# Patient Record
Sex: MALE | Race: BLACK OR AFRICAN AMERICAN | Employment: OTHER | URBAN - METROPOLITAN AREA
[De-identification: names, ages, dates, MRNs, and addresses within clinical notes are randomized per-mention and may not be internally consistent; named-entity substitution may affect disease eponyms.]

---

## 2019-03-17 ENCOUNTER — ED HISTORICAL/CONVERTED ENCOUNTER (OUTPATIENT)
Dept: OTHER | Age: 74
End: 2019-03-17

## 2019-07-08 ENCOUNTER — OP HISTORICAL/CONVERTED ENCOUNTER (OUTPATIENT)
Dept: OTHER | Age: 74
End: 2019-07-08

## 2019-07-17 ENCOUNTER — OP HISTORICAL/CONVERTED ENCOUNTER (OUTPATIENT)
Dept: OTHER | Age: 74
End: 2019-07-17

## 2020-08-03 VITALS
BODY MASS INDEX: 29.39 KG/M2 | TEMPERATURE: 97.8 F | OXYGEN SATURATION: 96 % | HEIGHT: 74 IN | DIASTOLIC BLOOD PRESSURE: 90 MMHG | WEIGHT: 229 LBS | HEART RATE: 65 BPM | RESPIRATION RATE: 18 BRPM | SYSTOLIC BLOOD PRESSURE: 160 MMHG

## 2020-08-03 DIAGNOSIS — N42.9 BENIGN PROSTATIC DISEASE: ICD-10-CM

## 2020-08-03 PROBLEM — N40.0 BENIGN PROSTATIC HYPERPLASIA: Status: ACTIVE | Noted: 2020-08-03

## 2020-08-03 PROBLEM — I10 HYPERTENSIVE DISORDER: Status: ACTIVE | Noted: 2020-08-03

## 2020-08-03 PROBLEM — R42 DIZZINESS AND GIDDINESS: Status: ACTIVE | Noted: 2020-08-03

## 2020-08-03 PROBLEM — G47.00 INSOMNIA: Status: ACTIVE | Noted: 2020-08-03

## 2020-08-03 PROBLEM — R19.7 DIARRHEA: Status: ACTIVE | Noted: 2020-08-03

## 2020-08-03 PROBLEM — J30.2 SEASONAL ALLERGIC RHINITIS: Status: ACTIVE | Noted: 2020-08-03

## 2020-08-03 PROBLEM — M25.579 ANKLE PAIN: Status: ACTIVE | Noted: 2020-08-03

## 2020-08-03 PROBLEM — R55 VASOVAGAL SYNCOPE: Status: ACTIVE | Noted: 2020-08-03

## 2020-08-03 PROBLEM — M54.12 CERVICAL RADICULOPATHY: Status: ACTIVE | Noted: 2020-08-03

## 2020-08-03 PROBLEM — M17.9 OSTEOARTHRITIS OF KNEE: Status: ACTIVE | Noted: 2020-08-03

## 2020-08-03 PROBLEM — H93.11 TINNITUS OF RIGHT EAR: Status: ACTIVE | Noted: 2020-08-03

## 2020-08-03 PROBLEM — N28.9 KIDNEY DISEASE: Status: ACTIVE | Noted: 2020-08-03

## 2020-08-03 PROBLEM — J44.9 CHRONIC OBSTRUCTIVE LUNG DISEASE (HCC): Status: ACTIVE | Noted: 2020-08-03

## 2020-08-03 PROBLEM — M54.50 LOW BACK PAIN: Status: ACTIVE | Noted: 2020-08-03

## 2020-08-03 PROBLEM — N64.4 PAIN IN BREAST: Status: ACTIVE | Noted: 2020-08-03

## 2020-08-03 PROBLEM — R73.03 PREDIABETES: Status: ACTIVE | Noted: 2020-08-03

## 2020-08-03 PROBLEM — G47.30 SLEEP APNEA: Status: ACTIVE | Noted: 2020-08-03

## 2020-08-03 PROBLEM — F14.10 COCAINE ABUSE (HCC): Status: ACTIVE | Noted: 2020-08-03

## 2020-08-03 PROBLEM — M25.519 SHOULDER PAIN: Status: ACTIVE | Noted: 2020-08-03

## 2020-08-03 PROBLEM — E78.00 HYPERCHOLESTEROLEMIA: Status: ACTIVE | Noted: 2020-08-03

## 2020-08-03 PROBLEM — M25.562 PAIN IN LEFT KNEE: Status: ACTIVE | Noted: 2020-08-03

## 2020-08-03 RX ORDER — PREDNISONE 20 MG/1
TABLET ORAL
COMMUNITY
End: 2022-05-13

## 2020-08-03 RX ORDER — CEPHALEXIN 500 MG/1
500 CAPSULE ORAL 4 TIMES DAILY
COMMUNITY

## 2020-08-03 RX ORDER — HYDROCODONE BITARTRATE AND ACETAMINOPHEN 5; 325 MG/1; MG/1
TABLET ORAL
COMMUNITY

## 2020-08-03 RX ORDER — COLCHICINE 0.6 MG/1
0.6 TABLET ORAL DAILY
COMMUNITY

## 2020-08-03 RX ORDER — AZITHROMYCIN 250 MG/1
250 TABLET, FILM COATED ORAL DAILY
COMMUNITY
End: 2022-05-13

## 2020-08-03 RX ORDER — BENZONATATE 200 MG/1
200 CAPSULE ORAL
COMMUNITY

## 2020-08-03 RX ORDER — INFLUENZA VACCINE, ADJUVANTED 15; 15; 15 UG/.5ML; UG/.5ML; UG/.5ML
0.5 INJECTION, SUSPENSION INTRAMUSCULAR
COMMUNITY

## 2020-08-03 RX ORDER — TRIAMCINOLONE ACETONIDE 40 MG/ML
INJECTION, SUSPENSION INTRA-ARTICULAR; INTRAMUSCULAR ONCE
COMMUNITY

## 2020-08-03 RX ORDER — SODIUM BICARBONATE 325 MG/1
325 TABLET ORAL 4 TIMES DAILY
COMMUNITY

## 2020-08-03 RX ORDER — CYCLOBENZAPRINE HCL 10 MG
TABLET ORAL
COMMUNITY

## 2020-08-03 RX ORDER — METHYLPREDNISOLONE 4 MG/1
TABLET ORAL
COMMUNITY

## 2020-08-03 RX ORDER — INDOMETHACIN 50 MG/1
CAPSULE ORAL 3 TIMES DAILY
COMMUNITY

## 2020-08-03 RX ORDER — TRAZODONE HYDROCHLORIDE 50 MG/1
TABLET ORAL
COMMUNITY

## 2020-08-03 RX ORDER — AMLODIPINE, VALSARTAN AND HYDROCHLOROTHIAZIDE 10; 320; 25 MG/1; MG/1; MG/1
TABLET ORAL
COMMUNITY
End: 2020-12-08 | Stop reason: SDUPTHER

## 2020-08-03 RX ORDER — TEMAZEPAM 30 MG/1
CAPSULE ORAL
COMMUNITY

## 2020-08-03 RX ORDER — FLUTICASONE PROPIONATE 50 MCG
2 SPRAY, SUSPENSION (ML) NASAL DAILY
COMMUNITY

## 2020-08-03 RX ORDER — FEXOFENADINE HCL 60 MG
TABLET ORAL
COMMUNITY

## 2020-08-03 RX ORDER — FINASTERIDE 5 MG/1
5 TABLET, FILM COATED ORAL DAILY
COMMUNITY

## 2020-08-03 RX ORDER — PREDNISONE 10 MG/1
TABLET ORAL
COMMUNITY
End: 2022-05-13

## 2020-08-03 RX ORDER — BECLOMETHASONE DIPROPIONATE HFA 80 UG/1
1 AEROSOL, METERED RESPIRATORY (INHALATION)
COMMUNITY

## 2020-08-03 RX ORDER — SIMVASTATIN 10 MG/1
TABLET, FILM COATED ORAL
COMMUNITY
End: 2021-03-14

## 2020-08-03 RX ORDER — DILTIAZEM HYDROCHLORIDE 120 MG/1
CAPSULE, EXTENDED RELEASE ORAL DAILY
COMMUNITY

## 2020-08-03 RX ORDER — ALBUTEROL SULFATE 90 UG/1
AEROSOL, METERED RESPIRATORY (INHALATION)
COMMUNITY
End: 2020-09-21 | Stop reason: SDUPTHER

## 2020-08-03 RX ORDER — DICLOFENAC SODIUM 10 MG/G
GEL TOPICAL 4 TIMES DAILY
COMMUNITY

## 2020-08-03 RX ORDER — FEXOFENADINE HCL AND PSEUDOEPHEDRINE HCI 60; 120 MG/1; MG/1
1 TABLET, EXTENDED RELEASE ORAL EVERY 12 HOURS
COMMUNITY

## 2020-08-06 ENCOUNTER — OFFICE VISIT (OUTPATIENT)
Dept: FAMILY MEDICINE CLINIC | Age: 75
End: 2020-08-06
Payer: MEDICARE

## 2020-08-06 VITALS
TEMPERATURE: 98.9 F | BODY MASS INDEX: 29.36 KG/M2 | RESPIRATION RATE: 18 BRPM | DIASTOLIC BLOOD PRESSURE: 72 MMHG | HEART RATE: 86 BPM | WEIGHT: 216.8 LBS | OXYGEN SATURATION: 99 % | SYSTOLIC BLOOD PRESSURE: 160 MMHG | HEIGHT: 72 IN

## 2020-08-06 DIAGNOSIS — I10 ESSENTIAL HYPERTENSION: ICD-10-CM

## 2020-08-06 DIAGNOSIS — J41.8 MIXED SIMPLE AND MUCOPURULENT CHRONIC BRONCHITIS (HCC): Primary | ICD-10-CM

## 2020-08-06 DIAGNOSIS — N40.0 BENIGN PROSTATIC HYPERPLASIA WITHOUT LOWER URINARY TRACT SYMPTOMS: ICD-10-CM

## 2020-08-06 DIAGNOSIS — F51.01 PRIMARY INSOMNIA: ICD-10-CM

## 2020-08-06 DIAGNOSIS — G47.33 OBSTRUCTIVE SLEEP APNEA SYNDROME: ICD-10-CM

## 2020-08-06 DIAGNOSIS — R73.03 PREDIABETES: ICD-10-CM

## 2020-08-06 PROCEDURE — G8536 NO DOC ELDER MAL SCRN: HCPCS | Performed by: FAMILY MEDICINE

## 2020-08-06 PROCEDURE — G8419 CALC BMI OUT NRM PARAM NOF/U: HCPCS | Performed by: FAMILY MEDICINE

## 2020-08-06 PROCEDURE — G8427 DOCREV CUR MEDS BY ELIG CLIN: HCPCS | Performed by: FAMILY MEDICINE

## 2020-08-06 PROCEDURE — G8510 SCR DEP NEG, NO PLAN REQD: HCPCS | Performed by: FAMILY MEDICINE

## 2020-08-06 PROCEDURE — 1101F PT FALLS ASSESS-DOCD LE1/YR: CPT | Performed by: FAMILY MEDICINE

## 2020-08-06 PROCEDURE — 99214 OFFICE O/P EST MOD 30 MIN: CPT | Performed by: FAMILY MEDICINE

## 2020-08-06 NOTE — PROGRESS NOTES
Celine Mancilla is a 66 y.o. male and presents with Follow-up; Hypertension; and COPD  . HPI     Subjective:  Cardiovascular Review:  The patient has hypertension   Diet and Lifestyle: generally follows a low fat low cholesterol diet, generally follows a low sodium diet, exercises sporadically  Home BP Monitoring: is not measured at home. Pertinent ROS: taking medications as instructed, no medication side effects noted, no TIA's, no chest pain on exertion, no dyspnea on exertion, no swelling of ankles. Review of Systems  ROS      Past Medical History:   Diagnosis Date    Ankle pain 8/3/2020    Benign prostatic disease 8/3/2020    Hypertrophy with Outflow Obstruction    Benign prostatic hyperplasia 8/3/2020    Bronchitis     Cervical radiculopathy 8/3/2020    Chronic obstructive lung disease ([facility] [address] 8/3/2020    Cocaine abuse ([facility] [address] 8/3/2020    Diarrhea 8/3/2020    Dizziness and giddiness 8/3/2020    Hypercholesterolemia 8/3/2020    Hypertensive disorder 8/3/2020    Insomnia 8/3/2020    Kidney disease 8/3/2020    Low back pain 8/3/2020    Osteoarthritis of knee 8/3/2020    Pain in breast 8/3/2020    Pain in left knee 8/3/2020    Prediabetes 8/3/2020    Seasonal allergic rhinitis 8/3/2020    Shoulder pain 8/3/2020    Sleep apnea 8/3/2020    Tinnitus of right ear 8/3/2020    Vasovagal syncope 8/3/2020     History reviewed. No pertinent surgical history. Social History     Socioeconomic History    Marital status:      Spouse name: Not on file    Number of children: Not on file    Years of education: Not on file    Highest education level: Not on file   Tobacco Use    Smoking status: Former Smoker    Smokeless tobacco: Never Used   Substance and Sexual Activity    Alcohol use: Yes     History reviewed. No pertinent family history.   Current Outpatient Medications   Medication Sig Dispense Refill    fexofenadine-pseudoephedrine (Allergy Relief-D,fexofenadine,)  mg Tb12 Take 1 Tab by mouth every twelve (12) hours.  amLODIPine-Valsartan-HCTZ -25 mg tab Take  by mouth.  azithromycin (ZITHROMAX) 250 mg tablet Take 250 mg by mouth daily.  benzonatate (TESSALON) 200 mg capsule Take 200 mg by mouth three (3) times daily as needed for Cough.  cephALEXin (KEFLEX) 500 mg capsule Take 500 mg by mouth four (4) times daily.  colchicine 0.6 mg tablet Take 0.6 mg by mouth daily.  cyclobenzaprine (FLEXERIL) 10 mg tablet Take  by mouth three (3) times daily as needed for Muscle Spasm(s).  dilTIAZem ER (DILACOR XR) 120 mg capsule Take  by mouth daily.  finasteride (PROSCAR) 5 mg tablet Take 5 mg by mouth daily.  influenza vaccine 2019-20, 65 yrs+,,PF, (Fluad 6609-6992, 65 yr up,,PF,) syrg injection 0.5 mL by IntraMUSCular route PRIOR TO DISCHARGE.  fluticasone propionate (FLONASE) 50 mcg/actuation nasal spray 2 Sprays by Both Nostrils route daily.  HYDROcodone-acetaminophen (NORCO) 5-325 mg per tablet Take  by mouth.  indomethacin (INDOCIN) 50 mg capsule Take  by mouth three (3) times daily.  methylPREDNISolone (MEDROL DOSEPACK) 4 mg tablet Take  by mouth.  predniSONE (DELTASONE) 10 mg tablet Take  by mouth daily (with breakfast).  predniSONE (DELTASONE) 20 mg tablet Take  by mouth daily (with breakfast).  beclomethasone (Qvar) 80 mcg/actuation aero Take 1 Puff by inhalation two (2) times a day.  beclomethasone dipropionate (Qvar RediHaler) 80 mcg/actuation HFAb inhaler Take 1 Puff by inhalation.  edoxaban (Savaysa) 60 mg tab tablet Take 60 mg by mouth daily.  simvastatin (ZOCOR) 10 mg tablet Take  by mouth nightly.  sodium bicarbonate 325 mg tablet Take 325 mg by mouth four (4) times daily.  temazepam (RESTORIL) 30 mg capsule Take  by mouth nightly as needed for Sleep.  traZODone (DESYREL) 50 mg tablet Take  by mouth nightly.       albuterol (Ventolin HFA) 90 mcg/actuation inhaler Take  by inhalation.  fexofenadine (ALLEGRA) 60 mg tablet Take  by mouth.  diclofenac (VOLTAREN) 1 % gel Apply  to affected area four (4) times daily.  triamcinolone acetonide (Kenalog) 40 mg/mL injection once. Allergies   Allergen Reactions    Bactrim [Sulfamethoprim] Unknown (comments)    Penicillin G Unknown (comments)       Objective:  Visit Vitals  /72 (BP 1 Location: Left arm, BP Patient Position: Sitting)   Pulse 86   Temp 98.9 °F (37.2 °C) (Temporal)   Resp 18   Ht 6' (1.829 m)   Wt 216 lb 12.8 oz (98.3 kg)   SpO2 99% Comment: room air   BMI 29.40 kg/m²       Physical Exam:   Physical Exam        No results found for this or any previous visit. Assessment/Plan:    ICD-10-CM ICD-9-CM    1. Mixed simple and mucopurulent chronic bronchitis (HCC)  J41.8 491.1    2. Essential hypertension  I10 401.9    3. Obstructive sleep apnea syndrome  G47.33 327.23     Patient to limit starchy and carbohydrate foods due to higher production of CO2 and adverse effects on COPD and Asthma   4. Primary insomnia  F51.01 307.42    5. Prediabetes  R73.03 790.29    6. Benign prostatic hyperplasia without lower urinary tract symptoms  N40.0 600.00      No orders of the defined types were placed in this encounter. Cannot display discharge medications since this is not an admission.

## 2020-08-22 RX ORDER — DILTIAZEM HYDROCHLORIDE 120 MG/1
CAPSULE, EXTENDED RELEASE ORAL
Qty: 30 CAP | Refills: 2 | Status: SHIPPED | OUTPATIENT
Start: 2020-08-22 | End: 2020-12-19

## 2020-09-21 ENCOUNTER — TELEPHONE (OUTPATIENT)
Dept: FAMILY MEDICINE CLINIC | Age: 75
End: 2020-09-21

## 2020-09-21 RX ORDER — ALBUTEROL SULFATE 90 UG/1
2 AEROSOL, METERED RESPIRATORY (INHALATION)
Qty: 1 INHALER | Refills: 1 | Status: SHIPPED | OUTPATIENT
Start: 2020-09-21 | End: 2022-05-13 | Stop reason: SDUPTHER

## 2020-12-08 ENCOUNTER — OFFICE VISIT (OUTPATIENT)
Dept: FAMILY MEDICINE CLINIC | Age: 75
End: 2020-12-08
Payer: MEDICARE

## 2020-12-08 VITALS
OXYGEN SATURATION: 96 % | DIASTOLIC BLOOD PRESSURE: 100 MMHG | HEART RATE: 96 BPM | HEIGHT: 73 IN | TEMPERATURE: 96.4 F | RESPIRATION RATE: 20 BRPM | WEIGHT: 217 LBS | SYSTOLIC BLOOD PRESSURE: 160 MMHG | BODY MASS INDEX: 28.76 KG/M2

## 2020-12-08 DIAGNOSIS — J41.8 MIXED SIMPLE AND MUCOPURULENT CHRONIC BRONCHITIS (HCC): Primary | ICD-10-CM

## 2020-12-08 DIAGNOSIS — M54.12 CERVICAL RADICULOPATHY: ICD-10-CM

## 2020-12-08 DIAGNOSIS — M51.26 LUMBAR HERNIATED DISC: ICD-10-CM

## 2020-12-08 DIAGNOSIS — I10 ESSENTIAL HYPERTENSION: ICD-10-CM

## 2020-12-08 DIAGNOSIS — F10.10 ALCOHOL ABUSE: ICD-10-CM

## 2020-12-08 DIAGNOSIS — Z23 ENCOUNTER FOR IMMUNIZATION: ICD-10-CM

## 2020-12-08 DIAGNOSIS — F51.01 PRIMARY INSOMNIA: ICD-10-CM

## 2020-12-08 DIAGNOSIS — M17.0 PRIMARY OSTEOARTHRITIS OF BOTH KNEES: ICD-10-CM

## 2020-12-08 PROCEDURE — G0008 ADMIN INFLUENZA VIRUS VAC: HCPCS | Performed by: FAMILY MEDICINE

## 2020-12-08 PROCEDURE — 99214 OFFICE O/P EST MOD 30 MIN: CPT | Performed by: FAMILY MEDICINE

## 2020-12-08 PROCEDURE — 90686 IIV4 VACC NO PRSV 0.5 ML IM: CPT | Performed by: FAMILY MEDICINE

## 2020-12-08 RX ORDER — CLONIDINE HYDROCHLORIDE 0.1 MG/1
0.2 TABLET ORAL
Status: COMPLETED | OUTPATIENT
Start: 2020-12-08 | End: 2020-12-08

## 2020-12-08 RX ORDER — AMLODIPINE, VALSARTAN AND HYDROCHLOROTHIAZIDE 10; 320; 25 MG/1; MG/1; MG/1
1 TABLET ORAL DAILY
Qty: 90 TAB | Refills: 0 | Status: SHIPPED | OUTPATIENT
Start: 2020-12-08 | End: 2021-03-08

## 2020-12-08 RX ADMIN — CLONIDINE HYDROCHLORIDE 0.2 MG: 0.1 TABLET ORAL at 14:18

## 2020-12-08 NOTE — PROGRESS NOTES
Ernie Ledesma is a 66 y.o. male and presents with Follow Up Chronic Condition; Hypertension; and Medication Refill  . HPI   67 yo AAM former [facility]  with a hx of HTN and job related lumbar disc herniation here on follow up c/o intermittent dizziness. Patient with markedly elevated BP admits to ETOH abuse drinking a pint of Scotch a day and blaming this on COVID 19 restrictions and staying indoors all the time-Patient advised he can go out if he has a mask on. Patient adamantly refused hospital transfer for work up and treatment. Subjective:  Cardiovascular Review:  The patient has hypertension   Diet and Lifestyle: generally follows a low fat low cholesterol diet, generally follows a low sodium diet, exercises sporadically  Home BP Monitoring: is not measured at home. Pertinent ROS: taking medications as instructed, no medication side effects noted, no TIA's, no chest pain on exertion, no dyspnea on exertion, no swelling of ankles. Review of Systems  Review of Systems   Constitutional: Negative. Negative for chills and fever. HENT: Negative. Negative for congestion, ear discharge, hearing loss, nosebleeds and tinnitus. Eyes: Negative. Negative for blurred vision, double vision, photophobia and pain. Respiratory: Negative. Negative for cough, hemoptysis and sputum production. Cardiovascular: Negative. Negative for chest pain and palpitations. Gastrointestinal: Negative. Negative for heartburn, nausea and vomiting. Genitourinary: Negative. Negative for dysuria, frequency and urgency. Musculoskeletal: Positive for back pain, joint pain and neck pain. Negative for myalgias. Skin: Negative. Neurological: Positive for dizziness. Negative for tingling, weakness and headaches. Endo/Heme/Allergies: Negative. Psychiatric/Behavioral: Negative. Negative for depression and suicidal ideas. The patient does not have insomnia.     All other systems reviewed and are negative. Past Medical History:   Diagnosis Date    Ankle pain 8/3/2020    Benign prostatic disease 8/3/2020    Hypertrophy with Outflow Obstruction    Benign prostatic hyperplasia 8/3/2020    Bronchitis     Cervical radiculopathy 8/3/2020    Chronic obstructive lung disease ([facility] [address] 8/3/2020    Cocaine abuse ([facility] [address] 8/3/2020    Diarrhea 8/3/2020    Dizziness and giddiness 8/3/2020    Hypercholesterolemia 8/3/2020    Hypertensive disorder 8/3/2020    Insomnia 8/3/2020    Kidney disease 8/3/2020    Low back pain 8/3/2020    Osteoarthritis of knee 8/3/2020    Pain in breast 8/3/2020    Pain in left knee 8/3/2020    Prediabetes 8/3/2020    Seasonal allergic rhinitis 8/3/2020    Shoulder pain 8/3/2020    Sleep apnea 8/3/2020    Tinnitus of right ear 8/3/2020    Vasovagal syncope 8/3/2020     No past surgical history on file. Social History     Socioeconomic History    Marital status:      Spouse name: Not on file    Number of children: Not on file    Years of education: Not on file    Highest education level: Not on file   Tobacco Use    Smoking status: Former Smoker    Smokeless tobacco: Never Used   Substance and Sexual Activity    Alcohol use: Yes     No family history on file. Current Outpatient Medications   Medication Sig Dispense Refill    albuterol (Ventolin HFA) 90 mcg/actuation inhaler Take 2 Puffs by inhalation every six (6) hours as needed for Wheezing. 1 Inhaler 1    dilTIAZem ER (CARDIZEM SR) 120 mg capsule TAKE 1 CAPSULE BY MOUTH EVERY DAY 30 Cap 2    fexofenadine-pseudoephedrine (Allergy Relief-D,fexofenadine,)  mg Tb12 Take 1 Tab by mouth every twelve (12) hours.  amLODIPine-Valsartan-HCTZ -25 mg tab Take  by mouth.  azithromycin (ZITHROMAX) 250 mg tablet Take 250 mg by mouth daily.  benzonatate (TESSALON) 200 mg capsule Take 200 mg by mouth three (3) times daily as needed for Cough.       cephALEXin (KEFLEX) 500 mg capsule Take 500 mg by mouth four (4) times daily.  colchicine 0.6 mg tablet Take 0.6 mg by mouth daily.  cyclobenzaprine (FLEXERIL) 10 mg tablet Take  by mouth three (3) times daily as needed for Muscle Spasm(s).  dilTIAZem ER (DILACOR XR) 120 mg capsule Take  by mouth daily.  finasteride (PROSCAR) 5 mg tablet Take 5 mg by mouth daily.  influenza vaccine 2019-20, 65 yrs+,,PF, (Fluad 0548-2142, 65 yr up,,PF,) syrg injection 0.5 mL by IntraMUSCular route PRIOR TO DISCHARGE.  fluticasone propionate (FLONASE) 50 mcg/actuation nasal spray 2 Sprays by Both Nostrils route daily.  HYDROcodone-acetaminophen (NORCO) 5-325 mg per tablet Take  by mouth.  indomethacin (INDOCIN) 50 mg capsule Take  by mouth three (3) times daily.  methylPREDNISolone (MEDROL DOSEPACK) 4 mg tablet Take  by mouth.  predniSONE (DELTASONE) 10 mg tablet Take  by mouth daily (with breakfast).  predniSONE (DELTASONE) 20 mg tablet Take  by mouth daily (with breakfast).  beclomethasone (Qvar) 80 mcg/actuation aero Take 1 Puff by inhalation two (2) times a day.  beclomethasone dipropionate (Qvar RediHaler) 80 mcg/actuation HFAb inhaler Take 1 Puff by inhalation.  edoxaban (Savaysa) 60 mg tab tablet Take 60 mg by mouth daily.  simvastatin (ZOCOR) 10 mg tablet Take  by mouth nightly.  sodium bicarbonate 325 mg tablet Take 325 mg by mouth four (4) times daily.  temazepam (RESTORIL) 30 mg capsule Take  by mouth nightly as needed for Sleep.  traZODone (DESYREL) 50 mg tablet Take  by mouth nightly.  fexofenadine (ALLEGRA) 60 mg tablet Take  by mouth.  diclofenac (VOLTAREN) 1 % gel Apply  to affected area four (4) times daily.  triamcinolone acetonide (Kenalog) 40 mg/mL injection once.        Allergies   Allergen Reactions    Bactrim [Sulfamethoprim] Unknown (comments)    Penicillin G Unknown (comments)       Objective:  Visit Vitals  BP (!) 200/110 (BP 1 Location: Right arm, BP Patient Position: Sitting)   Pulse 96   Temp (!) 96.4 °F (35.8 °C) (Temporal)   Resp 20   Ht 6' 1\" (1.854 m)   Wt 217 lb (98.4 kg)   SpO2 96% Comment: room air   BMI 28.63 kg/m²       Physical Exam:   Physical Exam  Vitals signs and nursing note reviewed. Constitutional:       General: He is not in acute distress. Appearance: Normal appearance. He is obese. He is not ill-appearing, toxic-appearing or diaphoretic. HENT:      Head: Normocephalic and atraumatic. Right Ear: Tympanic membrane, ear canal and external ear normal. There is no impacted cerumen. Left Ear: Tympanic membrane, ear canal and external ear normal. There is no impacted cerumen. Nose: Nose normal. No congestion or rhinorrhea. Mouth/Throat:      Mouth: Mucous membranes are moist.      Pharynx: Oropharynx is clear. No oropharyngeal exudate or posterior oropharyngeal erythema. Eyes:      General: No scleral icterus. Right eye: No discharge. Left eye: No discharge. Extraocular Movements: Extraocular movements intact. Conjunctiva/sclera: Conjunctivae normal.      Pupils: Pupils are equal, round, and reactive to light. Neck:      Musculoskeletal: Normal range of motion and neck supple. No neck rigidity or muscular tenderness. Vascular: No carotid bruit. Cardiovascular:      Rate and Rhythm: Normal rate and regular rhythm. Pulses: Normal pulses. Heart sounds: Normal heart sounds. No murmur. No friction rub. No gallop. Pulmonary:      Effort: Pulmonary effort is normal. No respiratory distress. Breath sounds: Normal breath sounds. No stridor. No wheezing, rhonchi or rales. Chest:      Chest wall: No tenderness. Abdominal:      General: Abdomen is flat. Bowel sounds are normal. There is no distension. Palpations: Abdomen is soft. There is no mass. Tenderness: There is no abdominal tenderness.  There is no right CVA tenderness, left CVA tenderness, guarding or rebound. Hernia: No hernia is present. Musculoskeletal: Normal range of motion. General: No swelling, tenderness, deformity (chest and shoulder) or signs of injury. Right lower leg: No edema. Left lower leg: No edema. Lymphadenopathy:      Cervical: No cervical adenopathy. Skin:     General: Skin is warm. Capillary Refill: Capillary refill takes 2 to 3 seconds. Coloration: Skin is not jaundiced or pale. Findings: No bruising, erythema, lesion or rash. Neurological:      General: No focal deficit present. Mental Status: He is alert and oriented to person, place, and time. Mental status is at baseline. Cranial Nerves: No cranial nerve deficit. Sensory: No sensory deficit. Motor: No weakness. Coordination: Coordination normal.      Gait: Gait abnormal.      Deep Tendon Reflexes: Reflexes normal.   Psychiatric:         Mood and Affect: Mood normal.         Behavior: Behavior normal.         Thought Content: Thought content normal.         Judgment: Judgment normal.             No results found for this or any previous visit. Assessment/Plan:    ICD-10-CM ICD-9-CM    1. Mixed simple and mucopurulent chronic bronchitis (HCC)  J41.8 491.1    2. Primary osteoarthritis of both knees  M17.0 715.16    3. Primary insomnia  F51.01 307.42    4. Alcohol abuse  F10.10 305.00    5. Cervical radiculopathy  M54.12 723.4    6. Lumbar herniated disc  M51.26 722.10      No orders of the defined types were placed in this encounter. Cannot display discharge medications since this is not an admission.

## 2020-12-19 RX ORDER — DILTIAZEM HYDROCHLORIDE 120 MG/1
CAPSULE, EXTENDED RELEASE ORAL
Qty: 30 CAP | Refills: 2 | Status: SHIPPED | OUTPATIENT
Start: 2020-12-19

## 2021-01-07 ENCOUNTER — TELEPHONE (OUTPATIENT)
Dept: FAMILY MEDICINE CLINIC | Age: 76
End: 2021-01-07

## 2021-03-04 ENCOUNTER — OFFICE VISIT (OUTPATIENT)
Dept: FAMILY MEDICINE CLINIC | Age: 76
End: 2021-03-04
Payer: MEDICARE

## 2021-03-04 VITALS
SYSTOLIC BLOOD PRESSURE: 156 MMHG | OXYGEN SATURATION: 98 % | TEMPERATURE: 98.3 F | HEART RATE: 78 BPM | WEIGHT: 209.4 LBS | BODY MASS INDEX: 27.75 KG/M2 | RESPIRATION RATE: 20 BRPM | HEIGHT: 73 IN | DIASTOLIC BLOOD PRESSURE: 80 MMHG

## 2021-03-04 DIAGNOSIS — N40.1 BENIGN PROSTATIC HYPERPLASIA WITH NOCTURIA: ICD-10-CM

## 2021-03-04 DIAGNOSIS — J41.8 MIXED SIMPLE AND MUCOPURULENT CHRONIC BRONCHITIS (HCC): Primary | ICD-10-CM

## 2021-03-04 DIAGNOSIS — M25.562 CHRONIC PAIN OF LEFT KNEE: ICD-10-CM

## 2021-03-04 DIAGNOSIS — G89.29 CHRONIC PAIN OF LEFT KNEE: ICD-10-CM

## 2021-03-04 DIAGNOSIS — R35.1 BENIGN PROSTATIC HYPERPLASIA WITH NOCTURIA: ICD-10-CM

## 2021-03-04 DIAGNOSIS — I11.9 MALIGNANT HYPERTENSIVE HEART DISEASE WITHOUT HEART FAILURE: ICD-10-CM

## 2021-03-04 DIAGNOSIS — M54.41 CHRONIC BILATERAL LOW BACK PAIN WITH BILATERAL SCIATICA: ICD-10-CM

## 2021-03-04 DIAGNOSIS — M17.0 PRIMARY OSTEOARTHRITIS OF BOTH KNEES: ICD-10-CM

## 2021-03-04 DIAGNOSIS — G89.29 CHRONIC BILATERAL LOW BACK PAIN WITH BILATERAL SCIATICA: ICD-10-CM

## 2021-03-04 DIAGNOSIS — M54.42 CHRONIC BILATERAL LOW BACK PAIN WITH BILATERAL SCIATICA: ICD-10-CM

## 2021-03-04 PROCEDURE — G8754 DIAS BP LESS 90: HCPCS | Performed by: FAMILY MEDICINE

## 2021-03-04 PROCEDURE — G8510 SCR DEP NEG, NO PLAN REQD: HCPCS | Performed by: FAMILY MEDICINE

## 2021-03-04 PROCEDURE — G8419 CALC BMI OUT NRM PARAM NOF/U: HCPCS | Performed by: FAMILY MEDICINE

## 2021-03-04 PROCEDURE — 1101F PT FALLS ASSESS-DOCD LE1/YR: CPT | Performed by: FAMILY MEDICINE

## 2021-03-04 PROCEDURE — G8536 NO DOC ELDER MAL SCRN: HCPCS | Performed by: FAMILY MEDICINE

## 2021-03-04 PROCEDURE — G8427 DOCREV CUR MEDS BY ELIG CLIN: HCPCS | Performed by: FAMILY MEDICINE

## 2021-03-04 PROCEDURE — 99214 OFFICE O/P EST MOD 30 MIN: CPT | Performed by: FAMILY MEDICINE

## 2021-03-04 PROCEDURE — G8753 SYS BP > OR = 140: HCPCS | Performed by: FAMILY MEDICINE

## 2021-03-04 NOTE — PROGRESS NOTES
Livia Torrez is a 66 y.o. male and presents with Follow Up Chronic Condition, Hypertension, and Fall (Patient states he fell twice a t home last week and injured face and head. Does not know what caused the fall.)  . HPI     Subjective:  Cardiovascular Review:  The patient has hypertension   Diet and Lifestyle: generally follows a low fat low cholesterol diet, generally follows a low sodium diet, exercises sporadically  Home BP Monitoring: is not measured at home. Pertinent ROS: taking medications as instructed, no medication side effects noted, no TIA's, no chest pain on exertion, no dyspnea on exertion, no swelling of ankles. Review of Systems  Review of Systems   Constitutional: Negative. Negative for chills and fever. HENT: Negative. Negative for congestion, ear discharge, hearing loss, nosebleeds and tinnitus. Eyes: Negative. Negative for blurred vision, double vision, photophobia and pain. Respiratory: Negative. Negative for cough, hemoptysis and sputum production. Cardiovascular: Negative. Negative for chest pain and palpitations. Gastrointestinal: Negative. Negative for heartburn, nausea and vomiting. Genitourinary: Negative. Negative for dysuria, frequency and urgency. Musculoskeletal: Negative. Negative for back pain and myalgias. Skin: Negative. Neurological: Negative. Negative for dizziness, tingling, weakness and headaches. Endo/Heme/Allergies: Negative. Psychiatric/Behavioral: Negative. Negative for depression and suicidal ideas. The patient does not have insomnia. All other systems reviewed and are negative.         Past Medical History:   Diagnosis Date    Ankle pain 8/3/2020    Benign prostatic disease 8/3/2020    Hypertrophy with Outflow Obstruction    Benign prostatic hyperplasia 8/3/2020    Bronchitis     Cervical radiculopathy 8/3/2020    Chronic obstructive lung disease ([facility] [address] 8/3/2020    Cocaine abuse ([facility] [address] 8/3/2020    Diarrhea 8/3/2020    Dizziness and giddiness 8/3/2020    Hypercholesterolemia 8/3/2020    Hypertensive disorder 8/3/2020    Insomnia 8/3/2020    Kidney disease 8/3/2020    Low back pain 8/3/2020    Osteoarthritis of knee 8/3/2020    Pain in breast 8/3/2020    Pain in left knee 8/3/2020    Prediabetes 8/3/2020    Seasonal allergic rhinitis 8/3/2020    Shoulder pain 8/3/2020    Sleep apnea 8/3/2020    Tinnitus of right ear 8/3/2020    Vasovagal syncope 8/3/2020     No past surgical history on file. Social History     Socioeconomic History    Marital status:      Spouse name: Not on file    Number of children: Not on file    Years of education: Not on file    Highest education level: Not on file   Tobacco Use    Smoking status: Former Smoker    Smokeless tobacco: Never Used   Substance and Sexual Activity    Alcohol use: Yes     No family history on file. Current Outpatient Medications   Medication Sig Dispense Refill    dilTIAZem ER (CARDIZEM SR) 120 mg capsule TAKE 1 CAPSULE BY MOUTH EVERY DAY 30 Cap 2    amLODIPine-Valsartan-HCTZ -25 mg tab Take 1 Tab by mouth daily for 90 days. 90 Tab 0    albuterol (Ventolin HFA) 90 mcg/actuation inhaler Take 2 Puffs by inhalation every six (6) hours as needed for Wheezing. 1 Inhaler 1    fexofenadine-pseudoephedrine (Allergy Relief-D,fexofenadine,)  mg Tb12 Take 1 Tab by mouth every twelve (12) hours.  azithromycin (ZITHROMAX) 250 mg tablet Take 250 mg by mouth daily.  benzonatate (TESSALON) 200 mg capsule Take 200 mg by mouth three (3) times daily as needed for Cough.  cephALEXin (KEFLEX) 500 mg capsule Take 500 mg by mouth four (4) times daily.  colchicine 0.6 mg tablet Take 0.6 mg by mouth daily.  cyclobenzaprine (FLEXERIL) 10 mg tablet Take  by mouth three (3) times daily as needed for Muscle Spasm(s).  dilTIAZem ER (DILACOR XR) 120 mg capsule Take  by mouth daily.       finasteride (PROSCAR) 5 mg tablet Take 5 mg by mouth daily.  influenza vaccine 2019-20, 65 yrs+,,PF, (Fluad 2290-1732, 65 yr up,,PF,) syrg injection 0.5 mL by IntraMUSCular route PRIOR TO DISCHARGE.  fluticasone propionate (FLONASE) 50 mcg/actuation nasal spray 2 Sprays by Both Nostrils route daily.  HYDROcodone-acetaminophen (NORCO) 5-325 mg per tablet Take  by mouth.  indomethacin (INDOCIN) 50 mg capsule Take  by mouth three (3) times daily.  methylPREDNISolone (MEDROL DOSEPACK) 4 mg tablet Take  by mouth.  predniSONE (DELTASONE) 10 mg tablet Take  by mouth daily (with breakfast).  predniSONE (DELTASONE) 20 mg tablet Take  by mouth daily (with breakfast).  beclomethasone (Qvar) 80 mcg/actuation aero Take 1 Puff by inhalation two (2) times a day.  beclomethasone dipropionate (Qvar RediHaler) 80 mcg/actuation HFAb inhaler Take 1 Puff by inhalation.  edoxaban (Savaysa) 60 mg tab tablet Take 60 mg by mouth daily.  simvastatin (ZOCOR) 10 mg tablet Take  by mouth nightly.  sodium bicarbonate 325 mg tablet Take 325 mg by mouth four (4) times daily.  temazepam (RESTORIL) 30 mg capsule Take  by mouth nightly as needed for Sleep.  traZODone (DESYREL) 50 mg tablet Take  by mouth nightly.  fexofenadine (ALLEGRA) 60 mg tablet Take  by mouth.  diclofenac (VOLTAREN) 1 % gel Apply  to affected area four (4) times daily.  triamcinolone acetonide (Kenalog) 40 mg/mL injection once. Allergies   Allergen Reactions    Bactrim [Sulfamethoprim] Unknown (comments)    Penicillin G Unknown (comments)       Objective:  Visit Vitals  BP (!) 156/80 (BP 1 Location: Right upper arm, BP Patient Position: Sitting, BP Cuff Size: Adult)   Pulse 78   Temp 98.3 °F (36.8 °C) (Temporal)   Resp 20   Ht 6' 1\" (1.854 m)   Wt 209 lb 6.4 oz (95 kg)   SpO2 98% Comment: room air   BMI 27.63 kg/m²       Physical Exam:   Physical Exam  Vitals signs and nursing note reviewed.    Constitutional:       General: He is not in acute distress. Appearance: Normal appearance. He is obese. He is not ill-appearing, toxic-appearing or diaphoretic. HENT:      Head: Normocephalic and atraumatic. Right Ear: Tympanic membrane, ear canal and external ear normal. There is no impacted cerumen. Left Ear: Tympanic membrane, ear canal and external ear normal. There is no impacted cerumen. Nose: Nose normal. No congestion or rhinorrhea. Mouth/Throat:      Mouth: Mucous membranes are moist.      Pharynx: Oropharynx is clear. No oropharyngeal exudate or posterior oropharyngeal erythema. Eyes:      General: No scleral icterus. Right eye: No discharge. Left eye: No discharge. Extraocular Movements: Extraocular movements intact. Conjunctiva/sclera: Conjunctivae normal.      Pupils: Pupils are equal, round, and reactive to light. Neck:      Musculoskeletal: Normal range of motion and neck supple. No neck rigidity or muscular tenderness. Vascular: No carotid bruit. Cardiovascular:      Rate and Rhythm: Normal rate and regular rhythm. Pulses: Normal pulses. Heart sounds: Normal heart sounds. No murmur. No friction rub. No gallop. Pulmonary:      Effort: Pulmonary effort is normal. No respiratory distress. Breath sounds: Normal breath sounds. No stridor. No wheezing, rhonchi or rales. Chest:      Chest wall: No tenderness. Abdominal:      General: Abdomen is flat. Bowel sounds are normal. There is no distension. Palpations: Abdomen is soft. There is no mass. Tenderness: There is no abdominal tenderness. There is no right CVA tenderness, left CVA tenderness, guarding or rebound. Hernia: No hernia is present. Musculoskeletal: Normal range of motion. General: No swelling, tenderness, deformity or signs of injury. Right lower leg: No edema. Left lower leg: No edema. Lymphadenopathy:      Cervical: No cervical adenopathy.    Skin:     General: Skin is warm. Capillary Refill: Capillary refill takes 2 to 3 seconds. Coloration: Skin is not jaundiced or pale. Findings: No bruising, erythema, lesion or rash. Neurological:      General: No focal deficit present. Mental Status: He is alert and oriented to person, place, and time. Mental status is at baseline. Cranial Nerves: No cranial nerve deficit. Sensory: No sensory deficit. Motor: No weakness. Coordination: Coordination normal.      Gait: Gait normal.      Deep Tendon Reflexes: Reflexes normal.   Psychiatric:         Mood and Affect: Mood normal.         Behavior: Behavior normal.         Thought Content: Thought content normal.         Judgment: Judgment normal.             No results found for this or any previous visit. Assessment/Plan:    ICD-10-CM ICD-9-CM    1. Mixed simple and mucopurulent chronic bronchitis (HCC)  J41.8 491.1    2. Primary osteoarthritis of both knees  M17.0 715.16    3. Chronic pain of left knee  M25.562 719.46     G89.29 338.29    4. Chronic bilateral low back pain with bilateral sciatica  M54.42 724.2     M54.41 724.3     G89.29 338.29      No orders of the defined types were placed in this encounter. Cannot display discharge medications since this is not an admission.

## 2021-03-13 DIAGNOSIS — E78.5 HYPERLIPIDEMIA, UNSPECIFIED: ICD-10-CM

## 2021-03-14 RX ORDER — SIMVASTATIN 10 MG/1
TABLET, FILM COATED ORAL
Qty: 30 TAB | Refills: 2 | Status: SHIPPED | OUTPATIENT
Start: 2021-03-14 | End: 2021-06-14

## 2021-03-19 ENCOUNTER — IMMUNIZATION (OUTPATIENT)
Dept: FAMILY MEDICINE CLINIC | Age: 76
End: 2021-03-19
Payer: MEDICARE

## 2021-03-19 DIAGNOSIS — Z23 ENCOUNTER FOR IMMUNIZATION: Primary | ICD-10-CM

## 2021-03-19 PROCEDURE — 91301 COVID-19, MRNA, LNP-S, PF, 100MCG/0.5ML DOSE(MODERNA): CPT | Performed by: FAMILY MEDICINE

## 2021-03-19 PROCEDURE — 0011A COVID-19, MRNA, LNP-S, PF, 100MCG/0.5ML DOSE(MODERNA): CPT | Performed by: FAMILY MEDICINE

## 2021-04-16 ENCOUNTER — IMMUNIZATION (OUTPATIENT)
Dept: FAMILY MEDICINE CLINIC | Age: 76
End: 2021-04-16
Payer: MEDICARE

## 2021-04-16 DIAGNOSIS — Z23 ENCOUNTER FOR IMMUNIZATION: Primary | ICD-10-CM

## 2021-04-16 PROCEDURE — 91301 COVID-19, MRNA, LNP-S, PF, 100MCG/0.5ML DOSE(MODERNA): CPT | Performed by: FAMILY MEDICINE

## 2021-04-16 PROCEDURE — 0012A COVID-19, MRNA, LNP-S, PF, 100MCG/0.5ML DOSE(MODERNA): CPT | Performed by: FAMILY MEDICINE

## 2021-05-12 ENCOUNTER — TELEPHONE (OUTPATIENT)
Dept: FAMILY MEDICINE CLINIC | Age: 76
End: 2021-05-12

## 2021-05-12 NOTE — TELEPHONE ENCOUNTER
Patient called and stated that he has called several pharmacies and they do not carry the [facility] 10/320/25. He wants to know if you can send in something else. Please advise. alert

## 2021-05-17 RX ORDER — AMLODIPINE, VALSARTAN AND HYDROCHLOROTHIAZIDE 10; 320; 25 MG/1; MG/1; MG/1
1 TABLET ORAL DAILY
Qty: 60 TAB | Refills: 0 | Status: SHIPPED | OUTPATIENT
Start: 2021-05-17 | End: 2021-06-24 | Stop reason: SINTOL

## 2021-06-14 DIAGNOSIS — E78.5 HYPERLIPIDEMIA, UNSPECIFIED: ICD-10-CM

## 2021-06-14 RX ORDER — SIMVASTATIN 10 MG/1
TABLET, FILM COATED ORAL
Qty: 30 TABLET | Refills: 2 | Status: SHIPPED | OUTPATIENT
Start: 2021-06-14

## 2021-06-19 LAB
ALBUMIN SERPL-MCNC: 3.5 G/DL (ref 3.7–4.7)
ALBUMIN/GLOB SERPL: 1 {RATIO} (ref 1.2–2.2)
ALP SERPL-CCNC: 106 IU/L (ref 48–121)
ALT SERPL-CCNC: 20 IU/L (ref 0–44)
AST SERPL-CCNC: 47 IU/L (ref 0–40)
BILIRUB SERPL-MCNC: 0.2 MG/DL (ref 0–1.2)
BUN SERPL-MCNC: 25 MG/DL (ref 8–27)
BUN/CREAT SERPL: 20 (ref 10–24)
CALCIUM SERPL-MCNC: 8.8 MG/DL (ref 8.6–10.2)
CHLORIDE SERPL-SCNC: 102 MMOL/L (ref 96–106)
CHOLEST SERPL-MCNC: 187 MG/DL (ref 100–199)
CO2 SERPL-SCNC: 22 MMOL/L (ref 20–29)
CREAT SERPL-MCNC: 1.27 MG/DL (ref 0.76–1.27)
GLOBULIN SER CALC-MCNC: 3.4 G/DL (ref 1.5–4.5)
GLUCOSE SERPL-MCNC: 109 MG/DL (ref 65–99)
HDLC SERPL-MCNC: 126 MG/DL
LDLC SERPL CALC-MCNC: 36 MG/DL (ref 0–99)
POTASSIUM SERPL-SCNC: 4.5 MMOL/L (ref 3.5–5.2)
PROT SERPL-MCNC: 6.9 G/DL (ref 6–8.5)
SODIUM SERPL-SCNC: 141 MMOL/L (ref 134–144)
TRIGL SERPL-MCNC: 161 MG/DL (ref 0–149)
VLDLC SERPL CALC-MCNC: 25 MG/DL (ref 5–40)

## 2021-06-24 ENCOUNTER — OFFICE VISIT (OUTPATIENT)
Dept: FAMILY MEDICINE CLINIC | Age: 76
End: 2021-06-24
Payer: MEDICARE

## 2021-06-24 VITALS
RESPIRATION RATE: 20 BRPM | SYSTOLIC BLOOD PRESSURE: 120 MMHG | HEIGHT: 73 IN | TEMPERATURE: 97.7 F | BODY MASS INDEX: 28.41 KG/M2 | DIASTOLIC BLOOD PRESSURE: 62 MMHG | WEIGHT: 214.4 LBS | HEART RATE: 82 BPM | OXYGEN SATURATION: 99 %

## 2021-06-24 DIAGNOSIS — G47.33 OBSTRUCTIVE SLEEP APNEA SYNDROME: ICD-10-CM

## 2021-06-24 DIAGNOSIS — I11.9 MALIGNANT HYPERTENSIVE HEART DISEASE WITHOUT HEART FAILURE: Primary | ICD-10-CM

## 2021-06-24 DIAGNOSIS — J41.8 MIXED SIMPLE AND MUCOPURULENT CHRONIC BRONCHITIS (HCC): ICD-10-CM

## 2021-06-24 DIAGNOSIS — N42.9 BENIGN PROSTATIC DISEASE: ICD-10-CM

## 2021-06-24 PROCEDURE — 99214 OFFICE O/P EST MOD 30 MIN: CPT | Performed by: FAMILY MEDICINE

## 2021-06-24 PROCEDURE — 1101F PT FALLS ASSESS-DOCD LE1/YR: CPT | Performed by: FAMILY MEDICINE

## 2021-06-24 PROCEDURE — G8536 NO DOC ELDER MAL SCRN: HCPCS | Performed by: FAMILY MEDICINE

## 2021-06-24 PROCEDURE — G8752 SYS BP LESS 140: HCPCS | Performed by: FAMILY MEDICINE

## 2021-06-24 PROCEDURE — G8419 CALC BMI OUT NRM PARAM NOF/U: HCPCS | Performed by: FAMILY MEDICINE

## 2021-06-24 PROCEDURE — G8754 DIAS BP LESS 90: HCPCS | Performed by: FAMILY MEDICINE

## 2021-06-24 PROCEDURE — G8427 DOCREV CUR MEDS BY ELIG CLIN: HCPCS | Performed by: FAMILY MEDICINE

## 2021-06-24 PROCEDURE — G8510 SCR DEP NEG, NO PLAN REQD: HCPCS | Performed by: FAMILY MEDICINE

## 2021-06-24 RX ORDER — VALSARTAN 320 MG/1
320 TABLET ORAL DAILY
Qty: 30 TABLET | Refills: 2 | Status: SHIPPED | OUTPATIENT
Start: 2021-06-24 | End: 2021-10-17

## 2021-06-24 NOTE — PROGRESS NOTES
Eden Charles is a 64 y.o. male and presents with Follow Up Chronic Condition, Hypertension, Labs (Patient wants to discuss lab work done on 06/18/2021), Shoulder Injury (Patient states has fallen twice at home over the last month and injured both shoulders ), Medication Refill, and Medication Evaluation (Patient states has stopped taking the amlodipine/vals/hctz because it was making him feel weak.)  . HPI   63 yo AAM with a hx of HTN and COPD stating tiredness and malaise when he takes Amlodipine/Varsartan/HCTZ for almost 1 month. Denies dyspnea or chest pain  Subjective:  Cardiovascular Review:  The patient has hypertension   Diet and Lifestyle: generally follows a low fat low cholesterol diet, generally follows a low sodium diet, exercises sporadically  Home BP Monitoring: is not measured at home. Pertinent ROS: taking medications as instructed, no medication side effects noted, no TIA's, no chest pain on exertion, no dyspnea on exertion, no swelling of ankles. Review of Systems  Review of Systems   Constitutional: Negative. Negative for chills and fever. HENT: Negative. Negative for congestion, ear discharge, hearing loss, nosebleeds and tinnitus. Eyes: Negative. Negative for blurred vision, double vision, photophobia and pain. Respiratory: Negative. Negative for cough, hemoptysis and sputum production. Cardiovascular: Negative. Negative for chest pain and palpitations. Gastrointestinal: Negative. Negative for heartburn, nausea and vomiting. Genitourinary: Negative. Negative for dysuria, frequency and urgency. Musculoskeletal: Negative. Negative for back pain and myalgias. Skin: Negative. Neurological: Positive for weakness. Negative for dizziness, tingling and headaches. Endo/Heme/Allergies: Negative. Psychiatric/Behavioral: Negative. Negative for depression and suicidal ideas. The patient does not have insomnia.     All other systems reviewed and are negative. Past Medical History:   Diagnosis Date    Ankle pain 8/3/2020    Benign prostatic disease 8/3/2020    Hypertrophy with Outflow Obstruction    Benign prostatic hyperplasia 8/3/2020    Bronchitis     Cervical radiculopathy 8/3/2020    Chronic obstructive lung disease ([facility] [address] 8/3/2020    Cocaine abuse ([facility] [address] 8/3/2020    Diarrhea 8/3/2020    Dizziness and giddiness 8/3/2020    Hypercholesterolemia 8/3/2020    Hypertensive disorder 8/3/2020    Insomnia 8/3/2020    Kidney disease 8/3/2020    Low back pain 8/3/2020    Osteoarthritis of knee 8/3/2020    Pain in breast 8/3/2020    Pain in left knee 8/3/2020    Prediabetes 8/3/2020    Seasonal allergic rhinitis 8/3/2020    Shoulder pain 8/3/2020    Sleep apnea 8/3/2020    Tinnitus of right ear 8/3/2020    Vasovagal syncope 8/3/2020     No past surgical history on file. Social History     Socioeconomic History    Marital status:      Spouse name: Not on file    Number of children: Not on file    Years of education: Not on file    Highest education level: Not on file   Tobacco Use    Smoking status: Former Smoker    Smokeless tobacco: Never Used   Substance and Sexual Activity    Alcohol use: Yes     Social Determinants of Health     Financial Resource Strain:     Difficulty of Paying Living Expenses:    Food Insecurity:     Worried About Running Out of Food in the Last Year:     [address] in the Last Year:    Transportation Needs:     Lack of Transportation (Medical):      Lack of Transportation (Non-Medical):    Physical Activity:     Days of Exercise per Week:     Minutes of Exercise per Session:    Stress:     Feeling of Stress :    Social Connections:     Frequency of Communication with Friends and Family:     Frequency of Social Gatherings with Friends and Family:     Attends Confucianism Services:     Active Member of Clubs or Organizations:     Attends Club or Organization Meetings:     Marital Status: No family history on file. Current Outpatient Medications   Medication Sig Dispense Refill    simvastatin (ZOCOR) 10 mg tablet TAKE 1 TABLET BY MOUTH EVERYDAY AT BEDTIME 30 Tablet 2    amLODIPine-Valsartan-HCTZ -25 mg tab Take 1 Tab by mouth daily. 60 Tab 0    dilTIAZem ER (CARDIZEM SR) 120 mg capsule TAKE 1 CAPSULE BY MOUTH EVERY DAY 30 Cap 2    albuterol (Ventolin HFA) 90 mcg/actuation inhaler Take 2 Puffs by inhalation every six (6) hours as needed for Wheezing. 1 Inhaler 1    fexofenadine-pseudoephedrine (Allergy Relief-D,fexofenadine,)  mg Tb12 Take 1 Tab by mouth every twelve (12) hours.  azithromycin (ZITHROMAX) 250 mg tablet Take 250 mg by mouth daily.  benzonatate (TESSALON) 200 mg capsule Take 200 mg by mouth three (3) times daily as needed for Cough.  cephALEXin (KEFLEX) 500 mg capsule Take 500 mg by mouth four (4) times daily.  colchicine 0.6 mg tablet Take 0.6 mg by mouth daily.  cyclobenzaprine (FLEXERIL) 10 mg tablet Take  by mouth three (3) times daily as needed for Muscle Spasm(s).  dilTIAZem ER (DILACOR XR) 120 mg capsule Take  by mouth daily.  finasteride (PROSCAR) 5 mg tablet Take 5 mg by mouth daily.  influenza vaccine 2019-20, 65 yrs+,,PF, (Fluad 7984-3155, 65 yr up,,PF,) syrg injection 0.5 mL by IntraMUSCular route PRIOR TO DISCHARGE.  fluticasone propionate (FLONASE) 50 mcg/actuation nasal spray 2 Sprays by Both Nostrils route daily.  HYDROcodone-acetaminophen (NORCO) 5-325 mg per tablet Take  by mouth.  indomethacin (INDOCIN) 50 mg capsule Take  by mouth three (3) times daily.  methylPREDNISolone (MEDROL DOSEPACK) 4 mg tablet Take  by mouth.  predniSONE (DELTASONE) 10 mg tablet Take  by mouth daily (with breakfast).  predniSONE (DELTASONE) 20 mg tablet Take  by mouth daily (with breakfast).  beclomethasone (Qvar) 80 mcg/actuation aero Take 1 Puff by inhalation two (2) times a day.       beclomethasone dipropionate (Qvar RediHaler) 80 mcg/actuation HFAb inhaler Take 1 Puff by inhalation.  edoxaban (Savaysa) 60 mg tab tablet Take 60 mg by mouth daily.  sodium bicarbonate 325 mg tablet Take 325 mg by mouth four (4) times daily.  temazepam (RESTORIL) 30 mg capsule Take  by mouth nightly as needed for Sleep.  traZODone (DESYREL) 50 mg tablet Take  by mouth nightly.  fexofenadine (ALLEGRA) 60 mg tablet Take  by mouth.  diclofenac (VOLTAREN) 1 % gel Apply  to affected area four (4) times daily.  triamcinolone acetonide (Kenalog) 40 mg/mL injection once. Allergies   Allergen Reactions    Bactrim [Sulfamethoprim] Unknown (comments)    Penicillin G Unknown (comments)       Objective:  Visit Vitals  /62 (BP 1 Location: Right upper arm, BP Patient Position: Sitting, BP Cuff Size: Adult)   Pulse 82   Temp 97.7 °F (36.5 °C) (Temporal)   Resp 20   Ht 6' 1\" (1.854 m)   Wt 214 lb 6.4 oz (97.3 kg)   SpO2 99% Comment: room air   BMI 28.29 kg/m²       Physical Exam:   Physical Exam  Vitals and nursing note reviewed. Constitutional:       General: He is not in acute distress. Appearance: Normal appearance. He is obese. He is not ill-appearing, toxic-appearing or diaphoretic. HENT:      Head: Normocephalic and atraumatic. Right Ear: Tympanic membrane, ear canal and external ear normal. There is no impacted cerumen. Left Ear: Tympanic membrane, ear canal and external ear normal. There is no impacted cerumen. Nose: Nose normal. No congestion or rhinorrhea. Mouth/Throat:      Mouth: Mucous membranes are moist.      Pharynx: Oropharynx is clear. No oropharyngeal exudate or posterior oropharyngeal erythema. Eyes:      General: No scleral icterus. Right eye: No discharge. Left eye: No discharge. Extraocular Movements: Extraocular movements intact.       Conjunctiva/sclera: Conjunctivae normal.      Pupils: Pupils are equal, round, and reactive to light. Neck:      Vascular: No carotid bruit. Cardiovascular:      Rate and Rhythm: Normal rate and regular rhythm. Pulses: Normal pulses. Heart sounds: Normal heart sounds. No murmur heard. No friction rub. No gallop. Pulmonary:      Effort: Pulmonary effort is normal. No respiratory distress. Breath sounds: Normal breath sounds. No stridor. No wheezing, rhonchi or rales. Chest:      Chest wall: No tenderness. Abdominal:      General: Abdomen is flat. Bowel sounds are normal. There is no distension. Palpations: Abdomen is soft. There is no mass. Tenderness: There is no abdominal tenderness. There is no right CVA tenderness, left CVA tenderness, guarding or rebound. Hernia: No hernia is present. Musculoskeletal:         General: No swelling, tenderness, deformity or signs of injury. Normal range of motion. Cervical back: Normal range of motion and neck supple. No rigidity. No muscular tenderness. Right lower leg: No edema. Left lower leg: No edema. Lymphadenopathy:      Cervical: No cervical adenopathy. Skin:     General: Skin is warm. Capillary Refill: Capillary refill takes 2 to 3 seconds. Coloration: Skin is not jaundiced or pale. Findings: No bruising, erythema, lesion or rash. Neurological:      General: No focal deficit present. Mental Status: He is alert and oriented to person, place, and time. Mental status is at baseline. Cranial Nerves: No cranial nerve deficit. Sensory: No sensory deficit. Motor: No weakness. Coordination: Coordination normal.      Gait: Gait normal.      Deep Tendon Reflexes: Reflexes normal.   Psychiatric:         Mood and Affect: Mood normal.         Behavior: Behavior normal.         Thought Content:  Thought content normal.         Judgment: Judgment normal.             Results for orders placed or performed in visit on 95/51/64   METABOLIC PANEL, COMPREHENSIVE   Result Value Ref Range    Glucose 109 (H) 65 - 99 mg/dL    BUN 25 8 - 27 mg/dL    Creatinine 1.27 0.76 - 1.27 mg/dL    GFR est non-AA 54 (L) >59 mL/min/1.73    GFR est AA 62 >59 mL/min/1.73    BUN/Creatinine ratio 20 10 - 24    Sodium 141 134 - 144 mmol/L    Potassium 4.5 3.5 - 5.2 mmol/L    Chloride 102 96 - 106 mmol/L    CO2 22 20 - 29 mmol/L    Calcium 8.8 8.6 - 10.2 mg/dL    Protein, total 6.9 6.0 - 8.5 g/dL    Albumin 3.5 (L) 3.7 - 4.7 g/dL    GLOBULIN, TOTAL 3.4 1.5 - 4.5 g/dL    A-G Ratio 1.0 (L) 1.2 - 2.2    Bilirubin, total 0.2 0.0 - 1.2 mg/dL    Alk. phosphatase 106 48 - 121 IU/L    AST (SGOT) 47 (H) 0 - 40 IU/L    ALT (SGPT) 20 0 - 44 IU/L   LIPID PANEL   Result Value Ref Range    Cholesterol, total 187 100 - 199 mg/dL    Triglyceride 161 (H) 0 - 149 mg/dL    HDL Cholesterol 126 >39 mg/dL    VLDL, calculated 25 5 - 40 mg/dL    LDL, calculated 36 0 - 99 mg/dL       Assessment/Plan:    ICD-10-CM ICD-9-CM    1. Malignant hypertensive heart disease without heart failure  I11.9 402.00    2. Benign prostatic disease  N42.9 602.9    3. Mixed simple and mucopurulent chronic bronchitis (HCC)  J41.8 491.1    4. Obstructive sleep apnea syndrome  G47.33 327.23      No orders of the defined types were placed in this encounter. Cannot display discharge medications since this is not an admission.

## 2021-06-24 NOTE — PROGRESS NOTES
Chief Complaint   Patient presents with    Follow Up Chronic Condition    Hypertension    Labs     Patient wants to discuss lab work done on 06/18/2021    Shoulder Injury     Patient states has fallen twice at home over the last month and injured both shoulders      1. Have you been to the ER, urgent care clinic since your last visit? Hospitalized since your last visit? No    2. Have you seen or consulted any other health care providers outside of the [address] [place] [address] since your last visit? Include any pap smears or colon screening.  No     Visit Vitals  /62 (BP 1 Location: Right upper arm, BP Patient Position: Sitting, BP Cuff Size: Adult)   Pulse 82   Temp 97.7 °F (36.5 °C) (Temporal)   Resp 20   Ht 6' 1\" (1.854 m)   Wt 214 lb 6.4 oz (97.3 kg)   SpO2 99% Comment: room air   BMI 28.29 kg/m²

## 2021-10-17 RX ORDER — VALSARTAN 320 MG/1
TABLET ORAL
Qty: 14 TABLET | Refills: 0 | Status: SHIPPED | OUTPATIENT
Start: 2021-10-17 | End: 2021-10-28

## 2021-10-27 ENCOUNTER — NURSE TRIAGE (OUTPATIENT)
Dept: OTHER | Facility: CLINIC | Age: 76
End: 2021-10-27

## 2021-10-27 NOTE — TELEPHONE ENCOUNTER
Received call from Jeremy Salguero at [facility] with Red Flag Complaint. Brief description of triage:  Patient calling for concerns for right hip pain since fall on Sunday. States that he also hit his head on the floor. No LOC. Denies any headache or vomiting. Goose egg swelling (quarter size) above right eye which he reports is decreasing. Mostly concerned about hip pain which he describes as 10/10 with movements, limping with walking. Triage indicates for patient to go to ED now. Attention Provider: Thank you for allowing me to participate in the care of your patient. The patient was connected to triage in response to information provided to the [facility]. Please do not respond through this encounter as the response is not directed to a shared pool. Reason for Disposition   SEVERE pain (e.g., excruciating)    Answer Assessment - Initial Assessment Questions  1. MECHANISM: \"How did the injury happen? \" (e.g., twisting injury, direct blow)       Going to elevator and fell:  Hit head on floor. No LOC    2. ONSET: \"When did the injury happen? \" (Minutes or hours ago)       Sunday afternoon    3. LOCATION: \"Where is the injury located? \"       Right hip    4. APPEARANCE of INJURY: \"What does the injury look like? \"  (e.g., deformity of leg)      No bleeding, no swelling, no bruising    5. SEVERITY: \"Can you put weight on that leg? \" \"Can you walk? \"       Can bear weight on leg, limping to walk    6. SIZE: For cuts, bruises, or swelling, ask: \"How large is it? \" (e.g., inches or centimeters;  entire joint)       None    7. PAIN: \"Is there pain? \" If so, ask: \"How bad is the pain? \"  (e.g., Scale 1-10; or mild, moderate, severe)      Yes, when moving 10/10    8. TETANUS: For any breaks in the skin, ask: \"When was the last tetanus booster? \"      N/a    9. OTHER SYMPTOMS: \"Do you have any other symptoms? \"       Goose egg lump over right eye (fifty cent size)    10. PREGNANCY: \"Is there any chance you are pregnant? \" \"When was your last menstrual period? \"        n/a    Protocols used: HIP INJURY-ADULT-[place]

## 2021-10-28 ENCOUNTER — APPOINTMENT (OUTPATIENT)
Dept: CT IMAGING | Age: 76
End: 2021-10-28
Attending: EMERGENCY MEDICINE
Payer: MEDICARE

## 2021-10-28 ENCOUNTER — HOSPITAL ENCOUNTER (EMERGENCY)
Age: 76
Discharge: HOME | End: 2021-10-28
Attending: EMERGENCY MEDICINE
Payer: MEDICARE

## 2021-10-28 VITALS
BODY MASS INDEX: 28.49 KG/M2 | HEART RATE: 99 BPM | HEIGHT: 73 IN | TEMPERATURE: 98.2 F | SYSTOLIC BLOOD PRESSURE: 142 MMHG | WEIGHT: 215 LBS | DIASTOLIC BLOOD PRESSURE: 94 MMHG | OXYGEN SATURATION: 95 % | RESPIRATION RATE: 21 BRPM

## 2021-10-28 DIAGNOSIS — F10.920 ALCOHOLIC INTOXICATION WITHOUT COMPLICATION (HCC): ICD-10-CM
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
## 2021-10-28 LAB
ALBUMIN SERPL-MCNC: 2.9 G/DL (ref 3.5–5)
ALBUMIN/GLOB SERPL: 0.8 {RATIO} (ref 1.1–2.2)
ALP SERPL-CCNC: 116 U/L (ref 45–117)
ALT SERPL-CCNC: 32 U/L (ref 12–78)
ANION GAP SERPL CALC-SCNC: 11 MMOL/L (ref 5–15)
AST SERPL W P-5'-P-CCNC: 82 U/L (ref 15–37)
BASOPHILS # BLD: 0 K/UL (ref 0–0.1)
BASOPHILS NFR BLD: 0 % (ref 0–1)
BILIRUB DIRECT SERPL-MCNC: 0.3 MG/DL (ref 0–0.2)
BILIRUB SERPL-MCNC: 0.5 MG/DL (ref 0.2–1)
BUN SERPL-MCNC: 29 MG/DL (ref 6–20)
BUN/CREAT SERPL: 22 (ref 12–20)
CA-I BLD-MCNC: 8.2 MG/DL (ref 8.5–10.1)
CHLORIDE SERPL-SCNC: 102 MMOL/L (ref 97–108)
CO2 SERPL-SCNC: 25 MMOL/L (ref 21–32)
CREAT SERPL-MCNC: 1.29 MG/DL (ref 0.7–1.3)
DIFFERENTIAL METHOD BLD: ABNORMAL
EOSINOPHIL # BLD: 0.1 K/UL (ref 0–0.4)
EOSINOPHIL NFR BLD: 2 % (ref 0–7)
ERYTHROCYTE [DISTWIDTH] IN BLOOD BY AUTOMATED COUNT: 15.1 % (ref 11.5–14.5)
ETHANOL SERPL-MCNC: 150 MG/DL
GLOBULIN SER CALC-MCNC: 3.7 G/DL (ref 2–4)
GLUCOSE SERPL-MCNC: 100 MG/DL (ref 65–100)
HCT VFR BLD AUTO: 32.1 % (ref 36.6–50.3)
HGB BLD-MCNC: 10.9 G/DL (ref 12.1–17)
IMM GRANULOCYTES # BLD AUTO: 0 K/UL (ref 0–0.04)
IMM GRANULOCYTES NFR BLD AUTO: 0 % (ref 0–0.5)
LYMPHOCYTES # BLD: 2.2 K/UL (ref 0.8–3.5)
LYMPHOCYTES NFR BLD: 42 % (ref 12–49)
MCH RBC QN AUTO: 35.7 PG (ref 26–34)
MCHC RBC AUTO-ENTMCNC: 34 G/DL (ref 30–36.5)
MCV RBC AUTO: 105.2 FL (ref 80–99)
MONOCYTES # BLD: 0.7 K/UL (ref 0–1)
MONOCYTES NFR BLD: 13 % (ref 5–13)
NEUTS SEG # BLD: 2.2 K/UL (ref 1.8–8)
NEUTS SEG NFR BLD: 43 % (ref 32–75)
PLATELET # BLD AUTO: 131 K/UL (ref 150–400)
PMV BLD AUTO: 8.8 FL (ref 8.9–12.9)
POTASSIUM SERPL-SCNC: 3.7 MMOL/L (ref 3.5–5.1)
PROT SERPL-MCNC: 6.6 G/DL (ref 6.4–8.2)
RBC # BLD AUTO: 3.05 M/UL (ref 4.1–5.7)
SODIUM SERPL-SCNC: 138 MMOL/L (ref 136–145)
WBC # BLD AUTO: 5.2 K/UL (ref 4.1–11.1)

## 2021-10-28 PROCEDURE — 80076 HEPATIC FUNCTION PANEL: CPT

## 2021-10-28 PROCEDURE — 82077 ASSAY SPEC XCP UR&BREATH IA: CPT

## 2021-10-28 PROCEDURE — 80048 BASIC METABOLIC PNL TOTAL CA: CPT

## 2021-10-28 PROCEDURE — 72192 CT PELVIS W/O DYE: CPT

## 2021-10-28 PROCEDURE — 70450 CT HEAD/BRAIN W/O DYE: CPT

## 2021-10-28 PROCEDURE — 36415 COLL VENOUS BLD VENIPUNCTURE: CPT

## 2021-10-28 PROCEDURE — 85025 COMPLETE CBC W/AUTO DIFF WBC: CPT

## 2021-10-28 PROCEDURE — 99283 EMERGENCY DEPT VISIT LOW MDM: CPT

## 2021-10-28 RX ORDER — VALSARTAN 320 MG/1
TABLET ORAL
Qty: 14 TABLET | Refills: 0 | Status: SHIPPED | OUTPATIENT
Start: 2021-10-28 | End: 2021-12-13 | Stop reason: SDUPTHER

## 2021-10-28 NOTE — ED NOTES
This RN ambulated down hallway past EMS doors and back to room 2, pt ambulated well and stated that his leg feels better than it did when he came in. ED MD notified.

## 2021-10-28 NOTE — ED PROVIDER NOTES
EMERGENCY DEPARTMENT HISTORY AND PHYSICAL EXAM      Date: 10/28/2021  Patient Name: Sabrina Noble    History of Presenting Illness     Chief Complaint   Patient presents with    Fall    Head Injury    Hip Pain    Hand Pain    Knee Pain       History Provided By: Patient    HPI: Sabrina Noble, 64 y.o. male   presents to the ED with cc of fall. Patient states that he had a ground-level fall 2 days ago when he lost balance. He states that he hit his head without LOC. Patient complains of mild discomfort on right forearm, right hip, right hand and right knee. Patient admits of drinking EtOH daily. Patient is not on anticoagulation. Headache is mild without visual changes, slurred speech, paresthesia, or motor dysfunction. No neck pain or back pain. No OTC treatment. PCP: Damien Vega MD    No current facility-administered medications on file prior to encounter. Current Outpatient Medications on File Prior to Encounter   Medication Sig Dispense Refill    valsartan (DIOVAN) 320 mg tablet TAKE 1 TABLET BY MOUTH DAILY 14 Tablet 0    simvastatin (ZOCOR) 10 mg tablet TAKE 1 TABLET BY MOUTH EVERYDAY AT BEDTIME 30 Tablet 2    dilTIAZem ER (CARDIZEM SR) 120 mg capsule TAKE 1 CAPSULE BY MOUTH EVERY DAY 30 Cap 2    albuterol (Ventolin HFA) 90 mcg/actuation inhaler Take 2 Puffs by inhalation every six (6) hours as needed for Wheezing. 1 Inhaler 1    fexofenadine-pseudoephedrine (Allergy Relief-D,fexofenadine,)  mg Tb12 Take 1 Tab by mouth every twelve (12) hours.  azithromycin (ZITHROMAX) 250 mg tablet Take 250 mg by mouth daily.  benzonatate (TESSALON) 200 mg capsule Take 200 mg by mouth three (3) times daily as needed for Cough.  cephALEXin (KEFLEX) 500 mg capsule Take 500 mg by mouth four (4) times daily.  colchicine 0.6 mg tablet Take 0.6 mg by mouth daily.  cyclobenzaprine (FLEXERIL) 10 mg tablet Take  by mouth three (3) times daily as needed for Muscle Spasm(s).  dilTIAZem ER (DILACOR XR) 120 mg capsule Take  by mouth daily.  finasteride (PROSCAR) 5 mg tablet Take 5 mg by mouth daily.  influenza vaccine 2019-20, 65 yrs+,,PF, (Fluad 7951-4551, 65 yr up,,PF,) syrg injection 0.5 mL by IntraMUSCular route PRIOR TO DISCHARGE.  fluticasone propionate (FLONASE) 50 mcg/actuation nasal spray 2 Sprays by Both Nostrils route daily.  HYDROcodone-acetaminophen (NORCO) 5-325 mg per tablet Take  by mouth.  indomethacin (INDOCIN) 50 mg capsule Take  by mouth three (3) times daily.  methylPREDNISolone (MEDROL DOSEPACK) 4 mg tablet Take  by mouth.  predniSONE (DELTASONE) 10 mg tablet Take  by mouth daily (with breakfast).  predniSONE (DELTASONE) 20 mg tablet Take  by mouth daily (with breakfast).  beclomethasone (Qvar) 80 mcg/actuation aero Take 1 Puff by inhalation two (2) times a day.  beclomethasone dipropionate (Qvar RediHaler) 80 mcg/actuation HFAb inhaler Take 1 Puff by inhalation.  edoxaban (Savaysa) 60 mg tab tablet Take 60 mg by mouth daily.  sodium bicarbonate 325 mg tablet Take 325 mg by mouth four (4) times daily.  temazepam (RESTORIL) 30 mg capsule Take  by mouth nightly as needed for Sleep.  traZODone (DESYREL) 50 mg tablet Take  by mouth nightly.  fexofenadine (ALLEGRA) 60 mg tablet Take  by mouth.  diclofenac (VOLTAREN) 1 % gel Apply  to affected area four (4) times daily.  triamcinolone acetonide (Kenalog) 40 mg/mL injection once.          Past History     Past Medical History:  Past Medical History:   Diagnosis Date    Ankle pain 8/3/2020    Benign prostatic disease 8/3/2020    Hypertrophy with Outflow Obstruction    Benign prostatic hyperplasia 8/3/2020    Bronchitis     Cervical radiculopathy 8/3/2020    Chronic obstructive lung disease ([address] 8/3/2020    Cocaine abuse ([facility] [address] 8/3/2020    Diarrhea 8/3/2020    Dizziness and giddiness 8/3/2020    Hypercholesterolemia 8/3/2020    Hypertensive disorder 8/3/2020    Insomnia 8/3/2020    Kidney disease 8/3/2020    Low back pain 8/3/2020    Osteoarthritis of knee 8/3/2020    Pain in breast 8/3/2020    Pain in left knee 8/3/2020    Prediabetes 8/3/2020    Seasonal allergic rhinitis 8/3/2020    Shoulder pain 8/3/2020    Sleep apnea 8/3/2020    Tinnitus of right ear 8/3/2020    Vasovagal syncope 8/3/2020       Past Surgical History:  History reviewed. No pertinent surgical history. Family History:  History reviewed. No pertinent family history. Social History:  Social History     Tobacco Use    Smoking status: Former Smoker    Smokeless tobacco: Never Used   Substance Use Topics    Alcohol use: Yes     Comment: pt drinks everyday    Drug use: Yes     Frequency: 3.0 times per week     Types: Cocaine       Allergies: Allergies   Allergen Reactions    Bactrim [Sulfamethoprim] Unknown (comments)    Penicillin G Unknown (comments)         Review of Systems   Review of Systems   Constitutional: Negative for chills and fever. HENT: Negative for sore throat. Eyes: Negative for visual disturbance. Respiratory: Negative for cough and shortness of breath. Cardiovascular: Negative for chest pain. Gastrointestinal: Negative for abdominal pain and vomiting. Endocrine: Negative for polyuria. Genitourinary: Negative for difficulty urinating. Musculoskeletal: Negative for arthralgias. Skin: Negative for rash. Neurological: Negative for seizures and syncope. Psychiatric/Behavioral: Negative for suicidal ideas. All other systems reviewed and are negative. Physical Exam   Physical Exam  Vitals and nursing note reviewed. Constitutional:       Appearance: Normal appearance. He is not toxic-appearing or diaphoretic. HENT:      Head: Normocephalic and atraumatic. Comments: No appreciable forehead hematoma     Right Ear: External ear normal.      Left Ear: External ear normal.      Nose: No congestion. Mouth/Throat:      Mouth: Mucous membranes are moist.   Eyes:      General: No scleral icterus. Extraocular Movements: Extraocular movements intact. Conjunctiva/sclera: Conjunctivae normal.      Pupils: Pupils are equal, round, and reactive to light. Cardiovascular:      Rate and Rhythm: Normal rate and regular rhythm. Heart sounds: Normal heart sounds. Pulmonary:      Effort: Pulmonary effort is normal.      Breath sounds: Normal breath sounds. Abdominal:      General: Abdomen is flat. Bowel sounds are normal.      Palpations: Abdomen is soft. Musculoskeletal:         General: No swelling. Cervical back: Neck supple. Right lower leg: No edema. Left lower leg: No edema. Comments: Right hand normal without deformity or tenderness. Right knee without deformity or joint effusion. Right hip is nontender. The leg is not shortened and rotated. Patient was able to bear weight and ambulate well without much difficulty. Skin:     General: Skin is warm and dry. Neurological:      General: No focal deficit present. Mental Status: He is alert. Cranial Nerves: No cranial nerve deficit. Motor: No weakness. Gait: Gait normal.   Psychiatric:         Mood and Affect: Mood normal.         Diagnostic Study Results     Labs -     Recent Results (from the past 12 hour(s))   CBC WITH AUTOMATED DIFF    Collection Time: 10/28/21 12:43 AM   Result Value Ref Range    WBC 5.2 4.1 - 11.1 K/uL    RBC 3.05 (L) 4.10 - 5.70 M/uL    HGB 10.9 (L) 12.1 - 17.0 g/dL    HCT 32.1 (L) 36.6 - 50.3 %    .2 (H) 80.0 - 99.0 FL    MCH 35.7 (H) 26.0 - 34.0 PG    MCHC 34.0 30.0 - 36.5 g/dL    RDW 15.1 (H) 11.5 - 14.5 %    PLATELET 348 (L) 058 - 400 K/uL    MPV 8.8 (L) 8.9 - 12.9 FL    NEUTROPHILS 43 32 - 75 %    LYMPHOCYTES 42 12 - 49 %    MONOCYTES 13 5 - 13 %    EOSINOPHILS 2 0 - 7 %    BASOPHILS 0 0 - 1 %    IMMATURE GRANULOCYTES 0 0.0 - 0.5 %    ABS.  NEUTROPHILS 2.2 1.8 - 8.0 K/UL ABS. LYMPHOCYTES 2.2 0.8 - 3.5 K/UL    ABS. MONOCYTES 0.7 0.0 - 1.0 K/UL    ABS. EOSINOPHILS 0.1 0.0 - 0.4 K/UL    ABS. BASOPHILS 0.0 0.0 - 0.1 K/UL    ABS. IMM. GRANS. 0.0 0.00 - 0.04 K/UL    DF AUTOMATED     METABOLIC PANEL, BASIC    Collection Time: 10/28/21 12:43 AM   Result Value Ref Range    Sodium 138 136 - 145 mmol/L    Potassium 3.7 3.5 - 5.1 mmol/L    Chloride 102 97 - 108 mmol/L    CO2 25 21 - 32 mmol/L    Anion gap 11 5 - 15 mmol/L    Glucose 100 65 - 100 mg/dL    BUN 29 (H) 6 - 20 mg/dL    Creatinine 1.29 0.70 - 1.30 mg/dL    BUN/Creatinine ratio 22 (H) 12 - 20      GFR est AA >60 >60 ml/min/1.73m2    GFR est non-AA 54 (L) >60 ml/min/1.73m2    Calcium 8.2 (L) 8.5 - 10.1 mg/dL   HEPATIC FUNCTION PANEL    Collection Time: 10/28/21 12:43 AM   Result Value Ref Range    Protein, total 6.6 6.4 - 8.2 g/dL    Albumin 2.9 (L) 3.5 - 5.0 g/dL    Globulin 3.7 2.0 - 4.0 g/dL    A-G Ratio 0.8 (L) 1.1 - 2.2      Bilirubin, total 0.5 0.2 - 1.0 mg/dL    Bilirubin, direct 0.3 (H) 0.0 - 0.2 mg/dL    Alk. phosphatase 116 45 - 117 U/L    AST (SGOT) 82 (H) 15 - 37 U/L    ALT (SGPT) 32 12 - 78 U/L   ETHYL ALCOHOL    Collection Time: 10/28/21 12:43 AM   Result Value Ref Range    ALCOHOL(ETHYL),SERUM 150 (H) <10 mg/dL       Radiologic Studies -   CT HEAD WO CONT   Final Result   Mild senescent changes consistent with the patient's age. No acute or active intracranial process. CT PELV WO CONT   Final Result   No specific acute or active process. CT Results  (Last 48 hours)               10/28/21 0101  CT HEAD WO CONT Final result    Impression:  Mild senescent changes consistent with the patient's age. No acute or active intracranial process.                Narrative:  PROCEDURE: CT HEAD WO CONT       HISTORY:Injury from a fall right for head hematoma       COMPARISON:None       Department policy stipulates all CT scans at this facility are performed using   dose reduction optimization techniques as appropriate to the performed exam,   including the following: Automated exposure control, adjustments of the mA   and/or KVP according to the patient size, and the use of iterative   reconstruction technique. TECHNIQUE: Without IV contrast       Bones/extracranial soft tissues:  Within normal limits   Extra-axial spaces:  No hemorrhage, mass or focal fluid collection. Ventricles/sulci:  There is mild dilatation of the ventricles. Sulci are   prominent. Brain parenchyma:   No mass effect. There is good gray-white differentiation. There are inhomogeneous areas of mildly decreased density in periventricular   white matter. 10/28/21 0100  CT PELV WO CONT Final result    Impression:  No specific acute or active process. Narrative:  PROCEDURE: CT PELV WO CONT       HISTORY:Injuries from fall. Right hip pain       COMPARISON:CT abdomen and pelvis 24 August 2015       Department policy stipulates all CT scans at this facility are performed using   dose reduction optimization techniques as appropriate to the performed exam,   including the following: Automated exposure control, adjustments of the mA   and/or KVP according to the patient size, and the use of iterative   reconstruction technique. TECHNIQUE: Axial images through the pelvis. No IV contrast given. LIMITATIONS: Evaluation of the vessels and solid organs is limited without IV   contrast.       BLADDER: Normal   RETROPERITONEUM/AORTA: Atherosclerotic changes. No aneurysm or periaortic   pathology. BOWEL/MESENTERY: Visualized portions of the small bowel are unremarkable. Colon   shows extensive diverticulosis without evidence of diverticulitis. APPENDIX: Identified and normal   PERITONEAL CAVITY: No free air or fluid   REPRODUCTIVE ORGANS: Normal   BONE/TISSUES: No acute abnormality.        OTHER: None               CXR Results  (Last 48 hours)    None            Medical Decision Making   I am the first provider for this patient. I reviewed the vital signs, available nursing notes, past medical history, past surgical history, family history and social history. Vital Signs-Reviewed the patient's vital signs. Patient Vitals for the past 12 hrs:   Temp Pulse Resp BP SpO2   10/28/21 0017 98.2 °F (36.8 °C) 96 19 102/88 96 %       Records Reviewed:     Provider Notes (Medical Decision Making):   CTs were obtained to rule out fracture or intracranial bleed. Blood work is significant for elevated EtOH level. Family was called for a ride back home and advised strongly to the patient not to drive after EtOH consumption. ED Course:   Initial assessment performed. The patients presenting problems have been discussed, and they are in agreement with the care plan formulated and outlined with them. I have encouraged them to ask questions as they arise throughout their visit. Patient is alert and oriented x4. Patient ambulated without much difficulty. PROCEDURES      Disposition: Condition stable   DC- Adult Discharges: All of the diagnostic tests were reviewed and questions answered. Diagnosis, care plan and treatment options were discussed. understand instructions and will follow up as directed. The patients results have been reviewed with them. They have been counseled regarding their diagnosis. The patient verbally convey understanding and agreement of the signs, symptoms, diagnosis, treatment and prognosis and additionally agrees to follow up as recommended. They also agree with the care-plan and convey that all of their questions have been answered. I have also put together some discharge instructions for them that include: 1) educational information regarding their diagnosis, 2) how to care for their diagnosis at home, as well a 3) list of reasons why they would want to return to the ED prior to their follow-up appointment, should their condition change. PLAN:  1.    Current Discharge Medication List 2.   Follow-up Information     Follow up With Specialties Details Why Contact Info    Follow up with your primary care physician  Schedule an appointment as soon as possible for a visit in 3 days As needed         Return to ED if worse     Diagnosis     Clinical Impression:   1. Fall from ground level    2. Alcoholic intoxication without complication ([address]        Please note that this dictation was completed with [employer], the computer voice recognition software. Quite often unanticipated grammatical, syntax, homophones, and other interpretive errors are inadvertently transcribed by the computer software. Please disregard these errors. Please excuse any errors that have escaped final proofreading. Thank you.

## 2021-10-28 NOTE — ED TRIAGE NOTES
On Sunday pt fell and hit the right side of his head and entire right side of body hit the floor pt states his R hip and hand are now bothering him, it hurts his hip when lifting his right leg. Positive ETOH, pt has consumed a 1/2 pint of ayde. Pt states he drank to try to help with the pain.

## 2021-10-28 NOTE — ED NOTES
IV removed. Pt a&ox4. gcs 15. Pt self ambulated out of ED to ED waiting room to wait for friend to pick him up.

## 2021-11-11 ENCOUNTER — TELEPHONE (OUTPATIENT)
Dept: FAMILY MEDICINE CLINIC | Age: 76
End: 2021-11-11

## 2021-11-11 NOTE — TELEPHONE ENCOUNTER
[facility] -[phone] called to see if you received fax for a refill on nebulizer.  They are asking for it to be faxed back as soon as possible

## 2021-12-13 RX ORDER — VALSARTAN 320 MG/1
320 TABLET ORAL DAILY
Qty: 30 TABLET | Refills: 0 | Status: SHIPPED | OUTPATIENT
Start: 2021-12-13 | End: 2022-01-10

## 2021-12-27 ENCOUNTER — OFFICE VISIT (OUTPATIENT)
Dept: FAMILY MEDICINE CLINIC | Age: 76
End: 2021-12-27
Payer: MEDICARE

## 2021-12-27 VITALS
SYSTOLIC BLOOD PRESSURE: 130 MMHG | TEMPERATURE: 97.7 F | OXYGEN SATURATION: 98 % | DIASTOLIC BLOOD PRESSURE: 80 MMHG | RESPIRATION RATE: 18 BRPM | HEART RATE: 77 BPM

## 2021-12-27 DIAGNOSIS — I11.9 MALIGNANT HYPERTENSIVE HEART DISEASE WITHOUT HEART FAILURE: Primary | ICD-10-CM

## 2021-12-27 DIAGNOSIS — M54.41 CHRONIC BILATERAL LOW BACK PAIN WITH BILATERAL SCIATICA: ICD-10-CM

## 2021-12-27 DIAGNOSIS — G89.29 CHRONIC BILATERAL LOW BACK PAIN WITH BILATERAL SCIATICA: ICD-10-CM

## 2021-12-27 DIAGNOSIS — E78.00 HYPERCHOLESTEROLEMIA: ICD-10-CM

## 2021-12-27 DIAGNOSIS — M17.0 PRIMARY OSTEOARTHRITIS OF BOTH KNEES: ICD-10-CM

## 2021-12-27 DIAGNOSIS — M54.42 CHRONIC BILATERAL LOW BACK PAIN WITH BILATERAL SCIATICA: ICD-10-CM

## 2021-12-27 DIAGNOSIS — R29.898 WEAKNESS OF BOTH ARMS: ICD-10-CM

## 2021-12-27 DIAGNOSIS — R29.898 WEAKNESS OF BOTH LEGS: ICD-10-CM

## 2021-12-27 DIAGNOSIS — J41.8 MIXED SIMPLE AND MUCOPURULENT CHRONIC BRONCHITIS (HCC): ICD-10-CM

## 2021-12-27 DIAGNOSIS — Z23 ENCOUNTER FOR IMMUNIZATION: ICD-10-CM

## 2021-12-27 PROCEDURE — 90694 VACC AIIV4 NO PRSRV 0.5ML IM: CPT | Performed by: FAMILY MEDICINE

## 2021-12-27 PROCEDURE — G0008 ADMIN INFLUENZA VIRUS VAC: HCPCS | Performed by: FAMILY MEDICINE

## 2021-12-27 PROCEDURE — 99214 OFFICE O/P EST MOD 30 MIN: CPT | Performed by: FAMILY MEDICINE

## 2021-12-27 PROCEDURE — G8510 SCR DEP NEG, NO PLAN REQD: HCPCS | Performed by: FAMILY MEDICINE

## 2021-12-27 PROCEDURE — 1101F PT FALLS ASSESS-DOCD LE1/YR: CPT | Performed by: FAMILY MEDICINE

## 2021-12-27 PROCEDURE — G8419 CALC BMI OUT NRM PARAM NOF/U: HCPCS | Performed by: FAMILY MEDICINE

## 2021-12-27 PROCEDURE — G8754 DIAS BP LESS 90: HCPCS | Performed by: FAMILY MEDICINE

## 2021-12-27 PROCEDURE — G8427 DOCREV CUR MEDS BY ELIG CLIN: HCPCS | Performed by: FAMILY MEDICINE

## 2021-12-27 PROCEDURE — G8536 NO DOC ELDER MAL SCRN: HCPCS | Performed by: FAMILY MEDICINE

## 2021-12-27 PROCEDURE — G8752 SYS BP LESS 140: HCPCS | Performed by: FAMILY MEDICINE

## 2021-12-27 NOTE — PROGRESS NOTES
Chief Complaint   Patient presents with    Follow-up     arhtritis everywhere pt complains of extreme pain       1. Have you been to the ER, urgent care clinic since your last visit? Hospitalized since your last visit? Yes When: november 11, 2021    2. Have you seen or consulted any other health care providers outside of the [address] [place] [address] since your last visit? Include any pap smears or colon screening.  No     Visit Vitals  /80 (BP 1 Location: Right arm, BP Patient Position: Sitting, BP Cuff Size: Adult)   Pulse 77   Temp 97.7 °F (36.5 °C) (Temporal)   Resp 18   SpO2 98%       Pt complains of pain everywhere

## 2021-12-27 NOTE — PROGRESS NOTES
Isidoro Coyne is a 64 y.o. male and presents with Follow-up (arhtritis everywhere pt complains of extreme pain)  . HPI     Subjective:  Cardiovascular Review:  The patient has hypertension   Diet and Lifestyle: generally follows a low fat low cholesterol diet, generally follows a low sodium diet, exercises sporadically  Home BP Monitoring: is not measured at home. Pertinent ROS: taking medications as instructed, no medication side effects noted, no TIA's, no chest pain on exertion, no dyspnea on exertion, no swelling of ankles. Review of Systems  ROS      Past Medical History:   Diagnosis Date    Ankle pain 8/3/2020    Benign prostatic disease 8/3/2020    Hypertrophy with Outflow Obstruction    Benign prostatic hyperplasia 8/3/2020    Bronchitis     Cervical radiculopathy 8/3/2020    Chronic obstructive lung disease ([address] 8/3/2020    Cocaine abuse ([facility] [address] 8/3/2020    Diarrhea 8/3/2020    Dizziness and giddiness 8/3/2020    Hypercholesterolemia 8/3/2020    Hypertensive disorder 8/3/2020    Insomnia 8/3/2020    Kidney disease 8/3/2020    Low back pain 8/3/2020    Osteoarthritis of knee 8/3/2020    Pain in breast 8/3/2020    Pain in left knee 8/3/2020    Prediabetes 8/3/2020    Seasonal allergic rhinitis 8/3/2020    Shoulder pain 8/3/2020    Sleep apnea 8/3/2020    Tinnitus of right ear 8/3/2020    Vasovagal syncope 8/3/2020     History reviewed. No pertinent surgical history.   Social History     Socioeconomic History    Marital status: LEGALLY    Tobacco Use    Smoking status: Former Smoker    Smokeless tobacco: Never Used   Substance and Sexual Activity    Alcohol use: Yes     Comment: pt drinks everyday    Drug use: Yes     Frequency: 3.0 times per week     Types: Cocaine     Family History   Problem Relation Age of Onset    Hypertension Mother      Current Outpatient Medications   Medication Sig Dispense Refill    valsartan (DIOVAN) 320 mg tablet Take 1 Tablet by mouth daily. 30 Tablet 0    simvastatin (ZOCOR) 10 mg tablet TAKE 1 TABLET BY MOUTH EVERYDAY AT BEDTIME 30 Tablet 2    dilTIAZem ER (CARDIZEM SR) 120 mg capsule TAKE 1 CAPSULE BY MOUTH EVERY DAY 30 Cap 2    albuterol (Ventolin HFA) 90 mcg/actuation inhaler Take 2 Puffs by inhalation every six (6) hours as needed for Wheezing. 1 Inhaler 1    fexofenadine-pseudoephedrine (Allergy Relief-D,fexofenadine,)  mg Tb12 Take 1 Tab by mouth every twelve (12) hours.  azithromycin (ZITHROMAX) 250 mg tablet Take 250 mg by mouth daily.  benzonatate (TESSALON) 200 mg capsule Take 200 mg by mouth three (3) times daily as needed for Cough.  cephALEXin (KEFLEX) 500 mg capsule Take 500 mg by mouth four (4) times daily.  colchicine 0.6 mg tablet Take 0.6 mg by mouth daily.  cyclobenzaprine (FLEXERIL) 10 mg tablet Take  by mouth three (3) times daily as needed for Muscle Spasm(s).  dilTIAZem ER (DILACOR XR) 120 mg capsule Take  by mouth daily.  finasteride (PROSCAR) 5 mg tablet Take 5 mg by mouth daily.  influenza vaccine 2019-20, 65 yrs+,,PF, (Fluad 9729-8730, 65 yr up,,PF,) syrg injection 0.5 mL by IntraMUSCular route PRIOR TO DISCHARGE.  fluticasone propionate (FLONASE) 50 mcg/actuation nasal spray 2 Sprays by Both Nostrils route daily.  HYDROcodone-acetaminophen (NORCO) 5-325 mg per tablet Take  by mouth.  indomethacin (INDOCIN) 50 mg capsule Take  by mouth three (3) times daily.  methylPREDNISolone (MEDROL DOSEPACK) 4 mg tablet Take  by mouth.  predniSONE (DELTASONE) 10 mg tablet Take  by mouth daily (with breakfast).  predniSONE (DELTASONE) 20 mg tablet Take  by mouth daily (with breakfast).  beclomethasone (Qvar) 80 mcg/actuation aero Take 1 Puff by inhalation two (2) times a day.  beclomethasone dipropionate (Qvar RediHaler) 80 mcg/actuation HFAb inhaler Take 1 Puff by inhalation.       edoxaban (Savaysa) 60 mg tab tablet Take 60 mg by mouth daily.      sodium bicarbonate 325 mg tablet Take 325 mg by mouth four (4) times daily.  temazepam (RESTORIL) 30 mg capsule Take  by mouth nightly as needed for Sleep.  traZODone (DESYREL) 50 mg tablet Take  by mouth nightly.  fexofenadine (ALLEGRA) 60 mg tablet Take  by mouth.  diclofenac (VOLTAREN) 1 % gel Apply  to affected area four (4) times daily.  triamcinolone acetonide (Kenalog) 40 mg/mL injection once. Allergies   Allergen Reactions    Bactrim [Sulfamethoprim] Unknown (comments)    Penicillin G Unknown (comments)       Objective:  Visit Vitals  /80 (BP 1 Location: Right arm, BP Patient Position: Sitting, BP Cuff Size: Adult)   Pulse 77   Temp 97.7 °F (36.5 °C) (Temporal)   Resp 18   SpO2 98%       Physical Exam:   Physical Exam        Results for orders placed or performed during the hospital encounter of 10/28/21   CBC WITH AUTOMATED DIFF   Result Value Ref Range    WBC 5.2 4.1 - 11.1 K/uL    RBC 3.05 (L) 4.10 - 5.70 M/uL    HGB 10.9 (L) 12.1 - 17.0 g/dL    HCT 32.1 (L) 36.6 - 50.3 %    .2 (H) 80.0 - 99.0 FL    MCH 35.7 (H) 26.0 - 34.0 PG    MCHC 34.0 30.0 - 36.5 g/dL    RDW 15.1 (H) 11.5 - 14.5 %    PLATELET 586 (L) 631 - 400 K/uL    MPV 8.8 (L) 8.9 - 12.9 FL    NEUTROPHILS 43 32 - 75 %    LYMPHOCYTES 42 12 - 49 %    MONOCYTES 13 5 - 13 %    EOSINOPHILS 2 0 - 7 %    BASOPHILS 0 0 - 1 %    IMMATURE GRANULOCYTES 0 0.0 - 0.5 %    ABS. NEUTROPHILS 2.2 1.8 - 8.0 K/UL    ABS. LYMPHOCYTES 2.2 0.8 - 3.5 K/UL    ABS. MONOCYTES 0.7 0.0 - 1.0 K/UL    ABS. EOSINOPHILS 0.1 0.0 - 0.4 K/UL    ABS. BASOPHILS 0.0 0.0 - 0.1 K/UL    ABS. IMM.  GRANS. 0.0 0.00 - 0.04 K/UL    DF AUTOMATED     METABOLIC PANEL, BASIC   Result Value Ref Range    Sodium 138 136 - 145 mmol/L    Potassium 3.7 3.5 - 5.1 mmol/L    Chloride 102 97 - 108 mmol/L    CO2 25 21 - 32 mmol/L    Anion gap 11 5 - 15 mmol/L    Glucose 100 65 - 100 mg/dL    BUN 29 (H) 6 - 20 mg/dL    Creatinine 1.29 0.70 - 1.30 mg/dL    BUN/Creatinine ratio 22 (H) 12 - 20      GFR est AA >60 >60 ml/min/1.73m2    GFR est non-AA 54 (L) >60 ml/min/1.73m2    Calcium 8.2 (L) 8.5 - 10.1 mg/dL   HEPATIC FUNCTION PANEL   Result Value Ref Range    Protein, total 6.6 6.4 - 8.2 g/dL    Albumin 2.9 (L) 3.5 - 5.0 g/dL    Globulin 3.7 2.0 - 4.0 g/dL    A-G Ratio 0.8 (L) 1.1 - 2.2      Bilirubin, total 0.5 0.2 - 1.0 mg/dL    Bilirubin, direct 0.3 (H) 0.0 - 0.2 mg/dL    Alk. phosphatase 116 45 - 117 U/L    AST (SGOT) 82 (H) 15 - 37 U/L    ALT (SGPT) 32 12 - 78 U/L   ETHYL ALCOHOL   Result Value Ref Range    ALCOHOL(ETHYL),SERUM 150 (H) <10 mg/dL       Assessment/Plan:    ICD-10-CM ICD-9-CM    1. Malignant hypertensive heart disease without heart failure  I11.9 402.00    2. Mixed simple and mucopurulent chronic bronchitis (HCC)  J41.8 491.1    3. Hypercholesterolemia  E78.00 272.0    4. Chronic bilateral low back pain with bilateral sciatica  M54.42 724.2 REFERRAL TO PHYSICAL THERAPY    M54.41 724.3     G89.29 338.29    5. Primary osteoarthritis of both knees  M17.0 715.16 REFERRAL TO PHYSICAL THERAPY   6. Weakness of both arms  R29.898 729.89 REFERRAL TO PHYSICAL THERAPY   7. Weakness of both legs  R29.898 729.89 REFERRAL TO PHYSICAL THERAPY     Orders Placed This Encounter    [address] EMPL     Referral Priority:   Routine     Referral Type:   PT/OT/ST     Referral Reason:   Specialty Services Required     Number of Visits Requested:   1     Cannot display discharge medications since this is not an admission.

## 2022-01-10 RX ORDER — VALSARTAN 320 MG/1
320 TABLET ORAL DAILY
Qty: 30 TABLET | Refills: 0 | Status: SHIPPED | OUTPATIENT
Start: 2022-01-10 | End: 2022-04-06 | Stop reason: SDUPTHER

## 2022-01-19 ENCOUNTER — TELEPHONE (OUTPATIENT)
Dept: FAMILY MEDICINE CLINIC | Age: 77
End: 2022-01-19

## 2022-01-19 NOTE — TELEPHONE ENCOUNTER
Dr. Noella Aase would like a referral to go to [facility] for toilet seat and shower chair due to weakness in arms and legs.

## 2022-01-20 NOTE — TELEPHONE ENCOUNTER
Referral for shower chair and bedside commode, and the last office notes sent over to [facility].

## 2022-01-25 ENCOUNTER — TELEPHONE (OUTPATIENT)
Dept: FAMILY MEDICINE CLINIC | Age: 77
End: 2022-01-25

## 2022-01-25 NOTE — TELEPHONE ENCOUNTER
This nurse called [facility] and spoke with Mckenzie Washburn about the bedside commode and shower chair and she stated she has been trying to get in touch with the patient and has had no luck. I called and spoke with the patient and explained to him that he needed to call [facility] and ask for Mckenzie Washburn and I gave him their telephone number [phone]. He stated he will call her.

## 2022-01-26 PROBLEM — Z23 ENCOUNTER FOR IMMUNIZATION: Status: RESOLVED | Noted: 2021-12-27 | Resolved: 2022-01-26

## 2022-02-02 ENCOUNTER — TELEPHONE (OUTPATIENT)
Dept: FAMILY MEDICINE CLINIC | Age: 77
End: 2022-02-02

## 2022-02-02 NOTE — TELEPHONE ENCOUNTER
This nurse called back to [facility] and spoke back with [facility] and she stated the patient still has not contacted her.

## 2022-03-18 PROBLEM — M25.519 SHOULDER PAIN: Status: ACTIVE | Noted: 2020-08-03

## 2022-03-18 PROBLEM — R29.898 WEAKNESS OF BOTH LEGS: Status: ACTIVE | Noted: 2021-12-27

## 2022-03-18 PROBLEM — G47.00 INSOMNIA: Status: ACTIVE | Noted: 2020-08-03

## 2022-03-18 PROBLEM — R55 VASOVAGAL SYNCOPE: Status: ACTIVE | Noted: 2020-08-03

## 2022-03-18 PROBLEM — M54.50 LOW BACK PAIN: Status: ACTIVE | Noted: 2020-08-03

## 2022-03-19 PROBLEM — M25.579 ANKLE PAIN: Status: ACTIVE | Noted: 2020-08-03

## 2022-03-19 PROBLEM — R73.03 PREDIABETES: Status: ACTIVE | Noted: 2020-08-03

## 2022-03-19 PROBLEM — N64.4 PAIN IN BREAST: Status: ACTIVE | Noted: 2020-08-03

## 2022-03-19 PROBLEM — J30.2 SEASONAL ALLERGIC RHINITIS: Status: ACTIVE | Noted: 2020-08-03

## 2022-03-19 PROBLEM — G47.30 SLEEP APNEA: Status: ACTIVE | Noted: 2020-08-03

## 2022-03-19 PROBLEM — N28.9 KIDNEY DISEASE: Status: ACTIVE | Noted: 2020-08-03

## 2022-03-19 PROBLEM — R42 DIZZINESS AND GIDDINESS: Status: ACTIVE | Noted: 2020-08-03

## 2022-03-19 PROBLEM — H93.11 TINNITUS OF RIGHT EAR: Status: ACTIVE | Noted: 2020-08-03

## 2022-03-19 PROBLEM — I11.9 MALIGNANT HYPERTENSIVE HEART DISEASE WITHOUT HEART FAILURE: Status: ACTIVE | Noted: 2021-03-04

## 2022-03-19 PROBLEM — F14.10 COCAINE ABUSE (HCC): Status: ACTIVE | Noted: 2020-08-03

## 2022-03-19 PROBLEM — E78.00 HYPERCHOLESTEROLEMIA: Status: ACTIVE | Noted: 2020-08-03

## 2022-03-19 PROBLEM — J44.9 CHRONIC OBSTRUCTIVE LUNG DISEASE (HCC): Status: ACTIVE | Noted: 2020-08-03

## 2022-03-19 PROBLEM — M17.9 OSTEOARTHRITIS OF KNEE: Status: ACTIVE | Noted: 2020-08-03

## 2022-03-19 PROBLEM — M25.562 PAIN IN LEFT KNEE: Status: ACTIVE | Noted: 2020-08-03

## 2022-03-19 PROBLEM — F10.10 ALCOHOL ABUSE: Status: ACTIVE | Noted: 2020-12-08

## 2022-03-19 PROBLEM — R19.7 DIARRHEA: Status: ACTIVE | Noted: 2020-08-03

## 2022-03-20 PROBLEM — N42.9 BENIGN PROSTATIC DISEASE: Status: ACTIVE | Noted: 2020-08-03

## 2022-03-20 PROBLEM — I10 HYPERTENSIVE DISORDER: Status: ACTIVE | Noted: 2020-08-03

## 2022-03-20 PROBLEM — N40.0 BENIGN PROSTATIC HYPERPLASIA: Status: ACTIVE | Noted: 2020-08-03

## 2022-03-20 PROBLEM — M54.12 CERVICAL RADICULOPATHY: Status: ACTIVE | Noted: 2020-08-03

## 2022-03-20 PROBLEM — M51.26 LUMBAR HERNIATED DISC: Status: ACTIVE | Noted: 2020-12-08

## 2022-03-20 PROBLEM — R29.898 WEAKNESS OF BOTH ARMS: Status: ACTIVE | Noted: 2021-12-27

## 2022-04-04 ENCOUNTER — TELEPHONE (OUTPATIENT)
Dept: ONCOLOGY | Age: 77
End: 2022-04-04

## 2022-04-05 ENCOUNTER — TELEPHONE (OUTPATIENT)
Dept: FAMILY MEDICINE CLINIC | Age: 77
End: 2022-04-05

## 2022-04-05 ENCOUNTER — TELEPHONE (OUTPATIENT)
Dept: ONCOLOGY | Age: 77
End: 2022-04-05

## 2022-04-05 NOTE — TELEPHONE ENCOUNTER
Returned patients call to reschedule appointment due to time being to early. Please advise. Unable to leave vm. Can move patient to any time slot.

## 2022-04-06 ENCOUNTER — TELEPHONE (OUTPATIENT)
Dept: FAMILY MEDICINE CLINIC | Age: 77
End: 2022-04-06

## 2022-04-06 RX ORDER — VALSARTAN 320 MG/1
320 TABLET ORAL DAILY
Qty: 7 TABLET | Refills: 0 | Status: SHIPPED | OUTPATIENT
Start: 2022-04-06 | End: 2022-04-13

## 2022-04-13 PROBLEM — D69.6 THROMBOCYTOPENIA (HCC): Status: ACTIVE | Noted: 2022-04-13

## 2022-04-13 PROBLEM — D53.9 MACROCYTIC ANEMIA: Status: ACTIVE | Noted: 2022-04-13

## 2022-04-14 ENCOUNTER — TELEPHONE (OUTPATIENT)
Dept: ONCOLOGY | Age: 77
End: 2022-04-14

## 2022-04-27 ENCOUNTER — NURSE TRIAGE (OUTPATIENT)
Dept: OTHER | Facility: CLINIC | Age: 77
End: 2022-04-27

## 2022-05-02 RX ORDER — VALSARTAN 320 MG/1
320 TABLET ORAL DAILY
Qty: 30 TABLET | Refills: 0 | Status: SHIPPED | OUTPATIENT
Start: 2022-05-02 | End: 2022-05-02

## 2022-05-02 RX ORDER — VALSARTAN 320 MG/1
320 TABLET ORAL DAILY
Qty: 90 TABLET | Refills: 0 | Status: SHIPPED | OUTPATIENT
Start: 2022-05-02 | End: 2022-06-08

## 2022-05-12 ENCOUNTER — HOSPITAL ENCOUNTER (EMERGENCY)
Age: 77
Discharge: HOME | End: 2022-05-13
Attending: STUDENT IN AN ORGANIZED HEALTH CARE EDUCATION/TRAINING PROGRAM
Payer: MEDICARE

## 2022-05-12 DIAGNOSIS — J44.1 COPD EXACERBATION (HCC): Primary | ICD-10-CM

## 2022-05-12 PROCEDURE — 99285 EMERGENCY DEPT VISIT HI MDM: CPT

## 2022-05-12 PROCEDURE — 96374 THER/PROPH/DIAG INJ IV PUSH: CPT

## 2022-05-13 ENCOUNTER — APPOINTMENT (OUTPATIENT)
Dept: GENERAL RADIOLOGY | Age: 77
End: 2022-05-13
Attending: STUDENT IN AN ORGANIZED HEALTH CARE EDUCATION/TRAINING PROGRAM
Payer: MEDICARE

## 2022-05-13 ENCOUNTER — APPOINTMENT (OUTPATIENT)
Dept: CT IMAGING | Age: 77
End: 2022-05-13
Attending: NURSE PRACTITIONER
Payer: MEDICARE

## 2022-05-13 VITALS
OXYGEN SATURATION: 100 % | RESPIRATION RATE: 22 BRPM | WEIGHT: 220 LBS | SYSTOLIC BLOOD PRESSURE: 147 MMHG | DIASTOLIC BLOOD PRESSURE: 104 MMHG | BODY MASS INDEX: 29.16 KG/M2 | HEIGHT: 73 IN | TEMPERATURE: 97.2 F | HEART RATE: 78 BPM

## 2022-05-13 LAB
ALBUMIN SERPL-MCNC: 2.7 G/DL (ref 3.5–5)
ALBUMIN/GLOB SERPL: 0.7 {RATIO} (ref 1.1–2.2)
ALP SERPL-CCNC: 88 U/L (ref 45–117)
ALT SERPL-CCNC: 23 U/L (ref 12–78)
ANION GAP SERPL CALC-SCNC: 10 MMOL/L (ref 5–15)
AST SERPL W P-5'-P-CCNC: 50 U/L (ref 15–37)
ATRIAL RATE: 73 BPM
BASOPHILS # BLD: 0 K/UL (ref 0–0.1)
BASOPHILS NFR BLD: 0 % (ref 0–1)
BILIRUB SERPL-MCNC: 0.6 MG/DL (ref 0.2–1)
BNP SERPL-MCNC: 456 PG/ML
BUN SERPL-MCNC: 26 MG/DL (ref 6–20)
BUN/CREAT SERPL: 21 (ref 12–20)
CA-I BLD-MCNC: 8 MG/DL (ref 8.5–10.1)
CALCULATED P AXIS, ECG09: 18 DEGREES
CALCULATED R AXIS, ECG10: -22 DEGREES
CALCULATED T AXIS, ECG11: 25 DEGREES
CHLORIDE SERPL-SCNC: 103 MMOL/L (ref 97–108)
CO2 SERPL-SCNC: 23 MMOL/L (ref 21–32)
CREAT SERPL-MCNC: 1.25 MG/DL (ref 0.7–1.3)
DIAGNOSIS, 93000: NORMAL
DIFFERENTIAL METHOD BLD: ABNORMAL
EOSINOPHIL # BLD: 0.1 K/UL (ref 0–0.4)
EOSINOPHIL NFR BLD: 1 % (ref 0–7)
ERYTHROCYTE [DISTWIDTH] IN BLOOD BY AUTOMATED COUNT: 15.6 % (ref 11.5–14.5)
GLOBULIN SER CALC-MCNC: 4 G/DL (ref 2–4)
GLUCOSE SERPL-MCNC: 95 MG/DL (ref 65–100)
HCT VFR BLD AUTO: 38.8 % (ref 36.6–50.3)
HGB BLD-MCNC: 13 G/DL (ref 12.1–17)
IMM GRANULOCYTES # BLD AUTO: 0 K/UL (ref 0–0.04)
IMM GRANULOCYTES NFR BLD AUTO: 0 % (ref 0–0.5)
LYMPHOCYTES # BLD: 3.1 K/UL (ref 0.8–3.5)
LYMPHOCYTES NFR BLD: 62 % (ref 12–49)
MCH RBC QN AUTO: 36.1 PG (ref 26–34)
MCHC RBC AUTO-ENTMCNC: 33.5 G/DL (ref 30–36.5)
MCV RBC AUTO: 107.8 FL (ref 80–99)
MONOCYTES # BLD: 0.4 K/UL (ref 0–1)
MONOCYTES NFR BLD: 8 % (ref 5–13)
NEUTS SEG # BLD: 1.5 K/UL (ref 1.8–8)
NEUTS SEG NFR BLD: 29 % (ref 32–75)
NRBC # BLD: 0 K/UL (ref 0–0.01)
NRBC BLD-RTO: 0 PER 100 WBC
P-R INTERVAL, ECG05: 122 MS
PLATELET # BLD AUTO: 195 K/UL (ref 150–400)
PMV BLD AUTO: 9.6 FL (ref 8.9–12.9)
POTASSIUM SERPL-SCNC: 4.9 MMOL/L (ref 3.5–5.1)
PROT SERPL-MCNC: 6.7 G/DL (ref 6.4–8.2)
Q-T INTERVAL, ECG07: 440 MS
QRS DURATION, ECG06: 104 MS
QTC CALCULATION (BEZET), ECG08: 484 MS
RBC # BLD AUTO: 3.6 M/UL (ref 4.1–5.7)
SODIUM SERPL-SCNC: 136 MMOL/L (ref 136–145)
TROPONIN-HIGH SENSITIVITY: 30 NG/L (ref 0–76)
VENTRICULAR RATE, ECG03: 73 BPM
WBC # BLD AUTO: 5.1 K/UL (ref 4.1–11.1)

## 2022-05-13 PROCEDURE — 36415 COLL VENOUS BLD VENIPUNCTURE: CPT

## 2022-05-13 PROCEDURE — 84484 ASSAY OF TROPONIN QUANT: CPT

## 2022-05-13 PROCEDURE — 85025 COMPLETE CBC W/AUTO DIFF WBC: CPT

## 2022-05-13 PROCEDURE — 70450 CT HEAD/BRAIN W/O DYE: CPT

## 2022-05-13 PROCEDURE — 71045 X-RAY EXAM CHEST 1 VIEW: CPT

## 2022-05-13 PROCEDURE — 74011250636 HC RX REV CODE- 250/636: Performed by: NURSE PRACTITIONER

## 2022-05-13 PROCEDURE — 70486 CT MAXILLOFACIAL W/O DYE: CPT

## 2022-05-13 PROCEDURE — 80053 COMPREHEN METABOLIC PANEL: CPT

## 2022-05-13 PROCEDURE — 93005 ELECTROCARDIOGRAM TRACING: CPT

## 2022-05-13 PROCEDURE — 83880 ASSAY OF NATRIURETIC PEPTIDE: CPT

## 2022-05-13 RX ORDER — PREDNISONE 20 MG/1
40 TABLET ORAL DAILY
Qty: 10 TABLET | Refills: 0 | Status: SHIPPED | OUTPATIENT
Start: 2022-05-13 | End: 2022-05-18

## 2022-05-13 RX ORDER — ALBUTEROL SULFATE 90 UG/1
2 AEROSOL, METERED RESPIRATORY (INHALATION)
Qty: 6.7 G | Refills: 0 | Status: SHIPPED | OUTPATIENT
Start: 2022-05-13

## 2022-05-13 RX ORDER — AZITHROMYCIN 250 MG/1
250 TABLET, FILM COATED ORAL SEE ADMIN INSTRUCTIONS
Qty: 6 TABLET | Refills: 0 | Status: SHIPPED | OUTPATIENT
Start: 2022-05-13

## 2022-05-13 RX ADMIN — METHYLPREDNISOLONE SODIUM SUCCINATE 60 MG: 125 INJECTION, POWDER, FOR SOLUTION INTRAMUSCULAR; INTRAVENOUS at 01:41

## 2022-05-13 NOTE — DISCHARGE INSTRUCTIONS
Thank you! Thank you for allowing me to care for you in the emergency department. I sincerely hope that you are satisfied with your visit today. It is my goal to provide you with excellent care. Below you will find a list of your labs and imaging from your visit today. Should you have any questions regarding these results please do not hesitate to call the emergency department. Labs -     Recent Results (from the past 12 hour(s))   CBC WITH AUTOMATED DIFF    Collection Time: 05/13/22 12:09 AM   Result Value Ref Range    WBC 5.1 4.1 - 11.1 K/uL    RBC 3.60 (L) 4.10 - 5.70 M/uL    HGB 13.0 12.1 - 17.0 g/dL    HCT 38.8 36.6 - 50.3 %    .8 (H) 80.0 - 99.0 FL    MCH 36.1 (H) 26.0 - 34.0 PG    MCHC 33.5 30.0 - 36.5 g/dL    RDW 15.6 (H) 11.5 - 14.5 %    PLATELET 044 139 - 860 K/uL    MPV 9.6 8.9 - 12.9 FL    NRBC 0.0 0.0  WBC    ABSOLUTE NRBC 0.00 0.00 - 0.01 K/uL    NEUTROPHILS 29 (L) 32 - 75 %    LYMPHOCYTES 62 (H) 12 - 49 %    MONOCYTES 8 5 - 13 %    EOSINOPHILS 1 0 - 7 %    BASOPHILS 0 0 - 1 %    IMMATURE GRANULOCYTES 0 0 - 0.5 %    ABS. NEUTROPHILS 1.5 (L) 1.8 - 8.0 K/UL    ABS. LYMPHOCYTES 3.1 0.8 - 3.5 K/UL    ABS. MONOCYTES 0.4 0.0 - 1.0 K/UL    ABS. EOSINOPHILS 0.1 0.0 - 0.4 K/UL    ABS. BASOPHILS 0.0 0.0 - 0.1 K/UL    ABS. IMM. GRANS. 0.0 0.00 - 0.04 K/UL    DF AUTOMATED     METABOLIC PANEL, COMPREHENSIVE    Collection Time: 05/13/22 12:09 AM   Result Value Ref Range    Sodium 136 136 - 145 mmol/L    Potassium 4.9 3.5 - 5.1 mmol/L    Chloride 103 97 - 108 mmol/L    CO2 23 21 - 32 mmol/L    Anion gap 10 5 - 15 mmol/L    Glucose 95 65 - 100 mg/dL    BUN 26 (H) 6 - 20 mg/dL    Creatinine 1.25 0.70 - 1.30 mg/dL    BUN/Creatinine ratio 21 (H) 12 - 20      GFR est AA >60 >60 ml/min/1.73m2    GFR est non-AA 56 (L) >60 ml/min/1.73m2    Calcium 8.0 (L) 8.5 - 10.1 mg/dL    Bilirubin, total 0.6 0.2 - 1.0 mg/dL    AST (SGOT) 50 (H) 15 - 37 U/L    ALT (SGPT) 23 12 - 78 U/L    Alk.  phosphatase 88 45 - 117 U/L    Protein, total 6.7 6.4 - 8.2 g/dL    Albumin 2.7 (L) 3.5 - 5.0 g/dL    Globulin 4.0 2.0 - 4.0 g/dL    A-G Ratio 0.7 (L) 1.1 - 2.2     NT-PRO BNP    Collection Time: 05/13/22  1:30 AM   Result Value Ref Range    NT pro- (H) <450 pg/mL   TROPONIN-HIGH SENSITIVITY    Collection Time: 05/13/22  1:41 AM   Result Value Ref Range    Troponin-High Sensitivity 30 0 - 76 ng/L       Radiologic Studies -   CT MAXILLOFACIAL WO CONT   Final Result   Head CT:   No acute intracranial abnormality. Facial bone CT:   No acute fracture the facial bones. CT HEAD WO CONT   Final Result   Head CT:   No acute intracranial abnormality. Facial bone CT:   No acute fracture the facial bones. XR CHEST PORT   Final Result   No acute cardiopulmonary findings. CT Results  (Last 48 hours)                 05/13/22 0143  CT MAXILLOFACIAL WO CONT Final result    Impression:  Head CT:   No acute intracranial abnormality. Facial bone CT:   No acute fracture the facial bones. Narrative:  Studies:   Head CT without contrast.   Facial bone CT without contrast.       Clinical Indication: Trauma. Comparison: Head CT dated 10/28/2021. Technique: Routine volume acquisition of the head and facial bones was performed   without contrast using soft tissue and bone kernels. Coronal and sagittal   reconstructions were generated and reviewed. Dose reduction: All CT scans at   this facility are performed using dose reduction optimization techniques as   appropriate to a performed exam including the following-automated exposure   control, adjustments of mA and/or Kv according to patient size, or use of   iterative reconstructive technique. Findings:       Head CT:   Generalized volume loss with commensurate prominence of the ventricles and   sulci, unchanged. No midline shift. The basal cisterns are patent.  No acute hemorrhage, abnormal   extra-axial fluid, or evidence of large territorial ischemia. Scattered foci of   hypodensity involving the cerebral hemispheric white matter are nonspecific but   most commonly associated with chronic small vessel ischemic changes. Absent native lenses. No acute fracture. Unremarkable extracalvarial soft   tissues. Facial bone CT: The facial soft tissues are unremarkable. No acute fracture of the facial bones. The temporomandibular joints and mandible   are intact. No evidence of an acute intraorbital injury. The paranasal sinuses and included mastoid air cells are clear. Cerumen in the   left EAC               05/13/22 0143  CT HEAD WO CONT Final result    Impression:  Head CT:   No acute intracranial abnormality. Facial bone CT:   No acute fracture the facial bones. Narrative:  Studies:   Head CT without contrast.   Facial bone CT without contrast.       Clinical Indication: Trauma. Comparison: Head CT dated 10/28/2021. Technique: Routine volume acquisition of the head and facial bones was performed   without contrast using soft tissue and bone kernels. Coronal and sagittal   reconstructions were generated and reviewed. Dose reduction: All CT scans at   this facility are performed using dose reduction optimization techniques as   appropriate to a performed exam including the following-automated exposure   control, adjustments of mA and/or Kv according to patient size, or use of   iterative reconstructive technique. Findings:       Head CT:   Generalized volume loss with commensurate prominence of the ventricles and   sulci, unchanged. No midline shift. The basal cisterns are patent. No acute hemorrhage, abnormal   extra-axial fluid, or evidence of large territorial ischemia. Scattered foci of   hypodensity involving the cerebral hemispheric white matter are nonspecific but   most commonly associated with chronic small vessel ischemic changes. Absent native lenses. No acute fracture. Unremarkable extracalvarial soft   tissues. Facial bone CT: The facial soft tissues are unremarkable. No acute fracture of the facial bones. The temporomandibular joints and mandible   are intact. No evidence of an acute intraorbital injury. The paranasal sinuses and included mastoid air cells are clear. Cerumen in the   left EAC                     CXR Results  (Last 48 hours)                 05/13/22 0039  XR CHEST PORT Final result    Impression:  No acute cardiopulmonary findings. Narrative:  Study: Chest radiograph(s), 1 view       Clinical Indication: Shortness of breath. Comparison: Chest radiographs dated 1/20/2014 and chest CT dated 2/17/2014. Findings: The cardiac silhouette is accentuated by portable AP technique. Tortuous   thoracic aorta. Mild right hemidiaphragm elevation. No focal consolidation, large pleural   effusion, or discernible pneumothorax. If you feel that you have not received excellent quality care or timely care, please ask to speak to the nurse manager. Please choose us in the future for your continued health care needs. ------------------------------------------------------------------------------------------------------------  The exam and treatment you received in the Emergency Department were for an urgent problem and are not intended as complete care. It is important that you follow-up with a doctor, nurse practitioner, or physician assistant to:  (1) confirm your diagnosis,  (2) re-evaluation of changes in your illness and treatment, and  (3) for ongoing care. If your symptoms become worse or you do not improve as expected and you are unable to reach your usual health care provider, you should return to the Emergency Department. We are available 24 hours a day. Please take your discharge instructions with you when you go to your follow-up appointment.      If you have any problem arranging a follow-up appointment, contact the Emergency Department immediately. If a prescription has been provided, please have it filled as soon as possible to prevent a delay in treatment. Read the entire medication instruction sheet provided to you by the pharmacy. If you have any questions or reservations about taking the medication due to side effects or interactions with other medications, please call your primary care physician or contact the ER to speak with the charge nurse. Make an appointment with your family doctor or the physician you were referred to for follow-up of this visit as instructed on your discharge paperwork, as this is a mandatory follow-up. Return to the ER if you are unable to be seen or if you are unable to be seen in a timely manner. If you have any problem arranging the follow-up visit, contact the Emergency Department immediately.

## 2022-05-13 NOTE — ED TRIAGE NOTES
Patient called for generalized weakness on arrival patient O2 was 89% on room air 4L and improved also reports he has had some falls recently

## 2022-05-13 NOTE — ED PROVIDER NOTES
EMERGENCY DEPARTMENT HISTORY AND PHYSICAL EXAM      Date: 5/12/2022  Patient Name: Gilberto Wilson      History of Presenting Illness     Chief Complaint   Patient presents with    Fatigue    Shortness of Breath       History Provided By: Patient    HPI: Gilberto Wilson, 64 y.o. male with a past medical history significant hypertension, COPD and BPH presents to the ED with cc of \"I feel bad \". Patient reports worsening shortness of breath over the last several days. He reports cough with increased mucus production. He denies any associated fever/chills, rhinorrhea or any other URI symptoms. He reports generalized weakness and states he has had several falls recently. He states he is unsure why he is falling but states he thinks it is related to his knees. Advises he has discussed with PCP and has pending PT. He is complaining of tenderness to the left side of his face secondary to fall approximately 1 week ago. He is unsure whether or not he had any LOC. He denies any chest pain, palpitations or dizziness. He denies any HA, visual disturbances or speech difficulty. There are no other complaints, changes, or physical findings at this time. PCP: Jacob Juarez MD    Current Outpatient Medications   Medication Sig Dispense Refill    predniSONE (DELTASONE) 20 mg tablet Take 40 mg by mouth daily for 5 days. With Breakfast 10 Tablet 0    azithromycin (Zithromax Z-Collins) 250 mg tablet Take 1 Tablet by mouth See Admin Instructions. 6 Tablet 0    albuterol (Ventolin HFA) 90 mcg/actuation inhaler Take 2 Puffs by inhalation every six (6) hours as needed for Wheezing or Shortness of Breath.  6.7 g 0    valsartan (DIOVAN) 320 mg tablet TAKE 1 TABLET BY MOUTH DAILY 90 Tablet 0    Bedside Commode XX As directed 1 Each 0    Shower Chair XX kelechi Use as directed 1 Each 0    simvastatin (ZOCOR) 10 mg tablet TAKE 1 TABLET BY MOUTH EVERYDAY AT BEDTIME 30 Tablet 2    dilTIAZem ER (CARDIZEM SR) 120 mg capsule TAKE 1 CAPSULE BY MOUTH EVERY DAY 30 Cap 2    fexofenadine-pseudoephedrine (Allergy Relief-D,fexofenadine,)  mg Tb12 Take 1 Tab by mouth every twelve (12) hours.  benzonatate (TESSALON) 200 mg capsule Take 200 mg by mouth three (3) times daily as needed for Cough.  cephALEXin (KEFLEX) 500 mg capsule Take 500 mg by mouth four (4) times daily.  colchicine 0.6 mg tablet Take 0.6 mg by mouth daily.  cyclobenzaprine (FLEXERIL) 10 mg tablet Take  by mouth three (3) times daily as needed for Muscle Spasm(s).  dilTIAZem ER (DILACOR XR) 120 mg capsule Take  by mouth daily.  finasteride (PROSCAR) 5 mg tablet Take 5 mg by mouth daily.  influenza vaccine 2019-20, 65 yrs+,,PF, (Fluad 8390-5003, 65 yr up,,PF,) syrg injection 0.5 mL by IntraMUSCular route PRIOR TO DISCHARGE.  fluticasone propionate (FLONASE) 50 mcg/actuation nasal spray 2 Sprays by Both Nostrils route daily.  HYDROcodone-acetaminophen (NORCO) 5-325 mg per tablet Take  by mouth.  indomethacin (INDOCIN) 50 mg capsule Take  by mouth three (3) times daily.  methylPREDNISolone (MEDROL DOSEPACK) 4 mg tablet Take  by mouth.  beclomethasone (Qvar) 80 mcg/actuation aero Take 1 Puff by inhalation two (2) times a day.  beclomethasone dipropionate (Qvar RediHaler) 80 mcg/actuation HFAb inhaler Take 1 Puff by inhalation.  edoxaban (Savaysa) 60 mg tab tablet Take 60 mg by mouth daily.  sodium bicarbonate 325 mg tablet Take 325 mg by mouth four (4) times daily.  temazepam (RESTORIL) 30 mg capsule Take  by mouth nightly as needed for Sleep.  traZODone (DESYREL) 50 mg tablet Take  by mouth nightly.  fexofenadine (ALLEGRA) 60 mg tablet Take  by mouth.  diclofenac (VOLTAREN) 1 % gel Apply  to affected area four (4) times daily.  triamcinolone acetonide (Kenalog) 40 mg/mL injection once.          Past History     Past Medical History:  Past Medical History:   Diagnosis Date    Ankle pain 8/3/2020    Benign prostatic disease 8/3/2020    Hypertrophy with Outflow Obstruction    Benign prostatic hyperplasia 8/3/2020    Bronchitis     Cervical radiculopathy 8/3/2020    Chronic obstructive lung disease ([facility] [address] 8/3/2020    Cocaine abuse ([facility] [address] 8/3/2020    Diarrhea 8/3/2020    Dizziness and giddiness 8/3/2020    Hypercholesterolemia 8/3/2020    Hypertensive disorder 8/3/2020    Insomnia 8/3/2020    Kidney disease 8/3/2020    Low back pain 8/3/2020    Osteoarthritis of knee 8/3/2020    Pain in breast 8/3/2020    Pain in left knee 8/3/2020    Prediabetes 8/3/2020    Seasonal allergic rhinitis 8/3/2020    Shoulder pain 8/3/2020    Sleep apnea 8/3/2020    Tinnitus of right ear 8/3/2020    Vasovagal syncope 8/3/2020       Past Surgical History:  History reviewed. No pertinent surgical history. Family History:  Family History   Problem Relation Age of Onset    Hypertension Mother        Social History:  Social History     Tobacco Use    Smoking status: Former Smoker    Smokeless tobacco: Never Used   Substance Use Topics    Alcohol use: Yes     Comment: pt drinks everyday    Drug use: Yes     Frequency: 3.0 times per week     Types: Cocaine       Allergies: Allergies   Allergen Reactions    Bactrim [Sulfamethoprim] Unknown (comments)    Penicillin G Unknown (comments)         Review of Systems     Review of Systems   Constitutional: Positive for fatigue. Negative for chills and fever. HENT: Positive for facial swelling. Negative for rhinorrhea and sore throat. Eyes: Negative for visual disturbance. Respiratory: Positive for cough and shortness of breath. Cardiovascular: Negative. Negative for chest pain and palpitations. Gastrointestinal: Negative. Negative for diarrhea, nausea and vomiting. Genitourinary: Negative. Negative for dysuria. Musculoskeletal: Positive for gait problem. Neurological: Negative for dizziness and headaches.    All other systems reviewed and are negative. Physical Exam     Physical Exam  Vitals and nursing note reviewed. Constitutional:       General: He is not in acute distress. Appearance: Normal appearance. HENT:      Head: Normocephalic. Contusion present. Jaw: There is normal jaw occlusion. Comments: Left sided facial contusion/swelling  Eyes:      General: Vision grossly intact. Extraocular Movements: Extraocular movements intact. Conjunctiva/sclera: Conjunctivae normal.      Pupils: Pupils are equal, round, and reactive to light. Cardiovascular:      Rate and Rhythm: Normal rate and regular rhythm. Pulses:           Dorsalis pedis pulses are 1+ on the right side and 1+ on the left side. Heart sounds: Normal heart sounds. Pulmonary:      Effort: Pulmonary effort is normal. No tachypnea or accessory muscle usage. Breath sounds: Decreased breath sounds present. No wheezing or rales. Abdominal:      Palpations: Abdomen is soft. Tenderness: There is no abdominal tenderness. Musculoskeletal:         General: Normal range of motion. Cervical back: Normal range of motion and neck supple. Left lower le+ Edema present. Skin:     General: Skin is warm and dry. Neurological:      General: No focal deficit present. Mental Status: He is alert. GCS: GCS eye subscore is 4. GCS verbal subscore is 5. GCS motor subscore is 6. Cranial Nerves: Cranial nerves are intact. Sensory: Sensation is intact. Motor: Weakness present. Psychiatric:         Mood and Affect: Mood normal.         Behavior: Behavior normal. Behavior is cooperative.          Lab and Diagnostic Study Results     Labs -     Admission on 2022, Discharged on 2022   Component Date Value    WBC 2022 5.1     RBC 2022 3.60*    HGB 2022 13.0     HCT 2022 38.8     MCV 2022 107.8*    MCH 2022 36.1*    MCHC 2022 33.5     RDW 2022 15.6*    PLATELET 03/70/3867 159     MPV 05/13/2022 9.6     NRBC 05/13/2022 0.0     ABSOLUTE NRBC 05/13/2022 0.00     NEUTROPHILS 05/13/2022 29*    LYMPHOCYTES 05/13/2022 62*    MONOCYTES 05/13/2022 8     EOSINOPHILS 05/13/2022 1     BASOPHILS 05/13/2022 0     IMMATURE GRANULOCYTES 05/13/2022 0     ABS. NEUTROPHILS 05/13/2022 1.5*    ABS. LYMPHOCYTES 05/13/2022 3.1     ABS. MONOCYTES 05/13/2022 0.4     ABS. EOSINOPHILS 05/13/2022 0.1     ABS. BASOPHILS 05/13/2022 0.0     ABS. IMM. GRANS. 05/13/2022 0.0     DF 05/13/2022 AUTOMATED     Sodium 05/13/2022 136     Potassium 05/13/2022 4.9     Chloride 05/13/2022 103     CO2 05/13/2022 23     Anion gap 05/13/2022 10     Glucose 05/13/2022 95     BUN 05/13/2022 26*    Creatinine 05/13/2022 1.25     BUN/Creatinine ratio 05/13/2022 21*    GFR est AA 05/13/2022 >60     GFR est non-AA 05/13/2022 56*    Calcium 05/13/2022 8.0*    Bilirubin, total 05/13/2022 0.6     AST (SGOT) 05/13/2022 50*    ALT (SGPT) 05/13/2022 23     Alk.  phosphatase 05/13/2022 88     Protein, total 05/13/2022 6.7     Albumin 05/13/2022 2.7*    Globulin 05/13/2022 4.0     A-G Ratio 05/13/2022 0.7*    Ventricular Rate 05/13/2022 73     Atrial Rate 05/13/2022 73     P-R Interval 05/13/2022 122     QRS Duration 05/13/2022 104     Q-T Interval 05/13/2022 440     QTC Calculation (Bezet) 05/13/2022 484     Calculated P Axis 05/13/2022 18     Calculated R Axis 05/13/2022 -22     Calculated T Axis 05/13/2022 25     Diagnosis 05/13/2022                      Value:Sinus rhythm with Premature atrial complexes  Moderate voltage criteria for LVH, may be normal variant  Nonspecific T wave abnormality  Abnormal ECG  When compared with ECG of 08-APR-2014 09:39,  Premature atrial complexes are now Present  Confirmed by Martin Stein MD, Joel Screen ([id number]) on 5/13/2022 11:53:19 PM      Troponin-High Sensitivity 05/13/2022 30     NT pro-BNP 05/13/2022 456*     Radiologic Studies -   CT Results (most recent):  Results from Hospital Encounter encounter on 05/12/22    CT HEAD WO CONT    Narrative  Studies:  Head CT without contrast.  Facial bone CT without contrast.    Clinical Indication: Trauma. Comparison: Head CT dated 10/28/2021. Technique: Routine volume acquisition of the head and facial bones was performed  without contrast using soft tissue and bone kernels. Coronal and sagittal  reconstructions were generated and reviewed. Dose reduction: All CT scans at  this facility are performed using dose reduction optimization techniques as  appropriate to a performed exam including the following-automated exposure  control, adjustments of mA and/or Kv according to patient size, or use of  iterative reconstructive technique. Findings:    Head CT:  Generalized volume loss with commensurate prominence of the ventricles and  sulci, unchanged. No midline shift. The basal cisterns are patent. No acute hemorrhage, abnormal  extra-axial fluid, or evidence of large territorial ischemia. Scattered foci of  hypodensity involving the cerebral hemispheric white matter are nonspecific but  most commonly associated with chronic small vessel ischemic changes. Absent native lenses. No acute fracture. Unremarkable extracalvarial soft  tissues. Facial bone CT: The facial soft tissues are unremarkable. No acute fracture of the facial bones. The temporomandibular joints and mandible  are intact. No evidence of an acute intraorbital injury. The paranasal sinuses and included mastoid air cells are clear. Cerumen in the  left EAC    Impression  Head CT:  No acute intracranial abnormality. Facial bone CT:  No acute fracture the facial bones. CXR Results  (Last 48 hours)    None          Medical Decision Making and ED Course   - I am the first and primary provider for this patient AND AM THE PRIMARY PROVIDER OF RECORD.     - I reviewed the vital signs, available nursing notes, past medical history, past surgical history, family history and social history. - Initial assessment performed. The patients presenting problems have been discussed, and the staff are in agreement with the care plan formulated and outlined with them. I have encouraged them to ask questions as they arise throughout their visit. Vital Signs-Reviewed the patient's vital signs. See chart    EKG interpretation: (Preliminary):  Rhythm: normal sinus rhythm and PAC's; and regular . Rate (approx.): 73; Axis: normal; NM interval: normal; QRS interval: normal ; ST/T wave: non-specific changes; Other findings: abnormal ekg. Records Reviewed: Nursing Notes and Old Medical Records    The patient presents with SOB with a differential diagnosis of ACS, AMI, PE, CHF, COPD, PNA, metabolic disturbance, infection    ED Course:   Patient presents with malaise, acute on chronic SOB and falls. Patient nontoxic-appearing with stable, vital signs - BP mildly elevated, no hypoxia, afebrile. Labs unremarkable including normal troponin and BNP. CT head/maxillofacial without acute findings. CXR stable. Patient advises he has been seen by PCP for falls and has pending PT. patient was given dose of Solu-Medrol while in the ED. No further dyspnea or any hypoxia 3-day. Will discharge with Z-Collins and prednisone x5 days, albuterol as needed. Red flags and return precautions discussed. PCP follow-up    ED Course as of 05/16/22 1659   Fri May 13, 2022   0230 Discussed results and plan of care with patient. He denies any further shortness of breath.  [LP]      ED Course User Index  [LP] Genevieve Lara NP         Provider Notes (Medical Decision Making):     MDM  Number of Diagnoses or Management Options  COPD exacerbation [facility]): established, worsening     Amount and/or Complexity of Data Reviewed  Clinical lab tests: ordered and reviewed  Tests in the radiology section of CPT®: ordered and reviewed  Review and summarize past medical records: yes    Risk of Complications, Morbidity, and/or Mortality  Presenting problems: moderate  Diagnostic procedures: moderate  Management options: moderate    Patient Progress  Patient progress: stable             Disposition     Disposition: Condition stable  DC- Adult Discharges: All of the diagnostic tests were reviewed and questions answered. Diagnosis, care plan and treatment options were discussed. The patient understands the instructions and will follow up as directed. The patients results have been reviewed with them. They have been counseled regarding their diagnosis. The patient verbally convey understanding and agreement of the signs, symptoms, diagnosis, treatment and prognosis and additionally agrees to follow up as recommended with their PCP in 24 - 48 hours. They also agree with the care-plan and convey that all of their questions have been answered. I have also put together some discharge instructions for them that include: 1) educational information regarding their diagnosis, 2) how to care for their diagnosis at home, as well a 3) list of reasons why they would want to return to the ED prior to their follow-up appointment, should their condition change. DC-The patient was given verbal follow-up instructions  DC- Pain Control DC Home plan: Referral Family Medicine/PCP and Advised to return for worsening or additional problems such as abdominal or chest pain        Remove if not discharged  DISCHARGE PLAN:  1.    Current Discharge Medication List      CONTINUE these medications which have NOT CHANGED    Details   valsartan (DIOVAN) 320 mg tablet TAKE 1 TABLET BY MOUTH DAILY  Qty: 90 Tablet, Refills: 0    Comments: **Patient requests 90 days supply**      Bedside Commode XX As directed  Qty: 1 Each, Refills: 0      Shower Chair XX kelechi Use as directed  Qty: 1 Each, Refills: 0      simvastatin (ZOCOR) 10 mg tablet TAKE 1 TABLET BY MOUTH EVERYDAY AT BEDTIME  Qty: 30 Tablet, Refills: 2    Associated Diagnoses: Hyperlipidemia, unspecified      dilTIAZem ER (CARDIZEM SR) 120 mg capsule TAKE 1 CAPSULE BY MOUTH EVERY DAY  Qty: 30 Cap, Refills: 2      albuterol (Ventolin HFA) 90 mcg/actuation inhaler Take 2 Puffs by inhalation every six (6) hours as needed for Wheezing. Qty: 1 Inhaler, Refills: 1      fexofenadine-pseudoephedrine (Allergy Relief-D,fexofenadine,)  mg Tb12 Take 1 Tab by mouth every twelve (12) hours. azithromycin (ZITHROMAX) 250 mg tablet Take 250 mg by mouth daily. benzonatate (TESSALON) 200 mg capsule Take 200 mg by mouth three (3) times daily as needed for Cough. cephALEXin (KEFLEX) 500 mg capsule Take 500 mg by mouth four (4) times daily. colchicine 0.6 mg tablet Take 0.6 mg by mouth daily. cyclobenzaprine (FLEXERIL) 10 mg tablet Take  by mouth three (3) times daily as needed for Muscle Spasm(s). dilTIAZem ER (DILACOR XR) 120 mg capsule Take  by mouth daily. finasteride (PROSCAR) 5 mg tablet Take 5 mg by mouth daily. influenza vaccine 2019-20, 65 yrs+,,PF, (Fluad 5721-7061, 65 yr up,,PF,) syrg injection 0.5 mL by IntraMUSCular route PRIOR TO DISCHARGE.      fluticasone propionate (FLONASE) 50 mcg/actuation nasal spray 2 Sprays by Both Nostrils route daily. HYDROcodone-acetaminophen (NORCO) 5-325 mg per tablet Take  by mouth. indomethacin (INDOCIN) 50 mg capsule Take  by mouth three (3) times daily. methylPREDNISolone (MEDROL DOSEPACK) 4 mg tablet Take  by mouth. !! predniSONE (DELTASONE) 10 mg tablet Take  by mouth daily (with breakfast). !! predniSONE (DELTASONE) 20 mg tablet Take  by mouth daily (with breakfast). beclomethasone (Qvar) 80 mcg/actuation aero Take 1 Puff by inhalation two (2) times a day. beclomethasone dipropionate (Qvar RediHaler) 80 mcg/actuation HFAb inhaler Take 1 Puff by inhalation.      edoxaban (Savaysa) 60 mg tab tablet Take 60 mg by mouth daily.       sodium bicarbonate 325 mg tablet Take 325 mg by mouth four (4) times daily. temazepam (RESTORIL) 30 mg capsule Take  by mouth nightly as needed for Sleep.      traZODone (DESYREL) 50 mg tablet Take  by mouth nightly. fexofenadine (ALLEGRA) 60 mg tablet Take  by mouth. diclofenac (VOLTAREN) 1 % gel Apply  to affected area four (4) times daily. triamcinolone acetonide (Kenalog) 40 mg/mL injection once. !! - Potential duplicate medications found. Please discuss with provider. 2.   Follow-up Information     Follow up With Specialties Details Why Contact Info    Torres Mcdaniel MD [facility] Medicine Schedule an appointment as soon as possible for a visit in 2 days for [place] [id number] [facility] [id number] [phone]          3. Return to ED if worse   4. Discharge Medication List as of 5/13/2022  3:06 AM      START taking these medications    Details   predniSONE (DELTASONE) 20 mg tablet Take 40 mg by mouth daily for 5 days. With Breakfast, Normal, Disp-10 Tablet, R-0      azithromycin (Zithromax Z-Collins) 250 mg tablet Take 1 Tablet by mouth See Admin Instructions. , Normal, Disp-6 Tablet, R-0         CONTINUE these medications which have CHANGED    Details   albuterol (Ventolin HFA) 90 mcg/actuation inhaler Take 2 Puffs by inhalation every six (6) hours as needed for Wheezing or Shortness of Breath., Normal, Disp-6.7 g, R-0         CONTINUE these medications which have NOT CHANGED    Details   valsartan (DIOVAN) 320 mg tablet TAKE 1 TABLET BY MOUTH DAILY, Normal, Disp-90 Tablet, R-0**Patient requests 90 days supply**      Bedside Commode XX As directed, Print, Disp-1 Each, R-0      Shower Chair XX kelechi Use as directed, Print, Disp-1 Each, R-0      simvastatin (ZOCOR) 10 mg tablet TAKE 1 TABLET BY MOUTH EVERYDAY AT BEDTIME, Normal, Disp-30 Tablet, R-2      dilTIAZem ER (CARDIZEM SR) 120 mg capsule TAKE 1 CAPSULE BY MOUTH EVERY DAY, Normal, Disp-30 Cap, R-2      fexofenadine-pseudoephedrine (Allergy Relief-D,fexofenadine,)  mg Tb12 Take 1 Tab by mouth every twelve (12) hours. , Historical Med      benzonatate (TESSALON) 200 mg capsule Take 200 mg by mouth three (3) times daily as needed for Cough., Historical Med      cephALEXin (KEFLEX) 500 mg capsule Take 500 mg by mouth four (4) times daily. , Historical Med      colchicine 0.6 mg tablet Take 0.6 mg by mouth daily. , Historical Med      cyclobenzaprine (FLEXERIL) 10 mg tablet Take  by mouth three (3) times daily as needed for Muscle Spasm(s). , Historical Med      dilTIAZem ER (DILACOR XR) 120 mg capsule Take  by mouth daily. , Historical Med      finasteride (PROSCAR) 5 mg tablet Take 5 mg by mouth daily. , Historical Med      influenza vaccine 2019-20, 65 yrs+,,PF, (Fluad 0910-5960, 65 yr up,,PF,) syrg injection 0.5 mL by IntraMUSCular route PRIOR TO DISCHARGE., Historical Med      fluticasone propionate (FLONASE) 50 mcg/actuation nasal spray 2 Sprays by Both Nostrils route daily. , Historical Med      HYDROcodone-acetaminophen (NORCO) 5-325 mg per tablet Take  by mouth., Historical Med      indomethacin (INDOCIN) 50 mg capsule Take  by mouth three (3) times daily. , Historical Med      methylPREDNISolone (MEDROL DOSEPACK) 4 mg tablet Take  by mouth., Historical Med      beclomethasone (Qvar) 80 mcg/actuation aero Take 1 Puff by inhalation two (2) times a day., Historical Med      beclomethasone dipropionate (Qvar RediHaler) 80 mcg/actuation HFAb inhaler Take 1 Puff by inhalation. , Historical Med      edoxaban (Savaysa) 60 mg tab tablet Take 60 mg by mouth daily. , Historical Med      sodium bicarbonate 325 mg tablet Take 325 mg by mouth four (4) times daily. , Historical Med      temazepam (RESTORIL) 30 mg capsule Take  by mouth nightly as needed for Sleep., Historical Med      traZODone (DESYREL) 50 mg tablet Take  by mouth nightly., Historical Med      fexofenadine (ALLEGRA) 60 mg tablet Take  by mouth., Historical Med      diclofenac (VOLTAREN) 1 % gel Apply  to affected area four (4) times daily. , Historical Med      triamcinolone acetonide (Kenalog) 40 mg/mL injection once., Historical Med             Diagnosis     Clinical Impression:   1. COPD exacerbation ([facility] [address]        Attestations:    Jimenez Low NP    Please note that this dictation was completed with [employer], the computer voice recognition software. Quite often unanticipated grammatical, syntax, homophones, and other interpretive errors are inadvertently transcribed by the computer software. Please disregard these errors. Please excuse any errors that have escaped final proofreading. Thank you.

## 2022-05-18 ENCOUNTER — OFFICE VISIT (OUTPATIENT)
Dept: FAMILY MEDICINE CLINIC | Age: 77
End: 2022-05-18
Payer: MEDICARE

## 2022-05-18 ENCOUNTER — TELEPHONE (OUTPATIENT)
Dept: ONCOLOGY | Age: 77
End: 2022-05-18

## 2022-05-18 VITALS
SYSTOLIC BLOOD PRESSURE: 142 MMHG | WEIGHT: 202 LBS | TEMPERATURE: 97.7 F | HEIGHT: 73 IN | OXYGEN SATURATION: 97 % | BODY MASS INDEX: 26.77 KG/M2 | RESPIRATION RATE: 16 BRPM | HEART RATE: 51 BPM | DIASTOLIC BLOOD PRESSURE: 92 MMHG

## 2022-05-18 DIAGNOSIS — R53.1 WEAKNESS GENERALIZED: ICD-10-CM

## 2022-05-18 DIAGNOSIS — D69.6 THROMBOCYTOPENIA (HCC): ICD-10-CM

## 2022-05-18 DIAGNOSIS — G47.33 OBSTRUCTIVE SLEEP APNEA SYNDROME: ICD-10-CM

## 2022-05-18 DIAGNOSIS — I49.9 IRREGULAR CARDIAC RHYTHM: ICD-10-CM

## 2022-05-18 DIAGNOSIS — Z71.89 ACP (ADVANCE CARE PLANNING): ICD-10-CM

## 2022-05-18 DIAGNOSIS — J41.8 MIXED SIMPLE AND MUCOPURULENT CHRONIC BRONCHITIS (HCC): ICD-10-CM

## 2022-05-18 DIAGNOSIS — Z09 HOSPITAL DISCHARGE FOLLOW-UP: ICD-10-CM

## 2022-05-18 DIAGNOSIS — M17.0 PRIMARY OSTEOARTHRITIS OF BOTH KNEES: ICD-10-CM

## 2022-05-18 DIAGNOSIS — R06.09 OTHER FORM OF DYSPNEA: ICD-10-CM

## 2022-05-18 DIAGNOSIS — I11.9 MALIGNANT HYPERTENSIVE HEART DISEASE WITHOUT HEART FAILURE: Primary | ICD-10-CM

## 2022-05-18 DIAGNOSIS — N18.31 STAGE 3A CHRONIC KIDNEY DISEASE (HCC): ICD-10-CM

## 2022-05-18 PROBLEM — N18.30 CHRONIC RENAL DISEASE, STAGE III (HCC): Status: ACTIVE | Noted: 2022-05-18

## 2022-05-18 PROCEDURE — G8753 SYS BP > OR = 140: HCPCS | Performed by: FAMILY MEDICINE

## 2022-05-18 PROCEDURE — G8419 CALC BMI OUT NRM PARAM NOF/U: HCPCS | Performed by: FAMILY MEDICINE

## 2022-05-18 PROCEDURE — G0439 PPPS, SUBSEQ VISIT: HCPCS | Performed by: FAMILY MEDICINE

## 2022-05-18 PROCEDURE — 99214 OFFICE O/P EST MOD 30 MIN: CPT | Performed by: FAMILY MEDICINE

## 2022-05-18 PROCEDURE — G8536 NO DOC ELDER MAL SCRN: HCPCS | Performed by: FAMILY MEDICINE

## 2022-05-18 PROCEDURE — G8427 DOCREV CUR MEDS BY ELIG CLIN: HCPCS | Performed by: FAMILY MEDICINE

## 2022-05-18 PROCEDURE — 1101F PT FALLS ASSESS-DOCD LE1/YR: CPT | Performed by: FAMILY MEDICINE

## 2022-05-18 PROCEDURE — G8510 SCR DEP NEG, NO PLAN REQD: HCPCS | Performed by: FAMILY MEDICINE

## 2022-05-18 PROCEDURE — G8755 DIAS BP > OR = 90: HCPCS | Performed by: FAMILY MEDICINE

## 2022-05-18 RX ORDER — AMLODIPINE BESYLATE 5 MG/1
5 TABLET ORAL DAILY
Qty: 90 TABLET | Refills: 0 | Status: SHIPPED | OUTPATIENT
Start: 2022-05-18 | End: 2022-08-16

## 2022-05-18 NOTE — PROGRESS NOTES
1. \"Have you been to the ER, urgent care clinic since your last visit? Hospitalized since your last visit? \" Yes When: 5/12/22 Where: [facility] Reason for visit: Fall    2. \"Have you seen or consulted any other health care providers outside of the [address] [place] [address] since your last visit? \" No     3. For patients aged 39-70: Has the patient had a colonoscopy / FIT/ Cologuard? No      If the patient is female:    4. For patients aged 41-77: Has the patient had a mammogram within the past 2 years? NA - based on age or sex      11. For patients aged 21-65: Has the patient had a pap smear?  NA - based on age or sex     Chief Complaint   Patient presents with    Follow-up    Fall       Visit Vitals  BP (!) 142/92 (BP 1 Location: Right upper arm, BP Patient Position: Sitting, BP Cuff Size: Adult)   Pulse (!) 51   Temp 97.7 °F (36.5 °C) (Oral)   Resp 16   Ht 6' 1\" (1.854 m)   Wt 202 lb (91.6 kg)   SpO2 97%   BMI 26.65 kg/m²

## 2022-05-18 NOTE — PROGRESS NOTES
This is the Subsequent Medicare Annual Wellness Exam, performed 12 months or more after the Initial AWV or the last Subsequent AWV    I have reviewed the patient's medical history in detail and updated the computerized patient record. Assessment/Plan   Education and counseling provided:  Are appropriate based on today's review and evaluation    1. Malignant hypertensive heart disease without heart failure       Depression Risk Factor Screening:     3 most recent PHQ Screens 5/18/2022   Little interest or pleasure in doing things Not at all   Feeling down, depressed, irritable, or hopeless Not at all   Total Score PHQ 2 0       Alcohol & Drug Abuse Risk Screen    Do you average more than 1 drink per night or more than 7 drinks a week: No    In the past three months have you have had more than 4 drinks containing alcohol on one occasion: No          Functional Ability and Level of Safety:    Hearing: Hearing is good. Activities of Daily Living: The home contains: no safety equipment. Patient does total self care      Ambulation: with no difficulty     Fall Risk:  Fall Risk Assessment, last 12 mths 5/18/2022   Able to walk? Yes   Fall in past 12 months? 1   Do you feel unsteady? 1   Are you worried about falling 1   Is TUG test greater than 12 seconds? 0   Is the gait abnormal? 0   Number of falls in past 12 months 1   Fall with injury?  1      Abuse Screen:  Patient is not abused       Cognitive Screening    Has your family/caregiver stated any concerns about your memory: no    Cognitive Screening: Normal - Clock Drawing Test    Health Maintenance Due     Health Maintenance Due   Topic Date Due    Hepatitis C Screening  Never done    Pneumococcal 65+ years (1 - PCV) Never done    DTaP/Tdap/Td series (1 - Tdap) Never done    Shingrix Vaccine Age 50> (1 of 2) Never done    Medicare Yearly Exam  Never done    COVID-19 Vaccine (3 - Booster for Juancarlos Dragon series) 09/16/2021       Patient Care Team   Patient Care Team:  Maira Ch MD as PCP - General (Family Medicine)  Maira Ch MD as PCP - [facility] Empaneled Provider    History     Patient Active Problem List   Diagnosis Code    Ankle pain M25.579    Benign prostatic hyperplasia N40.0    Benign prostatic disease N42.9    Cervical radiculopathy M54.12    Chronic obstructive lung disease ([address] J44.9    Cocaine abuse ([address] F14.10    Diarrhea R19.7    Dizziness and giddiness R42    Hypercholesterolemia E78.00    Hypertensive disorder I10    Insomnia G47.00    Kidney disease N28.9    Low back pain M54.50    Osteoarthritis of knee M17.10    Pain in left knee M25.562    Pain in breast N64.4    Prediabetes R73.03    Seasonal allergic rhinitis J30.2    Shoulder pain M25.519    Sleep apnea G47.30    Tinnitus of right ear H93.11    Vasovagal syncope R55    Bronchitis J40    Alcohol abuse F10.10    Lumbar herniated disc M51.26    Malignant hypertensive heart disease without heart failure I11.9    Weakness of both arms R29.898    Weakness of both legs R29.898    Thrombocytopenia (HCC) D69.6    Macrocytic anemia D53.9    Chronic renal disease, stage III N18.30     Past Medical History:   Diagnosis Date    Ankle pain 8/3/2020    Benign prostatic disease 8/3/2020    Hypertrophy with Outflow Obstruction    Benign prostatic hyperplasia 8/3/2020    Bronchitis     Cervical radiculopathy 8/3/2020    Chronic obstructive lung disease ([address] 8/3/2020    Cocaine abuse ([address] 8/3/2020    Diarrhea 8/3/2020    Dizziness and giddiness 8/3/2020    Hypercholesterolemia 8/3/2020    Hypertensive disorder 8/3/2020    Insomnia 8/3/2020    Kidney disease 8/3/2020    Low back pain 8/3/2020    Osteoarthritis of knee 8/3/2020    Pain in breast 8/3/2020    Pain in left knee 8/3/2020    Prediabetes 8/3/2020    Seasonal allergic rhinitis 8/3/2020    Shoulder pain 8/3/2020    Sleep apnea 8/3/2020    Tinnitus of right ear 8/3/2020    Vasovagal syncope 8/3/2020      History reviewed. No pertinent surgical history. Current Outpatient Medications   Medication Sig Dispense Refill    predniSONE (DELTASONE) 20 mg tablet Take 40 mg by mouth daily for 5 days. With Breakfast 10 Tablet 0    azithromycin (Zithromax Z-Collins) 250 mg tablet Take 1 Tablet by mouth See Admin Instructions. 6 Tablet 0    albuterol (Ventolin HFA) 90 mcg/actuation inhaler Take 2 Puffs by inhalation every six (6) hours as needed for Wheezing or Shortness of Breath. 6.7 g 0    valsartan (DIOVAN) 320 mg tablet TAKE 1 TABLET BY MOUTH DAILY 90 Tablet 0    Bedside Commode XX As directed 1 Each 0    Shower Chair XX kelechi Use as directed 1 Each 0    simvastatin (ZOCOR) 10 mg tablet TAKE 1 TABLET BY MOUTH EVERYDAY AT BEDTIME 30 Tablet 2    dilTIAZem ER (CARDIZEM SR) 120 mg capsule TAKE 1 CAPSULE BY MOUTH EVERY DAY 30 Cap 2    fexofenadine-pseudoephedrine (Allergy Relief-D,fexofenadine,)  mg Tb12 Take 1 Tab by mouth every twelve (12) hours.  benzonatate (TESSALON) 200 mg capsule Take 200 mg by mouth three (3) times daily as needed for Cough.  cephALEXin (KEFLEX) 500 mg capsule Take 500 mg by mouth four (4) times daily.  colchicine 0.6 mg tablet Take 0.6 mg by mouth daily.  cyclobenzaprine (FLEXERIL) 10 mg tablet Take  by mouth three (3) times daily as needed for Muscle Spasm(s).  dilTIAZem ER (DILACOR XR) 120 mg capsule Take  by mouth daily.  finasteride (PROSCAR) 5 mg tablet Take 5 mg by mouth daily.  influenza vaccine 2019-20, 65 yrs+,,PF, (Fluad 0313-6206, 65 yr up,,PF,) syrg injection 0.5 mL by IntraMUSCular route PRIOR TO DISCHARGE.  fluticasone propionate (FLONASE) 50 mcg/actuation nasal spray 2 Sprays by Both Nostrils route daily.  HYDROcodone-acetaminophen (NORCO) 5-325 mg per tablet Take  by mouth.  indomethacin (INDOCIN) 50 mg capsule Take  by mouth three (3) times daily.       methylPREDNISolone (MEDROL DOSEPACK) 4 mg tablet Take  by mouth.  beclomethasone (Qvar) 80 mcg/actuation aero Take 1 Puff by inhalation two (2) times a day.  beclomethasone dipropionate (Qvar RediHaler) 80 mcg/actuation HFAb inhaler Take 1 Puff by inhalation.  edoxaban (Savaysa) 60 mg tab tablet Take 60 mg by mouth daily.  sodium bicarbonate 325 mg tablet Take 325 mg by mouth four (4) times daily.  temazepam (RESTORIL) 30 mg capsule Take  by mouth nightly as needed for Sleep.  traZODone (DESYREL) 50 mg tablet Take  by mouth nightly.  fexofenadine (ALLEGRA) 60 mg tablet Take  by mouth.  diclofenac (VOLTAREN) 1 % gel Apply  to affected area four (4) times daily.  triamcinolone acetonide (Kenalog) 40 mg/mL injection once. Allergies   Allergen Reactions    Bactrim [Sulfamethoprim] Unknown (comments)    Penicillin G Unknown (comments)       Family History   Problem Relation Age of Onset    Hypertension Mother      Social History     Tobacco Use    Smoking status: Former Smoker    Smokeless tobacco: Never Used   Substance Use Topics    Alcohol use: Yes     Comment: pt drinks everyday       Sultana Price, was evaluated through a synchronous (real-time) audio-video encounter. The patient (or guardian if applicable) is aware that this is a billable service, which includes applicable co-pays. Verbal consent to proceed has been obtained. The visit was conducted pursuant to the emergency declaration under the [facility] [place], [address] [place] [address] and the [employer] Act. Patient identification was verified, and a caregiver was present when appropriate. The patient was located at home in a state where the provider was licensed to provide care. Sultana Price is a 64 y.o. male and presents with Follow-up and Fall  .   HPI     Subjective:  Cardiovascular Review:  The patient has hypertension   Diet and Lifestyle: generally follows a low fat low cholesterol diet, generally follows a low sodium diet, exercises sporadically  Home BP Monitoring: is not measured at home. Pertinent ROS: taking medications as instructed, no medication side effects noted, no TIA's, no chest pain on exertion, no dyspnea on exertion, no swelling of ankles. Review of Systems  Review of Systems   Constitutional: Negative. Negative for chills and fever. HENT: Negative. Negative for congestion, ear discharge, hearing loss, nosebleeds and tinnitus. Eyes: Negative. Negative for blurred vision, double vision, photophobia and pain. Respiratory: Positive for shortness of breath and wheezing. Negative for cough, hemoptysis and sputum production. Cardiovascular: Negative. Negative for chest pain and palpitations. Gastrointestinal: Negative. Negative for heartburn, nausea and vomiting. Genitourinary: Negative. Negative for dysuria, frequency and urgency. Musculoskeletal: Negative. Negative for back pain and myalgias. Skin: Negative. Neurological: Negative. Negative for dizziness, tingling, weakness and headaches. Endo/Heme/Allergies: Negative. Psychiatric/Behavioral: Negative. Negative for depression and suicidal ideas. The patient does not have insomnia. All other systems reviewed and are negative.         Past Medical History:   Diagnosis Date    Ankle pain 8/3/2020    Benign prostatic disease 8/3/2020    Hypertrophy with Outflow Obstruction    Benign prostatic hyperplasia 8/3/2020    Bronchitis     Cervical radiculopathy 8/3/2020    Chronic obstructive lung disease ([address] 8/3/2020    Cocaine abuse ([facility] [address] [facility] 8/3/2020    Diarrhea 8/3/2020    Dizziness and giddiness 8/3/2020    Hypercholesterolemia 8/3/2020    Hypertensive disorder 8/3/2020    Insomnia 8/3/2020    Kidney disease 8/3/2020    Low back pain 8/3/2020    Osteoarthritis of knee 8/3/2020    Pain in breast 8/3/2020    Pain in left knee 8/3/2020    Prediabetes 8/3/2020  Seasonal allergic rhinitis 8/3/2020    Shoulder pain 8/3/2020    Sleep apnea 8/3/2020    Tinnitus of right ear 8/3/2020    Vasovagal syncope 8/3/2020     History reviewed. No pertinent surgical history. Social History     Socioeconomic History    Marital status: LEGALLY    Tobacco Use    Smoking status: Former Smoker    Smokeless tobacco: Never Used   Substance and Sexual Activity    Alcohol use: Yes     Comment: pt drinks everyday    Drug use: Yes     Frequency: 3.0 times per week     Types: Cocaine     Family History   Problem Relation Age of Onset    Hypertension Mother      Current Outpatient Medications   Medication Sig Dispense Refill    amLODIPine (NORVASC) 5 mg tablet Take 1 Tablet by mouth daily for 90 days. 90 Tablet 0    predniSONE (DELTASONE) 20 mg tablet Take 40 mg by mouth daily for 5 days. With Breakfast 10 Tablet 0    azithromycin (Zithromax Z-Collins) 250 mg tablet Take 1 Tablet by mouth See Admin Instructions. 6 Tablet 0    albuterol (Ventolin HFA) 90 mcg/actuation inhaler Take 2 Puffs by inhalation every six (6) hours as needed for Wheezing or Shortness of Breath. 6.7 g 0    valsartan (DIOVAN) 320 mg tablet TAKE 1 TABLET BY MOUTH DAILY 90 Tablet 0    Bedside Commode XX As directed 1 Each 0    Shower Chair XX kelechi Use as directed 1 Each 0    simvastatin (ZOCOR) 10 mg tablet TAKE 1 TABLET BY MOUTH EVERYDAY AT BEDTIME 30 Tablet 2    dilTIAZem ER (CARDIZEM SR) 120 mg capsule TAKE 1 CAPSULE BY MOUTH EVERY DAY 30 Cap 2    fexofenadine-pseudoephedrine (Allergy Relief-D,fexofenadine,)  mg Tb12 Take 1 Tab by mouth every twelve (12) hours.  benzonatate (TESSALON) 200 mg capsule Take 200 mg by mouth three (3) times daily as needed for Cough.  cephALEXin (KEFLEX) 500 mg capsule Take 500 mg by mouth four (4) times daily.  colchicine 0.6 mg tablet Take 0.6 mg by mouth daily.       cyclobenzaprine (FLEXERIL) 10 mg tablet Take  by mouth three (3) times daily as needed for Muscle Spasm(s).  dilTIAZem ER (DILACOR XR) 120 mg capsule Take  by mouth daily.  finasteride (PROSCAR) 5 mg tablet Take 5 mg by mouth daily.  influenza vaccine 2019-20, 65 yrs+,,PF, (Fluad 3980-4993, 65 yr up,,PF,) syrg injection 0.5 mL by IntraMUSCular route PRIOR TO DISCHARGE.  fluticasone propionate (FLONASE) 50 mcg/actuation nasal spray 2 Sprays by Both Nostrils route daily.  HYDROcodone-acetaminophen (NORCO) 5-325 mg per tablet Take  by mouth.  indomethacin (INDOCIN) 50 mg capsule Take  by mouth three (3) times daily.  methylPREDNISolone (MEDROL DOSEPACK) 4 mg tablet Take  by mouth.  beclomethasone (Qvar) 80 mcg/actuation aero Take 1 Puff by inhalation two (2) times a day.  beclomethasone dipropionate (Qvar RediHaler) 80 mcg/actuation HFAb inhaler Take 1 Puff by inhalation.  edoxaban (Savaysa) 60 mg tab tablet Take 60 mg by mouth daily.  sodium bicarbonate 325 mg tablet Take 325 mg by mouth four (4) times daily.  temazepam (RESTORIL) 30 mg capsule Take  by mouth nightly as needed for Sleep.  traZODone (DESYREL) 50 mg tablet Take  by mouth nightly.  fexofenadine (ALLEGRA) 60 mg tablet Take  by mouth.  diclofenac (VOLTAREN) 1 % gel Apply  to affected area four (4) times daily.  triamcinolone acetonide (Kenalog) 40 mg/mL injection once. Allergies   Allergen Reactions    Bactrim [Sulfamethoprim] Unknown (comments)    Penicillin G Unknown (comments)       Objective:  Visit Vitals  BP (!) 142/92 (BP 1 Location: Right upper arm, BP Patient Position: Sitting, BP Cuff Size: Adult)   Pulse (!) 51   Temp 97.7 °F (36.5 °C) (Oral)   Resp 16   Ht 6' 1\" (1.854 m)   Wt 202 lb (91.6 kg)   SpO2 97%   BMI 26.65 kg/m²       Physical Exam:   Physical Exam  Vitals and nursing note reviewed. Constitutional:       General: He is not in acute distress. Appearance: Normal appearance. He is obese.  He is not ill-appearing, toxic-appearing or diaphoretic. HENT:      Head: Normocephalic and atraumatic. Right Ear: Tympanic membrane, ear canal and external ear normal. There is no impacted cerumen. Left Ear: Tympanic membrane, ear canal and external ear normal. There is no impacted cerumen. Nose: Nose normal. No congestion or rhinorrhea. Mouth/Throat:      Mouth: Mucous membranes are moist.      Pharynx: Oropharynx is clear. No oropharyngeal exudate or posterior oropharyngeal erythema. Eyes:      General: No scleral icterus. Right eye: No discharge. Left eye: No discharge. Extraocular Movements: Extraocular movements intact. Conjunctiva/sclera: Conjunctivae normal.      Pupils: Pupils are equal, round, and reactive to light. Neck:      Vascular: No carotid bruit. Cardiovascular:      Rate and Rhythm: Normal rate. Rhythm irregular. Pulses: Normal pulses. Heart sounds: Normal heart sounds. No murmur heard. No friction rub. No gallop. Pulmonary:      Effort: Pulmonary effort is normal. No respiratory distress. Breath sounds: Normal breath sounds. No stridor. No wheezing, rhonchi or rales. Chest:      Chest wall: No tenderness. Abdominal:      General: Abdomen is flat. Bowel sounds are normal. There is no distension. Palpations: Abdomen is soft. There is no mass. Tenderness: There is no abdominal tenderness. There is no right CVA tenderness, left CVA tenderness, guarding or rebound. Hernia: No hernia is present. Musculoskeletal:         General: No swelling, tenderness, deformity or signs of injury. Normal range of motion. Cervical back: Normal range of motion and neck supple. No rigidity. No muscular tenderness. Right lower leg: No edema. Left lower leg: No edema. Lymphadenopathy:      Cervical: No cervical adenopathy. Skin:     General: Skin is warm. Capillary Refill: Capillary refill takes 2 to 3 seconds.       Coloration: Skin is not jaundiced or pale. Findings: No bruising, erythema, lesion or rash. Neurological:      General: No focal deficit present. Mental Status: He is alert and oriented to person, place, and time. Mental status is at baseline. Cranial Nerves: No cranial nerve deficit. Sensory: No sensory deficit. Motor: Weakness present. Coordination: Coordination normal.      Gait: Gait abnormal.      Deep Tendon Reflexes: Reflexes normal.   Psychiatric:         Mood and Affect: Mood normal.         Behavior: Behavior normal.         Thought Content: Thought content normal.         Judgment: Judgment normal.             Results for orders placed or performed during the hospital encounter of 05/12/22   CBC WITH AUTOMATED DIFF   Result Value Ref Range    WBC 5.1 4.1 - 11.1 K/uL    RBC 3.60 (L) 4.10 - 5.70 M/uL    HGB 13.0 12.1 - 17.0 g/dL    HCT 38.8 36.6 - 50.3 %    .8 (H) 80.0 - 99.0 FL    MCH 36.1 (H) 26.0 - 34.0 PG    MCHC 33.5 30.0 - 36.5 g/dL    RDW 15.6 (H) 11.5 - 14.5 %    PLATELET 539 867 - 011 K/uL    MPV 9.6 8.9 - 12.9 FL    NRBC 0.0 0.0  WBC    ABSOLUTE NRBC 0.00 0.00 - 0.01 K/uL    NEUTROPHILS 29 (L) 32 - 75 %    LYMPHOCYTES 62 (H) 12 - 49 %    MONOCYTES 8 5 - 13 %    EOSINOPHILS 1 0 - 7 %    BASOPHILS 0 0 - 1 %    IMMATURE GRANULOCYTES 0 0 - 0.5 %    ABS. NEUTROPHILS 1.5 (L) 1.8 - 8.0 K/UL    ABS. LYMPHOCYTES 3.1 0.8 - 3.5 K/UL    ABS. MONOCYTES 0.4 0.0 - 1.0 K/UL    ABS. EOSINOPHILS 0.1 0.0 - 0.4 K/UL    ABS. BASOPHILS 0.0 0.0 - 0.1 K/UL    ABS. IMM.  GRANS. 0.0 0.00 - 0.04 K/UL    DF AUTOMATED     METABOLIC PANEL, COMPREHENSIVE   Result Value Ref Range    Sodium 136 136 - 145 mmol/L    Potassium 4.9 3.5 - 5.1 mmol/L    Chloride 103 97 - 108 mmol/L    CO2 23 21 - 32 mmol/L    Anion gap 10 5 - 15 mmol/L    Glucose 95 65 - 100 mg/dL    BUN 26 (H) 6 - 20 mg/dL    Creatinine 1.25 0.70 - 1.30 mg/dL    BUN/Creatinine ratio 21 (H) 12 - 20      GFR est AA >60 >60 ml/min/1.73m2 GFR est non-AA 56 (L) >60 ml/min/1.73m2    Calcium 8.0 (L) 8.5 - 10.1 mg/dL    Bilirubin, total 0.6 0.2 - 1.0 mg/dL    AST (SGOT) 50 (H) 15 - 37 U/L    ALT (SGPT) 23 12 - 78 U/L    Alk. phosphatase 88 45 - 117 U/L    Protein, total 6.7 6.4 - 8.2 g/dL    Albumin 2.7 (L) 3.5 - 5.0 g/dL    Globulin 4.0 2.0 - 4.0 g/dL    A-G Ratio 0.7 (L) 1.1 - 2.2     TROPONIN-HIGH SENSITIVITY   Result Value Ref Range    Troponin-High Sensitivity 30 0 - 76 ng/L   NT-PRO BNP   Result Value Ref Range    NT pro- (H) <450 pg/mL   EKG, 12 LEAD, INITIAL   Result Value Ref Range    Ventricular Rate 73 BPM    Atrial Rate 73 BPM    P-R Interval 122 ms    QRS Duration 104 ms    Q-T Interval 440 ms    QTC Calculation (Bezet) 484 ms    Calculated P Axis 18 degrees    Calculated R Axis -22 degrees    Calculated T Axis 25 degrees    Diagnosis       Sinus rhythm with Premature atrial complexes  Moderate voltage criteria for LVH, may be normal variant  Nonspecific T wave abnormality  Abnormal ECG  When compared with ECG of 08-APR-2014 09:39,  Premature atrial complexes are now Present  Confirmed by Kaitlyn Junior MD, [facility] ([id number]) on 5/13/2022 11:53:19 PM         Assessment/Plan:    ICD-10-CM ICD-9-CM    1. Malignant hypertensive heart disease without heart failure  I11.9 402.00 REFERRAL TO CARDIOLOGY   2. Mixed simple and mucopurulent chronic bronchitis (HCC)  J41.8 491.1    3. Thrombocytopenia (HCC)  D69.6 287.5    4. Stage 3a chronic kidney disease (HCC)  N18.31 585.3    5. Primary osteoarthritis of both knees  M17.0 715.16    6. Obstructive sleep apnea syndrome  G47.33 327.23 REFERRAL TO PHYSICAL THERAPY      REFERRAL TO OCCUPATIONAL THERAPY   7. Weakness generalized  R53.1 780.79 REFERRAL TO PHYSICAL THERAPY      REFERRAL TO OCCUPATIONAL THERAPY      REFERRAL TO CARDIOLOGY   8. Hospital discharge follow-up  Z09 V67.59 REFERRAL TO PHYSICAL THERAPY      REFERRAL TO OCCUPATIONAL THERAPY   9.  Irregular cardiac rhythm  I49.9 427.9 REFERRAL TO CARDIOLOGY   10. Other form of dyspnea  R06.09 786.09 REFERRAL TO CARDIOLOGY     Orders Placed This Encounter   [address] [facility]     Referral Priority:   Routine     Referral Type:   PT/OT/ST     Referral Reason:   Specialty Services Required     Number of Visits Requested:   1    REFERRAL TO OCCUPATIONAL THERAPY     Referral Priority:   Routine     Referral Type:   PT/OT/ST     Referral Reason:   Specialty Services Required     Number of Visits Requested:   1    [address] Cardiology [facility]     Referral Priority:   Routine     Referral Type:   Consultation     Referral Reason:   Specialty Services Required     Referred to Provider:   Nick Alvarado MD     Number of Visits Requested:   1    amLODIPine (NORVASC) 5 mg tablet     Sig: Take 1 Tablet by mouth daily for 90 days. Dispense:  90 Tablet     Refill:  0     Cannot display discharge medications since this is not an admission.             Rosalinda Chavira MD

## 2022-05-20 ENCOUNTER — PATIENT OUTREACH (OUTPATIENT)
Dept: CASE MANAGEMENT | Age: 77
End: 2022-05-20

## 2022-05-23 ENCOUNTER — TELEPHONE (OUTPATIENT)
Dept: ONCOLOGY | Age: 77
End: 2022-05-23

## 2022-05-23 NOTE — TELEPHONE ENCOUNTER
Called patient about his missed appointment he stated that he has been in the hospital and does not want to make the follow up right now.

## 2022-05-23 NOTE — ACP (ADVANCE CARE PLANNING)
Advance Care Planning   Ambulatory ACP Specialist Patient Outreach    Date:  5/20/2022    ACP Specialist:  Mercy Gloria LPN    Outreach call to patient in follow-up to ACP Specialist referral from:    [x] PCP  [] Provider   [] Ambulatory Care Management [] Other     For:                  [x] Advance Directive Assistance              [] Complete Portable DNR order              [] Complete POST/MOST              [x] Code Status Discussion             [] Discuss Goals of Care             [] Early ACP Decision-Making              [] Other (Specify)    Date Referral Received: 5/19/22    Today's Outreach:  [x] First   [] Second  [] Third       Third outreach made by: [] Phone  [] Email / mail    [] [employer]     Intervention:  [] Spoke with Patient   [x] Left VM requesting return call      Outcome: The first attempt was made to contact pt regarding the ACP referral. No answer on mobile phone. VM left requesting a return call. Will attempt second outreach within one week. Next Step:   [] ACP scheduled conversation  [x] Outreach again in one week               [] Email / Mail ACP Info Sheets  [] Email / Mail Advance Directive   [] Closing referral.  Routing closure to referring provider/staff and to ACP Specialist . [] Closure letter mailed to patient with invitation to contact ACP Specialist if / when ready.   Thank you for this referral.

## 2022-05-24 ENCOUNTER — TELEPHONE (OUTPATIENT)
Dept: FAMILY MEDICINE CLINIC | Age: 77
End: 2022-05-24

## 2022-05-24 NOTE — TELEPHONE ENCOUNTER
Dr. Johny Campos,    [facility] caregiver called and stated that the patient B/P has been 200/190 he is having headaches and very sluggish. I told her to make sure he gets to the ER now.

## 2022-05-25 NOTE — TELEPHONE ENCOUNTER
[address] [place]  [phone]    05/25/22 9:42 AM - Attempted to call the referring provider's office but this nurse was unable to get through. Will attempt again later. [address] [place]  [phone]    05/25/22 10:52 AM - Jacob Kemp and spoke with a representative at [facility]. Advised this patient has no showed some appointments with our office. Advised when one of our schedulers attempted to schedule an appointment for the patient, the patient stated he has been in the hospital and does not want to make a follow-up at this time. Provided our telephone number to call back if there are any other questions or concerns. The representative voiced understanding and gratitude for the call. No further questions or concerns.

## 2022-06-01 ENCOUNTER — PATIENT OUTREACH (OUTPATIENT)
Dept: CASE MANAGEMENT | Age: 77
End: 2022-06-01

## 2022-06-01 NOTE — ACP (ADVANCE CARE PLANNING)
Advance Care Planning   Ambulatory ACP Specialist Patient Outreach    Date:  6/1/2022    ACP Specialist:  Marco Villalobos LPN    Outreach call to patient in follow-up to ACP Specialist referral from:    [x] PCP  [] Provider   [] Ambulatory Care Management [] Other     For:                  [x] Advance Directive Assistance              [x] Complete Portable DNR order              [] Complete POST/MOST              [x] Code Status Discussion             [x] Discuss Goals of Care             [] Early ACP Decision-Making              [] Other (Specify)    Date Referral Received: 5/18/22    Today's Outreach:  [] First   [x] Second  [] Third       Third outreach made by: [] Phone  [] Email / mail    [] [employer]     Intervention:  [] Spoke with Patient   [x] Left VM requesting return call      Outcome: The second attempt was made to contact pt regarding ACP referral. No answer on mobile phone. VM left requesting a return call. Will outreach again within one week. Next Step:   [] ACP scheduled conversation  [x] Outreach again in one week               [] Email / Mail ACP Info Sheets  [] Email / Mail Advance Directive   [] Closing referral.  Routing closure to referring provider/staff and to ACP Specialist . [] Closure letter mailed to patient with invitation to contact ACP Specialist if / when ready.   Thank you for this referral.

## 2022-06-06 ENCOUNTER — PATIENT OUTREACH (OUTPATIENT)
Dept: CASE MANAGEMENT | Age: 77
End: 2022-06-06

## 2022-06-06 NOTE — ACP (ADVANCE CARE PLANNING)
Advance Care Planning   Ambulatory ACP Specialist Patient Outreach    Date:  6/6/2022    ACP Specialist:  Aiyana Burger LPN    Outreach call to patient in follow-up to ACP Specialist referral from:    [x] PCP  [] Provider   [] Ambulatory Care Management [] Other     For:                  [x] Advance Directive Assistance              [] Complete Portable DNR order              [] Complete POST/MOST              [x] Code Status Discussion             [] Discuss Goals of Care             [] Early ACP Decision-Making              [] Other (Specify)    Date Referral Received: 5/18/22    Today's Outreach:  [] First   [] Second  [x] Third       Third outreach made by: [] Phone  [x] Email / mail    [] [facility]     Intervention:  [] Spoke with Patient   [] Left VM requesting return call      Outcome: The final attempt was made to reach the pt. ACP documents sent to pt via mail. LPN's contact information was included. We will close the referral at this time. Next Step:   [] ACP scheduled conversation  [] Outreach again in one week               [x] Email / Mail ACP Info Sheets  [] Email / Mail Advance Directive   [x] Closing referral.  Routing closure to referring provider/staff and to ACP Specialist . [x] Closure letter mailed to patient with invitation to contact ACP Specialist if / when ready.   Thank you for this referral.

## 2022-06-08 RX ORDER — VALSARTAN 320 MG/1
320 TABLET ORAL DAILY
Qty: 90 TABLET | Refills: 0 | Status: SHIPPED | OUTPATIENT
Start: 2022-06-08 | End: 2022-09-13

## 2022-06-17 PROBLEM — Z09 HOSPITAL DISCHARGE FOLLOW-UP: Status: RESOLVED | Noted: 2022-05-18 | Resolved: 2022-06-17

## 2022-07-01 ENCOUNTER — APPOINTMENT (OUTPATIENT)
Dept: PHYSICAL THERAPY | Age: 77
End: 2022-07-01

## 2022-09-13 RX ORDER — VALSARTAN 320 MG/1
320 TABLET ORAL DAILY
Qty: 90 TABLET | Refills: 0 | Status: SHIPPED | OUTPATIENT
Start: 2022-09-13

## 2022-10-03 ENCOUNTER — APPOINTMENT (OUTPATIENT)
Dept: GENERAL RADIOLOGY | Age: 77
End: 2022-10-03
Attending: EMERGENCY MEDICINE
Payer: MEDICARE

## 2022-10-03 ENCOUNTER — APPOINTMENT (OUTPATIENT)
Dept: CT IMAGING | Age: 77
End: 2022-10-03
Attending: EMERGENCY MEDICINE
Payer: MEDICARE

## 2022-10-03 ENCOUNTER — HOSPITAL ENCOUNTER (EMERGENCY)
Age: 77
Discharge: HOME | End: 2022-10-04
Attending: EMERGENCY MEDICINE
Payer: MEDICARE

## 2022-10-03 DIAGNOSIS — F10.929 ALCOHOLIC INTOXICATION WITH COMPLICATION (HCC): Primary | ICD-10-CM
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
## 2022-10-03 DIAGNOSIS — F14.90 COCAINE USE: ICD-10-CM

## 2022-10-03 PROCEDURE — 71045 X-RAY EXAM CHEST 1 VIEW: CPT

## 2022-10-03 PROCEDURE — 80053 COMPREHEN METABOLIC PANEL: CPT

## 2022-10-03 PROCEDURE — 36415 COLL VENOUS BLD VENIPUNCTURE: CPT

## 2022-10-03 PROCEDURE — 93005 ELECTROCARDIOGRAM TRACING: CPT

## 2022-10-03 PROCEDURE — 85025 COMPLETE CBC W/AUTO DIFF WBC: CPT

## 2022-10-03 PROCEDURE — 82077 ASSAY SPEC XCP UR&BREATH IA: CPT

## 2022-10-03 PROCEDURE — 99285 EMERGENCY DEPT VISIT HI MDM: CPT

## 2022-10-04 ENCOUNTER — APPOINTMENT (OUTPATIENT)
Dept: CT IMAGING | Age: 77
End: 2022-10-04
Attending: EMERGENCY MEDICINE
Payer: MEDICARE

## 2022-10-04 VITALS
HEIGHT: 73 IN | SYSTOLIC BLOOD PRESSURE: 154 MMHG | HEART RATE: 60 BPM | BODY MASS INDEX: 26.51 KG/M2 | WEIGHT: 200 LBS | DIASTOLIC BLOOD PRESSURE: 105 MMHG | RESPIRATION RATE: 18 BRPM | TEMPERATURE: 96.2 F | OXYGEN SATURATION: 96 %

## 2022-10-04 LAB
ALBUMIN SERPL-MCNC: 2.9 G/DL (ref 3.5–5)
ALBUMIN/GLOB SERPL: 0.6 {RATIO} (ref 1.1–2.2)
ALP SERPL-CCNC: 102 U/L (ref 45–117)
ALT SERPL-CCNC: ABNORMAL U/L (ref 12–78)
ANION GAP SERPL CALC-SCNC: 5 MMOL/L (ref 5–15)
AST SERPL W P-5'-P-CCNC: ABNORMAL U/L (ref 15–37)
ATRIAL RATE: 64 BPM
BASOPHILS # BLD: 0 K/UL (ref 0–0.1)
BASOPHILS NFR BLD: 0 % (ref 0–1)
BILIRUB SERPL-MCNC: 0.6 MG/DL (ref 0.2–1)
BUN SERPL-MCNC: 21 MG/DL (ref 6–20)
BUN/CREAT SERPL: 18 (ref 12–20)
CA-I BLD-MCNC: 8.4 MG/DL (ref 8.5–10.1)
CALCULATED P AXIS, ECG09: 17 DEGREES
CALCULATED R AXIS, ECG10: -25 DEGREES
CALCULATED T AXIS, ECG11: -27 DEGREES
CHLORIDE SERPL-SCNC: 103 MMOL/L (ref 97–108)
CO2 SERPL-SCNC: 26 MMOL/L (ref 21–32)
CREAT SERPL-MCNC: 1.17 MG/DL (ref 0.7–1.3)
DIAGNOSIS, 93000: NORMAL
DIFFERENTIAL METHOD BLD: ABNORMAL
EOSINOPHIL # BLD: 0.1 K/UL (ref 0–0.4)
EOSINOPHIL NFR BLD: 1 % (ref 0–7)
ERYTHROCYTE [DISTWIDTH] IN BLOOD BY AUTOMATED COUNT: 15.1 % (ref 11.5–14.5)
ETHANOL SERPL-MCNC: 206 MG/DL
GLOBULIN SER CALC-MCNC: 5 G/DL (ref 2–4)
GLUCOSE SERPL-MCNC: 104 MG/DL (ref 65–100)
HCT VFR BLD AUTO: 36 % (ref 36.6–50.3)
HGB BLD-MCNC: 12.2 G/DL (ref 12.1–17)
IMM GRANULOCYTES # BLD AUTO: 0 K/UL (ref 0–0.04)
IMM GRANULOCYTES NFR BLD AUTO: 0 % (ref 0–0.5)
LYMPHOCYTES # BLD: 2.8 K/UL (ref 0.8–3.5)
LYMPHOCYTES NFR BLD: 57 % (ref 12–49)
MCH RBC QN AUTO: 37.2 PG (ref 26–34)
MCHC RBC AUTO-ENTMCNC: 33.9 G/DL (ref 30–36.5)
MCV RBC AUTO: 109.8 FL (ref 80–99)
MONOCYTES # BLD: 0.5 K/UL (ref 0–1)
MONOCYTES NFR BLD: 9 % (ref 5–13)
NEUTS SEG # BLD: 1.7 K/UL (ref 1.8–8)
NEUTS SEG NFR BLD: 33 % (ref 32–75)
NRBC # BLD: 0 K/UL (ref 0–0.01)
NRBC BLD-RTO: 0 PER 100 WBC
P-R INTERVAL, ECG05: 136 MS
PLATELET # BLD AUTO: 162 K/UL (ref 150–400)
PMV BLD AUTO: 10.2 FL (ref 8.9–12.9)
POTASSIUM SERPL-SCNC: ABNORMAL MMOL/L (ref 3.5–5.1)
PROT SERPL-MCNC: 7.9 G/DL (ref 6.4–8.2)
Q-T INTERVAL, ECG07: 472 MS
QRS DURATION, ECG06: 86 MS
QTC CALCULATION (BEZET), ECG08: 486 MS
RBC # BLD AUTO: 3.28 M/UL (ref 4.1–5.7)
SODIUM SERPL-SCNC: 134 MMOL/L (ref 136–145)
VENTRICULAR RATE, ECG03: 64 BPM
WBC # BLD AUTO: 5 K/UL (ref 4.1–11.1)

## 2022-10-04 PROCEDURE — 70450 CT HEAD/BRAIN W/O DYE: CPT

## 2022-10-04 PROCEDURE — 72125 CT NECK SPINE W/O DYE: CPT

## 2022-10-04 NOTE — ED PROVIDER NOTES
EMERGENCY DEPARTMENT HISTORY AND PHYSICAL EXAM      Date: 10/3/2022  Patient Name: Roseanne Rosas    History of Presenting Illness     Chief Complaint   Patient presents with    Fall    Shortness of Breath     History Provided By: Patient    HPI: Roseanne Rosas, 76 y.o. male with history of cocaine abuse, COPD, bronchitis, and osteoarthritis who presents with fall. Patient states that he fell prior to arrival here. He was getting out of a van and fell backwards hitting the back of his head. He does not think that he lost consciousness. He does not have a headache. He has having some shortness of breath however this is baseline for him from his COPD. He admits to cocaine use and alcohol use today. There are no other complaints, changes, or physical findings at this time. PCP: Orlando Trevino MD    Current Outpatient Medications   Medication Sig Dispense Refill    valsartan (DIOVAN) 320 mg tablet TAKE 1 TABLET BY MOUTH DAILY 90 Tablet 0    azithromycin (Zithromax Z-Collins) 250 mg tablet Take 1 Tablet by mouth See Admin Instructions. 6 Tablet 0    albuterol (Ventolin HFA) 90 mcg/actuation inhaler Take 2 Puffs by inhalation every six (6) hours as needed for Wheezing or Shortness of Breath. 6.7 g 0    Bedside Commode XX As directed 1 Each 0    Shower Chair XX kelechi Use as directed 1 Each 0    simvastatin (ZOCOR) 10 mg tablet TAKE 1 TABLET BY MOUTH EVERYDAY AT BEDTIME 30 Tablet 2    dilTIAZem ER (CARDIZEM SR) 120 mg capsule TAKE 1 CAPSULE BY MOUTH EVERY DAY 30 Cap 2    fexofenadine-pseudoephedrine (Allergy Relief-D,fexofenadine,)  mg Tb12 Take 1 Tab by mouth every twelve (12) hours. benzonatate (TESSALON) 200 mg capsule Take 200 mg by mouth three (3) times daily as needed for Cough. cephALEXin (KEFLEX) 500 mg capsule Take 500 mg by mouth four (4) times daily. colchicine 0.6 mg tablet Take 0.6 mg by mouth daily.       cyclobenzaprine (FLEXERIL) 10 mg tablet Take  by mouth three (3) times daily as needed for Muscle Spasm(s). dilTIAZem ER (DILACOR XR) 120 mg capsule Take  by mouth daily. finasteride (PROSCAR) 5 mg tablet Take 5 mg by mouth daily. influenza vaccine 2019-20, 65 yrs+,,PF, (Fluad 2873-6805, 65 yr up,,PF,) syrg injection 0.5 mL by IntraMUSCular route PRIOR TO DISCHARGE.      fluticasone propionate (FLONASE) 50 mcg/actuation nasal spray 2 Sprays by Both Nostrils route daily. HYDROcodone-acetaminophen (NORCO) 5-325 mg per tablet Take  by mouth. indomethacin (INDOCIN) 50 mg capsule Take  by mouth three (3) times daily. methylPREDNISolone (MEDROL DOSEPACK) 4 mg tablet Take  by mouth. beclomethasone (Qvar) 80 mcg/actuation aero Take 1 Puff by inhalation two (2) times a day. beclomethasone dipropionate (Qvar RediHaler) 80 mcg/actuation HFAb inhaler Take 1 Puff by inhalation.      edoxaban (Savaysa) 60 mg tab tablet Take 60 mg by mouth daily. sodium bicarbonate 325 mg tablet Take 325 mg by mouth four (4) times daily. temazepam (RESTORIL) 30 mg capsule Take  by mouth nightly as needed for Sleep.      traZODone (DESYREL) 50 mg tablet Take  by mouth nightly. fexofenadine (ALLEGRA) 60 mg tablet Take  by mouth. diclofenac (VOLTAREN) 1 % gel Apply  to affected area four (4) times daily. triamcinolone acetonide (Kenalog) 40 mg/mL injection once.          Past History   Past Medical History:  Past Medical History:   Diagnosis Date    Ankle pain 8/3/2020    Benign prostatic disease 8/3/2020    Hypertrophy with Outflow Obstruction    Benign prostatic hyperplasia 8/3/2020    Bronchitis     Cervical radiculopathy 8/3/2020    Chronic obstructive lung disease ([address] 8/3/2020    Cocaine abuse ([address] 8/3/2020    Diarrhea 8/3/2020    Dizziness and giddiness 8/3/2020    Hypercholesterolemia 8/3/2020    Hypertensive disorder 8/3/2020    Insomnia 8/3/2020    Kidney disease 8/3/2020    Low back pain 8/3/2020    Osteoarthritis of knee 8/3/2020    Pain in breast 8/3/2020    Pain in left knee 8/3/2020    Prediabetes 8/3/2020    Seasonal allergic rhinitis 8/3/2020    Shoulder pain 8/3/2020    Sleep apnea 8/3/2020    Tinnitus of right ear 8/3/2020    Vasovagal syncope 8/3/2020        Past Surgical History:  History reviewed. No pertinent surgical history. Family History:  Family History   Problem Relation Age of Onset    Hypertension Mother        Social History:  Social History     Tobacco Use    Smoking status: Former    Smokeless tobacco: Never   Substance Use Topics    Alcohol use: Yes     Comment: pt drinks everyday    Drug use: Yes     Frequency: 3.0 times per week     Types: Cocaine       Allergies: Allergies   Allergen Reactions    Bactrim [Sulfamethoprim] Unknown (comments)    Penicillin G Unknown (comments)        Review of Systems   Review of Systems   Constitutional:  Negative for fever. HENT:  Negative for congestion. Eyes:  Negative for visual disturbance. Respiratory:  Positive for shortness of breath. Cardiovascular:  Negative for chest pain. Gastrointestinal:  Negative for abdominal pain. Genitourinary:  Negative for dysuria. Musculoskeletal:  Negative for arthralgias. Skin:  Negative for rash. Neurological:  Negative for headaches. Physical Exam   Constitutional: No acute distress. Well-nourished. Skin: No rash. ENT: No rhinorrhea. No cough. Head is normocephalic and atraumatic. Eye: No proptosis or conjunctival injections. Respiratory: No apparent respiratory distress. Lung sounds are clear. Gastrointestinal: Nondistended. Soft and nontender. Musculoskeletal: No obvious bony deformities. No tenderness to the pelvis or extremities. No midline cervical spinal tenderness. Psychiatric: Cooperative. Appropriate mood and affect. Neuro: Slurred speech. No focal deficits otherwise. Normal cranial nerves. Normal movement of extremities.     Lab and Diagnostic Study Results   Labs -   No results found for this or any previous visit (from the past 12 hour(s)). Radiologic Studies -   [unfilled]  CT Results  (Last 48 hours)                 10/04/22 0022  CT HEAD WO CONT Final result    Impression:  No acute intracranial abnormality. Narrative:  EXAM:  CT HEAD WO CONT       INDICATION: Fall with head injury. COMPARISON: CT 5/13/2022       TECHNIQUE: Axial noncontrast head CT from foramen magnum to vertex. Coronal and   sagittal reformatted images were obtained. CT dose reduction was achieved   through use of a standardized protocol tailored for this examination and   automatic exposure control for dose modulation. FINDINGS:  There is diffuse age-related parenchymal volume loss. The ventricles   and sulci are age-appropriate without hydrocephalus. There is no mass effect or   midline shift. There is no intracranial hemorrhage or extra-axial fluid   collection. Areas of low attenuation in the periventricular white matter   represent stable chronic microvascular ischemic changes. The gray-white matter   differentiation is maintained. The basal cisterns are patent. The osseous structures are intact. There is mucosal thickening in the posterior   ethmoid air cells. The remaining paranasal sinuses and mastoid air cells are   clear. 10/04/22 0022  CT SPINE CERV WO CONT Final result    Impression:  No acute abnormality. Diffuse degenerative changes. Narrative:  EXAM:  CT CERVICAL SPINE WITHOUT CONTRAST       INDICATION: Fall with head injury. COMPARISON: Radiographs 3/17/2019. CONTRAST:  None. TECHNIQUE: Multislice helical CT of the cervical spine was performed without   intravenous contrast administration. Sagittal and coronal reformats were   generated. CT dose reduction was achieved through use of a standardized   protocol tailored for this examination and automatic exposure control for dose   modulation. FINDINGS:   There is no acute fracture or subluxation. There are diffuse degenerative   changes. The paraspinal soft tissues are unremarkable. The visualized lung   apices are clear. CXR Results  (Last 48 hours)                 10/03/22 2331  XR CHEST PORT Final result    Impression:  Mild right basilar atelectasis. No other acute process. Narrative:  EXAM:  CR chest portable       INDICATION: Shortness of breath       COMPARISON: 5/13/2022. TECHNIQUE: Portable AP upright chest view at 2330 hours       FINDINGS: The cardiomediastinal contours are stable. There is mild right basilar   atelectasis. The lungs and pleural spaces are otherwise clear. There is no   pneumothorax. The bones and upper abdomen are stable. Medical Decision Making and ED Course   - I am the first and primary provider for this patient AND AM THE PRIMARY PROVIDER OF RECORD. I reviewed the vital signs, available nursing notes, past medical history, past surgical history, family history and social history. - Initial assessment performed. The patients presenting problems have been discussed, and the staff are in agreement with the care plan formulated and outlined with them. I have encouraged them to ask questions as they arise throughout their visit. Vital Signs-Reviewed the patient's vital signs. Patient Vitals for the past 12 hrs:   Temp   10/04/22 0154 (!) 96.2 °F (35.7 °C)   10/04/22 0049 (!) 95.8 °F (35.4 °C)     MDM  The differential diagnosis is fall, cocaine abuse, alcohol abuse, head injury, neck injury, chronic COPD. Presented with fall as well as alcohol use and cocaine abuse. No obvious head injury. He did have slurred speech likely related to alcohol ingestion. No other acute findings. Head CT basic labs and imaging. No acute findings. Will discharge patient home when ambulatory. Disposition   Disposition: Discharged    DISCHARGE PLAN:  1.    Current Discharge Medication List        CONTINUE these medications which have NOT CHANGED    Details   valsartan (DIOVAN) 320 mg tablet TAKE 1 TABLET BY MOUTH DAILY  Qty: 90 Tablet, Refills: 0      azithromycin (Zithromax Z-Collins) 250 mg tablet Take 1 Tablet by mouth See Admin Instructions. Qty: 6 Tablet, Refills: 0      albuterol (Ventolin HFA) 90 mcg/actuation inhaler Take 2 Puffs by inhalation every six (6) hours as needed for Wheezing or Shortness of Breath. Qty: 6.7 g, Refills: 0      Bedside Commode XX As directed  Qty: 1 Each, Refills: 0      Shower Chair XX kelechi Use as directed  Qty: 1 Each, Refills: 0      simvastatin (ZOCOR) 10 mg tablet TAKE 1 TABLET BY MOUTH EVERYDAY AT BEDTIME  Qty: 30 Tablet, Refills: 2    Associated Diagnoses: Hyperlipidemia, unspecified      dilTIAZem ER (CARDIZEM SR) 120 mg capsule TAKE 1 CAPSULE BY MOUTH EVERY DAY  Qty: 30 Cap, Refills: 2      fexofenadine-pseudoephedrine (Allergy Relief-D,fexofenadine,)  mg Tb12 Take 1 Tab by mouth every twelve (12) hours. benzonatate (TESSALON) 200 mg capsule Take 200 mg by mouth three (3) times daily as needed for Cough. cephALEXin (KEFLEX) 500 mg capsule Take 500 mg by mouth four (4) times daily. colchicine 0.6 mg tablet Take 0.6 mg by mouth daily. cyclobenzaprine (FLEXERIL) 10 mg tablet Take  by mouth three (3) times daily as needed for Muscle Spasm(s). dilTIAZem ER (DILACOR XR) 120 mg capsule Take  by mouth daily. finasteride (PROSCAR) 5 mg tablet Take 5 mg by mouth daily. influenza vaccine 2019-20, 65 yrs+,,PF, (Fluad 6015-1133, 65 yr up,,PF,) syrg injection 0.5 mL by IntraMUSCular route PRIOR TO DISCHARGE.      fluticasone propionate (FLONASE) 50 mcg/actuation nasal spray 2 Sprays by Both Nostrils route daily. HYDROcodone-acetaminophen (NORCO) 5-325 mg per tablet Take  by mouth. indomethacin (INDOCIN) 50 mg capsule Take  by mouth three (3) times daily. methylPREDNISolone (MEDROL DOSEPACK) 4 mg tablet Take  by mouth.       beclomethasone (Qvar) 80 mcg/actuation aero Take 1 Puff by inhalation two (2) times a day. beclomethasone dipropionate (Qvar RediHaler) 80 mcg/actuation HFAb inhaler Take 1 Puff by inhalation.      edoxaban (Savaysa) 60 mg tab tablet Take 60 mg by mouth daily. sodium bicarbonate 325 mg tablet Take 325 mg by mouth four (4) times daily. temazepam (RESTORIL) 30 mg capsule Take  by mouth nightly as needed for Sleep.      traZODone (DESYREL) 50 mg tablet Take  by mouth nightly. fexofenadine (ALLEGRA) 60 mg tablet Take  by mouth. diclofenac (VOLTAREN) 1 % gel Apply  to affected area four (4) times daily. triamcinolone acetonide (Kenalog) 40 mg/mL injection once. 2.   Follow-up Information       Follow up With Specialties Details Why Contact Info    Primary care doctor  Schedule an appointment as soon as possible for a visit in 3 days            3. Return to ED if worse   4. Discharge Medication List as of 10/4/2022  1:38 AM           Diagnosis/Clinical Impression   Clinical Impression:   1. Alcoholic intoxication with complication ([address]    2. Fall, initial encounter    3. Cocaine use           Attestations:  Briana Blackman, DO    Please note that this dictation was completed with [employer], the computer voice recognition software. Quite often unanticipated grammatical, syntax, homophones, and other interpretive errors are inadvertently transcribed by the computer software. Please disregard these errors. Please excuse any errors that have escaped final proofreading. Thank you.

## 2022-10-04 NOTE — ED NOTES
Emely Yun ([address] HIS CAREGIVER): [phone]      Pt. Gave permission to update this contact when/if she calls.

## 2022-10-04 NOTE — ED TRIAGE NOTES
Patient was getting out of his car and slipped and fell, by standers found him and report that he was unresponsive however on EMS arrival patient was awake and alert GCS of 15 with complaints of arm pain from catching himself on the way down. Patient also complains of shortness of breath which is baseline with his COPD.  Patient also reports ETOH and cocaine use tonight

## 2022-11-30 ENCOUNTER — NURSE TRIAGE (OUTPATIENT)
Dept: OTHER | Facility: CLINIC | Age: 77
End: 2022-11-30

## 2022-11-30 NOTE — TELEPHONE ENCOUNTER
Location of patient: [address] [place] from [id number] at [facility] with [employer]. Subjective: Caller states when standing feels woozy as if he is going to fall. Pt states he has to hold on when walking. Changing position makes Dizziness worse. Has had Dizziness in the past but has not been seen by Dr. Taya Hurt more fatigue than normal.     Current Symptoms: Dizziness    Onset: 3 days ago; gradual    Associated Symptoms: NA    Pain Severity: 0/10; ;     Temperature: Denies     What has been tried: Denies    LMP: NA Pregnant: No    Recommended disposition: Go to ED/UCC Now (Or to Office with PCP Approval)     Care advice provided, patient verbalizes understanding; denies any other questions or concerns; instructed to call back for any new or worsening symptoms. Warm transferred to Kaila Kate at [facility] practice. Attention Provider: Thank you for allowing me to participate in the care of your patient. The patient was connected to triage in response to information provided to the [facility]. Please do not respond through this encounter as the response is not directed to a shared pool.           Reason for Disposition   SEVERE dizziness (e.g., unable to stand, requires support to walk, feels like passing out now)    Protocols used: Dizziness-ADULT-[place]

## 2023-01-04 ENCOUNTER — HOSPITAL ENCOUNTER (EMERGENCY)
Age: 78
Discharge: HOME | End: 2023-01-04
Attending: STUDENT IN AN ORGANIZED HEALTH CARE EDUCATION/TRAINING PROGRAM
Payer: MEDICARE

## 2023-01-04 VITALS
BODY MASS INDEX: 32.88 KG/M2 | HEART RATE: 74 BPM | DIASTOLIC BLOOD PRESSURE: 97 MMHG | HEIGHT: 76 IN | RESPIRATION RATE: 20 BRPM | WEIGHT: 270 LBS | TEMPERATURE: 97.7 F | SYSTOLIC BLOOD PRESSURE: 153 MMHG | OXYGEN SATURATION: 97 %

## 2023-01-04 DIAGNOSIS — R60.9 PERIPHERAL EDEMA: Primary | ICD-10-CM

## 2023-01-04 LAB
ALBUMIN SERPL-MCNC: 2.9 G/DL (ref 3.5–5)
ALBUMIN/GLOB SERPL: 0.9 {RATIO} (ref 1.1–2.2)
ALP SERPL-CCNC: 76 U/L (ref 45–117)
ALT SERPL-CCNC: 20 U/L (ref 12–78)
ANION GAP SERPL CALC-SCNC: 12 MMOL/L (ref 5–15)
AST SERPL W P-5'-P-CCNC: 32 U/L (ref 15–37)
BASOPHILS # BLD: 0 K/UL (ref 0–0.1)
BASOPHILS NFR BLD: 1 % (ref 0–1)
BILIRUB SERPL-MCNC: 0.4 MG/DL (ref 0.2–1)
BNP SERPL-MCNC: 1018 PG/ML
BUN SERPL-MCNC: 23 MG/DL (ref 6–20)
BUN/CREAT SERPL: 16 (ref 12–20)
CA-I BLD-MCNC: 7.9 MG/DL (ref 8.5–10.1)
CHLORIDE SERPL-SCNC: 102 MMOL/L (ref 97–108)
CO2 SERPL-SCNC: 25 MMOL/L (ref 21–32)
CREAT SERPL-MCNC: 1.42 MG/DL (ref 0.7–1.3)
DIFFERENTIAL METHOD BLD: ABNORMAL
EOSINOPHIL # BLD: 0 K/UL (ref 0–0.4)
EOSINOPHIL NFR BLD: 1 % (ref 0–7)
ERYTHROCYTE [DISTWIDTH] IN BLOOD BY AUTOMATED COUNT: 13.4 % (ref 11.5–14.5)
GLOBULIN SER CALC-MCNC: 3.4 G/DL (ref 2–4)
GLUCOSE SERPL-MCNC: 93 MG/DL (ref 65–100)
HCT VFR BLD AUTO: 31.1 % (ref 36.6–50.3)
HGB BLD-MCNC: 10.3 G/DL (ref 12.1–17)
IMM GRANULOCYTES # BLD AUTO: 0 K/UL (ref 0–0.04)
IMM GRANULOCYTES NFR BLD AUTO: 0 % (ref 0–0.5)
LYMPHOCYTES # BLD: 1.9 K/UL (ref 0.8–3.5)
LYMPHOCYTES NFR BLD: 46 % (ref 12–49)
MCH RBC QN AUTO: 36.7 PG (ref 26–34)
MCHC RBC AUTO-ENTMCNC: 33.1 G/DL (ref 30–36.5)
MCV RBC AUTO: 110.7 FL (ref 80–99)
MONOCYTES # BLD: 0.6 K/UL (ref 0–1)
MONOCYTES NFR BLD: 14 % (ref 5–13)
NEUTS SEG # BLD: 1.6 K/UL (ref 1.8–8)
NEUTS SEG NFR BLD: 38 % (ref 32–75)
PLATELET # BLD AUTO: 151 K/UL (ref 150–400)
PMV BLD AUTO: 10.2 FL (ref 8.9–12.9)
POTASSIUM SERPL-SCNC: 3.7 MMOL/L (ref 3.5–5.1)
PROT SERPL-MCNC: 6.3 G/DL (ref 6.4–8.2)
RBC # BLD AUTO: 2.81 M/UL (ref 4.1–5.7)
SODIUM SERPL-SCNC: 139 MMOL/L (ref 136–145)
WBC # BLD AUTO: 4.2 K/UL (ref 4.1–11.1)

## 2023-01-04 PROCEDURE — 80053 COMPREHEN METABOLIC PANEL: CPT

## 2023-01-04 PROCEDURE — 99283 EMERGENCY DEPT VISIT LOW MDM: CPT

## 2023-01-04 PROCEDURE — 85025 COMPLETE CBC W/AUTO DIFF WBC: CPT

## 2023-01-04 PROCEDURE — 83880 ASSAY OF NATRIURETIC PEPTIDE: CPT

## 2023-01-04 PROCEDURE — 36415 COLL VENOUS BLD VENIPUNCTURE: CPT

## 2023-01-04 NOTE — ED PROVIDER NOTES
EMERGENCY DEPARTMENT HISTORY AND PHYSICAL EXAM      Date: 1/4/2023  Patient Name: Xavier Lockett    History of Presenting Illness     Chief Complaint   Patient presents with    Leg Pain       History Provided By: Patient    HPI: Xavier Lockett, 76 y.o. male with past history as reported below presenting to the ED for bilateral lower leg swelling for the last 2 weeks. Patient reports that he typically does not have any swelling in his lower extremity, he notes that someone improves with raising his legs but has failed to improve over the last 2 weeks. Denies any fevers or chills, denies any history of blood clots. He notes mild bilateral pain. There are no other complaints, changes, or physical findings at this time. Past History     Past Medical History:  Past Medical History:   Diagnosis Date    Ankle pain 8/3/2020    Benign prostatic disease 8/3/2020    Hypertrophy with Outflow Obstruction    Benign prostatic hyperplasia 8/3/2020    Bronchitis     Cervical radiculopathy 8/3/2020    Chronic obstructive lung disease ([address] 8/3/2020    Cocaine abuse ([address] 8/3/2020    Diarrhea 8/3/2020    Dizziness and giddiness 8/3/2020    Hypercholesterolemia 8/3/2020    Hypertensive disorder 8/3/2020    Insomnia 8/3/2020    Kidney disease 8/3/2020    Low back pain 8/3/2020    Osteoarthritis of knee 8/3/2020    Pain in breast 8/3/2020    Pain in left knee 8/3/2020    Prediabetes 8/3/2020    Seasonal allergic rhinitis 8/3/2020    Shoulder pain 8/3/2020    Sleep apnea 8/3/2020    Tinnitus of right ear 8/3/2020    Vasovagal syncope 8/3/2020       Past Surgical History:  No past surgical history on file.     Family History:  Family History   Problem Relation Age of Onset    Hypertension Mother        Social History:  Social History     Tobacco Use    Smoking status: Former    Smokeless tobacco: Never   Substance Use Topics    Alcohol use: Yes     Comment: pt drinks everyday    Drug use: Yes     Frequency: 3.0 times per week Types: Cocaine       Allergies: Allergies   Allergen Reactions    Bactrim [Sulfamethoprim] Unknown (comments)    Penicillin G Unknown (comments)       PCP: Margo Leonard MD    No current facility-administered medications on file prior to encounter. Current Outpatient Medications on File Prior to Encounter   Medication Sig Dispense Refill    valsartan (DIOVAN) 320 mg tablet TAKE 1 TABLET BY MOUTH DAILY 90 Tablet 0    azithromycin (Zithromax Z-Collins) 250 mg tablet Take 1 Tablet by mouth See Admin Instructions. 6 Tablet 0    albuterol (Ventolin HFA) 90 mcg/actuation inhaler Take 2 Puffs by inhalation every six (6) hours as needed for Wheezing or Shortness of Breath. 6.7 g 0    Bedside Commode XX As directed 1 Each 0    Shower Chair XX kelechi Use as directed 1 Each 0    simvastatin (ZOCOR) 10 mg tablet TAKE 1 TABLET BY MOUTH EVERYDAY AT BEDTIME 30 Tablet 2    dilTIAZem ER (CARDIZEM SR) 120 mg capsule TAKE 1 CAPSULE BY MOUTH EVERY DAY 30 Cap 2    fexofenadine-pseudoephedrine (Allergy Relief-D,fexofenadine,)  mg Tb12 Take 1 Tab by mouth every twelve (12) hours. benzonatate (TESSALON) 200 mg capsule Take 200 mg by mouth three (3) times daily as needed for Cough. cephALEXin (KEFLEX) 500 mg capsule Take 500 mg by mouth four (4) times daily. colchicine 0.6 mg tablet Take 0.6 mg by mouth daily. cyclobenzaprine (FLEXERIL) 10 mg tablet Take  by mouth three (3) times daily as needed for Muscle Spasm(s). dilTIAZem ER (DILACOR XR) 120 mg capsule Take  by mouth daily. finasteride (PROSCAR) 5 mg tablet Take 5 mg by mouth daily. influenza vaccine 2019-20, 65 yrs+,,PF, (Fluad 7637-9057, 65 yr up,,PF,) syrg injection 0.5 mL by IntraMUSCular route PRIOR TO DISCHARGE.      fluticasone propionate (FLONASE) 50 mcg/actuation nasal spray 2 Sprays by Both Nostrils route daily. HYDROcodone-acetaminophen (NORCO) 5-325 mg per tablet Take  by mouth.       indomethacin (INDOCIN) 50 mg capsule Take by mouth three (3) times daily. methylPREDNISolone (MEDROL DOSEPACK) 4 mg tablet Take  by mouth. beclomethasone (Qvar) 80 mcg/actuation aero Take 1 Puff by inhalation two (2) times a day. beclomethasone dipropionate (Qvar RediHaler) 80 mcg/actuation HFAb inhaler Take 1 Puff by inhalation.      edoxaban (Savaysa) 60 mg tab tablet Take 60 mg by mouth daily. sodium bicarbonate 325 mg tablet Take 325 mg by mouth four (4) times daily. temazepam (RESTORIL) 30 mg capsule Take  by mouth nightly as needed for Sleep.      traZODone (DESYREL) 50 mg tablet Take  by mouth nightly. fexofenadine (ALLEGRA) 60 mg tablet Take  by mouth. diclofenac (VOLTAREN) 1 % gel Apply  to affected area four (4) times daily. triamcinolone acetonide (Kenalog) 40 mg/mL injection once. Review of Systems   Review of Systems  A 10 point review of system was performed and was negative except as noted above in HPI    Physical Exam   Physical Exam  Vitals and nursing note reviewed. Constitutional:       General: He is not in acute distress. Appearance: Normal appearance. HENT:      Head: Normocephalic. Nose: Nose normal.      Mouth/Throat:      Mouth: Mucous membranes are moist.   Eyes:      Conjunctiva/sclera: Conjunctivae normal.   Cardiovascular:      Rate and Rhythm: Normal rate. Pulses: Normal pulses. Comments: Bilateral lower extremity pitting edema, there is normal dorsalis pedis pulses bilaterally  Pulmonary:      Effort: Pulmonary effort is normal.      Breath sounds: Normal breath sounds. Abdominal:      General: Abdomen is flat. Musculoskeletal:         General: No deformity. Cervical back: Neck supple. Skin:     General: Skin is warm. Neurological:      General: No focal deficit present. Mental Status: He is alert.    Psychiatric:         Mood and Affect: Mood normal.       Lab and Diagnostic Study Results   Labs -     Recent Results (from the past 12 hour(s))   CBC WITH AUTOMATED DIFF    Collection Time: 01/04/23  7:35 AM   Result Value Ref Range    WBC 4.2 4.1 - 11.1 K/uL    RBC 2.81 (L) 4.10 - 5.70 M/uL    HGB 10.3 (L) 12.1 - 17.0 g/dL    HCT 31.1 (L) 36.6 - 50.3 %    .7 (H) 80.0 - 99.0 FL    MCH 36.7 (H) 26.0 - 34.0 PG    MCHC 33.1 30.0 - 36.5 g/dL    RDW 13.4 11.5 - 14.5 %    PLATELET 628 271 - 031 K/uL    MPV 10.2 8.9 - 12.9 FL    NEUTROPHILS 38 32 - 75 %    LYMPHOCYTES 46 12 - 49 %    MONOCYTES 14 (H) 5 - 13 %    EOSINOPHILS 1 0 - 7 %    BASOPHILS 1 0 - 1 %    IMMATURE GRANULOCYTES 0 0.0 - 0.5 %    ABS. NEUTROPHILS 1.6 (L) 1.8 - 8.0 K/UL    ABS. LYMPHOCYTES 1.9 0.8 - 3.5 K/UL    ABS. MONOCYTES 0.6 0.0 - 1.0 K/UL    ABS. EOSINOPHILS 0.0 0.0 - 0.4 K/UL    ABS. BASOPHILS 0.0 0.0 - 0.1 K/UL    ABS. IMM. GRANS. 0.0 0.00 - 0.04 K/UL    DF AUTOMATED     METABOLIC PANEL, COMPREHENSIVE    Collection Time: 01/04/23  7:35 AM   Result Value Ref Range    Sodium 139 136 - 145 mmol/L    Potassium 3.7 3.5 - 5.1 mmol/L    Chloride 102 97 - 108 mmol/L    CO2 25 21 - 32 mmol/L    Anion gap 12 5 - 15 mmol/L    Glucose 93 65 - 100 mg/dL    BUN 23 (H) 6 - 20 mg/dL    Creatinine 1.42 (H) 0.70 - 1.30 mg/dL    BUN/Creatinine ratio 16 12 - 20      eGFR 50 (L) >60 ml/min/1.73m2    Calcium 7.9 (L) 8.5 - 10.1 mg/dL    Bilirubin, total 0.4 0.2 - 1.0 mg/dL    AST (SGOT) 32 15 - 37 U/L    ALT (SGPT) 20 12 - 78 U/L    Alk.  phosphatase 76 45 - 117 U/L    Protein, total 6.3 (L) 6.4 - 8.2 g/dL    Albumin 2.9 (L) 3.5 - 5.0 g/dL    Globulin 3.4 2.0 - 4.0 g/dL    A-G Ratio 0.9 (L) 1.1 - 2.2     NT-PRO BNP    Collection Time: 01/04/23  7:35 AM   Result Value Ref Range    NT pro-BNP 1,018 (H) <450 pg/mL       Radiologic Studies -   @lastxrresult@  CT Results  (Last 48 hours)      None          CXR Results  (Last 48 hours)      None            Medical Decision Making and ED Course   Differential Diagnosis & Medical Decision Making Provider Note:   Is a 49-year-old male presenting to the ED with bilateral lower extremity pitting edema, differential diagnosis includes CHF, kidney failure, liver failure, laboratory abnormality, less likely DVT as there is no pain, chronicity of the swelling, there is a negative [place]' sign bilaterally there is no erythema or redness. Swelling appears chronic. Full obtain labs to eval for the above. - I am the first provider for this patient. I reviewed the vital signs, available nursing notes, past medical history, past surgical history, family history and social history. The patients presenting problems have been discussed, and they are in agreement with the care plan formulated and outlined with them. I have encouraged them to ask questions as they arise throughout their visit. Vital Signs-Reviewed the patient's vital signs. Patient Vitals for the past 12 hrs:   Temp Pulse Resp BP SpO2   01/04/23 0936 97.7 °F (36.5 °C) 74 20 (!) 153/97 97 %   01/04/23 0655 -- -- -- (!) 146/97 --   01/04/23 0653 98.7 °F (37.1 °C) 94 20 -- 98 %       ED Course: On reassessment the patient has slightly elevated pro BNP, this could be correlated with heart failure, he also has a slightly low albumin but normal transaminases, he has slightly increased kidney function. He is stable for discharge and outpatient follow-up as his vitals are unremarkable his pain is controlled. Once again I do not suspect any DVT in this case as the swelling is bilateral and he has elevated proBNP. Had discussion with him about the importance of follow-up with his doctor for evaluation of heart failure will refer to cardiology as well. Understanding was insured that at this time there is no evidence for a more malignant underlying process, but that early in the process of an illness, an emergency department workup can be falsely reassuring.      Routine discharge counseling was given including the fact that any worsening, changing or persistent symptoms should prompt an immediate call or follow up with their primary physician or the emergency department. The importance of appropriate follow up was also discussed. More extensive discharge instructions were given in the patient's discharge paperwork. After completion of evaluation and discussion of results and diagnoses, all the questions were answered. If required, all follow up appointments and treatments were discussed and explained. Understanding was insured prior to discharge. Procedures   Performed by: Jesse Sandoval MD  Procedures      Disposition   Disposition: DC- Adult Discharges: All of the diagnostic tests were reviewed and questions answered. Diagnosis, care plan and treatment options were discussed. The patient understands the instructions and will follow up as directed. The patients results have been reviewed with them. They have been counseled regarding their diagnosis. The patient verbally convey understanding and agreement of the signs, symptoms, diagnosis, treatment and prognosis and additionally agrees to follow up as recommended with their PCP in 24 - 48 hours. They also agree with the care-plan and convey that all of their questions have been answered. I have also put together some discharge instructions for them that include: 1) educational information regarding their diagnosis, 2) how to care for their diagnosis at home, as well a 3) list of reasons why they would want to return to the ED prior to their follow-up appointment, should their condition change. DISCHARGE PLAN:  1. Current Discharge Medication List        CONTINUE these medications which have NOT CHANGED    Details   valsartan (DIOVAN) 320 mg tablet TAKE 1 TABLET BY MOUTH DAILY  Qty: 90 Tablet, Refills: 0      azithromycin (Zithromax Z-Collins) 250 mg tablet Take 1 Tablet by mouth See Admin Instructions.   Qty: 6 Tablet, Refills: 0      albuterol (Ventolin HFA) 90 mcg/actuation inhaler Take 2 Puffs by inhalation every six (6) hours as needed for Wheezing or Shortness of Breath. Qty: 6.7 g, Refills: 0      Bedside Commode XX As directed  Qty: 1 Each, Refills: 0      Shower Chair XX kelechi Use as directed  Qty: 1 Each, Refills: 0      simvastatin (ZOCOR) 10 mg tablet TAKE 1 TABLET BY MOUTH EVERYDAY AT BEDTIME  Qty: 30 Tablet, Refills: 2    Associated Diagnoses: Hyperlipidemia, unspecified      dilTIAZem ER (CARDIZEM SR) 120 mg capsule TAKE 1 CAPSULE BY MOUTH EVERY DAY  Qty: 30 Cap, Refills: 2      fexofenadine-pseudoephedrine (Allergy Relief-D,fexofenadine,)  mg Tb12 Take 1 Tab by mouth every twelve (12) hours. benzonatate (TESSALON) 200 mg capsule Take 200 mg by mouth three (3) times daily as needed for Cough. cephALEXin (KEFLEX) 500 mg capsule Take 500 mg by mouth four (4) times daily. colchicine 0.6 mg tablet Take 0.6 mg by mouth daily. cyclobenzaprine (FLEXERIL) 10 mg tablet Take  by mouth three (3) times daily as needed for Muscle Spasm(s). dilTIAZem ER (DILACOR XR) 120 mg capsule Take  by mouth daily. finasteride (PROSCAR) 5 mg tablet Take 5 mg by mouth daily. influenza vaccine 2019-20, 65 yrs+,,PF, (Fluad 2732-4272, 65 yr up,,PF,) syrg injection 0.5 mL by IntraMUSCular route PRIOR TO DISCHARGE.      fluticasone propionate (FLONASE) 50 mcg/actuation nasal spray 2 Sprays by Both Nostrils route daily. HYDROcodone-acetaminophen (NORCO) 5-325 mg per tablet Take  by mouth. indomethacin (INDOCIN) 50 mg capsule Take  by mouth three (3) times daily. methylPREDNISolone (MEDROL DOSEPACK) 4 mg tablet Take  by mouth. beclomethasone (Qvar) 80 mcg/actuation aero Take 1 Puff by inhalation two (2) times a day. beclomethasone dipropionate (Qvar RediHaler) 80 mcg/actuation HFAb inhaler Take 1 Puff by inhalation.      edoxaban (Savaysa) 60 mg tab tablet Take 60 mg by mouth daily. sodium bicarbonate 325 mg tablet Take 325 mg by mouth four (4) times daily.       temazepam (RESTORIL) 30 mg capsule Take  by mouth nightly as needed for Sleep.      traZODone (DESYREL) 50 mg tablet Take  by mouth nightly. fexofenadine (ALLEGRA) 60 mg tablet Take  by mouth. diclofenac (VOLTAREN) 1 % gel Apply  to affected area four (4) times daily. triamcinolone acetonide (Kenalog) 40 mg/mL injection once. 2.   Follow-up Information       Follow up With Specialties Details Why Contact Info    Agnieszka Mireles MD Family Medicine Call in 1 day  [address] [address]  Call in 1 day  [address]  [address] [facility]  [phone]          3. Return to ED if worse   4. Current Discharge Medication List          Diagnosis/Clinical Impression     Clinical Impression:   1. Peripheral edema        Attestations: Artur Navarrete MD, am the primary clinician of record. Please note that this dictation was completed with [employer], the [employer] voice recognition software. Quite often unanticipated grammatical, syntax, homophones, and other interpretive errors are inadvertently transcribed by the computer software. Please disregard these errors. Please excuse any errors that have escaped final proofreading. Thank you.

## 2023-01-04 NOTE — ED TRIAGE NOTES
Pt presents with pedal edema, left worse than right. Endorses that he went to the dr 10 days ago for cortisone shot in left knee and noted swelling. States swelling worse now. Admits to beer this am for the pain.

## 2023-01-06 ENCOUNTER — OFFICE VISIT (OUTPATIENT)
Dept: FAMILY MEDICINE CLINIC | Age: 78
End: 2023-01-06
Payer: MEDICARE

## 2023-01-06 VITALS
BODY MASS INDEX: 24.94 KG/M2 | TEMPERATURE: 98.3 F | WEIGHT: 204.8 LBS | RESPIRATION RATE: 17 BRPM | SYSTOLIC BLOOD PRESSURE: 177 MMHG | HEIGHT: 76 IN | OXYGEN SATURATION: 96 % | DIASTOLIC BLOOD PRESSURE: 96 MMHG | HEART RATE: 82 BPM

## 2023-01-06 DIAGNOSIS — J41.8 MIXED SIMPLE AND MUCOPURULENT CHRONIC BRONCHITIS (HCC): ICD-10-CM

## 2023-01-06 DIAGNOSIS — F10.10 ALCOHOL ABUSE: ICD-10-CM

## 2023-01-06 DIAGNOSIS — I11.9 MALIGNANT HYPERTENSIVE HEART DISEASE WITHOUT HEART FAILURE: Primary | ICD-10-CM

## 2023-01-06 DIAGNOSIS — R60.0 BILATERAL LOWER EXTREMITY EDEMA: ICD-10-CM

## 2023-01-06 DIAGNOSIS — Z09 HOSPITAL DISCHARGE FOLLOW-UP: ICD-10-CM

## 2023-01-06 DIAGNOSIS — R53.1 WEAKNESS GENERALIZED: ICD-10-CM

## 2023-01-06 RX ORDER — HYDROCHLOROTHIAZIDE 25 MG/1
25 TABLET ORAL DAILY
Qty: 30 TABLET | Refills: 2 | Status: SHIPPED | OUTPATIENT
Start: 2023-01-06 | End: 2023-02-05

## 2023-01-06 RX ORDER — ALBUTEROL SULFATE 90 UG/1
2 AEROSOL, METERED RESPIRATORY (INHALATION)
Qty: 6.7 G | Refills: 0 | Status: SHIPPED | OUTPATIENT
Start: 2023-01-06

## 2023-01-06 NOTE — PROGRESS NOTES
1. \"Have you been to the ER, urgent care clinic since your last visit? Hospitalized since your last visit? \" Yes When: 1/3/23 Where: [facility] Reason for visit: foot swollen    2. \"Have you seen or consulted any other health care providers outside of the [address] [place] [address] since your last visit? \" No     3. For patients aged 39-70: Has the patient had a colonoscopy / FIT/ Cologuard? NA - based on age      If the patient is female:    4. For patients aged 41-77: Has the patient had a mammogram within the past 2 years? NA - based on age or sex      11. For patients aged 21-65: Has the patient had a pap smear? NA - based on age or sex     Chief Complaint   Patient presents with    Hospital Follow Up    Medication Refill     Pt stated he needed a refill on some solution for his breathing machine, i don't see it in his chart. Visit Vitals  BP (!) 177/96 (BP 1 Location: Left upper arm, BP Patient Position: Sitting, BP Cuff Size: Large adult)   Pulse 82   Temp 98.3 °F (36.8 °C) (Oral)   Resp 17   Ht 6' 4\" (1.93 m)   Wt 204 lb 12.8 oz (92.9 kg)   SpO2 96%   BMI 24.93 kg/m²     Pt is here for a ER follow up and med refill, BP was elevated both times i checked.

## 2023-01-06 NOTE — PROGRESS NOTES
Livia Torrez is a 76 y.o. male and presents with Hospital Follow Up and Medication Refill (Pt stated he needed a refill on some solution for his breathing machine, i don't see it in his chart. )  . HPI     Subjective:  Cardiovascular Review:  The patient has hypertension   Diet and Lifestyle: generally follows a low fat low cholesterol diet, generally follows a low sodium diet, exercises sporadically  Home BP Monitoring: is not measured at home. Pertinent ROS: taking medications as instructed, no medication side effects noted, no TIA's, no chest pain on exertion, no dyspnea on exertion, no swelling of ankles. Review of Systems  Review of Systems   Constitutional: Negative. Negative for chills and fever. HENT: Negative. Negative for congestion, ear discharge, hearing loss, nosebleeds and tinnitus. Eyes: Negative. Negative for blurred vision, double vision, photophobia and pain. Respiratory: Negative. Negative for cough, hemoptysis and sputum production. Cardiovascular: Negative. Negative for chest pain and palpitations. Gastrointestinal: Negative. Negative for heartburn, nausea and vomiting. Genitourinary: Negative. Negative for dysuria, frequency and urgency. Musculoskeletal: Negative. Negative for back pain and myalgias. Skin: Negative. Neurological: Negative. Negative for dizziness, tingling, weakness and headaches. Endo/Heme/Allergies: Negative. Psychiatric/Behavioral: Negative. Negative for depression and suicidal ideas. The patient does not have insomnia. All other systems reviewed and are negative.       Past Medical History:   Diagnosis Date    Ankle pain 8/3/2020    Benign prostatic disease 8/3/2020    Hypertrophy with Outflow Obstruction    Benign prostatic hyperplasia 8/3/2020    Bronchitis     Cervical radiculopathy 8/3/2020    Chronic obstructive lung disease ([facility] [address] 8/3/2020    Cocaine abuse ([facility] [address] 8/3/2020    Diarrhea 8/3/2020    Dizziness and giddiness 8/3/2020    Hypercholesterolemia 8/3/2020    Hypertensive disorder 8/3/2020    Insomnia 8/3/2020    Kidney disease 8/3/2020    Low back pain 8/3/2020    Osteoarthritis of knee 8/3/2020    Pain in breast 8/3/2020    Pain in left knee 8/3/2020    Prediabetes 8/3/2020    Seasonal allergic rhinitis 8/3/2020    Shoulder pain 8/3/2020    Sleep apnea 8/3/2020    Tinnitus of right ear 8/3/2020    Vasovagal syncope 8/3/2020     History reviewed. No pertinent surgical history. Social History     Socioeconomic History    Marital status: LEGALLY    Tobacco Use    Smoking status: Former    Smokeless tobacco: Never   Substance and Sexual Activity    Alcohol use: Yes     Comment: pt drinks everyday    Drug use: Yes     Frequency: 3.0 times per week     Types: Cocaine     Social Determinants of Health     Financial Resource Strain: High Risk    Difficulty of Paying Living Expenses: Hard   Food Insecurity: Food Insecurity Present    Worried About Running Out of Food in the Last Year: Sometimes true    Ran Out of Food in the Last Year: Sometimes true     Family History   Problem Relation Age of Onset    Hypertension Mother      Current Outpatient Medications   Medication Sig Dispense Refill    albuterol (Ventolin HFA) 90 mcg/actuation inhaler Take 2 Puffs by inhalation every six (6) hours as needed for Wheezing or Shortness of Breath. 6.7 g 0    valsartan (DIOVAN) 320 mg tablet TAKE 1 TABLET BY MOUTH DAILY 90 Tablet 0    azithromycin (Zithromax Z-Collins) 250 mg tablet Take 1 Tablet by mouth See Admin Instructions. 6 Tablet 0    simvastatin (ZOCOR) 10 mg tablet TAKE 1 TABLET BY MOUTH EVERYDAY AT BEDTIME 30 Tablet 2    dilTIAZem ER (CARDIZEM SR) 120 mg capsule TAKE 1 CAPSULE BY MOUTH EVERY DAY 30 Cap 2    fexofenadine-pseudoephedrine (ALLEGRA-D)  mg Tb12 Take 1 Tab by mouth every twelve (12) hours. benzonatate (TESSALON) 200 mg capsule Take 200 mg by mouth three (3) times daily as needed for Cough.       cephALEXin (KEFLEX) 500 mg capsule Take 500 mg by mouth four (4) times daily. colchicine 0.6 mg tablet Take 0.6 mg by mouth daily. cyclobenzaprine (FLEXERIL) 10 mg tablet Take  by mouth three (3) times daily as needed for Muscle Spasm(s). dilTIAZem ER (DILACOR XR) 120 mg capsule Take  by mouth daily. finasteride (PROSCAR) 5 mg tablet Take 5 mg by mouth daily. fluticasone propionate (FLONASE) 50 mcg/actuation nasal spray 2 Sprays by Both Nostrils route daily. HYDROcodone-acetaminophen (NORCO) 5-325 mg per tablet Take  by mouth. indomethacin (INDOCIN) 50 mg capsule Take  by mouth three (3) times daily. methylPREDNISolone (MEDROL DOSEPACK) 4 mg tablet Take  by mouth. beclomethasone (Qvar) 80 mcg/actuation aero Take 1 Puff by inhalation two (2) times a day. beclomethasone dipropionate (Qvar RediHaler) 80 mcg/actuation HFAb inhaler Take 1 Puff by inhalation.      edoxaban (SAVAYSA) 60 mg tab tablet Take 60 mg by mouth daily. sodium bicarbonate 325 mg tablet Take 325 mg by mouth four (4) times daily. temazepam (RESTORIL) 30 mg capsule Take  by mouth nightly as needed for Sleep.      traZODone (DESYREL) 50 mg tablet Take  by mouth nightly. fexofenadine (ALLEGRA) 60 mg tablet Take  by mouth. diclofenac (VOLTAREN) 1 % gel Apply  to affected area four (4) times daily. triamcinolone acetonide (KENALOG-40) 40 mg/mL injection once. Bedside Commode XX As directed 1 Each 0    Shower Chair XX kelechi Use as directed 1 Each 0    influenza vaccine 2019-20, 65 yrs+,,PF, (Fluad [id number], 65 yr up,,PF,) syrg injection 0.5 mL by IntraMUSCular route PRIOR TO DISCHARGE.        Allergies   Allergen Reactions    Bactrim [Sulfamethoprim] Unknown (comments)    Penicillin G Unknown (comments)       Objective:  Visit Vitals  BP (!) 177/96 (BP 1 Location: Left upper arm, BP Patient Position: Sitting, BP Cuff Size: Large adult)   Pulse 82   Temp 98.3 °F (36.8 °C) (Oral)   Resp 17 Ht 6' 4\" (1.93 m)   Wt 204 lb 12.8 oz (92.9 kg)   SpO2 96%   BMI 24.93 kg/m²       Physical Exam:   Physical Exam  Vitals and nursing note reviewed. Constitutional:       General: He is not in acute distress. Appearance: Normal appearance. He is obese. He is not ill-appearing, toxic-appearing or diaphoretic. HENT:      Head: Normocephalic and atraumatic. Right Ear: Tympanic membrane, ear canal and external ear normal. There is no impacted cerumen. Left Ear: Tympanic membrane, ear canal and external ear normal. There is no impacted cerumen. Nose: Nose normal. No congestion or rhinorrhea. Mouth/Throat:      Mouth: Mucous membranes are moist.      Pharynx: Oropharynx is clear. No oropharyngeal exudate or posterior oropharyngeal erythema. Eyes:      General: No scleral icterus. Right eye: No discharge. Left eye: No discharge. Extraocular Movements: Extraocular movements intact. Conjunctiva/sclera: Conjunctivae normal.      Pupils: Pupils are equal, round, and reactive to light. Neck:      Vascular: No carotid bruit. Cardiovascular:      Rate and Rhythm: Normal rate and regular rhythm. Pulses: Normal pulses. Heart sounds: Normal heart sounds. No murmur heard. No friction rub. No gallop. Pulmonary:      Effort: Pulmonary effort is normal. No respiratory distress. Breath sounds: Normal breath sounds. No stridor. No wheezing, rhonchi or rales. Chest:      Chest wall: No tenderness. Abdominal:      General: Abdomen is flat. Bowel sounds are normal. There is no distension. Palpations: Abdomen is soft. There is no mass. Tenderness: There is no abdominal tenderness. There is no right CVA tenderness, left CVA tenderness, guarding or rebound. Hernia: No hernia is present. Musculoskeletal:         General: No swelling, tenderness, deformity or signs of injury. Normal range of motion.       Cervical back: Normal range of motion and neck supple. No rigidity. No muscular tenderness. Right lower leg: No edema. Left lower leg: No edema. Lymphadenopathy:      Cervical: No cervical adenopathy. Skin:     General: Skin is warm. Capillary Refill: Capillary refill takes 2 to 3 seconds. Coloration: Skin is not jaundiced or pale. Findings: No bruising, erythema, lesion or rash. Neurological:      General: No focal deficit present. Mental Status: He is alert and oriented to person, place, and time. Mental status is at baseline. Cranial Nerves: No cranial nerve deficit. Sensory: No sensory deficit. Motor: No weakness. Coordination: Coordination normal.      Gait: Gait normal.      Deep Tendon Reflexes: Reflexes normal.   Psychiatric:         Mood and Affect: Mood normal.         Behavior: Behavior normal.         Thought Content: Thought content normal.         Judgment: Judgment normal.           Results for orders placed or performed during the hospital encounter of 01/04/23   CBC WITH AUTOMATED DIFF   Result Value Ref Range    WBC 4.2 4.1 - 11.1 K/uL    RBC 2.81 (L) 4.10 - 5.70 M/uL    HGB 10.3 (L) 12.1 - 17.0 g/dL    HCT 31.1 (L) 36.6 - 50.3 %    .7 (H) 80.0 - 99.0 FL    MCH 36.7 (H) 26.0 - 34.0 PG    MCHC 33.1 30.0 - 36.5 g/dL    RDW 13.4 11.5 - 14.5 %    PLATELET 920 789 - 808 K/uL    MPV 10.2 8.9 - 12.9 FL    NEUTROPHILS 38 32 - 75 %    LYMPHOCYTES 46 12 - 49 %    MONOCYTES 14 (H) 5 - 13 %    EOSINOPHILS 1 0 - 7 %    BASOPHILS 1 0 - 1 %    IMMATURE GRANULOCYTES 0 0.0 - 0.5 %    ABS. NEUTROPHILS 1.6 (L) 1.8 - 8.0 K/UL    ABS. LYMPHOCYTES 1.9 0.8 - 3.5 K/UL    ABS. MONOCYTES 0.6 0.0 - 1.0 K/UL    ABS. EOSINOPHILS 0.0 0.0 - 0.4 K/UL    ABS. BASOPHILS 0.0 0.0 - 0.1 K/UL    ABS. IMM.  GRANS. 0.0 0.00 - 0.04 K/UL    DF AUTOMATED     METABOLIC PANEL, COMPREHENSIVE   Result Value Ref Range    Sodium 139 136 - 145 mmol/L    Potassium 3.7 3.5 - 5.1 mmol/L    Chloride 102 97 - 108 mmol/L    CO2 25 21 - 32 mmol/L    Anion gap 12 5 - 15 mmol/L    Glucose 93 65 - 100 mg/dL    BUN 23 (H) 6 - 20 mg/dL    Creatinine 1.42 (H) 0.70 - 1.30 mg/dL    BUN/Creatinine ratio 16 12 - 20      eGFR 50 (L) >60 ml/min/1.73m2    Calcium 7.9 (L) 8.5 - 10.1 mg/dL    Bilirubin, total 0.4 0.2 - 1.0 mg/dL    AST (SGOT) 32 15 - 37 U/L    ALT (SGPT) 20 12 - 78 U/L    Alk. phosphatase 76 45 - 117 U/L    Protein, total 6.3 (L) 6.4 - 8.2 g/dL    Albumin 2.9 (L) 3.5 - 5.0 g/dL    Globulin 3.4 2.0 - 4.0 g/dL    A-G Ratio 0.9 (L) 1.1 - 2.2     NT-PRO BNP   Result Value Ref Range    NT pro-BNP 1,018 (H) <450 pg/mL       Assessment/Plan:    ICD-10-CM ICD-9-CM    1. Malignant hypertensive heart disease without heart failure  I11.9 402.00       2. Bilateral lower extremity edema  R60.0 782.3     start hctz 25mg daily      3. Weakness generalized  R53.1 780.79 REFERRAL TO PHYSICAL THERAPY      4. Mixed simple and mucopurulent chronic bronchitis (HCC)  J41.8 491.1 REFERRAL TO PHYSICAL THERAPY      5. Hospital discharge follow-up  Z09 V67.59 VT DISCHRG MEDS RECONCILED W/CURRENT MED LIST      6. Alcohol abuse  F10.10 305.00     counseling effected vis-a-vis nullification of drug effect with etoh intake        Orders Placed This Encounter    albuterol (Ventolin HFA) 90 mcg/actuation inhaler     Sig: Take 2 Puffs by inhalation every six (6) hours as needed for Wheezing or Shortness of Breath. Dispense:  6.7 g     Refill:  0     Cannot display discharge medications since this is not an admission.           Transitional Care Management Progress Note    Patient: Livia Torrez  : 1945  PCP: Krupa Martínez MD    Date of office visit: 2023   Date of admission: 23  Date of discharge: 23  Hospital: [facility]    Call initiated w/i 2 business dates of discharge: *No response documented in the last 14 days   Date of the most recent call to the patient: *No documented post hospital discharge outreach found in the last 14 days      Assessment/Plan:   The complexity of medical decision making for this patient's transitional care is high      Subjective:   Dixon Nurse is a 76 y.o. male presenting today for follow-up after hospital discharge. This encounter and supporting documentation was reviewed if available. Medication reconciliation was performed today. The main problem requiring admission was leg edema . Complications during admission: dyspnea       Interval history/Current status: fair    Admitting symptoms have: not changed      Medications marked \"taking\" at this time:  Prior to Admission medications    Medication Sig Start Date End Date Taking? Authorizing Provider   albuterol (Ventolin HFA) 90 mcg/actuation inhaler Take 2 Puffs by inhalation every six (6) hours as needed for Wheezing or Shortness of Breath. 1/6/23  Yes Carolina Greenberg MD   hydroCHLOROthiazide (HYDRODIURIL) 25 mg tablet Take 1 Tablet by mouth daily for 30 days. 1/6/23 2/5/23 Yes Carolina Greenberg MD   hydroCHLOROthiazide (HYDRODIURIL) 25 mg tablet Take 1 Tablet by mouth daily for 30 days. 1/6/23 2/5/23 Yes Carolina Greenberg MD   valsartan (DIOVAN) 320 mg tablet TAKE 1 TABLET BY MOUTH DAILY 9/13/22  Yes Carolina Greenberg MD   azithromycin (Zithromax Z-Collins) 250 mg tablet Take 1 Tablet by mouth See Admin Instructions. 5/13/22  Yes Mak Alva NP   simvastatin (ZOCOR) 10 mg tablet TAKE 1 TABLET BY MOUTH EVERYDAY AT BEDTIME 6/14/21  Yes Carolina Greenberg MD   dilTIAZem ER (CARDIZEM SR) 120 mg capsule TAKE 1 CAPSULE BY MOUTH EVERY DAY 12/19/20  Yes Carolina Greenberg MD   fexofenadine-pseudoephedrine (ALLEGRA-D)  mg Tb12 Take 1 Tab by mouth every twelve (12) hours. Yes Provider, Historical   benzonatate (TESSALON) 200 mg capsule Take 200 mg by mouth three (3) times daily as needed for Cough. Yes Provider, Historical   cephALEXin (KEFLEX) 500 mg capsule Take 500 mg by mouth four (4) times daily.    Yes Provider, Historical   colchicine 0.6 mg tablet Take 0.6 mg by mouth daily. Yes Provider, Historical   cyclobenzaprine (FLEXERIL) 10 mg tablet Take  by mouth three (3) times daily as needed for Muscle Spasm(s). Yes Provider, Historical   dilTIAZem ER (DILACOR XR) 120 mg capsule Take  by mouth daily. Yes Provider, Historical   finasteride (PROSCAR) 5 mg tablet Take 5 mg by mouth daily. Yes Provider, Historical   fluticasone propionate (FLONASE) 50 mcg/actuation nasal spray 2 Sprays by Both Nostrils route daily. Yes Provider, Historical   HYDROcodone-acetaminophen (NORCO) 5-325 mg per tablet Take  by mouth. Yes Provider, Historical   indomethacin (INDOCIN) 50 mg capsule Take  by mouth three (3) times daily. Yes Provider, Historical   methylPREDNISolone (MEDROL DOSEPACK) 4 mg tablet Take  by mouth. Yes Provider, Historical   beclomethasone (Qvar) 80 mcg/actuation aero Take 1 Puff by inhalation two (2) times a day. Yes Provider, Historical   beclomethasone dipropionate (Qvar RediHaler) 80 mcg/actuation HFAb inhaler Take 1 Puff by inhalation. Yes Provider, Historical   edoxaban (SAVAYSA) 60 mg tab tablet Take 60 mg by mouth daily. Yes Provider, Historical   sodium bicarbonate 325 mg tablet Take 325 mg by mouth four (4) times daily. Yes Provider, Historical   temazepam (RESTORIL) 30 mg capsule Take  by mouth nightly as needed for Sleep. Yes Provider, Historical   traZODone (DESYREL) 50 mg tablet Take  by mouth nightly. Yes Provider, Historical   fexofenadine (ALLEGRA) 60 mg tablet Take  by mouth. Yes Provider, Historical   diclofenac (VOLTAREN) 1 % gel Apply  to affected area four (4) times daily. Yes Provider, Historical   triamcinolone acetonide (KENALOG-40) 40 mg/mL injection once. Yes Provider, Historical   albuterol (Ventolin HFA) 90 mcg/actuation inhaler Take 2 Puffs by inhalation every six (6) hours as needed for Wheezing or Shortness of Breath.  5/13/22 1/6/23  Hayley Cano NP   Bedside Commode XX As directed 1/19/22 Carolina Greenberg MD   Shower Chair XX kelechi Use as directed 1/19/22   Carolina Greenberg MD   influenza vaccine 2019-20, 65 yrs+,,PF, (Fluad [id number], 65 yr up,,PF,) syrg injection 0.5 mL by IntraMUSCular route PRIOR TO DISCHARGE. Provider, Historical        ROS:  Negative except leg edema             Objective:   Visit Vitals  BP (!) 177/96 (BP 1 Location: Left upper arm, BP Patient Position: Sitting, BP Cuff Size: Large adult)   Pulse 82   Temp 98.3 °F (36.8 °C) (Oral)   Resp 17   Ht 6' 4\" (1.93 m)   Wt 204 lb 12.8 oz (92.9 kg)   SpO2 96%   BMI 24.93 kg/m²        Physical Examination: General appearance - alert, well appearing, and in no distress and chronically ill appearing       We discussed the expected course, resolution and complications of the diagnosis(es) in detail. Medication risks, benefits, costs, interactions, and alternatives were discussed as indicated. I advised him to contact the office if his condition worsens, changes or fails to improve as anticipated. He expressed understanding with the diagnosis(es) and plan.      Georges Gregory MD

## 2023-01-17 ENCOUNTER — PATIENT OUTREACH (OUTPATIENT)
Dept: CASE MANAGEMENT | Age: 78
End: 2023-01-17

## 2023-01-17 NOTE — PROGRESS NOTES
1/17/2023  1:37 PM    Patient outreach attempt by this ACM today to perform CCM assessment. Attempts to reach patient were unsuccessful. Left voicemail requesting call back and ACM contact information.      Shannen Brice RN, BSN - Ambulatory Care Manager  [phone]

## 2023-01-18 NOTE — PROGRESS NOTES
1/18/2023  3:41 PM    Patient outreach attempt by this ACM today to perform CCM assessment. Attempts to reach patient were unsuccessful. Left voicemail requesting call back and ACM contact information.      Linh Walls RN, BSN - Ambulatory Care Manager  [phone]

## 2023-04-05 ENCOUNTER — HOSPITAL ENCOUNTER (OUTPATIENT)
Age: 78
End: 2023-04-05
Attending: EMERGENCY MEDICINE
Payer: MEDICARE

## 2023-04-05 ENCOUNTER — APPOINTMENT (OUTPATIENT)
Dept: CT IMAGING | Age: 78
End: 2023-04-05
Attending: EMERGENCY MEDICINE
Payer: MEDICARE

## 2023-04-05 ENCOUNTER — APPOINTMENT (OUTPATIENT)
Dept: GENERAL RADIOLOGY | Age: 78
End: 2023-04-05
Attending: EMERGENCY MEDICINE
Payer: MEDICARE

## 2023-04-05 PROBLEM — R55 SYNCOPE AND COLLAPSE: Status: ACTIVE | Noted: 2023-04-05

## 2023-04-05 LAB
ALBUMIN SERPL-MCNC: 2.8 G/DL (ref 3.5–5)
ALBUMIN/GLOB SERPL: 0.8 (ref 1.1–2.2)
ALP SERPL-CCNC: 58 U/L (ref 45–117)
ALT SERPL-CCNC: 10 U/L (ref 12–78)
AMPHET UR QL SCN: NEGATIVE
ANION GAP SERPL CALC-SCNC: 12 MMOL/L (ref 5–15)
APPEARANCE UR: ABNORMAL
AST SERPL W P-5'-P-CCNC: ABNORMAL U/L (ref 15–37)
BACTERIA URNS QL MICRO: NEGATIVE /HPF
BARBITURATES UR QL SCN: NEGATIVE
BASOPHILS # BLD: 0 K/UL (ref 0–0.1)
BASOPHILS NFR BLD: 1 % (ref 0–1)
BENZODIAZ UR QL: NEGATIVE
BILIRUB SERPL-MCNC: 0.5 MG/DL (ref 0.2–1)
BILIRUB UR QL: NEGATIVE
BUN SERPL-MCNC: 27 MG/DL (ref 6–20)
BUN/CREAT SERPL: 20 (ref 12–20)
CA-I BLD-MCNC: 8.4 MG/DL (ref 8.5–10.1)
CANNABINOIDS UR QL SCN: NEGATIVE
CHLORIDE SERPL-SCNC: 108 MMOL/L (ref 97–108)
CK SERPL-CCNC: 275 U/L (ref 39–308)
CO2 SERPL-SCNC: 17 MMOL/L (ref 21–32)
COCAINE UR QL SCN: POSITIVE
COLOR UR: ABNORMAL
CREAT SERPL-MCNC: 1.36 MG/DL (ref 0.7–1.3)
DIFFERENTIAL METHOD BLD: ABNORMAL
DRUG SCRN COMMENT,DRGCM: ABNORMAL
EOSINOPHIL # BLD: 0.1 K/UL (ref 0–0.4)
EOSINOPHIL NFR BLD: 2 % (ref 0–7)
EPITH CASTS URNS QL MICRO: ABNORMAL /LPF
ERYTHROCYTE [DISTWIDTH] IN BLOOD BY AUTOMATED COUNT: 12.4 % (ref 11.5–14.5)
ETHANOL SERPL-MCNC: <10 MG/DL
GLOBULIN SER CALC-MCNC: 3.5 G/DL (ref 2–4)
GLUCOSE SERPL-MCNC: 74 MG/DL (ref 65–100)
GLUCOSE UR STRIP.AUTO-MCNC: NEGATIVE MG/DL
HCT VFR BLD AUTO: 37.9 % (ref 36.6–50.3)
HGB BLD-MCNC: 12.4 G/DL (ref 12.1–17)
HGB UR QL STRIP: ABNORMAL
IMM GRANULOCYTES # BLD AUTO: 0 K/UL (ref 0–0.04)
IMM GRANULOCYTES NFR BLD AUTO: 0 % (ref 0–0.5)
KETONES UR QL STRIP.AUTO: 5 MG/DL
LEUKOCYTE ESTERASE UR QL STRIP.AUTO: ABNORMAL
LYMPHOCYTES # BLD: 1.5 K/UL (ref 0.8–3.5)
LYMPHOCYTES NFR BLD: 42 % (ref 12–49)
MCH RBC QN AUTO: 33.2 PG (ref 26–34)
MCHC RBC AUTO-ENTMCNC: 32.7 G/DL (ref 30–36.5)
MCV RBC AUTO: 101.3 FL (ref 80–99)
METHADONE UR QL: NEGATIVE
MONOCYTES # BLD: 0.3 K/UL (ref 0–1)
MONOCYTES NFR BLD: 8 % (ref 5–13)
MUCOUS THREADS URNS QL MICRO: ABNORMAL /LPF
NEUTS SEG # BLD: 1.7 K/UL (ref 1.8–8)
NEUTS SEG NFR BLD: 47 % (ref 32–75)
NITRITE UR QL STRIP.AUTO: NEGATIVE
NRBC # BLD: 0 K/UL (ref 0–0.01)
NRBC BLD-RTO: 0 PER 100 WBC
OPIATES UR QL: NEGATIVE
PCP UR QL: NEGATIVE
PH UR STRIP: 5 (ref 5–8)
PLATELET # BLD AUTO: 185 K/UL (ref 150–400)
POTASSIUM SERPL-SCNC: ABNORMAL MMOL/L (ref 3.5–5.1)
POTASSIUM SERPL-SCNC: NORMAL MMOL/L (ref 3.5–5.1)
PROT SERPL-MCNC: 6.3 G/DL (ref 6.4–8.2)
PROT UR STRIP-MCNC: 30 MG/DL
RBC # BLD AUTO: 3.74 M/UL (ref 4.1–5.7)
RBC #/AREA URNS HPF: ABNORMAL /HPF (ref 0–5)
SODIUM SERPL-SCNC: 137 MMOL/L (ref 136–145)
SP GR UR REFRACTOMETRY: 1.01 (ref 1–1.03)
TROPONIN I SERPL HS-MCNC: 26 NG/L (ref 0–76)
TROPONIN I SERPL HS-MCNC: 53 NG/L (ref 0–76)
UA: UC IF INDICATED,UAUC: ABNORMAL
UROBILINOGEN UR QL STRIP.AUTO: 0.1 EU/DL (ref 0.1–1)
WBC # BLD AUTO: 3.6 K/UL (ref 4.1–11.1)
WBC URNS QL MICRO: >100 /HPF (ref 0–4)

## 2023-04-05 PROCEDURE — 74011250636 HC RX REV CODE- 250/636: Performed by: EMERGENCY MEDICINE

## 2023-04-05 PROCEDURE — 87086 URINE CULTURE/COLONY COUNT: CPT

## 2023-04-05 PROCEDURE — 70450 CT HEAD/BRAIN W/O DYE: CPT

## 2023-04-05 PROCEDURE — 80307 DRUG TEST PRSMV CHEM ANLYZR: CPT

## 2023-04-05 PROCEDURE — 71045 X-RAY EXAM CHEST 1 VIEW: CPT

## 2023-04-05 PROCEDURE — 36415 COLL VENOUS BLD VENIPUNCTURE: CPT

## 2023-04-05 PROCEDURE — 84484 ASSAY OF TROPONIN QUANT: CPT

## 2023-04-05 PROCEDURE — 82550 ASSAY OF CK (CPK): CPT

## 2023-04-05 PROCEDURE — 99285 EMERGENCY DEPT VISIT HI MDM: CPT

## 2023-04-05 PROCEDURE — G0378 HOSPITAL OBSERVATION PER HR: HCPCS

## 2023-04-05 PROCEDURE — 74011250637 HC RX REV CODE- 250/637: Performed by: INTERNAL MEDICINE

## 2023-04-05 PROCEDURE — 81001 URINALYSIS AUTO W/SCOPE: CPT

## 2023-04-05 PROCEDURE — 85025 COMPLETE CBC W/AUTO DIFF WBC: CPT

## 2023-04-05 PROCEDURE — 82077 ASSAY SPEC XCP UR&BREATH IA: CPT

## 2023-04-05 PROCEDURE — 72125 CT NECK SPINE W/O DYE: CPT

## 2023-04-05 PROCEDURE — 84132 ASSAY OF SERUM POTASSIUM: CPT

## 2023-04-05 RX ORDER — SODIUM CHLORIDE 0.9 % (FLUSH) 0.9 %
5-40 SYRINGE (ML) INJECTION EVERY 8 HOURS
Status: DISCONTINUED
Start: 2023-04-05 | End: 2023-04-05

## 2023-04-05 RX ORDER — ONDANSETRON 4 MG/1
4 TABLET, ORALLY DISINTEGRATING ORAL
Status: ACTIVE
Start: 2023-04-05

## 2023-04-05 RX ORDER — SODIUM BICARBONATE 650 MG/1
325 TABLET ORAL 4 TIMES DAILY
Status: DISCONTINUED
Start: 2023-04-05 | End: 2023-04-05

## 2023-04-05 RX ORDER — ACETAMINOPHEN 650 MG/1
650 SUPPOSITORY RECTAL
Status: ACTIVE
Start: 2023-04-05

## 2023-04-05 RX ORDER — FINASTERIDE 5 MG/1
5 TABLET, FILM COATED ORAL DAILY
Status: DISCONTINUED
Start: 2023-04-06 | End: 2023-04-05

## 2023-04-05 RX ORDER — SODIUM CHLORIDE 0.9 % (FLUSH) 0.9 %
5-40 SYRINGE (ML) INJECTION AS NEEDED
Status: DISPENSED
Start: 2023-04-05

## 2023-04-05 RX ORDER — ATORVASTATIN CALCIUM 10 MG/1
10 TABLET, FILM COATED ORAL
Status: DISCONTINUED
Start: 2023-04-05 | End: 2023-04-05

## 2023-04-05 RX ORDER — HYDROCHLOROTHIAZIDE 25 MG/1
25 TABLET ORAL DAILY
Status: ON HOLD

## 2023-04-05 RX ORDER — ACETAMINOPHEN 325 MG/1
650 TABLET ORAL
Status: DISPENSED
Start: 2023-04-05

## 2023-04-05 RX ORDER — DILTIAZEM HYDROCHLORIDE 60 MG/1
120 CAPSULE, EXTENDED RELEASE ORAL DAILY
Status: DISCONTINUED
Start: 2023-04-06 | End: 2023-04-05

## 2023-04-05 RX ORDER — BUDESONIDE 0.5 MG/2ML
250 INHALANT ORAL 2 TIMES DAILY
Status: DISCONTINUED
Start: 2023-04-05 | End: 2023-04-05

## 2023-04-05 RX ORDER — AMLODIPINE BESYLATE 5 MG/1
10 TABLET ORAL DAILY
Status: DISPENSED
Start: 2023-04-05

## 2023-04-05 RX ORDER — ONDANSETRON 2 MG/ML
4 INJECTION INTRAMUSCULAR; INTRAVENOUS
Status: ACTIVE
Start: 2023-04-05

## 2023-04-05 RX ORDER — POLYETHYLENE GLYCOL 3350 17 G/17G
17 POWDER, FOR SOLUTION ORAL DAILY PRN
Status: ACTIVE
Start: 2023-04-05

## 2023-04-05 RX ADMIN — ACETAMINOPHEN 650 MG: 325 TABLET ORAL at 18:37

## 2023-04-05 RX ADMIN — AMLODIPINE BESYLATE 10 MG: 5 TABLET ORAL at 19:30

## 2023-04-05 RX ADMIN — SODIUM CHLORIDE 1000 ML: 9 INJECTION, SOLUTION INTRAVENOUS at 12:05

## 2023-04-05 NOTE — PROGRESS NOTES
Admission Medication Reconciliation:    Information obtained from:  Patient   RxQuery data available¹:  YES    Comments/Recommendations: Updated PTA meds/reviewed patient's allergies. 1)  Patient was good historian     2)  Medication changes (since last review): Added  - hctz 25 mg - 1 daily    Adjusted  - N/A    Removed  - Removed azithromycin 250 mg  Removed qvar 80 mcg inh   Removed benzonatate 200 mg  Removed cephalexin 500 mg   Removed colchicine 0.6 mg  Removed cyclobenzaprine 10 mg   Removed diclofenac 1% gel  Removed diltiazem er 120 mg   Removed fexofenadine 60 mg   Removed finasteride 5 mg  Removed flonase  Removed hydrocodone-apap 5-325 mg  Removed indomethacin 50 mg  Removed medrol 4 mg dspk  Removed simvastatin 10 mg  Removed sodium bicarb 650 mg  Removed temazepam 30 mg  Removed trazodone 50 mg  Removed triamcinolone 40mg/ml inj  Removed valsartan 320 mg    3)  Patient states the only medication he takes is hctz 25 mg but he only takes \"every once in a while\"    Pharmacy: [address]   [facility] reflects medications filled and processed through the [facility], however   this data does NOT capture whether the medication was picked up or is currently being taken by the patient. Prior to Admission Medications   Prescriptions Last Dose Informant Taking? albuterol (Ventolin HFA) 90 mcg/actuation inhaler  Self Yes   Sig: Take 2 Puffs by inhalation every six (6) hours as needed for Wheezing or Shortness of Breath. hydroCHLOROthiazide (HYDRODIURIL) 25 mg tablet  Self Yes   Sig: Take 1 Tablet by mouth daily.       Facility-Administered Medications: None

## 2023-04-05 NOTE — PROGRESS NOTES
Admission incomplete. Patient A&Ox1. Called sister, no answer, left voicemail. Will try again later. [phone] again, spoke with sister. Admission completed.

## 2023-04-05 NOTE — ED PROVIDER NOTES
[facility] EMERGENCY DEPT  EMERGENCY DEPARTMENT HISTORY AND PHYSICAL EXAM      Date: 4/5/2023  Patient Name: Bere Snyder  MRN: [id number]  [place] 1945  Date of evaluation: 4/5/2023  Provider: Falguni Salazar DO   Note Started: 11:26 AM 4/5/23    History of Presenting Illness     Chief Complaint   Patient presents with    Altered mental status     History Provided By: Patient, EMS    HPI: Bere Snyder is a 76 y.o. male with history of bronchitis, hypertension, hyperlipidemia, sleep apnea, and COPD who presents with episode of altered mental status. Patient does not remember what happened. He states that he is not having any symptoms. He is not having a headache. No neck pain. He was found in his yard this morning by a neighbor who called 911. History is limited by patient memory. Past History     Past Medical History:  Past Medical History:   Diagnosis Date    Ankle pain 8/3/2020    Benign prostatic disease 8/3/2020    Hypertrophy with Outflow Obstruction    Benign prostatic hyperplasia 8/3/2020    Bronchitis     Cervical radiculopathy 8/3/2020    Chronic obstructive lung disease ([address] 8/3/2020    Cocaine abuse ([address] 8/3/2020    Diarrhea 8/3/2020    Dizziness and giddiness 8/3/2020    Hypercholesterolemia 8/3/2020    Hypertensive disorder 8/3/2020    Insomnia 8/3/2020    Kidney disease 8/3/2020    Low back pain 8/3/2020    Osteoarthritis of knee 8/3/2020    Pain in breast 8/3/2020    Pain in left knee 8/3/2020    Prediabetes 8/3/2020    Seasonal allergic rhinitis 8/3/2020    Shoulder pain 8/3/2020    Sleep apnea 8/3/2020    Tinnitus of right ear 8/3/2020    Vasovagal syncope 8/3/2020       Past Surgical History:  No past surgical history on file.     Family History:  Family History   Problem Relation Age of Onset    Hypertension Mother        Social History:  Social History     Tobacco Use    Smoking status: Former    Smokeless tobacco: Never   Substance Use Topics    Alcohol use: Yes     Comment: pt drinks everyday    Drug use: Yes     Frequency: 3.0 times per week     Types: Cocaine       Allergies: Allergies   Allergen Reactions    Bactrim [Sulfamethoprim] Unknown (comments)    Penicillin G Unknown (comments)       PCP: Hector Nelson MD    Current Meds:   Previous Medications    ALBUTEROL (VENTOLIN HFA) 90 MCG/ACTUATION INHALER    Take 2 Puffs by inhalation every six (6) hours as needed for Wheezing or Shortness of Breath. AZITHROMYCIN (ZITHROMAX Z-NICO) 250 MG TABLET    Take 1 Tablet by mouth See Admin Instructions. BECLOMETHASONE (QVAR) 80 MCG/ACTUATION AERO    Take 1 Puff by inhalation two (2) times a day. BECLOMETHASONE DIPROPIONATE (QVAR REDIHALER) 80 MCG/ACTUATION HFAB INHALER    Take 1 Puff by inhalation. BEDSIDE COMMODE XX    As directed    BENZONATATE (TESSALON) 200 MG CAPSULE    Take 200 mg by mouth three (3) times daily as needed for Cough. CEPHALEXIN (KEFLEX) 500 MG CAPSULE    Take 500 mg by mouth four (4) times daily. COLCHICINE 0.6 MG TABLET    Take 0.6 mg by mouth daily. CYCLOBENZAPRINE (FLEXERIL) 10 MG TABLET    Take  by mouth three (3) times daily as needed for Muscle Spasm(s). DICLOFENAC (VOLTAREN) 1 % GEL    Apply  to affected area four (4) times daily. DILTIAZEM ER (CARDIZEM SR) 120 MG CAPSULE    TAKE 1 CAPSULE BY MOUTH EVERY DAY    DILTIAZEM ER (DILACOR XR) 120 MG CAPSULE    Take  by mouth daily. EDOXABAN (SAVAYSA) 60 MG TAB TABLET    Take 60 mg by mouth daily. FEXOFENADINE (ALLEGRA) 60 MG TABLET    Take  by mouth. FEXOFENADINE-PSEUDOEPHEDRINE (ALLEGRA-D)  MG TB12    Take 1 Tab by mouth every twelve (12) hours. FINASTERIDE (PROSCAR) 5 MG TABLET    Take 5 mg by mouth daily. FLUTICASONE PROPIONATE (FLONASE) 50 MCG/ACTUATION NASAL SPRAY    2 Sprays by Both Nostrils route daily. HYDROCODONE-ACETAMINOPHEN (NORCO) 5-325 MG PER TABLET    Take  by mouth. INDOMETHACIN (INDOCIN) 50 MG CAPSULE    Take  by mouth three (3) times daily.     INFLUENZA VACCINE 2019-20, 65 YRS+,,PF, (FLUAD 4540-8003, 65 YR UP,,PF,) SYRG INJECTION    0.5 mL by IntraMUSCular route PRIOR TO DISCHARGE. METHYLPREDNISOLONE (MEDROL DOSEPACK) 4 MG TABLET    Take  by mouth. SHOWER CHAIR XX ELENA    Use as directed    SIMVASTATIN (ZOCOR) 10 MG TABLET    TAKE 1 TABLET BY MOUTH EVERYDAY AT BEDTIME    SODIUM BICARBONATE 325 MG TABLET    Take 325 mg by mouth four (4) times daily. TEMAZEPAM (RESTORIL) 30 MG CAPSULE    Take  by mouth nightly as needed for Sleep. TRAZODONE (DESYREL) 50 MG TABLET    Take  by mouth nightly. TRIAMCINOLONE ACETONIDE (KENALOG-40) 40 MG/ML INJECTION    once. VALSARTAN (DIOVAN) 320 MG TABLET    TAKE 1 TABLET BY MOUTH DAILY       Physical Exam   Vitals  ED Triage Vitals [04/05/23 1119]   ED Encounter Vitals Group      /86      Pulse (Heart Rate) 88      Resp Rate 16      Temp 97.6 °F (36.4 °C)      Temp src       O2 Sat (%) 96 %      Weight 200 lb      Height 6' 4\"      Exam  Constitutional: No acute distress. Well-nourished. Skin: No rash. ENT: No rhinorrhea. No cough. Head is normocephalic and atraumatic. Eye: No proptosis or conjunctival injections. Respiratory: No apparent respiratory distress. Lung sounds clear. Gastrointestinal: Nondistended. Musculoskeletal: No obvious bony deformities. Cardiac: Regular rate and rhythm. 2+ radial pulses. No murmurs. Neuro: Cranial nerves intact. Alert. No focal deficits.     SCREENINGS               No data recorded        Lab and Diagnostic Study Results   Labs -     Recent Results (from the past 12 hour(s))   CBC WITH AUTOMATED DIFF    Collection Time: 04/05/23 11:30 AM   Result Value Ref Range    WBC 3.6 (L) 4.1 - 11.1 K/uL    RBC 3.74 (L) 4.10 - 5.70 M/uL    HGB 12.4 12.1 - 17.0 g/dL    HCT 37.9 36.6 - 50.3 %    .3 (H) 80.0 - 99.0 FL    MCH 33.2 26.0 - 34.0 PG    MCHC 32.7 30.0 - 36.5 g/dL    RDW 12.4 11.5 - 14.5 %    PLATELET 505 091 - 376 K/uL    NRBC 0.0 0.0  WBC    ABSOLUTE NRBC 0.00 0.00 - 0.01 K/uL    NEUTROPHILS 47 32 - 75 %    LYMPHOCYTES 42 12 - 49 %    MONOCYTES 8 5 - 13 %    EOSINOPHILS 2 0 - 7 %    BASOPHILS 1 0 - 1 %    IMMATURE GRANULOCYTES 0 0 - 0.5 %    ABS. NEUTROPHILS 1.7 (L) 1.8 - 8.0 K/UL    ABS. LYMPHOCYTES 1.5 0.8 - 3.5 K/UL    ABS. MONOCYTES 0.3 0.0 - 1.0 K/UL    ABS. EOSINOPHILS 0.1 0.0 - 0.4 K/UL    ABS. BASOPHILS 0.0 0.0 - 0.1 K/UL    ABS. IMM. GRANS. 0.0 0.00 - 0.04 K/UL    DF AUTOMATED     METABOLIC PANEL, COMPREHENSIVE    Collection Time: 04/05/23 11:30 AM   Result Value Ref Range    Sodium 137 136 - 145 mmol/L    Potassium Hemolysed specimen 3.5 - 5.1 mmol/L    Chloride 108 97 - 108 mmol/L    CO2 17 (L) 21 - 32 mmol/L    Anion gap 12 5 - 15 mmol/L    Glucose 74 65 - 100 mg/dL    BUN 27 (H) 6 - 20 mg/dL    Creatinine 1.36 (H) 0.70 - 1.30 mg/dL    BUN/Creatinine ratio 20 12 - 20      eGFR 53 (L) >60 ml/min/1.73m2    Calcium 8.4 (L) 8.5 - 10.1 mg/dL    Bilirubin, total 0.5 0.2 - 1.0 mg/dL    AST (SGOT) Hemolysed specimen 15 - 37 U/L    ALT (SGPT) 10 (L) 12 - 78 U/L    Alk. phosphatase 58 45 - 117 U/L    Protein, total 6.3 (L) 6.4 - 8.2 g/dL    Albumin 2.8 (L) 3.5 - 5.0 g/dL    Globulin 3.5 2.0 - 4.0 g/dL    A-G Ratio 0.8 (L) 1.1 - 2.2     TROPONIN-HIGH SENSITIVITY    Collection Time: 04/05/23 11:30 AM   Result Value Ref Range    Troponin-High Sensitivity 26 0 - 76 ng/L       EKG: Initial EKG interpreted by me. Interpretation: Obtained on 4/5/2023 at 1132. Read at [phone]. Sinus rhythm with sinus arrhythmia at a rate of 85 bpm.  Normal TX interval, QRS duration, QTc interval.  No ST segment abnormalities. Left axis deviation. Nonspecific T wave abnormalities. Radiologic Studies:  Non-plain film images such as CT, Ultrasound and MRI are read by the radiologist. Plain radiographic images are visualized and preliminarily interpreted by the ED Provider with the below findings:  X-ray negative for any pneumonia or pneumothorax.     Interpretation per the radiologist below, if available at the time of this note:  XR CHEST SNGL V    Result Date: 4/5/2023  EXAM: XR CHEST SNGL V HISTORY: fall. COMPARISON: 10/3/2022 FINDINGS: Portable AP. The heart is upper limits of normal for size, but unchanged. The aorta is tortuous. The lungs are underexpanded with mild bibasilar atelectasis. No pleural effusion or pneumothorax. The bones are osteopenic. No acute process. CT HEAD WO CONT    Result Date: 4/5/2023  EXAM: CT HEAD WO CONT INDICATION: fall, possible syncope vs seizure COMPARISON: None. CONTRAST: None. TECHNIQUE: Unenhanced CT of the head was performed using 5 mm images. Brain and bone windows were generated. Coronal and sagittal reformats. CT dose reduction was achieved through use of a standardized protocol tailored for this examination and automatic exposure control for dose modulation. FINDINGS: The ventricles and sulci are normal in size, shape and configuration. There is no significant white matter disease. There is no intracranial hemorrhage, extra-axial collection, or mass effect. The basilar cisterns are open. No CT evidence of acute infarct. The bone windows demonstrate no abnormalities. The visualized portions of the paranasal sinuses and mastoid air cells are clear. No acute intracranial abnormality. CT SPINE CERV WO CONT    Result Date: 4/5/2023  INDICATION:  fall, possible seizure or syncope STUDY:  CT SPINE CERV WO CONT Examination: Cervical spine CT. No contrast. Comparison 10/4/02746. Thin section axial images were obtained with sagittal and coronal reformats. CT dose reduction was achieved through use of a standardized protocol tailored for this examination and automatic exposure control for dose modulation. FINDINGS: Patient is kyphotic. There is no bony destructive lesion or evidence of acute fracture. Small cystic lucencies in the lung apices unchanged. 3.5 x 1.0 cm fatty mass posterior subcutaneous tissues compatible with small lipoma.  Extensive multilevel degenerative change     1. No acute abnormality        MDM (EMERGENCY DEPARTMENT COURSE and DIFFERENTIAL DIAGNOSIS)   Vitals:    Vitals:    04/05/23 1119   BP: 138/86   Pulse: 88   Resp: 16   Temp: 97.6 °F (36.4 °C)   SpO2: 96%   Weight: 90.7 kg (200 lb)   Height: 6' 4\" (1.93 m)        Patient was given the following medications:  Medications   sodium chloride 0.9 % bolus infusion 1,000 mL (1,000 mL IntraVENous New Bag 4/5/23 1205)       CONSULTS: (who and what was discussed)  None     Social Determinants affecting Dx or Tx: None  Counseling: none    Records Reviewed (source and summary of external notes): Nursing notes. Prior medical records    CC/HPI Summary: Presents with altered mental status episode. Syncope versus seizure. DDx: Seizure, syncope, polysubstance abuse, arrhythmia  ED Course and Reassessment:   Concern for possible seizure or syncope. CT head ordered as well as CT scan of the neck. Chest x-ray as well due to fall. Basic labs and test pending as well including troponin. EKG obtained as well shows T wave inversions but no other acute pathology. X-ray is negative for acute pathology. CT scan of the brain shows no intracranial hemorrhage or signs of stroke. Urine is pending. I am can concerned the patient could have had a syncopal episode. I am also worried that nurse mention he is acting confused. He lives alone and I do not think it is safe to go home. Discussed with the hospitalist for admission. Disposition/Plan     Disposition: Admitted to Dr. Wai Esparza    Clinical Impression     Clinical Impression:   1. Syncope, unspecified syncope type    2. Confusion         Orval Rom, DO    I am the Primary Clinician of Record. Orval Rom, DO (electronically signed)    (Please note that parts of this dictation were completed with voice recognition software.  Quite often unanticipated grammatical, syntax, homophones, and other interpretive errors are inadvertently transcribed by the computer software. Please disregards these errors.  Please excuse any errors that have escaped final proofreading.)

## 2023-04-05 NOTE — ED TRIAGE NOTES
Pt was found via ems on the ground. Pt was confused on arrival. Farzad Mckeon states they do not know if he fell or had a seizure or what is going on.

## 2023-04-05 NOTE — ED NOTES
Pt pulled out line and was jumping out of bed. Patient is not able to tell me where he is but is alert to self and can follow basic commands. Safety sitter has been sat at bedside for confusion.

## 2023-04-05 NOTE — H&P
[facility] HISTORY & PHYSICAL  Barry Weinstein MD, [place], [place]         NAME: Vianey Segura   :  1945   MRN:  [id number]     Date/Time:  2023 3:50 PM    Patient PCP: Cassandra Tabor MD       Subjective:   CHIEF COMPLAINT: Fainting    HISTORY OF PRESENT ILLNESS:     Tamela Santos is a 76 y.o. M with history of hypertension, COPD, previous cocaine abuse, chronic renal failure stage III presenting with syncope and collapse. Patient seen at bedside is currently alert and oriented x3 however sluggish and slow to respond. He states that he does not recall anything that happened last night or early this a.m. and definitely does not recall falling in his porch. Per ED physician patient was found on his front yard unconscious. Patient denies any dysuria, chest pain, nausea/vomiting, abdominal pain, fever/chills, neck pain, trauma to the head. He does endorse mild shortness of breath however has chronic COPD. Patient denies current alcohol use or illicit drug use. In the ED patient found to have normal vital signs and normotensive without any signs or sequelae of seizure activity. Chest x-ray, CT head and CT spine are essentially normal.    Obtain twelve-lead EKG as no EKG was performed in the ED. Urinalysis and UDS currently pending. CPK also noted to be normal.    Patient will be kept in the hospital for further evaluation under observation on telemetry for syncope and mild shortness of breath.     Past Medical History:   Diagnosis Date    Ankle pain 8/3/2020    Benign prostatic disease 8/3/2020    Hypertrophy with Outflow Obstruction    Benign prostatic hyperplasia 8/3/2020    Bronchitis     Cervical radiculopathy 8/3/2020    Chronic obstructive lung disease ([address] 8/3/2020    Cocaine abuse [address] [address] 8/3/2020    Diarrhea 8/3/2020    Dizziness and giddiness 8/3/2020    Hypercholesterolemia 8/3/2020 Hypertensive disorder 8/3/2020    Insomnia 8/3/2020    Kidney disease 8/3/2020    Low back pain 8/3/2020    Osteoarthritis of knee 8/3/2020    Pain in breast 8/3/2020    Pain in left knee 8/3/2020    Prediabetes 8/3/2020    Seasonal allergic rhinitis 8/3/2020    Shoulder pain 8/3/2020    Sleep apnea 8/3/2020    Tinnitus of right ear 8/3/2020    Vasovagal syncope 8/3/2020        No past surgical history on file. Social History     Tobacco Use    Smoking status: Former    Smokeless tobacco: Never   Substance Use Topics    Alcohol use: Yes     Comment: pt drinks everyday        Family History   Problem Relation Age of Onset    Hypertension Mother      Allergies   Allergen Reactions    Bactrim [Sulfamethoprim] Unknown (comments)    Penicillin G Unknown (comments)        Prior to Admission medications    Medication Sig Start Date End Date Taking? Authorizing Provider   hydroCHLOROthiazide (HYDRODIURIL) 25 mg tablet Take 1 Tablet by mouth daily. Yes Provider, Historical   albuterol (Ventolin HFA) 90 mcg/actuation inhaler Take 2 Puffs by inhalation every six (6) hours as needed for Wheezing or Shortness of Breath.  1/6/23  Yes Francine Ivan MD       REVIEW OF SYSTEMS:         Total of 12 systems reviewed as follows:       POSITIVE= underlined text  Negative = text not underlined  General:  fever, chills, sweats, generalized weakness, weight loss/gain,      loss of appetite   Eyes:    blurred vision, eye pain, loss of vision, double vision  ENT:    rhinorrhea, pharyngitis   Respiratory:   cough, sputum production, SOB, BAIRES, wheezing, pleuritic pain   Cardiology:   chest pain, palpitations, orthopnea, PND, edema, syncope   Gastrointestinal:  abdominal pain , N/V, diarrhea, dysphagia, constipation, bleeding   Genitourinary:  frequency, urgency, dysuria, hematuria, incontinence   Muskuloskeletal :  arthralgia, myalgia, back pain  Hematology:  easy bruising, nose or gum bleeding, lymphadenopathy   Dermatological: rash, ulceration, pruritis, color change / jaundice  Endocrine:   hot flashes or polydipsia   Neurological:  headache, dizziness, confusion, focal weakness, paresthesia,     Speech difficulties, memory loss, gait difficulty  Psychological: Feelings of anxiety, depression, agitation    Objective:   VITALS:    Visit Vitals  BP (!) 195/119 (BP 1 Location: Right upper arm, BP Patient Position: Supine)   Pulse 93   Temp 97.6 °F (36.4 °C)   Resp 21   Ht 6' 4\" (1.93 m)   Wt 86.8 kg (191 lb 6.4 oz)   SpO2 98%   BMI 23.30 kg/m²         LAB DATA REVIEWED:    Recent Results (from the past 24 hour(s))   CBC WITH AUTOMATED DIFF    Collection Time: 04/05/23 11:30 AM   Result Value Ref Range    WBC 3.6 (L) 4.1 - 11.1 K/uL    RBC 3.74 (L) 4.10 - 5.70 M/uL    HGB 12.4 12.1 - 17.0 g/dL    HCT 37.9 36.6 - 50.3 %    .3 (H) 80.0 - 99.0 FL    MCH 33.2 26.0 - 34.0 PG    MCHC 32.7 30.0 - 36.5 g/dL    RDW 12.4 11.5 - 14.5 %    PLATELET 115 794 - 319 K/uL    NRBC 0.0 0.0  WBC    ABSOLUTE NRBC 0.00 0.00 - 0.01 K/uL    NEUTROPHILS 47 32 - 75 %    LYMPHOCYTES 42 12 - 49 %    MONOCYTES 8 5 - 13 %    EOSINOPHILS 2 0 - 7 %    BASOPHILS 1 0 - 1 %    IMMATURE GRANULOCYTES 0 0 - 0.5 %    ABS. NEUTROPHILS 1.7 (L) 1.8 - 8.0 K/UL    ABS. LYMPHOCYTES 1.5 0.8 - 3.5 K/UL    ABS. MONOCYTES 0.3 0.0 - 1.0 K/UL    ABS. EOSINOPHILS 0.1 0.0 - 0.4 K/UL    ABS. BASOPHILS 0.0 0.0 - 0.1 K/UL    ABS. IMM.  GRANS. 0.0 0.00 - 0.04 K/UL    DF AUTOMATED     METABOLIC PANEL, COMPREHENSIVE    Collection Time: 04/05/23 11:30 AM   Result Value Ref Range    Sodium 137 136 - 145 mmol/L    Potassium Hemolysed specimen 3.5 - 5.1 mmol/L    Chloride 108 97 - 108 mmol/L    CO2 17 (L) 21 - 32 mmol/L    Anion gap 12 5 - 15 mmol/L    Glucose 74 65 - 100 mg/dL    BUN 27 (H) 6 - 20 mg/dL    Creatinine 1.36 (H) 0.70 - 1.30 mg/dL    BUN/Creatinine ratio 20 12 - 20      eGFR 53 (L) >60 ml/min/1.73m2    Calcium 8.4 (L) 8.5 - 10.1 mg/dL    Bilirubin, total 0.5 0.2 - 1.0 mg/dL    AST (SGOT) Hemolysed specimen 15 - 37 U/L    ALT (SGPT) 10 (L) 12 - 78 U/L    Alk. phosphatase 58 45 - 117 U/L    Protein, total 6.3 (L) 6.4 - 8.2 g/dL    Albumin 2.8 (L) 3.5 - 5.0 g/dL    Globulin 3.5 2.0 - 4.0 g/dL    A-G Ratio 0.8 (L) 1.1 - 2.2     TROPONIN-HIGH SENSITIVITY    Collection Time: 04/05/23 11:30 AM   Result Value Ref Range    Troponin-High Sensitivity 26 0 - 76 ng/L   CK    Collection Time: 04/05/23  2:25 PM   Result Value Ref Range     39 - 308 U/L   POTASSIUM    Collection Time: 04/05/23  2:25 PM   Result Value Ref Range    Potassium Hemolysed specimen 3.5 - 5.1 mmol/L   ETHYL ALCOHOL    Collection Time: 04/05/23  2:25 PM   Result Value Ref Range    ALCOHOL(ETHYL),SERUM <10 <10 mg/dL       RADIOLOGY:  @[facility]([email or web address])@      PHYSICAL EXAM:    General:    Alert, cooperative, no distress, appears stated age. HEENT: Atraumatic, anicteric sclerae, pink conjunctivae     No oral ulcers, mucosa moist, throat clear, dentition fair  Neck:  Supple, symmetrical,  thyroid: non tender  Lungs:   Clear to auscultation bilaterally. No Wheezing or Rhonchi. No rales. Chest wall:  No tenderness  No Accessory muscle use. Heart:   Regular  rhythm,  No  murmur   No edema  Abdomen:   Soft, non-tender. Not distended. Bowel sounds normal  Extremities: No cyanosis. No clubbing,      Skin turgor normal, Capillary refill normal, Radial dial pulse 2+  Skin:     Not pale. Not Jaundiced  No rashes   Psych:  Good insight. Not depressed. Not anxious or agitated. Neurologic: EOMs intact. No facial asymmetry. No aphasia or slurred speech. Symmetrical strength, Sensation grossly intact. Alert and oriented X 4.      ______________________________________________________________________  Given the patient's current clinical presentation, I have a high level of concern for decompensation if discharged from the emergency department.   Complex decision making was performed, which includes reviewing the patient's available past medical records, laboratory results, and x-ray films. My assessment of this patient's clinical condition and my plan of care is as follows. Assessment / Plan:    Syncope and collapse  Initial troponin normal noted. Will cycle another troponin as well as obtain echocardiogram.  Obtain EKG as none was done in the ED. Denies any history of atrial fibrillation or any cardiac history. Obtain orthostatics. Patient does have significant history of cocaine abuse therefore will check UDS. Obtaining urinalysis. CRF stage III  Seems to be at baseline in terms of GFR. Hold HCTZ for now. Hypertension  Start Norvasc 10mg    COPD  Currently compensated. DVT Proph  Heparin SC    Patient be kept in the hospital under observation on telemetry for further evaluation. _______________________________________________________________________  Care Plan discussed with:    Comments   Patient x    Family      RN x    Care Manager                    Consultant:      _______________________________________________________________________  Expected  Disposition:   Home with Family x   HH/PT/OT/RN    SNF/LTC    ROE    ________________________________________________________________________  TOTAL TIME:  28 Minutes    Critical Care Provided     Minutes non procedure based      Comments     Reviewed previous records   >50% of visit spent in counseling and coordination of care x Discussion with patient and/or family and questions answered       ________________________________________________________________________  Signed: Magdaleno Cameron MD    Procedures: see electronic medical records for all procedures/Xrays and details which were not copied into this note but were reviewed prior to creation of Plan.

## 2023-04-05 NOTE — ED NOTES
Report called to [facility] RN on [employer] using [employer]

## 2023-04-05 NOTE — PROGRESS NOTES
Dual skin assessment performed by Marcy Palomino RN and myself. Patient's skin is clean, dry, and intact.

## 2023-04-05 NOTE — PROGRESS NOTES
Atorvastatin 10 mg was therapeutically interchanged for Simvastatin 10 mg per the P&T Committee approved Therapeutic Interchanges Policy.     Regine Bueno MS, Pharmacist  4/5/2023 3:11 PM

## 2023-04-05 NOTE — PROGRESS NOTES
Problem: Falls - Risk of  Goal: *Absence of Falls  Description: Document Regla Dow Fall Risk and appropriate interventions in the flowsheet. Outcome: Progressing Towards Goal  Note: Fall Risk Interventions:    Problem: Patient Education: Go to Patient Education Activity  Goal: Patient/Family Education  Outcome: Progressing Towards Goal     Problem: Pressure Injury - Risk of  Goal: *Prevention of pressure injury  Description: Document Josue Scale and appropriate interventions in the flowsheet.   Outcome: Progressing Towards Goal  Note: Pressure Injury Interventions:  Sensory Interventions: Assess changes in LOC, Check visual cues for pain, Keep linens dry and wrinkle-free, Minimize linen layers, Discuss PT/OT consult with provider    Moisture Interventions: Absorbent underpads, Internal/External urinary devices, Minimize layers    Activity Interventions: PT/OT evaluation    Mobility Interventions: PT/OT evaluation    Nutrition Interventions: Document food/fluid/supplement intake, Offer support with meals,snacks and hydration                          Problem: Patient Education: Go to Patient Education Activity  Goal: Patient/Family Education  Outcome: Progressing Towards Goal

## 2023-04-06 ENCOUNTER — APPOINTMENT (OUTPATIENT)
Dept: NON INVASIVE DIAGNOSTICS | Age: 78
End: 2023-04-06
Attending: INTERNAL MEDICINE
Payer: MEDICARE

## 2023-04-06 PROCEDURE — 93306 TTE W/DOPPLER COMPLETE: CPT

## 2023-04-06 PROCEDURE — 97530 THERAPEUTIC ACTIVITIES: CPT

## 2023-04-06 PROCEDURE — 97162 PT EVAL MOD COMPLEX 30 MIN: CPT

## 2023-04-06 PROCEDURE — 74011250637 HC RX REV CODE- 250/637: Performed by: INTERNAL MEDICINE

## 2023-04-06 PROCEDURE — 74011250636 HC RX REV CODE- 250/636: Performed by: HOSPITALIST

## 2023-04-06 PROCEDURE — 97165 OT EVAL LOW COMPLEX 30 MIN: CPT

## 2023-04-06 PROCEDURE — G0378 HOSPITAL OBSERVATION PER HR: HCPCS

## 2023-04-06 PROCEDURE — 74011000250 HC RX REV CODE- 250: Performed by: HOSPITALIST

## 2023-04-06 RX ADMIN — SODIUM CHLORIDE 75 ML/HR: 9 INJECTION, SOLUTION INTRAVENOUS at 16:09

## 2023-04-06 RX ADMIN — AMLODIPINE BESYLATE 10 MG: 5 TABLET ORAL at 08:50

## 2023-04-06 RX ADMIN — SODIUM CHLORIDE 1000 ML: 9 INJECTION, SOLUTION INTRAVENOUS at 11:54

## 2023-04-06 RX ADMIN — CEFTRIAXONE SODIUM 1 G: 1 INJECTION, POWDER, FOR SOLUTION INTRAMUSCULAR; INTRAVENOUS at 11:54

## 2023-04-06 NOTE — DISCHARGE INSTRUCTIONS
INSTRUCTIONS ON DISCHARGE    (1) please take KEFLEX 500 mg twice a day for 5 days    (2) please followup with cardiology as needed.

## 2023-04-06 NOTE — CONSULTS
CARDIOLOGY CONSULTATION    REASON FOR CONSULT: Syncope    REQUESTING PROVIDER: Dr. Monique Calvin:  Syncope    HISTORY OF PRESENT ILLNESS:  Amy Galo is a 76y.o. year-old male with past medical history significant for hypertension, COPD, cocaine abuse, CKD who was evaluated today due to syncope. He states he cannot remember what he was doing yesterday and woke up in the ED. UDS was positive for cocaine. He denies chest pain or palpitations. He notes occasional shortness of breath. Presently he is feeling fine, back to normal.  No other complaints offered. Records from hospital admission course thus far reviewed. Telemetry reviewed. Brief SVT overnight. INPATIENT MEDICATIONS:  Home medications reviewed. Current Facility-Administered Medications:     cefTRIAXone (ROCEPHIN) 1 g in sterile water (preservative free) 10 mL IV syringe, 1 g, IntraVENous, Q24H, Richard Montemayor MD    sodium chloride 0.9 % bolus infusion 1,000 mL, 1,000 mL, IntraVENous, ONCE, Richard Montemayor MD    hydrALAZINE (APRESOLINE) 20 mg/mL injection 20 mg, 20 mg, IntraVENous, Q4H PRN, Shannon Paulson MD    sodium chloride (NS) flush 5-40 mL, 5-40 mL, IntraVENous, PRN, Babak Carlson MD    acetaminophen (TYLENOL) tablet 650 mg, 650 mg, Oral, Q6H PRN, 650 mg at 04/05/23 1837 **OR** acetaminophen (TYLENOL) suppository 650 mg, 650 mg, Rectal, Q6H PRN, Babak Carlson MD    polyethylene glycol (MIRALAX) packet 17 g, 17 g, Oral, DAILY PRN, Babak Carlson MD    ondansetron (ZOFRAN ODT) tablet 4 mg, 4 mg, Oral, Q8H PRN **OR** ondansetron (ZOFRAN) injection 4 mg, 4 mg, IntraVENous, Q6H PRN, Babak Carlson MD    amLODIPine (NORVASC) tablet 10 mg, 10 mg, Oral, DAILY, Babak Carlson MD, 10 mg at 04/06/23 0850     ALLERGIES:  Allergies reviewed with the patient,   Allergies   Allergen Reactions    Bactrim [Sulfamethoprim] Unknown (comments)    Penicillin G Unknown (comments)    .       FAMILY HISTORY:  Family history reviewed. SOCIAL HISTORY:  Notable for no tobacco use, no heavy alcohol, + illicit drug use. REVIEW OF SYSTEMS:  Complete review of systems performed, pertinents noted above, all other systems are negative. PHYSICAL EXAMINATION:    General:  Alert, in NAD  Cardiovascular:  RRR, no murmur  Respiratory:  Lungs are clear  Abdomen:  Soft, nontender  Extremities:  No lower extremity edema  Skin:  Dry, warm  Psych:  Normal affect    Visit Vitals  BP (!) 158/89 (BP 1 Location: Left upper arm, BP Patient Position: Lying)   Pulse 93   Temp 97.3 °F (36.3 °C)   Resp 18   Ht 6' 4\" (1.93 m)   Wt 86.8 kg (191 lb 6.4 oz)   SpO2 98%   BMI 23.30 kg/m²       Recent labs results and imaging reviewed. Discussed case with Dr. Alexandra Lundberg and our impression and recommendations are as follows:  Syncope, unclear circumstances but in setting of cocaine use  Check orthostatics  Echo with preserved EF, moderate AI  Trops negative  SVT, brief episode overnight, fit with monitor as OP  Moderate aortic valve insufficiency, follow with serial echos in office  Hypertension, blood pressure above goal, agree with amlodipine  COPD, per primary  Cocaine abuse, needs total cessation    Acceptable for discharge today with 2 week follow up in office. Thank you for involving us in the care of this patient. Please do not hesitate to call me or Dr. Alexandra Lundberg if additional questions arise.     Magalie Rodriguez NP  4/6/2023

## 2023-04-06 NOTE — DISCHARGE SUMMARY
Physician Discharge Summary     Patient ID:    Zac Cedeno  [id number]  76 y.o.  1945    Admit date: 4/5/2023    Discharge date : 4/6/2023    Chronic Diagnoses:    Problem List as of 4/6/2023 Date Reviewed: 5/18/2022            Codes Class Noted - Resolved    Syncope and collapse ICD-10-CM: R55  ICD-9-CM: 780.2  4/5/2023 - Present        Bilateral lower extremity edema ICD-10-CM: R60.0  ICD-9-CM: 782.3  1/6/2023 - Present        Chronic renal disease, stage III ICD-10-CM: N18.30  ICD-9-CM: 585.3  5/18/2022 - Present        Weakness generalized ICD-10-CM: R53.1  ICD-9-CM: 780.79  5/18/2022 - Present        Hospital discharge follow-up ICD-10-CM: Z09  ICD-9-CM: V67.59  5/18/2022 - Present        Thrombocytopenia ([address] ICD-10-CM: D69.6  ICD-9-CM: 287.5  4/13/2022 - Present        Macrocytic anemia ICD-10-CM: D53.9  ICD-9-CM: 281.9  4/13/2022 - Present        Weakness of both arms ICD-10-CM: R29.898  ICD-9-CM: 729.89  12/27/2021 - Present        Weakness of both legs ICD-10-CM: R29.898  ICD-9-CM: 729.89  12/27/2021 - Present        Malignant hypertensive heart disease without heart failure ICD-10-CM: I11.9  ICD-9-CM: 402.00  3/4/2021 - Present        Alcohol abuse ICD-10-CM: F10.10  ICD-9-CM: 305.00  12/8/2020 - Present        Lumbar herniated disc ICD-10-CM: M51.26  ICD-9-CM: 722.10  12/8/2020 - Present        Ankle pain ICD-10-CM: M25.579  ICD-9-CM: 719.47  8/3/2020 - Present        Benign prostatic hyperplasia ICD-10-CM: N40.0  ICD-9-CM: 600.00  8/3/2020 - Present        Benign prostatic disease ICD-10-CM: N42.9  ICD-9-CM: 602.9  8/3/2020 - Present    Overview Signed 8/3/2020  2:26 PM by Roselia Barry with Outflow Obstruction             Cervical radiculopathy ICD-10-CM: M54.12  ICD-9-CM: 723.4  8/3/2020 - Present        Chronic obstructive lung disease ([facility] [address] ICD-10-CM: J44.9  ICD-9-CM: 460  8/3/2020 - Present        Cocaine abuse ([facility] [address] ICD-10-CM: F14.10  ICD-9-CM: 305.60 8/3/2020 - Present        Diarrhea ICD-10-CM: R19.7  ICD-9-CM: 787.91  8/3/2020 - Present        Dizziness and giddiness ICD-10-CM: R42  ICD-9-CM: 780.4  8/3/2020 - Present        Hypercholesterolemia ICD-10-CM: E78.00  ICD-9-CM: 272.0  8/3/2020 - Present        Hypertensive disorder ICD-10-CM: I10  ICD-9-CM: 401.9  8/3/2020 - Present        Insomnia ICD-10-CM: G47.00  ICD-9-CM: 780.52  8/3/2020 - Present        Kidney disease ICD-10-CM: N28.9  ICD-9-CM: 593.9  8/3/2020 - Present        Low back pain ICD-10-CM: M54.50  ICD-9-CM: 724.2  8/3/2020 - Present        Osteoarthritis of knee ICD-10-CM: M17.9  ICD-9-CM: 715.36  8/3/2020 - Present        Pain in left knee ICD-10-CM: M25.562  ICD-9-CM: 719.46  8/3/2020 - Present        Pain in breast ICD-10-CM: N64.4  ICD-9-CM: 611.71  8/3/2020 - Present        Prediabetes ICD-10-CM: R73.03  ICD-9-CM: 790.29  8/3/2020 - Present        Seasonal allergic rhinitis ICD-10-CM: J30.2  ICD-9-CM: 477.9  8/3/2020 - Present        Shoulder pain ICD-10-CM: M25.519  ICD-9-CM: 719.41  8/3/2020 - Present        Sleep apnea ICD-10-CM: G47.30  ICD-9-CM: 780.57  8/3/2020 - Present        Tinnitus of right ear ICD-10-CM: H93.11  ICD-9-CM: 388.30  8/3/2020 - Present        Vasovagal syncope ICD-10-CM: R55  ICD-9-CM: 780.2  8/3/2020 - Present        Bronchitis ICD-10-CM: J40  ICD-9-CM: 979  Unknown - Present        RESOLVED: Encounter for immunization ICD-10-CM: Z23  ICD-9-CM: V03.89  12/27/2021 - 1/26/2022       22    Final Diagnoses:   Syncope and collapse [R55]    Reason for Hospitalization:  Syncope- orthostatic  UTI  Polysubstance abuse with cocaine  CKDIII  HTN  COPD        Hospital Course:   Gracie Hendrickson is a 76 y.o. M with history of hypertension, COPD, previous cocaine abuse, chronic renal failure stage III presenting with syncope and collapse. Patient seen at bedside is currently alert and oriented x3 however sluggish and slow to respond.      He states that he does not recall anything that happened last night or early this a.m. and definitely does not recall falling in his porch. Per ED physician patient was found on his front yard unconscious. Patient denies any dysuria, chest pain, nausea/vomiting, abdominal pain, fever/chills, neck pain, trauma to the head. He does endorse mild shortness of breath however has chronic COPD. Patient denies current alcohol use or illicit drug use. In the ED patient found to have normal vital signs and normotensive without any signs or sequelae of seizure activity. Chest x-ray, CT head and CT spine are essentially normal.     Obtain twelve-lead EKG as no EKG was performed in the ED. Urinalysis and UDS currently pending. CPK also noted to be normal.     Patient will be kept in the hospital for further evaluation under observation on telemetry for syncope and mild shortness of breath.    --> he was found to be + orthostasis. UDS positive for cocaine and UA suggestive of UTI. He was treated with IVF and ceftriaxone. ECHO was performed. Results pending. INSTRUCTIONS ON DISCHARGE    (1) please take KEFLEX 500 mg twice a day for 5 days    (2) please followup with cardiology as needed. Discharge Medications:   Current Discharge Medication List        START taking these medications    Details   cephALEXin (Keflex) 500 mg capsule Take 1 Capsule by mouth two (2) times a day for 5 days. Qty: 10 Capsule, Refills: 0  Start date: 4/6/2023, End date: 4/11/2023           CONTINUE these medications which have NOT CHANGED    Details   hydroCHLOROthiazide (HYDRODIURIL) 25 mg tablet Take 1 Tablet by mouth daily. albuterol (Ventolin HFA) 90 mcg/actuation inhaler Take 2 Puffs by inhalation every six (6) hours as needed for Wheezing or Shortness of Breath. Qty: 6.7 g, Refills: 0               Follow up Care:    1. Kacey Mccray MD in 1-2 weeks. Please call to set up an appointment shortly after discharge.       Diet:  Cardiac Diet    Disposition:  Home. Advanced Directive:   FULL x   DNR      Discharge Exam:  General:          Alert, cooperative, no distress, appears stated age. HEENT:           Atraumatic, anicteric sclerae, pink conjunctivae                          No oral ulcers, mucosa moist, throat clear, dentition fair  Neck:               Supple, symmetrical,  thyroid: non tender  Lungs:             Clear to auscultation bilaterally. No Wheezing or Rhonchi. No rales. Chest wall:      No tenderness  No Accessory muscle use. Heart:              Regular  rhythm,  No  murmur   No edema  Abdomen:        Soft, non-tender. Not distended. Bowel sounds normal  Extremities:     No cyanosis. No clubbing,                            Skin turgor normal, Capillary refill normal, Radial dial pulse 2+  Skin:                Not pale. Not Jaundiced  No rashes   Psych:             Good insight. Not depressed. Not anxious or agitated. Neurologic:      EOMs intact. No facial asymmetry. No aphasia or slurred speech. Symmetrical strength, Sensation grossly intact. Alert and oriented X 4.    CONSULTATIONS: Cardiology    Significant Diagnostic Studies:     Radiologic Studies -   Results from Hospital Encounter encounter on 04/05/23    XR CHEST SNGL V    Narrative  EXAM: XR CHEST SNGL V    HISTORY: fall. COMPARISON: 10/3/2022    FINDINGS: Portable AP. The heart is upper limits of normal for size, but  unchanged. The aorta is tortuous. The lungs are underexpanded with mild  bibasilar atelectasis. No pleural effusion or pneumothorax. The bones are  osteopenic. Impression  No acute process. CT Results  (Last 48 hours)                 04/05/23 1215  CT HEAD WO CONT Final result    Impression:  No acute intracranial abnormality. Narrative:  EXAM: CT HEAD WO CONT       INDICATION: fall, possible syncope vs seizure       COMPARISON: None. CONTRAST: None.        TECHNIQUE: Unenhanced CT of the head was performed using 5 mm images. Brain and   bone windows were generated. Coronal and sagittal reformats. CT dose reduction   was achieved through use of a standardized protocol tailored for this   examination and automatic exposure control for dose modulation. FINDINGS:   The ventricles and sulci are normal in size, shape and configuration. There is   no significant white matter disease. There is no intracranial hemorrhage,   extra-axial collection, or mass effect. The basilar cisterns are open. No CT   evidence of acute infarct. The bone windows demonstrate no abnormalities. The visualized portions of the   paranasal sinuses and mastoid air cells are clear. 04/05/23 1215  CT SPINE CERV WO CONT Final result    Impression:  1. No acute abnormality           Narrative:  INDICATION:  fall, possible seizure or syncope        STUDY:  CT SPINE CERV WO CONT        Examination: Cervical spine CT. No contrast. Comparison 10/4/13728. Thin section   axial images were obtained with sagittal and coronal reformats. CT dose   reduction was achieved through use of a standardized protocol tailored for this   examination and automatic exposure control for dose modulation. FINDINGS: Patient is kyphotic. There is no bony destructive lesion or evidence   of acute fracture. Small cystic lucencies in the lung apices unchanged. 3.5 x   1.0 cm fatty mass posterior subcutaneous tissues compatible with small lipoma.    Extensive multilevel degenerative change                       Discharge time spent 35 minutes    Signed:  Spring Colunga MD  4/6/2023  11:34 AM

## 2023-04-06 NOTE — PROGRESS NOTES
OCCUPATIONAL THERAPY EVALUATION  Patient: Stephanie Rowe (53 y.o. male)  Date: 4/6/2023  Primary Diagnosis: Syncope and collapse [R55]       Precautions: fall risk     In place during session: External Catheter and EKG/telemetry     ASSESSMENT  Pt is a 76 y.o. male presenting to [facility] with c/o AMS, admitted 4/5/23 and currently being treated for syncope and confusion. See below for home and PLOF information. Pt received semi-supine in bed upon arrival, AXO x person and place, and agreeable to OT evaluation. Based on current observations, pt presents with deficits in generalized strength/AROM, balance (see PT note for gait details), functional activity tolerance, cognition/confusion, and decreased safety awareness currently impacting overall performance of ADLs and functional transfers/mobility (see below for objective details and assist levels). Overall, pt tolerates session fair with pt completing bed mobility, sit<>stand transfers, ambulating to and from bathroom/around room using SPC with SBA-CGA overall. Pt able to doff/don socks while seated EOB with CGA. Hospital gown doffed/donned with min A to thread BUE. Pt demo'd decreased RUE shoulder flexion compared to LUE. Pt inconsistent with reports regarding whether he followed up with care for R shoulder so unclear whether pt has been receiving therapy or has a brace, however, pt preferred to use RUE to hold SPC. OT/PT educated pt on using LUE with SPC, however, pt reports he prefers RUE and continued to use RUE throughout ambulation. Pt able to stand at sink and wash face with CGA for balance. Pt would benefit from continued skilled OT services to address current impairments and improve IND and safety with self cares and functional transfers/mobility. Current OT d/c recommendation Beto Rodriguez once medically appropriate.     Other factors to consider for discharge: family/social support, DME, time since onset, severity of deficits, functional baseline     Patient will benefit from skilled therapy intervention to address the above noted impairments. PLAN :  Recommendations and Planned Interventions: self care training, functional mobility training, therapeutic exercise, balance training, therapeutic activities, cognitive retraining, endurance activities, patient education, and home safety training    Recommend with staff: Out of bed to chair for meals, Encourage HEP in prep for ADLs/mobility, Amb to bathroom for toileting with gt belt and AD, Use of bed/chair alarm for safety, and Amb in hallway    Recommend next session: Toileting and Standing grooming    Frequency/Duration: Patient will be followed by occupational therapy:  2-3x/week to address goals. Recommendation for discharge: (in order for the patient to meet his/her long term goals)  Beto Tay discharge recommendation:  Has been made in collaboration with the attending provider and/or case management    IF patient discharges home will need the following DME: TBD       SUBJECTIVE:   Patient stated I like to use my right arm.     OBJECTIVE DATA SUMMARY:   HISTORY:   Past Medical History:   Diagnosis Date    Ankle pain 8/3/2020    Benign prostatic disease 8/3/2020    Hypertrophy with Outflow Obstruction    Benign prostatic hyperplasia 8/3/2020    Bronchitis     Cervical radiculopathy 8/3/2020    Chronic obstructive lung disease ([facility] [address] 8/3/2020    Cocaine abuse ([facility] [address] 8/3/2020    Diarrhea 8/3/2020    Dizziness and giddiness 8/3/2020    Hypercholesterolemia 8/3/2020    Hypertensive disorder 8/3/2020    Insomnia 8/3/2020    Kidney disease 8/3/2020    Low back pain 8/3/2020    Osteoarthritis of knee 8/3/2020    Pain in breast 8/3/2020    Pain in left knee 8/3/2020    Prediabetes 8/3/2020    Seasonal allergic rhinitis 8/3/2020    Shoulder pain 8/3/2020    Sleep apnea 8/3/2020    Tinnitus of right ear 8/3/2020    Vasovagal syncope 8/3/2020     No past surgical history on file.    Per pt:   Home Situation  Home Environment: Other (comment) (renting a room)  One/Two Story Residence: One story  Living Alone: No  Support Systems: Friend/Neighbor  Patient Expects to be Discharged to[de-identified] Skilled nursing facility  Current DME Used/Available at Home: Cane, straight    Hand dominance: Right    EXAMINATION OF PERFORMANCE DEFICITS:  Cognitive/Behavioral Status:  Neurologic State: Alert;Confused  Orientation Level: Disoriented to situation;Disoriented to time    Hearing: Auditory  Auditory Impairment: None    Range of Motion:  AROM: Generally decreased, functional (RUE decreased compared to LUE)    Strength:  Strength: Generally decreased, functional (BUE)    Coordination:  Coordination: Within functional limits  Fine Motor Skills-Upper: Left Intact; Right Intact    Gross Motor Skills-Upper: Left Intact; Right Intact    Balance:  Sitting: Intact; Without support  Standing: Impaired; With support  Standing - Static: Good;Constant support  Standing - Dynamic : Fair;Constant support    Functional Mobility and Transfers for ADLs:  Bed Mobility:  Rolling: Stand-by assistance  Supine to Sit: Stand-by assistance;Contact guard assistance  Sit to Supine: Stand-by assistance;Contact guard assistance  Scooting: Stand-by assistance;Contact guard assistance    Transfers:  Sit to Stand: Stand-by assistance;Contact guard assistance  Stand to Sit: Stand-by assistance;Contact guard assistance      ADL Intervention and task modifications:  Grooming  Position Performed: Standing (At sink)  Washing Face: Contact guard assistance    Upper Body [address]: Minimum  assistance    Lower Body Dressing Assistance  Socks: Contact guard assistance (For balance)  Position Performed: Seated edge of bed    Therapeutic Exercise:  Pt would benefit from UE HEP in prep for ADLs and functional mobility/transfers.      Functional Measure:    Leander Edgar AM-PACSUSIE \"6 Clicks\" Daily Activity Inpatient Short Form  How much help from another person does the patient currently need. .. Total; A Lot A Little None   1. Putting on and taking off regular lower body clothing? []  1 []  2 [x]  3 []  4   2. Bathing (including washing, rinsing, drying)? []  1 []  2 [x]  3 []  4   3. Toileting, which includes using toilet, bedpan or urinal? [] 1 []  2 [x]  3 []  4   4. Putting on and taking off regular upper body clothing? []  1 []  2 [x]  3 []  4   5. Taking care of personal grooming such as brushing teeth? []  1 []  2 [x]  3 []  4   6. Eating meals? []  1 []  2 [x]  3 []  4   © , Trustees of [address] [place] [address] [address] [address], under license to [employer]. All rights reserved     Score: 1824     Interpretation of Tool:  Represents clinically-significant functional categories (i.e. Activities of daily living). Percentage of Impairment CH    0%   CI    1-19% CJ    20-39% CK    40-59% CL    60-79% CM    80-99% CN     100%   [facility]  Score 6-24 24 23 20-22 15-19 10-14 7-9 6     Occupational Therapy Evaluation Charge Determination   History Examination Decision-Making   LOW Complexity : Brief history review  MEDIUM Complexity : 3-5 performance deficits relating to physical, cognitive , or psychosocial skils that result in activity limitations and / or participation restrictions MEDIUM Complexity : Patient may present with comorbidities that affect occupational performnce.  Miniml to moderate modification of tasks or assistance (eg, physical or verbal ) with assesment(s) is necessary to enable patient to complete evaluation       Based on the above components, the patient evaluation is determined to be of the following complexity level: LOW     Pain Ratin/10    Activity Tolerance:   Fair and requires rest breaks    After treatment patient left in no apparent distress:    Supine in bed, Call bell within reach, Bed / chair alarm activated, and Side rails x 3, bed locked and in lowest position    COMMUNICATION/EDUCATION:   The patients plan of care was discussed with: Physical therapist, Registered nurse, and Case management. The supervising occupational therapist and treating occupational therapist assistant have met to review this patients progress and plan of care. Patient/family agree to work toward stated goals and plan of care. This patients plan of care is appropriate for delegation to ISADORA. PT/OT sessions occurred together for increased safety of pt and clinician. Thank you for this referral.  Pennie Montesnios, OT  Time Calculation: 23 mins    Problem: Self Care Deficits Care Plan (Adult)  Goal: *Acute Goals and Plan of Care (Insert Text)  Description:   FUNCTIONAL STATUS PRIOR TO ADMISSION: Patient was modified independent using a single point cane for functional mobility. Patient was independent for basic and instrumental ADLs. HOME SUPPORT: Inconsistent reports. Per nsg and CM, the patient lived with a friend but did not require assist. Per pt, he lived alone. Occupational Therapy Goals  Initiated 4/6/2023  Patient Goal: Pt did not state. 1.  Patient will perform lower body dressing with independence within 7 day(s). 2.  Patient will perform grooming with independence within 7 day(s). 3.  Patient will perform bathing with independence within 7 day(s). 4.  Patient will perform toilet transfers with independence within 7 day(s). 5.  Patient will perform all aspects of toileting with independence within 7 day(s). 6.  Patient will participate in upper extremity therapeutic exercise/activities with independence within 7 day(s).       Outcome: Not Met

## 2023-04-06 NOTE — PROGRESS NOTES
PHYSICAL THERAPY EVALUATION  Patient: Lalitha Appiah (4 y.o. male)  Date: 4/6/2023  Primary Diagnosis: Syncope and collapse [R55]       Precautions: Fall    In place during session: Peripheral IV and External Catheter    ASSESSMENT  Pt is a 76 y.o. male admitted on 4/5/2023 for AMS; pt currently being treated for syncope confusion . Pt semi supine  upon PT /OTarrival, agreeable to evaluation. Pt A&O x 2. Based on the objective data described, the patient presents with generalized weakness, impaired functional mobility, impaired amb, impaired balance, and decreased endurance to activities. (See below for objective details and assist levels). Patient able to amb with starlight cane but using the cane in rt UE which rt shoulder with previous injury,Patient was unable to tell us the time and duration of injury and what he has been doing with therapy. patient did say he has been going to therapy. patient with altered posture while using th ecane in the rt UE with elevated shoulder. Loss of rom and strength  in rt shoulder. cane too high,nit adjustable. May benefit from [facility]. Overall pt tolerated session fair today with therapy. Pt will benefit from continued skilled PT to address above deficits and return to PLOF. Current PT DC recommendation SNF once medically appropriate. Current Level of Function Impacting Discharge (mobility/balance): patient with rt shoulder recent injury and left knee and ankle injury in 1962. patient is a poor historian so difficult to communicate. will not be safe at home alone     Other factors to consider for discharge: SNf      PLAN :  Recommendations and Planned Interventions: bed mobility training, transfer training, gait training, therapeutic exercises, patient and family training/education, and therapeutic activities      Recommend for staff: Out of bed to chair for meals and Amb to bathroom for toileting with gt belt and AD    Frequency/Duration: Patient will be followed by physical therapy:  2-3x/week to address goals. Recommendation for discharge: (in order for the patient to meet his/her long term goals)  Beto Rodriguez    This discharge recommendation:  Has been made in collaboration with the attending provider and/or case management    IF patient discharges home will need the following DME: rolling walker         SUBJECTIVE:   Patient stated I am ok.     OBJECTIVE DATA SUMMARY:   HISTORY:    Past Medical History:   Diagnosis Date    Ankle pain 8/3/2020    Benign prostatic disease 8/3/2020    Hypertrophy with Outflow Obstruction    Benign prostatic hyperplasia 8/3/2020    Bronchitis     Cervical radiculopathy 8/3/2020    Chronic obstructive lung disease ([facility] [address] 8/3/2020    Cocaine abuse ([facility] [address] 8/3/2020    Diarrhea 8/3/2020    Dizziness and giddiness 8/3/2020    Hypercholesterolemia 8/3/2020    Hypertensive disorder 8/3/2020    Insomnia 8/3/2020    Kidney disease 8/3/2020    Low back pain 8/3/2020    Osteoarthritis of knee 8/3/2020    Pain in breast 8/3/2020    Pain in left knee 8/3/2020    Prediabetes 8/3/2020    Seasonal allergic rhinitis 8/3/2020    Shoulder pain 8/3/2020    Sleep apnea 8/3/2020    Tinnitus of right ear 8/3/2020    Vasovagal syncope 8/3/2020   No past surgical history on file. Home Situation  Home Environment: Other (comment) (renting a room)  One/Two Story Residence: One story  Living Alone: No  Support Systems: Friend/Neighbor  Patient Expects to be Discharged to[de-identified] Skilled nursing facility  Current DME Used/Available at Home: Cane, straight    EXAMINATION/PRESENTATION/DECISION MAKING:   Critical Behavior:  Neurologic State: Alert, Confused  Orientation Level: Disoriented to situation, Disoriented to time  Cognition: Impaired decision making, Poor safety awareness     Hearing:   Auditory  Auditory Impairment: None  Skin:  intact  Edema:   Range Of Motion:         BLE WFL                 Strength:        Decreased left LE strength and endurance Tone & Sensation:       wfl                           Functional Mobility:  Bed Mobility:  Rolling: Stand-by assistance  Supine to Sit: Stand-by assistance;Contact guard assistance  Sit to Supine: Stand-by assistance;Contact guard assistance  Scooting: Stand-by assistance;Contact guard assistance  Transfers:  Sit to Stand: Stand-by assistance;Contact guard assistance  Stand to Sit: Stand-by assistance;Contact guard assistance                       Balance:   Sitting: Intact; Without support  Standing: Impaired; With support  Standing - Static: Good;Constant support  Standing - Dynamic : Fair;Constant support  Ambulation/Gait Training:  Distance (ft): 100 Feet (ft)  Assistive Device: Cane, straight  Ambulation - Level of Assistance: Stand-by assistance;Contact guard assistance     Gait Description (WDL): Exceptions to WDL  Gait Abnormalities: Antalgic; Altered arm swing;Hip Hike                                      Functional Measure:  [facility] AM-PAC 6 Clicks         Basic Mobility Inpatient Short Form  How much difficulty does the patient currently have. .. Unable A Lot A Little None   1. Turning over in bed (including adjusting bedclothes, sheets and blankets)? [] 1   [] 2   [] 3   [x] 4   2. Sitting down on and standing up from a chair with arms ( e.g., wheelchair, bedside commode, etc.)   [] 1   [] 2   [x] 3   [] 4   3. Moving from lying on back to sitting on the side of the bed? [] 1   [] 2   [] 3   [x] 4          How much help from another person does the patient currently need. .. Total A Lot A Little None   4. Moving to and from a bed to a chair (including a wheelchair)? [] 1   [] 2   [x] 3   [] 4   5. Need to walk in hospital room? [] 1   [] 2   [x] 3   [] 4   6. Climbing 3-5 steps with a railing? [] 1   [] 2   [x] 3   [] 4   © 2007, Trustees of [facility], under license to [employer].  All rights reserved     Score:  Initial: 20/24 Most Recent: X (Date: 04/05/2023 ) Interpretation of Tool:  Represents activities that are increasingly more difficult (i.e. Bed mobility, Transfers, Gait). Score 24 23 22-20 19-15 14-10 9-7 6   Modifier CH CI CJ CK CL CM CN         Physical Therapy Evaluation Charge Determination   History Examination Presentation Decision-Making   LOW Complexity : Zero comorbidities / personal factors that will impact the outcome / POC LOW Complexity : 1-2 Standardized tests and measures addressing body structure, function, activity limitation and / or participation in recreation  LOW Complexity : Stable, uncomplicated  Other outcome measures [facility] 6        Based on the above components, the patient evaluation is determined to be of the following complexity level: LOW     Pain Ratin/10     Activity Tolerance:   Good and Fair    After treatment patient left in no apparent distress:   Bed locked and in lowest position Supine in bed, Call bell within reach, Bed / chair alarm activated, and Side rails x 3. GOALS:    Problem: Mobility Impaired (Adult and Pediatric)  Goal: *Acute Goals and Plan of Care (Insert Text)  Description: FUNCTIONAL STATUS PRIOR TO ADMISSION: Patient was modified independent using a single point cane for functional mobility. HOME SUPPORT PRIOR TO ADMISSION: The patient lived alone with friends or roommate to provide assistance. Patient stated goal: get better  Patient will move from supine to sit and sit to supine , scoot up and down, and roll side to side in bed with modified independence within 7 day(s). Patient will transfer from bed to chair and chair to bed with modified independence using the least restrictive device within 7 day(s). Patient will improve static standing balance to modified independence within 1 week(s). Patient will ambulate 120 feet with modified independence with least restrictive device within 1 weeks.      Outcome: Progressing Towards Goal       COMMUNICATION/EDUCATION:   The patients plan of care was discussed with: Occupational therapist, Registered nurse, and Case management. Fall prevention education was provided and the patient/caregiver indicated understanding., Patient/family have participated as able in goal setting and plan of care. , and Patient/family agree to work toward stated goals and plan of care.          Thank you for this referral.  Yovanny Read, PT   Time Calculation: 29 mins

## 2023-04-06 NOTE — PROGRESS NOTES
Problem: Falls - Risk of  Goal: *Absence of Falls  Description: Document Aura Hernandez Fall Risk and appropriate interventions in the flowsheet. Outcome: Progressing Towards Goal  Note: Fall Risk Interventions:                                Problem: Patient Education: Go to Patient Education Activity  Goal: Patient/Family Education  Outcome: Progressing Towards Goal     Problem: Pressure Injury - Risk of  Goal: *Prevention of pressure injury  Description: Document Josue Scale and appropriate interventions in the flowsheet.   Outcome: Progressing Towards Goal  Note: Pressure Injury Interventions:  Sensory Interventions: Assess changes in LOC, Check visual cues for pain, Float heels, Keep linens dry and wrinkle-free, Minimize linen layers    Moisture Interventions: Absorbent underpads, Internal/External urinary devices, Minimize layers    Activity Interventions: PT/OT evaluation    Mobility Interventions: PT/OT evaluation    Nutrition Interventions: Document food/fluid/supplement intake, Offer support with meals,snacks and hydration

## 2023-04-06 NOTE — PROGRESS NOTES
Problem: Pressure Injury - Risk of  Goal: *Prevention of pressure injury  Description: Document Josue Scale and appropriate interventions in the flowsheet. Outcome: Progressing Towards Goal  Note: Pressure Injury Interventions:  Sensory Interventions: Assess changes in LOC    Moisture Interventions: Absorbent underpads    Activity Interventions: PT/OT evaluation    Mobility Interventions: PT/OT evaluation    Nutrition Interventions: Discuss nutritional consult with provider                     Problem: Falls - Risk of  Goal: *Absence of Falls  Description: Document Torres Fall Risk and appropriate interventions in the flowsheet.   Outcome: Progressing Towards Goal  Note: Fall Risk Interventions:                                Problem: Patient Education: Go to Patient Education Activity  Goal: Patient/Family Education  Outcome: Progressing Towards Goal

## 2023-04-06 NOTE — PROGRESS NOTES
CM notified by PT/OT that patient is confused and they are recc [facility] for patient. Patient's nurse contacted patient's sister, Kandis Samuel [phone], to discuss, CM also spoke with Ms. Marge Urbina, she is agreeable to [facility]. Referrals sent via kendal, awaiting acceptance. Medicare Outpatient Observation Notice (MOON)/ [place] Outpatient Observation Notice (Sherri Fenton) provided to patient/representative with verbal explanation of the notice. Time allotted for questions regarding the notice. Patient /representative provided a completed copy of the MOON/VOON notice. Copy placed on bedside chart.

## 2023-04-07 RX ADMIN — Medication 10 ML: at 05:26

## 2023-04-07 RX ADMIN — SODIUM CHLORIDE 75 ML/HR: 9 INJECTION, SOLUTION INTRAVENOUS at 09:05

## 2023-04-07 RX ADMIN — AMLODIPINE BESYLATE 10 MG: 5 TABLET ORAL at 09:03

## 2023-04-07 NOTE — PROGRESS NOTES
Problem: Pressure Injury - Risk of  Goal: *Prevention of pressure injury  Description: Document Josue Scale and appropriate interventions in the flowsheet.   Outcome: Progressing Towards Goal  Note: Pressure Injury Interventions:  Sensory Interventions: Assess changes in LOC    Moisture Interventions: Absorbent underpads    Activity Interventions: PT/OT evaluation    Mobility Interventions: PT/OT evaluation    Nutrition Interventions: Document food/fluid/supplement intake, Offer support with meals,snacks and hydration                     Problem: Patient Education: Go to Patient Education Activity  Goal: Patient/Family Education  Outcome: Progressing Towards Goal     Problem: Patient Education: Go to Patient Education Activity  Goal: Patient/Family Education  Outcome: Progressing Towards Goal     Problem: Patient Education: Go to Patient Education Activity  Goal: Patient/Family Education  Outcome: Progressing Towards Goal

## 2023-04-20 ENCOUNTER — PATIENT OUTREACH (OUTPATIENT)
Dept: CASE MANAGEMENT | Age: 78
End: 2023-04-20

## 2023-04-25 ENCOUNTER — OFFICE VISIT (OUTPATIENT)
Dept: FAMILY MEDICINE CLINIC | Age: 78
End: 2023-04-25

## 2023-04-25 ENCOUNTER — PATIENT OUTREACH (OUTPATIENT)
Dept: CASE MANAGEMENT | Age: 78
End: 2023-04-25

## 2023-04-25 VITALS
HEIGHT: 76 IN | TEMPERATURE: 97.8 F | WEIGHT: 202.8 LBS | HEART RATE: 73 BPM | OXYGEN SATURATION: 95 % | RESPIRATION RATE: 18 BRPM | SYSTOLIC BLOOD PRESSURE: 138 MMHG | DIASTOLIC BLOOD PRESSURE: 82 MMHG | BODY MASS INDEX: 24.7 KG/M2

## 2023-04-25 DIAGNOSIS — G47.33 OBSTRUCTIVE SLEEP APNEA SYNDROME: ICD-10-CM

## 2023-04-25 DIAGNOSIS — M25.471 ANKLE EDEMA, BILATERAL: ICD-10-CM

## 2023-04-25 DIAGNOSIS — Z99.89 OSA ON CPAP: ICD-10-CM

## 2023-04-25 DIAGNOSIS — F10.10 ALCOHOL ABUSE: ICD-10-CM

## 2023-04-25 DIAGNOSIS — G89.29 CHRONIC PAIN OF LEFT KNEE: ICD-10-CM

## 2023-04-25 DIAGNOSIS — F14.10 COCAINE ABUSE (HCC): ICD-10-CM

## 2023-04-25 DIAGNOSIS — M25.562 CHRONIC PAIN OF LEFT KNEE: ICD-10-CM

## 2023-04-25 DIAGNOSIS — J41.8 MIXED SIMPLE AND MUCOPURULENT CHRONIC BRONCHITIS (HCC): ICD-10-CM

## 2023-04-25 DIAGNOSIS — R29.898 WEAKNESS OF BOTH LEGS: ICD-10-CM

## 2023-04-25 DIAGNOSIS — R26.81 UNSTEADY GAIT WHEN WALKING: ICD-10-CM

## 2023-04-25 DIAGNOSIS — Z71.51 DRUG ABUSE COUNSELING AND SURVEILLANCE OF DRUG ABUSER: ICD-10-CM

## 2023-04-25 DIAGNOSIS — Z09 HOSPITAL DISCHARGE FOLLOW-UP: ICD-10-CM

## 2023-04-25 DIAGNOSIS — R55 SYNCOPE AND COLLAPSE: ICD-10-CM

## 2023-04-25 DIAGNOSIS — I11.9 MALIGNANT HYPERTENSIVE HEART DISEASE WITHOUT HEART FAILURE: Primary | ICD-10-CM

## 2023-04-25 DIAGNOSIS — G47.33 OSA ON CPAP: ICD-10-CM

## 2023-04-25 DIAGNOSIS — N18.31 STAGE 3A CHRONIC KIDNEY DISEASE (HCC): ICD-10-CM

## 2023-04-25 DIAGNOSIS — M25.472 ANKLE EDEMA, BILATERAL: ICD-10-CM

## 2023-04-25 RX ORDER — HYDROCHLOROTHIAZIDE 25 MG/1
25 TABLET ORAL
Qty: 30 TABLET | Refills: 0 | Status: SHIPPED | OUTPATIENT
Start: 2023-04-25 | End: 2023-05-25

## 2023-04-25 NOTE — PROGRESS NOTES
Lalitha Appiah is a 76 y.o. male and presents with Physical (Physical for admission to assisted living facility)  . HPI     77 Yo with a hx of HTN,s/p hospitalization for ETOH abuse and drug abuse denying both and refusing to be admitted into an [address] stating he'd rather stay living renting space from a Mrs Kami Hernandez. States recent memory lapses and denies dyspnea or chest pain    Subjective:  Cardiovascular Review:  The patient has hypertension   Diet and Lifestyle: generally follows a low fat low cholesterol diet, generally follows a low sodium diet, exercises sporadically  Home BP Monitoring: is not measured at home. Pertinent ROS: taking medications as instructed, no medication side effects noted, no TIA's, no chest pain on exertion, no dyspnea on exertion, no swelling of ankles. Review of Systems  Review of Systems   Constitutional: Negative. Negative for chills and fever. HENT: Negative. Negative for congestion, ear discharge, hearing loss, nosebleeds and tinnitus. Eyes: Negative. Negative for blurred vision, double vision, photophobia and pain. Respiratory: Negative. Negative for cough, hemoptysis and sputum production. Cardiovascular: Negative. Negative for chest pain and palpitations. Gastrointestinal: Negative. Negative for heartburn, nausea and vomiting. Genitourinary: Negative. Negative for dysuria, frequency and urgency. Musculoskeletal:  Positive for joint pain. Negative for back pain and myalgias. Skin: Negative. Neurological: Negative. Negative for dizziness, tingling, weakness and headaches. Endo/Heme/Allergies: Negative. Psychiatric/Behavioral: Negative. Negative for depression and suicidal ideas. The patient does not have insomnia.         Past Medical History:   Diagnosis Date    Ankle pain 8/3/2020    Benign prostatic disease 8/3/2020    Hypertrophy with Outflow Obstruction    Benign prostatic hyperplasia 8/3/2020    Bronchitis     Cervical radiculopathy 8/3/2020    Chronic obstructive lung disease ([facility] [address] 8/3/2020    Cocaine abuse ([facility] [address] 8/3/2020    Diarrhea 8/3/2020    Dizziness and giddiness 8/3/2020    Hypercholesterolemia 8/3/2020    Hypertensive disorder 8/3/2020    Insomnia 8/3/2020    Kidney disease 8/3/2020    Low back pain 8/3/2020    Osteoarthritis of knee 8/3/2020    Pain in breast 8/3/2020    Pain in left knee 8/3/2020    Prediabetes 8/3/2020    Seasonal allergic rhinitis 8/3/2020    Shoulder pain 8/3/2020    Sleep apnea 8/3/2020    Tinnitus of right ear 8/3/2020    Vasovagal syncope 8/3/2020     History reviewed. No pertinent surgical history. Social History     Socioeconomic History    Marital status: LEGALLY    Tobacco Use    Smoking status: Former    Smokeless tobacco: Never   Vaping Use    Vaping Use: Former   Substance and Sexual Activity    Alcohol use: Yes     Comment: pt drinks everyday    Drug use: Yes     Frequency: 3.0 times per week     Types: Cocaine     Social Determinants of Health     Financial Resource Strain: High Risk    Difficulty of Paying Living Expenses: Hard   Food Insecurity: Food Insecurity Present    Worried About Running Out of Food in the Last Year: Sometimes true    Ran Out of Food in the Last Year: Sometimes true     Family History   Problem Relation Age of Onset    Hypertension Mother      Current Outpatient Medications   Medication Sig Dispense Refill    amLODIPine (NORVASC) 10 mg tablet Take 1 Tablet by mouth daily. 30 Tablet 2    albuterol (Ventolin HFA) 90 mcg/actuation inhaler Take 2 Puffs by inhalation every six (6) hours as needed for Wheezing or Shortness of Breath.  6.7 g 0     Allergies   Allergen Reactions    Bactrim [Sulfamethoprim] Unknown (comments)    Penicillin G Unknown (comments)       Objective:  Visit Vitals  BP (!) 140/86 (BP 1 Location: Left arm, BP Patient Position: Sitting, BP Cuff Size: Adult)   Pulse 73   Temp 97.8 °F (36.6 °C) (Oral)   Resp 18   Ht 6' 4\" (1.93 m)   Wt 202 lb 12.8 oz (92 kg)   SpO2 95%   BMI 24.69 kg/m²       Physical Exam:   Physical Exam  Vitals and nursing note reviewed. Constitutional:       General: He is not in acute distress. Appearance: Normal appearance. He is obese. He is not ill-appearing, toxic-appearing or diaphoretic. HENT:      Head: Normocephalic and atraumatic. Right Ear: Tympanic membrane, ear canal and external ear normal. There is no impacted cerumen. Left Ear: Tympanic membrane, ear canal and external ear normal. There is no impacted cerumen. Nose: Nose normal. No congestion or rhinorrhea. Mouth/Throat:      Mouth: Mucous membranes are moist.      Pharynx: Oropharynx is clear. No oropharyngeal exudate or posterior oropharyngeal erythema. Eyes:      General: No scleral icterus. Right eye: No discharge. Left eye: No discharge. Extraocular Movements: Extraocular movements intact. Conjunctiva/sclera: Conjunctivae normal.      Pupils: Pupils are equal, round, and reactive to light. Neck:      Vascular: No carotid bruit. Cardiovascular:      Rate and Rhythm: Normal rate and regular rhythm. Pulses: Normal pulses. Heart sounds: Normal heart sounds. No murmur heard. No friction rub. No gallop. Pulmonary:      Effort: Pulmonary effort is normal. No respiratory distress. Breath sounds: Normal breath sounds. No stridor. No wheezing, rhonchi or rales. Chest:      Chest wall: No tenderness. Abdominal:      General: Abdomen is flat. Bowel sounds are normal. There is no distension. Palpations: Abdomen is soft. There is no mass. Tenderness: There is no abdominal tenderness. There is no right CVA tenderness, left CVA tenderness, guarding or rebound. Hernia: No hernia is present. Musculoskeletal:         General: No swelling, tenderness, deformity or signs of injury. Normal range of motion. Cervical back: Normal range of motion and neck supple. No rigidity.  No muscular tenderness. Right lower leg: Edema present. Left lower leg: Edema present. Lymphadenopathy:      Cervical: No cervical adenopathy. Skin:     General: Skin is warm. Capillary Refill: Capillary refill takes 2 to 3 seconds. Coloration: Skin is not jaundiced or pale. Findings: No bruising, erythema, lesion or rash. Neurological:      General: No focal deficit present. Mental Status: He is alert and oriented to person, place, and time. Mental status is at baseline. Cranial Nerves: No cranial nerve deficit. Sensory: No sensory deficit. Motor: Weakness present. Coordination: Coordination normal.      Gait: Gait abnormal.      Deep Tendon Reflexes: Reflexes normal.   Psychiatric:         Mood and Affect: Mood normal.         Behavior: Behavior normal.         Thought Content: Thought content normal.         Judgment: Judgment normal.           Results for orders placed or performed during the hospital encounter of 04/05/23   CULTURE, URINE    Specimen: Urine   Result Value Ref Range    Special Requests: No Special Requests  Reflexed from [id number]        [place] Count 15,000      Culture result: Mixed urogenital donn isolated     CBC WITH AUTOMATED DIFF   Result Value Ref Range    WBC 3.6 (L) 4.1 - 11.1 K/uL    RBC 3.74 (L) 4.10 - 5.70 M/uL    HGB 12.4 12.1 - 17.0 g/dL    HCT 37.9 36.6 - 50.3 %    .3 (H) 80.0 - 99.0 FL    MCH 33.2 26.0 - 34.0 PG    MCHC 32.7 30.0 - 36.5 g/dL    RDW 12.4 11.5 - 14.5 %    PLATELET 072 230 - 139 K/uL    NRBC 0.0 0.0  WBC    ABSOLUTE NRBC 0.00 0.00 - 0.01 K/uL    NEUTROPHILS 47 32 - 75 %    LYMPHOCYTES 42 12 - 49 %    MONOCYTES 8 5 - 13 %    EOSINOPHILS 2 0 - 7 %    BASOPHILS 1 0 - 1 %    IMMATURE GRANULOCYTES 0 0 - 0.5 %    ABS. NEUTROPHILS 1.7 (L) 1.8 - 8.0 K/UL    ABS. LYMPHOCYTES 1.5 0.8 - 3.5 K/UL    ABS. MONOCYTES 0.3 0.0 - 1.0 K/UL    ABS. EOSINOPHILS 0.1 0.0 - 0.4 K/UL    ABS.  BASOPHILS 0.0 0.0 - 0.1 K/UL ABS. IMM. GRANS. 0.0 0.00 - 0.04 K/UL    DF AUTOMATED     METABOLIC PANEL, COMPREHENSIVE   Result Value Ref Range    Sodium 137 136 - 145 mmol/L    Potassium Hemolysed specimen 3.5 - 5.1 mmol/L    Chloride 108 97 - 108 mmol/L    CO2 17 (L) 21 - 32 mmol/L    Anion gap 12 5 - 15 mmol/L    Glucose 74 65 - 100 mg/dL    BUN 27 (H) 6 - 20 mg/dL    Creatinine 1.36 (H) 0.70 - 1.30 mg/dL    BUN/Creatinine ratio 20 12 - 20      eGFR 53 (L) >60 ml/min/1.73m2    Calcium 8.4 (L) 8.5 - 10.1 mg/dL    Bilirubin, total 0.5 0.2 - 1.0 mg/dL    AST (SGOT) Hemolysed specimen 15 - 37 U/L    ALT (SGPT) 10 (L) 12 - 78 U/L    Alk.  phosphatase 58 45 - 117 U/L    Protein, total 6.3 (L) 6.4 - 8.2 g/dL    Albumin 2.8 (L) 3.5 - 5.0 g/dL    Globulin 3.5 2.0 - 4.0 g/dL    A-G Ratio 0.8 (L) 1.1 - 2.2     TROPONIN-HIGH SENSITIVITY   Result Value Ref Range    Troponin-High Sensitivity 26 0 - 76 ng/L   URINALYSIS W/ REFLEX CULTURE    Specimen: Urine   Result Value Ref Range    Color Yellow/Straw      Appearance Turbid (A) Clear      Specific gravity 1.013 1.003 - 1.030      pH (UA) 5.0 5.0 - 8.0      Protein 30 (A) Negative mg/dL    Glucose Negative Negative mg/dL    Ketone 5 (A) Negative mg/dL    Bilirubin Negative Negative      Blood Small (A) Negative      Urobilinogen 0.1 0.1 - 1.0 EU/dL    Nitrites Negative Negative      Leukocyte Esterase Large (A) Negative      UA:UC IF INDICATED Urine Culture Ordered (A) Culture not indicated by UA result      WBC >100 (H) 0 - 4 /hpf    RBC 0-5 0 - 5 /hpf    Epithelial cells Few Few /lpf    Bacteria Negative Negative /hpf    Mucus Trace /lpf   CK   Result Value Ref Range     39 - 308 U/L   POTASSIUM   Result Value Ref Range    Potassium Hemolysed specimen 3.5 - 5.1 mmol/L   DRUG SCREEN, URINE   Result Value Ref Range    AMPHETAMINES Negative Negative      BARBITURATES Negative Negative      BENZODIAZEPINES Negative Negative      COCAINE Positive (A) Negative      METHADONE Negative Negative      OPIATES Negative Negative      PCP(PHENCYCLIDINE) Negative Negative      THC (TH-CANNABINOL) Negative Negative      Drug screen comment        This test is a screen for drugs of abuse in a medical setting only (i.e., they are unconfirmed results and as such must not be used for non-medical purposes, e.g.,employment testing, legal testing). Due to its inherent nature, false positive (FP) and false negative (FN) results may be obtained. Therefore, if necessary for medical care, recommend confirmation of positive findings by GC/MS.      ETHYL ALCOHOL   Result Value Ref Range    ALCOHOL(ETHYL),SERUM <10 <10 mg/dL   TROPONIN-HIGH SENSITIVITY   Result Value Ref Range    Troponin-High Sensitivity 53 0 - 76 ng/L   METABOLIC PANEL, BASIC   Result Value Ref Range    Sodium 137 136 - 145 mmol/L    Potassium 4.2 3.5 - 5.1 mmol/L    Chloride 108 97 - 108 mmol/L    CO2 23 21 - 32 mmol/L    Anion gap 6 5 - 15 mmol/L    Glucose 87 65 - 100 mg/dL    BUN 31 (H) 6 - 20 mg/dL    Creatinine 1.33 (H) 0.70 - 1.30 mg/dL    BUN/Creatinine ratio 23 (H) 12 - 20      eGFR 54 (L) >60 ml/min/1.73m2    Calcium 8.8 8.5 - 10.1 mg/dL   MAGNESIUM   Result Value Ref Range    Magnesium 2.0 1.6 - 2.4 mg/dL   EKG, 12 LEAD, INITIAL   Result Value Ref Range    Ventricular Rate 85 BPM    Atrial Rate 85 BPM    P-R Interval 148 ms    QRS Duration 96 ms    Q-T Interval 422 ms    QTC Calculation (Bezet) 502 ms    Calculated P Axis 27 degrees    Calculated R Axis -34 degrees    Calculated T Axis 68 degrees    Diagnosis       Sinus rhythm with marked sinus arrhythmia  Left axis deviation  Minimal voltage criteria for LVH, may be normal variant  Nonspecific T wave abnormality  Prolonged QT  Abnormal ECG    Confirmed by ELMA SPANGLER, Mini Winslow ([id number]) on 4/7/2023 10:12:49 AM     EKG, 12 LEAD, INITIAL   Result Value Ref Range    Ventricular Rate 79 BPM    Atrial Rate 79 BPM    P-R Interval 148 ms    QRS Duration 86 ms    Q-T Interval 412 ms    QTC Calculation (Bezet) 472 ms Calculated P Axis 27 degrees    Calculated R Axis -36 degrees    Calculated T Axis -24 degrees    Diagnosis       Sinus rhythm with marked sinus arrhythmia  Left axis deviation  Nonspecific T wave abnormality  Prolonged QT  Abnormal ECG  When compared with ECG of 05-APR-2023 11:32, (Unconfirmed)  No significant change was found  Confirmed by [facility] ANSHUL SPANGLER ([id number]) on 4/7/2023 1:48:26 PM     ECHO ADULT COMPLETE   Result Value Ref Range    LV EDV A2C 114 mL    LV EDV A4C 112 mL    LV ESV A2C 41 mL    LV ESV A4C 50 mL    IVSd 1.2 (A) 0.6 - 1.0 cm    LVIDd 4.7 4.2 - 5.9 cm    LVIDs 3.3 cm    LVOT Diameter 2.0 cm    LVOT Mean Gradient 2 mmHg    LVOT VTI 21.8 cm    LVOT Peak Velocity 0.9 m/s    LVOT Peak Gradient 3 mmHg    LVPWd 1.2 (A) 0.6 - 1.0 cm    LV Ejection Fraction A2C 64 %    LV Ejection Fraction A4C 55 %    EF BP 59 55 - 100 %    LVOT Area 3.1 cm2    LVOT SV 68.5 ml    LA Minor Axis 6.7 cm    LA Major [place] 5.2 cm    LA Area 2C 29.4 cm2    LA Area 4C 23.4 cm2    LA Diameter 3.6 cm    AR .0 ms    AR Max Velocity PISA 5.0 m/s    AV Peak Velocity 1.6 m/s    AV Peak Gradient 11 mmHg    AV Mean Gradient 6 mmHg    AV VTI 30.2 cm    AV Mean Velocity 1.1 m/s    AV Area by VTI 2.3 cm2    AV Area by Peak Velocity 1.8 cm2    Aortic Root 3.8 cm    Ascending Aorta 4.3 cm    PV Max Velocity 0.6 m/s    PV Peak Gradient 1 mmHg    RVIDd 3.6 cm    TR Max Velocity 2.60 m/s    TR Peak Gradient 27 mmHg    Fractional Shortening 2D 30 28 - 44 %    LV ESV Index A4C 23 mL/m2    LV EDV Index A4C 52 mL/m2    LV ESV Index A2C 19 mL/m2    LV EDV Index A2C 53 mL/m2    LVIDd Index 2.17 cm/m2    LVIDs Index 1.52 cm/m2    LV RWT Ratio 0.51     LV Mass 2D 212.0 88 - 224 g    LV Mass 2D Index 97.7 49 - 115 g/m2    LVOT Stroke Volume Index 31.5 mL/m2    LA Size Index 1.66 cm/m2    LA/AO Root Ratio 0.95     Ao Root Index 1.75 cm/m2    Ascending Aorta Index 1.98 cm/m2    AV Velocity Ratio 0.56     LVOT:AV VTI Index 0.72     ANH/BSA VTI 1.1 cm2/m2    ANH/BSA Peak Velocity 0.8 cm2/m2       Assessment/Plan:    ICD-10-CM ICD-9-CM    1. Malignant hypertensive heart disease without heart failure  I11.9 402.00       2. Mixed simple and mucopurulent chronic bronchitis (HCC)  J41.8 491.1       3. Hospital discharge follow-up  Z09 V67.59 [facility]      4. Alcohol abuse  F10.10 305.00       5. Cocaine abuse ([facility] [address]  F14.10 305.60       6. Stage 3a chronic kidney disease (HCC)  N18.31 585.3       7. Syncope and collapse  R55 780.2       8. Weakness of both legs  R29.898 729.89 REFERRAL TO HOME HEALTH      9. Chronic pain of left knee  M25.562 719.46 REFERRAL TO HOME HEALTH    G89.29 338.29       10. Obstructive sleep apnea syndrome  G47.33 327.23       11. MILIND on CPAP  G47.33 327.23     Z99.89 V46.8       12. Unsteady gait when walking  R26.81 781.2 REFERRAL TO HOME HEALTH      13. Ankle edema, bilateral  M25.471 719.07     M25.472      start hctz 25mg daily prn      14. Drug abuse counseling and surveillance of drug abuser  Z71.51 V65.42     Refused referral for drug abuse treatment        No orders of the defined types were placed in this encounter. Cannot display discharge medications since this is not an admission.

## 2023-04-25 NOTE — PROGRESS NOTES
Care Transitions Follow Up Call    Patient Current Location: [facility] Transition Nurse (CTN) contacted by [facility]  by telephone to follow up aon referral to help with skilled nursing Verified name and  with  [facility]  as identifiers. Addressed changes since last contact: Follow up appointment completed? yes. Was follow up appointment scheduled within 7 days of discharge? no.       [address] follow up appointment(s): No future appointments. [facility]s): [facility]    CTN provided contact information for future needs. Plan for follow-up call in 1-2 days based on severity of symptoms and risk factors. Plan for next call: community resources-[facility] L[facility]ano       Goals Addressed                   This Visit's Progress     Understands red flags post discharge. 23  -patient declined SNF inpatient, opened to skilled nursing  -sister reports patient wants information on assist living, help at home-[facility]  -Attend [facility] appt -23  -Will  abx from pharmacy today  -c/o knee pain-participate with [facility], [address] [address]    23   Spoke with patient, he did not attend appt with PC on  as scheduled, now has an appt on  scheduled. Patient states he has not had any further syncope, does have knee and ankle pain. Patient states he is not receiving Home Health and has not heard from [facility]. Called and LM for [place] to see if they received referral or had issues or questions. Received call back from [facility], they have talked to patient and sister a few times. Took patient to the [place] to see what resources he could have there and took him to the [place]. Patient is going tomorrow to look at 3 skilled nursing that he is able to afford tomorrow. Monitor status if patient did attend PCP appt, and did he decide on [facility]? .  ACP status    Brandan Barnhart MSN, RN, [facility] / Care Transition Nurse / [phone]   23  [facility] called to update patient has toured facility and has not made a decision to move to skilled nursing.  PCP today and [facility] doctor 4/27/23. Spoken with patient multiple times. Per review of chart patient attended PCP appt today, given new referral for [facility]. CTN will follow up in 1-2 days with patient to ensure [facility] SOC.  ALANA

## 2023-04-25 NOTE — PROGRESS NOTES
1. \"Have you been to the ER, urgent care clinic since your last visit? Hospitalized since your last visit? \" Yes When: few days ago   [address]    2. \"Have you seen or consulted any other health care providers outside of the [address] [place] [address] since your last visit? \" No     3. For patients aged 39-70: Has the patient had a colonoscopy / FIT/ Cologuard? NA - based on age      If the patient is female:    4. For patients aged 41-77: Has the patient had a mammogram within the past 2 years? NA - based on age or sex      11. For patients aged 21-65: Has the patient had a pap smear?  NA - based on age or sex    Chief Complaint   Patient presents with    Physical     Physical for admission to assisted living facility        Visit Vitals  BP (!) 140/86 (BP 1 Location: Left arm, BP Patient Position: Sitting, BP Cuff Size: Adult)   Pulse 73   Temp 97.8 °F (36.6 °C) (Oral)   Resp 18   Ht 6' 4\" (1.93 m)   Wt 202 lb 12.8 oz (92 kg)   SpO2 95%   BMI 24.69 kg/m²        Patient is here for a physical for admission to assisted living facility

## 2023-04-27 ENCOUNTER — PATIENT OUTREACH (OUTPATIENT)
Dept: CASE MANAGEMENT | Age: 78
End: 2023-04-27

## 2023-04-27 NOTE — PROGRESS NOTES
Care Transitions Follow Up Call    Patient Current Location: [facility] Transition Nurse (CTN) contacted the patient by telephone to follow up after admission on 23. Verified name and  with patient as identifiers. Addressed changes since last contact: Follow up appointment completed? yes. Was follow up appointment scheduled within 7 days of discharge? no.       CTN reviewed discharge instructions, medical action plan and red flags with patient and discussed any barriers to care and/or understanding of plan of care after discharge. Discussed appropriate site of care based on symptoms and resources available to patient including: PCP, Home Health, and Condition related references. The patient agrees to contact the PCP office for questions related to their healthcare. [facility] follow up appointment(s): No future appointments. Non-[facility] follow up appointment(s): none    CTN provided contact information for future needs. Plan for follow-up call in 5-7 days based on severity of symptoms and risk factors. Plan for next call: symptom management-leg pain and referrals-        Goals Addressed                   This Visit's Progress     Understands red flags post discharge. 23  -patient declined SNF inpatient, opened to [facility]  -sister reports patient wants information on assist living, help at home-referral to [address] [place] [address]  -Attend CLEVE appt -23  -Will  abx from pharmacy today  -c/o knee pain-participate with [facility], [address] [address]    23   Spoke with patient, he did not attend appt with PC on  as scheduled, now has an appt on  scheduled. Patient states he has not had any further syncope, does have knee and ankle pain. Patient states he is not receiving Home Health and has not heard from [facility]. Called and LM for [facility] to see if they received referral or had issues or questions. [facility], they have talked to patient and sister a few times.   Took patient to the [address] to see what resources he could have there and took him to the bank. Patient is going tomorrow to look at 3 senior care that he is able to afford tomorrow. Monitor status if patient did attend PCP appt, and did he decide on [facility]? .  ACP status    Charlie MOREL, RN, [facility] / Care Transition Nurse / [phone]   4/25/23  Ambrose Llamas called to update patient has toured facility and has not made a decision to move to [facility]. PCP today and [facility] doctor 4/27/23. Spoken with patient multiple times. Per review of chart patient attended PCP appt today, given new referral for [facility]. CTN will follow up in 1-2 days with patient to ensure [facility] SOC. ALANA   4/27/23 patient reports seeing PCP this week, HH SOC today, endorses leg pain so cancelled [facility] appt today. Patient declining senior care at this time. Will monitor improvement of leg pain with HH. CTN will outreach patient next week.  ALANA

## 2023-05-03 ENCOUNTER — PATIENT OUTREACH (OUTPATIENT)
Dept: CASE MANAGEMENT | Age: 78
End: 2023-05-03

## 2023-05-10 ENCOUNTER — TELEPHONE (OUTPATIENT)
Facility: CLINIC | Age: 78
End: 2023-05-10

## 2023-06-22 ENCOUNTER — OFFICE VISIT (OUTPATIENT)
Facility: CLINIC | Age: 78
End: 2023-06-22
Payer: MEDICARE

## 2023-06-22 VITALS
HEIGHT: 76 IN | DIASTOLIC BLOOD PRESSURE: 90 MMHG | RESPIRATION RATE: 18 BRPM | HEART RATE: 97 BPM | SYSTOLIC BLOOD PRESSURE: 158 MMHG | BODY MASS INDEX: 25.79 KG/M2 | WEIGHT: 211.8 LBS | TEMPERATURE: 98.1 F | OXYGEN SATURATION: 98 %

## 2023-06-22 DIAGNOSIS — I11.9 MALIGNANT HYPERTENSIVE HEART DISEASE WITHOUT HEART FAILURE: Primary | ICD-10-CM

## 2023-06-22 DIAGNOSIS — D69.6 THROMBOCYTOPENIA, UNSPECIFIED (HCC): ICD-10-CM

## 2023-06-22 DIAGNOSIS — Z00.00 MEDICARE ANNUAL WELLNESS VISIT, SUBSEQUENT: ICD-10-CM

## 2023-06-22 DIAGNOSIS — N18.31 STAGE 3A CHRONIC KIDNEY DISEASE (HCC): ICD-10-CM

## 2023-06-22 DIAGNOSIS — I48.91 ATRIAL FIBRILLATION, UNSPECIFIED TYPE (HCC): ICD-10-CM

## 2023-06-22 DIAGNOSIS — D69.6 THROMBOCYTOPENIA (HCC): ICD-10-CM

## 2023-06-22 DIAGNOSIS — J44.9 CHRONIC OBSTRUCTIVE PULMONARY DISEASE, UNSPECIFIED COPD TYPE (HCC): ICD-10-CM

## 2023-06-22 DIAGNOSIS — R01.1 HEART MURMUR, SYSTOLIC: ICD-10-CM

## 2023-06-22 DIAGNOSIS — I49.9 CARDIAC ARRHYTHMIA, UNSPECIFIED CARDIAC ARRHYTHMIA TYPE: ICD-10-CM

## 2023-06-22 DIAGNOSIS — F10.920 ALCOHOL USE, UNSPECIFIED WITH INTOXICATION, UNCOMPLICATED (HCC): ICD-10-CM

## 2023-06-22 PROCEDURE — 1036F TOBACCO NON-USER: CPT | Performed by: FAMILY MEDICINE

## 2023-06-22 PROCEDURE — 3023F SPIROM DOC REV: CPT | Performed by: FAMILY MEDICINE

## 2023-06-22 PROCEDURE — 99214 OFFICE O/P EST MOD 30 MIN: CPT | Performed by: FAMILY MEDICINE

## 2023-06-22 PROCEDURE — G8419 CALC BMI OUT NRM PARAM NOF/U: HCPCS | Performed by: FAMILY MEDICINE

## 2023-06-22 PROCEDURE — 1123F ACP DISCUSS/DSCN MKR DOCD: CPT | Performed by: FAMILY MEDICINE

## 2023-06-22 PROCEDURE — G8427 DOCREV CUR MEDS BY ELIG CLIN: HCPCS | Performed by: FAMILY MEDICINE

## 2023-06-22 PROCEDURE — G0439 PPPS, SUBSEQ VISIT: HCPCS | Performed by: FAMILY MEDICINE

## 2023-06-22 PROCEDURE — 3080F DIAST BP >= 90 MM HG: CPT | Performed by: FAMILY MEDICINE

## 2023-06-22 PROCEDURE — 3077F SYST BP >= 140 MM HG: CPT | Performed by: FAMILY MEDICINE

## 2023-06-22 RX ORDER — NAPROXEN 500 MG/1
500 TABLET ORAL 2 TIMES DAILY PRN
Qty: 50 TABLET | Refills: 0 | Status: SHIPPED | OUTPATIENT
Start: 2023-06-22 | End: 2023-07-17

## 2023-06-22 SDOH — ECONOMIC STABILITY: HOUSING INSECURITY
IN THE LAST 12 MONTHS, WAS THERE A TIME WHEN YOU DID NOT HAVE A STEADY PLACE TO SLEEP OR SLEPT IN A SHELTER (INCLUDING NOW)?: NO

## 2023-06-22 SDOH — ECONOMIC STABILITY: FOOD INSECURITY: WITHIN THE PAST 12 MONTHS, YOU WORRIED THAT YOUR FOOD WOULD RUN OUT BEFORE YOU GOT MONEY TO BUY MORE.: NEVER TRUE

## 2023-06-22 SDOH — ECONOMIC STABILITY: INCOME INSECURITY: HOW HARD IS IT FOR YOU TO PAY FOR THE VERY BASICS LIKE FOOD, HOUSING, MEDICAL CARE, AND HEATING?: NOT VERY HARD

## 2023-06-22 SDOH — ECONOMIC STABILITY: FOOD INSECURITY: WITHIN THE PAST 12 MONTHS, THE FOOD YOU BOUGHT JUST DIDN'T LAST AND YOU DIDN'T HAVE MONEY TO GET MORE.: NEVER TRUE

## 2023-06-22 ASSESSMENT — PATIENT HEALTH QUESTIONNAIRE - PHQ9
SUM OF ALL RESPONSES TO PHQ QUESTIONS 1-9: 0
SUM OF ALL RESPONSES TO PHQ QUESTIONS 1-9: 0
1. LITTLE INTEREST OR PLEASURE IN DOING THINGS: 0
2. FEELING DOWN, DEPRESSED OR HOPELESS: 0
SUM OF ALL RESPONSES TO PHQ QUESTIONS 1-9: 0
SUM OF ALL RESPONSES TO PHQ9 QUESTIONS 1 & 2: 0
SUM OF ALL RESPONSES TO PHQ QUESTIONS 1-9: 0

## 2023-06-22 ASSESSMENT — LIFESTYLE VARIABLES
HOW OFTEN DO YOU HAVE A DRINK CONTAINING ALCOHOL: NEVER
HOW MANY STANDARD DRINKS CONTAINING ALCOHOL DO YOU HAVE ON A TYPICAL DAY: PATIENT DOES NOT DRINK

## 2023-06-22 NOTE — PATIENT INSTRUCTIONS
Preventing Falls: Care Instructions    Talk to your doctor about the medicines you take. Ask if any of them increase the risk of falls and whether they can be changed or stopped. Try to exercise regularly. It can help improve your strength and balance. This can help lower your risk of falling. Practice fall safety and prevention. Wear low-heeled shoes that fit well and give your feet good support. Talk to your doctor if you have foot problems that make this hard. Carry a cellphone or wear a medical alert device that you can use to call for help. Use stepladders instead of chairs to reach high objects. Don't climb if you're at risk for falls. Ask for help, if needed. Wear the correct eyeglasses, if you need them. Make your home safer. Remove rugs, cords, clutter, and furniture from walkways. Keep your house well lit. Use night-lights in hallways and bathrooms. Install and use sturdy handrails on stairways. Wear nonskid footwear, even inside. Don't walk barefoot or in socks without shoes. Be safe outside. Use handrails, curb cuts, and ramps whenever possible. Keep your hands free by using a shoulder bag or backpack. Try to walk in well-lit areas. Watch out for uneven ground, changes in pavement, and debris. Be careful in the winter. Walk on the grass or gravel when sidewalks are slippery. Use de-icer on steps and walkways. Add non-slip devices to shoes. Put grab bars and nonskid mats in your shower or tub and near the toilet. Try to use a shower chair or bath bench when bathing. Get into a tub or shower by putting in your weaker leg first. Get out with your strong side first. Have a phone or medical alert device in the bathroom with you. Where can you learn more? Go to http://www.messina.com/ and enter G117 to learn more about \"Preventing Falls: Care Instructions. \"  Current as of: November 9, 2022               Content Version: 13.7  © 5501-0868 [employer]wise

## 2023-06-22 NOTE — PROGRESS NOTES
1. \"Have you been to the ER, urgent care clinic since your last visit? Hospitalized since your last visit? \" No     2. \"Have you seen or consulted any other health care providers outside of the [address] [place] [address] since your last visit? \" No      3. For patients aged 39-70: Has the patient had a colonoscopy / FIT/ Cologuard? N/A      If the patient is female:    4. For patients aged 41-77: Has the patient had a mammogram within the past 2 years? N/a based off age or sex       11. For patients aged 21-65: Has the patient had a pap smear? N/a based off age or sex       Chief Complaint   Patient presents with    Medicare AWV    Wrist Pain    Knee Pain     BP (!) 158/90 (Site: Right Upper Arm, Position: Sitting, Cuff Size: Medium Adult)   Pulse 97   Temp 98.1 °F (36.7 °C) (Oral)   Resp 18   Ht 6' 4\" (1.93 m)   Wt 211 lb 12.8 oz (96.1 kg)   SpO2 98%   BMI 25.78 kg/m²     Pt is here for an AWV, experiencing wrist pain and knee pain. Bp was elevated when I checked.
[facility]ocyt[facility] ([facility]  D69.6         No orders of the defined types were placed in this encounter. Cannot display discharge medications since this is not an admission.

## 2023-07-22 PROBLEM — Z00.00 MEDICARE ANNUAL WELLNESS VISIT, SUBSEQUENT: Status: RESOLVED | Noted: 2023-06-22 | Resolved: 2023-07-22

## 2023-07-28 ENCOUNTER — NURSE TRIAGE (OUTPATIENT)
Dept: OTHER | Facility: CLINIC | Age: 78
End: 2023-07-28

## 2023-07-28 NOTE — TELEPHONE ENCOUNTER
Received call from [place] at [facility] with [employer] Complaint of worsening COPD (SOB). Patient disconnected the call prior to transfer from the [facility] ([facility]). 2 attempts to call back were unsuccessful. No msg left as mailbox is full. Attention Provider: Thank you for allowing me to participate in the care of your patient. The patient was connected to triage in response to information provided to the ECC/PSC. Please do not respond through this encounter as the response is not directed to a shared pool.

## 2023-08-07 ENCOUNTER — OFFICE VISIT (OUTPATIENT)
Facility: CLINIC | Age: 78
End: 2023-08-07
Payer: MEDICARE

## 2023-08-07 VITALS
HEIGHT: 76 IN | TEMPERATURE: 97.7 F | WEIGHT: 212.6 LBS | OXYGEN SATURATION: 96 % | HEART RATE: 73 BPM | SYSTOLIC BLOOD PRESSURE: 138 MMHG | DIASTOLIC BLOOD PRESSURE: 70 MMHG | RESPIRATION RATE: 18 BRPM | BODY MASS INDEX: 25.89 KG/M2

## 2023-08-07 DIAGNOSIS — J30.89 NON-SEASONAL ALLERGIC RHINITIS, UNSPECIFIED TRIGGER: ICD-10-CM

## 2023-08-07 DIAGNOSIS — I11.9 MALIGNANT HYPERTENSIVE HEART DISEASE WITHOUT HEART FAILURE: Primary | ICD-10-CM

## 2023-08-07 DIAGNOSIS — G47.33 OSA ON CPAP: ICD-10-CM

## 2023-08-07 DIAGNOSIS — J44.1 CHRONIC OBSTRUCTIVE PULMONARY DISEASE WITH ACUTE EXACERBATION (HCC): ICD-10-CM

## 2023-08-07 DIAGNOSIS — N40.1 BENIGN PROSTATIC HYPERPLASIA WITH NOCTURIA: ICD-10-CM

## 2023-08-07 DIAGNOSIS — R35.1 BENIGN PROSTATIC HYPERPLASIA WITH NOCTURIA: ICD-10-CM

## 2023-08-07 DIAGNOSIS — R05.3 PERSISTENT COUGH: ICD-10-CM

## 2023-08-07 DIAGNOSIS — Z99.89 OSA ON CPAP: ICD-10-CM

## 2023-08-07 DIAGNOSIS — N18.31 STAGE 3A CHRONIC KIDNEY DISEASE (HCC): ICD-10-CM

## 2023-08-07 DIAGNOSIS — R29.898 WEAKNESS OF BOTH LEGS: ICD-10-CM

## 2023-08-07 DIAGNOSIS — I48.11 LONGSTANDING PERSISTENT ATRIAL FIBRILLATION (HCC): ICD-10-CM

## 2023-08-07 PROCEDURE — 99214 OFFICE O/P EST MOD 30 MIN: CPT | Performed by: FAMILY MEDICINE

## 2023-08-07 PROCEDURE — G8419 CALC BMI OUT NRM PARAM NOF/U: HCPCS | Performed by: FAMILY MEDICINE

## 2023-08-07 PROCEDURE — 1036F TOBACCO NON-USER: CPT | Performed by: FAMILY MEDICINE

## 2023-08-07 PROCEDURE — 3078F DIAST BP <80 MM HG: CPT | Performed by: FAMILY MEDICINE

## 2023-08-07 PROCEDURE — 3075F SYST BP GE 130 - 139MM HG: CPT | Performed by: FAMILY MEDICINE

## 2023-08-07 PROCEDURE — 3023F SPIROM DOC REV: CPT | Performed by: FAMILY MEDICINE

## 2023-08-07 PROCEDURE — G8427 DOCREV CUR MEDS BY ELIG CLIN: HCPCS | Performed by: FAMILY MEDICINE

## 2023-08-07 PROCEDURE — 1123F ACP DISCUSS/DSCN MKR DOCD: CPT | Performed by: FAMILY MEDICINE

## 2023-08-07 RX ORDER — ALBUTEROL SULFATE 90 UG/1
2 AEROSOL, METERED RESPIRATORY (INHALATION) EVERY 6 HOURS PRN
Qty: 18 G | Refills: 1 | Status: SHIPPED | OUTPATIENT
Start: 2023-08-07 | End: 2023-09-06

## 2023-08-07 RX ORDER — GUAIFENESIN 600 MG/1
600 TABLET, EXTENDED RELEASE ORAL 2 TIMES DAILY
Qty: 30 TABLET | Refills: 0 | Status: SHIPPED | OUTPATIENT
Start: 2023-08-07 | End: 2023-08-22

## 2023-08-07 SDOH — ECONOMIC STABILITY: FOOD INSECURITY: WITHIN THE PAST 12 MONTHS, YOU WORRIED THAT YOUR FOOD WOULD RUN OUT BEFORE YOU GOT MONEY TO BUY MORE.: NEVER TRUE

## 2023-08-07 SDOH — ECONOMIC STABILITY: FOOD INSECURITY: WITHIN THE PAST 12 MONTHS, THE FOOD YOU BOUGHT JUST DIDN'T LAST AND YOU DIDN'T HAVE MONEY TO GET MORE.: NEVER TRUE

## 2023-08-07 SDOH — ECONOMIC STABILITY: INCOME INSECURITY: HOW HARD IS IT FOR YOU TO PAY FOR THE VERY BASICS LIKE FOOD, HOUSING, MEDICAL CARE, AND HEATING?: NOT HARD AT ALL

## 2023-08-07 ASSESSMENT — ANXIETY QUESTIONNAIRES
1. FEELING NERVOUS, ANXIOUS, OR ON EDGE: 0
2. NOT BEING ABLE TO STOP OR CONTROL WORRYING: 0
5. BEING SO RESTLESS THAT IT IS HARD TO SIT STILL: 0
3. WORRYING TOO MUCH ABOUT DIFFERENT THINGS: 0
IF YOU CHECKED OFF ANY PROBLEMS ON THIS QUESTIONNAIRE, HOW DIFFICULT HAVE THESE PROBLEMS MADE IT FOR YOU TO DO YOUR WORK, TAKE CARE OF THINGS AT HOME, OR GET ALONG WITH OTHER PEOPLE: NOT DIFFICULT AT ALL
4. TROUBLE RELAXING: 0
GAD7 TOTAL SCORE: 0
7. FEELING AFRAID AS IF SOMETHING AWFUL MIGHT HAPPEN: 0
6. BECOMING EASILY ANNOYED OR IRRITABLE: 0

## 2023-08-07 ASSESSMENT — COPD QUESTIONNAIRES: COPD: 1

## 2023-08-07 ASSESSMENT — PATIENT HEALTH QUESTIONNAIRE - PHQ9
SUM OF ALL RESPONSES TO PHQ QUESTIONS 1-9: 0
SUM OF ALL RESPONSES TO PHQ QUESTIONS 1-9: 0
1. LITTLE INTEREST OR PLEASURE IN DOING THINGS: 0
SUM OF ALL RESPONSES TO PHQ9 QUESTIONS 1 & 2: 0
SUM OF ALL RESPONSES TO PHQ QUESTIONS 1-9: 0
SUM OF ALL RESPONSES TO PHQ QUESTIONS 1-9: 0
2. FEELING DOWN, DEPRESSED OR HOPELESS: 0

## 2023-08-07 ASSESSMENT — ENCOUNTER SYMPTOMS: COUGH: 1

## 2023-08-07 NOTE — PROGRESS NOTES
Natalie Laguerre is a 78 y.o. male and presents with Follow-up, Hypertension, COPD, Cough, and Medication Refill (Patient wants to know if you can prescribe him some mucinex for mucus and cough)  . Hypertension    COPD  He complains of cough. Cough    Medication Refill  Associated symptoms include coughing. with a hx of HTN and COPD  78 y.o. Patient here on a routine follow up with c/o cough mostly dry x 2 weeks    Subjective:  Cardiovascular Review:  The patient has hypertension   Diet and Lifestyle: generally follows a low fat low cholesterol diet, generally follows a low sodium diet, exercises sporadically  Home BP Monitoring: is not measured at home. Pertinent ROS: taking medications as instructed, no medication side effects noted, no TIA's, no chest pain on exertion, no dyspnea on exertion, no swelling of ankles. Review of Systems  Review of Systems - Respiratory ROS: positive for - cough       Past Medical History:   Diagnosis Date    Ankle pain 8/3/2020    Benign prostatic disease 8/3/2020    Hypertrophy with Outflow Obstruction    Benign prostatic hyperplasia 8/3/2020    Bronchitis     Cervical radiculopathy 8/3/2020    Chronic obstructive lung disease ([address]) 8/3/2020    Cocaine abuse ([address]) 8/3/2020    Diarrhea 8/3/2020    Dizziness and giddiness 8/3/2020    Hypercholesterolemia 8/3/2020    Hypertensive disorder 8/3/2020    Insomnia 8/3/2020    Kidney disease 8/3/2020    Low back pain 8/3/2020    Osteoarthritis of knee 8/3/2020    Pain in breast 8/3/2020    Pain in left knee 8/3/2020    Prediabetes 8/3/2020    Seasonal allergic rhinitis 8/3/2020    Shoulder pain 8/3/2020    Sleep apnea 8/3/2020    Tinnitus of right ear 8/3/2020    Vasovagal syncope 8/3/2020     No past surgical history on file.   Social History     Socioeconomic History    Marital status: Legally      Spouse name: None    Number of children: None    Years of education: None    Highest education level: None   Tobacco Use

## 2023-09-05 ENCOUNTER — CLINICAL DOCUMENTATION (OUTPATIENT)
Facility: CLINIC | Age: 78
End: 2023-09-05

## 2023-09-05 ENCOUNTER — TELEPHONE (OUTPATIENT)
Facility: CLINIC | Age: 78
End: 2023-09-05

## 2023-09-05 NOTE — PROGRESS NOTES
Mail box full unable to leave the telephone for dr Berhane Birmingham in [facility]

## 2023-09-05 NOTE — TELEPHONE ENCOUNTER
----- Message from Tiffany Chaney sent at 9/5/2023  1:31 PM EDT -----  Subject: Message to Provider    QUESTIONS  Information for Provider? Patient is calling because she needs the number   to Dr. Javed Patricio his arthritis provider. Please advise  ---------------------------------------------------------------------------  --------------  [facility] INFO  [phone]; OK to leave message on voicemail  ---------------------------------------------------------------------------  --------------  SCRIPT ANSWERS  Relationship to Patient?  Self

## 2023-10-04 ENCOUNTER — HOSPITAL ENCOUNTER (EMERGENCY)
Facility: HOSPITAL | Age: 78
Discharge: HOME | End: 2023-10-04
Attending: EMERGENCY MEDICINE
Payer: MEDICARE

## 2023-10-04 ENCOUNTER — APPOINTMENT (OUTPATIENT)
Facility: HOSPITAL | Age: 78
End: 2023-10-04
Payer: MEDICARE

## 2023-10-04 VITALS
HEART RATE: 71 BPM | SYSTOLIC BLOOD PRESSURE: 178 MMHG | RESPIRATION RATE: 20 BRPM | HEIGHT: 73 IN | OXYGEN SATURATION: 97 % | TEMPERATURE: 97.9 F | WEIGHT: 190 LBS | BODY MASS INDEX: 25.18 KG/M2 | DIASTOLIC BLOOD PRESSURE: 117 MMHG

## 2023-10-04 DIAGNOSIS — Z91.148 NON COMPLIANCE W MEDICATION REGIMEN: ICD-10-CM

## 2023-10-04 DIAGNOSIS — Z91.148 H/O MEDICATION NONCOMPLIANCE: ICD-10-CM

## 2023-10-04 DIAGNOSIS — I10 UNCONTROLLED HYPERTENSION: ICD-10-CM

## 2023-10-04 DIAGNOSIS — I48.91 ATRIAL FIBRILLATION, UNSPECIFIED TYPE (HCC): ICD-10-CM

## 2023-10-04 DIAGNOSIS — M79.89 LEG SWELLING: Primary | ICD-10-CM

## 2023-10-04 DIAGNOSIS — N18.9 CHRONIC KIDNEY DISEASE, UNSPECIFIED CKD STAGE: ICD-10-CM

## 2023-10-04 LAB
ALBUMIN SERPL-MCNC: 3 G/DL (ref 3.5–5)
ALBUMIN/GLOB SERPL: 0.8 (ref 1.1–2.2)
ALP SERPL-CCNC: 66 U/L (ref 45–117)
ALT SERPL-CCNC: 18 U/L (ref 12–78)
AMPHET UR QL SCN: NEGATIVE
ANION GAP SERPL CALC-SCNC: 7 MMOL/L (ref 5–15)
APPEARANCE UR: CLEAR
AST SERPL W P-5'-P-CCNC: 22 U/L (ref 15–37)
BACTERIA URNS QL MICRO: NEGATIVE /HPF
BARBITURATES UR QL SCN: NEGATIVE
BASOPHILS # BLD: 0 K/UL (ref 0–0.1)
BASOPHILS NFR BLD: 0 % (ref 0–1)
BENZODIAZ UR QL: NEGATIVE
BILIRUB SERPL-MCNC: 0.5 MG/DL (ref 0.2–1)
BILIRUB UR QL: NEGATIVE
BNP SERPL-MCNC: 3217 PG/ML
BUN SERPL-MCNC: 44 MG/DL (ref 6–20)
BUN/CREAT SERPL: 30 (ref 12–20)
CA-I BLD-MCNC: 8.9 MG/DL (ref 8.5–10.1)
CANNABINOIDS UR QL SCN: NEGATIVE
CHLORIDE SERPL-SCNC: 103 MMOL/L (ref 97–108)
CO2 SERPL-SCNC: 28 MMOL/L (ref 21–32)
COCAINE UR QL SCN: POSITIVE
COLOR UR: YELLOW
CREAT SERPL-MCNC: 1.48 MG/DL (ref 0.7–1.3)
DIFFERENTIAL METHOD BLD: ABNORMAL
EOSINOPHIL # BLD: 0 K/UL (ref 0–0.4)
EOSINOPHIL NFR BLD: 1 % (ref 0–7)
EPITH CASTS URNS QL MICRO: ABNORMAL /LPF
ERYTHROCYTE [DISTWIDTH] IN BLOOD BY AUTOMATED COUNT: 13.5 % (ref 11.5–14.5)
GLOBULIN SER CALC-MCNC: 4 G/DL (ref 2–4)
GLUCOSE SERPL-MCNC: 95 MG/DL (ref 65–100)
GLUCOSE UR STRIP.AUTO-MCNC: NEGATIVE MG/DL
HCT VFR BLD AUTO: 37.5 % (ref 36.6–50.3)
HGB BLD-MCNC: 12.1 G/DL (ref 12.1–17)
HGB UR QL STRIP: ABNORMAL
HYALINE CASTS URNS QL MICRO: ABNORMAL /LPF (ref 0–5)
IMM GRANULOCYTES # BLD AUTO: 0 K/UL (ref 0–0.04)
IMM GRANULOCYTES NFR BLD AUTO: 0 % (ref 0–0.5)
KETONES UR QL STRIP.AUTO: NEGATIVE MG/DL
LEUKOCYTE ESTERASE UR QL STRIP.AUTO: NEGATIVE
LYMPHOCYTES # BLD: 1.4 K/UL (ref 0.8–3.5)
LYMPHOCYTES NFR BLD: 28 % (ref 12–49)
Lab: ABNORMAL
MCH RBC QN AUTO: 32.1 PG (ref 26–34)
MCHC RBC AUTO-ENTMCNC: 32.3 G/DL (ref 30–36.5)
MCV RBC AUTO: 99.5 FL (ref 80–99)
METHADONE UR QL: NEGATIVE
MONOCYTES # BLD: 0.5 K/UL (ref 0–1)
MONOCYTES NFR BLD: 10 % (ref 5–13)
NEUTS SEG # BLD: 3 K/UL (ref 1.8–8)
NEUTS SEG NFR BLD: 61 % (ref 32–75)
NITRITE UR QL STRIP.AUTO: NEGATIVE
OPIATES UR QL: NEGATIVE
PCP UR QL: NEGATIVE
PH UR STRIP: 5 (ref 5–8)
PLATELET # BLD AUTO: 160 K/UL (ref 150–400)
PMV BLD AUTO: 9.6 FL (ref 8.9–12.9)
POTASSIUM SERPL-SCNC: 4 MMOL/L (ref 3.5–5.1)
PROT SERPL-MCNC: 7 G/DL (ref 6.4–8.2)
PROT UR STRIP-MCNC: >300 MG/DL
RBC # BLD AUTO: 3.77 M/UL (ref 4.1–5.7)
RBC #/AREA URNS HPF: ABNORMAL /HPF (ref 0–5)
SODIUM SERPL-SCNC: 138 MMOL/L (ref 136–145)
SP GR UR REFRACTOMETRY: 1.01 (ref 1–1.03)
URINE CULTURE IF INDICATED: ABNORMAL
UROBILINOGEN UR QL STRIP.AUTO: 0.1 EU/DL (ref 0.2–1)
WBC # BLD AUTO: 5 K/UL (ref 4.1–11.1)
WBC URNS QL MICRO: ABNORMAL /HPF (ref 0–4)

## 2023-10-04 PROCEDURE — 83880 ASSAY OF NATRIURETIC PEPTIDE: CPT

## 2023-10-04 PROCEDURE — 80307 DRUG TEST PRSMV CHEM ANLYZR: CPT

## 2023-10-04 PROCEDURE — 71045 X-RAY EXAM CHEST 1 VIEW: CPT

## 2023-10-04 PROCEDURE — 81001 URINALYSIS AUTO W/SCOPE: CPT

## 2023-10-04 PROCEDURE — 99284 EMERGENCY DEPT VISIT MOD MDM: CPT

## 2023-10-04 PROCEDURE — 85025 COMPLETE CBC W/AUTO DIFF WBC: CPT

## 2023-10-04 PROCEDURE — 6360000002 HC RX W HCPCS: Performed by: EMERGENCY MEDICINE

## 2023-10-04 PROCEDURE — 80053 COMPREHEN METABOLIC PANEL: CPT

## 2023-10-04 PROCEDURE — 36415 COLL VENOUS BLD VENIPUNCTURE: CPT

## 2023-10-04 PROCEDURE — 96374 THER/PROPH/DIAG INJ IV PUSH: CPT

## 2023-10-04 RX ORDER — FUROSEMIDE 10 MG/ML
40 INJECTION INTRAMUSCULAR; INTRAVENOUS ONCE
Status: COMPLETED | OUTPATIENT
Start: 2023-10-04 | End: 2023-10-04

## 2023-10-04 RX ORDER — FUROSEMIDE 20 MG/1
20 TABLET ORAL DAILY
Qty: 7 TABLET | Refills: 0 | Status: SHIPPED | OUTPATIENT
Start: 2023-10-04 | End: 2023-10-11

## 2023-10-04 RX ADMIN — FUROSEMIDE 40 MG: 10 INJECTION, SOLUTION INTRAMUSCULAR; INTRAVENOUS at 15:00

## 2023-10-04 ASSESSMENT — PAIN - FUNCTIONAL ASSESSMENT: PAIN_FUNCTIONAL_ASSESSMENT: NONE - DENIES PAIN

## 2023-10-04 NOTE — ED TRIAGE NOTES
Pt c/o bilateral LE swelling x2 weeks; seen by PCP when symptoms started and was started on a fluid pill    Denies chest pain/SOB

## 2023-10-04 NOTE — ED PROVIDER NOTES
[facility] EMERGENCY DEPT  EMERGENCY DEPARTMENT HISTORY AND PHYSICAL EXAM      Date: 10/4/2023  Patient Name: Florette Curling  MRN: [id number]  [address] [place] 1945  Date of evaluation: 10/4/2023  Provider: Dary Vega MD   Note Started: 1:27 PM EDT 10/4/23    HISTORY OF PRESENT ILLNESS     Chief Complaint   Patient presents with    Leg Swelling       History Provided By: Patient    HPI: Florette Curling is a 78 y.o. male with complex medical history including but not limited to afib, HTN, COPD, KD, chrnic LE edema, presents with complaints of worsening edema to both legs. He reports he was given prescription for a fluid pill a few days ago but doesn't know name or dose. He denies worsening SOB, but notes chronic SOB due to SOB. He denies CP. He denies asymmetric swelling or calf victorino    PAST MEDICAL HISTORY   Past Medical History:  Past Medical History:   Diagnosis Date    Ankle pain 8/3/2020    Benign prostatic disease 8/3/2020    Hypertrophy with Outflow Obstruction    Benign prostatic hyperplasia 8/3/2020    Bronchitis     Cervical radiculopathy 8/3/2020    Chronic obstructive lung disease ([address]) 8/3/2020    Cocaine abuse ([address]) 8/3/2020    Diarrhea 8/3/2020    Dizziness and giddiness 8/3/2020    Hypercholesterolemia 8/3/2020    Hypertensive disorder 8/3/2020    Insomnia 8/3/2020    Kidney disease 8/3/2020    Low back pain 8/3/2020    Osteoarthritis of knee 8/3/2020    Pain in breast 8/3/2020    Pain in left knee 8/3/2020    Prediabetes 8/3/2020    Seasonal allergic rhinitis 8/3/2020    Shoulder pain 8/3/2020    Sleep apnea 8/3/2020    Tinnitus of right ear 8/3/2020    Vasovagal syncope 8/3/2020       Past Surgical History:  History reviewed. No pertinent surgical history.     Family History:  Family History   Problem Relation Age of Onset    Hypertension Mother        Social History:  Social History     Tobacco Use    Smoking status: Former    Smokeless tobacco: Never   Substance Use Topics    Alcohol use: Yes    Drug use: Not List          I am the Primary Clinician of Record. Apryl Ocampo MD (electronically signed)    (Please note that parts of this dictation were completed with voice recognition software. Quite often unanticipated grammatical, syntax, homophones, and other interpretive errors are inadvertently transcribed by the computer software. Please disregards these errors.  Please excuse any errors that have escaped final proofreading.)     Apryl Ocampo MD  10/06/23 0028

## 2023-10-06 ENCOUNTER — OFFICE VISIT (OUTPATIENT)
Facility: CLINIC | Age: 78
End: 2023-10-06

## 2023-10-06 VITALS
SYSTOLIC BLOOD PRESSURE: 170 MMHG | TEMPERATURE: 98.8 F | OXYGEN SATURATION: 98 % | HEART RATE: 68 BPM | RESPIRATION RATE: 17 BRPM | DIASTOLIC BLOOD PRESSURE: 80 MMHG | WEIGHT: 214 LBS | HEIGHT: 73 IN | BODY MASS INDEX: 28.36 KG/M2

## 2023-10-06 DIAGNOSIS — I11.9 MALIGNANT HYPERTENSIVE HEART DISEASE WITHOUT HEART FAILURE: Primary | ICD-10-CM

## 2023-10-06 DIAGNOSIS — Z09 HOSPITAL DISCHARGE FOLLOW-UP: ICD-10-CM

## 2023-10-06 DIAGNOSIS — Z23 ENCOUNTER FOR IMMUNIZATION: ICD-10-CM

## 2023-10-06 DIAGNOSIS — R60.0 EDEMA, LOWER EXTREMITY: ICD-10-CM

## 2023-10-06 RX ORDER — FUROSEMIDE 40 MG/1
40 TABLET ORAL DAILY PRN
Qty: 30 TABLET | Refills: 0 | Status: SHIPPED | OUTPATIENT
Start: 2023-10-06

## 2023-10-06 NOTE — PROGRESS NOTES
Post-Discharge Transitional Care  Follow Up      Natalie Laguerre   YOB: 1945    Date of Office Visit:  10/6/2023  Date of Hospital Admission: 10/4/23  Date of Hospital Discharge: 10/4/23  Risk of hospital readmission (high >=14%. Medium >=10%) :No data recorded    Care management risk score Rising risk (score 2-5) and Complex Care (Scores >=6): No Risk Score On File     Non face to face  following discharge, date last encounter closed (first attempt may have been earlier): *No documented post hospital discharge outreach found in the last 14 days    Call initiated 2 business days of discharge: *No response recorded in the last 14 days    ASSESSMENT/PLAN:   Malignant hypertensive heart disease without heart failure  Edema, lower extremity  Hospital discharge follow-up  -     AK DISCHARGE MEDS RECONCILED W/ CURRENT OUTPATIENT MED LIST      Medical Decision Making: moderate complexity  No follow-ups on file. On this date 10/6/2023 I have spent 35 minutes reviewing previous notes, test results and face to face with the patient discussing the diagnosis and importance of compliance with the treatment plan as well as documenting on the day of the visit. Subjective:   HPI:  Follow up of Hospital problems/diagnosis(es): Lower extremity edema    Inpatient course: Discharge summary reviewed- see chart.     Interval history/Current status: stable    Patient Active Problem List   Diagnosis    Weakness of both legs    Insomnia    Shoulder pain    Low back pain    Vasovagal syncope    Tinnitus of right ear    Bronchitis    Alcohol abuse    Malignant hypertensive heart disease without heart failure    Pain in left knee    Dizziness and giddiness    Osteoarthritis of knee    Diarrhea    Hypercholesterolemia    Ankle pain    Pain in breast    Prediabetes    Sleep apnea    Seasonal allergic rhinitis    Chronic obstructive lung disease (HCC)    Cocaine abuse ([address])    Kidney disease    Benign prostatic disease

## 2023-11-05 PROBLEM — Z09 HOSPITAL DISCHARGE FOLLOW-UP: Status: RESOLVED | Noted: 2023-10-06 | Resolved: 2023-11-05

## 2023-12-14 NOTE — TELEPHONE ENCOUNTER
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 male was recently admitted to the hospital, this is their TCM encounter.    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  The patient tolerated the procedure well and was transferred to the PACU in stable condition.  Their post op course was uncomplicated.  The patients pain was controlled with PO medications.  Ambulating without issue.  Voiding without issue with adequate urine output. Passing gas and stool.  Tolerating diet without nausea or vomiting.  Incision site is clean, dry, and intact.  Patient is on hydrocortisone 40mg in the morning and 20mg in the afternoon.  Has been stable on this regiment.  Endocrinology has adjusted his insulin regiment as well.  Discharged on 12/10 in good condition.     DECISION MAKING TODAY       Assessment & Plan:  1. 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    --Incision site is clean, dry, and intact.    --increased NovoLog to 8 units t.i.d. and Levemir to 20 units daily; Bgs 200-300s prior to dose change  --patient is following with Endocrine, Oncology, Cardiology, Nephrology  --patient's endocrinologist gave instruction to resume testosterone  --awaiting response from  Received call from [facility] at [facility] with Red Flag Complaint. Subjective: Caller states \"cough with green mucous production, respiratory problem\"     Current Symptoms: cough with green mucous    Onset: 3 days ago; worsening    Associated Symptoms: none    Pain Severity: denies    Temperature: denies    What has been tried: nebulizer twice a day    LMP: NA Pregnant: NA    Recommended disposition: Go to [facility] for further evaluation of cough with green mucous production and wheezing. Care advice provided, patient verbalizes understanding; denies any other questions or concerns; instructed to call back for any new or worsening symptoms. Patient/Caller agrees with recommended disposition; writer provided warm transfer to Atul España at [facility] for appointment scheduling    Attention Provider: Thank you for allowing me to participate in the care of your patient. The patient was connected to triage in response to information provided to the [facility]. Please do not respond through this encounter as the response is not directed to a shared pool.     Reason for Disposition   Wheezing is present    Protocols used: [id number] "oncologist on whether or not to resume lanreotide  --HH with PT/OT/RN ordered with labs on admission      2. Secondary neuroendocrine tumor of bone  -     Ambulatory referral/consult to [facility]  -     Ambulatory referral/consult to Home Health; Future; Expected date: 12/15/2023         Medication List on Discharge:     Medication List            Accurate as of December 13, 2023 11:59 PM. If you have any questions, ask your nurse or doctor.                CHANGE how you take these medications      LEVEMIR FLEXPEN 100 unit/mL (3 mL) Inpn pen  Generic drug: insulin detemir U-100 (Levemir)  Inject 12 Units into the skin once daily.  What changed: how much to take     NovoLOG Flexpen U-100 Insulin 100 unit/mL (3 mL) Inpn pen  Generic drug: insulin aspart U-100  Inject 7 Units into the skin 3 (three) times daily.  What changed: how much to take            CONTINUE taking these medications      albuterol 90 mcg/actuation inhaler  Commonly known as: PROVENTIL/VENTOLIN HFA  Inhale 1-2 puffs into the lungs every 4 (four) hours as needed for Wheezing or Shortness of Breath (cough). Rescue     ALPHAGAN P 0.1 % Drop  Generic drug: brimonidine 0.1%  Place 1 drop into both eyes 2 (two) times a day.     * BD ULTRA-FINE DONIS PEN NEEDLE 32 gauge x 5/32" Ndle  Generic drug: pen needle, diabetic  Use to inject insulin once daily     * BD ULTRA-FINE DONIS PEN NEEDLE 32 gauge x 5/32" Ndle  Generic drug: pen needle, diabetic  Use as directed     DEXCOM G7 SENSOR Debora  Generic drug: blood-glucose sensor  1 each by Misc.(Non-Drug; Combo Route) route every 10 days.     gabapentin 100 MG capsule  Commonly known as: NEURONTIN  Take 1 capsule (100 mg total) by mouth 2 (two) times daily.     hydrALAZINE 25 MG tablet  Commonly known as: APRESOLINE  Take 25 mg by mouth 3 (three) times daily as needed.     HYDROcodone-acetaminophen 5-325 mg per tablet  Commonly known as: NORCO  Take 1 tablet by mouth every 6 (six) " "hours as needed for Pain.     hydrocortisone 20 MG Tab  Commonly known as: CORTEF  Take 2 tablets (40 mg total) by mouth every morning & take 1 tablet (20 mg total) by mouth before dinner.     ketoconazole 200 mg Tab  Commonly known as: NIZORAL  Take 2 tablets (400 mg total) by mouth 3 (three) times daily.     lanreotide 60 mg/0.2 mL Syrg  Commonly known as: SOMATULINE DEPOT  Inject 60 mg into the skin every 28 days.     * needle (disp) 18 G 18 gauge x 1" Ndle  1 Device by Misc.(Non-Drug; Combo Route) route every 14 (fourteen) days. Use to draw testosterone     * needle (disp) 25 gauge 25 gauge x 1" Ndle  1 Device by Misc.(Non-Drug; Combo Route) route every 14 (fourteen) days. Use to inject testosterone     NYSTATIN TOP  Apply 100,000 Units/day topically as needed.     ONETOUCH ULTRASOFT LANCETS Misc  Generic drug: lancets  1 EACH 3 (THREE) TIMES DAILY BY MISC.(NON-DRUG; COMBO ROUTE) ROUTE     oxyCODONE 5 MG immediate release tablet  Commonly known as: ROXICODONE  Take 1 tablet (5 mg total) by mouth every 6 (six) hours as needed for Pain.     pantoprazole 40 MG tablet  Commonly known as: PROTONIX  Take 1 tablet (40 mg total) by mouth once daily.     potassium chloride 10 MEQ Tbsr  Commonly known as: KLOR-CON  Take 20 mEq by mouth once daily.     rosuvastatin 20 MG tablet  Commonly known as: CRESTOR  TAKE ONE TABLET BY MOUTH AT BEDTIME     spironolactone 25 MG tablet  Commonly known as: ALDACTONE  TAKE 3 TABLETS(75 MG) BY MOUTH TWICE DAILY     syringe (disposable) 3 mL Syrg  1 mL by Misc.(Non-Drug; Combo Route) route every 14 (fourteen) days.     tamsulosin 0.4 mg Cap  Commonly known as: FLOMAX  Take 1 capsule by mouth every evening.     testosterone cypionate 200 mg/mL injection  Commonly known as: DEPOTESTOTERONE CYPIONATE  Inject 1 mL (200 mg total) into the muscle every 14 (fourteen) days.     urea 40 % Crea  Commonly known as: CARMOL  as needed.           * This list has 4 medication(s) that are the same as " other medications prescribed for you. Read the directions carefully, and ask your doctor or other care provider to review them with you.                  Medication Reconciliation:  Were medications changed on discharge? Yes  Were medications in the home? Yes  Is the patient taking the medications as directed? Yes  Does the patient understand the medications and changes? Yes  Does updated med list accurately reflects meds patient is currently taking? Yes    ENVIRONMENT OF CARE      Family and/or Caregiver present at visit?  Yes  Name of Caregiver: wife  History provided by: patient and caregiver    Advance Care Planning   Advanced Care Planning Status:  Patient does not have an ACP conversation on file  Living Will: No  Power of : No  La[place]: No           Impression upon entering the home:  Physical Dwelling: single family home   Appearance of home environment: cleaniness: clean and walking pathways: clear  Functional Status: minimal assistance  Mobility: ambulatory with device  Nutritional access: adequate intake and access  Home Health: No, and will be referred today   DME/Supplies: rolling walker     Diagnostic tests reviewed/disposition: No diagnosic tests pending after this hospitalization.  Disease/illness education:  Cushing's  Establishment or re-establishment of referral orders for community resources: No other necessary community resources.   Discussion with other health care providers: Endo, onc, nephro  Does patient have a PCP at [place]? No   Repatriation plan with PCP? follow-up with PCP within 30d   Does patient have an ostomy (ileostomy, colostomy, suprapubic catheter, nephrostomy tube, tracheostomy, PEG tube, pleurex catheter, cholecystostomy, etc)? No  Were BPAs reviewed? Yes    Social History     Socioeconomic History    Marital status:    Tobacco Use    Smoking status: Never     Passive exposure: Yes    Smokeless tobacco: Never   Substance and Sexual Activity    Alcohol use: Not  Currently    Drug use: Never    Sexual activity: Yes     Partners: Female     Social Determinants of Health     Financial Resource Strain: Low Risk  (10/24/2023)    Overall Financial Resource Strain (CARDIA)     Difficulty of Paying Living Expenses: Not hard at all   Food Insecurity: No Food Insecurity (10/24/2023)    Hunger Vital Sign     Worried About Running Out of Food in the Last Year: Never true     Ran Out of Food in the Last Year: Never true   Transportation Needs: No Transportation Needs (10/24/2023)    PRAPARE - Transportation     Lack of Transportation (Medical): No     Lack of Transportation (Non-Medical): No   Physical Activity: Inactive (10/24/2023)    Exercise Vital Sign     Days of Exercise per Week: 0 days     Minutes of Exercise per Session: 0 min   Stress: No Stress Concern Present (10/24/2023)    [employer] - Occupational Stress Questionnaire     Feeling of Stress : Not at all   Social Connections: Socially Integrated (10/24/2023)    Social Connection and Isolation Panel [NHANES]     Frequency of Communication with Friends and Family: More than three times a week     Frequency of Social Gatherings with Friends and Family: More than three times a week     Attends Yarsanism Services: More than 4 times per year     Active Member of Clubs or Organizations: Yes     Attends Club or Organization Meetings: 1 to 4 times per year     Marital Status:    Housing Stability: Low Risk  (10/24/2023)    Housing Stability Vital Sign     Unable to Pay for Housing in the Last Year: No     Number of Places Lived in the Last Year: 1     Unstable Housing in the Last Year: No         OBJECTIVE:     Vital Signs:  Vitals:    12/13/23 1000   BP: 118/72   Pulse: 102   Resp: 18   Temp: 98.1 °F (36.7 °C)       Review of Systems    Physical Exam:  Physical Exam    INSTRUCTIONS FOR PATIENT:     Scheduled Follow-up, Appts Reviewed with Modifications if Needed: Yes  Future Appointments   Date  Time Provider Department Center   12/20/2023  9:20 AM LAB,  HOSPITAL WB LAB [facility] [place]   12/20/2023 10:00 AM WB CT2 LIMIT 500 LBS WB CT SCAN [facility] [place]   12/21/2023  4:30 PM Cielo Buck MD [facility] [address]   12/22/2023  1:30 PM Hung Jean MD [facility] HEMONC3 [address]   12/22/2023  3:45 PM INJECTION, [facility] INFUSION [facility] CHEMO [facility] [place]   12/27/2023  9:00 AM Meera Christianson FNP-C RET [facility]   2/14/2024 11:45 AM Jessika Harrison MD [facility]       Signature: Donaldo Youngblood NP    Transition of Care Visit:  I have reviewed and updated the history and problem list.  I have reconciled the medication list.  I have discussed the hospitalization and current medical issues, prognosis and plans with the patient/family.

## 2024-02-01 ENCOUNTER — HOSPITAL ENCOUNTER (EMERGENCY)
Facility: HOSPITAL | Age: 79
Discharge: HOME | End: 2024-02-01
Attending: STUDENT IN AN ORGANIZED HEALTH CARE EDUCATION/TRAINING PROGRAM
Payer: MEDICARE

## 2024-02-01 ENCOUNTER — APPOINTMENT (OUTPATIENT)
Facility: HOSPITAL | Age: 79
End: 2024-02-01
Payer: MEDICARE

## 2024-02-01 VITALS
DIASTOLIC BLOOD PRESSURE: 120 MMHG | HEIGHT: 73 IN | RESPIRATION RATE: 18 BRPM | OXYGEN SATURATION: 94 % | BODY MASS INDEX: 25.84 KG/M2 | SYSTOLIC BLOOD PRESSURE: 178 MMHG | TEMPERATURE: 96.8 F | HEART RATE: 77 BPM | WEIGHT: 195 LBS

## 2024-02-01 DIAGNOSIS — I10 UNCONTROLLED HYPERTENSION: ICD-10-CM

## 2024-02-01 DIAGNOSIS — S39.012A STRAIN OF LUMBAR REGION, INITIAL ENCOUNTER: Primary | ICD-10-CM

## 2024-02-01 LAB
APPEARANCE UR: CLEAR
BACTERIA URNS QL MICRO: NEGATIVE /HPF
BILIRUB UR QL: NEGATIVE
COLOR UR: YELLOW
EPITH CASTS URNS QL MICRO: ABNORMAL /LPF
GLUCOSE UR STRIP.AUTO-MCNC: NEGATIVE MG/DL
HGB UR QL STRIP: ABNORMAL
KETONES UR QL STRIP.AUTO: 15 MG/DL
LEUKOCYTE ESTERASE UR QL STRIP.AUTO: NEGATIVE
NITRITE UR QL STRIP.AUTO: NEGATIVE
PH UR STRIP: 5 (ref 5–8)
PROT UR STRIP-MCNC: >300 MG/DL
RBC #/AREA URNS HPF: ABNORMAL /HPF (ref 0–5)
SP GR UR REFRACTOMETRY: 1.02 (ref 1–1.03)
URINE CULTURE IF INDICATED: ABNORMAL
UROBILINOGEN UR QL STRIP.AUTO: 0.1 EU/DL (ref 0.2–1)
WBC URNS QL MICRO: ABNORMAL /HPF (ref 0–4)

## 2024-02-01 PROCEDURE — 6370000000 HC RX 637 (ALT 250 FOR IP): Performed by: STUDENT IN AN ORGANIZED HEALTH CARE EDUCATION/TRAINING PROGRAM

## 2024-02-01 PROCEDURE — 93005 ELECTROCARDIOGRAM TRACING: CPT | Performed by: STUDENT IN AN ORGANIZED HEALTH CARE EDUCATION/TRAINING PROGRAM

## 2024-02-01 PROCEDURE — 81001 URINALYSIS AUTO W/SCOPE: CPT

## 2024-02-01 PROCEDURE — 6360000002 HC RX W HCPCS: Performed by: STUDENT IN AN ORGANIZED HEALTH CARE EDUCATION/TRAINING PROGRAM

## 2024-02-01 PROCEDURE — 72100 X-RAY EXAM L-S SPINE 2/3 VWS: CPT

## 2024-02-01 PROCEDURE — 99285 EMERGENCY DEPT VISIT HI MDM: CPT

## 2024-02-01 PROCEDURE — 96372 THER/PROPH/DIAG INJ SC/IM: CPT

## 2024-02-01 RX ORDER — DILTIAZEM HYDROCHLORIDE 120 MG/1
120 CAPSULE, COATED, EXTENDED RELEASE ORAL ONCE
Status: DISCONTINUED | OUTPATIENT
Start: 2024-02-01 | End: 2024-02-01 | Stop reason: RX

## 2024-02-01 RX ORDER — LIDOCAINE 50 MG/G
1 PATCH TOPICAL DAILY
Qty: 10 PATCH | Refills: 0 | Status: SHIPPED | OUTPATIENT
Start: 2024-02-01

## 2024-02-01 RX ORDER — KETOROLAC TROMETHAMINE 30 MG/ML
30 INJECTION, SOLUTION INTRAMUSCULAR; INTRAVENOUS
Status: COMPLETED | OUTPATIENT
Start: 2024-02-01 | End: 2024-02-01

## 2024-02-01 RX ORDER — LOSARTAN POTASSIUM 25 MG/1
100 TABLET ORAL ONCE
Status: COMPLETED | OUTPATIENT
Start: 2024-02-01 | End: 2024-02-01

## 2024-02-01 RX ORDER — HYDROCODONE BITARTRATE AND ACETAMINOPHEN 5; 325 MG/1; MG/1
1 TABLET ORAL
Status: COMPLETED | OUTPATIENT
Start: 2024-02-01 | End: 2024-02-01

## 2024-02-01 RX ORDER — ACETAMINOPHEN 325 MG/1
650 TABLET ORAL
Status: COMPLETED | OUTPATIENT
Start: 2024-02-01 | End: 2024-02-01

## 2024-02-01 RX ORDER — LIDOCAINE 4 G/G
1 PATCH TOPICAL
Status: DISCONTINUED | OUTPATIENT
Start: 2024-02-01 | End: 2024-02-01 | Stop reason: HOSPADM

## 2024-02-01 RX ORDER — METHOCARBAMOL 750 MG/1
750 TABLET, FILM COATED ORAL 3 TIMES DAILY
Qty: 9 TABLET | Refills: 0 | Status: SHIPPED | OUTPATIENT
Start: 2024-02-01 | End: 2024-02-04

## 2024-02-01 RX ORDER — VALSARTAN 160 MG/1
320 TABLET ORAL
Status: DISCONTINUED | OUTPATIENT
Start: 2024-02-01 | End: 2024-02-01 | Stop reason: RX

## 2024-02-01 RX ORDER — NAPROXEN 500 MG/1
500 TABLET ORAL 2 TIMES DAILY
Qty: 14 TABLET | Refills: 0 | Status: SHIPPED | OUTPATIENT
Start: 2024-02-01 | End: 2024-02-08

## 2024-02-01 RX ADMIN — LOSARTAN POTASSIUM 100 MG: 25 TABLET, FILM COATED ORAL at 17:21

## 2024-02-01 RX ADMIN — HYDROCODONE BITARTRATE AND ACETAMINOPHEN 1 TABLET: 5; 325 TABLET ORAL at 17:21

## 2024-02-01 RX ADMIN — KETOROLAC TROMETHAMINE 30 MG: 30 INJECTION INTRAMUSCULAR; INTRAVENOUS at 17:22

## 2024-02-01 RX ADMIN — ACETAMINOPHEN 650 MG: 325 TABLET ORAL at 17:21

## 2024-02-01 RX ADMIN — DILTIAZEM HYDROCHLORIDE 30 MG: 30 TABLET, FILM COATED ORAL at 17:21

## 2024-02-01 ASSESSMENT — PAIN - FUNCTIONAL ASSESSMENT
PAIN_FUNCTIONAL_ASSESSMENT: PREVENTS OR INTERFERES SOME ACTIVE ACTIVITIES AND ADLS
PAIN_FUNCTIONAL_ASSESSMENT: 0-10

## 2024-02-01 ASSESSMENT — PAIN DESCRIPTION - ONSET: ONSET: ON-GOING

## 2024-02-01 ASSESSMENT — PAIN SCALES - GENERAL: PAINLEVEL_OUTOF10: 8

## 2024-02-01 ASSESSMENT — PAIN DESCRIPTION - DESCRIPTORS: DESCRIPTORS: ACHING;SORE

## 2024-02-01 ASSESSMENT — PAIN DESCRIPTION - FREQUENCY: FREQUENCY: CONTINUOUS

## 2024-02-01 ASSESSMENT — PAIN DESCRIPTION - ORIENTATION: ORIENTATION: LOWER

## 2024-02-01 ASSESSMENT — PAIN DESCRIPTION - LOCATION: LOCATION: BACK

## 2024-02-01 ASSESSMENT — PAIN DESCRIPTION - PAIN TYPE: TYPE: CHRONIC PAIN

## 2024-02-01 NOTE — ED PROVIDER NOTES
[facility] EMERGENCY DEPARTMENT  EMERGENCY DEPARTMENT HISTORY AND PHYSICAL EXAM      Date: 2/1/2024  Patient Name: Agustín Willis  MRN: [id number]  Birthdate 1945  Date of evaluation: 2/1/2024  Provider: Sai Weems MD   Note Started: 6:14 PM EST 2/1/24    HISTORY OF PRESENT ILLNESS     Chief Complaint   Patient presents with    Back Pain       History Provided By: Patient    HPI: Agustín Willis is a 78 y.o. male with past medical history as reviewed below presents for evaluation of acute on chronic low back pain.  Patient endorses pain worse over the last 24 hours.  He has not taken any of his medications today.  Patient urinated on himself today due to being unable to get to the bathroom in time.  He denies any other incontinence, no urinary retention, no focal motor weakness or loss of sensation.  No recent trauma.    PAST MEDICAL HISTORY   Past Medical History:  Past Medical History:   Diagnosis Date    Ankle pain 8/3/2020    Benign prostatic disease 8/3/2020    Hypertrophy with Outflow Obstruction    Benign prostatic hyperplasia 8/3/2020    Bronchitis     Cervical radiculopathy 8/3/2020    Chronic obstructive lung disease (HCC) 8/3/2020    Cocaine abuse (HCC) 8/3/2020    Diarrhea 8/3/2020    Dizziness and giddiness 8/3/2020    Hypercholesterolemia 8/3/2020    Hypertensive disorder 8/3/2020    Insomnia 8/3/2020    Kidney disease 8/3/2020    Low back pain 8/3/2020    Osteoarthritis of knee 8/3/2020    Pain in breast 8/3/2020    Pain in left knee 8/3/2020    Prediabetes 8/3/2020    Seasonal allergic rhinitis 8/3/2020    Shoulder pain 8/3/2020    Sleep apnea 8/3/2020    Tinnitus of right ear 8/3/2020    Vasovagal syncope 8/3/2020       Past Surgical History:  History reviewed. No pertinent surgical history.    Family History:  Family History   Problem Relation Age of Onset    Hypertension Mother        Social History:  Social History     Tobacco Use    Smoking status: Former    Smokeless tobacco: Never

## 2024-02-01 NOTE — ED TRIAGE NOTES
Arrival to ED via EMS. Chronic lower back pain since 1984, worse x 1 day. Pt is unkempt and smells heavily of urine.

## 2024-02-02 LAB
EKG ATRIAL RATE: 93 BPM
EKG DIAGNOSIS: NORMAL
EKG P AXIS: 22 DEGREES
EKG P-R INTERVAL: 152 MS
EKG Q-T INTERVAL: 388 MS
EKG QRS DURATION: 86 MS
EKG QTC CALCULATION (BAZETT): 482 MS
EKG R AXIS: -36 DEGREES
EKG T AXIS: -19 DEGREES
EKG VENTRICULAR RATE: 93 BPM

## 2024-02-05 ENCOUNTER — APPOINTMENT (OUTPATIENT)
Facility: HOSPITAL | Age: 79
DRG: 896 | End: 2024-02-05
Payer: MEDICARE

## 2024-02-05 ENCOUNTER — HOSPITAL ENCOUNTER (INPATIENT)
Facility: HOSPITAL | Age: 79
LOS: 8 days | Discharge: NURSING, REHAB OR LONG-TERM CARE | DRG: 896 | End: 2024-02-14
Attending: EMERGENCY MEDICINE | Admitting: STUDENT IN AN ORGANIZED HEALTH CARE EDUCATION/TRAINING PROGRAM
Payer: MEDICARE

## 2024-02-05 DIAGNOSIS — R60.0 EDEMA, LOWER EXTREMITY: ICD-10-CM

## 2024-02-05 DIAGNOSIS — R41.82 ALTERED MENTAL STATUS, UNSPECIFIED ALTERED MENTAL STATUS TYPE: Primary | ICD-10-CM

## 2024-02-05 PROBLEM — N17.9 AKI (ACUTE KIDNEY INJURY) (HCC): Status: ACTIVE | Noted: 2024-02-05

## 2024-02-05 LAB
ALBUMIN SERPL-MCNC: 2.9 G/DL (ref 3.5–5)
ALBUMIN/GLOB SERPL: 0.7 (ref 1.1–2.2)
ALP SERPL-CCNC: 61 U/L (ref 45–117)
ALT SERPL-CCNC: 21 U/L (ref 12–78)
AMMONIA PLAS-SCNC: <10 UMOL/L
ANION GAP SERPL CALC-SCNC: 12 MMOL/L (ref 5–15)
APTT PPP: 25.8 SEC (ref 21.2–34.1)
AST SERPL W P-5'-P-CCNC: 35 U/L (ref 15–37)
BASOPHILS # BLD: 0 K/UL (ref 0–0.1)
BASOPHILS NFR BLD: 0 % (ref 0–1)
BILIRUB SERPL-MCNC: 0.4 MG/DL (ref 0.2–1)
BNP SERPL-MCNC: 1211 PG/ML
BUN SERPL-MCNC: 43 MG/DL (ref 6–20)
BUN/CREAT SERPL: 26 (ref 12–20)
CA-I BLD-MCNC: 8.8 MG/DL (ref 8.5–10.1)
CHLORIDE SERPL-SCNC: 105 MMOL/L (ref 97–108)
CHOLEST SERPL-MCNC: 184 MG/DL
CK SERPL-CCNC: 367 U/L (ref 39–308)
CO2 SERPL-SCNC: 22 MMOL/L (ref 21–32)
CREAT SERPL-MCNC: 1.68 MG/DL (ref 0.7–1.3)
DIFFERENTIAL METHOD BLD: ABNORMAL
EOSINOPHIL # BLD: 0.1 K/UL (ref 0–0.4)
EOSINOPHIL NFR BLD: 2 % (ref 0–7)
ERYTHROCYTE [DISTWIDTH] IN BLOOD BY AUTOMATED COUNT: 12.9 % (ref 11.5–14.5)
ETHANOL SERPL-MCNC: 21 MG/DL (ref 0–0.08)
GLOBULIN SER CALC-MCNC: 4.1 G/DL (ref 2–4)
GLUCOSE SERPL-MCNC: 75 MG/DL (ref 65–100)
HCT VFR BLD AUTO: 36.5 % (ref 36.6–50.3)
HDLC SERPL-MCNC: 52 MG/DL
HDLC SERPL: 3.5 (ref 0–5)
HGB BLD-MCNC: 11.8 G/DL (ref 12.1–17)
IMM GRANULOCYTES # BLD AUTO: 0 K/UL (ref 0–0.04)
IMM GRANULOCYTES NFR BLD AUTO: 0 % (ref 0–0.5)
INR PPP: 1 (ref 0.9–1.1)
LDLC SERPL CALC-MCNC: 110.4 MG/DL (ref 0–100)
LIPID PANEL: ABNORMAL
LYMPHOCYTES # BLD: 2.6 K/UL (ref 0.8–3.5)
LYMPHOCYTES NFR BLD: 39 % (ref 12–49)
MAGNESIUM SERPL-MCNC: 1.7 MG/DL (ref 1.6–2.4)
MCH RBC QN AUTO: 31 PG (ref 26–34)
MCHC RBC AUTO-ENTMCNC: 32.3 G/DL (ref 30–36.5)
MCV RBC AUTO: 95.8 FL (ref 80–99)
MONOCYTES # BLD: 0.5 K/UL (ref 0–1)
MONOCYTES NFR BLD: 7 % (ref 5–13)
NEUTS SEG # BLD: 3.5 K/UL (ref 1.8–8)
NEUTS SEG NFR BLD: 52 % (ref 32–75)
NRBC # BLD: 0 K/UL (ref 0–0.01)
NRBC BLD-RTO: 0 PER 100 WBC
PLATELET # BLD AUTO: 197 K/UL (ref 150–400)
PMV BLD AUTO: 10 FL (ref 8.9–12.9)
POTASSIUM SERPL-SCNC: 5.2 MMOL/L (ref 3.5–5.1)
PROT SERPL-MCNC: 7 G/DL (ref 6.4–8.2)
PROTHROMBIN TIME: 13.7 SEC (ref 11.9–14.6)
RBC # BLD AUTO: 3.81 M/UL (ref 4.1–5.7)
SODIUM SERPL-SCNC: 139 MMOL/L (ref 136–145)
THERAPEUTIC RANGE: NORMAL SEC (ref 82–109)
TRIGL SERPL-MCNC: 108 MG/DL
TROPONIN I SERPL HS-MCNC: 40 NG/L (ref 0–76)
TSH SERPL DL<=0.05 MIU/L-ACNC: 2.63 UIU/ML (ref 0.36–3.74)
VLDLC SERPL CALC-MCNC: 21.6 MG/DL
WBC # BLD AUTO: 6.8 K/UL (ref 4.1–11.1)

## 2024-02-05 PROCEDURE — 36415 COLL VENOUS BLD VENIPUNCTURE: CPT

## 2024-02-05 PROCEDURE — 87086 URINE CULTURE/COLONY COUNT: CPT

## 2024-02-05 PROCEDURE — G0378 HOSPITAL OBSERVATION PER HR: HCPCS

## 2024-02-05 PROCEDURE — 94640 AIRWAY INHALATION TREATMENT: CPT

## 2024-02-05 PROCEDURE — 6370000000 HC RX 637 (ALT 250 FOR IP): Performed by: STUDENT IN AN ORGANIZED HEALTH CARE EDUCATION/TRAINING PROGRAM

## 2024-02-05 PROCEDURE — 71045 X-RAY EXAM CHEST 1 VIEW: CPT

## 2024-02-05 PROCEDURE — 2580000003 HC RX 258: Performed by: STUDENT IN AN ORGANIZED HEALTH CARE EDUCATION/TRAINING PROGRAM

## 2024-02-05 PROCEDURE — 82077 ASSAY SPEC XCP UR&BREATH IA: CPT

## 2024-02-05 PROCEDURE — 83735 ASSAY OF MAGNESIUM: CPT

## 2024-02-05 PROCEDURE — 85610 PROTHROMBIN TIME: CPT

## 2024-02-05 PROCEDURE — 71250 CT THORAX DX C-: CPT

## 2024-02-05 PROCEDURE — 85025 COMPLETE CBC W/AUTO DIFF WBC: CPT

## 2024-02-05 PROCEDURE — 80053 COMPREHEN METABOLIC PANEL: CPT

## 2024-02-05 PROCEDURE — 70498 CT ANGIOGRAPHY NECK: CPT

## 2024-02-05 PROCEDURE — 85730 THROMBOPLASTIN TIME PARTIAL: CPT

## 2024-02-05 PROCEDURE — 0042T CT BRAIN PERFUSION: CPT

## 2024-02-05 PROCEDURE — 84443 ASSAY THYROID STIM HORMONE: CPT

## 2024-02-05 PROCEDURE — 94762 N-INVAS EAR/PLS OXIMTRY CONT: CPT

## 2024-02-05 PROCEDURE — 81001 URINALYSIS AUTO W/SCOPE: CPT

## 2024-02-05 PROCEDURE — 82140 ASSAY OF AMMONIA: CPT

## 2024-02-05 PROCEDURE — 4A03X5D MEASUREMENT OF ARTERIAL FLOW, INTRACRANIAL, EXTERNAL APPROACH: ICD-10-PCS | Performed by: STUDENT IN AN ORGANIZED HEALTH CARE EDUCATION/TRAINING PROGRAM

## 2024-02-05 PROCEDURE — 84484 ASSAY OF TROPONIN QUANT: CPT

## 2024-02-05 PROCEDURE — 6360000004 HC RX CONTRAST MEDICATION: Performed by: EMERGENCY MEDICINE

## 2024-02-05 PROCEDURE — 82550 ASSAY OF CK (CPK): CPT

## 2024-02-05 PROCEDURE — 80307 DRUG TEST PRSMV CHEM ANLYZR: CPT

## 2024-02-05 PROCEDURE — 99285 EMERGENCY DEPT VISIT HI MDM: CPT

## 2024-02-05 PROCEDURE — 83880 ASSAY OF NATRIURETIC PEPTIDE: CPT

## 2024-02-05 PROCEDURE — G0378 HOSPITAL OBSERVATION PER HR: HCPCS | Performed by: STUDENT IN AN ORGANIZED HEALTH CARE EDUCATION/TRAINING PROGRAM

## 2024-02-05 PROCEDURE — 93005 ELECTROCARDIOGRAM TRACING: CPT | Performed by: EMERGENCY MEDICINE

## 2024-02-05 PROCEDURE — 80061 LIPID PANEL: CPT

## 2024-02-05 PROCEDURE — 70450 CT HEAD/BRAIN W/O DYE: CPT

## 2024-02-05 RX ORDER — LOSARTAN POTASSIUM 50 MG/1
25 TABLET ORAL DAILY
Status: DISCONTINUED | OUTPATIENT
Start: 2024-02-05 | End: 2024-02-15 | Stop reason: HOSPADM

## 2024-02-05 RX ORDER — LORAZEPAM 1 MG/1
1 TABLET ORAL
Status: DISCONTINUED | OUTPATIENT
Start: 2024-02-05 | End: 2024-02-08

## 2024-02-05 RX ORDER — SODIUM CHLORIDE 0.9 % (FLUSH) 0.9 %
5-40 SYRINGE (ML) INJECTION PRN
Status: DISCONTINUED | OUTPATIENT
Start: 2024-02-05 | End: 2024-02-15 | Stop reason: HOSPADM

## 2024-02-05 RX ORDER — FLUTICASONE PROPIONATE 110 UG/1
1 AEROSOL, METERED RESPIRATORY (INHALATION)
Status: DISCONTINUED | OUTPATIENT
Start: 2024-02-05 | End: 2024-02-15 | Stop reason: HOSPADM

## 2024-02-05 RX ORDER — FOLIC ACID 1 MG/1
1 TABLET ORAL DAILY
Status: DISCONTINUED | OUTPATIENT
Start: 2024-02-05 | End: 2024-02-15 | Stop reason: HOSPADM

## 2024-02-05 RX ORDER — HYDROCODONE BITARTRATE AND ACETAMINOPHEN 5; 325 MG/1; MG/1
1 TABLET ORAL EVERY 6 HOURS PRN
Status: DISCONTINUED | OUTPATIENT
Start: 2024-02-05 | End: 2024-02-08

## 2024-02-05 RX ORDER — TRAZODONE HYDROCHLORIDE 50 MG/1
50 TABLET ORAL NIGHTLY
Status: DISCONTINUED | OUTPATIENT
Start: 2024-02-05 | End: 2024-02-15 | Stop reason: HOSPADM

## 2024-02-05 RX ORDER — LORAZEPAM 1 MG/1
2 TABLET ORAL
Status: DISCONTINUED | OUTPATIENT
Start: 2024-02-05 | End: 2024-02-07

## 2024-02-05 RX ORDER — LORAZEPAM 1 MG/1
3 TABLET ORAL
Status: DISCONTINUED | OUTPATIENT
Start: 2024-02-05 | End: 2024-02-07

## 2024-02-05 RX ORDER — ENOXAPARIN SODIUM 100 MG/ML
40 INJECTION SUBCUTANEOUS DAILY
Status: DISCONTINUED | OUTPATIENT
Start: 2024-02-05 | End: 2024-02-15 | Stop reason: HOSPADM

## 2024-02-05 RX ORDER — FLUTICASONE PROPIONATE 50 MCG
2 SPRAY, SUSPENSION (ML) NASAL DAILY
Status: DISCONTINUED | OUTPATIENT
Start: 2024-02-05 | End: 2024-02-15 | Stop reason: HOSPADM

## 2024-02-05 RX ORDER — SODIUM CHLORIDE 9 MG/ML
INJECTION, SOLUTION INTRAVENOUS PRN
Status: DISCONTINUED | OUTPATIENT
Start: 2024-02-05 | End: 2024-02-15 | Stop reason: HOSPADM

## 2024-02-05 RX ORDER — POLYETHYLENE GLYCOL 3350 17 G/17G
17 POWDER, FOR SOLUTION ORAL DAILY PRN
Status: DISCONTINUED | OUTPATIENT
Start: 2024-02-05 | End: 2024-02-15 | Stop reason: HOSPADM

## 2024-02-05 RX ORDER — MAGNESIUM SULFATE IN WATER 40 MG/ML
2000 INJECTION, SOLUTION INTRAVENOUS PRN
Status: DISCONTINUED | OUTPATIENT
Start: 2024-02-05 | End: 2024-02-15 | Stop reason: HOSPADM

## 2024-02-05 RX ORDER — SODIUM CHLORIDE, SODIUM LACTATE, POTASSIUM CHLORIDE, CALCIUM CHLORIDE 600; 310; 30; 20 MG/100ML; MG/100ML; MG/100ML; MG/100ML
INJECTION, SOLUTION INTRAVENOUS CONTINUOUS
Status: DISPENSED | OUTPATIENT
Start: 2024-02-05 | End: 2024-02-06

## 2024-02-05 RX ORDER — LORAZEPAM 2 MG/ML
2 INJECTION INTRAMUSCULAR
Status: DISCONTINUED | OUTPATIENT
Start: 2024-02-05 | End: 2024-02-07

## 2024-02-05 RX ORDER — POTASSIUM CHLORIDE 20 MEQ/1
40 TABLET, EXTENDED RELEASE ORAL PRN
Status: DISCONTINUED | OUTPATIENT
Start: 2024-02-05 | End: 2024-02-15 | Stop reason: HOSPADM

## 2024-02-05 RX ORDER — ONDANSETRON 2 MG/ML
4 INJECTION INTRAMUSCULAR; INTRAVENOUS EVERY 6 HOURS PRN
Status: DISCONTINUED | OUTPATIENT
Start: 2024-02-05 | End: 2024-02-15 | Stop reason: HOSPADM

## 2024-02-05 RX ORDER — FINASTERIDE 5 MG/1
5 TABLET, FILM COATED ORAL DAILY
Status: DISCONTINUED | OUTPATIENT
Start: 2024-02-05 | End: 2024-02-15 | Stop reason: HOSPADM

## 2024-02-05 RX ORDER — SODIUM BICARBONATE 650 MG/1
325 TABLET ORAL 4 TIMES DAILY
Status: DISCONTINUED | OUTPATIENT
Start: 2024-02-05 | End: 2024-02-15 | Stop reason: HOSPADM

## 2024-02-05 RX ORDER — LORAZEPAM 2 MG/ML
3 INJECTION INTRAMUSCULAR
Status: DISCONTINUED | OUTPATIENT
Start: 2024-02-05 | End: 2024-02-07

## 2024-02-05 RX ORDER — ATORVASTATIN CALCIUM 10 MG/1
10 TABLET, FILM COATED ORAL NIGHTLY
Status: DISCONTINUED | OUTPATIENT
Start: 2024-02-05 | End: 2024-02-15 | Stop reason: HOSPADM

## 2024-02-05 RX ORDER — ACETAMINOPHEN 650 MG/1
650 SUPPOSITORY RECTAL EVERY 6 HOURS PRN
Status: DISCONTINUED | OUTPATIENT
Start: 2024-02-05 | End: 2024-02-15 | Stop reason: HOSPADM

## 2024-02-05 RX ORDER — GAUZE BANDAGE 2" X 2"
100 BANDAGE TOPICAL DAILY
Status: DISCONTINUED | OUTPATIENT
Start: 2024-02-05 | End: 2024-02-15 | Stop reason: HOSPADM

## 2024-02-05 RX ORDER — FUROSEMIDE 10 MG/ML
20 INJECTION INTRAMUSCULAR; INTRAVENOUS ONCE
Status: DISCONTINUED | OUTPATIENT
Start: 2024-02-05 | End: 2024-02-11

## 2024-02-05 RX ORDER — POTASSIUM CHLORIDE 7.45 MG/ML
10 INJECTION INTRAVENOUS PRN
Status: DISCONTINUED | OUTPATIENT
Start: 2024-02-05 | End: 2024-02-15 | Stop reason: HOSPADM

## 2024-02-05 RX ORDER — SODIUM CHLORIDE 0.9 % (FLUSH) 0.9 %
5-40 SYRINGE (ML) INJECTION EVERY 12 HOURS SCHEDULED
Status: DISCONTINUED | OUTPATIENT
Start: 2024-02-05 | End: 2024-02-15 | Stop reason: HOSPADM

## 2024-02-05 RX ORDER — ACETAMINOPHEN 325 MG/1
650 TABLET ORAL EVERY 6 HOURS PRN
Status: DISCONTINUED | OUTPATIENT
Start: 2024-02-05 | End: 2024-02-15 | Stop reason: HOSPADM

## 2024-02-05 RX ORDER — DILTIAZEM HYDROCHLORIDE 60 MG/1
120 CAPSULE, EXTENDED RELEASE ORAL DAILY
Status: DISCONTINUED | OUTPATIENT
Start: 2024-02-05 | End: 2024-02-12

## 2024-02-05 RX ORDER — ONDANSETRON 4 MG/1
4 TABLET, ORALLY DISINTEGRATING ORAL EVERY 8 HOURS PRN
Status: DISCONTINUED | OUTPATIENT
Start: 2024-02-05 | End: 2024-02-15 | Stop reason: HOSPADM

## 2024-02-05 RX ORDER — LORAZEPAM 2 MG/ML
1 INJECTION INTRAMUSCULAR
Status: DISCONTINUED | OUTPATIENT
Start: 2024-02-05 | End: 2024-02-08

## 2024-02-05 RX ORDER — PANTOPRAZOLE SODIUM 40 MG/1
40 TABLET, DELAYED RELEASE ORAL
Status: DISCONTINUED | OUTPATIENT
Start: 2024-02-06 | End: 2024-02-15 | Stop reason: HOSPADM

## 2024-02-05 RX ORDER — SODIUM CHLORIDE, SODIUM LACTATE, POTASSIUM CHLORIDE, CALCIUM CHLORIDE 600; 310; 30; 20 MG/100ML; MG/100ML; MG/100ML; MG/100ML
INJECTION, SOLUTION INTRAVENOUS CONTINUOUS
Status: DISCONTINUED | OUTPATIENT
Start: 2024-02-05 | End: 2024-02-05

## 2024-02-05 RX ORDER — ALBUTEROL SULFATE 90 UG/1
2 AEROSOL, METERED RESPIRATORY (INHALATION) EVERY 6 HOURS PRN
Status: DISCONTINUED | OUTPATIENT
Start: 2024-02-05 | End: 2024-02-15 | Stop reason: HOSPADM

## 2024-02-05 RX ORDER — HYDRALAZINE HYDROCHLORIDE 20 MG/ML
10 INJECTION INTRAMUSCULAR; INTRAVENOUS EVERY 6 HOURS PRN
Status: DISCONTINUED | OUTPATIENT
Start: 2024-02-05 | End: 2024-02-15 | Stop reason: HOSPADM

## 2024-02-05 RX ADMIN — SODIUM CHLORIDE, PRESERVATIVE FREE 5 ML: 5 INJECTION INTRAVENOUS at 21:41

## 2024-02-05 RX ADMIN — SODIUM CHLORIDE, PRESERVATIVE FREE 10 ML: 5 INJECTION INTRAVENOUS at 21:41

## 2024-02-05 RX ADMIN — ATORVASTATIN CALCIUM 10 MG: 20 TABLET, FILM COATED ORAL at 21:40

## 2024-02-05 RX ADMIN — SODIUM BICARBONATE 325 MG: 650 TABLET ORAL at 21:40

## 2024-02-05 RX ADMIN — Medication 1 PUFF: at 20:56

## 2024-02-05 RX ADMIN — TRAZODONE HYDROCHLORIDE 50 MG: 50 TABLET ORAL at 21:41

## 2024-02-05 RX ADMIN — HYDROCODONE BITARTRATE AND ACETAMINOPHEN 1 TABLET: 5; 325 TABLET ORAL at 21:40

## 2024-02-05 RX ADMIN — IOPAMIDOL 100 ML: 755 INJECTION, SOLUTION INTRAVENOUS at 13:42

## 2024-02-05 ASSESSMENT — PAIN SCALES - GENERAL: PAINLEVEL_OUTOF10: 9

## 2024-02-05 ASSESSMENT — PAIN DESCRIPTION - LOCATION: LOCATION: BACK

## 2024-02-05 NOTE — ED NOTES
Pt wiped with warm wipes, urine present. Changed into clean hospital gown, repositioned on hospital stretcher, given fresh warm blanket, is aware of plan of care.   
   Active Feeding Tubes:      Administered Medications:   Medications   sodium chloride flush 0.9 % injection 5-40 mL (has no administration in time range)   sodium chloride flush 0.9 % injection 5-40 mL (has no administration in time range)   0.9 % sodium chloride infusion (has no administration in time range)   potassium chloride (KLOR-CON M) extended release tablet 40 mEq (has no administration in time range)     Or   potassium bicarb-citric acid (EFFER-K) effervescent tablet 40 mEq (has no administration in time range)     Or   potassium chloride 10 mEq/100 mL IVPB (Peripheral Line) (has no administration in time range)   magnesium sulfate 2000 mg in 50 mL IVPB premix (has no administration in time range)   enoxaparin (LOVENOX) injection 40 mg (has no administration in time range)   ondansetron (ZOFRAN-ODT) disintegrating tablet 4 mg (has no administration in time range)     Or   ondansetron (ZOFRAN) injection 4 mg (has no administration in time range)   polyethylene glycol (GLYCOLAX) packet 17 g (has no administration in time range)   acetaminophen (TYLENOL) tablet 650 mg (has no administration in time range)     Or   acetaminophen (TYLENOL) suppository 650 mg (has no administration in time range)   furosemide (LASIX) injection 20 mg (has no administration in time range)   folic acid (FOLVITE) tablet 1 mg (has no administration in time range)   thiamine mononitrate tablet 100 mg (has no administration in time range)   albuterol sulfate HFA (PROVENTIL;VENTOLIN;PROAIR) 108 (90 Base) MCG/ACT inhaler 2 puff (has no administration in time range)   fluticasone (FLOVENT HFA) 110 MCG/ACT inhaler 1 puff (has no administration in time range)   dilTIAZem (CARDIZEM 12 HR) extended release capsule 120 mg (has no administration in time range)   finasteride (PROSCAR) tablet 5 mg (has no administration in time range)   fluticasone (FLONASE) 50 MCG/ACT nasal spray 2 spray (has no administration in time range)

## 2024-02-05 NOTE — H&P
Hospitalist Admission Note    NAME: Agustín Willis   :  1945   MRN:  [id number]     Date/Time:  2024 5:13 PM    Patient PCP: Gerry Taylor MD    ______________________________________________________________________  Given the patient's current clinical presentation, I have a high level of concern for decompensation if discharged from the emergency department.  Complex decision making was performed, which includes reviewing the patient's available past medical records, laboratory results, and x-ray films.       My assessment of this patient's clinical condition and my plan of care is as follows.    Assessment / Plan:    AMS  - Possibly secondary to polysubstance abuse  - No leukocytosis, afebrile  - Urinalysis pending  - CT brain perfusion and CTA head/neck negative for acute intracranial abnormality, no large vessel occlusion or flow-limiting stenosis    Uncontrolled HTN  - Expect non-compliance  - Hold ACE in setting of FIDEL  - IV hydralazine PRN     FIDEL on CKD   - BUN 43, Cr 1.68  - Likely s/t poor oral intake  - Continue IVFs    Atrial fibrillation  - Rate controlled  - No on AC  - Continue home diltiazem 120 mg daily    Hyperkalemia   - Monitor for now    Rhabdomyolysis  - Mild, CK elevated 367  - Trend CK  - Start on IV Lactated Ringers at 100 mL/hr     Lung cysts  - Incidental finding on CT head  - Will order CT chest for further evaluation    Fluid overload  - Will give one dose IV lasix   - Otherwise hold diuretics until FIDLE improves   - Will order echocardiogram     Polysubstance abuse  - Ethyl alcohol level mildly elevated  - CIWA protocol   - UDS pending     HLD  - Lipid panel  - Continue statin    COPD  - Not in exacerbation  - Continue home inhalers: Flovent     Failure to thrive   - Possible APS case ? Per prior report, family taking patient to [facility] to pull out his money.  - Case management consult  - PT/OT eval             Medical Decision Making:   I personally reviewed labs: CBC,

## 2024-02-05 NOTE — ED TRIAGE NOTES
Ems called for unresponsive, lkw approx 30 mins prior to ems arrival to pt, however bystander on scene stated pt has been confused for 3-4 days, dr fulton at bedside to assess. Stroke alert called at this time

## 2024-02-05 NOTE — ED PROVIDER NOTES
[facility] EMERGENCY DEPT  EMERGENCY DEPARTMENT HISTORY AND PHYSICAL EXAM      Date: 2/5/2024  Patient Name: Agustín Willis  MRN: [id number]  YOB: 1945  Date of evaluation: 2/5/2024  Provider: Chalino Sandoval MD   Note Started: 1:32 PM EST 2/5/24    HISTORY OF PRESENT ILLNESS     Chief Complaint   Patient presents with    Altered Mental Status       History Provided By: Patient    HPI: Agustín Willis is a 78-year-old male with history of A-fib, hypertension, COPD, CKD here with altered mental status.  There was report that episode started about 10 minutes prior to arrival however apparently someone else presented on scene saying that this has been going on for 3 days and they have been taking him to the bank to pull money out of his bank account    PAST MEDICAL HISTORY   Past Medical History:  Past Medical History:   Diagnosis Date    Ankle pain 8/3/2020    Benign prostatic disease 8/3/2020    Hypertrophy with Outflow Obstruction    Benign prostatic hyperplasia 8/3/2020    Bronchitis     Cervical radiculopathy 8/3/2020    Chronic obstructive lung disease (HCC) 8/3/2020    Cocaine abuse (HCC) 8/3/2020    Diarrhea 8/3/2020    Dizziness and giddiness 8/3/2020    Hypercholesterolemia 8/3/2020    Hypertensive disorder 8/3/2020    Insomnia 8/3/2020    Kidney disease 8/3/2020    Low back pain 8/3/2020    Osteoarthritis of knee 8/3/2020    Pain in breast 8/3/2020    Pain in left knee 8/3/2020    Prediabetes 8/3/2020    Seasonal allergic rhinitis 8/3/2020    Shoulder pain 8/3/2020    Sleep apnea 8/3/2020    Tinnitus of right ear 8/3/2020    Vasovagal syncope 8/3/2020       Past Surgical History:  No past surgical history on file.    Family History:  Family History   Problem Relation Age of Onset    Hypertension Mother        Social History:  Social History     Tobacco Use    Smoking status: Former    Smokeless tobacco: Never   Substance Use Topics    Alcohol use: Yes    Drug use: Yes     Frequency: 3.0 times per week

## 2024-02-06 ENCOUNTER — APPOINTMENT (OUTPATIENT)
Facility: HOSPITAL | Age: 79
DRG: 896 | End: 2024-02-06
Payer: MEDICARE

## 2024-02-06 PROBLEM — R41.82 AMS (ALTERED MENTAL STATUS): Status: ACTIVE | Noted: 2024-02-06

## 2024-02-06 LAB
ALBUMIN SERPL-MCNC: 2.8 G/DL (ref 3.5–5)
ALBUMIN/GLOB SERPL: 0.7 (ref 1.1–2.2)
ALP SERPL-CCNC: 58 U/L (ref 45–117)
ALT SERPL-CCNC: 24 U/L (ref 12–78)
AMPHET UR QL SCN: NEGATIVE
ANION GAP SERPL CALC-SCNC: 8 MMOL/L (ref 5–15)
APPEARANCE UR: CLEAR
AST SERPL W P-5'-P-CCNC: 54 U/L (ref 15–37)
BACTERIA URNS QL MICRO: NEGATIVE /HPF
BARBITURATES UR QL SCN: NEGATIVE
BASOPHILS # BLD: 0 K/UL (ref 0–0.1)
BASOPHILS NFR BLD: 0 % (ref 0–1)
BENZODIAZ UR QL: NEGATIVE
BILIRUB SERPL-MCNC: 0.7 MG/DL (ref 0.2–1)
BILIRUB UR QL: NEGATIVE
BNP SERPL-MCNC: 714 PG/ML
BUN SERPL-MCNC: 46 MG/DL (ref 6–20)
BUN/CREAT SERPL: 28 (ref 12–20)
CA-I BLD-MCNC: 8.8 MG/DL (ref 8.5–10.1)
CANNABINOIDS UR QL SCN: NEGATIVE
CHLORIDE SERPL-SCNC: 107 MMOL/L (ref 97–108)
CK SERPL-CCNC: 879 U/L (ref 39–308)
CO2 SERPL-SCNC: 22 MMOL/L (ref 21–32)
COCAINE UR QL SCN: POSITIVE
COLOR UR: ABNORMAL
CREAT SERPL-MCNC: 1.62 MG/DL (ref 0.7–1.3)
DIFFERENTIAL METHOD BLD: ABNORMAL
EKG ATRIAL RATE: 85 BPM
EKG DIAGNOSIS: NORMAL
EKG P AXIS: 28 DEGREES
EKG P-R INTERVAL: 124 MS
EKG Q-T INTERVAL: 394 MS
EKG QRS DURATION: 96 MS
EKG QTC CALCULATION (BAZETT): 468 MS
EKG R AXIS: -25 DEGREES
EKG T AXIS: 108 DEGREES
EKG VENTRICULAR RATE: 85 BPM
EOSINOPHIL # BLD: 0.1 K/UL (ref 0–0.4)
EOSINOPHIL NFR BLD: 1 % (ref 0–7)
EPITH CASTS URNS QL MICRO: ABNORMAL /LPF
ERYTHROCYTE [DISTWIDTH] IN BLOOD BY AUTOMATED COUNT: 13 % (ref 11.5–14.5)
GLOBULIN SER CALC-MCNC: 4.3 G/DL (ref 2–4)
GLUCOSE SERPL-MCNC: 66 MG/DL (ref 65–100)
GLUCOSE UR STRIP.AUTO-MCNC: NEGATIVE MG/DL
HCT VFR BLD AUTO: 35.3 % (ref 36.6–50.3)
HGB BLD-MCNC: 11.6 G/DL (ref 12.1–17)
HGB UR QL STRIP: NEGATIVE
IMM GRANULOCYTES # BLD AUTO: 0 K/UL (ref 0–0.04)
IMM GRANULOCYTES NFR BLD AUTO: 0 % (ref 0–0.5)
KETONES UR QL STRIP.AUTO: 20 MG/DL
LEUKOCYTE ESTERASE UR QL STRIP.AUTO: ABNORMAL
LYMPHOCYTES # BLD: 2 K/UL (ref 0.8–3.5)
LYMPHOCYTES NFR BLD: 32 % (ref 12–49)
Lab: ABNORMAL
MCH RBC QN AUTO: 31.4 PG (ref 26–34)
MCHC RBC AUTO-ENTMCNC: 32.9 G/DL (ref 30–36.5)
MCV RBC AUTO: 95.7 FL (ref 80–99)
METHADONE UR QL: NEGATIVE
MONOCYTES # BLD: 0.5 K/UL (ref 0–1)
MONOCYTES NFR BLD: 8 % (ref 5–13)
NEUTS SEG # BLD: 3.7 K/UL (ref 1.8–8)
NEUTS SEG NFR BLD: 59 % (ref 32–75)
NITRITE UR QL STRIP.AUTO: NEGATIVE
NRBC # BLD: 0 K/UL (ref 0–0.01)
NRBC BLD-RTO: 0 PER 100 WBC
OPIATES UR QL: POSITIVE
PCP UR QL: NEGATIVE
PH UR STRIP: 5 (ref 5–8)
PLATELET # BLD AUTO: 211 K/UL (ref 150–400)
PMV BLD AUTO: 10.4 FL (ref 8.9–12.9)
POTASSIUM SERPL-SCNC: 5.2 MMOL/L (ref 3.5–5.1)
PROT SERPL-MCNC: 7.1 G/DL (ref 6.4–8.2)
PROT UR STRIP-MCNC: 100 MG/DL
RBC # BLD AUTO: 3.69 M/UL (ref 4.1–5.7)
RBC #/AREA URNS HPF: ABNORMAL /HPF (ref 0–5)
SODIUM SERPL-SCNC: 137 MMOL/L (ref 136–145)
URINE CULTURE IF INDICATED: ABNORMAL
UROBILINOGEN UR QL STRIP.AUTO: 2 EU/DL (ref 0.1–1)
WBC # BLD AUTO: 6.2 K/UL (ref 4.1–11.1)
WBC URNS QL MICRO: ABNORMAL /HPF (ref 0–4)

## 2024-02-06 PROCEDURE — 2580000003 HC RX 258: Performed by: STUDENT IN AN ORGANIZED HEALTH CARE EDUCATION/TRAINING PROGRAM

## 2024-02-06 PROCEDURE — G0378 HOSPITAL OBSERVATION PER HR: HCPCS

## 2024-02-06 PROCEDURE — 96376 TX/PRO/DX INJ SAME DRUG ADON: CPT

## 2024-02-06 PROCEDURE — 85025 COMPLETE CBC W/AUTO DIFF WBC: CPT

## 2024-02-06 PROCEDURE — 99221 1ST HOSP IP/OBS SF/LOW 40: CPT | Performed by: PSYCHIATRY & NEUROLOGY

## 2024-02-06 PROCEDURE — 6370000000 HC RX 637 (ALT 250 FOR IP): Performed by: STUDENT IN AN ORGANIZED HEALTH CARE EDUCATION/TRAINING PROGRAM

## 2024-02-06 PROCEDURE — 6360000004 HC RX CONTRAST MEDICATION

## 2024-02-06 PROCEDURE — 96375 TX/PRO/DX INJ NEW DRUG ADDON: CPT

## 2024-02-06 PROCEDURE — C8924 2D TTE W OR W/O FOL W/CON,FU: HCPCS

## 2024-02-06 PROCEDURE — 80053 COMPREHEN METABOLIC PANEL: CPT

## 2024-02-06 PROCEDURE — 6360000002 HC RX W HCPCS: Performed by: STUDENT IN AN ORGANIZED HEALTH CARE EDUCATION/TRAINING PROGRAM

## 2024-02-06 PROCEDURE — 82550 ASSAY OF CK (CPK): CPT

## 2024-02-06 PROCEDURE — 94640 AIRWAY INHALATION TREATMENT: CPT

## 2024-02-06 PROCEDURE — 70551 MRI BRAIN STEM W/O DYE: CPT

## 2024-02-06 PROCEDURE — 1100000000 HC RM PRIVATE

## 2024-02-06 PROCEDURE — 83880 ASSAY OF NATRIURETIC PEPTIDE: CPT

## 2024-02-06 PROCEDURE — 6360000002 HC RX W HCPCS: Performed by: PSYCHIATRY & NEUROLOGY

## 2024-02-06 PROCEDURE — 93325 DOPPLER ECHO COLOR FLOW MAPG: CPT

## 2024-02-06 RX ORDER — LEVETIRACETAM 500 MG/5ML
500 INJECTION, SOLUTION, CONCENTRATE INTRAVENOUS EVERY 12 HOURS
Status: DISCONTINUED | OUTPATIENT
Start: 2024-02-06 | End: 2024-02-07

## 2024-02-06 RX ADMIN — SODIUM CHLORIDE, PRESERVATIVE FREE 10 ML: 5 INJECTION INTRAVENOUS at 21:36

## 2024-02-06 RX ADMIN — SODIUM BICARBONATE 325 MG: 650 TABLET ORAL at 21:28

## 2024-02-06 RX ADMIN — PERFLUTREN 2 ML: 6.52 INJECTION, SUSPENSION INTRAVENOUS at 13:14

## 2024-02-06 RX ADMIN — TRAZODONE HYDROCHLORIDE 50 MG: 50 TABLET ORAL at 21:28

## 2024-02-06 RX ADMIN — Medication 1 PUFF: at 08:08

## 2024-02-06 RX ADMIN — LORAZEPAM 2 MG: 1 TABLET ORAL at 06:01

## 2024-02-06 RX ADMIN — LORAZEPAM 2 MG: 2 INJECTION INTRAMUSCULAR; INTRAVENOUS at 22:31

## 2024-02-06 RX ADMIN — Medication 1 PUFF: at 20:26

## 2024-02-06 RX ADMIN — LEVETIRACETAM 500 MG: 100 INJECTION, SOLUTION INTRAVENOUS at 21:29

## 2024-02-06 RX ADMIN — LORAZEPAM 1 MG: 2 INJECTION INTRAMUSCULAR; INTRAVENOUS at 16:49

## 2024-02-07 ENCOUNTER — APPOINTMENT (OUTPATIENT)
Facility: HOSPITAL | Age: 79
DRG: 896 | End: 2024-02-07
Payer: MEDICARE

## 2024-02-07 LAB
ANION GAP SERPL CALC-SCNC: 7 MMOL/L (ref 5–15)
ARTERIAL PATENCY WRIST A: YES
BACTERIA SPEC CULT: NORMAL
BASE DEFICIT BLDA-SCNC: 5.8 MMOL/L
BDY SITE: ABNORMAL
BODY TEMPERATURE: 98.8
BUN SERPL-MCNC: 47 MG/DL (ref 6–20)
BUN/CREAT SERPL: 31 (ref 12–20)
CA-I BLD-MCNC: 8.9 MG/DL (ref 8.5–10.1)
CHLORIDE SERPL-SCNC: 106 MMOL/L (ref 97–108)
CK SERPL-CCNC: 714 U/L (ref 39–308)
CO2 SERPL-SCNC: 24 MMOL/L (ref 21–32)
COHGB MFR BLD: 0.2 % (ref 1–2)
CREAT SERPL-MCNC: 1.53 MG/DL (ref 0.7–1.3)
ECHO AO ASC DIAM: 4.2 CM
ECHO AO ASCENDING AORTA INDEX: 1.97 CM/M2
ECHO AO ROOT DIAM: 2.8 CM
ECHO AO ROOT INDEX: 1.31 CM/M2
ECHO AR MAX VEL PISA: 3.5 M/S
ECHO AV AREA PEAK VELOCITY: 1.9 CM2
ECHO AV AREA VTI: 2.2 CM2
ECHO AV AREA/BSA PEAK VELOCITY: 0.9 CM2/M2
ECHO AV AREA/BSA VTI: 1 CM2/M2
ECHO AV CUSP MM: 1.3 CM
ECHO AV MEAN GRADIENT: 7 MMHG
ECHO AV MEAN VELOCITY: 1.2 M/S
ECHO AV PEAK GRADIENT: 13 MMHG
ECHO AV PEAK VELOCITY: 1.8 M/S
ECHO AV REGURGITANT PHT: 894 MS
ECHO AV VELOCITY RATIO: 0.56
ECHO AV VTI: 26.8 CM
ECHO BSA: 2.13 M2
ECHO LA AREA 2C: 12.8 CM2
ECHO LA AREA 4C: 17.6 CM2
ECHO LA DIAMETER INDEX: 2.07 CM/M2
ECHO LA DIAMETER: 4.4 CM
ECHO LA MAJOR AXIS: 5.7 CM
ECHO LA MINOR AXIS: 4 CM
ECHO LA TO AORTIC ROOT RATIO: 1.57
ECHO LA VOL MOD A2C: 34 ML (ref 18–58)
ECHO LA VOL MOD A4C: 45 ML (ref 18–58)
ECHO LA VOLUME INDEX MOD A2C: 16 ML/M2 (ref 16–34)
ECHO LA VOLUME INDEX MOD A4C: 21 ML/M2 (ref 16–34)
ECHO LV E' LATERAL VELOCITY: 6 CM/S
ECHO LV E' SEPTAL VELOCITY: 3 CM/S
ECHO LV EDV A2C: 88 ML
ECHO LV EDV A4C: 113 ML
ECHO LV EDV INDEX A4C: 53 ML/M2
ECHO LV EDV NDEX A2C: 41 ML/M2
ECHO LV EJECTION FRACTION A2C: 74 %
ECHO LV EJECTION FRACTION A4C: 61 %
ECHO LV EJECTION FRACTION BIPLANE: 68 % (ref 55–100)
ECHO LV ESV A2C: 23 ML
ECHO LV ESV A4C: 44 ML
ECHO LV ESV INDEX A2C: 11 ML/M2
ECHO LV ESV INDEX A4C: 21 ML/M2
ECHO LV FRACTIONAL SHORTENING: 43 % (ref 28–44)
ECHO LV INTERNAL DIMENSION DIASTOLE INDEX: 2.07 CM/M2
ECHO LV INTERNAL DIMENSION DIASTOLIC MMODE: 5 CM (ref 4.2–5.9)
ECHO LV INTERNAL DIMENSION DIASTOLIC: 4.4 CM (ref 4.2–5.9)
ECHO LV INTERNAL DIMENSION SYSTOLIC INDEX: 1.17 CM/M2
ECHO LV INTERNAL DIMENSION SYSTOLIC MMODE: 2.9 CM
ECHO LV INTERNAL DIMENSION SYSTOLIC: 2.5 CM
ECHO LV IVSD MMODE: 1.5 CM (ref 0.6–1)
ECHO LV IVSD: 1.8 CM (ref 0.6–1)
ECHO LV MASS 2D: 280.7 G (ref 88–224)
ECHO LV MASS INDEX 2D: 131.8 G/M2 (ref 49–115)
ECHO LV POSTERIOR WALL DIASTOLIC MMODE: 1.5 CM (ref 0.6–1)
ECHO LV POSTERIOR WALL DIASTOLIC: 1.3 CM (ref 0.6–1)
ECHO LV RELATIVE WALL THICKNESS RATIO: 0.59
ECHO LVOT AREA: 3.5 CM2
ECHO LVOT AV VTI INDEX: 0.63
ECHO LVOT DIAM: 2.1 CM
ECHO LVOT MEAN GRADIENT: 2 MMHG
ECHO LVOT PEAK GRADIENT: 4 MMHG
ECHO LVOT PEAK VELOCITY: 1 M/S
ECHO LVOT STROKE VOLUME INDEX: 27.5 ML/M2
ECHO LVOT SV: 58.5 ML
ECHO LVOT VTI: 16.9 CM
ECHO MV A VELOCITY: 0.63 M/S
ECHO MV E DECELERATION TIME (DT): 341 MS
ECHO MV E VELOCITY: 0.3 M/S
ECHO MV E/A RATIO: 0.48
ECHO MV E/E' LATERAL: 5
ECHO MV E/E' RATIO (AVERAGED): 7.5
ECHO PV MAX VELOCITY: 1.2 M/S
ECHO PV PEAK GRADIENT: 5 MMHG
ECHO RVOT MEAN GRADIENT: 1 MMHG
ECHO RVOT PEAK GRADIENT: 2 MMHG
ECHO RVOT PEAK VELOCITY: 0.7 M/S
ECHO RVOT VTI: 11.5 CM
ECHO TV REGURGITANT MAX VELOCITY: 1.67 M/S
ECHO TV REGURGITANT PEAK GRADIENT: 11 MMHG
ERYTHROCYTE [DISTWIDTH] IN BLOOD BY AUTOMATED COUNT: 12.6 % (ref 11.5–14.5)
FIO2 ON VENT: 28 %
GAS FLOW.O2 O2 DELIVERY SYS: 2 L/MIN
GLUCOSE BLD STRIP.AUTO-MCNC: 107 MG/DL (ref 65–100)
GLUCOSE SERPL-MCNC: 99 MG/DL (ref 65–100)
HCO3 BLDA-SCNC: 17 MMOL/L (ref 22–26)
HCT VFR BLD AUTO: 35.2 % (ref 36.6–50.3)
HGB BLD-MCNC: 11.4 G/DL (ref 12.1–17)
Lab: NORMAL
MCH RBC QN AUTO: 31.2 PG (ref 26–34)
MCHC RBC AUTO-ENTMCNC: 32.4 G/DL (ref 30–36.5)
MCV RBC AUTO: 96.4 FL (ref 80–99)
METHGB MFR BLD: 0.5 % (ref 0–1.4)
NRBC # BLD: 0 K/UL (ref 0–0.01)
NRBC BLD-RTO: 0 PER 100 WBC
OXYHGB MFR BLD: 96.7 % (ref 95–99)
PCO2 BLDA: 28 MMHG (ref 35–45)
PERFORMED BY:: ABNORMAL
PERFORMED BY:: ABNORMAL
PH BLDA: 7.41 (ref 7.35–7.45)
PLATELET # BLD AUTO: 197 K/UL (ref 150–400)
PMV BLD AUTO: 10 FL (ref 8.9–12.9)
PO2 BLDA: 107 MMHG (ref 80–100)
POTASSIUM SERPL-SCNC: 4.4 MMOL/L (ref 3.5–5.1)
RBC # BLD AUTO: 3.65 M/UL (ref 4.1–5.7)
SAO2 % BLD: 97 % (ref 95–99)
SAO2% DEVICE SAO2% SENSOR NAME: ABNORMAL
SODIUM SERPL-SCNC: 137 MMOL/L (ref 136–145)
SPECIMEN SITE: ABNORMAL
WBC # BLD AUTO: 5.8 K/UL (ref 4.1–11.1)

## 2024-02-07 PROCEDURE — 70450 CT HEAD/BRAIN W/O DYE: CPT

## 2024-02-07 PROCEDURE — 36600 WITHDRAWAL OF ARTERIAL BLOOD: CPT

## 2024-02-07 PROCEDURE — 96376 TX/PRO/DX INJ SAME DRUG ADON: CPT

## 2024-02-07 PROCEDURE — 2580000003 HC RX 258: Performed by: STUDENT IN AN ORGANIZED HEALTH CARE EDUCATION/TRAINING PROGRAM

## 2024-02-07 PROCEDURE — 6360000002 HC RX W HCPCS: Performed by: STUDENT IN AN ORGANIZED HEALTH CARE EDUCATION/TRAINING PROGRAM

## 2024-02-07 PROCEDURE — 1100000000 HC RM PRIVATE

## 2024-02-07 PROCEDURE — 82962 GLUCOSE BLOOD TEST: CPT

## 2024-02-07 PROCEDURE — 82803 BLOOD GASES ANY COMBINATION: CPT

## 2024-02-07 PROCEDURE — 94761 N-INVAS EAR/PLS OXIMETRY MLT: CPT

## 2024-02-07 PROCEDURE — 85027 COMPLETE CBC AUTOMATED: CPT

## 2024-02-07 PROCEDURE — 36415 COLL VENOUS BLD VENIPUNCTURE: CPT

## 2024-02-07 PROCEDURE — 6360000002 HC RX W HCPCS: Performed by: PSYCHIATRY & NEUROLOGY

## 2024-02-07 PROCEDURE — 96372 THER/PROPH/DIAG INJ SC/IM: CPT

## 2024-02-07 PROCEDURE — 6370000000 HC RX 637 (ALT 250 FOR IP): Performed by: STUDENT IN AN ORGANIZED HEALTH CARE EDUCATION/TRAINING PROGRAM

## 2024-02-07 PROCEDURE — 71250 CT THORAX DX C-: CPT

## 2024-02-07 PROCEDURE — 96375 TX/PRO/DX INJ NEW DRUG ADDON: CPT

## 2024-02-07 PROCEDURE — G0425 INPT/ED TELECONSULT30: HCPCS | Performed by: PSYCHIATRY & NEUROLOGY

## 2024-02-07 PROCEDURE — 82550 ASSAY OF CK (CPK): CPT

## 2024-02-07 PROCEDURE — 80048 BASIC METABOLIC PNL TOTAL CA: CPT

## 2024-02-07 PROCEDURE — 94640 AIRWAY INHALATION TREATMENT: CPT

## 2024-02-07 RX ORDER — 0.9 % SODIUM CHLORIDE 0.9 %
500 INTRAVENOUS SOLUTION INTRAVENOUS ONCE
Status: COMPLETED | OUTPATIENT
Start: 2024-02-07 | End: 2024-02-07

## 2024-02-07 RX ORDER — ASPIRIN 81 MG/1
81 TABLET ORAL DAILY
Status: DISCONTINUED | OUTPATIENT
Start: 2024-02-07 | End: 2024-02-15 | Stop reason: HOSPADM

## 2024-02-07 RX ADMIN — HYDRALAZINE HYDROCHLORIDE 10 MG: 20 INJECTION, SOLUTION INTRAMUSCULAR; INTRAVENOUS at 12:21

## 2024-02-07 RX ADMIN — Medication 1 PUFF: at 08:27

## 2024-02-07 RX ADMIN — SODIUM CHLORIDE 500 ML: 9 INJECTION, SOLUTION INTRAVENOUS at 11:51

## 2024-02-07 RX ADMIN — ENOXAPARIN SODIUM 40 MG: 100 INJECTION SUBCUTANEOUS at 11:53

## 2024-02-07 RX ADMIN — SODIUM CHLORIDE, PRESERVATIVE FREE 10 ML: 5 INJECTION INTRAVENOUS at 11:51

## 2024-02-07 RX ADMIN — LEVETIRACETAM 500 MG: 100 INJECTION, SOLUTION INTRAVENOUS at 11:48

## 2024-02-07 ASSESSMENT — PAIN SCALES - GENERAL: PAINLEVEL_OUTOF10: 0

## 2024-02-08 LAB
ANION GAP SERPL CALC-SCNC: 8 MMOL/L (ref 5–15)
BUN SERPL-MCNC: 53 MG/DL (ref 6–20)
BUN/CREAT SERPL: 28 (ref 12–20)
CA-I BLD-MCNC: 8.8 MG/DL (ref 8.5–10.1)
CHLORIDE SERPL-SCNC: 108 MMOL/L (ref 97–108)
CK SERPL-CCNC: 1293 U/L (ref 39–308)
CO2 SERPL-SCNC: 23 MMOL/L (ref 21–32)
CREAT SERPL-MCNC: 1.92 MG/DL (ref 0.7–1.3)
ERYTHROCYTE [DISTWIDTH] IN BLOOD BY AUTOMATED COUNT: 13 % (ref 11.5–14.5)
GLUCOSE BLD STRIP.AUTO-MCNC: 116 MG/DL (ref 65–100)
GLUCOSE BLD STRIP.AUTO-MCNC: 99 MG/DL (ref 65–100)
GLUCOSE SERPL-MCNC: 108 MG/DL (ref 65–100)
HCT VFR BLD AUTO: 33.9 % (ref 36.6–50.3)
HGB BLD-MCNC: 11.2 G/DL (ref 12.1–17)
MCH RBC QN AUTO: 31.5 PG (ref 26–34)
MCHC RBC AUTO-ENTMCNC: 33 G/DL (ref 30–36.5)
MCV RBC AUTO: 95.2 FL (ref 80–99)
NRBC # BLD: 0 K/UL (ref 0–0.01)
NRBC BLD-RTO: 0 PER 100 WBC
PERFORMED BY:: ABNORMAL
PERFORMED BY:: NORMAL
PLATELET # BLD AUTO: 192 K/UL (ref 150–400)
PMV BLD AUTO: 10.1 FL (ref 8.9–12.9)
POTASSIUM SERPL-SCNC: 4.3 MMOL/L (ref 3.5–5.1)
RBC # BLD AUTO: 3.56 M/UL (ref 4.1–5.7)
SODIUM SERPL-SCNC: 139 MMOL/L (ref 136–145)
WBC # BLD AUTO: 6.8 K/UL (ref 4.1–11.1)

## 2024-02-08 PROCEDURE — 96372 THER/PROPH/DIAG INJ SC/IM: CPT

## 2024-02-08 PROCEDURE — 82550 ASSAY OF CK (CPK): CPT

## 2024-02-08 PROCEDURE — 94761 N-INVAS EAR/PLS OXIMETRY MLT: CPT

## 2024-02-08 PROCEDURE — 6370000000 HC RX 637 (ALT 250 FOR IP): Performed by: STUDENT IN AN ORGANIZED HEALTH CARE EDUCATION/TRAINING PROGRAM

## 2024-02-08 PROCEDURE — 36415 COLL VENOUS BLD VENIPUNCTURE: CPT

## 2024-02-08 PROCEDURE — 2580000003 HC RX 258: Performed by: STUDENT IN AN ORGANIZED HEALTH CARE EDUCATION/TRAINING PROGRAM

## 2024-02-08 PROCEDURE — 94640 AIRWAY INHALATION TREATMENT: CPT

## 2024-02-08 PROCEDURE — G0408 INPT/TELE FOLLOW UP 35: HCPCS | Performed by: PSYCHIATRY & NEUROLOGY

## 2024-02-08 PROCEDURE — 85027 COMPLETE CBC AUTOMATED: CPT

## 2024-02-08 PROCEDURE — 6360000002 HC RX W HCPCS: Performed by: STUDENT IN AN ORGANIZED HEALTH CARE EDUCATION/TRAINING PROGRAM

## 2024-02-08 PROCEDURE — 95819 EEG AWAKE AND ASLEEP: CPT

## 2024-02-08 PROCEDURE — 80048 BASIC METABOLIC PNL TOTAL CA: CPT

## 2024-02-08 PROCEDURE — 82962 GLUCOSE BLOOD TEST: CPT

## 2024-02-08 PROCEDURE — 1100000000 HC RM PRIVATE

## 2024-02-08 RX ORDER — SODIUM CHLORIDE 9 MG/ML
INJECTION, SOLUTION INTRAVENOUS CONTINUOUS
Status: DISCONTINUED | OUTPATIENT
Start: 2024-02-08 | End: 2024-02-09

## 2024-02-08 RX ADMIN — ENOXAPARIN SODIUM 40 MG: 100 INJECTION SUBCUTANEOUS at 13:04

## 2024-02-08 RX ADMIN — Medication 1 PUFF: at 09:34

## 2024-02-08 RX ADMIN — FLUTICASONE PROPIONATE 2 SPRAY: 50 SPRAY, METERED NASAL at 09:30

## 2024-02-08 RX ADMIN — SODIUM BICARBONATE 325 MG: 650 TABLET ORAL at 16:31

## 2024-02-08 RX ADMIN — SODIUM CHLORIDE: 9 INJECTION, SOLUTION INTRAVENOUS at 16:27

## 2024-02-08 RX ADMIN — SODIUM CHLORIDE, PRESERVATIVE FREE 10 ML: 5 INJECTION INTRAVENOUS at 09:30

## 2024-02-08 ASSESSMENT — PAIN SCALES - GENERAL: PAINLEVEL_OUTOF10: 0

## 2024-02-08 NOTE — CODE DOCUMENTATION
Rapid response was called as patient was unresponsive.  According to patient's nurse since morning patient is lethargic, did not get anything by mouth.  He is responding only to pain.  And this evening due to shift change noticed patient is not even responding to the pain properly very lethargic.  Per staff the night before he was agitated, was given Ativan in the middle of the night     Ordered for the CT head already, patient is vomiting in the CT scan.  He is still reviewed lethargic and not responding.  2 days ago on admission has a CT and perfusion scan done that was normal.  On admission he was with lower dose of the opiates and he is with substance abuse.    Had a CT of the chest done as he was vomiting with suspicion of aspiration pneumonitis but it was negative.  Neuro alert was called evaluation was done.  ABG did not show any CO2 retention.    Evaluation was encephalopathic due to persistent acute kidney injury and suspected from medications.  Did not suspect any CVA.  Recommended supportive management avoiding any sedatives.

## 2024-02-09 ENCOUNTER — APPOINTMENT (OUTPATIENT)
Facility: HOSPITAL | Age: 79
DRG: 896 | End: 2024-02-09
Payer: MEDICARE

## 2024-02-09 LAB
ANION GAP SERPL CALC-SCNC: 8 MMOL/L (ref 5–15)
BNP SERPL-MCNC: 302 PG/ML
BUN SERPL-MCNC: 53 MG/DL (ref 6–20)
BUN/CREAT SERPL: 33 (ref 12–20)
CA-I BLD-MCNC: 8.8 MG/DL (ref 8.5–10.1)
CHLORIDE SERPL-SCNC: 112 MMOL/L (ref 97–108)
CK SERPL-CCNC: 692 U/L (ref 39–308)
CO2 SERPL-SCNC: 22 MMOL/L (ref 21–32)
CREAT SERPL-MCNC: 1.63 MG/DL (ref 0.7–1.3)
ERYTHROCYTE [DISTWIDTH] IN BLOOD BY AUTOMATED COUNT: 13 % (ref 11.5–14.5)
GLUCOSE BLD STRIP.AUTO-MCNC: 100 MG/DL (ref 65–100)
GLUCOSE SERPL-MCNC: 107 MG/DL (ref 65–100)
HCT VFR BLD AUTO: 31.1 % (ref 36.6–50.3)
HGB BLD-MCNC: 10.3 G/DL (ref 12.1–17)
MCH RBC QN AUTO: 31.7 PG (ref 26–34)
MCHC RBC AUTO-ENTMCNC: 33.1 G/DL (ref 30–36.5)
MCV RBC AUTO: 95.7 FL (ref 80–99)
NRBC # BLD: 0 K/UL (ref 0–0.01)
NRBC BLD-RTO: 0 PER 100 WBC
PERFORMED BY:: NORMAL
PLATELET # BLD AUTO: 178 K/UL (ref 150–400)
PMV BLD AUTO: 10.7 FL (ref 8.9–12.9)
POTASSIUM SERPL-SCNC: 3.9 MMOL/L (ref 3.5–5.1)
RBC # BLD AUTO: 3.25 M/UL (ref 4.1–5.7)
SODIUM SERPL-SCNC: 142 MMOL/L (ref 136–145)
WBC # BLD AUTO: 10.5 K/UL (ref 4.1–11.1)

## 2024-02-09 PROCEDURE — 85027 COMPLETE CBC AUTOMATED: CPT

## 2024-02-09 PROCEDURE — 96372 THER/PROPH/DIAG INJ SC/IM: CPT

## 2024-02-09 PROCEDURE — 6370000000 HC RX 637 (ALT 250 FOR IP): Performed by: STUDENT IN AN ORGANIZED HEALTH CARE EDUCATION/TRAINING PROGRAM

## 2024-02-09 PROCEDURE — 96376 TX/PRO/DX INJ SAME DRUG ADON: CPT

## 2024-02-09 PROCEDURE — G0408 INPT/TELE FOLLOW UP 35: HCPCS | Performed by: PSYCHIATRY & NEUROLOGY

## 2024-02-09 PROCEDURE — 1100000000 HC RM PRIVATE

## 2024-02-09 PROCEDURE — 82550 ASSAY OF CK (CPK): CPT

## 2024-02-09 PROCEDURE — 36415 COLL VENOUS BLD VENIPUNCTURE: CPT

## 2024-02-09 PROCEDURE — 82962 GLUCOSE BLOOD TEST: CPT

## 2024-02-09 PROCEDURE — 92610 EVALUATE SWALLOWING FUNCTION: CPT

## 2024-02-09 PROCEDURE — 83880 ASSAY OF NATRIURETIC PEPTIDE: CPT

## 2024-02-09 PROCEDURE — 71045 X-RAY EXAM CHEST 1 VIEW: CPT

## 2024-02-09 PROCEDURE — 2580000003 HC RX 258: Performed by: STUDENT IN AN ORGANIZED HEALTH CARE EDUCATION/TRAINING PROGRAM

## 2024-02-09 PROCEDURE — 6360000002 HC RX W HCPCS: Performed by: STUDENT IN AN ORGANIZED HEALTH CARE EDUCATION/TRAINING PROGRAM

## 2024-02-09 PROCEDURE — 94760 N-INVAS EAR/PLS OXIMETRY 1: CPT

## 2024-02-09 PROCEDURE — 94640 AIRWAY INHALATION TREATMENT: CPT

## 2024-02-09 PROCEDURE — 94761 N-INVAS EAR/PLS OXIMETRY MLT: CPT

## 2024-02-09 PROCEDURE — 80048 BASIC METABOLIC PNL TOTAL CA: CPT

## 2024-02-09 RX ORDER — HYDRALAZINE HYDROCHLORIDE 20 MG/ML
10 INJECTION INTRAMUSCULAR; INTRAVENOUS EVERY 8 HOURS
Status: DISCONTINUED | OUTPATIENT
Start: 2024-02-09 | End: 2024-02-10

## 2024-02-09 RX ADMIN — SODIUM CHLORIDE, PRESERVATIVE FREE 10 ML: 5 INJECTION INTRAVENOUS at 10:31

## 2024-02-09 RX ADMIN — ENOXAPARIN SODIUM 40 MG: 100 INJECTION SUBCUTANEOUS at 11:22

## 2024-02-09 RX ADMIN — Medication 1 PUFF: at 21:54

## 2024-02-09 RX ADMIN — HYDRALAZINE HYDROCHLORIDE 10 MG: 20 INJECTION, SOLUTION INTRAMUSCULAR; INTRAVENOUS at 08:51

## 2024-02-09 RX ADMIN — HYDRALAZINE HYDROCHLORIDE 10 MG: 20 INJECTION, SOLUTION INTRAMUSCULAR; INTRAVENOUS at 17:23

## 2024-02-09 RX ADMIN — Medication 1 PUFF: at 07:53

## 2024-02-09 RX ADMIN — HYDRALAZINE HYDROCHLORIDE 10 MG: 20 INJECTION, SOLUTION INTRAMUSCULAR; INTRAVENOUS at 23:09

## 2024-02-09 RX ADMIN — FLUTICASONE PROPIONATE 2 SPRAY: 50 SPRAY, METERED NASAL at 09:00

## 2024-02-09 ASSESSMENT — PAIN SCALES - WONG BAKER: WONGBAKER_NUMERICALRESPONSE: NO HURT

## 2024-02-09 ASSESSMENT — PAIN SCALES - GENERAL: PAINLEVEL_OUTOF10: 0

## 2024-02-10 ENCOUNTER — APPOINTMENT (OUTPATIENT)
Facility: HOSPITAL | Age: 79
DRG: 896 | End: 2024-02-10
Payer: MEDICARE

## 2024-02-10 LAB
AMMONIA PLAS-SCNC: <10 UMOL/L
ANION GAP SERPL CALC-SCNC: 7 MMOL/L (ref 5–15)
BNP SERPL-MCNC: 608 PG/ML
BUN SERPL-MCNC: 54 MG/DL (ref 6–20)
BUN/CREAT SERPL: 34 (ref 12–20)
CA-I BLD-MCNC: 8.7 MG/DL (ref 8.5–10.1)
CHLORIDE SERPL-SCNC: 116 MMOL/L (ref 97–108)
CK SERPL-CCNC: 388 U/L (ref 39–308)
CO2 SERPL-SCNC: 22 MMOL/L (ref 21–32)
CREAT SERPL-MCNC: 1.57 MG/DL (ref 0.7–1.3)
ERYTHROCYTE [DISTWIDTH] IN BLOOD BY AUTOMATED COUNT: 13.1 % (ref 11.5–14.5)
GLUCOSE BLD STRIP.AUTO-MCNC: 108 MG/DL (ref 65–100)
GLUCOSE SERPL-MCNC: 105 MG/DL (ref 65–100)
HCT VFR BLD AUTO: 32 % (ref 36.6–50.3)
HGB BLD-MCNC: 10.4 G/DL (ref 12.1–17)
MAGNESIUM SERPL-MCNC: 2.4 MG/DL (ref 1.6–2.4)
MCH RBC QN AUTO: 31.2 PG (ref 26–34)
MCHC RBC AUTO-ENTMCNC: 32.5 G/DL (ref 30–36.5)
MCV RBC AUTO: 96.1 FL (ref 80–99)
NRBC # BLD: 0 K/UL (ref 0–0.01)
NRBC BLD-RTO: 0 PER 100 WBC
PERFORMED BY:: ABNORMAL
PLATELET # BLD AUTO: 196 K/UL (ref 150–400)
PMV BLD AUTO: 9.8 FL (ref 8.9–12.9)
POTASSIUM SERPL-SCNC: 4.1 MMOL/L (ref 3.5–5.1)
RBC # BLD AUTO: 3.33 M/UL (ref 4.1–5.7)
SODIUM SERPL-SCNC: 145 MMOL/L (ref 136–145)
TROPONIN I SERPL HS-MCNC: 52 NG/L (ref 0–76)
WBC # BLD AUTO: 11.5 K/UL (ref 4.1–11.1)

## 2024-02-10 PROCEDURE — 71045 X-RAY EXAM CHEST 1 VIEW: CPT

## 2024-02-10 PROCEDURE — 96366 THER/PROPH/DIAG IV INF ADDON: CPT

## 2024-02-10 PROCEDURE — 97166 OT EVAL MOD COMPLEX 45 MIN: CPT

## 2024-02-10 PROCEDURE — 2580000003 HC RX 258: Performed by: STUDENT IN AN ORGANIZED HEALTH CARE EDUCATION/TRAINING PROGRAM

## 2024-02-10 PROCEDURE — 85027 COMPLETE CBC AUTOMATED: CPT

## 2024-02-10 PROCEDURE — 94640 AIRWAY INHALATION TREATMENT: CPT

## 2024-02-10 PROCEDURE — 2060000000 HC ICU INTERMEDIATE R&B

## 2024-02-10 PROCEDURE — 2580000003 HC RX 258: Performed by: INTERNAL MEDICINE

## 2024-02-10 PROCEDURE — 83735 ASSAY OF MAGNESIUM: CPT

## 2024-02-10 PROCEDURE — 92526 ORAL FUNCTION THERAPY: CPT

## 2024-02-10 PROCEDURE — 96375 TX/PRO/DX INJ NEW DRUG ADDON: CPT

## 2024-02-10 PROCEDURE — 87040 BLOOD CULTURE FOR BACTERIA: CPT

## 2024-02-10 PROCEDURE — 2500000003 HC RX 250 WO HCPCS: Performed by: INTERNAL MEDICINE

## 2024-02-10 PROCEDURE — 82962 GLUCOSE BLOOD TEST: CPT

## 2024-02-10 PROCEDURE — 83880 ASSAY OF NATRIURETIC PEPTIDE: CPT

## 2024-02-10 PROCEDURE — 94761 N-INVAS EAR/PLS OXIMETRY MLT: CPT

## 2024-02-10 PROCEDURE — 96372 THER/PROPH/DIAG INJ SC/IM: CPT

## 2024-02-10 PROCEDURE — 93005 ELECTROCARDIOGRAM TRACING: CPT | Performed by: INTERNAL MEDICINE

## 2024-02-10 PROCEDURE — 6360000002 HC RX W HCPCS: Performed by: STUDENT IN AN ORGANIZED HEALTH CARE EDUCATION/TRAINING PROGRAM

## 2024-02-10 PROCEDURE — 82140 ASSAY OF AMMONIA: CPT

## 2024-02-10 PROCEDURE — 82550 ASSAY OF CK (CPK): CPT

## 2024-02-10 PROCEDURE — 97530 THERAPEUTIC ACTIVITIES: CPT

## 2024-02-10 PROCEDURE — 6370000000 HC RX 637 (ALT 250 FOR IP): Performed by: STUDENT IN AN ORGANIZED HEALTH CARE EDUCATION/TRAINING PROGRAM

## 2024-02-10 PROCEDURE — 80048 BASIC METABOLIC PNL TOTAL CA: CPT

## 2024-02-10 PROCEDURE — 36415 COLL VENOUS BLD VENIPUNCTURE: CPT

## 2024-02-10 PROCEDURE — 96376 TX/PRO/DX INJ SAME DRUG ADON: CPT

## 2024-02-10 PROCEDURE — 6360000002 HC RX W HCPCS: Performed by: INTERNAL MEDICINE

## 2024-02-10 PROCEDURE — 84484 ASSAY OF TROPONIN QUANT: CPT

## 2024-02-10 PROCEDURE — 96365 THER/PROPH/DIAG IV INF INIT: CPT

## 2024-02-10 PROCEDURE — 2700000000 HC OXYGEN THERAPY PER DAY

## 2024-02-10 PROCEDURE — 2500000003 HC RX 250 WO HCPCS

## 2024-02-10 RX ORDER — DILTIAZEM HYDROCHLORIDE 5 MG/ML
10 INJECTION INTRAVENOUS ONCE
Status: COMPLETED | OUTPATIENT
Start: 2024-02-10 | End: 2024-02-10

## 2024-02-10 RX ORDER — MAGNESIUM SULFATE IN WATER 40 MG/ML
2000 INJECTION, SOLUTION INTRAVENOUS ONCE
Status: COMPLETED | OUTPATIENT
Start: 2024-02-10 | End: 2024-02-11

## 2024-02-10 RX ORDER — HYDRALAZINE HYDROCHLORIDE 20 MG/ML
10 INJECTION INTRAMUSCULAR; INTRAVENOUS 2 TIMES DAILY
Status: DISCONTINUED | OUTPATIENT
Start: 2024-02-10 | End: 2024-02-15 | Stop reason: HOSPADM

## 2024-02-10 RX ORDER — FUROSEMIDE 10 MG/ML
40 INJECTION INTRAMUSCULAR; INTRAVENOUS ONCE
Status: COMPLETED | OUTPATIENT
Start: 2024-02-10 | End: 2024-02-10

## 2024-02-10 RX ORDER — MORPHINE SULFATE 2 MG/ML
1 INJECTION, SOLUTION INTRAMUSCULAR; INTRAVENOUS EVERY 4 HOURS PRN
Status: DISPENSED | OUTPATIENT
Start: 2024-02-10 | End: 2024-02-12

## 2024-02-10 RX ORDER — DILTIAZEM HYDROCHLORIDE 5 MG/ML
INJECTION INTRAVENOUS
Status: COMPLETED
Start: 2024-02-10 | End: 2024-02-10

## 2024-02-10 RX ORDER — CARBOXYMETHYLCELLULOSE SODIUM 10 MG/ML
2 GEL OPHTHALMIC 3 TIMES DAILY PRN
Status: DISCONTINUED | OUTPATIENT
Start: 2024-02-10 | End: 2024-02-15 | Stop reason: HOSPADM

## 2024-02-10 RX ADMIN — SODIUM CHLORIDE: 9 INJECTION, SOLUTION INTRAVENOUS at 06:02

## 2024-02-10 RX ADMIN — FUROSEMIDE 40 MG: 10 INJECTION, SOLUTION INTRAMUSCULAR; INTRAVENOUS at 19:31

## 2024-02-10 RX ADMIN — MAGNESIUM SULFATE HEPTAHYDRATE 2000 MG: 40 INJECTION, SOLUTION INTRAVENOUS at 22:04

## 2024-02-10 RX ADMIN — DILTIAZEM HYDROCHLORIDE 10 MG: 5 INJECTION, SOLUTION INTRAVENOUS at 18:51

## 2024-02-10 RX ADMIN — DILTIAZEM HYDROCHLORIDE 5 MG/HR: 5 INJECTION, SOLUTION INTRAVENOUS at 21:07

## 2024-02-10 RX ADMIN — DILTIAZEM HYDROCHLORIDE 10 MG: 5 INJECTION INTRAVENOUS at 18:51

## 2024-02-10 RX ADMIN — HYDRALAZINE HYDROCHLORIDE 10 MG: 20 INJECTION, SOLUTION INTRAMUSCULAR; INTRAVENOUS at 08:42

## 2024-02-10 RX ADMIN — HYDRALAZINE HYDROCHLORIDE 10 MG: 20 INJECTION, SOLUTION INTRAMUSCULAR; INTRAVENOUS at 22:00

## 2024-02-10 RX ADMIN — ENOXAPARIN SODIUM 40 MG: 100 INJECTION SUBCUTANEOUS at 09:33

## 2024-02-10 RX ADMIN — SODIUM CHLORIDE, PRESERVATIVE FREE 10 ML: 5 INJECTION INTRAVENOUS at 21:09

## 2024-02-10 RX ADMIN — SODIUM CHLORIDE, PRESERVATIVE FREE 10 ML: 5 INJECTION INTRAVENOUS at 09:35

## 2024-02-10 RX ADMIN — Medication 1 PUFF: at 20:44

## 2024-02-10 RX ADMIN — SODIUM CHLORIDE, PRESERVATIVE FREE 10 ML: 5 INJECTION INTRAVENOUS at 09:36

## 2024-02-10 ASSESSMENT — PAIN SCALES - GENERAL
PAINLEVEL_OUTOF10: 0
PAINLEVEL_OUTOF10: 0
PAINLEVEL_OUTOF10: 4

## 2024-02-10 ASSESSMENT — PAIN SCALES - WONG BAKER: WONGBAKER_NUMERICALRESPONSE: NO HURT

## 2024-02-11 LAB
ANION GAP SERPL CALC-SCNC: 5 MMOL/L (ref 5–15)
BUN SERPL-MCNC: 65 MG/DL (ref 6–20)
BUN/CREAT SERPL: 40 (ref 12–20)
CA-I BLD-MCNC: 9.7 MG/DL (ref 8.5–10.1)
CHLORIDE SERPL-SCNC: 118 MMOL/L (ref 97–108)
CK SERPL-CCNC: 396 U/L (ref 39–308)
CO2 SERPL-SCNC: 23 MMOL/L (ref 21–32)
CREAT SERPL-MCNC: 1.62 MG/DL (ref 0.7–1.3)
ERYTHROCYTE [DISTWIDTH] IN BLOOD BY AUTOMATED COUNT: 13.4 % (ref 11.5–14.5)
GLUCOSE SERPL-MCNC: 110 MG/DL (ref 65–100)
HCT VFR BLD AUTO: 33.7 % (ref 36.6–50.3)
HGB BLD-MCNC: 10.8 G/DL (ref 12.1–17)
MCH RBC QN AUTO: 31.4 PG (ref 26–34)
MCHC RBC AUTO-ENTMCNC: 32 G/DL (ref 30–36.5)
MCV RBC AUTO: 98 FL (ref 80–99)
NRBC # BLD: 0 K/UL (ref 0–0.01)
NRBC BLD-RTO: 0 PER 100 WBC
PLATELET # BLD AUTO: 236 K/UL (ref 150–400)
PMV BLD AUTO: 10.1 FL (ref 8.9–12.9)
POTASSIUM SERPL-SCNC: 4.1 MMOL/L (ref 3.5–5.1)
RBC # BLD AUTO: 3.44 M/UL (ref 4.1–5.7)
SODIUM SERPL-SCNC: 146 MMOL/L (ref 136–145)
TROPONIN I SERPL HS-MCNC: 39 NG/L (ref 0–76)
WBC # BLD AUTO: 8.3 K/UL (ref 4.1–11.1)

## 2024-02-11 PROCEDURE — 82550 ASSAY OF CK (CPK): CPT

## 2024-02-11 PROCEDURE — 2700000000 HC OXYGEN THERAPY PER DAY

## 2024-02-11 PROCEDURE — 92526 ORAL FUNCTION THERAPY: CPT

## 2024-02-11 PROCEDURE — 6360000002 HC RX W HCPCS: Performed by: STUDENT IN AN ORGANIZED HEALTH CARE EDUCATION/TRAINING PROGRAM

## 2024-02-11 PROCEDURE — 96376 TX/PRO/DX INJ SAME DRUG ADON: CPT

## 2024-02-11 PROCEDURE — 94761 N-INVAS EAR/PLS OXIMETRY MLT: CPT

## 2024-02-11 PROCEDURE — 96372 THER/PROPH/DIAG INJ SC/IM: CPT

## 2024-02-11 PROCEDURE — 2500000003 HC RX 250 WO HCPCS: Performed by: INTERNAL MEDICINE

## 2024-02-11 PROCEDURE — 2060000000 HC ICU INTERMEDIATE R&B

## 2024-02-11 PROCEDURE — 85027 COMPLETE CBC AUTOMATED: CPT

## 2024-02-11 PROCEDURE — 2580000003 HC RX 258: Performed by: INTERNAL MEDICINE

## 2024-02-11 PROCEDURE — 36415 COLL VENOUS BLD VENIPUNCTURE: CPT

## 2024-02-11 PROCEDURE — 96367 TX/PROPH/DG ADDL SEQ IV INF: CPT

## 2024-02-11 PROCEDURE — 94640 AIRWAY INHALATION TREATMENT: CPT

## 2024-02-11 PROCEDURE — 6370000000 HC RX 637 (ALT 250 FOR IP): Performed by: STUDENT IN AN ORGANIZED HEALTH CARE EDUCATION/TRAINING PROGRAM

## 2024-02-11 PROCEDURE — 80048 BASIC METABOLIC PNL TOTAL CA: CPT

## 2024-02-11 PROCEDURE — 84484 ASSAY OF TROPONIN QUANT: CPT

## 2024-02-11 PROCEDURE — 96366 THER/PROPH/DIAG IV INF ADDON: CPT

## 2024-02-11 PROCEDURE — 2580000003 HC RX 258: Performed by: STUDENT IN AN ORGANIZED HEALTH CARE EDUCATION/TRAINING PROGRAM

## 2024-02-11 RX ORDER — FUROSEMIDE 10 MG/ML
20 INJECTION INTRAMUSCULAR; INTRAVENOUS ONCE
Status: COMPLETED | OUTPATIENT
Start: 2024-02-11 | End: 2024-02-11

## 2024-02-11 RX ADMIN — Medication 1 PUFF: at 08:58

## 2024-02-11 RX ADMIN — DILTIAZEM HYDROCHLORIDE 5 MG/HR: 5 INJECTION, SOLUTION INTRAVENOUS at 23:21

## 2024-02-11 RX ADMIN — Medication 1 PUFF: at 21:54

## 2024-02-11 RX ADMIN — TRAZODONE HYDROCHLORIDE 50 MG: 50 TABLET ORAL at 20:58

## 2024-02-11 RX ADMIN — HYDRALAZINE HYDROCHLORIDE 10 MG: 20 INJECTION, SOLUTION INTRAMUSCULAR; INTRAVENOUS at 10:23

## 2024-02-11 RX ADMIN — SODIUM CHLORIDE, PRESERVATIVE FREE 10 ML: 5 INJECTION INTRAVENOUS at 20:59

## 2024-02-11 RX ADMIN — SODIUM CHLORIDE, PRESERVATIVE FREE 10 ML: 5 INJECTION INTRAVENOUS at 09:00

## 2024-02-11 RX ADMIN — SODIUM CHLORIDE, PRESERVATIVE FREE 10 ML: 5 INJECTION INTRAVENOUS at 17:15

## 2024-02-11 RX ADMIN — SODIUM BICARBONATE 325 MG: 650 TABLET ORAL at 20:57

## 2024-02-11 RX ADMIN — THIAMINE HCL TAB 100 MG 100 MG: 100 TAB at 17:08

## 2024-02-11 RX ADMIN — FLUTICASONE PROPIONATE 2 SPRAY: 50 SPRAY, METERED NASAL at 10:33

## 2024-02-11 RX ADMIN — FINASTERIDE 5 MG: 5 TABLET, FILM COATED ORAL at 17:09

## 2024-02-11 RX ADMIN — ASPIRIN 81 MG: 81 TABLET, COATED ORAL at 17:09

## 2024-02-11 RX ADMIN — FUROSEMIDE 20 MG: 10 INJECTION, SOLUTION INTRAMUSCULAR; INTRAVENOUS at 10:23

## 2024-02-11 RX ADMIN — FOLIC ACID 1 MG: 1 TABLET ORAL at 17:08

## 2024-02-11 RX ADMIN — ENOXAPARIN SODIUM 40 MG: 100 INJECTION SUBCUTANEOUS at 10:24

## 2024-02-11 RX ADMIN — SODIUM CHLORIDE, PRESERVATIVE FREE 10 ML: 5 INJECTION INTRAVENOUS at 20:58

## 2024-02-11 RX ADMIN — SODIUM BICARBONATE 325 MG: 650 TABLET ORAL at 17:08

## 2024-02-11 ASSESSMENT — PAIN SCALES - GENERAL
PAINLEVEL_OUTOF10: 0
PAINLEVEL_OUTOF10: 0
PAINLEVEL_OUTOF10: 5

## 2024-02-11 ASSESSMENT — PAIN DESCRIPTION - PAIN TYPE: TYPE: ACUTE PAIN

## 2024-02-11 ASSESSMENT — PAIN DESCRIPTION - LOCATION: LOCATION: BACK

## 2024-02-11 ASSESSMENT — PAIN DESCRIPTION - ORIENTATION: ORIENTATION: MID

## 2024-02-11 ASSESSMENT — PAIN DESCRIPTION - DESCRIPTORS: DESCRIPTORS: ACHING

## 2024-02-11 NOTE — CONSULTS
Cardiology Consult Note    Patient Name: Agustín Willis  : 1945 MRN: [id number]  Date: 2024  Time: 11:05 AM    Admit Diagnosis: FIDEL (acute kidney injury) (HCC) [N17.9]  Edema, lower extremity [R60.0]  Altered mental status, unspecified altered mental status type [R41.82]  AMS (altered mental status) [R41.82]    Primary Cardiologist: Panfilo Huffman MD   Consulting Cardiologist: Gerry Wilson MD    Reason for Consult: tachy    Requesting MD: Guillermo Izaguirre MD    HPI:  Agustín Willis is a 78 y.o. male admitted on 2024  for FIDEL (acute kidney injury) (HCC) [N17.9]  Edema, lower extremity [R60.0]  Altered mental status, unspecified altered mental status type [R41.82]  AMS (altered mental status) [R41.82].   has a past medical history of Ankle pain, Benign prostatic disease, Benign prostatic hyperplasia, Bronchitis, Cervical radiculopathy, Chronic obstructive lung disease (HCC), Cocaine abuse (HCC), Diarrhea, Dizziness and giddiness, Hypercholesterolemia, Hypertensive disorder, Insomnia, Kidney disease, Low back pain, Osteoarthritis of knee, Pain in breast, Pain in left knee, Prediabetes, Seasonal allergic rhinitis, Shoulder pain, Sleep apnea, Tinnitus of right ear, and Vasovagal syncope.  presenting to the ED via EMS for altered mental status. Per prior reports, episode started about 10 minutes prior to arrival, but apparently a bystander on the scene states patient has been confused for the last 3-4 days. States that family has been taking patient to the bank to pull money from his bank account. UDS positive for cocaine and opiates. ETOH +    Subjective:  Alert, awake. Poor historian.  No c/o CP or SOB. On low dose Dilt gtt      Assessment and Plan     AMS - per Primary team  HTN - not taking PO. IV hydral.  Consider Clonidine SC  FIDEL on CKD  Tachy - SVT on EKG.  NSR on telem.  Continue on Dilt gtt 5   - Trops neg   - - Echocardiogram LVEF 
     CARDIOLOGY CONSULTATION    REASON FOR CONSULT: LA closure/ablation vs DOAC vs ASA for 2nd stroke prevention- risk vs benefit conversation with AA/microbleeds and ischemic risks     REQUESTING PROVIDER: Venkata Paredes PA-C    CHIEF COMPLAINT:  AMS    HISTORY OF PRESENT ILLNESS:  Agustín Willis is a 78 y.o. year-old male with past medical history significant for hypertension, COPD, cocaine abuse, CKD who was evaluated today due to neuro/primary requesting DOAC conversation. Patient here with altered mental status for the past 3-4 days. Attempted to converse with patient today and patient adamant that he has no cardiac issues.     Records from hospital admission course thus far reviewed.      Not on telemetry.     INPATIENT MEDICATIONS:  Home medications reviewed.    Current Facility-Administered Medications:     levETIRAcetam (KEPPRA) injection 500 mg, 500 mg, IntraVENous, Q12H, Elia Cerrato MD    sodium chloride flush 0.9 % injection 5-40 mL, 5-40 mL, IntraVENous, 2 times per day, Venkata Paredes PA-C, 5 mL at 02/05/24 2141    sodium chloride flush 0.9 % injection 5-40 mL, 5-40 mL, IntraVENous, PRN, Venkata Paredes PA-C    0.9 % sodium chloride infusion, , IntraVENous, PRN, Venkata Paredes PA-C    potassium chloride (KLOR-CON M) extended release tablet 40 mEq, 40 mEq, Oral, PRN **OR** potassium bicarb-citric acid (EFFER-K) effervescent tablet 40 mEq, 40 mEq, Oral, PRN **OR** potassium chloride 10 mEq/100 mL IVPB (Peripheral Line), 10 mEq, IntraVENous, PRN, Venkata Paredes PA-C    magnesium sulfate 2000 mg in 50 mL IVPB premix, 2,000 mg, IntraVENous, PRN, Venkata Paredes PA-C    enoxaparin (LOVENOX) injection 40 mg, 40 mg, SubCUTAneous, Daily, Venkata Paredes PA-C    ondansetron (ZOFRAN-ODT) disintegrating tablet 4 mg, 4 mg, Oral, Q8H PRN **OR** ondansetron (ZOFRAN) injection 4 mg, 4 mg, IntraVENous, Q6H PRN, Venkata Paredes PA-C    polyethylene glycol (GLYCOLAX) packet 17 g, 17 
[facility] HEALTH NEUROLOGY CONSULT NOTE    Patient Name:  Agustín Willis  Date of Admission: 2/5/2024  Referring Provider: Guillermo Izaguirre MD    Chief Complaint/Admission Diagnosis:  FIDEL (acute kidney injury) (HCC) [N17.9]  Edema, lower extremity [R60.0]  Altered mental status, unspecified altered mental status type [R41.82]  AMS (altered mental status) [R41.82]    History of Present Illness:  Agustín Willis is a 78 y.o. male  with LKW unknown, Ams/speech debility/global weak, he has no insight notes chronic back pain.  No other complaints.     Review of Symptoms:    A ten system review of constitutional, cardiovascular, respiratory, musculoskeletal, endocrine, skin, HEENT, genitourinary, neurological, and psychiatric systems was obtained and is negative except as noted above in HPI.    Past Medical History:  Past Medical History:   Diagnosis Date    Ankle pain 8/3/2020    Benign prostatic disease 8/3/2020    Hypertrophy with Outflow Obstruction    Benign prostatic hyperplasia 8/3/2020    Bronchitis     Cervical radiculopathy 8/3/2020    Chronic obstructive lung disease (HCC) 8/3/2020    Cocaine abuse ([facility]) 8/3/2020    Diarrhea 8/3/2020    Dizziness and giddiness 8/3/2020    Hypercholesterolemia 8/3/2020    Hypertensive disorder 8/3/2020    Insomnia 8/3/2020    Kidney disease 8/3/2020    Low back pain 8/3/2020    Osteoarthritis of knee 8/3/2020    Pain in breast 8/3/2020    Pain in left knee 8/3/2020    Prediabetes 8/3/2020    Seasonal allergic rhinitis 8/3/2020    Shoulder pain 8/3/2020    Sleep apnea 8/3/2020    Tinnitus of right ear 8/3/2020    Vasovagal syncope 8/3/2020      No past surgical history on file.    Family History:  Family History   Problem Relation Age of Onset    Hypertension Mother        Social History:  Social History     Tobacco Use    Smoking status: Former    Smokeless tobacco: Never   Substance Use Topics    Alcohol use: Yes    Drug use: Yes     Frequency: 3.0 times per week     Types: Cocaine 
The orbits are otherwise unremarkable. Patchy opacification of right ethmoid air cells and mild diffuse mucosal thickening of the paranasal sinuses. The mastoids are free of fluid bilaterally. Dental amalgam associated metallic artifact precludes optimal evaluation of the adjacent structures, oral cavity and oropharynx. Edentulous maxilla. Visualized soft tissues of the neck are unremarkable. Visualized lung apices demonstrate multiple bilateral thin-walled cystic lesions measuring up to 1.4 x 1.4 cm (image 25 of series 305). No acute fracture or aggressive osseous lesion. Multilevel cervical spondylosis with areas of at least moderate spinal canal narrowing at C3-C4 through C7-T1 and associated moderate to severe bilateral neural foraminal narrowing at this levels.     CT Head: No evidence of acute intracranial abnormality. CT Brain Perfusion: Technically nondiagnostic study. CTA Head: No large vessel occlusion or significant flow-limiting stenosis. CTA Neck: No large vessel occlusion or significant flow-limiting stenosis. Others: Bilateral lung cysts. Clinical correlation advised and consider further evaluation with dedicated chest imaging. Multilevel cervical spondylosis with associated high-grade spinal canal or neuroforaminal narrowing as detailed.      CTA HEAD NECK W CONTRAST    Result Date: 2/5/2024  EXAM:  CTA CODE NEURO HEAD AND NECK W CONT, CT BRAIN PERFUSION, CT CODE NEURO HEAD WO CONTRAST INDICATION:   dysphagia, confusion, weakness COMPARISON:  4/5/2023 head. CONTRAST:  100 mL of Isovue-370. TECHNIQUE:  Unenhanced  images were obtained to localize the volume for acquisition. Unenhanced multislice axial CT of the head was performed. Multislice helical axial CT angiography was performed from the aortic arch to the top of the head during uneventful rapid bolus intravenous contrast administration.   Coronal and sagittal reformations and 3D/MIP  post processing were performed.  CT brain perfusion was

## 2024-02-12 LAB
ALBUMIN SERPL-MCNC: 2 G/DL (ref 3.5–5)
ALBUMIN/GLOB SERPL: 0.4 (ref 1.1–2.2)
ALP SERPL-CCNC: 68 U/L (ref 45–117)
ALT SERPL-CCNC: 29 U/L (ref 12–78)
ANION GAP SERPL CALC-SCNC: 6 MMOL/L (ref 5–15)
AST SERPL W P-5'-P-CCNC: 51 U/L (ref 15–37)
BASOPHILS # BLD: 0 K/UL (ref 0–0.1)
BASOPHILS NFR BLD: 0 % (ref 0–1)
BILIRUB SERPL-MCNC: 0.4 MG/DL (ref 0.2–1)
BUN SERPL-MCNC: 71 MG/DL (ref 6–20)
BUN/CREAT SERPL: 41 (ref 12–20)
CA-I BLD-MCNC: 9.2 MG/DL (ref 8.5–10.1)
CHLORIDE SERPL-SCNC: 118 MMOL/L (ref 97–108)
CK SERPL-CCNC: 245 U/L (ref 39–308)
CO2 SERPL-SCNC: 23 MMOL/L (ref 21–32)
CREAT SERPL-MCNC: 1.75 MG/DL (ref 0.7–1.3)
DIFFERENTIAL METHOD BLD: ABNORMAL
EKG ATRIAL RATE: 96 BPM
EKG DIAGNOSIS: NORMAL
EKG P AXIS: 7 DEGREES
EKG P-R INTERVAL: 132 MS
EKG Q-T INTERVAL: 370 MS
EKG QRS DURATION: 88 MS
EKG QTC CALCULATION (BAZETT): 467 MS
EKG R AXIS: -25 DEGREES
EKG T AXIS: 93 DEGREES
EKG VENTRICULAR RATE: 96 BPM
EOSINOPHIL # BLD: 0.1 K/UL (ref 0–0.4)
EOSINOPHIL NFR BLD: 1 % (ref 0–7)
ERYTHROCYTE [DISTWIDTH] IN BLOOD BY AUTOMATED COUNT: 13.3 % (ref 11.5–14.5)
GLOBULIN SER CALC-MCNC: 4.8 G/DL (ref 2–4)
GLUCOSE SERPL-MCNC: 125 MG/DL (ref 65–100)
HCT VFR BLD AUTO: 31.2 % (ref 36.6–50.3)
HGB BLD-MCNC: 9.9 G/DL (ref 12.1–17)
IMM GRANULOCYTES # BLD AUTO: 0.1 K/UL (ref 0–0.04)
IMM GRANULOCYTES NFR BLD AUTO: 1 % (ref 0–0.5)
LYMPHOCYTES # BLD: 1.1 K/UL (ref 0.8–3.5)
LYMPHOCYTES NFR BLD: 17 % (ref 12–49)
MCH RBC QN AUTO: 31.1 PG (ref 26–34)
MCHC RBC AUTO-ENTMCNC: 31.7 G/DL (ref 30–36.5)
MCV RBC AUTO: 98.1 FL (ref 80–99)
MONOCYTES # BLD: 0.7 K/UL (ref 0–1)
MONOCYTES NFR BLD: 11 % (ref 5–13)
NEUTS SEG # BLD: 4.5 K/UL (ref 1.8–8)
NEUTS SEG NFR BLD: 70 % (ref 32–75)
NRBC # BLD: 0 K/UL (ref 0–0.01)
NRBC BLD-RTO: 0 PER 100 WBC
PLATELET # BLD AUTO: 252 K/UL (ref 150–400)
PMV BLD AUTO: 9.9 FL (ref 8.9–12.9)
POTASSIUM SERPL-SCNC: 3.8 MMOL/L (ref 3.5–5.1)
PROT SERPL-MCNC: 6.8 G/DL (ref 6.4–8.2)
RBC # BLD AUTO: 3.18 M/UL (ref 4.1–5.7)
RBC MORPH BLD: ABNORMAL
SODIUM SERPL-SCNC: 147 MMOL/L (ref 136–145)
WBC # BLD AUTO: 6.5 K/UL (ref 4.1–11.1)

## 2024-02-12 PROCEDURE — 82550 ASSAY OF CK (CPK): CPT

## 2024-02-12 PROCEDURE — 6370000000 HC RX 637 (ALT 250 FOR IP): Performed by: STUDENT IN AN ORGANIZED HEALTH CARE EDUCATION/TRAINING PROGRAM

## 2024-02-12 PROCEDURE — 94761 N-INVAS EAR/PLS OXIMETRY MLT: CPT

## 2024-02-12 PROCEDURE — 80053 COMPREHEN METABOLIC PANEL: CPT

## 2024-02-12 PROCEDURE — 96372 THER/PROPH/DIAG INJ SC/IM: CPT

## 2024-02-12 PROCEDURE — 85025 COMPLETE CBC W/AUTO DIFF WBC: CPT

## 2024-02-12 PROCEDURE — 36415 COLL VENOUS BLD VENIPUNCTURE: CPT

## 2024-02-12 PROCEDURE — 6370000000 HC RX 637 (ALT 250 FOR IP)

## 2024-02-12 PROCEDURE — 94640 AIRWAY INHALATION TREATMENT: CPT

## 2024-02-12 PROCEDURE — 97530 THERAPEUTIC ACTIVITIES: CPT

## 2024-02-12 PROCEDURE — 2060000000 HC ICU INTERMEDIATE R&B

## 2024-02-12 PROCEDURE — 97161 PT EVAL LOW COMPLEX 20 MIN: CPT

## 2024-02-12 PROCEDURE — 96366 THER/PROPH/DIAG IV INF ADDON: CPT

## 2024-02-12 PROCEDURE — 96376 TX/PRO/DX INJ SAME DRUG ADON: CPT

## 2024-02-12 PROCEDURE — 2700000000 HC OXYGEN THERAPY PER DAY

## 2024-02-12 PROCEDURE — 2580000003 HC RX 258: Performed by: STUDENT IN AN ORGANIZED HEALTH CARE EDUCATION/TRAINING PROGRAM

## 2024-02-12 PROCEDURE — 6360000002 HC RX W HCPCS: Performed by: STUDENT IN AN ORGANIZED HEALTH CARE EDUCATION/TRAINING PROGRAM

## 2024-02-12 RX ADMIN — Medication 1 PUFF: at 07:24

## 2024-02-12 RX ADMIN — PANTOPRAZOLE SODIUM 40 MG: 40 TABLET, DELAYED RELEASE ORAL at 06:29

## 2024-02-12 RX ADMIN — SODIUM BICARBONATE 325 MG: 650 TABLET ORAL at 12:04

## 2024-02-12 RX ADMIN — METOPROLOL TARTRATE 25 MG: 25 TABLET, FILM COATED ORAL at 21:39

## 2024-02-12 RX ADMIN — FOLIC ACID 1 MG: 1 TABLET ORAL at 09:34

## 2024-02-12 RX ADMIN — FINASTERIDE 5 MG: 5 TABLET, FILM COATED ORAL at 09:34

## 2024-02-12 RX ADMIN — SODIUM CHLORIDE, PRESERVATIVE FREE 10 ML: 5 INJECTION INTRAVENOUS at 21:39

## 2024-02-12 RX ADMIN — HYDRALAZINE HYDROCHLORIDE 10 MG: 20 INJECTION, SOLUTION INTRAMUSCULAR; INTRAVENOUS at 10:29

## 2024-02-12 RX ADMIN — TRAZODONE HYDROCHLORIDE 50 MG: 50 TABLET ORAL at 21:39

## 2024-02-12 RX ADMIN — HYDRALAZINE HYDROCHLORIDE 10 MG: 20 INJECTION, SOLUTION INTRAMUSCULAR; INTRAVENOUS at 21:38

## 2024-02-12 RX ADMIN — ENOXAPARIN SODIUM 40 MG: 100 INJECTION SUBCUTANEOUS at 09:34

## 2024-02-12 RX ADMIN — FLUTICASONE PROPIONATE 2 SPRAY: 50 SPRAY, METERED NASAL at 09:35

## 2024-02-12 RX ADMIN — SODIUM BICARBONATE 325 MG: 650 TABLET ORAL at 21:39

## 2024-02-12 RX ADMIN — SODIUM BICARBONATE 325 MG: 650 TABLET ORAL at 09:34

## 2024-02-12 RX ADMIN — SODIUM BICARBONATE 325 MG: 650 TABLET ORAL at 16:20

## 2024-02-12 RX ADMIN — ASPIRIN 81 MG: 81 TABLET, COATED ORAL at 09:34

## 2024-02-12 RX ADMIN — SODIUM CHLORIDE, PRESERVATIVE FREE 10 ML: 5 INJECTION INTRAVENOUS at 10:29

## 2024-02-12 RX ADMIN — THIAMINE HCL TAB 100 MG 100 MG: 100 TAB at 09:34

## 2024-02-12 RX ADMIN — SODIUM CHLORIDE, PRESERVATIVE FREE 10 ML: 5 INJECTION INTRAVENOUS at 21:43

## 2024-02-12 RX ADMIN — Medication 1 PUFF: at 20:59

## 2024-02-13 LAB
ALBUMIN SERPL-MCNC: 2.1 G/DL (ref 3.5–5)
ALBUMIN/GLOB SERPL: 0.4 (ref 1.1–2.2)
ALP SERPL-CCNC: 62 U/L (ref 45–117)
ALT SERPL-CCNC: 24 U/L (ref 12–78)
ANION GAP SERPL CALC-SCNC: 3 MMOL/L (ref 5–15)
AST SERPL W P-5'-P-CCNC: 25 U/L (ref 15–37)
BASOPHILS # BLD: 0 K/UL (ref 0–0.1)
BASOPHILS NFR BLD: 0 % (ref 0–1)
BILIRUB SERPL-MCNC: 0.4 MG/DL (ref 0.2–1)
BUN SERPL-MCNC: 63 MG/DL (ref 6–20)
BUN/CREAT SERPL: 40 (ref 12–20)
CA-I BLD-MCNC: 9.6 MG/DL (ref 8.5–10.1)
CHLORIDE SERPL-SCNC: 119 MMOL/L (ref 97–108)
CO2 SERPL-SCNC: 25 MMOL/L (ref 21–32)
CREAT SERPL-MCNC: 1.57 MG/DL (ref 0.7–1.3)
DIFFERENTIAL METHOD BLD: ABNORMAL
EOSINOPHIL # BLD: 0.1 K/UL (ref 0–0.4)
EOSINOPHIL NFR BLD: 2 % (ref 0–7)
ERYTHROCYTE [DISTWIDTH] IN BLOOD BY AUTOMATED COUNT: 13.2 % (ref 11.5–14.5)
GLOBULIN SER CALC-MCNC: 4.9 G/DL (ref 2–4)
GLUCOSE SERPL-MCNC: 155 MG/DL (ref 65–100)
HCT VFR BLD AUTO: 33.9 % (ref 36.6–50.3)
HGB BLD-MCNC: 10.7 G/DL (ref 12.1–17)
IMM GRANULOCYTES # BLD AUTO: 0 K/UL (ref 0–0.04)
IMM GRANULOCYTES NFR BLD AUTO: 0 % (ref 0–0.5)
IRON SATN MFR SERPL: 17 % (ref 20–50)
IRON SERPL-MCNC: 28 UG/DL (ref 35–150)
LYMPHOCYTES # BLD: 1.1 K/UL (ref 0.8–3.5)
LYMPHOCYTES NFR BLD: 20 % (ref 12–49)
MAGNESIUM SERPL-MCNC: 2.6 MG/DL (ref 1.6–2.4)
MCH RBC QN AUTO: 31.5 PG (ref 26–34)
MCHC RBC AUTO-ENTMCNC: 31.6 G/DL (ref 30–36.5)
MCV RBC AUTO: 99.7 FL (ref 80–99)
MONOCYTES # BLD: 0.7 K/UL (ref 0–1)
MONOCYTES NFR BLD: 14 % (ref 5–13)
NEUTS SEG # BLD: 3.3 K/UL (ref 1.8–8)
NEUTS SEG NFR BLD: 64 % (ref 32–75)
NRBC # BLD: 0 K/UL (ref 0–0.01)
NRBC BLD-RTO: 0 PER 100 WBC
PLATELET # BLD AUTO: 268 K/UL (ref 150–400)
PMV BLD AUTO: 9.8 FL (ref 8.9–12.9)
POTASSIUM SERPL-SCNC: 4 MMOL/L (ref 3.5–5.1)
PROT SERPL-MCNC: 7 G/DL (ref 6.4–8.2)
RBC # BLD AUTO: 3.4 M/UL (ref 4.1–5.7)
SODIUM SERPL-SCNC: 147 MMOL/L (ref 136–145)
TIBC SERPL-MCNC: 168 UG/DL (ref 250–450)
WBC # BLD AUTO: 5.2 K/UL (ref 4.1–11.1)

## 2024-02-13 PROCEDURE — 85025 COMPLETE CBC W/AUTO DIFF WBC: CPT

## 2024-02-13 PROCEDURE — 6360000002 HC RX W HCPCS: Performed by: STUDENT IN AN ORGANIZED HEALTH CARE EDUCATION/TRAINING PROGRAM

## 2024-02-13 PROCEDURE — 6370000000 HC RX 637 (ALT 250 FOR IP): Performed by: STUDENT IN AN ORGANIZED HEALTH CARE EDUCATION/TRAINING PROGRAM

## 2024-02-13 PROCEDURE — 94640 AIRWAY INHALATION TREATMENT: CPT

## 2024-02-13 PROCEDURE — 2060000000 HC ICU INTERMEDIATE R&B

## 2024-02-13 PROCEDURE — 2700000000 HC OXYGEN THERAPY PER DAY

## 2024-02-13 PROCEDURE — 2580000003 HC RX 258: Performed by: STUDENT IN AN ORGANIZED HEALTH CARE EDUCATION/TRAINING PROGRAM

## 2024-02-13 PROCEDURE — 83540 ASSAY OF IRON: CPT

## 2024-02-13 PROCEDURE — 96376 TX/PRO/DX INJ SAME DRUG ADON: CPT

## 2024-02-13 PROCEDURE — 94761 N-INVAS EAR/PLS OXIMETRY MLT: CPT

## 2024-02-13 PROCEDURE — 97530 THERAPEUTIC ACTIVITIES: CPT

## 2024-02-13 PROCEDURE — 36415 COLL VENOUS BLD VENIPUNCTURE: CPT

## 2024-02-13 PROCEDURE — 96372 THER/PROPH/DIAG INJ SC/IM: CPT

## 2024-02-13 PROCEDURE — 80053 COMPREHEN METABOLIC PANEL: CPT

## 2024-02-13 PROCEDURE — 83735 ASSAY OF MAGNESIUM: CPT

## 2024-02-13 PROCEDURE — 6370000000 HC RX 637 (ALT 250 FOR IP)

## 2024-02-13 RX ORDER — FERROUS SULFATE 325(65) MG
325 TABLET ORAL
Status: DISCONTINUED | OUTPATIENT
Start: 2024-02-14 | End: 2024-02-15 | Stop reason: HOSPADM

## 2024-02-13 RX ORDER — FUROSEMIDE 10 MG/ML
20 INJECTION INTRAMUSCULAR; INTRAVENOUS ONCE
Status: COMPLETED | OUTPATIENT
Start: 2024-02-13 | End: 2024-02-13

## 2024-02-13 RX ADMIN — FLUTICASONE PROPIONATE 2 SPRAY: 50 SPRAY, METERED NASAL at 09:56

## 2024-02-13 RX ADMIN — HYDRALAZINE HYDROCHLORIDE 10 MG: 20 INJECTION, SOLUTION INTRAMUSCULAR; INTRAVENOUS at 09:54

## 2024-02-13 RX ADMIN — SODIUM CHLORIDE, PRESERVATIVE FREE 10 ML: 5 INJECTION INTRAVENOUS at 22:29

## 2024-02-13 RX ADMIN — ASPIRIN 81 MG: 81 TABLET, COATED ORAL at 09:53

## 2024-02-13 RX ADMIN — POLYETHYLENE GLYCOL 3350 17 G: 17 POWDER, FOR SOLUTION ORAL at 07:56

## 2024-02-13 RX ADMIN — PANTOPRAZOLE SODIUM 40 MG: 40 TABLET, DELAYED RELEASE ORAL at 06:10

## 2024-02-13 RX ADMIN — Medication 1 PUFF: at 19:37

## 2024-02-13 RX ADMIN — METOPROLOL TARTRATE 25 MG: 25 TABLET, FILM COATED ORAL at 22:25

## 2024-02-13 RX ADMIN — FOLIC ACID 1 MG: 1 TABLET ORAL at 09:55

## 2024-02-13 RX ADMIN — Medication 1 PUFF: at 07:49

## 2024-02-13 RX ADMIN — ENOXAPARIN SODIUM 40 MG: 100 INJECTION SUBCUTANEOUS at 09:54

## 2024-02-13 RX ADMIN — SODIUM CHLORIDE, PRESERVATIVE FREE 10 ML: 5 INJECTION INTRAVENOUS at 22:11

## 2024-02-13 RX ADMIN — SODIUM CHLORIDE, PRESERVATIVE FREE 10 ML: 5 INJECTION INTRAVENOUS at 09:54

## 2024-02-13 RX ADMIN — METOPROLOL TARTRATE 25 MG: 25 TABLET, FILM COATED ORAL at 09:54

## 2024-02-13 RX ADMIN — FINASTERIDE 5 MG: 5 TABLET, FILM COATED ORAL at 09:54

## 2024-02-13 RX ADMIN — SODIUM BICARBONATE 325 MG: 650 TABLET ORAL at 22:25

## 2024-02-13 RX ADMIN — ACETAMINOPHEN 650 MG: 325 TABLET ORAL at 18:31

## 2024-02-13 RX ADMIN — SODIUM BICARBONATE 325 MG: 650 TABLET ORAL at 09:53

## 2024-02-13 RX ADMIN — FUROSEMIDE 20 MG: 10 INJECTION, SOLUTION INTRAMUSCULAR; INTRAVENOUS at 12:35

## 2024-02-13 RX ADMIN — SODIUM BICARBONATE 325 MG: 650 TABLET ORAL at 18:26

## 2024-02-13 RX ADMIN — SODIUM BICARBONATE 325 MG: 650 TABLET ORAL at 12:29

## 2024-02-13 RX ADMIN — ACETAMINOPHEN 650 MG: 325 TABLET ORAL at 10:51

## 2024-02-13 RX ADMIN — THIAMINE HCL TAB 100 MG 100 MG: 100 TAB at 09:54

## 2024-02-13 ASSESSMENT — PAIN DESCRIPTION - LOCATION
LOCATION: ARM
LOCATION: BACK

## 2024-02-13 ASSESSMENT — PAIN SCALES - GENERAL: PAINLEVEL_OUTOF10: 6

## 2024-02-13 NOTE — PLAN OF CARE
PHYSICAL THERAPY TREATMENT     Patient: Agustín Willis (78 y.o. male)  Date: 2/13/2024  Diagnosis: FIDEL (acute kidney injury) ([facility]) [N17.9]  Edema, lower extremity [R60.0]  Altered mental status, unspecified altered mental status type [R41.82]  AMS (altered mental status) [R41.82] FIDEL (acute kidney injury) ([facility])      Precautions: Fall Risk                      Recommendations for nursing mobility: Encourage HEP in prep for ADLs/mobility; see handout for details, Frequent repositioning to prevent skin breakdown, Use of bed/chair alarm for safety, and Assist x2    In place during session: External Catheter and EKG/telemetry   Chart, physical therapy assessment, plan of care and goals were reviewed.  ASSESSMENT  Patient continues with skilled PT services and is slowly progressing towards goals. Pt supine in bed upon PT arrival, agreeable to session. (See below for objective details and assist levels).     Overall pt tolerated session fair/poor today. Pt transferred to eob requiring hands on assistance x2. Pt sat demonstrating L lateral lean that pt was unaware of. Pt sat demonstrating good sitting balance. Pt performed seated therex with no complaints of pain or discomfort but required verbal cues for speed and sequencing. Pt stood once from bed and bedpan placed under him due to pt stating needing to have a bowel movement. Pt sat eob attempted to have a bowel movement but no success so laid pt back down in bed and kept with bedpan underneath him. Pt left supine with all needs met. Will continue to benefit from skilled PT services, and will continue to progress as tolerated. Potential barriers for safe discharge:. Current PT DC recommendation Skilled Nursing Facility once medically appropriate.     Start of Session End of Session   SPO2 (%) 92 96   Heart Rate (BPM) 92 65     GOALS:  Problem: Physical Therapy - Adult  Goal: By Discharge: Performs mobility at highest level of function for planned discharge 
  Problem: Discharge Planning  Goal: Discharge to home or other facility with appropriate resources  Outcome: Progressing     Problem: Safety - Adult  Goal: Free from fall injury  Outcome: Progressing     Problem: Skin/Tissue Integrity  Goal: Absence of new skin breakdown  Description: 1.  Monitor for areas of redness and/or skin breakdown  2.  Assess vascular access sites hourly  3.  Every 4-6 hours minimum:  Change oxygen saturation probe site  4.  Every 4-6 hours:  If on nasal continuous positive airway pressure, respiratory therapy assess nares and determine need for appliance change or resting period.  Outcome: Progressing     Problem: Pain  Goal: Verbalizes/displays adequate comfort level or baseline comfort level  Outcome: Progressing     Problem: Pain  Goal: Verbalizes/displays adequate comfort level or baseline comfort level  Outcome: Progressing     
  Problem: Discharge Planning  Goal: Discharge to home or other facility with appropriate resources  Outcome: Progressing  Flowsheets (Taken 2/11/2024 0820)  Discharge to home or other facility with appropriate resources:   Identify barriers to discharge with patient and caregiver   Arrange for needed discharge resources and transportation as appropriate   Identify discharge learning needs (meds, wound care, etc)   Arrange for interpreters to assist at discharge as needed   Refer to discharge planning if patient needs post-hospital services based on physician order or complex needs related to functional status, cognitive ability or social support system     Problem: Safety - Adult  Goal: Free from fall injury  Outcome: Progressing     Problem: Skin/Tissue Integrity  Goal: Absence of new skin breakdown  Description: 1.  Monitor for areas of redness and/or skin breakdown  2.  Assess vascular access sites hourly  3.  Every 4-6 hours minimum:  Change oxygen saturation probe site  4.  Every 4-6 hours:  If on nasal continuous positive airway pressure, respiratory therapy assess nares and determine need for appliance change or resting period.  Outcome: Progressing     Problem: Pain  Goal: Verbalizes/displays adequate comfort level or baseline comfort level  Outcome: Progressing     
  Problem: Safety - Adult  Goal: Free from fall injury  Outcome: Progressing     Problem: Skin/Tissue Integrity  Goal: Absence of new skin breakdown  Description: 1.  Monitor for areas of redness and/or skin breakdown  2.  Assess vascular access sites hourly  3.  Every 4-6 hours minimum:  Change oxygen saturation probe site  4.  Every 4-6 hours:  If on nasal continuous positive airway pressure, respiratory therapy assess nares and determine need for appliance change or resting period.  Outcome: Progressing     Problem: SLP Adult - Impaired Swallowing  Goal: By Discharge: Advance to least restrictive diet without signs or symptoms of aspiration for planned discharge setting.  See evaluation for individualized goals.  Description: Speech Therapy Swallow Goals    Initiated 2/9/2024    -Patient stated goal: pt unable to state        -Patient will tolerate PO trials without clinical indicators of aspiration given max cues within 7 day(s).  NPO      -Patient will participate in modified barium swallow study within 7 day(s), when medically appropriate.      -Patient will demonstrate understanding of swallow safety precautions and aspiration precautions, diet recs with max cues within 7 day(s).           2/9/2024 1134 by Dianna Grant, SLP  Outcome: Progressing     
OCCUPATIONAL THERAPY EVALUATION  Patient: Agustín Willis (78 y.o. male)  Date: 2/10/2024  Primary Diagnosis: FIDEL (acute kidney injury) ([facility]) [N17.9]  Edema, lower extremity [R60.0]  Altered mental status, unspecified altered mental status type [R41.82]  AMS (altered mental status) [R41.82]       Precautions: Fall Risk, NPO                Recommendations for nursing mobility: Frequent repositioning to prevent skin breakdown, LE elevation for management of edema, and Assist x2    In place during session:Peripheral IV, Nasal Cannula 3.5L, External Catheter, and EKG/telemetry   ASSESSMENT  Pt is a 78 y.o. male presenting to [facility] with c/o AMS, admitted 2/5/2024 and currently being treated for AMS, uncontrolled HTN, FIDEL on CKD, A-fib, hyperkalemia, rhabdomyolysis, lung cysts, fluid overload, polysubstance abuse, HLD, COPD, failure to thrive. Pt received semi-supine in bed upon arrival, AXO to person and place, requiring orientation to date and situation, and agreeable to OT evaluation. OT spoke w/ nursing prior to eval, who reports pt's fever has improved and pt is now appropriate for therapy. In addition, pt's BP has improved and was 123/75 in semi-supine in bed.     Based on current observations, pt presents with decreased  functional mobility, independence in ADLs, high-level IADLs, ROM, strength, activity tolerance, endurance, cognition, command following, attention/concentration, coordination (see below for objective details and assist levels).     Overall, pt tolerates session poor today with c/o 9/10 pain in neck at beginning of session, nsing notified. Pt was verbal and responsive throughout session, however, often required repetition of commands and multimodal cueing. OT assessed BUE ROM at bed level, pt presents w/ minimal AROM through BUEs. Pt able to tolerate ~70* PROM through L shoulder flexion and 110* PROM through R shoulder flexion. Pt reports having arthritis, impacting BUE ROM. Pt presents w/ edema through 
Speech LAnguage Pathology Dysphagia EVALUATION    Patient: Agustín Willis (78 y.o. male)  Date: 2/9/2024  Primary Diagnosis: FIDEL (acute kidney injury) ([facility]) [N17.9]  Edema, lower extremity [R60.0]  Altered mental status, unspecified altered mental status type [R41.82]  AMS (altered mental status) [R41.82]       Precautions: aspiration    DIET RECOMMENDATIONS: NPO, alternate source of nutrition as per PA    SWALLOW SAFETY PRECAUTIONS: oral care, contact PA for med orders, keep suction at bedside      ASSESSMENT :  Based on the objective data described below, the patient presents with severe oropharyngeal dysphagia negatively impacted by AMS.    Pt seen for bedside sw evaluation, requested due to improved mentation. Nsg held meds this date due to poor arousability.   Pt admitted with FIDEL, AMS, HTN, FIDEL on CKD, a-fib, hyperkalemia, rhabdo, lung cysts, fluid overload, polysubstance abuse, HLD, COPD, FTT. Pt has reportedly been lethargic. PA reports improved mentation this date.  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 Severe nonspecific white matter lesions may represent  chronic microangiopathic  versus amyloid angiopathy related white matter disease. Clinical correlation  advised. Nonspecific brain atrophy.\"    Pt with poor responsiveness but vocalizes in response to to clinician. Pt does not follow commands. Labored respirations noted. Pt does not follow commands. Pt's top dentures noted to be loose and removed from oral cavity and placed in cup with . Significant halitosis. Pt needs oral care, nsg aware. Pt accepts ice chip and thin via spoon trials.  Weak labial closure and no oral phase initiation.  Bolus spills from oral cavity. VERY delayed pharyngeal phase of swallow initiated x 1 of limited trials.  No further trials due to concerns for aspiration. Pt's reg/thin diet held by nsg this date. 
Speech LAnguage Pathology Dysphagia TREATMENT    Patient: Agustín Willis (78 y.o. male)  Date: 2/10/2024  Primary Diagnosis: FIDEL (acute kidney injury) (formerly [facility]) [N17.9]  Edema, lower extremity [R60.0]  Altered mental status, unspecified altered mental status type [R41.82]  AMS (altered mental status) [R41.82]       Precautions: Aspiration; Fall Risk, NPO                  ASSESSMENT :  Patient positioned upright, alert, and cooperative with IV flowing. Upon arrival he stated he was thirsty and wanted water. SLP provided ice chips, ice water via spoon, cup and straw. Oral phase: no deficits noted. SLP had to provide oral care for dried secretions. Slow mastication of ice chips noted, but functional. Pharyngeal phase: double swallows noted followed by wet vocalization. He was able to clear after numerous throat clears or cough/double swallows. No further trials provided. SLP will continue with PO trials.     PLAN :  Recommendations and Planned Interventions:  Diet: NPO  Possible alternate means of nutrition.     Acute SLP Services: Yes, SLP will continue to follow per plan of care.    Discharge Recommendations: Skilled Nursing Facility     SUBJECTIVE:   I want water.    OBJECTIVE:     Past Medical History:   Diagnosis Date    Ankle pain 8/3/2020    Benign prostatic disease 8/3/2020    Hypertrophy with Outflow Obstruction    Benign prostatic hyperplasia 8/3/2020    Bronchitis     Cervical radiculopathy 8/3/2020    Chronic obstructive lung disease (HCC) 8/3/2020    Cocaine abuse (formerly [facility]) 8/3/2020    Diarrhea 8/3/2020    Dizziness and giddiness 8/3/2020    Hypercholesterolemia 8/3/2020    Hypertensive disorder 8/3/2020    Insomnia 8/3/2020    Kidney disease 8/3/2020    Low back pain 8/3/2020    Osteoarthritis of knee 8/3/2020    Pain in breast 8/3/2020    Pain in left knee 8/3/2020    Prediabetes 8/3/2020    Seasonal allergic rhinitis 8/3/2020    Shoulder pain 8/3/2020    Sleep apnea 8/3/2020    Tinnitus of right ear 8/3/2020    
Speech LAnguage Pathology Dysphagia TREATMENT    Patient: Agustín Willis (78 y.o. male)  Date: 2/11/2024  Primary Diagnosis: FIDEL (acute kidney injury) ([facility]) [N17.9]  Edema, lower extremity [R60.0]  Altered mental status, unspecified altered mental status type [R41.82]  AMS (altered mental status) [R41.82]       Precautions: Aspiration, Fall Risk                   ASSESSMENT :  Based on the objective data described below, the patient presents with mild oral dysphagia. The patient was alert and oriented x4.  Patient carried more of a conversation this date and was able to tolerate all PO intake without difficulty. Patient presents with poor natural dentition. RN reported the MD ordered a pureed diet with moderately thick liquids that was at bedside. However, only liquids had been provided by staff. PO trials: ice water via straw, moderately thick and mildly thick liquids. Patient given pureed diet. Oral phase: slow mastication and manipulation of bolus, primarily due to minimal dentition. However, able to clear bolus with minimal oral residue. Pharyngeal phase: grossly intact; good HLE via digital palpation. Immediate coughing noted with thin liquids via cup and straw. He ate 100% of pureed tray with mildly thick liquids. MD notified of swallow results and diet recommendations. Nurse notified.     Patient will benefit from skilled intervention to address the above impairments.     PLAN :  Recommendations and Planned Interventions:  Diet: Puree and mildly thick liquids  Patient is a feeder; Please use Aspiration/GERD precautions.     Acute SLP Services: Yes, SLP will continue to follow per plan of care.    Discharge Recommendations: To Be Determined     SUBJECTIVE:   Thank you for helping me.    OBJECTIVE:     Past Medical History:   Diagnosis Date    Ankle pain 8/3/2020    Benign prostatic disease 8/3/2020    Hypertrophy with Outflow Obstruction    Benign prostatic hyperplasia 8/3/2020    Bronchitis     Cervical 
Restraints and nsg updated.    COMMUNICATION/EDUCATION:   The patient’s plan of care was discussed with: Case management    Patient Education  Education Given To: Patient  Education Provided: Role of Therapy;Plan of Care  Education Method: Verbal  Barriers to Learning: Cognition  Education Outcome: Continued education needed         Thank you for this referral.  Katt Sanz, PT  Minutes: 20

## 2024-02-13 NOTE — CARE COORDINATION
3:00pm CM received call from Louie Weinstein with [facility] at [phone]. He informed CM he has been following and trying to assist the patient for the past year with placement/resources. (CM reached out to the patient's sister to get permission to speak with Mr. Weinstein and confirm relationship)     Per Mr. Weinstein he is trying to help with placement for the patient and reports patient is service connected but he is trying to obtain his  to see how what he is eligible for. He reached out to [facility] and they asked that CM send referral for placement. Patient has [employer] and would still need insurance auth.     3:09pm CM obtained choice from patient's sister, Obdulio Cho, for [facility] along with with no preference should they decline. She was agreeable for referrals anywhere in the [place] to [place] area.    3:15pm [facility] declined patient. Additional referrals sent starting in the [place] area.     At this time patient has not been able to work with therapy due to lethargy/elevated Blood Pressures.     CM will continue to follow and see if patient is able to work with therapy as his insurance requires authorization.     *Louie Weinstein remains of assistance in placement for this patient and can be reached at [phone].          
CM has reviewed pt chart    DCP: from home with sister; CM received cons for poss SNF placement; pt will require 2 [place] inpatient stay for SNF placement, pt currently in Obs.     1500: Ted BOWSER came to visit pt, no further information provided by APS  
CM has reviewed pt chart    DCP: from home with sister; [facility] involved; awaiting PT/OT evals and recs    1150: CM spoke with PT and pt unable to follow commands and eval is unable to be completed, PT/OT orders to be dc'd at this time. Once pt is able to participate will need new PT/OT orders.     1314: CM called [facility] to speak with . CM left  for return call.     1407: CM spoke with [facility]. They reported pt is cleared to return to his home with his sister. Per APS sister is able to be updated on pt care.      1415: CM spoke with pt sister at length to discuss dispo. CM explained that pt is not able to participate with therapy at this time, but would like to discuss what his dc plan may be. Sister states pt can return home with the lady he rents a room from if he gets back to his baseline. CM explained he could go to rehab on a short term basis based on his insurance, but would need a long term payor (ie. Medicaid) in order to stay for a long term. Pt sister says pt likely not to qualify for medicaid as he receives a pension. Pt sister states she wishes to await more information on his clinical status or progress before making a further plan other than returning to pt former place of residence. CM to f/u at later time. Pt sister requested attending to give her a call to discuss pt clinical status.     1431: CM sent message to attending and informed sister requesting call from her.   
CM has reviewed pt chart    DCP: pt from home with sister; pending PT/OT evals and recs;  [facility] is involved    1027: GARRETT spoke with attending, pt will need to be evaluated by PT/OT before discharging to determine dispo.   
CM reviewed chart. [facility] accepted, auth is needed. PT and OT note needed to start auth. CM will continue to follow.  
CM reviewed. Patient with discharge order. Patient pending placement and insurance auth.  
CM was informed patient was for ready for discharge today pending PT and OT. Per nursing patient's BP is elevated and he has not yet worked with therapy. CM awaiting to see if patient is able to work with therapy and their recs.     Patient has no LTC payor but if SNF is recommended we could pursue that but will need insurance auth.     CM will follow once recs made.  
Insurance auth was approved for patient to go to [facility], however, they informed CM patient has to be out of [facility] x24 hours. Patient was taken out of [facility] around 8:30am per nursing. If he remains out he can discharge to [facility] tomorrow.  
  One/Two Story Residence One story   History of falls? 0   Services At/After Discharge   Transition of Care Consult (CM Consult) Discharge Planning   Services At/After Discharge None    Resource Information Provided? No   Mode of Transport at Discharge Other (see comment)  (Friend)   Confirm Follow Up Transport Friends     Advance Care Planning     General Advance Care Planning (ACP) Conversation    Date of Conversation: 2/5/2024  Conducted with:     Healthcare Decision Maker:    Primary Decision Maker: Obdulio Cho - Brother/Sister - [phone]  Click here to complete Healthcare Decision Makers including selection of the Healthcare Decision Maker Relationship (ie \"Primary\").   Today we     Content/Action Overview:    Reviewed DNR/DNI and patient       Length of Voluntary ACP Conversation in minutes:      Analilia Vela RN           CM met with pt & no confusion noted @ this time, but pt with confusion earlier ( pt pulled IV out blood on hands, body, & bed) nsg informed. D/C Plan is home with sister ( Obdulio Cho) & a friend to transport. Send Rxs to [facility] upon discharge. Uses cane. Pt gave verbal consent to OBS Notice.

## 2024-02-14 VITALS
SYSTOLIC BLOOD PRESSURE: 126 MMHG | RESPIRATION RATE: 18 BRPM | DIASTOLIC BLOOD PRESSURE: 79 MMHG | BODY MASS INDEX: 25.24 KG/M2 | OXYGEN SATURATION: 92 % | WEIGHT: 190.48 LBS | TEMPERATURE: 97.5 F | HEART RATE: 69 BPM | HEIGHT: 73 IN

## 2024-02-14 LAB
ALBUMIN SERPL-MCNC: 2 G/DL (ref 3.5–5)
ALBUMIN/GLOB SERPL: 0.4 (ref 1.1–2.2)
ALP SERPL-CCNC: 64 U/L (ref 45–117)
ALT SERPL-CCNC: 19 U/L (ref 12–78)
ANION GAP SERPL CALC-SCNC: 7 MMOL/L (ref 5–15)
AST SERPL W P-5'-P-CCNC: 21 U/L (ref 15–37)
BASOPHILS # BLD: 0 K/UL (ref 0–0.1)
BASOPHILS NFR BLD: 0 % (ref 0–1)
BILIRUB SERPL-MCNC: 0.4 MG/DL (ref 0.2–1)
BUN SERPL-MCNC: 63 MG/DL (ref 6–20)
BUN/CREAT SERPL: 39 (ref 12–20)
CA-I BLD-MCNC: 9.4 MG/DL (ref 8.5–10.1)
CHLORIDE SERPL-SCNC: 114 MMOL/L (ref 97–108)
CO2 SERPL-SCNC: 25 MMOL/L (ref 21–32)
CREAT SERPL-MCNC: 1.6 MG/DL (ref 0.7–1.3)
DIFFERENTIAL METHOD BLD: ABNORMAL
EOSINOPHIL # BLD: 0.2 K/UL (ref 0–0.4)
EOSINOPHIL NFR BLD: 3 % (ref 0–7)
ERYTHROCYTE [DISTWIDTH] IN BLOOD BY AUTOMATED COUNT: 13.1 % (ref 11.5–14.5)
GLOBULIN SER CALC-MCNC: 4.9 G/DL (ref 2–4)
GLUCOSE SERPL-MCNC: 113 MG/DL (ref 65–100)
HCT VFR BLD AUTO: 33.3 % (ref 36.6–50.3)
HGB BLD-MCNC: 10.3 G/DL (ref 12.1–17)
IMM GRANULOCYTES # BLD AUTO: 0 K/UL (ref 0–0.04)
IMM GRANULOCYTES NFR BLD AUTO: 0 % (ref 0–0.5)
LYMPHOCYTES # BLD: 1.6 K/UL (ref 0.8–3.5)
LYMPHOCYTES NFR BLD: 28 % (ref 12–49)
MCH RBC QN AUTO: 30.8 PG (ref 26–34)
MCHC RBC AUTO-ENTMCNC: 30.9 G/DL (ref 30–36.5)
MCV RBC AUTO: 99.7 FL (ref 80–99)
MONOCYTES # BLD: 0.7 K/UL (ref 0–1)
MONOCYTES NFR BLD: 12 % (ref 5–13)
NEUTS SEG # BLD: 3.2 K/UL (ref 1.8–8)
NEUTS SEG NFR BLD: 57 % (ref 32–75)
NRBC # BLD: 0 K/UL (ref 0–0.01)
NRBC BLD-RTO: 0 PER 100 WBC
PLATELET # BLD AUTO: 301 K/UL (ref 150–400)
PMV BLD AUTO: 10.4 FL (ref 8.9–12.9)
POTASSIUM SERPL-SCNC: 4.1 MMOL/L (ref 3.5–5.1)
PROT SERPL-MCNC: 6.9 G/DL (ref 6.4–8.2)
RBC # BLD AUTO: 3.34 M/UL (ref 4.1–5.7)
SODIUM SERPL-SCNC: 146 MMOL/L (ref 136–145)
WBC # BLD AUTO: 5.5 K/UL (ref 4.1–11.1)

## 2024-02-14 PROCEDURE — 94640 AIRWAY INHALATION TREATMENT: CPT

## 2024-02-14 PROCEDURE — 6370000000 HC RX 637 (ALT 250 FOR IP): Performed by: STUDENT IN AN ORGANIZED HEALTH CARE EDUCATION/TRAINING PROGRAM

## 2024-02-14 PROCEDURE — 80053 COMPREHEN METABOLIC PANEL: CPT

## 2024-02-14 PROCEDURE — 85025 COMPLETE CBC W/AUTO DIFF WBC: CPT

## 2024-02-14 PROCEDURE — 6360000002 HC RX W HCPCS: Performed by: STUDENT IN AN ORGANIZED HEALTH CARE EDUCATION/TRAINING PROGRAM

## 2024-02-14 PROCEDURE — 2580000003 HC RX 258: Performed by: STUDENT IN AN ORGANIZED HEALTH CARE EDUCATION/TRAINING PROGRAM

## 2024-02-14 PROCEDURE — 6370000000 HC RX 637 (ALT 250 FOR IP)

## 2024-02-14 PROCEDURE — 36415 COLL VENOUS BLD VENIPUNCTURE: CPT

## 2024-02-14 PROCEDURE — 96372 THER/PROPH/DIAG INJ SC/IM: CPT

## 2024-02-14 PROCEDURE — 94761 N-INVAS EAR/PLS OXIMETRY MLT: CPT

## 2024-02-14 PROCEDURE — 96376 TX/PRO/DX INJ SAME DRUG ADON: CPT

## 2024-02-14 RX ORDER — ASPIRIN 81 MG/1
81 TABLET ORAL DAILY
Qty: 30 TABLET | Refills: 1 | Status: SHIPPED
Start: 2024-02-15

## 2024-02-14 RX ORDER — FOLIC ACID 1 MG/1
1 TABLET ORAL DAILY
Qty: 30 TABLET | Refills: 0 | Status: SHIPPED
Start: 2024-02-15

## 2024-02-14 RX ORDER — THIAMINE MONONITRATE (VIT B1) 100 MG
100 TABLET ORAL DAILY
Qty: 30 TABLET | Refills: 0 | Status: SHIPPED
Start: 2024-02-15 | End: 2024-03-16

## 2024-02-14 RX ORDER — FERROUS SULFATE 325(65) MG
325 TABLET ORAL
Qty: 30 TABLET | Refills: 0 | Status: SHIPPED
Start: 2024-02-15

## 2024-02-14 RX ORDER — HYDRALAZINE HYDROCHLORIDE 10 MG/1
10 TABLET, FILM COATED ORAL 3 TIMES DAILY
Qty: 90 TABLET | Refills: 0 | Status: SHIPPED
Start: 2024-02-14 | End: 2024-03-15

## 2024-02-14 RX ADMIN — SODIUM BICARBONATE 325 MG: 650 TABLET ORAL at 21:46

## 2024-02-14 RX ADMIN — ASPIRIN 81 MG: 81 TABLET, COATED ORAL at 08:42

## 2024-02-14 RX ADMIN — SODIUM BICARBONATE 325 MG: 650 TABLET ORAL at 08:41

## 2024-02-14 RX ADMIN — HYDRALAZINE HYDROCHLORIDE 10 MG: 20 INJECTION, SOLUTION INTRAMUSCULAR; INTRAVENOUS at 08:42

## 2024-02-14 RX ADMIN — FOLIC ACID 1 MG: 1 TABLET ORAL at 08:41

## 2024-02-14 RX ADMIN — TRAZODONE HYDROCHLORIDE 50 MG: 50 TABLET ORAL at 21:47

## 2024-02-14 RX ADMIN — FINASTERIDE 5 MG: 5 TABLET, FILM COATED ORAL at 08:42

## 2024-02-14 RX ADMIN — SODIUM BICARBONATE 325 MG: 650 TABLET ORAL at 13:43

## 2024-02-14 RX ADMIN — METOPROLOL TARTRATE 25 MG: 25 TABLET, FILM COATED ORAL at 21:47

## 2024-02-14 RX ADMIN — FERROUS SULFATE TAB 325 MG (65 MG ELEMENTAL FE) 325 MG: 325 (65 FE) TAB at 08:41

## 2024-02-14 RX ADMIN — ENOXAPARIN SODIUM 40 MG: 100 INJECTION SUBCUTANEOUS at 08:42

## 2024-02-14 RX ADMIN — PANTOPRAZOLE SODIUM 40 MG: 40 TABLET, DELAYED RELEASE ORAL at 06:19

## 2024-02-14 RX ADMIN — THIAMINE HCL TAB 100 MG 100 MG: 100 TAB at 08:42

## 2024-02-14 RX ADMIN — Medication 1 PUFF: at 19:51

## 2024-02-14 RX ADMIN — HYDRALAZINE HYDROCHLORIDE 10 MG: 20 INJECTION, SOLUTION INTRAMUSCULAR; INTRAVENOUS at 21:41

## 2024-02-14 RX ADMIN — Medication 1 PUFF: at 08:57

## 2024-02-14 RX ADMIN — METOPROLOL TARTRATE 25 MG: 25 TABLET, FILM COATED ORAL at 08:41

## 2024-02-14 RX ADMIN — SODIUM CHLORIDE, PRESERVATIVE FREE 10 ML: 5 INJECTION INTRAVENOUS at 21:47

## 2024-02-14 NOTE — DISCHARGE SUMMARY
follow-up as noted below.    Discharge Exam:  Patient seen and examined by me on discharge day.  Pertinent Findings:  Patient Vitals for the past 24 hrs:   BP Temp Temp src Pulse Resp SpO2   02/14/24 1459 136/82 97.5 °F (36.4 °C) Oral 81 20 97 %   02/14/24 1243 103/62 98.2 °F (36.8 °C) Oral 80 24 98 %   02/14/24 0859 -- -- -- -- -- 97 %   02/14/24 0757 -- -- -- 82 -- --   02/14/24 0752 (!) 143/86 97.5 °F (36.4 °C) Oral 92 15 98 %   02/14/24 0341 135/88 -- Oral 74 20 100 %   02/14/24 0215 (!) 142/92 98 °F (36.7 °C) Oral 78 -- 97 %   02/13/24 1939 (!) 138/50 98 °F (36.7 °C) Oral 85 22 92 %       Gen:    Not in distress  Chest: Clear lungs  CVS:   Regular rate and rhythm.  Trace lower extremity edema  Abd:  Soft, not distended, not tender  Neuro: A&Ox3.     Discharge/Recent Laboratory Results:  Recent Labs     02/13/24  0652 02/14/24  0234   * 146*   K 4.0 4.1   * 114*   CO2 25 25   BUN 63* 63*   CREATININE 1.57* 1.60*   GLUCOSE 155* 113*   CALCIUM 9.6 9.4   MG 2.6*  --      Recent Labs     02/14/24  0234   HGB 10.3*   HCT 33.3*   WBC 5.5          Discharge Medications:     Medication List        START taking these medications      aspirin 81 MG EC tablet  Take 1 tablet by mouth daily  Start taking on: February 15, 2024     ferrous sulfate 325 (65 Fe) MG tablet  Commonly known as: IRON 325  Take 1 tablet by mouth daily (with breakfast)  Start taking on: February 15, 2024     folic acid 1 MG tablet  Commonly known as: FOLVITE  Take 1 tablet by mouth daily  Start taking on: February 15, 2024     hydrALAZINE 10 MG tablet  Commonly known as: APRESOLINE  Take 1 tablet by mouth 3 times daily     metoprolol tartrate 25 MG tablet  Commonly known as: LOPRESSOR  Take 1 tablet by mouth 2 times daily     vitamin B-1 100 MG tablet  Commonly known as: THIAMINE  Take 1 tablet by mouth daily  Start taking on: February 15, 2024            CHANGE how you take these medications      furosemide 40 MG tablet  Commonly

## 2024-02-15 NOTE — PROGRESS NOTES
CARDIOLOGY PROGRESS NOTE      Patient Name: Agustín Willis  Age: 78 y.o.  Gender:male  :1945  MRN: [id number]    Patient seen and examined. This is a patient with a history of hypertension, COPD, cocaine abuse, CKD who presented with altered mental status now being followed for tachycardia. Patient awake, alert today. Denies chest pain. No other complaints reported.    Telemetry reviewed, there were no events noted in the past 24 hours.    Pertinent review of systems items noted above, all other systems are negative. Current medications reviewed.    Physical Examination    Allergies   Allergen Reactions    Penicillin G Other (See Comments)     Pt states he passes out    Sulfamethoxazole-Trimethoprim      Pt states he passes out     Vitals:    24 1050   BP: (!) 148/99   Pulse: 88   Resp: 15   Temp: 97.7 °F (36.5 °C)   SpO2: 99%     Vital signs are stable  No apparent distress.  Heart has a RRR.  No murmur  Lungs are clear  Abdomen is soft, nontender, normal bowel sounds.  Extremities have no edema  Skin is dry and warm.  Normal affect    Labs reviewed:  Recent Results (from the past 12 hour(s))   CBC with Auto Differential    Collection Time: 24  6:52 AM   Result Value Ref Range    WBC 5.2 4.1 - 11.1 K/uL    RBC 3.40 (L) 4.10 - 5.70 M/uL    Hemoglobin 10.7 (L) 12.1 - 17.0 g/dL    Hematocrit 33.9 (L) 36.6 - 50.3 %    MCV 99.7 (H) 80.0 - 99.0 FL    MCH 31.5 26.0 - 34.0 PG    MCHC 31.6 30.0 - 36.5 g/dL    RDW 13.2 11.5 - 14.5 %    Platelets 268 150 - 400 K/uL    MPV 9.8 8.9 - 12.9 FL    Nucleated RBCs 0.0 0.0  WBC    nRBC 0.00 0.00 - 0.01 K/uL    Neutrophils % 64 32 - 75 %    Lymphocytes % 20 12 - 49 %    Monocytes % 14 (H) 5 - 13 %    Eosinophils % 2 0 - 7 %    Basophils % 0 0 - 1 %    Immature Granulocytes 0 0 - 0.5 %    Neutrophils Absolute 3.3 1.8 - 8.0 K/UL    Lymphocytes Absolute 1.1 0.8 - 3.5 K/UL    Monocytes Absolute 0.7 0.0 - 1.0 K/UL    Eosinophils Absolute 0.1 0.0 - 0.4 K/UL    
    CARDIOLOGY PROGRESS NOTE      Patient Name: Agustín Willis  Age: 78 y.o.  Gender:male  :1945  MRN: [id number]    Patient seen and examined. This is a patient with a history of hypertension, COPD, cocaine abuse, CKD who presented with altered mental status now being followed for tachycardia. Patient awake, alert today. Complaining of some right shoulder/ arm pain. No other complaints reported.    Telemetry reviewed, there were no events noted in the past 24 hours.    Pertinent review of systems items noted above, all other systems are negative. Current medications reviewed.    Physical Examination    Allergies   Allergen Reactions    Penicillin G Other (See Comments)     Pt states he passes out    Sulfamethoxazole-Trimethoprim      Pt states he passes out     Vitals:    24 0745   BP: (!) 157/91   Pulse: 73   Resp: 17   Temp: 97.7 °F (36.5 °C)   SpO2: 100%     Vital signs are stable  No apparent distress.  Heart has a RRR.  No murmur  Lungs are clear  Abdomen is soft, nontender, normal bowel sounds.  Extremities have no edema  Skin is dry and warm.  Normal affect    Labs reviewed:  Recent Results (from the past 12 hour(s))   CK    Collection Time: 24  4:58 AM   Result Value Ref Range    Total  39 - 308 U/L   Comprehensive Metabolic Panel    Collection Time: 24  4:58 AM   Result Value Ref Range    Sodium 147 (H) 136 - 145 mmol/L    Potassium 3.8 3.5 - 5.1 mmol/L    Chloride 118 (H) 97 - 108 mmol/L    CO2 23 21 - 32 mmol/L    Anion Gap 6 5 - 15 mmol/L    Glucose 125 (H) 65 - 100 mg/dL    BUN 71 (H) 6 - 20 mg/dL    Creatinine 1.75 (H) 0.70 - 1.30 mg/dL    Bun/Cre Ratio 41 (H) 12 - 20      Est, Glom Filt Rate 39 (L) >60 ml/min/1.73m2    Calcium 9.2 8.5 - 10.1 mg/dL    Total Bilirubin 0.4 0.2 - 1.0 mg/dL    AST 51 (H) 15 - 37 U/L    ALT 29 12 - 78 U/L    Alk Phosphatase 68 45 - 117 U/L    Total Protein 6.8 6.4 - 8.2 g/dL    Albumin 2.0 (L) 3.5 - 5.0 g/dL    Globulin 4.8 (H) 2.0 - 4.0 g/dL 
    Hospitalist Progress Note    NAME:   Agustín Willis   : 1945   MRN: [id number]     Date/Time: 2024 10:33 AM  Patient PCP: Gerry Taylor MD    Estimated discharge date: 24 hours  Barriers: PT/OT      HOSPITAL COURSE:    Agustín Willis is a 78 y.o.  male with PMHx significant for atrial fibrillation, COPD, HTN, HLD, and CKD presenting to the ED via EMS for altered mental status. Per prior reports, episode started about 10 minutes prior to arrival, but apparently a bystander on the scene states patient has been confused for the last 3-4 days. States that family has been taking patient to the bank to pull money from his bank account. Patient denies any alcohol or drug use. States, \"I came in for my back.\" Poor historian. Denies any headaches, dizziness, chills, chest pain, palpitations, cough, SOB, abdominal pain, N/V, dysuria, constipation, diarrhea, or extremity edema.   In the ED, stroke alert called. Vitals significant for /96, otherwise stable. Initial labs significant for potassium 5.2, BUN 43, Cr 1.68, and mildly elevated BNP. CK elevated 367. Ethyl alcohol level mildly elevated. UDS positive for cocaine and opiates. CT brain perfusion and CTA head/neck negative for acute intracranial abnormality, no large vessel occlusion or flow-limiting stenosis. Incidental finding of bilateral lung cysts. CT chest negative. CXR showed hypoinspiratory lungs with mild pulmonary fluid overload. We were asked to admit for work up and evaluation of the above problems. Tele-neurology recommending MRI brain, negative for acute infarct or hemorrhage, but noted multiple bilateral hemispheric microhemorrhages, likely cerebral angiopathy. Neuro recommending to start on Keppra 500 BID. Cardiology recommending aspirin for antiplatelet coverage.     Patient is medically stable. He would benefit from SNF. APS involved in case and case management aware. Awaiting PT/OT recommendations for discharge.     Assessment / 
    Hospitalist Progress Note    NAME:   Agustín Willis   : 1945   MRN: [id number]     Date/Time: 2024 10:50 AM  Patient PCP: Gerry Tyalor MD    Estimated discharge date: 24 hours  Barriers: PT/OT, placement       HOSPITAL COURSE:    Agustín Willis is a 78 y.o.  male with PMHx significant for atrial fibrillation, COPD, HTN, HLD, and CKD presenting to the ED via EMS for altered mental status. Per prior reports, episode started about 10 minutes prior to arrival, but apparently a bystander on the scene states patient has been confused for the last 3-4 days. States that family has been taking patient to the bank to pull money from his bank account. Patient denies any alcohol or drug use. States, \"I came in for my back.\" Poor historian. Denies any headaches, dizziness, chills, chest pain, palpitations, cough, SOB, abdominal pain, N/V, dysuria, constipation, diarrhea, or extremity edema.   In the ED, stroke alert called. Vitals significant for /96, otherwise stable. Initial labs significant for potassium 5.2, BUN 43, Cr 1.68, and mildly elevated BNP. CK elevated 367. Ethyl alcohol level mildly elevated. UDS positive for cocaine and opiates. CT brain perfusion and CTA head/neck negative for acute intracranial abnormality, no large vessel occlusion or flow-limiting stenosis. Incidental finding of bilateral lung cysts. CT chest negative. CXR showed hypoinspiratory lungs with mild pulmonary fluid overload. We were asked to admit for work up and evaluation of the above problems. Tele-neurology recommending MRI brain, negative for acute infarct or hemorrhage, but noted multiple bilateral hemispheric microhemorrhages, likely cerebral angiopathy. Neuro recommending to start on Keppra 500 BID. Patient developed worsening lethargy on . Requested tele-neurology follow-up with patient. It was deemed patient's sx unlikely seizure, discontinued keppra. Recommending follow-up CT head, which was negative. 
    Hospitalist Progress Note    NAME:   Agustín Willis   : 1945   MRN: [id number]     Date/Time: 2024 11:28 AM  Patient PCP: Gerry Taylor MD    Estimated discharge date: 24-48 hours  Barriers: placement      HOSPITAL COURSE:    Agustín Willis is a 78 y.o.  male with PMHx significant for atrial fibrillation, COPD, HTN, HLD, and CKD presenting to the ED via EMS for altered mental status. Per prior reports, episode started about 10 minutes prior to arrival, but apparently a bystander on the scene states patient has been confused for the last 3-4 days. States that family has been taking patient to the bank to pull money from his bank account. Patient denies any alcohol or drug use. States, \"I came in for my back.\" Poor historian. Denies any headaches, dizziness, chills, chest pain, palpitations, cough, SOB, abdominal pain, N/V, dysuria, constipation, diarrhea, or extremity edema.   In the ED, stroke alert called. Vitals significant for /96, otherwise stable. Initial labs significant for potassium 5.2, BUN 43, Cr 1.68, and mildly elevated BNP. CK elevated 367. Ethyl alcohol level mildly elevated. UDS positive for cocaine and opiates. CT brain perfusion and CTA head/neck negative for acute intracranial abnormality, no large vessel occlusion or flow-limiting stenosis. Incidental finding of bilateral lung cysts. CT chest negative. CXR showed hypoinspiratory lungs with mild pulmonary fluid overload. We were asked to admit for work up and evaluation of the above problems. Tele-neurology recommending MRI brain, negative for acute infarct or hemorrhage, but noted multiple bilateral hemispheric microhemorrhages, likely cerebral angiopathy. Neuro recommending to start on Keppra 500 BID. Patient developed worsening lethargy on . Requested tele-neurology follow-up with patient. It was deemed patient's sx unlikely seizure, discontinued keppra. Recommending follow-up CT head, which was negative. Cardiology 
    Hospitalist Progress Note    NAME:   Agustín Willis   : 1945   MRN: [id number]     Date/Time: 2024 3:56 PM  Patient PCP: Gerry Taylor MD    Estimated discharge date: 24-48 hours  Barriers: placement      HOSPITAL COURSE:    Agustín Willis is a 78 y.o.  male with PMHx significant for atrial fibrillation, COPD, HTN, HLD, and CKD presenting to the ED via EMS for altered mental status. Per prior reports, episode started about 10 minutes prior to arrival, but apparently a bystander on the scene states patient has been confused for the last 3-4 days. States that family has been taking patient to the bank to pull money from his bank account. Patient denies any alcohol or drug use. States, \"I came in for my back.\" Poor historian. Denies any headaches, dizziness, chills, chest pain, palpitations, cough, SOB, abdominal pain, N/V, dysuria, constipation, diarrhea, or extremity edema.   In the ED, stroke alert called. Vitals significant for /96, otherwise stable. Initial labs significant for potassium 5.2, BUN 43, Cr 1.68, and mildly elevated BNP. CK elevated 367. Ethyl alcohol level mildly elevated. UDS positive for cocaine and opiates. CT brain perfusion and CTA head/neck negative for acute intracranial abnormality, no large vessel occlusion or flow-limiting stenosis. Incidental finding of bilateral lung cysts. CT chest negative. CXR showed hypoinspiratory lungs with mild pulmonary fluid overload. We were asked to admit for work up and evaluation of the above problems. Tele-neurology recommending MRI brain, negative for acute infarct or hemorrhage, but noted multiple bilateral hemispheric microhemorrhages, likely cerebral angiopathy. Neuro recommending to start on Keppra 500 BID. Patient developed worsening lethargy on . Requested tele-neurology follow-up with patient. It was deemed patient's sx unlikely seizure, discontinued keppra. Recommending follow-up CT head, which was negative. Cardiology 
    Hospitalist Progress Note    NAME:   Agustín Willis   : 1945   MRN: [id number]     Date/Time: 2024 1:04 PM  Patient PCP: Gerry Taylor MD    Estimated discharge date: 24 hours  Barriers: PT/OT, placement       HOSPITAL COURSE:    Agustín Willis is a 78 y.o.  male with PMHx significant for atrial fibrillation, COPD, HTN, HLD, and CKD presenting to the ED via EMS for altered mental status. Per prior reports, episode started about 10 minutes prior to arrival, but apparently a bystander on the scene states patient has been confused for the last 3-4 days. States that family has been taking patient to the bank to pull money from his bank account. Patient denies any alcohol or drug use. States, \"I came in for my back.\" Poor historian. Denies any headaches, dizziness, chills, chest pain, palpitations, cough, SOB, abdominal pain, N/V, dysuria, constipation, diarrhea, or extremity edema.   In the ED, stroke alert called. Vitals significant for /96, otherwise stable. Initial labs significant for potassium 5.2, BUN 43, Cr 1.68, and mildly elevated BNP. CK elevated 367. Ethyl alcohol level mildly elevated. UDS positive for cocaine and opiates. CT brain perfusion and CTA head/neck negative for acute intracranial abnormality, no large vessel occlusion or flow-limiting stenosis. Incidental finding of bilateral lung cysts. CT chest negative. CXR showed hypoinspiratory lungs with mild pulmonary fluid overload. We were asked to admit for work up and evaluation of the above problems. Tele-neurology recommending MRI brain, negative for acute infarct or hemorrhage, but noted multiple bilateral hemispheric microhemorrhages, likely cerebral angiopathy. Neuro recommending to start on Keppra 500 BID. Patient developed worsening lethargy on . Requested tele-neurology follow-up with patient. It was deemed patient's sx unlikely seizure, discontinued keppra. Recommending follow-up CT head, which was negative. Cardiology 
  Physician Progress Note      PATIENT:               YOSVANY FITCH  CSN #:                  [id number]  :                       1945  ADMIT DATE:       2024 1:18 PM  DISCH DATE:  RESPONDING  PROVIDER #:        Venkata Paredes PA-C        QUERY TEXT:    Stage of Chronic Kidney Disease: Please provide further specificity, if known.    Clinical indicators include: ckd, bun, cr, bnp, creatinine, brain natriuretic   peptide, pro-bnp  Options provided:  -- Chronic kidney disease stage 1  -- Chronic kidney disease stage 2  -- Chronic kidney disease stage 3  -- Chronic kidney disease stage 3a  -- Chronic kidney disease stage 3b  -- Chronic kidney disease stage 4  -- Chronic kidney disease stage 5  -- Chronic kidney disease stage 5, requiring dialysis  -- End stage renal disease  -- Other - I will add my own diagnosis  -- Disagree - Not applicable / Not valid  -- Disagree - Clinically Unable to determine / Unknown        PROVIDER RESPONSE TEXT:    The patient has chronic kidney disease stage 3a.      Electronically signed by:  Venkata Paredes PA-C 2024 8:48 AM          
  Physician Progress Note      PATIENT:               YOSVANY FITCH  [facility] #:                  [id number]  :                       1945  ADMIT DATE:       2024 1:18 PM  DISCH DATE:  RESPONDING  PROVIDER #:        Venkata Paredes PA-C          QUERY TEXT:    Patient admitted with polysubstance abuse.  Noted documentation of Acute   Kidney Injury in H&P, IM PN .  In order to support the diagnosis of FIDEL,   please include additional clinical indicators in your documentation.? Or   please document if the diagnosis of FIDEL has been ruled out after further   study.    The medical record reflects the following:  Risk Factors: 78 year old male, CKD, HTN, FTT  Clinical Indicators: Per H&P, IM PN  \"FIDEL on CKD\",  Creatinine: 1.68-1.62-1.53-1.92 (Creatinine 10/4/23: 1.48)  BUN/Creatinine Ratio: --  Attending query response: The patient has chronic kidney disease stage 3a.  Treatment: IVF NS bolus x 1, IVF NS 100ml/hr, Serial lab    Defined by Kidney Disease Improving Global Outcomes (KDIGO) clinical practice   guideline for acute kidney injury:  -Increase in SCr by greater than or equal to 0.3 mg/dl within 48 hours; or  -Increase or decrease in SCr to greater than or equal to 1.5 times baseline,   which is known or presumed to have occurred within the prior 7 days; or  -Urine volume < 0.5ml/kg/h for 6 hours.      Please email [email or web address] with any questions  Options provided:  -- Acute kidney injury evidenced by, Please document evidence as well as a   numerical baseline creatinine, if known.  -- CKD Only, Please specify stage  -- Other - I will add my own diagnosis  -- Disagree - Not applicable / Not valid  -- Disagree - Clinically unable to determine / Unknown  -- Refer to Clinical Documentation Reviewer    PROVIDER RESPONSE TEXT:    This patient has acute kidney injury as evidenced by elevated Cr 1.92 from   baseline    Query created by: Sheryl Bloom on 2024 6:18 
 PT /96 medicated with 10mg of Apresoline PRN order. Pt  alert opens eyes. NPO. Urine output noted dark bahman color.  Reported a run of SVT per monitor.   
1930 hrs: Noted patient was unresponsive, only withdraws to painful stimuli (GCS [id number]).     Rapid Response/Stroke Code activated, noted normal Sinus rhythm to Sinus Tachycardia on monitor, BP normal, no desaturations seen.   STAT CT Brain done for possible stroke, mild left facial droop noted with drooling. Dr. Whelan seen and reviewed patient at   CT Scan. Ordered STAT ABG (no C02 retention noted).   Dr. Allen (Tele-neurologist) seen and reviewed patient's notes, CT Brain,unremarkable. No further orders made from neuro POV.     To manage patient in dumont level.      
4 Eyes Skin Assessment     NAME:  Agustín Willis  YOB: 1945  MEDICAL RECORD NUMBER:  [id number]    The patient is being assessed for  Admission    I agree that at least one RN has performed a thorough Head to Toe Skin Assessment on the patient. ALL assessment sites listed below have been assessed.      Areas assessed by both nurses:    Head, Face, Ears, Shoulders, Back, Chest, Arms, Elbows, Hands, Sacrum. Buttock, Coccyx, Ischium, and Legs. Feet and Heels        Does the Patient have a Wound? No noted wound(s)       Luisito Prevention initiated by RN: No  Wound Care Orders initiated by RN: No    Pressure Injury (Stage 3,4, Unstageable, DTI, NWPT, and Complex wounds) if present, place Wound referral order by RN under : No    New Ostomies, if present place, Ostomy referral order under : No     Nurse 1 eSignature: Electronically signed by Becca Lockhart RN on 2/6/24 at 2:31 AM EST    **SHARE this note so that the co-signing nurse can place an eSignature**    Nurse 2 eSignature: {Esignature:[id number]}   
4 Eyes Skin Assessment     NAME:  Agustín Willis  YOB: 1945  MEDICAL RECORD NUMBER:  [id number]    The patient is being assessed for  Admission    I agree that at least one RN has performed a thorough Head to Toe Skin Assessment on the patient. ALL assessment sites listed below have been assessed.      Areas assessed by both nurses:    Head, Face, Ears, Shoulders, Back, Chest, Arms, Elbows, Hands, Sacrum. Buttock, Coccyx, Ischium, and Legs. Feet and Heels        Does the Patient have a Wound? No noted wound(s)       Luisito Prevention initiated by RN: No  Wound Care Orders initiated by RN: No    Pressure Injury (Stage 3,4, Unstageable, DTI, NWPT, and Complex wounds) if present, place Wound referral order by RN under : No    New Ostomies, if present place, Ostomy referral order under : No     Nurse 1 eSignature: Electronically signed by Becca Lockhart RN on 2/6/24 at 7:04 AM EST    **SHARE this note so that the co-signing nurse can place an eSignature**    Nurse 2 eSignature: {Esignature:[id number]}   
4 Eyes Skin Assessment     NAME:  Agustín Willis  YOB: 1945  MEDICAL RECORD NUMBER:  [id number]    The patient is being assessed for  Transfer to New Unit    I agree that at least one RN has performed a thorough Head to Toe Skin Assessment on the patient. ALL assessment sites listed below have been assessed.      Areas assessed by both nurses:    Head, Face, Ears, Shoulders, Back, Chest, Arms, Elbows, Hands, Sacrum. Buttock, Coccyx, Ischium, Legs. Feet and Heels, and Under Medical Devices         Does the Patient have a Wound? Yes wound(s) were present on assessment. LDA wound assessment was Initiated and completed by RN  Small open area present on left forearm       Luisito Prevention initiated by RN: Yes  Wound Care Orders initiated by RN: No    Pressure Injury (Stage 3,4, Unstageable, DTI, NWPT, and Complex wounds) if present, place Wound referral order by RN under : No    New Ostomies, if present place, Ostomy referral order under : No     Nurse 1 eSignature: Electronically signed by Priscila Mullins RN on 2/11/24 at 12:48 AM EST    **SHARE this note so that the co-signing nurse can place an eSignature**    Nurse 2 eSignature: Electronically signed by Ann Marie Khan RN on 2/11/24 at 12:49 AM EST   
4 Eyes Skin Assessment     NAME:  Agustín Willis  YOB: 1945  MEDICAL RECORD NUMBER:  [id number]    The patient is being assessed for  Other Per protocol    I agree that at least one RN has performed a thorough Head to Toe Skin Assessment on the patient. ALL assessment sites listed below have been assessed.      Areas assessed by both nurses:    Shoulders, Back, Chest, Arms, Elbows, Hands, Sacrum. Buttock, Coccyx, Ischium, and Legs. Feet and Heels        Does the Patient have a Wound? No noted wound(s)       Luisito Prevention initiated by RN: Yes  Wound Care Orders initiated by RN: No    Pressure Injury (Stage 3,4, Unstageable, DTI, NWPT, and Complex wounds) if present, place Wound referral order by RN under : No    New Ostomies, if present place, Ostomy referral order under : No     Nurse 1 eSignature: Electronically signed by Pam Thompson RN on 2/8/24 at 8:37 AM EST    **SHARE this note so that the co-signing nurse can place an eSignature**    Nurse 2 eSignature: Electronically signed by Javan Soria RN on 2/8/24 at 4:49 PM EST    
CM noted discharge order. CM called and spoke to Nataliya Joya. Patient will be going to room 203 at [facility]. Nursing report can be called to [phone].     CM attempted to call sister at [phone]. Left voicemail for return call to inform of discharge.     1608 pm  CM called again and left message for sister Obdulio Cho at [phone] to notify of discharge to [facility] room 203 today. CM requested family transport or pay for hospital to home wheelchair service.         
Comprehensive Nutrition Assessment    Type and Reason for Visit:  Initial, RD Nutrition Re-Screen/LOS    Nutrition Recommendations/Plan:   Ensure HP BID  Montior labs, intake, GI symptoms, wt      Malnutrition Assessment:  Malnutrition Status:  Insufficient data (02/13/24 1444)      Nutrition Assessment:    Pt with PMH of hypertension, COPD, cocaine abuse, CKD who presented with altered mental status now being followed for tachycardia. Noted pt disoriented x2, and poor historian. Per chart review, pt with some wt loss since 5/18/23, indicating 8.9% loss in 10 months. Not yet significant. Pt however denies wt loss, reports UBW of 185lbs. Per plate observation pt consuming ~50% meals. RD to initiate ONS. No GI symptoms noted. Meds: folic acid, B1, protonix Labs: Na 147, Mg 2.6, .    Nutrition Related Findings:    No deficts noted per NFPE, poor dentition noted, no N/V/D/C reported. LBM PTA. L/R etremity + 1 pitting. Wound Type: None       Current Nutrition Intake & Therapies:    Average Meal Intake:  (~50%)  Average Supplements Intake: None Ordered  ADULT DIET; Dysphagia - Pureed; Mildly Thick (Nectar)  ADULT ORAL NUTRITION SUPPLEMENT; Dinner, Breakfast; Low Calorie/High Protein Oral Supplement    Anthropometric Measures:  Height: 185.4 cm (6' 1\")  Ideal Body Weight (IBW): 184 lbs (84 kg)       Current Body Weight: 86.4 kg (190 lb 7.6 oz),   IBW. Weight Source: Bed Scale  Current BMI (kg/m2): 25.1        Weight Adjustment For: No Adjustment                 BMI Categories: Overweight (BMI 25.0-29.9)    Estimated Daily Nutrient Needs:  Energy Requirements Based On: Kcal/kg  Weight Used for Energy Requirements: Current  Energy (kcal/day): 1900- 2160 kcal/day  Weight Used for Protein Requirements: Current  Protein (g/day): 104- 130g pro  Method Used for Fluid Requirements: 1 ml/kcal  Fluid (ml/day): 1900- 2160 ml per day or per MD    Nutrition Diagnosis:   Inadequate protein-energy intake related to swallowing 
Discharge paperwork started. Patient not leaving today per primary nurse.  
Echo completed. Report to follow.     
Mittens were taking off of patient around 0830 on 2/13/2024. Mittens will be left off for the rest of the patients stay at the hospital.  
OCCUPATIONAL THERAPY TREATMENT  Patient: Agustín Willis (78 y.o. male)  Date: 2/12/2024  Primary Diagnosis: FIDEL (acute kidney injury) (HCC) [N17.9]  Edema, lower extremity [R60.0]  Altered mental status, unspecified altered mental status type [R41.82]  AMS (altered mental status) [R41.82]       Precautions: Fall Risk                Recommendations for nursing mobility: Frequent repositioning to prevent skin breakdown, Use of bed/chair alarm for safety, and Assist x2    In place during session: Peripheral IV and EKG/telemetry   Chart, occupational therapy assessment, plan of care, and goals were reviewed.  ASSESSMENT  Patient continues with skilled OT services and is progressing towards goals. Pt semi supine in bed w/ B mitts on and O2 out upon OT arrival, agreeable to session. Pt A&O x 2. O2 placed back on and stat checked at 98.  B mitts removed w/ edema noted in R hand. Informed RN of swelling. Pt completed light grooming semi supine in bed  and UE therex w/ min verbal/ visual cues, see grid below for details. Therex completed to maintain/ increase strength and endurance to aid in adl performance.  Mitt replaced at end of session.   All known needs met. (See below for objective details and assist levels).     Overall pt tolerated session fair today with rest breaks.  Pt pleasant and cooperative during session. Potential barriers for safe discharge: pts current support system is unable to meet their requirements for physical assistance, pt has poor safety awareness, pt has impaired cognition, pt is a high fall risk, pt is not safe to be alone, and concern for pt safely navigating or managing the home environment. Current OT recommendations for discharge Skilled Nursing Facility. Will continue to benefit from skilled OT services, and will continue to progress as tolerated.      Start of Session End of Session   SPO2 (%) 98 97   Heart Rate (BPM) 86 84     GOALS:    Problem: Occupational Therapy - Adult  Goal: By 
OT eval order received and acknowledged. OT eval attempted at 933am spoke w/ nsing, pt's BP is improving, however, pt now w/ fever. Nsing advised to hold until fever has come down. Will continue to follow patient and attempt OT eval at a later time. Thank you.   
OT eval order received and acknowledged. OT eval attempted at 9:18 AM however pt's /100. Will continue to follow patient and attempt OT eval at a later time when medically appropriate. Thank you.   
OT eval order received and acknowledged. OT eval attempted at 9:26 AM however pt lethargic, having difficulty following commands, and difficulty keep eyes open. Will continue to follow patient and attempt OT eval at a later time. Thank you.   
Oral suctioning done. About 20ml of frothy secretions removed. Patient looks alert, and made comfortable in bed.  
PT eval order received and acknowledged. PT eval attempted at 0843 however pt very lethargic and with decreased responses to orientation questions, limited eye opening. Pt declined therapy at this time stating \"not right now\" despite max encouragement for OOB mobility. Will continue to follow patient and attempt PT eval at a later time. Thank you.    
PT eval order received and acknowledged. PT eval attempted at 1133 however pt continues to be lethargic with minimal responses to commands. Pt with improved responsiveness this date compared to most recent ATT, opens eyes with verbal commands, is oriented to self and place and answers with soft mumbles. Pt presents with increased oral secretions and foam, SLP and RN notified and addressing.  Pt demos light  bilaterally with cues to squeeze fingers, wiggles R toes with verbal and tactile cues, unable to wiggle L. Pt unable to move BLE/BUE further with commands. BP continues to be elevated (175/106). Pt continues to be inappropriate to attempt OOB mobility at this time d/t safety concerns d/t limited command following and alertness, will continue to follow patient and attempt PT eval at a later time. Thank you.    
PT eval order received and acknowledged. PT eval attempted x3 at 1140 however pt continues to present with significant lethargy, does not follow commands. Pt does not open eyes to sternal rub. Pt is inappropriate for therapy at this time, will discontinue current PT orders. Please re-consult if functional status changes. Thank you.    
PT eval order received and acknowledged. X2 PT eval attempted at 0930 however PT observed OT and per OT report, pt continues to be lethargic and is unable to follow commands with limited eye opening. Will continue to follow patient and attempt PT eval at a later time. Thank you.    
Patient is lethargic, he withdraws from pain. Vital signs checked and recorded. SPO2 is 99% on room air. INO2 discontinued. Morning medications held. Attending provider Venkata JACKSON notified.  
Patient is still lethargic, he responds to painful stimuli and falls back to sleep.  Morning oral medications not given. Vitals signs checked and recorded. /111mmHg. Rechecked and recorded 196/100mmHg, IV hydralazine 10mg given.   
Patient lives with his sister, but recommendations from PT/OT for patient to discharge to SNF. Patient accepted at [facility] and auth obtained but patient needed to be out of mitts for 24 hours. Mitts were removed yesterday at 8:30 am. Patient should be able to discharge today if medically clear. Awaiting bed assignment at [facility].     
Pt continues to be lethargic,unable to give 2100 medications opens eyes and responds to verbal and tactile stimuli but falls back to sleep. Vitals stable.   
Report given to Caroline Blanco  
Spiritual Care Assessment/Progress Note  [facility]    Name: Agustín Willis MRN: [id number]    Age: 78 y.o.     Sex: male   Language: English     Date: 2/14/2024            Total Time Calculated: 17 min              Spiritual Assessment begun in [facility] [place] [facility]  Service Provided For:: Patient  Referral/Consult From:: Rounding  Encounter Overview/Reason : Initial Encounter    Spiritual beliefs:      [x] Involved in a yamini tradition/spiritual practice: Hinduism     [x] Supported by a yamini community: [place] [employer]ism     [] Claims no spiritual orientation:      [] Seeking spiritual identity:           [] Adheres to an individual form of spirituality:      [] Not able to assess:                Identified resources for coping and support system:   Support System: Children, Family members       [x] Prayer                  [] Devotional reading               [] Music                  [] Guided Imagery     [] Pet visits                                        [] Other: (COMMENT)     Specific area/focus of visit   Encounter:    Crisis:    Spiritual/Emotional needs: Type: Spiritual Support  Ritual, Rites and Sacraments: Type: Blessings  Grief, Loss, and Adjustments:    Ethics/Mediation:    Behavioral Health:    Palliative Care:    Advance Care Planning:      Plan/Referrals: Other (Comment) ( is available if needed)    Narrative:  conferred with RNEula prior to visit. Physician is consulting with pt when  arrives. Pt is lying in bed. He admits he is feeling fair. Pt shares in life review/storytelling. Pt shares about familial relationships, former  career, and his yamini. Pt reports he prays daily. Pt believes in treating others the way he wants to be treated. Pt letitia by taking one day at a time. Pt expresses his gratitude for the prayer and the visit.     invited pt to articulate his feelings/concerns.  listened empathically as pt shared. 
imaging: CT brain perfusion, CTA head/neck, CXR, MRI brain  Toxic drug monitoring:  Lovenox for signs of bleeding  Discussed case with: patient and nursing        Code Status: FULL  DVT Prophylaxis: Lovenox   GI Prophylaxis: Protonix     Subjective:     Chief Complaint / Reason for Physician Visit    Patient seen and examined at bedside.  He is lethargic. Confused.  Discussed with RN events overnight.       Objective:     VITALS:   Last 24hrs VS reviewed since prior progress note. Most recent are:  Patient Vitals for the past 24 hrs:   BP Temp Temp src Pulse Resp SpO2   02/07/24 1145 (!) 177/93 -- -- 94 -- --   02/07/24 0827 -- -- -- -- -- 97 %   02/07/24 0739 (!) 186/89 98.2 °F (36.8 °C) Oral 94 18 97 %   02/06/24 2006 (!) 166/96 98.5 °F (36.9 °C) Oral 80 20 100 %   02/06/24 1517 (!) 139/98 98.1 °F (36.7 °C) Oral 84 19 96 %           Intake/Output Summary (Last 24 hours) at 2/7/2024 1429  Last data filed at 2/6/2024 1730  Gross per 24 hour   Intake 0 ml   Output 100 ml   Net -100 ml          I had a face to face encounter and independently examined this patient on 2/7/2024, as outlined below:    Review of Systems   Reason unable to perform ROS: AMS.   Constitutional:  Negative for fever.   Respiratory:  Negative for cough and shortness of breath.    Cardiovascular:  Negative for chest pain, palpitations and leg swelling.   Gastrointestinal:  Negative for abdominal pain, diarrhea, nausea and vomiting.   Musculoskeletal:  Positive for back pain.   Neurological:  Negative for headaches.         PHYSICAL EXAM:  Constitutional:       Appearance: He is ill-appearing.   HENT:      Head: Normocephalic and atraumatic.   Eyes:      Pupils: Pupils are equal, round, and reactive to light.   Cardiovascular:      Rate and Rhythm: Normal rate and regular rhythm.      Heart sounds: No murmur heard.     No friction rub. No gallop.   Pulmonary:      Effort: Pulmonary effort is normal.      Breath sounds: Rhonchi present. No wheezing 
on CT head  - However, CT chest negative. No further work-up.      Fluid overload  - S/p one dose IV lasix   - Otherwise hold diuretics until FIDEL improves   - Echocardiogram LVEF 65-70%, LVH      Polysubstance abuse  - Ethyl alcohol level mildly elevated  - CIWA protocol   - UDS positive for cocaine and opiates       HLD  - Lipid panel  - Continue statin     COPD  - Not in exacerbation  - Continue home inhalers: Flovent      Failure to thrive   - APS case. Per prior report, woman taking care of patient was taking him to the bank to pull out his money.  - Case management consult  - PT/OT eval            Medical Decision Making:   I personally reviewed labs: Cr 1.57,   I personally reviewed imaging: CT brain perfusion, CTA head/neck, CXR, MRI brain  Toxic drug monitoring:  Lovenox for signs of bleeding  Discussed case with: patient and nursing. Updated sister, Obdulio Cho, over the phone.        Code Status: FULL  DVT Prophylaxis: Lovenox   GI Prophylaxis: Protonix     Subjective:     Chief Complaint / Reason for Physician Visit    Patient seen and examined at bedside.  He is more responsive today. Carrying on conversation. Asking for ice water.  I have consulted Speech Therapy to re-evaluate patient.  I have also asked the RN to have PT and OT work with him. Patient does not have true temperature, advised OT this is not a reason to not work with patient.  Discussed with RN events overnight.       Objective:     VITALS:   Last 24hrs VS reviewed since prior progress note. Most recent are:  Patient Vitals for the past 24 hrs:   BP Temp Temp src Pulse Resp SpO2   02/10/24 0915 133/78 100.1 °F (37.8 °C) -- 61 -- --   02/10/24 0826 (!) 152/102 99.1 °F (37.3 °C) Oral 62 20 98 %   02/10/24 0602 (!) 140/78 -- -- 61 -- --   02/10/24 0132 135/82 -- -- -- -- --   02/09/24 2309 (!) 177/96 -- -- -- -- --   02/09/24 2259 (!) 177/96 98.4 °F (36.9 °C) Oral 51 18 97 %   02/09/24 2154 -- -- -- 57 18 97 %   02/09/24 2014 (!) 149/90 
     Est, Glom Filt Rate 42 (L) >60 ml/min/1.73m2    Calcium 8.8 8.5 - 10.1 mg/dL   CK    Collection Time: 02/09/24  5:04 AM   Result Value Ref Range    Total  (H) 39 - 308 U/L   Brain Natriuretic Peptide    Collection Time: 02/09/24  5:04 AM   Result Value Ref Range    NT Pro- <450 pg/mL       ?     Imaging:         CT CHEST WO CONTRAST    Result Date: 2/7/2024  INDICATION: Aspiration COMPARISON: 2/5/2024 CONTRAST: None. TECHNIQUE:  5 mm axial images were obtained through the chest. Coronal and sagittal reformats were generated.  CT dose reduction was achieved through use of a standardized protocol tailored for this examination and automatic exposure control for dose modulation. The absence of intravenous contrast reduces the sensitivity for evaluation of the mediastinum, china, vasculature, and upper abdominal organs. FINDINGS: CHEST WALL: No mass or axillary lymphadenopathy. THYROID: No nodule. MEDIASTINUM: No mass or lymphadenopathy. CHINA: No mass or lymphadenopathy. THORACIC AORTA: No aneurysm. MAIN PULMONARY ARTERY: Normal in caliber. TRACHEA/BRONCHI: Patent. ESOPHAGUS: No wall thickening or dilatation. HEART: Left ventricular dilatation. Coronary artery calcium: present PLEURA: No effusion or pneumothorax. LUNGS: Minimal bilateral lower lobe atelectasis is noted. INCIDENTALLY IMAGED UPPER ABDOMEN: No significant abnormality in the incidentally imaged upper abdomen. BONES: Degenerative changes are seen in the thoracic spine.     Minimal bilateral lower lobe atelectasis otherwise no acute abnormality is identified.    CT HEAD WO CONTRAST    Result Date: 2/7/2024  EXAM: CT CODE NEURO HEAD WO CONTRAST INDICATION: Stroke alert COMPARISON: 2/5/2020. CONTRAST: None. TECHNIQUE: Unenhanced CT of the head was performed using 5 mm images. Brain and bone windows were generated. Coronal and sagittal reformats. CT dose reduction was achieved through use of a standardized protocol tailored for this examination 
  HENT:      Head: Normocephalic and atraumatic.   Eyes:      Pupils: Pupils are equal, round, and reactive to light.   Cardiovascular:      Rate and Rhythm: Normal rate and regular rhythm.      Heart sounds: No murmur heard.     No friction rub. No gallop.   Pulmonary:      Effort: Pulmonary effort is normal.      Breath sounds: Rhonchi present. No wheezing or rales.   Abdominal:      General: Abdomen is flat. There is no distension.      Palpations: Abdomen is soft.      Tenderness: There is no abdominal tenderness.   Musculoskeletal:         General: Normal range of motion.      Right lower leg: Edema present.      Left lower leg: Edema present.   Skin:     General: Skin is dry.      Coloration: Skin is pale.   Neurological:      Mental Status: He is alert. He is disoriented.      Cranial Nerves: No cranial nerve deficit.      Comments: Normal speech. No facial droop.   Psychiatric:         Mood and Affect: Mood normal.         Behavior: Behavior normal.     Reviewed most current lab test results and cultures  YES  Reviewed most current radiology test results   YES  Review and summation of old records today    NO  Reviewed patient's current orders and MAR    YES  PMH/SH reviewed - no change compared to H&P  ________________________________________________________________________  Care Plan discussed with:    Comments   Patient x    Family      RN x    Care Manager     Consultant                        Multidiciplinary team rounds were held today with , nursing, pharmacist and clinical coordinator.  Patient's plan of care was discussed; medications were reviewed and discharge planning was addressed.     ________________________________________________________________________  Total NON critical care TIME:  35  Minutes    Total CRITICAL CARE TIME Spent:   Minutes non procedure based      Comments   >50% of visit spent in counseling and coordination of care   
0.0 0.0  WBC    nRBC 0.00 0.00 - 0.01 K/uL       ?     Imaging:         CT CHEST WO CONTRAST    Result Date: 2/7/2024  INDICATION: Aspiration COMPARISON: 2/5/2024 CONTRAST: None. TECHNIQUE:  5 mm axial images were obtained through the chest. Coronal and sagittal reformats were generated.  CT dose reduction was achieved through use of a standardized protocol tailored for this examination and automatic exposure control for dose modulation. The absence of intravenous contrast reduces the sensitivity for evaluation of the mediastinum, china, vasculature, and upper abdominal organs. FINDINGS: CHEST WALL: No mass or axillary lymphadenopathy. THYROID: No nodule. MEDIASTINUM: No mass or lymphadenopathy. CHINA: No mass or lymphadenopathy. THORACIC AORTA: No aneurysm. MAIN PULMONARY ARTERY: Normal in caliber. TRACHEA/BRONCHI: Patent. ESOPHAGUS: No wall thickening or dilatation. HEART: Left ventricular dilatation. Coronary artery calcium: present PLEURA: No effusion or pneumothorax. LUNGS: Minimal bilateral lower lobe atelectasis is noted. INCIDENTALLY IMAGED UPPER ABDOMEN: No significant abnormality in the incidentally imaged upper abdomen. BONES: Degenerative changes are seen in the thoracic spine.     Minimal bilateral lower lobe atelectasis otherwise no acute abnormality is identified.    CT HEAD WO CONTRAST    Result Date: 2/7/2024  EXAM: CT CODE NEURO HEAD WO CONTRAST INDICATION: Stroke alert COMPARISON: 2/5/2020. CONTRAST: None. TECHNIQUE: Unenhanced CT of the head was performed using 5 mm images. Brain and bone windows were generated. Coronal and sagittal reformats. CT dose reduction was achieved through use of a standardized protocol tailored for this examination and automatic exposure control for dose modulation. Study is limited by motion artifact FINDINGS: Ventricles and cisterns are enlarged compatible with the overall degree of volume. Severe low-density in the periventricular white matter. There is no 
Clinical Factors: EOB, facial care               Therapeutic exercise:    Exercise Sets Reps AROM AAROM PROM Self PROM Comments   Elbow E/F 1 10 [] [x]R UE [] [] EOB   Chest press 1 10 [] [x] [] []    Wrist E/F 1 10 [] [x] [] []         Pain Ratin/10  Pain Intervention(s):       Activity Tolerance:   Fair  and requires rest breaks    After treatment patient left in no apparent distress:   Bed locked and returned to lowest position, Patient left in no apparent distress in bed, Call bell within reach, Bed/ chair alarm activated, Side rails x3, and left on bed pan , and nsg updated     COMMUNICATION/EDUCATION:   The patient’s plan of care was discussed with: Physical therapy assistant and Registered nurse Co-tx with PTA for increased pt/clinician safety with OOB activity         Patient Education  Education Given To: Patient  Education Provided: Role of Therapy;Home Exercise Program;Plan of Care;Transfer Training;Fall Prevention Strategies;Equipment;Orientation  Education Method: Demonstration;Verbal  Barriers to Learning: Cognition  Education Outcome: Demonstrated understanding;Continued education needed    Thank you for this referral.  LESLIE Ribeiro  Minutes: 42   
see electronic medical records for all procedures/Xrays and details which were not copied into this note but were reviewed prior to creation of Plan.      LABS:  I reviewed today's most current labs and imaging studies.  Pertinent labs include:  Recent Labs     02/05/24  1357 02/06/24  0528   WBC 6.8 6.2   HGB 11.8* 11.6*   HCT 36.5* 35.3*    211     Recent Labs     02/05/24  1357 02/06/24  0528    137   K 5.2* 5.2*    107   CO2 22 22   BUN 43* 46*   MG 1.7  --    ALT 21 24   INR 1.0  --        Signed: Venkata Paredes PA-C

## 2024-02-16 LAB
BACTERIA SPEC CULT: NORMAL
BACTERIA SPEC CULT: NORMAL
Lab: NORMAL
Lab: NORMAL

## 2024-03-03 ENCOUNTER — APPOINTMENT (OUTPATIENT)
Facility: HOSPITAL | Age: 79
DRG: 917 | End: 2024-03-03
Payer: MEDICARE

## 2024-03-03 ENCOUNTER — HOSPITAL ENCOUNTER (INPATIENT)
Facility: HOSPITAL | Age: 79
LOS: 9 days | Discharge: NURSING, REHAB OR LONG-TERM CARE | DRG: 917 | End: 2024-03-12
Attending: EMERGENCY MEDICINE | Admitting: HOSPITALIST
Payer: MEDICARE

## 2024-03-03 DIAGNOSIS — M62.82 NON-TRAUMATIC RHABDOMYOLYSIS: Primary | ICD-10-CM

## 2024-03-03 DIAGNOSIS — R53.1 GENERALIZED WEAKNESS: ICD-10-CM

## 2024-03-03 DIAGNOSIS — S09.90XA INJURY OF HEAD, INITIAL ENCOUNTER: ICD-10-CM

## 2024-03-03 LAB
ALBUMIN SERPL-MCNC: 2.3 G/DL (ref 3.5–5)
ALBUMIN/GLOB SERPL: 0.6 (ref 1.1–2.2)
ALP SERPL-CCNC: 69 U/L (ref 45–117)
ALT SERPL-CCNC: 23 U/L (ref 12–78)
AMPHET UR QL SCN: NEGATIVE
ANION GAP SERPL CALC-SCNC: 4 MMOL/L (ref 5–15)
APPEARANCE UR: ABNORMAL
AST SERPL W P-5'-P-CCNC: 55 U/L (ref 15–37)
BACTERIA URNS QL MICRO: NEGATIVE /HPF
BARBITURATES UR QL SCN: NEGATIVE
BASOPHILS # BLD: 0 K/UL (ref 0–0.1)
BASOPHILS NFR BLD: 0 % (ref 0–1)
BENZODIAZ UR QL: NEGATIVE
BILIRUB SERPL-MCNC: 0.6 MG/DL (ref 0.2–1)
BILIRUB UR QL: NEGATIVE
BUN SERPL-MCNC: 32 MG/DL (ref 6–20)
BUN/CREAT SERPL: 21 (ref 12–20)
CA-I BLD-MCNC: 8.2 MG/DL (ref 8.5–10.1)
CANNABINOIDS UR QL SCN: NEGATIVE
CHLORIDE SERPL-SCNC: 108 MMOL/L (ref 97–108)
CK SERPL-CCNC: 914 U/L (ref 39–308)
CO2 SERPL-SCNC: 26 MMOL/L (ref 21–32)
COCAINE UR QL SCN: POSITIVE
COLOR UR: ABNORMAL
CREAT SERPL-MCNC: 1.49 MG/DL (ref 0.7–1.3)
DIFFERENTIAL METHOD BLD: ABNORMAL
EKG ATRIAL RATE: 93 BPM
EKG DIAGNOSIS: NORMAL
EKG P AXIS: 91 DEGREES
EKG P-R INTERVAL: 116 MS
EKG Q-T INTERVAL: 364 MS
EKG QRS DURATION: 86 MS
EKG QTC CALCULATION (BAZETT): 452 MS
EKG R AXIS: -30 DEGREES
EKG T AXIS: 140 DEGREES
EKG VENTRICULAR RATE: 93 BPM
EOSINOPHIL # BLD: 0 K/UL (ref 0–0.4)
EOSINOPHIL NFR BLD: 0 % (ref 0–7)
EPITH CASTS URNS QL MICRO: ABNORMAL /LPF
ERYTHROCYTE [DISTWIDTH] IN BLOOD BY AUTOMATED COUNT: 13 % (ref 11.5–14.5)
ETHANOL SERPL-MCNC: <10 MG/DL (ref 0–0.08)
FLUAV AG NPH QL IA: NEGATIVE
FLUBV AG NOSE QL IA: NEGATIVE
GLOBULIN SER CALC-MCNC: 4.1 G/DL (ref 2–4)
GLUCOSE SERPL-MCNC: 115 MG/DL (ref 65–100)
GLUCOSE UR STRIP.AUTO-MCNC: NEGATIVE MG/DL
HCT VFR BLD AUTO: 29.4 % (ref 36.6–50.3)
HGB BLD-MCNC: 9.6 G/DL (ref 12.1–17)
HGB UR QL STRIP: ABNORMAL
IMM GRANULOCYTES # BLD AUTO: 0 K/UL (ref 0–0.04)
IMM GRANULOCYTES NFR BLD AUTO: 0 % (ref 0–0.5)
KETONES UR QL STRIP.AUTO: NEGATIVE MG/DL
LEUKOCYTE ESTERASE UR QL STRIP.AUTO: ABNORMAL
LYMPHOCYTES # BLD: 1.6 K/UL (ref 0.8–3.5)
LYMPHOCYTES NFR BLD: 15 % (ref 12–49)
Lab: ABNORMAL
MAGNESIUM SERPL-MCNC: 1.8 MG/DL (ref 1.6–2.4)
MAGNESIUM SERPL-MCNC: 1.8 MG/DL (ref 1.6–2.4)
MCH RBC QN AUTO: 30.9 PG (ref 26–34)
MCHC RBC AUTO-ENTMCNC: 32.7 G/DL (ref 30–36.5)
MCV RBC AUTO: 94.5 FL (ref 80–99)
METHADONE UR QL: NEGATIVE
MONOCYTES # BLD: 0.7 K/UL (ref 0–1)
MONOCYTES NFR BLD: 7 % (ref 5–13)
MUCOUS THREADS URNS QL MICRO: NEGATIVE /LPF
NEUTS SEG # BLD: 8 K/UL (ref 1.8–8)
NEUTS SEG NFR BLD: 78 % (ref 32–75)
NITRITE UR QL STRIP.AUTO: NEGATIVE
NRBC # BLD: 0 K/UL (ref 0–0.01)
NRBC BLD-RTO: 0 PER 100 WBC
OPIATES UR QL: NEGATIVE
PCP UR QL: NEGATIVE
PH UR STRIP: 7 (ref 5–8)
PLATELET # BLD AUTO: 182 K/UL (ref 150–400)
PMV BLD AUTO: 9.9 FL (ref 8.9–12.9)
POTASSIUM SERPL-SCNC: 4.3 MMOL/L (ref 3.5–5.1)
POTASSIUM SERPL-SCNC: 5.3 MMOL/L (ref 3.5–5.1)
PROT SERPL-MCNC: 6.4 G/DL (ref 6.4–8.2)
PROT UR STRIP-MCNC: 100 MG/DL
RBC # BLD AUTO: 3.11 M/UL (ref 4.1–5.7)
RBC #/AREA URNS HPF: >100 /HPF (ref 0–5)
SARS-COV-2 RDRP RESP QL NAA+PROBE: NOT DETECTED
SODIUM SERPL-SCNC: 138 MMOL/L (ref 136–145)
SP GR UR REFRACTOMETRY: 1.02 (ref 1–1.03)
TROPONIN I SERPL HS-MCNC: 40 NG/L (ref 0–76)
URINE CULTURE IF INDICATED: ABNORMAL
UROBILINOGEN UR QL STRIP.AUTO: 4 EU/DL (ref 0.1–1)
WBC # BLD AUTO: 10.4 K/UL (ref 4.1–11.1)
WBC URNS QL MICRO: ABNORMAL /HPF (ref 0–4)

## 2024-03-03 PROCEDURE — 81001 URINALYSIS AUTO W/SCOPE: CPT

## 2024-03-03 PROCEDURE — 70450 CT HEAD/BRAIN W/O DYE: CPT

## 2024-03-03 PROCEDURE — 70486 CT MAXILLOFACIAL W/O DYE: CPT

## 2024-03-03 PROCEDURE — 87804 INFLUENZA ASSAY W/OPTIC: CPT

## 2024-03-03 PROCEDURE — 83735 ASSAY OF MAGNESIUM: CPT

## 2024-03-03 PROCEDURE — 93005 ELECTROCARDIOGRAM TRACING: CPT | Performed by: EMERGENCY MEDICINE

## 2024-03-03 PROCEDURE — 99285 EMERGENCY DEPT VISIT HI MDM: CPT

## 2024-03-03 PROCEDURE — 6370000000 HC RX 637 (ALT 250 FOR IP): Performed by: EMERGENCY MEDICINE

## 2024-03-03 PROCEDURE — 72125 CT NECK SPINE W/O DYE: CPT

## 2024-03-03 PROCEDURE — 2580000003 HC RX 258: Performed by: EMERGENCY MEDICINE

## 2024-03-03 PROCEDURE — 36415 COLL VENOUS BLD VENIPUNCTURE: CPT

## 2024-03-03 PROCEDURE — 82550 ASSAY OF CK (CPK): CPT

## 2024-03-03 PROCEDURE — 80053 COMPREHEN METABOLIC PANEL: CPT

## 2024-03-03 PROCEDURE — 71045 X-RAY EXAM CHEST 1 VIEW: CPT

## 2024-03-03 PROCEDURE — 94761 N-INVAS EAR/PLS OXIMETRY MLT: CPT

## 2024-03-03 PROCEDURE — 80307 DRUG TEST PRSMV CHEM ANLYZR: CPT

## 2024-03-03 PROCEDURE — 87086 URINE CULTURE/COLONY COUNT: CPT

## 2024-03-03 PROCEDURE — 87635 SARS-COV-2 COVID-19 AMP PRB: CPT

## 2024-03-03 PROCEDURE — 84484 ASSAY OF TROPONIN QUANT: CPT

## 2024-03-03 PROCEDURE — 82077 ASSAY SPEC XCP UR&BREATH IA: CPT

## 2024-03-03 PROCEDURE — 1100000000 HC RM PRIVATE

## 2024-03-03 PROCEDURE — 85025 COMPLETE CBC W/AUTO DIFF WBC: CPT

## 2024-03-03 PROCEDURE — 84132 ASSAY OF SERUM POTASSIUM: CPT

## 2024-03-03 RX ORDER — 0.9 % SODIUM CHLORIDE 0.9 %
1000 INTRAVENOUS SOLUTION INTRAVENOUS
Status: COMPLETED | OUTPATIENT
Start: 2024-03-03 | End: 2024-03-03

## 2024-03-03 RX ORDER — IPRATROPIUM BROMIDE AND ALBUTEROL SULFATE 2.5; .5 MG/3ML; MG/3ML
1 SOLUTION RESPIRATORY (INHALATION) EVERY 20 MIN
Status: COMPLETED | OUTPATIENT
Start: 2024-03-03 | End: 2024-03-03

## 2024-03-03 RX ADMIN — SODIUM CHLORIDE 1000 ML: 9 INJECTION, SOLUTION INTRAVENOUS at 21:14

## 2024-03-03 RX ADMIN — IPRATROPIUM BROMIDE AND ALBUTEROL SULFATE 1 DOSE: .5; 3 SOLUTION RESPIRATORY (INHALATION) at 14:06

## 2024-03-03 RX ADMIN — IPRATROPIUM BROMIDE AND ALBUTEROL SULFATE 1 DOSE: .5; 3 SOLUTION RESPIRATORY (INHALATION) at 14:30

## 2024-03-03 RX ADMIN — IPRATROPIUM BROMIDE AND ALBUTEROL SULFATE 1 DOSE: .5; 3 SOLUTION RESPIRATORY (INHALATION) at 13:32

## 2024-03-03 ASSESSMENT — LIFESTYLE VARIABLES
HOW MANY STANDARD DRINKS CONTAINING ALCOHOL DO YOU HAVE ON A TYPICAL DAY: PATIENT DOES NOT DRINK
HOW OFTEN DO YOU HAVE A DRINK CONTAINING ALCOHOL: NEVER

## 2024-03-03 ASSESSMENT — PAIN - FUNCTIONAL ASSESSMENT: PAIN_FUNCTIONAL_ASSESSMENT: NONE - DENIES PAIN

## 2024-03-03 NOTE — ED PROVIDER NOTES
[facility] EMERGENCY DEPT  EMERGENCY DEPARTMENT HISTORY AND PHYSICAL EXAM      Date: 3/3/2024  Patient Name: Agustín Willis  MRN: [id number]  Birthdate 1945  Date of evaluation: 3/3/2024  Provider: Alvarez Song MD   Note Started: 12:57 PM EST 3/3/24    HISTORY OF PRESENT ILLNESS     Chief Complaint   Patient presents with    Fall    Care Management       History Provided By: Patient and patient's friend    HPI: Agustín Willis is a 78 y.o. male presents to the emergency department with complaint of evaluation for ground-level fall.  Per patient's friend, the patient is staying with him after he was discharged from [facility] for physical therapy from a recent admission.  Patient's friend states he found the patient on the ground this morning is uncertain when he fell.  Patient is unsure of LOC, does not think he is on blood thinners.  Patient also reports wound to his leg from the fall.  Patient states he was discharged but was still too weak to ambulate and care for himself at home.    PAST MEDICAL HISTORY   Past Medical History:  Past Medical History:   Diagnosis Date    Ankle pain 8/3/2020    Benign prostatic disease 8/3/2020    Hypertrophy with Outflow Obstruction    Benign prostatic hyperplasia 8/3/2020    Bronchitis     Cervical radiculopathy 8/3/2020    Chronic obstructive lung disease (HCC) 8/3/2020    Cocaine abuse (HCC) 8/3/2020    Diarrhea 8/3/2020    Dizziness and giddiness 8/3/2020    Hypercholesterolemia 8/3/2020    Hypertensive disorder 8/3/2020    Insomnia 8/3/2020    Kidney disease 8/3/2020    Low back pain 8/3/2020    Osteoarthritis of knee 8/3/2020    Pain in breast 8/3/2020    Pain in left knee 8/3/2020    Prediabetes 8/3/2020    Seasonal allergic rhinitis 8/3/2020    Shoulder pain 8/3/2020    Sleep apnea 8/3/2020    Tinnitus of right ear 8/3/2020    Vasovagal syncope 8/3/2020       Past Surgical History:  History reviewed. No pertinent surgical history.    Family History:  Family History

## 2024-03-03 NOTE — ED TRIAGE NOTES
Pt arrives with friend. Was here IP for AMS and FIDEL. [facility] DC from [facility] Friday, has not ambulated, weak, sleepy in triage, evidence of fall to side of left face. Bruising and swelling, no thinners on list. Unsure of LOC, states he fell a few days ago. Here because he was not ready to dc from SNF and for possible fall.

## 2024-03-04 ENCOUNTER — APPOINTMENT (OUTPATIENT)
Facility: HOSPITAL | Age: 79
DRG: 917 | End: 2024-03-04
Payer: MEDICARE

## 2024-03-04 PROBLEM — R31.0 GROSS HEMATURIA: Status: ACTIVE | Noted: 2024-03-04

## 2024-03-04 LAB
ALBUMIN SERPL-MCNC: 2.1 G/DL (ref 3.5–5)
ANION GAP SERPL CALC-SCNC: 4 MMOL/L (ref 5–15)
BASOPHILS # BLD: 0 K/UL (ref 0–0.1)
BASOPHILS NFR BLD: 0 % (ref 0–1)
BUN SERPL-MCNC: 29 MG/DL (ref 6–20)
BUN/CREAT SERPL: 23 (ref 12–20)
CA-I BLD-MCNC: 8.6 MG/DL (ref 8.5–10.1)
CHLORIDE SERPL-SCNC: 109 MMOL/L (ref 97–108)
CO2 SERPL-SCNC: 26 MMOL/L (ref 21–32)
CREAT SERPL-MCNC: 1.26 MG/DL (ref 0.7–1.3)
DIFFERENTIAL METHOD BLD: ABNORMAL
EKG ATRIAL RATE: 144 BPM
EKG DIAGNOSIS: NORMAL
EKG P-R INTERVAL: 118 MS
EKG Q-T INTERVAL: 326 MS
EKG QRS DURATION: 84 MS
EKG QTC CALCULATION (BAZETT): 504 MS
EKG R AXIS: -35 DEGREES
EKG T AXIS: 225 DEGREES
EKG VENTRICULAR RATE: 144 BPM
EOSINOPHIL # BLD: 0 K/UL (ref 0–0.4)
EOSINOPHIL NFR BLD: 0 % (ref 0–7)
ERYTHROCYTE [DISTWIDTH] IN BLOOD BY AUTOMATED COUNT: 13 % (ref 11.5–14.5)
GLUCOSE SERPL-MCNC: 93 MG/DL (ref 65–100)
HCT VFR BLD AUTO: 27.4 % (ref 36.6–50.3)
HGB BLD-MCNC: 8.8 G/DL (ref 12.1–17)
IMM GRANULOCYTES # BLD AUTO: 0.1 K/UL (ref 0–0.04)
IMM GRANULOCYTES NFR BLD AUTO: 1 % (ref 0–0.5)
LYMPHOCYTES # BLD: 2 K/UL (ref 0.8–3.5)
LYMPHOCYTES NFR BLD: 19 % (ref 12–49)
MCH RBC QN AUTO: 30.7 PG (ref 26–34)
MCHC RBC AUTO-ENTMCNC: 32.1 G/DL (ref 30–36.5)
MCV RBC AUTO: 95.5 FL (ref 80–99)
MONOCYTES # BLD: 0.8 K/UL (ref 0–1)
MONOCYTES NFR BLD: 7 % (ref 5–13)
NEUTS SEG # BLD: 7.6 K/UL (ref 1.8–8)
NEUTS SEG NFR BLD: 73 % (ref 32–75)
NRBC # BLD: 0 K/UL (ref 0–0.01)
NRBC BLD-RTO: 0 PER 100 WBC
PHOSPHATE SERPL-MCNC: 4.7 MG/DL (ref 2.6–4.7)
PLATELET # BLD AUTO: 181 K/UL (ref 150–400)
PMV BLD AUTO: 10.3 FL (ref 8.9–12.9)
POTASSIUM SERPL-SCNC: 4.2 MMOL/L (ref 3.5–5.1)
RBC # BLD AUTO: 2.87 M/UL (ref 4.1–5.7)
SODIUM SERPL-SCNC: 139 MMOL/L (ref 136–145)
WBC # BLD AUTO: 10.5 K/UL (ref 4.1–11.1)

## 2024-03-04 PROCEDURE — 1100000000 HC RM PRIVATE

## 2024-03-04 PROCEDURE — 99222 1ST HOSP IP/OBS MODERATE 55: CPT | Performed by: UROLOGY

## 2024-03-04 PROCEDURE — 94761 N-INVAS EAR/PLS OXIMETRY MLT: CPT

## 2024-03-04 PROCEDURE — 6370000000 HC RX 637 (ALT 250 FOR IP): Performed by: HOSPITALIST

## 2024-03-04 PROCEDURE — 74176 CT ABD & PELVIS W/O CONTRAST: CPT

## 2024-03-04 PROCEDURE — 6370000000 HC RX 637 (ALT 250 FOR IP): Performed by: INTERNAL MEDICINE

## 2024-03-04 PROCEDURE — 85025 COMPLETE CBC W/AUTO DIFF WBC: CPT

## 2024-03-04 PROCEDURE — 6360000002 HC RX W HCPCS: Performed by: HOSPITALIST

## 2024-03-04 PROCEDURE — 94640 AIRWAY INHALATION TREATMENT: CPT

## 2024-03-04 PROCEDURE — 2580000003 HC RX 258: Performed by: HOSPITALIST

## 2024-03-04 PROCEDURE — 80069 RENAL FUNCTION PANEL: CPT

## 2024-03-04 PROCEDURE — 36415 COLL VENOUS BLD VENIPUNCTURE: CPT

## 2024-03-04 RX ORDER — ACETAMINOPHEN 325 MG/1
650 TABLET ORAL EVERY 6 HOURS PRN
Status: DISCONTINUED | OUTPATIENT
Start: 2024-03-04 | End: 2024-03-12 | Stop reason: HOSPADM

## 2024-03-04 RX ORDER — HYDRALAZINE HYDROCHLORIDE 20 MG/ML
10 INJECTION INTRAMUSCULAR; INTRAVENOUS EVERY 4 HOURS PRN
Status: DISCONTINUED | OUTPATIENT
Start: 2024-03-04 | End: 2024-03-12 | Stop reason: HOSPADM

## 2024-03-04 RX ORDER — ONDANSETRON 4 MG/1
4 TABLET, ORALLY DISINTEGRATING ORAL EVERY 8 HOURS PRN
Status: DISCONTINUED | OUTPATIENT
Start: 2024-03-04 | End: 2024-03-12 | Stop reason: HOSPADM

## 2024-03-04 RX ORDER — GAUZE BANDAGE 2" X 2"
100 BANDAGE TOPICAL DAILY
Status: DISCONTINUED | OUTPATIENT
Start: 2024-03-04 | End: 2024-03-12 | Stop reason: HOSPADM

## 2024-03-04 RX ORDER — SODIUM CHLORIDE 9 MG/ML
INJECTION, SOLUTION INTRAVENOUS PRN
Status: DISCONTINUED | OUTPATIENT
Start: 2024-03-04 | End: 2024-03-12 | Stop reason: HOSPADM

## 2024-03-04 RX ORDER — ONDANSETRON 2 MG/ML
4 INJECTION INTRAMUSCULAR; INTRAVENOUS EVERY 6 HOURS PRN
Status: DISCONTINUED | OUTPATIENT
Start: 2024-03-04 | End: 2024-03-12 | Stop reason: HOSPADM

## 2024-03-04 RX ORDER — SODIUM CHLORIDE 0.9 % (FLUSH) 0.9 %
5-40 SYRINGE (ML) INJECTION EVERY 12 HOURS SCHEDULED
Status: DISCONTINUED | OUTPATIENT
Start: 2024-03-04 | End: 2024-03-12 | Stop reason: HOSPADM

## 2024-03-04 RX ORDER — FINASTERIDE 5 MG/1
5 TABLET, FILM COATED ORAL DAILY
Status: DISCONTINUED | OUTPATIENT
Start: 2024-03-04 | End: 2024-03-12 | Stop reason: HOSPADM

## 2024-03-04 RX ORDER — FOLIC ACID 1 MG/1
1 TABLET ORAL DAILY
Status: DISCONTINUED | OUTPATIENT
Start: 2024-03-04 | End: 2024-03-12 | Stop reason: HOSPADM

## 2024-03-04 RX ORDER — ACETAMINOPHEN 650 MG/1
650 SUPPOSITORY RECTAL EVERY 6 HOURS PRN
Status: DISCONTINUED | OUTPATIENT
Start: 2024-03-04 | End: 2024-03-12 | Stop reason: HOSPADM

## 2024-03-04 RX ORDER — ASPIRIN 81 MG/1
81 TABLET ORAL DAILY
Status: DISCONTINUED | OUTPATIENT
Start: 2024-03-04 | End: 2024-03-12 | Stop reason: HOSPADM

## 2024-03-04 RX ORDER — IPRATROPIUM BROMIDE AND ALBUTEROL SULFATE 2.5; .5 MG/3ML; MG/3ML
1 SOLUTION RESPIRATORY (INHALATION)
Status: DISCONTINUED | OUTPATIENT
Start: 2024-03-04 | End: 2024-03-10

## 2024-03-04 RX ORDER — SODIUM CHLORIDE 0.9 % (FLUSH) 0.9 %
5-40 SYRINGE (ML) INJECTION PRN
Status: DISCONTINUED | OUTPATIENT
Start: 2024-03-04 | End: 2024-03-12 | Stop reason: HOSPADM

## 2024-03-04 RX ORDER — IPRATROPIUM BROMIDE AND ALBUTEROL SULFATE 2.5; .5 MG/3ML; MG/3ML
1 SOLUTION RESPIRATORY (INHALATION) EVERY 6 HOURS PRN
Status: DISCONTINUED | OUTPATIENT
Start: 2024-03-04 | End: 2024-03-12 | Stop reason: HOSPADM

## 2024-03-04 RX ORDER — ENOXAPARIN SODIUM 100 MG/ML
40 INJECTION SUBCUTANEOUS DAILY
Status: DISCONTINUED | OUTPATIENT
Start: 2024-03-04 | End: 2024-03-12 | Stop reason: HOSPADM

## 2024-03-04 RX ADMIN — METOPROLOL TARTRATE 25 MG: 25 TABLET, FILM COATED ORAL at 08:46

## 2024-03-04 RX ADMIN — METOPROLOL TARTRATE 25 MG: 25 TABLET, FILM COATED ORAL at 21:07

## 2024-03-04 RX ADMIN — FINASTERIDE 5 MG: 5 TABLET, FILM COATED ORAL at 08:46

## 2024-03-04 RX ADMIN — SODIUM CHLORIDE, PRESERVATIVE FREE 10 ML: 5 INJECTION INTRAVENOUS at 08:51

## 2024-03-04 RX ADMIN — FOLIC ACID 1 MG: 1 TABLET ORAL at 08:46

## 2024-03-04 RX ADMIN — ASPIRIN 81 MG: 81 TABLET, COATED ORAL at 08:46

## 2024-03-04 RX ADMIN — IPRATROPIUM BROMIDE AND ALBUTEROL SULFATE 1 DOSE: .5; 3 SOLUTION RESPIRATORY (INHALATION) at 19:37

## 2024-03-04 RX ADMIN — IPRATROPIUM BROMIDE AND ALBUTEROL SULFATE 1 DOSE: .5; 3 SOLUTION RESPIRATORY (INHALATION) at 12:38

## 2024-03-04 RX ADMIN — IPRATROPIUM BROMIDE AND ALBUTEROL SULFATE 1 DOSE: .5; 3 SOLUTION RESPIRATORY (INHALATION) at 03:27

## 2024-03-04 RX ADMIN — SODIUM CHLORIDE, PRESERVATIVE FREE 10 ML: 5 INJECTION INTRAVENOUS at 21:07

## 2024-03-04 RX ADMIN — IPRATROPIUM BROMIDE AND ALBUTEROL SULFATE 1 DOSE: .5; 3 SOLUTION RESPIRATORY (INHALATION) at 15:44

## 2024-03-04 RX ADMIN — THIAMINE HCL TAB 100 MG 100 MG: 100 TAB at 08:46

## 2024-03-04 NOTE — ED NOTES
Assumed care of pt at this time. Pt connected to continuous pulse ox, BP, and cardiac monitor. Resting comfortably with equal chest rise and fall. Continuing plan of care.  
Attempted calling Oswald Freitases, pt's nephew. He did not answer and was unable to leave a voicemail due to mailbox being full.  
Called nurse on [facility] to update on pt condition.  
Upon changing/cleaning pt up, skin tear that appears like a burn noted to left upper thigh and another smaller tear to left inner thigh. Painful to pt. Removed soiled brief and noted small blood clots in brief. Pt does not recall how the skin tears happened and has not complained of any painful urination, or pain at all. MD notified of concerns.  
Shoulder pain 8/3/2020    Sleep apnea 8/3/2020    Tinnitus of right ear 8/3/2020    Vasovagal syncope 8/3/2020       Assessment  Vitals: MEWS Score: 2  Level of Consciousness: Alert (0)   Vitals:    03/03/24 1830 03/03/24 1936 03/03/24 2030 03/03/24 2315   BP: (!) 148/102 (!) 130/96 (!) 138/100 128/81   Pulse: 91 93 87 89   Resp:  23 18 17   Temp:  98.9 °F (37.2 °C)  98.8 °F (37.1 °C)   TempSrc:  Oral  Oral   SpO2: 99% 94% 95% 98%   Weight:       Height:         Deterioration Index (DI): Deterioration Index: 24.08  Deterioration Index (DI) Interventions Performed:    O2 Flow Rate:    O2 Device: O2 Device: None (Room air)  Cardiac Rhythm:    Critical Lab Results: [unfilled]  Cultures: Cultures:Urine  NIH Score: NIH     Active LDA's:   Peripheral IV 03/03/24 Left;Posterior Forearm (Active)     Active Central Lines:                          Active Wounds:    Active Barnett's:    Active Feeding Tubes:      Administered Medications:   Medications   ipratropium 0.5 mg-albuterol 2.5 mg (DUONEB) nebulizer solution 1 Dose (1 Dose Inhalation Given 3/3/24 1430)   sodium chloride 0.9 % bolus 1,000 mL (1,000 mLs IntraVENous New Bag 3/3/24 2114)     Last documented pain medication administration:   Pertinent or High Risk Medications/Drips: no   If Yes, please provide details:   Blood Product Administration: no  If Yes, please provide details:   Process Protocols/Bundles:     Recommendation  Incomplete STAT orders: none  Overdue Medications: none  Patient Belongings:    Additional Comments: pt very pleasant and not in pain. Should have no problems with  If any further questions, please call Sending RN at [phone]      Admitting Unit Notification  Name of person notified and time: Daphne at 3625      Electronically signed by: Electronically signed by Lisandra Arzate RN on 3/3/2024 at 11:22 PM

## 2024-03-04 NOTE — PROGRESS NOTES
Pt arrived to unit exhibiting dyspnea at rest and with exertion. Expiratory wheezing and congested non productive cough observed. 94% on RA.MD notified. See new order.

## 2024-03-04 NOTE — CARE COORDINATION
03/04/24 1506   Service Assessment   Patient Orientation Person   Cognition Alert   History Provided By Child/Family   Primary Caregiver Self   Accompanied By/Relationship alone   Support Systems Family Members   Patient's Healthcare Decision Maker is: Patient Declined (Legal Next of Kin Remains as Decision Maker)   PCP Verified by CM Yes  (Dr. Taylor, unsure when last seen by MD)   Last Visit to PCP Within last year   Prior Functional Level Assistance with the following:;Mobility;Bathing;Dressing;Housework;Shopping   Current Functional Level Mobility;Assistance with the following:;Bathing;Dressing;Housework;Shopping   Can patient return to prior living arrangement Unknown at present   Ability to make needs known: Poor   Family able to assist with home care needs: No   Would you like for me to discuss the discharge plan with any other family members/significant others, and if so, who? Yes  (sister)   Financial Resources Medicare   Community Resources None   CM/SW Referral Other (see comment)  (discharge planning)   Discharge Planning   Patient expects to be discharged to: Unknown     CM spoke with pt sister. Pt d/c'd from [place] on 2/15 and has been staying in a hotel, but sister was unaware that he was staying in hotel. Pt at this time is homeless, does not have a LTC payor. CM explained that pt will need to be screened for Medicaid in order to try for LTC.     CM will f/u with family and pt on dispo, likely does not have any more Skilled days as he has recently been discharged from SNF. CM to complete [facility] for LTC medicaid screening.     Advance Care Planning   Healthcare Decision Maker:    Primary Decision Maker: Obdulio Cho - Brother/Sister - [phone]

## 2024-03-04 NOTE — PROGRESS NOTES
4 Eyes Skin Assessment     NAME:  Agustín Willis  YOB: 1945  MEDICAL RECORD NUMBER:  [id number]    The patient is being assessed for  Admission    I agree that at least one RN has performed a thorough Head to Toe Skin Assessment on the patient. ALL assessment sites listed below have been assessed.      Areas assessed by both nurses:    Head, Face, Ears, Shoulders, Back, Chest, Arms, Elbows, Hands, Sacrum. Buttock, Coccyx, Ischium, Legs. Feet and Heels, and Under Medical Devices         Does the Patient have a Wound? Yes wound(s) were present on assessment. LDA wound assessment was Initiated and completed by RN       Luisito Prevention initiated by RN: Yes  Wound Care Orders initiated by RN: Yes    Pressure Injury (Stage 3,4, Unstageable, DTI, NWPT, and Complex wounds) if present, place Wound referral order by RN under : Yes    New Ostomies, if present place, Ostomy referral order under : No     Nurse 1 eSignature: Electronically signed by Daniel Carr RN on 3/4/24 at 1:37 AM EST    **SHARE this note so that the co-signing nurse can place an eSignature**    Nurse 2 eSignature: Electronically signed by FREDRICK CHAMBERS RN on 3/4/24 at 1:38 AM EST

## 2024-03-04 NOTE — CONSULTS
UROLOGY CONSULT    Wilfrid Shea MD  [phone] Office    Patient: Agustín Willis MRN: [id number]  SSN: [id number]    YOB: 1945  Age: 78 y.o.  Sex: male          Date of Encounter:  March 4, 2024  ADMITTED: 3/3/2024  for Altered mental status [R41.82]  Generalized weakness [R53.1]  Injury of head, initial encounter [S09.90XA]  Non-traumatic rhabdomyolysis [M62.82]  Chief Complaint:  falls, failure to thrive  Reason for consult: hematuria         History of Present Illness:  Patient is a 78 y.o. male admitted 3/3/2024 to the hospital for Altered mental status [R41.82]  Generalized weakness [R53.1]  Injury of head, initial encounter [S09.90XA]  Non-traumatic rhabdomyolysis [M62.82].       Mr. Willis was brought to the emergency room by family members, reportedly found on the floor altered.  He is more alert but says he does not feel back to normal.   He was recently hospitalized here fore the similar presentation from 2/5/24-2/15/24 and was discharged to [facility].  He was recently discharged from [facility] to home with family members.     Noticed to have an injury on the left thigh anteriorly with the skin thickness loss of skin. Also has a small injury on the lower extremity.      ER nursing staff reported blood clots in the urine.  Urology consulted for this reason.  He says he saw blood in the urine about 3 days.  The patient denies urinary frequency, urgency, dysuria or hematuria.    The patient denies a weak urinary stream, sense of incomplete emptying, straining or hesitancy.    He is on ASA 81 mg, enoxaparin.    UA 3/3/24 with > 100 RBC, 20-50 WBC, culture pending.  Not on abx at this time.     He is also on finasteride.  No I&O recorded.  Cr 1.49 today; appears to be baseline.    He recent h/o influenza, positive for COVID-19. 3/3/24 COVID rapid test negative. Flu a/b negative  His urine toxicology was positive for cocaine since last year 4/5/23, 10/4/23, 2/5/24 and 3/3/24. Opiate positive  Lumbar herniated disc    Cervical radiculopathy    Hypertensive disorder    Thrombocytopenia (HCC)    Macrocytic anemia    Chronic renal disease, stage III (HCC)    Weakness generalized    Bilateral lower extremity edema    Syncope and collapse    SARTHAK on CPAP    Unsteady gait when walking    Ankle edema, bilateral    Drug abuse counseling and surveillance of drug abuser    Alcohol use, unspecified with intoxication, uncomplicated (HCC)    Thrombocytopenia, unspecified (HCC)    Stage 3a chronic kidney disease (HCC)    Heart murmur, systolic    Atrial fibrillation (HCC)    Cardiac arrhythmia    Persistent cough    Non-seasonal allergic rhinitis    Edema, lower extremity    Encounter for immunization    FIDEL (acute kidney injury) (HCC)    Altered mental status        Assessment/Plan:  GROSS HEMATURIA: CT pending.  UA with > 100 RBC on admission.    Urine WBC present but low.  Urine LE small 20-0 wbc/ >100rbc not convincing for cystitis: culture pending.  Not on abx at this time. Not symptomatic  Plan CT abd/pel w/wo IV.   On ASA and Lovenox.  Cocaine positive.        I personally had a face to face encounter with the patient and performed the history, physical, assessment and plan.   Wilfrid Shea MD      Signed By: KELLEY Nelson - EYAD  - March 4, 2024    Please note that portions of this note was potentially completed with Dragon dictation, the computer voice recognition software.  Quite often unanticipated grammatical, syntax, homophones, and other interpretive errors are inadvertently transcribed by the computer software.  Please disregard these errors.  Please excuse any errors that have escaped final proofreading.  Thank you.

## 2024-03-04 NOTE — H&P
Hospitalist History & Physical Notes.           [facility].              Name : Agustín Willis      MRN number : [id number]     YOB: 1945     Subjective :   Chief Complaint : Found on the floor with loss of consciousness, wound on left thigh    Source of information : From previous records, ED provider and staff.  Patient is with altered mental status when I seen him.    History of present illness:   Agustín Willis is  78 y.o. male below mentioned past medical history is brought to the emergency room as he was found on the floor altered.  He was recently admitted to our hospital February 5 due to altered mental status of similar presentation today, discharged on 15 February 2 [facility].  During treatment he got improvement, he is completely oriented and doing well at the time of discharge.  But case management and staff noticed that patient was unkempt on arrival soaked in feces and urine.  But niece nephew was trying to take him to get the money.  So [facility]PS was contacted.    He was discharged from the [facility] few days ago, who ever was with him placed him in a motor labs as they do not have the house available at this time.  She left she went out to get some errands, when he came back found him on the floor unresponsive.  Called emergency crew and brought here.  Patient has no respiratory distress, holding airway well.  Vitals are stable, blood pressure low normal.  Monitoring suggesting with rhythm.    Noticed to have an injury on the left thigh anteriorly with the skin thickness loss of skin.  Also has a small injury on the lower extremity.  Started in the emergency room noticed blood clot from the urine when they were trying to clean him to transfer to floor.    He was recently found with influenza, positive for COVID-19.    Past Medical History:   Diagnosis Date    Ankle pain 8/3/2020    Benign prostatic disease 8/3/2020    Cervical radiculopathy 8/3/2020  Duration 86 ms    Q-T Interval 364 ms    QTc Calculation (Bazett) 452 ms    P Axis 91 degrees    R Axis -30 degrees    T Axis 140 degrees    Diagnosis       Normal sinus rhythm with sinus arrhythmia  Left axis deviation  Pulmonary disease pattern  Moderate voltage criteria for LVH, may be normal variant  Nonspecific ST and T wave abnormality  Abnormal ECG  When compared with ECG of 10-FEB-2024 19:03,  Premature atrial complexes are no longer Present  Non-specific change in ST segment in Lateral leads  Nonspecific T wave abnormality now evident in Inferior leads  Confirmed by WILLIAMS GARCIAS MDH ([id number]) on 3/3/2024 6:08:01 PM     CBC with Auto Differential    Collection Time: 03/03/24  1:11 PM   Result Value Ref Range    WBC 10.4 4.1 - 11.1 K/uL    RBC 3.11 (L) 4.10 - 5.70 M/uL    Hemoglobin 9.6 (L) 12.1 - 17.0 g/dL    Hematocrit 29.4 (L) 36.6 - 50.3 %    MCV 94.5 80.0 - 99.0 FL    MCH 30.9 26.0 - 34.0 PG    MCHC 32.7 30.0 - 36.5 g/dL    RDW 13.0 11.5 - 14.5 %    Platelets 182 150 - 400 K/uL    MPV 9.9 8.9 - 12.9 FL    Nucleated RBCs 0.0 0.0  WBC    nRBC 0.00 0.00 - 0.01 K/uL    Neutrophils % 78 (H) 32 - 75 %    Lymphocytes % 15 12 - 49 %    Monocytes % 7 5 - 13 %    Eosinophils % 0 0 - 7 %    Basophils % 0 0 - 1 %    Immature Granulocytes 0 0 - 0.5 %    Neutrophils Absolute 8.0 1.8 - 8.0 K/UL    Lymphocytes Absolute 1.6 0.8 - 3.5 K/UL    Monocytes Absolute 0.7 0.0 - 1.0 K/UL    Eosinophils Absolute 0.0 0.0 - 0.4 K/UL    Basophils Absolute 0.0 0.0 - 0.1 K/UL    Absolute Immature Granulocyte 0.0 0.00 - 0.04 K/UL    Differential Type AUTOMATED     CMP    Collection Time: 03/03/24  1:11 PM   Result Value Ref Range    Sodium 138 136 - 145 mmol/L    Potassium 5.3 (H) 3.5 - 5.1 mmol/L    Chloride 108 97 - 108 mmol/L    CO2 26 21 - 32 mmol/L    Anion Gap 4 (L) 5 - 15 mmol/L    Glucose 115 (H) 65 - 100 mg/dL    BUN 32 (H) 6 - 20 mg/dL    Creatinine 1.49 (H) 0.70 - 1.30 mg/dL    Bun/Cre Ratio 21 (H) 12 - 20      Est, Glom

## 2024-03-04 NOTE — PROGRESS NOTES
Dr. Martinez notified about continued dyspnea at rest with expiratory wheezing and congested, non productive cough after nebulizer treatment. 91-94% on RA.Charge nurse notified.

## 2024-03-04 NOTE — PROGRESS NOTES
yesterday/today were reviewed  [x] All current labs were reviewed and interpreted for clinical significance   [x] Appropriate follow-up labs were ordered  [] Collateral history obtained from:  chart review      Code Status: full  DVT Prophylaxis: Lovenox  GI Prophylaxis:none    Subjective:     Chief Complaint / Reason for Physician Visit  Patient was seen and examined at the bedside. Awake and alert. He is a poor historian overall.  Discussed with RN events overnight.       Objective:     VITALS:   Last 24hrs VS reviewed since prior progress note. Most recent are:  Patient Vitals for the past 24 hrs:   BP Temp Temp src Pulse Resp SpO2   03/04/24 1230 -- -- -- 83 18 95 %   03/04/24 0830 (!) 146/85 98.2 °F (36.8 °C) Oral 87 20 96 %   03/04/24 0330 -- -- -- 87 26 94 %   03/04/24 0058 (!) 148/82 99.4 °F (37.4 °C) Oral 81 22 94 %   03/03/24 2315 128/81 98.8 °F (37.1 °C) Oral 89 17 98 %   03/03/24 2030 (!) 138/100 -- -- 87 18 95 %   03/03/24 1936 (!) 130/96 98.9 °F (37.2 °C) Oral 93 23 94 %   03/03/24 1830 (!) 148/102 -- -- 91 -- 99 %   03/03/24 1745 (!) 145/85 98.8 °F (37.1 °C) Oral 87 -- 96 %   03/03/24 1645 (!) 148/99 -- -- 81 -- 96 %   03/03/24 1604 (!) 151/97 -- -- 87 -- 98 %   03/03/24 1507 -- -- -- 97 -- --   03/03/24 1504 (!) 144/90 -- -- (!) 144 23 97 %   03/03/24 1420 -- -- -- 87 23 98 %         Intake/Output Summary (Last 24 hours) at 3/4/2024 1356  Last data filed at 3/4/2024 0940  Gross per 24 hour   Intake 100 ml   Output --   Net 100 ml        I had a face to face encounter and independently examined this patient on 3/4/2024, as outlined below:    Review of systems  General: No fever  Neuro: No headache. (+) AMS.  CV: No chest pain  Resp: No shortness of breath  GI: No vomiting. No diarrhea.  : No dysuria. No flank pain.  All other systems reviewed are negative except as documented in the HPI.    Exam  General:Well developed, well nourished patient in NAD  Skin/Derm: Skin is warm and dry.  Ophth:  Palpebral conjunctivae are not pale and the bulbar conjunctivae  are not erythematous.  No scleral icterus.  ENT: Oral mucosa is moist. Missing dentition  Neck: Neck is supple for age without thyromegaly. No nuchal rigidity.  Pulm: No tachypnea or retractions. Lungs are clear to auscultation. No wheezing.  GI: Soft and non-tender.  : No CVAT.  Neuro: No facial asymmetry. Alert. Strength is 5/5 in bilateral upper extremities. No dysarthria.   Psych: Affect is normal.  Non-anxious.      ________________________________________________________________________    Total NON critical care TIME:  35  Minutes    Total CRITICAL CARE TIME Spent:  0 Minutes non procedure based      Comments   >50% of visit spent in counseling and coordination of care     ________________________________________________________________________  KELLEY Nicholson NP     Procedures: see electronic medical records for all procedures/Xrays and details which were not copied into this note but were reviewed prior to creation of Plan.      LABS:  I reviewed today's most current labs and imaging studies.  Pertinent labs include:  Recent Labs     03/03/24  1311 03/04/24  0838   WBC 10.4 10.5   HGB 9.6* 8.8*   HCT 29.4* 27.4*    181     Recent Labs     03/03/24  1311 03/03/24  1526 03/04/24  0838     --  139   K 5.3* 4.3 4.2     --  109*   CO2 26  --  26   BUN 32*  --  29*   MG 1.8 1.8  --    PHOS  --   --  4.7   ALT 23  --   --        Signed: KELLEY Nicholson NP

## 2024-03-05 LAB
ALBUMIN SERPL-MCNC: 2 G/DL (ref 3.5–5)
ANION GAP SERPL CALC-SCNC: 3 MMOL/L (ref 5–15)
ANION GAP SERPL CALC-SCNC: 6 MMOL/L (ref 5–15)
BACTERIA SPEC CULT: NORMAL
BASOPHILS # BLD: 0 K/UL (ref 0–0.1)
BASOPHILS NFR BLD: 0 % (ref 0–1)
BUN SERPL-MCNC: 28 MG/DL (ref 6–20)
BUN SERPL-MCNC: 30 MG/DL (ref 6–20)
BUN/CREAT SERPL: 22 (ref 12–20)
BUN/CREAT SERPL: 23 (ref 12–20)
CA-I BLD-MCNC: 7.7 MG/DL (ref 8.5–10.1)
CA-I BLD-MCNC: 8 MG/DL (ref 8.5–10.1)
CHLORIDE SERPL-SCNC: 107 MMOL/L (ref 97–108)
CHLORIDE SERPL-SCNC: 109 MMOL/L (ref 97–108)
CO2 SERPL-SCNC: 25 MMOL/L (ref 21–32)
CO2 SERPL-SCNC: 25 MMOL/L (ref 21–32)
CREAT SERPL-MCNC: 1.24 MG/DL (ref 0.7–1.3)
CREAT SERPL-MCNC: 1.39 MG/DL (ref 0.7–1.3)
DIFFERENTIAL METHOD BLD: ABNORMAL
EOSINOPHIL # BLD: 0.1 K/UL (ref 0–0.4)
EOSINOPHIL NFR BLD: 1 % (ref 0–7)
ERYTHROCYTE [DISTWIDTH] IN BLOOD BY AUTOMATED COUNT: 13.1 % (ref 11.5–14.5)
GLUCOSE SERPL-MCNC: 100 MG/DL (ref 65–100)
GLUCOSE SERPL-MCNC: 97 MG/DL (ref 65–100)
HCT VFR BLD AUTO: 24.8 % (ref 36.6–50.3)
HGB BLD-MCNC: 8.1 G/DL (ref 12.1–17)
IMM GRANULOCYTES # BLD AUTO: 0 K/UL (ref 0–0.04)
IMM GRANULOCYTES NFR BLD AUTO: 0 % (ref 0–0.5)
LYMPHOCYTES # BLD: 1.3 K/UL (ref 0.8–3.5)
LYMPHOCYTES NFR BLD: 15 % (ref 12–49)
Lab: NORMAL
MCH RBC QN AUTO: 30.6 PG (ref 26–34)
MCHC RBC AUTO-ENTMCNC: 32.7 G/DL (ref 30–36.5)
MCV RBC AUTO: 93.6 FL (ref 80–99)
MONOCYTES # BLD: 0.7 K/UL (ref 0–1)
MONOCYTES NFR BLD: 8 % (ref 5–13)
NEUTS SEG # BLD: 6.3 K/UL (ref 1.8–8)
NEUTS SEG NFR BLD: 76 % (ref 32–75)
NRBC # BLD: 0 K/UL (ref 0–0.01)
NRBC BLD-RTO: 0 PER 100 WBC
PHOSPHATE SERPL-MCNC: 3.6 MG/DL (ref 2.6–4.7)
PLATELET # BLD AUTO: 167 K/UL (ref 150–400)
PMV BLD AUTO: 10.3 FL (ref 8.9–12.9)
POTASSIUM SERPL-SCNC: 3.9 MMOL/L (ref 3.5–5.1)
POTASSIUM SERPL-SCNC: 4 MMOL/L (ref 3.5–5.1)
RBC # BLD AUTO: 2.65 M/UL (ref 4.1–5.7)
SODIUM SERPL-SCNC: 137 MMOL/L (ref 136–145)
SODIUM SERPL-SCNC: 138 MMOL/L (ref 136–145)
WBC # BLD AUTO: 8.4 K/UL (ref 4.1–11.1)

## 2024-03-05 PROCEDURE — 6360000002 HC RX W HCPCS: Performed by: NURSE PRACTITIONER

## 2024-03-05 PROCEDURE — 94640 AIRWAY INHALATION TREATMENT: CPT

## 2024-03-05 PROCEDURE — 6370000000 HC RX 637 (ALT 250 FOR IP): Performed by: HOSPITALIST

## 2024-03-05 PROCEDURE — 94761 N-INVAS EAR/PLS OXIMETRY MLT: CPT

## 2024-03-05 PROCEDURE — 36415 COLL VENOUS BLD VENIPUNCTURE: CPT

## 2024-03-05 PROCEDURE — 1100000000 HC RM PRIVATE

## 2024-03-05 PROCEDURE — 6370000000 HC RX 637 (ALT 250 FOR IP): Performed by: INTERNAL MEDICINE

## 2024-03-05 PROCEDURE — 80048 BASIC METABOLIC PNL TOTAL CA: CPT

## 2024-03-05 PROCEDURE — 85025 COMPLETE CBC W/AUTO DIFF WBC: CPT

## 2024-03-05 PROCEDURE — 2580000003 HC RX 258: Performed by: HOSPITALIST

## 2024-03-05 PROCEDURE — 80069 RENAL FUNCTION PANEL: CPT

## 2024-03-05 PROCEDURE — 99232 SBSQ HOSP IP/OBS MODERATE 35: CPT | Performed by: UROLOGY

## 2024-03-05 RX ORDER — BENZONATATE 100 MG/1
100 CAPSULE ORAL 3 TIMES DAILY PRN
Status: DISCONTINUED | OUTPATIENT
Start: 2024-03-05 | End: 2024-03-12 | Stop reason: HOSPADM

## 2024-03-05 RX ORDER — PANTOPRAZOLE SODIUM 40 MG/1
40 TABLET, DELAYED RELEASE ORAL
Status: DISCONTINUED | OUTPATIENT
Start: 2024-03-06 | End: 2024-03-12 | Stop reason: HOSPADM

## 2024-03-05 RX ORDER — FUROSEMIDE 10 MG/ML
20 INJECTION INTRAMUSCULAR; INTRAVENOUS DAILY
Status: COMPLETED | OUTPATIENT
Start: 2024-03-05 | End: 2024-03-06

## 2024-03-05 RX ORDER — METHYLPREDNISOLONE SODIUM SUCCINATE 40 MG/ML
40 INJECTION, POWDER, LYOPHILIZED, FOR SOLUTION INTRAMUSCULAR; INTRAVENOUS EVERY 12 HOURS
Status: DISCONTINUED | OUTPATIENT
Start: 2024-03-05 | End: 2024-03-08

## 2024-03-05 RX ADMIN — BENZONATATE 100 MG: 100 CAPSULE ORAL at 00:38

## 2024-03-05 RX ADMIN — IPRATROPIUM BROMIDE AND ALBUTEROL SULFATE 1 DOSE: .5; 3 SOLUTION RESPIRATORY (INHALATION) at 20:31

## 2024-03-05 RX ADMIN — IPRATROPIUM BROMIDE AND ALBUTEROL SULFATE 1 DOSE: .5; 3 SOLUTION RESPIRATORY (INHALATION) at 11:13

## 2024-03-05 RX ADMIN — SODIUM CHLORIDE, PRESERVATIVE FREE 10 ML: 5 INJECTION INTRAVENOUS at 08:54

## 2024-03-05 RX ADMIN — IPRATROPIUM BROMIDE AND ALBUTEROL SULFATE 1 DOSE: .5; 3 SOLUTION RESPIRATORY (INHALATION) at 15:01

## 2024-03-05 RX ADMIN — METOPROLOL TARTRATE 25 MG: 25 TABLET, FILM COATED ORAL at 08:52

## 2024-03-05 RX ADMIN — FUROSEMIDE 20 MG: 10 INJECTION, SOLUTION INTRAMUSCULAR; INTRAVENOUS at 11:25

## 2024-03-05 RX ADMIN — ASPIRIN 81 MG: 81 TABLET, COATED ORAL at 08:51

## 2024-03-05 RX ADMIN — IPRATROPIUM BROMIDE AND ALBUTEROL SULFATE 1 DOSE: .5; 3 SOLUTION RESPIRATORY (INHALATION) at 08:09

## 2024-03-05 RX ADMIN — THIAMINE HCL TAB 100 MG 100 MG: 100 TAB at 08:51

## 2024-03-05 RX ADMIN — IPRATROPIUM BROMIDE AND ALBUTEROL SULFATE 1 DOSE: .5; 3 SOLUTION RESPIRATORY (INHALATION) at 03:11

## 2024-03-05 RX ADMIN — FOLIC ACID 1 MG: 1 TABLET ORAL at 08:51

## 2024-03-05 RX ADMIN — METHYLPREDNISOLONE SODIUM SUCCINATE 40 MG: 40 INJECTION INTRAMUSCULAR; INTRAVENOUS at 11:15

## 2024-03-05 RX ADMIN — SODIUM CHLORIDE, PRESERVATIVE FREE 10 ML: 5 INJECTION INTRAVENOUS at 21:11

## 2024-03-05 RX ADMIN — FINASTERIDE 5 MG: 5 TABLET, FILM COATED ORAL at 08:52

## 2024-03-05 RX ADMIN — METOPROLOL TARTRATE 25 MG: 25 TABLET, FILM COATED ORAL at 21:11

## 2024-03-05 NOTE — PROGRESS NOTES
UROLOGY Progress Note         [phone]      Daily Progress Note: 3/5/2024    Subjective:   The patient is seen for UROLOGIC follow up for hematuria.     On ASA/enoxaparin. HX of drug use/positive tox screens.  750 cc urine out overnight. No damico. Urine culture in process, 3/3/24.  Cr stable at 1.26.  Has been on finasteride.    CT scan abd/pel WO with no upper tract findings on 3/4/24.  I independently reviewed the images. I agree with the lack of urologic concerns.  No bladder thickening. Prostate not abnormally enlarged.  No renal stones, hydro or masses.  Small renal masses or endophytic lesions cannot be seen on noncontrast imaging    No further hematuria while in the hospital.He is still on aspirin.  Not on Lovenox.    He has refused inpatient cystoscopy for lower tract evaluation.    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    Pain in left knee    Dizziness and giddiness    Osteoarthritis of knee    Diarrhea    Hypercholesterolemia    Ankle pain    Pain in breast    Prediabetes    Sleep apnea    Seasonal allergic rhinitis    Chronic obstructive lung disease (HCC)    Cocaine abuse (HCC)    Kidney disease    Benign prostatic disease    Benign prostatic hyperplasia    Weakness of both arms    Lumbar herniated disc    Cervical radiculopathy    Hypertensive disorder    Thrombocytopenia (HCC)    Macrocytic anemia    Chronic renal disease, stage III (HCC)    Weakness generalized    Bilateral lower extremity edema    Syncope and collapse    SARTHAK on CPAP    Unsteady gait when walking    Ankle edema, bilateral    Drug abuse counseling and surveillance of drug abuser    Alcohol use, unspecified with intoxication, uncomplicated (HCC)    Thrombocytopenia, unspecified (HCC)    Stage 3a  is normal.   Genitourinary:     Comments: Voiding; hematuria noted in external collection device.  Neurological:      Mental Status: He is alert and oriented to person, place, and time. Mental status is at baseline.   Psychiatric:         Behavior: Behavior normal.          Vitals:    03/05/24 0753   BP: 130/79   Pulse: 86   Resp:    Temp: 99 °F (37.2 °C)   SpO2: 94%       In: 650 [P.O.:650]  Out: 750 [Urine:750]    Results in Past 30 Days  Result Component Current Result Ref Range Previous Result Ref Range   Appearance Turbid (A) (3/3/2024) Clear   Clear (2/5/2024) Clear     Bilirubin Urine Negative (3/3/2024) Negative   Negative (2/5/2024) Negative     Blood, Urine Large (A) (3/3/2024) Negative   Negative (2/5/2024) Negative     Color, UA Penny (3/3/2024)   Yellow/Straw (2/5/2024)     Glucose, UA Negative (3/3/2024) Negative mg/dL Negative (2/5/2024) Negative mg/dL   Ketones, Urine Negative (3/3/2024) Negative mg/dL 20 (A) (2/5/2024) Negative mg/dL   Leukocyte Esterase, Urine Small (A) (3/3/2024) Negative   Small (A) (2/5/2024) Negative     Nitrite, Urine Negative (3/3/2024) Negative   Negative (2/5/2024) Negative     pH, Urine 7.0 (3/3/2024) 5.0 - 8.0   5.0 (2/5/2024) 5.0 - 8.0     Protein,  (A) (3/3/2024) Negative mg/dL 100 (A) (2/5/2024) Negative mg/dL   RBC, UA >100 (H) (3/3/2024) 0 - 5 /hpf 0-5 (2/5/2024) 0 - 5 /hpf   Specific Gravity, UA 1.017 (3/3/2024) 1.003 - 1.030   Not in Time Range    Urobilinogen, Urine 4.0 (H) (3/3/2024) 0.1 - 1.0 EU/dL 2.0 (H) (2/5/2024) 0.1 - 1.0 EU/dL   WBC, UA 20-50 (3/3/2024) 0 - 4 /hpf 10-20 (2/5/2024) 0 - 4 /hpf         Data Review:       Recent Days:  Recent Labs     03/03/24  1311 03/04/24  0838   WBC 10.4 10.5   HGB 9.6* 8.8*   HCT 29.4* 27.4*    181     Recent Labs     03/03/24  1311 03/03/24  1526 03/04/24  0838     --  139   K 5.3* 4.3 4.2     --  109*   CO2 26  --  26   BUN 32*  --  29*   MG 1.8 1.8  --    PHOS  --   --  4.7   ALT 23  --   --

## 2024-03-05 NOTE — CARE COORDINATION
CM has reviewed pt chart    DCP: TBD; pt has been staying at [facility] with a family member

## 2024-03-05 NOTE — CONSULTS
PULMONARY CONSULT  [facility]    Name: Agustín Willis MRN: [id number]   : 1945 Hospital: [facility]   Date: 3/5/2024  Admission date: 3/3/2024 Hospital Day: 3       HPI:     Patient Active Problem List   Diagnosis    Weakness of both legs    Insomnia    Shoulder pain    Low back pain    Vasovagal syncope    Tinnitus of right ear    Bronchitis    Alcohol abuse    Malignant hypertensive heart disease without heart failure    Pain in left knee    Dizziness and giddiness    Osteoarthritis of knee    Diarrhea    Hypercholesterolemia    Ankle pain    Pain in breast    Prediabetes    Sleep apnea    Seasonal allergic rhinitis    Chronic obstructive lung disease (HCC)    Cocaine abuse (HCC)    Kidney disease    Benign prostatic disease    Benign prostatic hyperplasia    Weakness of both arms    Lumbar herniated disc    Cervical radiculopathy    Hypertensive disorder    Thrombocytopenia (HCC)    Macrocytic anemia    Chronic renal disease, stage III (HCC)    Weakness generalized    Bilateral lower extremity edema    Syncope and collapse    SARTHAK on CPAP    Unsteady gait when walking    Ankle edema, bilateral    Drug abuse counseling and surveillance of drug abuser    Alcohol use, unspecified with intoxication, uncomplicated (HCC)    Thrombocytopenia, unspecified (HCC)    Stage 3a chronic kidney disease (HCC)    Heart murmur, systolic    Atrial fibrillation (HCC)    Cardiac arrhythmia    Persistent cough    Non-seasonal allergic rhinitis    Edema, lower extremity    Encounter for immunization    FIDEL (acute kidney injury) (HCC)    Altered mental status    Gross hematuria             [x] High complexity decision making was performed  [x] See my orders for details      Subjective/Initial History:     I was asked by Guillermo Martinez MD to see Agustín Willis  a 78 y.o.   male in consultation     Excerpts from admission 3/3/2024 or consult notes as follows:   78-year-old male came in

## 2024-03-05 NOTE — PROGRESS NOTES
PT eval order received and acknowledged. PT eval attempted at 0810 however pt currently refusing to participate with therapy at this time reporting he wants to sleep. Will continue to follow patient and attempt PT eval at a later time. Thank you.

## 2024-03-05 NOTE — PROGRESS NOTES
Hospitalist Progress Note    NAME:   Agustín Willis   : 1945   MRN: [id number]     Date/Time: 3/5/2024 10:59 AM  Patient PCP: Gerry Taylor MD    Estimated discharge date:>48 hours  Barriers: clinical improvement in mental status, placement      HOSPITAL COURSE:    Agustín Willis is  78 y.o. male with benign prostatic disease, cervical radiculopathy, COPD, cocaine abuse, HLD, HTN, osteoarthritis, seasonal allergies, and SARTHAK who was admitted to the hospital with a chief complaint of an AMS. Patient was recently admitted to [facility] on , with the same presentation. Patient was treated and discharged on 15 February to [facility]. During his last admission APS was involved due to social and financial issues that were concerning.    Patient was discharged from the [facility] a few days ago. Patient was apparently staying in a motel/hotel with a family member as they did not having a stable housing option. The family member went to run errands and when she returned he was on lying on the floor unresponsive.  EMS was called and the patient was transported to the ED. In the ED, the patient  was hemodynamically stable  with no signs of  respiratory distress. Noticed to have an injury on the anterior left thigh. Patient passing clots with urination, urology consult requested. 400 cc of bloody urine noted in the canister.   CT head negative. CT C-spine, CT maxillofacial showed no acute fracture or subluxation. He was recently found with influenza, positive for COVID-19. Cxr showed subtle fullness in the right perihilar region, airspace disease vs atelectasis. Patient with diffuse inspiratory/expiratory wheezing this morning. Will add IV Solu-medrol. Pulmonary consult requested.     Assessment / Plan:    Acute metabolic encephalopathy:   Likely relate to cocaine use  No obvious signs of infection.   UA negative  Cxr, no acute abnormalities  CT chest/abdomen bibasilar airspace  documented in the HPI.    Exam  General:Well developed, well nourished patient in NAD, appears chronically ill   Skin/Derm: Skin is warm and dry.  Ophth: Palpebral conjunctivae are not pale and the bulbar conjunctivae  are not erythematous.  No scleral icterus.  ENT: Oral mucosa is moist. Missing dentition  Neck: Neck is supple for age without thyromegaly. No nuchal rigidity.  Pulm: No tachypnea or retractions. Lungs are clear to auscultation. Inspiratory and expiratory wheezing.  GI: Soft and non-tender.  : No CVAT.  Neuro: No facial asymmetry. Drowsy but arousable No dysarthria.   Psych: Affect is normal.  Non-anxious.      ________________________________________________________________________    Total NON critical care TIME:  35  Minutes    Total CRITICAL CARE TIME Spent:  0 Minutes non procedure based      Comments   >50% of visit spent in counseling and coordination of care     ________________________________________________________________________  KELLEY Nicholson NP     Procedures: see electronic medical records for all procedures/Xrays and details which were not copied into this note but were reviewed prior to creation of Plan.      LABS:  I reviewed today's most current labs and imaging studies.  Pertinent labs include:  Recent Labs     03/03/24  1311 03/04/24  0838   WBC 10.4 10.5   HGB 9.6* 8.8*   HCT 29.4* 27.4*    181       Recent Labs     03/03/24  1311 03/03/24  1526 03/04/24  0838 03/05/24  0905     --  139 138   K 5.3* 4.3 4.2 3.9     --  109* 107   CO2 26  --  26 25   BUN 32*  --  29* 28*   MG 1.8 1.8  --   --    PHOS  --   --  4.7 3.6   ALT 23  --   --   --          Signed: KELLEY Nicholson NP

## 2024-03-06 LAB
ALBUMIN SERPL-MCNC: 2.3 G/DL (ref 3.5–5)
ANION GAP SERPL CALC-SCNC: 3 MMOL/L (ref 5–15)
ANION GAP SERPL CALC-SCNC: 5 MMOL/L (ref 5–15)
BASOPHILS # BLD: 0 K/UL (ref 0–0.1)
BASOPHILS NFR BLD: 0 % (ref 0–1)
BUN SERPL-MCNC: 48 MG/DL (ref 6–20)
BUN SERPL-MCNC: 50 MG/DL (ref 6–20)
BUN/CREAT SERPL: 30 (ref 12–20)
BUN/CREAT SERPL: 31 (ref 12–20)
CA-I BLD-MCNC: 8.2 MG/DL (ref 8.5–10.1)
CA-I BLD-MCNC: 8.3 MG/DL (ref 8.5–10.1)
CHLORIDE SERPL-SCNC: 105 MMOL/L (ref 97–108)
CHLORIDE SERPL-SCNC: 105 MMOL/L (ref 97–108)
CO2 SERPL-SCNC: 26 MMOL/L (ref 21–32)
CO2 SERPL-SCNC: 27 MMOL/L (ref 21–32)
CREAT SERPL-MCNC: 1.59 MG/DL (ref 0.7–1.3)
CREAT SERPL-MCNC: 1.6 MG/DL (ref 0.7–1.3)
DIFFERENTIAL METHOD BLD: ABNORMAL
EOSINOPHIL # BLD: 0 K/UL (ref 0–0.4)
EOSINOPHIL NFR BLD: 0 % (ref 0–7)
ERYTHROCYTE [DISTWIDTH] IN BLOOD BY AUTOMATED COUNT: 12.9 % (ref 11.5–14.5)
GLUCOSE SERPL-MCNC: 157 MG/DL (ref 65–100)
GLUCOSE SERPL-MCNC: 159 MG/DL (ref 65–100)
HCT VFR BLD AUTO: 27.6 % (ref 36.6–50.3)
HGB BLD-MCNC: 9.1 G/DL (ref 12.1–17)
IMM GRANULOCYTES # BLD AUTO: 0.1 K/UL (ref 0–0.04)
IMM GRANULOCYTES NFR BLD AUTO: 1 % (ref 0–0.5)
LYMPHOCYTES # BLD: 1.2 K/UL (ref 0.8–3.5)
LYMPHOCYTES NFR BLD: 15 % (ref 12–49)
MCH RBC QN AUTO: 30.7 PG (ref 26–34)
MCHC RBC AUTO-ENTMCNC: 33 G/DL (ref 30–36.5)
MCV RBC AUTO: 93.2 FL (ref 80–99)
MONOCYTES # BLD: 0.4 K/UL (ref 0–1)
MONOCYTES NFR BLD: 5 % (ref 5–13)
NEUTS SEG # BLD: 6.1 K/UL (ref 1.8–8)
NEUTS SEG NFR BLD: 79 % (ref 32–75)
NRBC # BLD: 0 K/UL (ref 0–0.01)
NRBC BLD-RTO: 0 PER 100 WBC
PHOSPHATE SERPL-MCNC: 4.1 MG/DL (ref 2.6–4.7)
PLATELET # BLD AUTO: 195 K/UL (ref 150–400)
PMV BLD AUTO: 10.1 FL (ref 8.9–12.9)
POTASSIUM SERPL-SCNC: 4.6 MMOL/L (ref 3.5–5.1)
POTASSIUM SERPL-SCNC: 4.6 MMOL/L (ref 3.5–5.1)
RBC # BLD AUTO: 2.96 M/UL (ref 4.1–5.7)
SODIUM SERPL-SCNC: 135 MMOL/L (ref 136–145)
SODIUM SERPL-SCNC: 136 MMOL/L (ref 136–145)
WBC # BLD AUTO: 7.7 K/UL (ref 4.1–11.1)

## 2024-03-06 PROCEDURE — 97165 OT EVAL LOW COMPLEX 30 MIN: CPT

## 2024-03-06 PROCEDURE — 85025 COMPLETE CBC W/AUTO DIFF WBC: CPT

## 2024-03-06 PROCEDURE — 36415 COLL VENOUS BLD VENIPUNCTURE: CPT

## 2024-03-06 PROCEDURE — 80048 BASIC METABOLIC PNL TOTAL CA: CPT

## 2024-03-06 PROCEDURE — 97530 THERAPEUTIC ACTIVITIES: CPT

## 2024-03-06 PROCEDURE — 6360000002 HC RX W HCPCS: Performed by: NURSE PRACTITIONER

## 2024-03-06 PROCEDURE — 6370000000 HC RX 637 (ALT 250 FOR IP): Performed by: INTERNAL MEDICINE

## 2024-03-06 PROCEDURE — 92610 EVALUATE SWALLOWING FUNCTION: CPT

## 2024-03-06 PROCEDURE — 99232 SBSQ HOSP IP/OBS MODERATE 35: CPT | Performed by: NURSE PRACTITIONER

## 2024-03-06 PROCEDURE — 80069 RENAL FUNCTION PANEL: CPT

## 2024-03-06 PROCEDURE — 1100000000 HC RM PRIVATE

## 2024-03-06 PROCEDURE — 6370000000 HC RX 637 (ALT 250 FOR IP): Performed by: NURSE PRACTITIONER

## 2024-03-06 PROCEDURE — 94640 AIRWAY INHALATION TREATMENT: CPT

## 2024-03-06 PROCEDURE — 97162 PT EVAL MOD COMPLEX 30 MIN: CPT

## 2024-03-06 PROCEDURE — 2580000003 HC RX 258: Performed by: HOSPITALIST

## 2024-03-06 PROCEDURE — 6370000000 HC RX 637 (ALT 250 FOR IP): Performed by: HOSPITALIST

## 2024-03-06 PROCEDURE — 94761 N-INVAS EAR/PLS OXIMETRY MLT: CPT

## 2024-03-06 RX ORDER — GINSENG 100 MG
CAPSULE ORAL 2 TIMES DAILY
Status: DISCONTINUED | OUTPATIENT
Start: 2024-03-06 | End: 2024-03-12 | Stop reason: HOSPADM

## 2024-03-06 RX ORDER — AZITHROMYCIN 500 MG/1
500 TABLET, FILM COATED ORAL DAILY
Status: DISCONTINUED | OUTPATIENT
Start: 2024-03-06 | End: 2024-03-06

## 2024-03-06 RX ORDER — LEVOFLOXACIN 500 MG/1
500 TABLET, FILM COATED ORAL DAILY
Status: COMPLETED | OUTPATIENT
Start: 2024-03-06 | End: 2024-03-10

## 2024-03-06 RX ADMIN — THIAMINE HCL TAB 100 MG 100 MG: 100 TAB at 08:35

## 2024-03-06 RX ADMIN — BENZONATATE 100 MG: 100 CAPSULE ORAL at 08:55

## 2024-03-06 RX ADMIN — PANTOPRAZOLE SODIUM 40 MG: 40 TABLET, DELAYED RELEASE ORAL at 08:34

## 2024-03-06 RX ADMIN — FOLIC ACID 1 MG: 1 TABLET ORAL at 08:35

## 2024-03-06 RX ADMIN — METOPROLOL TARTRATE 25 MG: 25 TABLET, FILM COATED ORAL at 08:34

## 2024-03-06 RX ADMIN — BENZONATATE 100 MG: 100 CAPSULE ORAL at 20:34

## 2024-03-06 RX ADMIN — ASPIRIN 81 MG: 81 TABLET, COATED ORAL at 08:35

## 2024-03-06 RX ADMIN — SODIUM CHLORIDE, PRESERVATIVE FREE 10 ML: 5 INJECTION INTRAVENOUS at 08:37

## 2024-03-06 RX ADMIN — FUROSEMIDE 20 MG: 10 INJECTION, SOLUTION INTRAMUSCULAR; INTRAVENOUS at 08:35

## 2024-03-06 RX ADMIN — IPRATROPIUM BROMIDE AND ALBUTEROL SULFATE 1 DOSE: .5; 3 SOLUTION RESPIRATORY (INHALATION) at 08:01

## 2024-03-06 RX ADMIN — HYDRALAZINE HYDROCHLORIDE 10 MG: 20 INJECTION INTRAMUSCULAR; INTRAVENOUS at 18:49

## 2024-03-06 RX ADMIN — AZITHROMYCIN DIHYDRATE 500 MG: 500 TABLET ORAL at 08:35

## 2024-03-06 RX ADMIN — IPRATROPIUM BROMIDE AND ALBUTEROL SULFATE 1 DOSE: .5; 3 SOLUTION RESPIRATORY (INHALATION) at 11:14

## 2024-03-06 RX ADMIN — METHYLPREDNISOLONE SODIUM SUCCINATE 40 MG: 40 INJECTION INTRAMUSCULAR; INTRAVENOUS at 00:37

## 2024-03-06 RX ADMIN — METHYLPREDNISOLONE SODIUM SUCCINATE 40 MG: 40 INJECTION INTRAMUSCULAR; INTRAVENOUS at 11:08

## 2024-03-06 RX ADMIN — SODIUM CHLORIDE, PRESERVATIVE FREE 10 ML: 5 INJECTION INTRAVENOUS at 20:34

## 2024-03-06 RX ADMIN — BACITRACIN: 500 OINTMENT TOPICAL at 13:22

## 2024-03-06 RX ADMIN — METHYLPREDNISOLONE SODIUM SUCCINATE 40 MG: 40 INJECTION INTRAMUSCULAR; INTRAVENOUS at 23:22

## 2024-03-06 RX ADMIN — METOPROLOL TARTRATE 25 MG: 25 TABLET, FILM COATED ORAL at 20:34

## 2024-03-06 RX ADMIN — LEVOFLOXACIN 500 MG: 500 TABLET, FILM COATED ORAL at 11:08

## 2024-03-06 RX ADMIN — IPRATROPIUM BROMIDE AND ALBUTEROL SULFATE 1 DOSE: .5; 3 SOLUTION RESPIRATORY (INHALATION) at 22:10

## 2024-03-06 RX ADMIN — BACITRACIN: 500 OINTMENT TOPICAL at 20:35

## 2024-03-06 RX ADMIN — FINASTERIDE 5 MG: 5 TABLET, FILM COATED ORAL at 08:35

## 2024-03-06 NOTE — PROGRESS NOTES
PULMONARY NOTE  [facility]    Name: Agustín Willis MRN: [id number]   : 1945 Hospital: [facility]   Date: 3/6/2024  Admission date: 3/3/2024 Hospital Day: 4       HPI:     Patient Active Problem List   Diagnosis    Weakness of both legs    Insomnia    Shoulder pain    Low back pain    Vasovagal syncope    Tinnitus of right ear    Bronchitis    Alcohol abuse    Malignant hypertensive heart disease without heart failure    Pain in left knee    Dizziness and giddiness    Osteoarthritis of knee    Diarrhea    Hypercholesterolemia    Ankle pain    Pain in breast    Prediabetes    Sleep apnea    Seasonal allergic rhinitis    Chronic obstructive lung disease (HCC)    Cocaine abuse (HCC)    Kidney disease    Benign prostatic disease    Benign prostatic hyperplasia    Weakness of both arms    Lumbar herniated disc    Cervical radiculopathy    Hypertensive disorder    Thrombocytopenia (HCC)    Macrocytic anemia    Chronic renal disease, stage III (HCC)    Weakness generalized    Bilateral lower extremity edema    Syncope and collapse    SARTHAK on CPAP    Unsteady gait when walking    Ankle edema, bilateral    Drug abuse counseling and surveillance of drug abuser    Alcohol use, unspecified with intoxication, uncomplicated (HCC)    Thrombocytopenia, unspecified (HCC)    Stage 3a chronic kidney disease (HCC)    Heart murmur, systolic    Atrial fibrillation (HCC)    Cardiac arrhythmia    Persistent cough    Non-seasonal allergic rhinitis    Edema, lower extremity    Encounter for immunization    FIDEL (acute kidney injury) (HCC)    Altered mental status    Gross hematuria             [x] High complexity decision making was performed  [x] See my orders for details      Subjective/Initial History:     I was asked by Guillermo Martinez MD to see Agustín Willis  a 78 y.o.   male in consultation     Excerpts from admission 3/3/2024 or consult notes as follows:   78-year-old male came in because  microvascular ischemic changes. There is no evidence of acute territorial infarct. The mastoid air cells are well pneumatized. There are air-fluid levels within the visualized left and right maxillary sinuses.     No acute intracranial hemorrhage or infarct.  INDICATION: GLF, left facial injury Exam: Noncontrast CT of the face is performed with 2.5 mm collimation. Coronal and sagittal reformatted images were also obtained. CT dose reduction was achieved through the use of a standardized protocol tailored for this examination and automatic exposure control for dose modulation. FINDINGS: No acute fracture is visualized. Visualized articulations are normal. There are air-fluid levels within the left and right maxillary sinuses. There is partial opacification of the ethmoid air cells and there is moderate mucosal thickening in the left sphenoid sinus. The globes are intact bilaterally. Orbits are intact. Extraocular muscles and optic nerves are grossly normal. IMPRESSION: No acute fracture. INDICATION: GLF, left facial injury Exam: Noncontrast CT of the cervical spine is performed with 2.5 mm collimation. Sagittal and coronal reformatted images were also performed. CT dose reduction was achieved through use of a standardized protocol tailored for this examination and automatic exposure control for dose modulation. FINDINGS: There is no acute fracture or subluxation. The prevertebral soft tissues are within normal limits. Bones are diffusely demineralized. Remaining visualized soft tissues are normal. The level cervical degenerative changes are noted. IMPRESSION: No acute fracture or subluxation.      CT CSpine W/O Contrast    Result Date: 3/3/2024  INDICATION:GLF, left facial injury Exam: Noncontrast CT of the brain is performed with 5 mm collimation. CT dose reduction was achieved to the use of a standardized protocol tailored for this examination and automatic exposure control for dose modulation. FINDINGS: There is

## 2024-03-06 NOTE — PROGRESS NOTES
distress in bed, Call bell within reach, Bed/ chair alarm activated, and Side rails x3 and nsg updated.    COMMUNICATION/EDUCATION:   The patient’s plan of care was discussed with: Occupational therapist, Speech therapist, and Registered nurse     Patient Education  Education Given To: Patient  Education Provided: Role of Therapy;Plan of Care;Fall Prevention Strategies;Transfer Training;Orientation;Equipment  Education Provided Comments: DC recommendations, PT POC, safety with mobility, and use of RW  Education Method: Demonstration;Verbal;Teach Back  Barriers to Learning: Cognition (desire and motivation)  Education Outcome: Verbalized understanding;Unable to demonstrate understanding;Continued education needed    PT/OT sessions occurred together for increased safety of pt and clinician.     Thank you for this referral.  Pati Lou, PT, DPT  Minutes: 33

## 2024-03-06 NOTE — PLAN OF CARE
Equipment: Cane  Has the patient had two or more falls in the past year or any fall with injury in the past year?:  (6 falls in the past 3 months)  Social/Functional History  Lives With:  (niece)  Type of Home:  (motel)  Home Equipment: Cane  Has the patient had two or more falls in the past year or any fall with injury in the past year?:  (6 falls in the past 3 months)  ADL Assistance: Needs assistance  Toileting: Needs assistance  Homemaking Assistance: Needs assistance  Ambulation Assistance: Needs assistance  Transfer Assistance: Needs assistance  Active : No    Hand Dominance: right     EXAMINATION OF PERFORMANCE DEFICITS:    Cognitive/Behavioral Status:  Orientation  Orientation Level: Oriented to place;Oriented to person;Disoriented to time;Disoriented to situation  Cognition  Overall Cognitive Status: Exceptions  Following Commands: Inconsistently follows commands  Attention Span: Difficulty dividing attention  Safety Judgement: Decreased awareness of need for assistance;Decreased awareness of need for safety  Insights: Decreased awareness of deficits  Initiation: Requires cues for all  Sequencing: Requires cues for all      Hearing:   Hearing  Hearing: Exceptions to [facility]  Hearing Exceptions: Hard of hearing/hearing concerns    Vision/Perceptual:          Vision  Vision: Within Functional Limits       Range of Motion:     AROM: Generally decreased, functional (LUE generally decreased functional. RUE grossly decreased nonfunctional.)       Strength:    Strength: Generally decreased, functional (LUE generally decreased functional. RUE grossly decreased nonfunctional)    Coordination:  Coordination: Generally decreased, functional        Functional Mobility and Transfers for ADLs:  Bed Mobility:  Bed Mobility Training  Bed Mobility Training: Yes  Interventions: Safety awareness training;Verbal cues;Tactile cues;Manual cues;Visual cues  Rolling: Moderate assistance;Maximum assistance;Assist X2;Additional  time  Supine to Sit: Maximum assistance;Assist X2;Additional time  Sit to Supine: Maximum assistance;Assist X2;Additional time  Scooting: Maximum assistance;Assist X2;Additional time    Transfers:  Transfer Training  Transfer Training: Yes  Interventions: Verbal cues;Tactile cues;Safety awareness training;Manual cues  Sit to Stand: Maximum assistance;Assist X2;Additional time;Adaptive equipment  Stand to Sit: Maximum assistance;Assist X2;Adaptive equipment      Balance:  Balance  Sitting: Impaired  Sitting - Static: Good (unsupported)  Sitting - Dynamic: Poor (constant support)  Standing: Impaired  Standing - Static: Constant support;Poor  Standing - Dynamic: Poor;Constant support      ADL Assessment:          Feeding: Stand by assistance  Feeding Skilled Clinical Factors: Pt brings LUE>mouth IND. Req's VC for pacing.                                            Toileting: Dependent/Total  Toileting Skilled Clinical Factors: Posterior gab care while standing and while rolling in bed               Functional Measure:    [facility] AM-PAC \"6 Clicks\"                                                       Daily Activity Inpatient Short Form  How much help from another person does the patient currently need... Total; A Lot A Little None   1.  Putting on and taking off regular lower body clothing? [x]  1 []  2 []  3 []  4   2.  Bathing (including washing, rinsing, drying)? [x]  1 []  2 []  3 []  4   3.  Toileting, which includes using toilet, bedpan or urinal? [x] 1 []  2 []  3 []  4   4.  Putting on and taking off regular upper body clothing? []  1 [x]  2 []  3 []  4   5.  Taking care of personal grooming such as brushing teeth? []  1 []  2 [x]  3 []  4   6.  Eating meals? []  1 []  2 [x]  3 []  4   © 2007, Trustees of [facility], under license to [employer]. All rights reserved     Score: 11/24     Interpretation of Tool:  Represents clinically-significant functional categories (i.e. Activities of daily

## 2024-03-06 NOTE — PLAN OF CARE
Problem: Discharge Planning  Goal: Discharge to home or other facility with appropriate resources  Outcome: Progressing  Flowsheets (Taken 3/5/2024 2325)  Discharge to home or other facility with appropriate resources:   Identify barriers to discharge with patient and caregiver   Arrange for needed discharge resources and transportation as appropriate   Identify discharge learning needs (meds, wound care, etc)     Problem: Safety - Adult  Goal: Free from fall injury  Outcome: Progressing  Flowsheets (Taken 3/5/2024 2325)  Free From Fall Injury:   Instruct family/caregiver on patient safety   Based on caregiver fall risk screen, instruct family/caregiver to ask for assistance with transferring infant if caregiver noted to have fall risk factors     Problem: Skin/Tissue Integrity  Goal: Absence of new skin breakdown  Description: 1.  Monitor for areas of redness and/or skin breakdown  2.  Assess vascular access sites hourly  3.  Every 4-6 hours minimum:  Change oxygen saturation probe site  4.  Every 4-6 hours:  If on nasal continuous positive airway pressure, respiratory therapy assess nares and determine need for appliance change or resting period.  Outcome: Progressing

## 2024-03-06 NOTE — ADT AUTH CERT
Medicine     Progress Notes      Signed     Date of Service: 3/5/2024 10:59 AM     Signed              Hospitalist Progress Note     NAME:              Agustín Willis   :   1945   MRN:   [id number]      Date/Time: 3/5/2024 10:59 AM  Patient PCP: Gerry Taylor MD     Estimated discharge date:>48 hours  Barriers: clinical improvement in mental status, placement        HOSPITAL COURSE:     Agustín Willis is  78 y.o. male with benign prostatic disease, cervical radiculopathy, COPD, cocaine abuse, HLD, HTN, osteoarthritis, seasonal allergies, and SARTHKA who was admitted to the hospital with a chief complaint of an AMS. Patient was recently admitted to [facility] on , with the same presentation. Patient was treated and discharged on 15 February to [facility]. During his last admission APS was involved due to social and financial issues that were concerning.    Patient was discharged from the [facility] a few days ago. Patient was apparently staying in a motel/hotel with a family member as they did not having a stable housing option. The family member went to run errands and when she returned he was on lying on the floor unresponsive.  EMS was called and the patient was transported to the ED. In the ED, the patient  was hemodynamically stable  with no signs of  respiratory distress. Noticed to have an injury on the anterior left thigh. Patient passing clots with urination, urology consult requested. 400 cc of bloody urine noted in the canister.   CT head negative. CT C-spine, CT maxillofacial showed no acute fracture or subluxation. He was recently found with influenza, positive for COVID-19. Cxr showed subtle fullness in the right perihilar region, airspace disease vs atelectasis. Patient with diffuse inspiratory/expiratory wheezing this morning. Will add IV Solu-medrol. Pulmonary consult requested.      Assessment / Plan:     Acute metabolic encephalopathy:   Likely relate to cocaine  11.5 - 14.5 %     Platelets 167 150 - 400 K/uL     MPV 10.3 8.9 - 12.9 FL     Nucleated RBCs 0.0 0.0  WBC     nRBC 0.00 0.00 - 0.01 K/uL     Neutrophils % 76 (H) 32 - 75 %     Lymphocytes % 15 12 - 49 %     Monocytes % 8 5 - 13 %     Eosinophils % 1 0 - 7 %     Basophils % 0 0 - 1 %     Immature Granulocytes 0 0 - 0.5 %     Neutrophils Absolute 6.3 1.8 - 8.0 K/UL     Lymphocytes Absolute 1.3 0.8 - 3.5 K/UL     Monocytes Absolute 0.7 0.0 - 1.0 K/UL     Eosinophils Absolute 0.1 0.0 - 0.4 K/UL     Basophils Absolute 0.0 0.0 - 0.1 K/UL     Absolute Immature Granulocyte 0.0 0.00 - 0.04 K/UL     Differential Type AUTOMATED              Assessment          Patient Active Problem List   Diagnosis    Weakness of both legs    Insomnia    Shoulder pain    Low back pain    Vasovagal syncope    Tinnitus of right ear    Bronchitis    Alcohol abuse    Malignant hypertensive heart disease without heart failure    Pain in left knee    Dizziness and giddiness    Osteoarthritis of knee    Diarrhea    Hypercholesterolemia    Ankle pain    Pain in breast    Prediabetes    Sleep apnea    Seasonal allergic rhinitis    Chronic obstructive lung disease (HCC)    Cocaine abuse (HCC)    Kidney disease    Benign prostatic disease    Benign prostatic hyperplasia    Weakness of both arms    Lumbar herniated disc    Cervical radiculopathy    Hypertensive disorder    Thrombocytopenia (HCC)    Macrocytic anemia    Chronic renal disease, stage III (HCC)    Weakness generalized    Bilateral lower extremity edema    Syncope and collapse    SARTHAK on CPAP    Unsteady gait when walking    Ankle edema, bilateral    Drug abuse counseling and surveillance of drug abuser    Alcohol use, unspecified with intoxication, uncomplicated (HCC)    Thrombocytopenia, unspecified (HCC)    Stage 3a chronic kidney disease (HCC)    Heart murmur, systolic    Atrial fibrillation (HCC)    Cardiac arrhythmia    Persistent cough    Non-seasonal allergic rhinitis    Edema,

## 2024-03-06 NOTE — PROGRESS NOTES
Hospitalist Progress Note    NAME:   Agustín Willis   : 1945   MRN: [id number]     Date/Time: 3/6/2024 11:56 AM  Patient PCP: Gerry Taylor MD    Estimated discharge date:>48 hours  Barriers: MBS 3/7, placement      HOSPITAL COURSE:    Agustín Willis is  78 y.o. male with benign prostatic disease, cervical radiculopathy, COPD, cocaine abuse, HLD, HTN, osteoarthritis, seasonal allergies, and SARTHAK who was admitted to the hospital with a chief complaint of an AMS. Patient was recently admitted to [facility] on , with the same presentation. Patient was treated and discharged on 15 February to [facility]. During his last admission APS was involved due to social and financial issues that were concerning.    Patient was discharged from the [facility] a few days ago. Patient was apparently staying in a motel/hotel with a family member as they did not having a stable housing option. The family member went to run errands and when she returned he was on lying on the floor unresponsive.  EMS was called and the patient was transported to the ED. In the ED, the patient  was hemodynamically stable  with no signs of  respiratory distress. Noticed to have an injury on the anterior left thigh. Patient passing clots with urination, urology consult requested. 400 cc of bloody urine noted in the canister.   CT head negative. CT C-spine, CT maxillofacial showed no acute fracture or subluxation. He was recently found with influenza, positive for COVID-19. Cxr showed subtle fullness in the right perihilar region, airspace disease vs atelectasis. Patient with diffuse inspiratory/expiratory wheezing this morning. Will add IV Solu-medrol. Continue oral abx. ST recommending MBS tomorrow for dysphagia.    Assessment / Plan:    Acute metabolic encephalopathy:   Likely relate to cocaine use  No obvious signs of infection.   UA negative  Cxr, no acute abnormalities  CT chest/abdomen bibasilar airspace

## 2024-03-06 NOTE — PLAN OF CARE
Speech LAnguage Pathology Dysphagia EVALUATION    Patient: Agustín Willis (78 y.o. male)  Date: 3/6/2024  Primary Diagnosis: Altered mental status [R41.82]  Generalized weakness [R53.1]  Injury of head, initial encounter [S09.90XA]  Non-traumatic rhabdomyolysis [M62.82]       Precautions: Aspiration Fall Risk, Bed Alarm                DIET RECOMMENDATIONS: Soft and bite sized and thin liquids, meds whole with applesauce or pudding,    SWALLOW SAFETY PRECAUTIONS: Rec slow rate of intake, small bites/sips, 6 small meals, double swallow, liquid wash, remain upright 1 hour after PO and do not eat 3 hours before bedtime.       ASSESSMENT :  Based on the objective data described below, the patient presents with mild oral dysphagia and concerns for underlying esophageal dysphagia.   Notified by PT patient coughing while eating sausage and blueberry muffin. RN reports tolerated meals past 3 days. Patient w/ audible wheeze. CXR results noted.   Patient confused and irritable but cooperative  Oral phase c/b reduced mastication s/t dentition. Pharyngeal phase c/b timely swallow and HLE is wfl upon palpation. Audible swallow noted. Patient reports globus sensation. No overt s/s of pen/asp observed.  Given reports of coughing, pharyngeal globus and CXR results will plan for MBS to r/o aspiration and further assess oropharyngeal phase of swallow.   Patient will benefit from skilled intervention to address the above impairments.    GOALS:    Problem: SLP Adult - Impaired Swallowing  Goal: By Discharge: Advance to least restrictive diet without signs or symptoms of aspiration for planned discharge setting.  See evaluation for individualized goals.  Description: Speech Therapy Swallow Goals  Initiated 3/6/2024  -Patient will tolerate soft and bite sized diet with thin liquids without clinical indicators of aspiration given no cues within 7 day(s).        -Patient will tolerate PO trials without clinical indicators of aspiration  given no cues within 7 day(s).      -Patient will participate in modified barium swallow study within 7 day(s).      -Patient will demonstrate understanding of swallow safety precautions and aspiration precautions, diet recs with no cues within 7day(s).    -Patient/caregiver goal: eat what I want           Outcome: Progressing        PLAN :  Recommendations and Planned Interventions:  DIET RECOMMENDATIONS: Soft and bite sized and thin liquids, meds whole with applesauce or pudding,    SWALLOW SAFETY PRECAUTIONS: Rec slow rate of intake, small bites/sips, 6 small meals, double swallow, liquid wash, remain upright 1 hour after PO and do not eat 3 hours before bedtime.    Plan for MBS on 3/7/24  Acute SLP Services: Yes, patient will be followed by speech-language pathology 3x/week to address goals. Patient's rehabilitation potential is considered to be Fair.    Discharge Recommendations: Skilled Nursing Facility     SUBJECTIVE:   \"I'm from [place]\"    OBJECTIVE:   Patient admitted w/  Past Medical History:   Diagnosis Date    Benign prostatic disease 8/3/2020    Hypertrophy with Outflow Obstruction    Benign prostatic hyperplasia 8/3/2020    Cervical radiculopathy 8/3/2020    Chronic obstructive lung disease (HCC) 8/3/2020    Cocaine abuse (HCC) 8/3/2020    Dizziness and giddiness 8/3/2020    Hypercholesterolemia 8/3/2020    Hypertensive disorder 8/3/2020    Insomnia 8/3/2020    Kidney disease 8/3/2020    Low back pain 8/3/2020    Osteoarthritis of knee 8/3/2020    Sleep apnea 8/3/2020    Vasovagal syncope 8/3/2020   History reviewed. No pertinent surgical history.  Prior Level of Function/Home Situation:   Social/Functional History  Lives With:  (niece)  Type of Home:  (motel)  Home Equipment: Cane  Has the patient had two or more falls in the past year or any fall with injury in the past year?:  (6 falls in the past 3 months)  ADL Assistance: Needs assistance  Toileting: Needs assistance  Homemaking Assistance:
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
## 2024-03-06 NOTE — PROGRESS NOTES
Notified by PT, patient having difficult time w/ breakfast. Please place ST order as medically indicated.

## 2024-03-06 NOTE — PROGRESS NOTES
UROLOGY Progress Note         [phone]      Daily Progress Note: 3/6/2024    Subjective:   The patient is seen for UROLOGIC follow up for hematuria.     On ASA/enoxaparin (enoxaparin held). HX of drug use/positive tox screens.  No damico; voiding to external collection device. Urine culture with no growth.  Cr slightly elevated at 1.39, 3/5/24.  Has been on finasteride.    CT scan abd/pel WO with no upper tract findings on 3/4/24.  No urologic findings to explain hematuria.  No bladder thickening. Prostate not abnormally enlarged.  No renal stones, hydro or masses.    Small renal masses or endophytic lesions cannot be seen on noncontrast imaging.    He continues with hematuria in the external collection device, though clearing.    He has refused inpatient cystoscopy for lower tract evaluation.  He is concerned with his breakfast tray.    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    Pain in left knee    Dizziness and giddiness    Osteoarthritis of knee    Diarrhea    Hypercholesterolemia    Ankle pain    Pain in breast    Prediabetes    Sleep apnea    Seasonal allergic rhinitis    Chronic obstructive lung disease (HCC)    Cocaine abuse (HCC)    Kidney disease    Benign prostatic disease    Benign prostatic hyperplasia    Weakness of both arms    Lumbar herniated disc    Cervical radiculopathy    Hypertensive disorder    Thrombocytopenia (HCC)    Macrocytic anemia    Chronic renal disease, stage III (HCC)    Weakness generalized    Bilateral lower extremity edema    Syncope and collapse    SARTHAK on CPAP    Unsteady gait when walking    Ankle edema, bilateral    Drug abuse counseling and surveillance of drug abuser    Alcohol use, unspecified with intoxication, uncomplicated ([facility])     03/05/24  1015   WBC 10.4 10.5 8.4   HGB 9.6* 8.8* 8.1*   HCT 29.4* 27.4* 24.8*    181 167       Recent Labs     03/03/24  1311 03/03/24  1526 03/04/24  0838 03/05/24  0905 03/05/24  1015     --  139 138 137   K 5.3* 4.3 4.2 3.9 4.0     --  109* 107 109*   CO2 26  --  26 25 25   BUN 32*  --  29* 28* 30*   MG 1.8 1.8  --   --   --    PHOS  --   --  4.7 3.6  --    ALT 23  --   --   --   --          24 Hour Results:  Recent Results (from the past 24 hour(s))   Basic Metabolic Panel    Collection Time: 03/05/24 10:15 AM   Result Value Ref Range    Sodium 137 136 - 145 mmol/L    Potassium 4.0 3.5 - 5.1 mmol/L    Chloride 109 (H) 97 - 108 mmol/L    CO2 25 21 - 32 mmol/L    Anion Gap 3 (L) 5 - 15 mmol/L    Glucose 100 65 - 100 mg/dL    BUN 30 (H) 6 - 20 mg/dL    Creatinine 1.39 (H) 0.70 - 1.30 mg/dL    Bun/Cre Ratio 22 (H) 12 - 20      Est, Glom Filt Rate 51 (L) >60 ml/min/1.73m2    Calcium 7.7 (L) 8.5 - 10.1 mg/dL   CBC with Auto Differential    Collection Time: 03/05/24 10:15 AM   Result Value Ref Range    WBC 8.4 4.1 - 11.1 K/uL    RBC 2.65 (L) 4.10 - 5.70 M/uL    Hemoglobin 8.1 (L) 12.1 - 17.0 g/dL    Hematocrit 24.8 (L) 36.6 - 50.3 %    MCV 93.6 80.0 - 99.0 FL    MCH 30.6 26.0 - 34.0 PG    MCHC 32.7 30.0 - 36.5 g/dL    RDW 13.1 11.5 - 14.5 %    Platelets 167 150 - 400 K/uL    MPV 10.3 8.9 - 12.9 FL    Nucleated RBCs 0.0 0.0  WBC    nRBC 0.00 0.00 - 0.01 K/uL    Neutrophils % 76 (H) 32 - 75 %    Lymphocytes % 15 12 - 49 %    Monocytes % 8 5 - 13 %    Eosinophils % 1 0 - 7 %    Basophils % 0 0 - 1 %    Immature Granulocytes 0 0 - 0.5 %    Neutrophils Absolute 6.3 1.8 - 8.0 K/UL    Lymphocytes Absolute 1.3 0.8 - 3.5 K/UL    Monocytes Absolute 0.7 0.0 - 1.0 K/UL    Eosinophils Absolute 0.1 0.0 - 0.4 K/UL    Basophils Absolute 0.0 0.0 - 0.1 K/UL    Absolute Immature Granulocyte 0.0 0.00 - 0.04 K/UL    Differential Type AUTOMATED         Assessment     Patient Active Problem List   Diagnosis

## 2024-03-07 ENCOUNTER — APPOINTMENT (OUTPATIENT)
Facility: HOSPITAL | Age: 79
DRG: 917 | End: 2024-03-07
Payer: MEDICARE

## 2024-03-07 LAB
ANION GAP SERPL CALC-SCNC: 4 MMOL/L (ref 5–15)
BASOPHILS # BLD: 0 K/UL (ref 0–0.1)
BASOPHILS NFR BLD: 0 % (ref 0–1)
BUN SERPL-MCNC: 55 MG/DL (ref 6–20)
BUN/CREAT SERPL: 34 (ref 12–20)
CA-I BLD-MCNC: 8 MG/DL (ref 8.5–10.1)
CHLORIDE SERPL-SCNC: 106 MMOL/L (ref 97–108)
CO2 SERPL-SCNC: 26 MMOL/L (ref 21–32)
CREAT SERPL-MCNC: 1.61 MG/DL (ref 0.7–1.3)
DIFFERENTIAL METHOD BLD: ABNORMAL
EOSINOPHIL # BLD: 0 K/UL (ref 0–0.4)
EOSINOPHIL NFR BLD: 0 % (ref 0–7)
ERYTHROCYTE [DISTWIDTH] IN BLOOD BY AUTOMATED COUNT: 12.9 % (ref 11.5–14.5)
GLUCOSE SERPL-MCNC: 148 MG/DL (ref 65–100)
HCT VFR BLD AUTO: 25.5 % (ref 36.6–50.3)
HGB BLD-MCNC: 8.5 G/DL (ref 12.1–17)
IMM GRANULOCYTES # BLD AUTO: 0.1 K/UL (ref 0–0.04)
IMM GRANULOCYTES NFR BLD AUTO: 2 % (ref 0–0.5)
LYMPHOCYTES # BLD: 0.9 K/UL (ref 0.8–3.5)
LYMPHOCYTES NFR BLD: 11 % (ref 12–49)
MCH RBC QN AUTO: 30.8 PG (ref 26–34)
MCHC RBC AUTO-ENTMCNC: 33.3 G/DL (ref 30–36.5)
MCV RBC AUTO: 92.4 FL (ref 80–99)
MONOCYTES # BLD: 0.4 K/UL (ref 0–1)
MONOCYTES NFR BLD: 5 % (ref 5–13)
NEUTS SEG # BLD: 6.6 K/UL (ref 1.8–8)
NEUTS SEG NFR BLD: 82 % (ref 32–75)
NRBC # BLD: 0 K/UL (ref 0–0.01)
NRBC BLD-RTO: 0 PER 100 WBC
PLATELET # BLD AUTO: 212 K/UL (ref 150–400)
PMV BLD AUTO: 10.5 FL (ref 8.9–12.9)
POTASSIUM SERPL-SCNC: 4.4 MMOL/L (ref 3.5–5.1)
RBC # BLD AUTO: 2.76 M/UL (ref 4.1–5.7)
SODIUM SERPL-SCNC: 136 MMOL/L (ref 136–145)
WBC # BLD AUTO: 8 K/UL (ref 4.1–11.1)

## 2024-03-07 PROCEDURE — 80048 BASIC METABOLIC PNL TOTAL CA: CPT

## 2024-03-07 PROCEDURE — 97530 THERAPEUTIC ACTIVITIES: CPT

## 2024-03-07 PROCEDURE — 6360000002 HC RX W HCPCS: Performed by: NURSE PRACTITIONER

## 2024-03-07 PROCEDURE — 92611 MOTION FLUOROSCOPY/SWALLOW: CPT

## 2024-03-07 PROCEDURE — 94761 N-INVAS EAR/PLS OXIMETRY MLT: CPT

## 2024-03-07 PROCEDURE — 74230 X-RAY XM SWLNG FUNCJ C+: CPT

## 2024-03-07 PROCEDURE — 6370000000 HC RX 637 (ALT 250 FOR IP): Performed by: HOSPITALIST

## 2024-03-07 PROCEDURE — 6370000000 HC RX 637 (ALT 250 FOR IP): Performed by: INTERNAL MEDICINE

## 2024-03-07 PROCEDURE — 85025 COMPLETE CBC W/AUTO DIFF WBC: CPT

## 2024-03-07 PROCEDURE — 2580000003 HC RX 258: Performed by: NURSE PRACTITIONER

## 2024-03-07 PROCEDURE — 94640 AIRWAY INHALATION TREATMENT: CPT

## 2024-03-07 PROCEDURE — 36415 COLL VENOUS BLD VENIPUNCTURE: CPT

## 2024-03-07 PROCEDURE — 1100000000 HC RM PRIVATE

## 2024-03-07 PROCEDURE — 2500000003 HC RX 250 WO HCPCS: Performed by: INTERNAL MEDICINE

## 2024-03-07 PROCEDURE — 2580000003 HC RX 258: Performed by: HOSPITALIST

## 2024-03-07 RX ORDER — SODIUM CHLORIDE 9 MG/ML
INJECTION, SOLUTION INTRAVENOUS CONTINUOUS
Status: DISPENSED | OUTPATIENT
Start: 2024-03-07 | End: 2024-03-08

## 2024-03-07 RX ADMIN — BARIUM SULFATE 100 ML: 0.81 POWDER, FOR SUSPENSION ORAL at 13:15

## 2024-03-07 RX ADMIN — BARIUM SULFATE 30 ML: 400 PASTE ORAL at 13:15

## 2024-03-07 RX ADMIN — BACITRACIN: 500 OINTMENT TOPICAL at 09:10

## 2024-03-07 RX ADMIN — METOPROLOL TARTRATE 25 MG: 25 TABLET, FILM COATED ORAL at 20:53

## 2024-03-07 RX ADMIN — SODIUM CHLORIDE, PRESERVATIVE FREE 10 ML: 5 INJECTION INTRAVENOUS at 09:46

## 2024-03-07 RX ADMIN — FOLIC ACID 1 MG: 1 TABLET ORAL at 09:45

## 2024-03-07 RX ADMIN — METHYLPREDNISOLONE SODIUM SUCCINATE 40 MG: 40 INJECTION INTRAMUSCULAR; INTRAVENOUS at 23:24

## 2024-03-07 RX ADMIN — IPRATROPIUM BROMIDE AND ALBUTEROL SULFATE 1 DOSE: .5; 3 SOLUTION RESPIRATORY (INHALATION) at 22:44

## 2024-03-07 RX ADMIN — HYDRALAZINE HYDROCHLORIDE 10 MG: 20 INJECTION INTRAMUSCULAR; INTRAVENOUS at 08:01

## 2024-03-07 RX ADMIN — THIAMINE HCL TAB 100 MG 100 MG: 100 TAB at 09:44

## 2024-03-07 RX ADMIN — METHYLPREDNISOLONE SODIUM SUCCINATE 40 MG: 40 INJECTION INTRAMUSCULAR; INTRAVENOUS at 11:58

## 2024-03-07 RX ADMIN — PANTOPRAZOLE SODIUM 40 MG: 40 TABLET, DELAYED RELEASE ORAL at 06:37

## 2024-03-07 RX ADMIN — SODIUM CHLORIDE: 9 INJECTION, SOLUTION INTRAVENOUS at 16:05

## 2024-03-07 RX ADMIN — LEVOFLOXACIN 500 MG: 500 TABLET, FILM COATED ORAL at 09:44

## 2024-03-07 RX ADMIN — SODIUM CHLORIDE, PRESERVATIVE FREE 10 ML: 5 INJECTION INTRAVENOUS at 20:54

## 2024-03-07 RX ADMIN — IPRATROPIUM BROMIDE AND ALBUTEROL SULFATE 1 DOSE: .5; 3 SOLUTION RESPIRATORY (INHALATION) at 08:00

## 2024-03-07 RX ADMIN — BENZONATATE 100 MG: 100 CAPSULE ORAL at 20:53

## 2024-03-07 RX ADMIN — METOPROLOL TARTRATE 25 MG: 25 TABLET, FILM COATED ORAL at 09:44

## 2024-03-07 RX ADMIN — IPRATROPIUM BROMIDE AND ALBUTEROL SULFATE 1 DOSE: .5; 3 SOLUTION RESPIRATORY (INHALATION) at 16:04

## 2024-03-07 RX ADMIN — BACITRACIN: 500 OINTMENT TOPICAL at 20:55

## 2024-03-07 RX ADMIN — FINASTERIDE 5 MG: 5 TABLET, FILM COATED ORAL at 09:45

## 2024-03-07 RX ADMIN — ASPIRIN 81 MG: 81 TABLET, COATED ORAL at 09:44

## 2024-03-07 RX ADMIN — BENZONATATE 100 MG: 100 CAPSULE ORAL at 02:59

## 2024-03-07 NOTE — CARE COORDINATION
CM has reviewed pt chart    DCP: [facility] pending acceptance and auth    0819: CM spoke with pt sister and she made SNF choice of pt to try [facility] again    0822: CM sent referral to [facility] and requested auth to be initiated if able to accept, awaiting reply

## 2024-03-07 NOTE — PLAN OF CARE
precautions and aspiration precautions, diet recs with no cues within 7day(s).    -Patient/caregiver goal: eat what I want           Outcome: Progressing        PLAN :  Recommendations and Planned Interventions:  DIET RECOMMENDATIONS: Soft and bite sized and thin liquids, meds whole with applesauce or pudding,     SWALLOW SAFETY PRECAUTIONS: Rec slow rate of intake, small bites/sips, 6 small meals, double swallow, liquid wash, remain upright 1 hour after PO and do not eat 3 hours before bedtime.   Acute SLP Services: Yes, patient will be followed by speech-language pathology 1x/week to address goals. Patient's rehabilitation potential is considered to be Guarded. F/u x1 for diet tolerance  Discharge Recommendations: None     SUBJECTIVE:   Patient reports he did well with lunch.    OBJECTIVE:     Past Medical History:   Diagnosis Date    Benign prostatic disease 8/3/2020    Hypertrophy with Outflow Obstruction    Benign prostatic hyperplasia 8/3/2020    Cervical radiculopathy 8/3/2020    Chronic obstructive lung disease (HCC) 8/3/2020    Cocaine abuse (HCC) 8/3/2020    Dizziness and giddiness 8/3/2020    Hypercholesterolemia 8/3/2020    Hypertensive disorder 8/3/2020    Insomnia 8/3/2020    Kidney disease 8/3/2020    Low back pain 8/3/2020    Osteoarthritis of knee 8/3/2020    Sleep apnea 8/3/2020    Vasovagal syncope 8/3/2020   History reviewed. No pertinent surgical history.  Prior Level of Function/Home Situation:   Social/Functional History  Lives With:  (niece)  Type of Home:  ([facility])  Home Equipment: Cane  Has the patient had two or more falls in the past year or any fall with injury in the past year?:  (6 falls in the past 3 months)  ADL Assistance: Needs assistance  Toileting: Needs assistance  Homemaking Assistance: Needs assistance  Ambulation Assistance: Needs assistance  Transfer Assistance: Needs assistance  Active : No       Video Flouroscopic Procedures  [x] Lateral View   [] A-P View [x] Scanned

## 2024-03-07 NOTE — PLAN OF CARE
Problem: Discharge Planning  Goal: Discharge to home or other facility with appropriate resources  Outcome: Progressing  Flowsheets (Taken 3/6/2024 2004)  Discharge to home or other facility with appropriate resources:   Identify barriers to discharge with patient and caregiver   Arrange for needed discharge resources and transportation as appropriate     Problem: Safety - Adult  Goal: Free from fall injury  Outcome: Progressing  Flowsheets (Taken 3/5/2024 2325)  Free From Fall Injury:   Instruct family/caregiver on patient safety   Based on caregiver fall risk screen, instruct family/caregiver to ask for assistance with transferring infant if caregiver noted to have fall risk factors     Problem: Skin/Tissue Integrity  Goal: Absence of new skin breakdown  Description: 1.  Monitor for areas of redness and/or skin breakdown  2.  Assess vascular access sites hourly  3.  Every 4-6 hours minimum:  Change oxygen saturation probe site  4.  Every 4-6 hours:  If on nasal continuous positive airway pressure, respiratory therapy assess nares and determine need for appliance change or resting period.  Outcome: Progressing

## 2024-03-07 NOTE — PROGRESS NOTES
OCCUPATIONAL THERAPY TREATMENT  Patient: Agustín Willis (78 y.o. male)  Date: 3/7/2024  Primary Diagnosis: Altered mental status [R41.82]  Generalized weakness [R53.1]  Injury of head, initial encounter [S09.90XA]  Non-traumatic rhabdomyolysis [M62.82]       Precautions: Fall Risk, Bed Alarm                Recommendations for nursing mobility: Out of bed to chair for meals, Encourage HEP in prep for ADLs/mobility; see handout for details, Frequent repositioning to prevent skin breakdown, Use of bed/chair alarm for safety, LE elevation for management of edema, Use of BSC for toileting , AD and gt belt for bed to chair , and Assist x2    In place during session: EKG/telemetry   Chart, occupational therapy assessment, plan of care, and goals were reviewed.  ASSESSMENT  Patient continues with skilled OT services and is progressing towards goals. Pt semi supine with friend at bedside upon OT and PTA arrival, agreeable to session. Pt's friend stayed throughout session with pt's approval. Pt A&O x person and place only. Pt continues to req significant Ax2 for bed and OOB functional mobility/transfers in prep for bathroom mobility req'd for toileting. Pt able to stand ~5-7 minutes this date to complete gab care with total A. Pt req'd 1 seated RB following stand and then completed SPT EOB>chair with max Ax2 and RW. Pt continues to req additional time for all activity, verbal and visual cuing for sequencing, and cuing to push from/reach back for bed/chair during transfers. (See below for objective details and assist levels).     Overall pt tolerated session fair today with c/o L knee pain with activity. Potential barriers for safe discharge: pts current support system is unable to meet their requirements for physical assistance, pt has poor safety awareness, pt has impaired cognition, pt is a high fall risk, pt is not safe to be alone, and concern for pt safely navigating or managing the home environment. Current OT

## 2024-03-07 NOTE — PLAN OF CARE
PHYSICAL THERAPY TREATMENT     Patient: Agustín Willis (78 y.o. male)  Date: 3/7/2024  Diagnosis: Altered mental status [R41.82]  Generalized weakness [R53.1]  Injury of head, initial encounter [S09.90XA]  Non-traumatic rhabdomyolysis [M62.82] Altered mental status      Precautions: Fall Risk, Bed Alarm                      Recommendations for nursing mobility: Encourage HEP in prep for ADLs/mobility; see handout for details, Юлия Hull for bed to chair transfers , and Assist x2    In place during session: External Catheter and EKG/telemetry   Chart, physical therapy assessment, plan of care and goals were reviewed.  ASSESSMENT  Patient continues with skilled PT services and is progressing towards goals. Pt in bed upon PT arrival, agreeable to session. Pt A&O x 2. (See below for objective details and assist levels).     Overall pt tolerated session fair today. Pt improved with functional mobility today, overall needing max Ax2 for rolling, mod A x2 for supine>sit, and transfers. Pt did req max A x2 for transfers to the chair. Prior to chair transfer, pt stood approx 5 minutes to get cleaned up/pericare provided. Standing balance improved however demos very shuffled gait and unsteadiness increased when attempting ambulation. Patient req max A x2 ambulation and very unsafe for further ambulation than just bed>chair at this time. Will continue to benefit from skilled PT services, and will continue to progress as tolerated. Potential barriers for safe discharge: pt has poor safety awareness, pt has impaired cognition, pt is a high fall risk, and concern for pt safely navigating or managing the home environment. Current PT DC recommendation Skilled Nursing Facility once medically appropriate.     Start of Session End of Session   SPO2 (%) 95    Heart Rate (BPM) 83      GOALS:    Problem: Physical Therapy - Adult  Goal: By Discharge: Performs mobility at highest level of function for planned discharge setting.

## 2024-03-07 NOTE — PROGRESS NOTES
PULMONARY NOTE  [facility]    Name: Agustín Willis MRN: [id number]   : 1945 Hospital: [facility]   Date: 3/7/2024  Admission date: 3/3/2024 Hospital Day: 5       HPI:     Patient Active Problem List   Diagnosis    Weakness of both legs    Insomnia    Shoulder pain    Low back pain    Vasovagal syncope    Tinnitus of right ear    Bronchitis    Alcohol abuse    Malignant hypertensive heart disease without heart failure    Pain in left knee    Dizziness and giddiness    Osteoarthritis of knee    Diarrhea    Hypercholesterolemia    Ankle pain    Pain in breast    Prediabetes    Sleep apnea    Seasonal allergic rhinitis    Chronic obstructive lung disease (HCC)    Cocaine abuse (HCC)    Kidney disease    Benign prostatic disease    Benign prostatic hyperplasia    Weakness of both arms    Lumbar herniated disc    Cervical radiculopathy    Hypertensive disorder    Thrombocytopenia (HCC)    Macrocytic anemia    Chronic renal disease, stage III (HCC)    Weakness generalized    Bilateral lower extremity edema    Syncope and collapse    SARTHAK on CPAP    Unsteady gait when walking    Ankle edema, bilateral    Drug abuse counseling and surveillance of drug abuser    Alcohol use, unspecified with intoxication, uncomplicated (HCC)    Thrombocytopenia, unspecified (HCC)    Stage 3a chronic kidney disease (HCC)    Heart murmur, systolic    Atrial fibrillation (HCC)    Cardiac arrhythmia    Persistent cough    Non-seasonal allergic rhinitis    Edema, lower extremity    Encounter for immunization    FIDEL (acute kidney injury) (HCC)    Altered mental status    Gross hematuria             [x] High complexity decision making was performed  [x] See my orders for details      Subjective/Initial History:     I was asked by Guillermo Martinez MD to see Agustín Willis  a 78 y.o.   male in consultation     Excerpts from admission 3/3/2024 or consult notes as follows:   78-year-old male came in because  modulation. FINDINGS: There is no acute fracture or subluxation. The prevertebral soft tissues are within normal limits. Bones are diffusely demineralized. Remaining visualized soft tissues are normal. The level cervical degenerative changes are noted. IMPRESSION: No acute fracture or subluxation.      XR CHEST PORTABLE    Result Date: 3/3/2024  INDICATION:  b//l wheezing, h/.o COPD Exam: Portable chest 1316. Comparison: 2/10/2024. Findings: Cardiomediastinal silhouette is within normal limits. Pulmonary vasculature is not engorged. Subtle fullness of the right perihilar region. No pneumothorax or pleural effusion.     1. Subtle fullness in the right perihilar region may represent developing airspace disease or atelectasis. Close follow-up is recommended        ARTERIAL BLOOD GAS  No results for input(s): \"PHART\", \"FGV3IXA\", \"PO2ART\", \"GNA5RKP\", \"BEART\", \"QDX6ETVZ\", \"HGBART\", \"TN9OYRDYY\", \"FIO2A\", \"O6PXKDHK\", \"OXYHEM\", \"CARBOXHGBART\", \"METHGBART\", \"W5KUQVPQT\", \"PHCORART\", \"TEMP\" in the last 72 hours.    Invalid input(s): \"SCZ0QNCCR\"  No results found for this or any previous visit.    IMPRESSION:   Polysubstance abuse  Cocaine positive  Chronic Obstructive Pulmonary Disease   Chronic kidney disease stage III  Hematuria       RECOMMENDATIONS/PLAN:     78-year-old male came in confusion altered mental status generalized weakness also having hematuria he has significant history of polysubstance abuse now with cocaine positive chronic kidney disease stage III  Wheezing agree with giving Solu-Medrol and nebulizer treatment  Patient is on Zithromax unable to give Augmentin secondary to penicillin allergy will change to Levaquin  Hx of EtOH abuse start patient on thiamine folic acid B12  Patient on Protonix  Patient refused inpatient cystoscopy     3/7 now patient is on room air tolerating well intermittent confusion    Jeremy Ramos MD

## 2024-03-07 NOTE — PROGRESS NOTES
Hospitalist Progress Note    NAME:   Agustín Willis   : 1945   MRN: [id number]     Date/Time: 3/7/2024 3:20 PM  Patient PCP: Gerry Taylor MD    Estimated discharge date:>48 hours  Barriers: MIRA 3/7, placement      HOSPITAL COURSE:    Agustín Willis is  78 y.o. male with benign prostatic disease, cervical radiculopathy, COPD, cocaine abuse, HLD, HTN, osteoarthritis, seasonal allergies, and SARTHAK who was admitted to the hospital with a chief complaint of an AMS. Patient was recently admitted to [facility] on , with the same presentation. Patient was treated and discharged on [facility]. During his last admission APS was involved due to social and financial issues that were concerning.    Patient was discharged from the [facility] a few days ago. Patient was apparently staying in a motel/hotel with a family member as they did not having a stable housing option. The family member went to run errands and when she returned he was on lying on the floor unresponsive.  EMS was called and the patient was transported to the ED. In the ED, the patient  was hemodynamically stable  with no signs of  respiratory distress. Noticed to have an injury on the anterior left thigh. Patient passing clots with urination, urology consult requested. 400 cc of bloody urine noted in the canister.   CT head negative. CT C-spine, CT maxillofacial showed no acute fracture or subluxation. He was recently found with influenza, positive for COVID-19. Cxr showed subtle fullness in the right perihilar region, airspace disease vs atelectasis. Patient with diffuse inspiratory/expiratory wheezing this morning. Will add IV Solu-medrol. Continue oral abx. MB normal, continue current diet. PT/OT recommending SNF, awaiting for authorization.    Assessment / Plan:    Acute metabolic encephalopathy:   Improved  No obvious signs of infection.   UA negative  Cxr, no acute abnormalities  CT chest/abdomen  systems  General: No fever  Neuro: No headache. (-) AMS.  CV: No chest pain  Resp: No shortness of breath  GI: No vomiting. No diarrhea.  : No dysuria. No flank pain.  All other systems reviewed are negative except as documented in the HPI.    Exam  General:Well developed, well nourished patient in [facility], appears chronically ill   Skin/Derm: Skin is warm and dry.  Ophth: Palpebral conjunctivae are not pale and the bulbar conjunctivae  are not erythematous.  No scleral icterus.  ENT: Oral mucosa is moist. Missing dentition  Neck: Neck is supple for age without thyromegaly. No nuchal rigidity.  Pulm: No tachypnea or retractions. Lungs are clear to auscultation. Wheezing improved.   GI: Soft and non-tender.  : No CVAT.  Neuro: No facial asymmetry. No dysarthria.   Psych: Affect is normal.  Non-anxious.      ________________________________________________________________________    Total NON critical care TIME:  35  Minutes    Total CRITICAL CARE TIME Spent:  0 Minutes non procedure based      Comments   >50% of visit spent in counseling and coordination of care     ________________________________________________________________________  KELLEY Nicholson NP     Procedures: see electronic medical records for all procedures/Xrays and details which were not copied into this note but were reviewed prior to creation of Plan.      LABS:  I reviewed today's most current labs and imaging studies.  Pertinent labs include:  Recent Labs     03/05/24  1015 03/06/24  1121 03/07/24  0547   WBC 8.4 7.7 8.0   HGB 8.1* 9.1* 8.5*   HCT 24.8* 27.6* 25.5*    195 212       Recent Labs     03/05/24  0905 03/05/24  1015 03/06/24  1120 03/06/24  1121 03/07/24  0547      < > 135* 136 136   K 3.9   < > 4.6 4.6 4.4      < > 105 105 106   CO2 25   < > 27 26 26   BUN 28*   < > 50* 48* 55*   PHOS 3.6  --  4.1  --   --     < > = values in this interval not displayed.         Signed: KELLEY Nicholson NP

## 2024-03-08 LAB
ALBUMIN SERPL-MCNC: 2.1 G/DL (ref 3.5–5)
ANION GAP SERPL CALC-SCNC: 6 MMOL/L (ref 5–15)
BASOPHILS # BLD: 0 K/UL (ref 0–0.1)
BASOPHILS NFR BLD: 0 % (ref 0–1)
BUN SERPL-MCNC: 57 MG/DL (ref 6–20)
BUN/CREAT SERPL: 37 (ref 12–20)
CA-I BLD-MCNC: 7.9 MG/DL (ref 8.5–10.1)
CHLORIDE SERPL-SCNC: 108 MMOL/L (ref 97–108)
CO2 SERPL-SCNC: 23 MMOL/L (ref 21–32)
CREAT SERPL-MCNC: 1.53 MG/DL (ref 0.7–1.3)
DIFFERENTIAL METHOD BLD: ABNORMAL
EOSINOPHIL # BLD: 0 K/UL (ref 0–0.4)
EOSINOPHIL NFR BLD: 0 % (ref 0–7)
ERYTHROCYTE [DISTWIDTH] IN BLOOD BY AUTOMATED COUNT: 12.9 % (ref 11.5–14.5)
GLUCOSE SERPL-MCNC: 142 MG/DL (ref 65–100)
HCT VFR BLD AUTO: 26.1 % (ref 36.6–50.3)
HGB BLD-MCNC: 8.2 G/DL (ref 12.1–17)
IMM GRANULOCYTES # BLD AUTO: 0 K/UL
IMM GRANULOCYTES NFR BLD AUTO: 0 %
LYMPHOCYTES # BLD: 0.6 K/UL (ref 0.8–3.5)
LYMPHOCYTES NFR BLD: 8 % (ref 12–49)
MCH RBC QN AUTO: 29.7 PG (ref 26–34)
MCHC RBC AUTO-ENTMCNC: 31.4 G/DL (ref 30–36.5)
MCV RBC AUTO: 94.6 FL (ref 80–99)
METAMYELOCYTES NFR BLD MANUAL: 2 %
MONOCYTES # BLD: 0.2 K/UL (ref 0–1)
MONOCYTES NFR BLD: 3 % (ref 5–13)
NEUTS BAND NFR BLD MANUAL: 1 % (ref 0–6)
NEUTS SEG # BLD: 7 K/UL (ref 1.8–8)
NEUTS SEG NFR BLD: 86 % (ref 32–75)
NRBC # BLD: 0 K/UL (ref 0–0.01)
NRBC BLD-RTO: 0 PER 100 WBC
PHOSPHATE SERPL-MCNC: 3.5 MG/DL (ref 2.6–4.7)
PLATELET # BLD AUTO: 232 K/UL (ref 150–400)
PMV BLD AUTO: 10.2 FL (ref 8.9–12.9)
POTASSIUM SERPL-SCNC: 4.3 MMOL/L (ref 3.5–5.1)
RBC # BLD AUTO: 2.76 M/UL (ref 4.1–5.7)
RBC MORPH BLD: ABNORMAL
SODIUM SERPL-SCNC: 137 MMOL/L (ref 136–145)
WBC # BLD AUTO: 8 K/UL (ref 4.1–11.1)

## 2024-03-08 PROCEDURE — 1100000000 HC RM PRIVATE

## 2024-03-08 PROCEDURE — 97530 THERAPEUTIC ACTIVITIES: CPT

## 2024-03-08 PROCEDURE — 94761 N-INVAS EAR/PLS OXIMETRY MLT: CPT

## 2024-03-08 PROCEDURE — 2580000003 HC RX 258: Performed by: HOSPITALIST

## 2024-03-08 PROCEDURE — 6370000000 HC RX 637 (ALT 250 FOR IP): Performed by: HOSPITALIST

## 2024-03-08 PROCEDURE — 6370000000 HC RX 637 (ALT 250 FOR IP): Performed by: INTERNAL MEDICINE

## 2024-03-08 PROCEDURE — 80069 RENAL FUNCTION PANEL: CPT

## 2024-03-08 PROCEDURE — 6370000000 HC RX 637 (ALT 250 FOR IP): Performed by: NURSE PRACTITIONER

## 2024-03-08 PROCEDURE — 85025 COMPLETE CBC W/AUTO DIFF WBC: CPT

## 2024-03-08 PROCEDURE — 6360000002 HC RX W HCPCS: Performed by: INTERNAL MEDICINE

## 2024-03-08 PROCEDURE — 2580000003 HC RX 258: Performed by: NURSE PRACTITIONER

## 2024-03-08 PROCEDURE — 36415 COLL VENOUS BLD VENIPUNCTURE: CPT

## 2024-03-08 PROCEDURE — 6360000002 HC RX W HCPCS: Performed by: NURSE PRACTITIONER

## 2024-03-08 PROCEDURE — 94640 AIRWAY INHALATION TREATMENT: CPT

## 2024-03-08 RX ORDER — AMLODIPINE BESYLATE 5 MG/1
5 TABLET ORAL DAILY
Status: DISCONTINUED | OUTPATIENT
Start: 2024-03-08 | End: 2024-03-12 | Stop reason: HOSPADM

## 2024-03-08 RX ORDER — METHYLPREDNISOLONE SODIUM SUCCINATE 40 MG/ML
20 INJECTION, POWDER, LYOPHILIZED, FOR SOLUTION INTRAMUSCULAR; INTRAVENOUS EVERY 12 HOURS
Status: DISCONTINUED | OUTPATIENT
Start: 2024-03-08 | End: 2024-03-09

## 2024-03-08 RX ADMIN — IPRATROPIUM BROMIDE AND ALBUTEROL SULFATE 1 DOSE: .5; 3 SOLUTION RESPIRATORY (INHALATION) at 19:50

## 2024-03-08 RX ADMIN — SODIUM CHLORIDE, PRESERVATIVE FREE 10 ML: 5 INJECTION INTRAVENOUS at 20:22

## 2024-03-08 RX ADMIN — BACITRACIN: 500 OINTMENT TOPICAL at 20:20

## 2024-03-08 RX ADMIN — METHYLPREDNISOLONE SODIUM SUCCINATE 20 MG: 40 INJECTION INTRAMUSCULAR; INTRAVENOUS at 23:57

## 2024-03-08 RX ADMIN — IPRATROPIUM BROMIDE AND ALBUTEROL SULFATE 1 DOSE: .5; 3 SOLUTION RESPIRATORY (INHALATION) at 15:28

## 2024-03-08 RX ADMIN — IPRATROPIUM BROMIDE AND ALBUTEROL SULFATE 1 DOSE: .5; 3 SOLUTION RESPIRATORY (INHALATION) at 07:19

## 2024-03-08 RX ADMIN — IPRATROPIUM BROMIDE AND ALBUTEROL SULFATE 1 DOSE: .5; 3 SOLUTION RESPIRATORY (INHALATION) at 12:12

## 2024-03-08 RX ADMIN — PANTOPRAZOLE SODIUM 40 MG: 40 TABLET, DELAYED RELEASE ORAL at 06:13

## 2024-03-08 RX ADMIN — ASPIRIN 81 MG: 81 TABLET, COATED ORAL at 08:26

## 2024-03-08 RX ADMIN — BACITRACIN: 500 OINTMENT TOPICAL at 08:28

## 2024-03-08 RX ADMIN — METOPROLOL TARTRATE 25 MG: 25 TABLET, FILM COATED ORAL at 20:20

## 2024-03-08 RX ADMIN — SODIUM CHLORIDE: 9 INJECTION, SOLUTION INTRAVENOUS at 06:13

## 2024-03-08 RX ADMIN — BENZONATATE 100 MG: 100 CAPSULE ORAL at 20:20

## 2024-03-08 RX ADMIN — METHYLPREDNISOLONE SODIUM SUCCINATE 40 MG: 40 INJECTION INTRAMUSCULAR; INTRAVENOUS at 10:50

## 2024-03-08 RX ADMIN — METOPROLOL TARTRATE 25 MG: 25 TABLET, FILM COATED ORAL at 08:27

## 2024-03-08 RX ADMIN — THIAMINE HCL TAB 100 MG 100 MG: 100 TAB at 08:26

## 2024-03-08 RX ADMIN — BENZONATATE 100 MG: 100 CAPSULE ORAL at 06:13

## 2024-03-08 RX ADMIN — LEVOFLOXACIN 500 MG: 500 TABLET, FILM COATED ORAL at 08:26

## 2024-03-08 RX ADMIN — AMLODIPINE BESYLATE 5 MG: 5 TABLET ORAL at 10:50

## 2024-03-08 RX ADMIN — FINASTERIDE 5 MG: 5 TABLET, FILM COATED ORAL at 08:27

## 2024-03-08 RX ADMIN — FOLIC ACID 1 MG: 1 TABLET ORAL at 08:27

## 2024-03-08 NOTE — PROGRESS NOTES
air tolerating well intermittent confusion  3/8 will change Solu-Medrol dose continue with DuoNeb high risk for aspiration    Jeremy Ramos MD

## 2024-03-08 NOTE — PROGRESS NOTES
Hospitalist Progress Note    NAME:   Agustín Willis   : 1945   MRN: [id number]     Date/Time: 3/8/2024 9:58 AM  Patient PCP: Gerry Taylor MD    Estimated discharge date:>48 hours  Barriers:pulmonary recommendations, placement      HOSPITAL COURSE:    Agustín Willis is  78 y.o. male with benign prostatic disease, cervical radiculopathy, COPD, cocaine abuse, HLD, HTN, osteoarthritis, seasonal allergies, and SARTHAK who was admitted to the hospital with a chief complaint of an AMS. Patient was recently admitted to [facility] on , with the same presentation. Patient was treated and discharged on 15 February to [facility]. During his last admission APS was involved due to social and financial issues that were concerning.    Patient was discharged from the [facility] a few days ago. Patient was apparently staying in a motel/hotel with a family member as they did not having a stable housing option. The family member went to run errands and when she returned he was on lying on the floor unresponsive.  EMS was called and the patient was transported to the ED. In the ED, the patient  was hemodynamically stable  with no signs of  respiratory distress. Noticed to have an injury on the anterior left thigh. Patient passing clots with urination, urology consult requested. 400 cc of bloody urine noted in the canister.   CT head negative. CT C-spine, CT maxillofacial showed no acute fracture or subluxation. He was recently found with influenza, positive for COVID-19. Cxr showed subtle fullness in the right perihilar region, airspace disease vs atelectasis. Patient with diffuse inspiratory/expiratory wheezing this morning. Will add IV Solu-medrol. Continue oral abx. MB normal, continue current diet. PT/OT recommending SNF, awaiting for authorization.     Assessment / Plan:    Acute metabolic encephalopathy:   Improved  No obvious signs of infection.   UA negative  Cxr, no acute  below:    Review of systems  General: No fever  Neuro: No headache. (-) AMS.  CV: No chest pain  Resp: No shortness of breath  GI: No vomiting. No diarrhea.  : No dysuria. No flank pain.  All other systems reviewed are negative except as documented in the HPI.    Exam  General:Well developed, well nourished patient in [facility], appears chronically ill   Skin/Derm: Skin is warm and dry.  Ophth: Palpebral conjunctivae are not pale and the bulbar conjunctivae  are not erythematous.  No scleral icterus.  ENT: Oral mucosa is moist. Missing dentition  Neck: Neck is supple for age without thyromegaly. No nuchal rigidity.  Pulm: No tachypnea or retractions. Lungs are clear to auscultation. Wheezing improved.   GI: Soft and non-tender.  : No CVAT.  Neuro: No facial asymmetry. No dysarthria.   Psych: Affect is normal.  Non-anxious.      ________________________________________________________________________    Total NON critical care TIME:  35  Minutes    Total CRITICAL CARE TIME Spent:  0 Minutes non procedure based      Comments   >50% of visit spent in counseling and coordination of care     ________________________________________________________________________  KELLEY Nicholson NP     Procedures: see electronic medical records for all procedures/Xrays and details which were not copied into this note but were reviewed prior to creation of Plan.      LABS:  I reviewed today's most current labs and imaging studies.  Pertinent labs include:  Recent Labs     03/06/24  1121 03/07/24  0547 03/08/24  0512   WBC 7.7 8.0 8.0   HGB 9.1* 8.5* 8.2*   HCT 27.6* 25.5* 26.1*    212 232       Recent Labs     03/06/24  1120 03/06/24  1121 03/07/24  0547 03/08/24  0512   * 136 136 137   K 4.6 4.6 4.4 4.3    105 106 108   CO2 27 26 26 23   BUN 50* 48* 55* 57*   PHOS 4.1  --   --  3.5         Signed: KELLEY Nicholson - NP

## 2024-03-08 NOTE — PLAN OF CARE
Problem: Discharge Planning  Goal: Discharge to home or other facility with appropriate resources  Outcome: Progressing     Problem: Safety - Adult  Goal: Free from fall injury  Outcome: Progressing     Problem: Skin/Tissue Integrity  Goal: Absence of new skin breakdown  Description: 1.  Monitor for areas of redness and/or skin breakdown  2.  Assess vascular access sites hourly  3.  Every 4-6 hours minimum:  Change oxygen saturation probe site  4.  Every 4-6 hours:  If on nasal continuous positive airway pressure, respiratory therapy assess nares and determine need for appliance change or resting period.  Outcome: Progressing     Problem: Physical Therapy - Adult  Goal: By Discharge: Performs mobility at highest level of function for planned discharge setting.  See evaluation for individualized goals.  Description: FUNCTIONAL STATUS PRIOR TO ADMISSION: pt reports he required assistance for all ADLS and IADLs prior to admission which was provided by a friend. Reports 6 falls in the past 3 months, was recently DC from [facility].    HOME SUPPORT PRIOR TO ADMISSION: The patient lived with niece in a motel room PTA.    Physical Therapy Goals  Initiated 3/6/2024  Pt stated goal: to go home  Pt will be I with LE HEP in 7 days.  Pt will perform bed mobility with Modified [place] in 7 days.  Pt will perform transfers with Modified [place] in 7 days.   Pt will amb 25-50 feet with LRAD safely with Modified [place] in 7 days.  Pt will demonstrate improvement in standing balance from Maximal Assist x2 to Modified [place] in 7 days.      3/7/2024 1259 by Ananya Browne, PTA  Outcome: Progressing     Problem: Occupational Therapy - Adult  Goal: By Discharge: Performs self-care activities at highest level of function for planned discharge setting.  See evaluation for individualized goals.  Description:  FUNCTIONAL STATUS PRIOR TO ADMISSION: pt reports he required assistance for all ADLS and IADLs  prior to admission which was provided by a friend. Reports 6 falls in the past 3 months, was recently DC from [facility].    HOME SUPPORT PRIOR TO ADMISSION: The patient lived with niece in a motel room PTA.       Occupational Therapy Goals:  Initiated 3/6/2024  Patient/Family stated goal: Get better  1.  Patient will perform lower body dressing with Modified [place] within 7 day(s).  2.  Patient will perform grooming with [place] within 7 day(s).  3.  Patient will perform bathing with Modified [place] within 7 day(s).  4.  Patient will perform toilet transfers with Modified [place]  within 7 day(s).  5.  Patient will perform all aspects of toileting with Modified [place] within 7 day(s).  6.  Patient will participate in upper extremity therapeutic exercise/activities with Modified [place] within 7 day(s).    7.  Patient will utilize energy conservation techniques during functional activities with verbal cues within 7 day(s).  3/7/2024 1407 by Alice Langford OT  Outcome: Progressing     Problem: SLP Adult - Impaired Swallowing  Goal: By Discharge: Advance to least restrictive diet without signs or symptoms of aspiration for planned discharge setting.  See evaluation for individualized goals.  Description: Speech Therapy Swallow Goals  Initiated 3/6/2024  -Patient will tolerate soft and bite sized diet with thin liquids without clinical indicators of aspiration given no cues within 7 day(s).        -Patient will tolerate PO trials without clinical indicators of aspiration given no cues within 7 day(s).      -Patient will participate in modified barium swallow study within 7 day(s).      -Patient will demonstrate understanding of swallow safety precautions and aspiration precautions, diet recs with no cues within 7day(s).    -Patient/caregiver goal: eat what I want           3/7/2024 1329 by Dianna Hodges, SLP  Outcome: Progressing

## 2024-03-08 NOTE — PLAN OF CARE
X2;Additional time  Supine to Sit: Moderate assistance;Assist X2;Additional time  Sit to Supine: Moderate assistance;Assist X2;Additional time  Scooting: Moderate assistance;Assist X2;Additional time  Transfers:  Transfer Training  Transfer Training: Yes  Interventions: Verbal cues;Safety awareness training;Tactile cues;Manual cues;Visual cues  Sit to Stand: Moderate assistance;Assist X2  Stand to Sit: Moderate assistance;Assist X2  Stand Pivot Transfers: Maximum assistance;Assist X2;Additional time;Adaptive equipment  Bed to Chair: Maximum assistance;Assist X2;Additional time;Adaptive equipment  Balance:  Balance  Sitting: Impaired  Sitting - Static: Good (unsupported)  Sitting - Dynamic: Fair (occasional)  Standing: Impaired  Standing - Static: Constant support;Poor  Standing - Dynamic: Poor;Constant support      Therapeutic Exercises:   Reviewed heel slides and AP     Pain Rating:  Did not rate pain but reported leg pain     Activity Tolerance:   Fair  and requires rest breaks    After treatment patient left in no apparent distress:   Bed locked and returned to lowest position, Patient left in no apparent distress in bed, Call bell within reach, Bed/ chair alarm activated, and Side rails x3, and nsg updated     COMMUNICATION/COLLABORATION:   The patient’s plan of care was discussed with: Occupational therapist and Registered nurse PT/OT sessions occurred together for increased safety of pt and clinician.      Patient Education  Education Given To: Patient  Education Provided: Role of Therapy;Plan of Care;Fall Prevention Strategies;Transfer Training;Orientation;Equipment;Home Exercise Program  Education Provided Comments: DC recommendations, PT POC, safety with mobility, and use of RW  Education Method: Demonstration;Verbal;Teach Back  Barriers to Learning: Cognition  Education Outcome: Verbalized understanding;Unable to demonstrate understanding;Continued education needed      Ananya Browne,  PTA  Minutes: 27

## 2024-03-08 NOTE — PROGRESS NOTES
Physician Progress Note      PATIENT:               YOSVANY FITCH  CSN #:                  [id number]  :                       1945  ADMIT DATE:       3/3/2024 12:20 PM  DISCH DATE:  RESPONDING  PROVIDER #:        Rekha Dubois NP          QUERY TEXT:    Pt admitted with AMS, +Cocaine.  Pt noted to have documented non-traumatic   rhabdomyolysis by Urology Consultant on 3/4 PN.  If possible, please document   in progress notes and discharge summary if you are evaluating and/or treating   any of the following:    The medical record reflects the following:  Risk Factors: AMS, found on the floor, Cocaine abuse.    Clinical Indicators:  CK: 914  3/4 Urology PN: \"Admitted: Non-traumatic rhabdomyolysis, AMS, weakness, injury   of head.\"    Treatment: Urology consulted, IVF, Lab monitoring.    Per [email or web address]://www.Need/contents/cevxdw-wk-[email or web address]  Traumatic rhabdomyolysis cause examples: crush syndrome, prolonged   immobilization  Nontraumatic rhabdomyolysis cause examples:  marked exertion, hyperthermia,   metabolic myopathy, drugs or toxins, infections, electrolyte disorders.    Thank you,  LEV BasilioN, RN, CRCR, CDI Specialist  [email or web address]  Can also be reached on Perfect Serve.  .  Options provided:  -- Traumatic rhabdomyolysis  -- Nontraumatic rhabdomyolysis  -- Other - I will add my own diagnosis  -- Disagree - Not applicable / Not valid  -- Disagree - Clinically unable to determine / Unknown  -- Refer to Clinical Documentation Reviewer    PROVIDER RESPONSE TEXT:    This patient has nontraumatic rhabdomyolysis.    Query created by: Beverly Vaughn on 3/6/2024 2:14 PM      Electronically signed by:  Rekha Dubois NP 3/8/2024 4:33 PM

## 2024-03-08 NOTE — PLAN OF CARE
OCCUPATIONAL THERAPY TREATMENT  Patient: Agustín Willis (78 y.o. male)  Date: 3/8/2024  Primary Diagnosis: Altered mental status [R41.82]  Generalized weakness [R53.1]  Injury of head, initial encounter [S09.90XA]  Non-traumatic rhabdomyolysis [M62.82]       Precautions: Ax2, Fall Risk, Bed Alarm                Recommendations for nursing mobility: Out of bed to chair for meals, Encourage HEP in prep for ADLs/mobility; see handout for details, Frequent repositioning to prevent skin breakdown, Use of bed/chair alarm for safety, LE elevation for management of edema, and Assist x2    In place during session: External Catheter and EKG/telemetry   Chart, occupational therapy assessment, plan of care, and goals were reviewed.  ASSESSMENT  Patient continues with skilled OT services and is progressing towards goals. Pt semi supine upon OT arrival, agreeable to session. Pt A&O x person, place, and year. Pt completed bed mobility and SPT EOB>chair>EOB in prep for toileting. Pt continues to req Ax2 for all mobility with additional time and verbal cues for initiation and sequencing. Verbal and manual cuing req'd to push from/reach back for bed/chair during transfers. Pt demo'd understanding. One seated RB req'd prior to SPT d/t LLE pain and BLE weakness. Linens changed on bed while pt was sitting in chair and then pt transferred back to EOB. Pt had more difficulty transferring from chair>bed d/t lower surface so transfer completed with PT bear hugging and OT assisting with guiding hips from behind. Once returned supine, pt educated on UE HEP (wrist, elbow, and shoulder flexion/extension) to increase strength and endurance req'd for ADLs and functional transfers/mobility. Pt verbalized understanding. (See below for objective details and assist levels).     Overall pt tolerated session fair today with c/o L thigh/knee pain with activity. Potential barriers for safe discharge: pts current support system is unable to meet their

## 2024-03-08 NOTE — CARE COORDINATION
CM has reviewed pt chart    DCP: [facility] pending auth    0830: CM sent message to [facility] requesting update on auth, awaiting reply.    0832:  requested   to call and check on auth status    0837: CM sent message to [facility] informing of updated notes being attached.

## 2024-03-09 LAB
ALBUMIN SERPL-MCNC: 2.1 G/DL (ref 3.5–5)
ANION GAP SERPL CALC-SCNC: 6 MMOL/L (ref 5–15)
BASOPHILS # BLD: 0 K/UL (ref 0–0.1)
BASOPHILS NFR BLD: 0 % (ref 0–1)
BUN SERPL-MCNC: 53 MG/DL (ref 6–20)
BUN/CREAT SERPL: 36 (ref 12–20)
CA-I BLD-MCNC: 7.8 MG/DL (ref 8.5–10.1)
CHLORIDE SERPL-SCNC: 110 MMOL/L (ref 97–108)
CO2 SERPL-SCNC: 23 MMOL/L (ref 21–32)
CREAT SERPL-MCNC: 1.49 MG/DL (ref 0.7–1.3)
DIFFERENTIAL METHOD BLD: ABNORMAL
EOSINOPHIL # BLD: 0 K/UL (ref 0–0.4)
EOSINOPHIL NFR BLD: 0 % (ref 0–7)
ERYTHROCYTE [DISTWIDTH] IN BLOOD BY AUTOMATED COUNT: 12.9 % (ref 11.5–14.5)
GLUCOSE SERPL-MCNC: 167 MG/DL (ref 65–100)
HCT VFR BLD AUTO: 26.2 % (ref 36.6–50.3)
HGB BLD-MCNC: 8.4 G/DL (ref 12.1–17)
IMM GRANULOCYTES # BLD AUTO: 0 K/UL
IMM GRANULOCYTES NFR BLD AUTO: 0 %
LYMPHOCYTES # BLD: 0.9 K/UL (ref 0.8–3.5)
LYMPHOCYTES NFR BLD: 11 % (ref 12–49)
MCH RBC QN AUTO: 30.2 PG (ref 26–34)
MCHC RBC AUTO-ENTMCNC: 32.1 G/DL (ref 30–36.5)
MCV RBC AUTO: 94.2 FL (ref 80–99)
MONOCYTES # BLD: 0.6 K/UL (ref 0–1)
MONOCYTES NFR BLD: 7 % (ref 5–13)
MYELOCYTES NFR BLD MANUAL: 1 %
NEUTS BAND NFR BLD MANUAL: 1 % (ref 0–6)
NEUTS SEG # BLD: 6.6 K/UL (ref 1.8–8)
NEUTS SEG NFR BLD: 80 % (ref 32–75)
NRBC # BLD: 0 K/UL (ref 0–0.01)
NRBC BLD-RTO: 0 PER 100 WBC
PHOSPHATE SERPL-MCNC: 3.2 MG/DL (ref 2.6–4.7)
PLATELET # BLD AUTO: 232 K/UL (ref 150–400)
PMV BLD AUTO: 9.9 FL (ref 8.9–12.9)
POTASSIUM SERPL-SCNC: 4.4 MMOL/L (ref 3.5–5.1)
RBC # BLD AUTO: 2.78 M/UL (ref 4.1–5.7)
RBC MORPH BLD: ABNORMAL
SODIUM SERPL-SCNC: 139 MMOL/L (ref 136–145)
WBC # BLD AUTO: 8.2 K/UL (ref 4.1–11.1)

## 2024-03-09 PROCEDURE — 6370000000 HC RX 637 (ALT 250 FOR IP): Performed by: INTERNAL MEDICINE

## 2024-03-09 PROCEDURE — 36415 COLL VENOUS BLD VENIPUNCTURE: CPT

## 2024-03-09 PROCEDURE — 6370000000 HC RX 637 (ALT 250 FOR IP): Performed by: HOSPITALIST

## 2024-03-09 PROCEDURE — 1100000000 HC RM PRIVATE

## 2024-03-09 PROCEDURE — 6370000000 HC RX 637 (ALT 250 FOR IP): Performed by: NURSE PRACTITIONER

## 2024-03-09 PROCEDURE — 85025 COMPLETE CBC W/AUTO DIFF WBC: CPT

## 2024-03-09 PROCEDURE — 2580000003 HC RX 258: Performed by: HOSPITALIST

## 2024-03-09 PROCEDURE — 80069 RENAL FUNCTION PANEL: CPT

## 2024-03-09 PROCEDURE — 94640 AIRWAY INHALATION TREATMENT: CPT

## 2024-03-09 RX ORDER — PREDNISONE 5 MG/1
20 TABLET ORAL 2 TIMES DAILY
Status: DISCONTINUED | OUTPATIENT
Start: 2024-03-09 | End: 2024-03-12

## 2024-03-09 RX ADMIN — METOPROLOL TARTRATE 25 MG: 25 TABLET, FILM COATED ORAL at 08:50

## 2024-03-09 RX ADMIN — FINASTERIDE 5 MG: 5 TABLET, FILM COATED ORAL at 08:50

## 2024-03-09 RX ADMIN — IPRATROPIUM BROMIDE AND ALBUTEROL SULFATE 1 DOSE: .5; 3 SOLUTION RESPIRATORY (INHALATION) at 16:40

## 2024-03-09 RX ADMIN — IPRATROPIUM BROMIDE AND ALBUTEROL SULFATE 1 DOSE: .5; 3 SOLUTION RESPIRATORY (INHALATION) at 20:07

## 2024-03-09 RX ADMIN — SODIUM CHLORIDE, PRESERVATIVE FREE 10 ML: 5 INJECTION INTRAVENOUS at 20:04

## 2024-03-09 RX ADMIN — PANTOPRAZOLE SODIUM 40 MG: 40 TABLET, DELAYED RELEASE ORAL at 05:11

## 2024-03-09 RX ADMIN — PREDNISONE 20 MG: 5 TABLET ORAL at 11:54

## 2024-03-09 RX ADMIN — FOLIC ACID 1 MG: 1 TABLET ORAL at 08:50

## 2024-03-09 RX ADMIN — IPRATROPIUM BROMIDE AND ALBUTEROL SULFATE 1 DOSE: .5; 3 SOLUTION RESPIRATORY (INHALATION) at 13:01

## 2024-03-09 RX ADMIN — BACITRACIN: 500 OINTMENT TOPICAL at 20:03

## 2024-03-09 RX ADMIN — ASPIRIN 81 MG: 81 TABLET, COATED ORAL at 08:50

## 2024-03-09 RX ADMIN — THIAMINE HCL TAB 100 MG 100 MG: 100 TAB at 08:49

## 2024-03-09 RX ADMIN — IPRATROPIUM BROMIDE AND ALBUTEROL SULFATE 1 DOSE: .5; 3 SOLUTION RESPIRATORY (INHALATION) at 08:10

## 2024-03-09 RX ADMIN — PREDNISONE 20 MG: 5 TABLET ORAL at 20:04

## 2024-03-09 RX ADMIN — METOPROLOL TARTRATE 25 MG: 25 TABLET, FILM COATED ORAL at 20:04

## 2024-03-09 RX ADMIN — LEVOFLOXACIN 500 MG: 500 TABLET, FILM COATED ORAL at 08:50

## 2024-03-09 RX ADMIN — AMLODIPINE BESYLATE 5 MG: 5 TABLET ORAL at 08:51

## 2024-03-09 NOTE — PROGRESS NOTES
PULMONARY NOTE  [facility]    Name: Agustín Willis MRN: [id number]   : 1945 Hospital: [facility]   Date: 3/9/2024  Admission date: 3/3/2024 Hospital Day: 7       HPI:     Patient Active Problem List   Diagnosis    Weakness of both legs    Insomnia    Shoulder pain    Low back pain    Vasovagal syncope    Tinnitus of right ear    Bronchitis    Alcohol abuse    Malignant hypertensive heart disease without heart failure    Pain in left knee    Dizziness and giddiness    Osteoarthritis of knee    Diarrhea    Hypercholesterolemia    Ankle pain    Pain in breast    Prediabetes    Sleep apnea    Seasonal allergic rhinitis    Chronic obstructive lung disease (HCC)    Cocaine abuse (HCC)    Kidney disease    Benign prostatic disease    Benign prostatic hyperplasia    Weakness of both arms    Lumbar herniated disc    Cervical radiculopathy    Hypertensive disorder    Thrombocytopenia (HCC)    Macrocytic anemia    Chronic renal disease, stage III (HCC)    Weakness generalized    Bilateral lower extremity edema    Syncope and collapse    SARTHAK on CPAP    Unsteady gait when walking    Ankle edema, bilateral    Drug abuse counseling and surveillance of drug abuser    Alcohol use, unspecified with intoxication, uncomplicated (HCC)    Thrombocytopenia, unspecified (HCC)    Stage 3a chronic kidney disease (HCC)    Heart murmur, systolic    Atrial fibrillation (HCC)    Cardiac arrhythmia    Persistent cough    Non-seasonal allergic rhinitis    Edema, lower extremity    Encounter for immunization    FIDEL (acute kidney injury) (HCC)    Altered mental status    Gross hematuria             [x] High complexity decision making was performed  [x] See my orders for details      Subjective/Initial History:     I was asked by Guillermo Martinez MD to see Agustín Willis  a 78 y.o.   male in consultation     Excerpts from admission 3/3/2024 or consult notes as follows:   78-year-old male came in because    ROS:    Unable to obtain       Objective:     Vital Signs: Telemetry:    normal sinus rhythm Intake/Output:   BP (!) 143/89   Pulse 70   Temp 97.7 °F (36.5 °C) (Oral)   Resp 20   Ht 1.854 m (6' 1\")   Wt 86.2 kg (190 lb)   SpO2 97%   BMI 25.07 kg/m²     Temp (24hrs), Av.2 °F (36.8 °C), Min:97.7 °F (36.5 °C), Max:98.4 °F (36.9 °C)          O2 Device: None (Room air)       Wt Readings from Last 4 Encounters:   24 86.2 kg (190 lb)   24 86.4 kg (190 lb 7.6 oz)   24 88.5 kg (195 lb)   10/06/23 97.1 kg (214 lb)          Intake/Output Summary (Last 24 hours) at 3/9/2024 1049  Last data filed at 3/8/2024 1800  Gross per 24 hour   Intake 840 ml   Output 300 ml   Net 540 ml         Last shift:      No intake/output data recorded.  Last 3 shifts:  1901 -  0700  In: 1080 [P.O.:1080]  Out: 800 [Urine:800]       Physical Exam:     Physical Exam  Constitutional:       Comments: Not oriented to time and place   HENT:      Head: Normocephalic and atraumatic.      Nose: Nose normal.      Mouth/Throat:      Mouth: Mucous membranes are moist.   Eyes:      Pupils: Pupils are equal, round, and reactive to light.   Cardiovascular:      Rate and Rhythm: Normal rate and regular rhythm.      Pulses: Normal pulses.      Heart sounds: Normal heart sounds.   Pulmonary:      Effort: Pulmonary effort is normal.   Abdominal:      General: Abdomen is flat. Bowel sounds are normal.      Palpations: Abdomen is soft.   Musculoskeletal:         General: Normal range of motion.      Cervical back: Normal range of motion and neck supple.   Skin:     General: Skin is warm.   Neurological:      Mental Status: He is alert.          Labs:    Recent Labs     24  0547 24  0512 24  0501   WBC 8.0 8.0 8.2   HGB 8.5* 8.2* 8.4*    232 232       Recent Labs     24  1120 24  1121 24  0547 24  0512 24  0501   *   < > 136 137 139   K 4.6   < > 4.4 4.3 4.4      <

## 2024-03-09 NOTE — PLAN OF CARE
Problem: Discharge Planning  Goal: Discharge to home or other facility with appropriate resources  3/9/2024 1205 by Ama Oscar RN  Outcome: Progressing  3/8/2024 2325 by Dariela Snider RN  Outcome: Progressing  Flowsheets (Taken 3/8/2024 2325)  Discharge to home or other facility with appropriate resources:   Identify barriers to discharge with patient and caregiver   Arrange for needed discharge resources and transportation as appropriate     Problem: Safety - Adult  Goal: Free from fall injury  3/9/2024 1205 by Ama Oscar RN  Outcome: Progressing  3/8/2024 2325 by Dariela Snider RN  Outcome: Progressing  Flowsheets (Taken 3/8/2024 2325)  Free From Fall Injury:   Instruct family/caregiver on patient safety   Based on caregiver fall risk screen, instruct family/caregiver to ask for assistance with transferring infant if caregiver noted to have fall risk factors     Problem: Skin/Tissue Integrity  Goal: Absence of new skin breakdown  Description: 1.  Monitor for areas of redness and/or skin breakdown  2.  Assess vascular access sites hourly  3.  Every 4-6 hours minimum:  Change oxygen saturation probe site  4.  Every 4-6 hours:  If on nasal continuous positive airway pressure, respiratory therapy assess nares and determine need for appliance change or resting period.  3/9/2024 1205 by Ama Oscar RN  Outcome: Progressing  3/8/2024 2325 by Dariela Snider RN  Outcome: Progressing

## 2024-03-09 NOTE — PLAN OF CARE
Problem: Discharge Planning  Goal: Discharge to home or other facility with appropriate resources  Outcome: Progressing  Flowsheets (Taken 3/8/2024 2325)  Discharge to home or other facility with appropriate resources:   Identify barriers to discharge with patient and caregiver   Arrange for needed discharge resources and transportation as appropriate     Problem: Safety - Adult  Goal: Free from fall injury  Outcome: Progressing  Flowsheets (Taken 3/8/2024 2325)  Free From Fall Injury:   Instruct family/caregiver on patient safety   Based on caregiver fall risk screen, instruct family/caregiver to ask for assistance with transferring infant if caregiver noted to have fall risk factors     Problem: Skin/Tissue Integrity  Goal: Absence of new skin breakdown  Description: 1.  Monitor for areas of redness and/or skin breakdown  2.  Assess vascular access sites hourly  3.  Every 4-6 hours minimum:  Change oxygen saturation probe site  4.  Every 4-6 hours:  If on nasal continuous positive airway pressure, respiratory therapy assess nares and determine need for appliance change or resting period.  Outcome: Progressing

## 2024-03-09 NOTE — PROGRESS NOTES
hypotension/hypertension/arrhythmias.  Frequent blood pressure monitoring, telemetry/EKGs. PO Metoprolol 25 mg BID.  [] Change in code status:    [] Decision to escalate care:    [] Major surgery/procedure with associated risk factors:    ----------------------------------------------------------------------  C. Data (any 2)  [x] Discussed current management and discharge planning options with Case Management.  [x] Discussed management of the case with:  Patient, nursing  [] Telemetry personally reviewed and interpreted as documented above    [] Imaging personally reviewed and interpreted, includes:    [] Data Review (any 3)  [x] All available Consultant notes from yesterday/today were reviewed  [x] All current labs were reviewed and interpreted for clinical significance   [x] Appropriate follow-up labs were ordered  [] Collateral history obtained from:  chart review      Code Status: full  DVT Prophylaxis: Lovenox  GI Prophylaxis:none    Subjective:     Chief Complaint / Reason for Physician Visit  Patient was seen and examined at the bedside. Sleepy this morning,but arousable.   Discussed with RN events overnight.       Objective:     VITALS:   Last 24hrs VS reviewed since prior progress note. Most recent are:  Patient Vitals for the past 24 hrs:   BP Temp Temp src Pulse Resp SpO2   03/09/24 0814 (!) 143/89 97.7 °F (36.5 °C) Oral 70 20 97 %   03/08/24 2328 (!) 140/100 98.4 °F (36.9 °C) Oral 70 18 98 %   03/08/24 1959 (!) 147/94 98.1 °F (36.7 °C) Axillary 60 18 98 %   03/08/24 1952 -- -- -- 82 18 99 %   03/08/24 1551 (!) 148/94 98.4 °F (36.9 °C) Oral 69 18 95 %           Intake/Output Summary (Last 24 hours) at 3/9/2024 1243  Last data filed at 3/9/2024 1200  Gross per 24 hour   Intake 480 ml   Output 875 ml   Net -395 ml          I had a face to face encounter and independently examined this patient on 3/9/2024, as outlined below:    Review of systems  General: No fever  Neuro: No headache. (-) AMS.  CV: No chest  pain  Resp: No shortness of breath  GI: No vomiting. No diarrhea.  : No dysuria. No flank pain.  All other systems reviewed are negative except as documented in the HPI.    Exam  General:Well developed, well nourished patient in [facility], appears chronically ill   Skin/Derm: Skin is warm and dry.  Ophth: Palpebral conjunctivae are not pale and the bulbar conjunctivae  are not erythematous.  No scleral icterus.  ENT: Oral mucosa is moist. Missing dentition  Neck: Neck is supple for age without thyromegaly. No nuchal rigidity.  Pulm: No tachypnea or retractions. Lungs are clear to auscultation. Wheezing improved.   GI: Soft and non-tender.  : No CVAT.  Neuro: No facial asymmetry. No dysarthria.   Psych: Affect is normal.  Non-anxious.      ________________________________________________________________________    Total NON critical care TIME:  35  Minutes    Total CRITICAL CARE TIME Spent:  0 Minutes non procedure based      Comments   >50% of visit spent in counseling and coordination of care     ________________________________________________________________________  KELLEY Nicholson NP     Procedures: see electronic medical records for all procedures/Xrays and details which were not copied into this note but were reviewed prior to creation of Plan.      LABS:  I reviewed today's most current labs and imaging studies.  Pertinent labs include:  Recent Labs     03/07/24  0547 03/08/24  0512 03/09/24  0501   WBC 8.0 8.0 8.2   HGB 8.5* 8.2* 8.4*   HCT 25.5* 26.1* 26.2*    232 232       Recent Labs     03/07/24  0547 03/08/24  0512 03/09/24  0501    137 139   K 4.4 4.3 4.4    108 110*   CO2 26 23 23   BUN 55* 57* 53*   PHOS  --  3.5 3.2         Signed: KELLEY Nicholson NP

## 2024-03-10 LAB
ALBUMIN SERPL-MCNC: 2.1 G/DL (ref 3.5–5)
ANION GAP SERPL CALC-SCNC: 4 MMOL/L (ref 5–15)
BASOPHILS # BLD: 0 K/UL (ref 0–0.1)
BASOPHILS NFR BLD: 0 % (ref 0–1)
BUN SERPL-MCNC: 50 MG/DL (ref 6–20)
BUN/CREAT SERPL: 37 (ref 12–20)
CA-I BLD-MCNC: 7.9 MG/DL (ref 8.5–10.1)
CHLORIDE SERPL-SCNC: 110 MMOL/L (ref 97–108)
CO2 SERPL-SCNC: 25 MMOL/L (ref 21–32)
CREAT SERPL-MCNC: 1.34 MG/DL (ref 0.7–1.3)
DIFFERENTIAL METHOD BLD: ABNORMAL
EOSINOPHIL # BLD: 0 K/UL (ref 0–0.4)
EOSINOPHIL NFR BLD: 0 % (ref 0–7)
ERYTHROCYTE [DISTWIDTH] IN BLOOD BY AUTOMATED COUNT: 13.2 % (ref 11.5–14.5)
GLUCOSE SERPL-MCNC: 125 MG/DL (ref 65–100)
HCT VFR BLD AUTO: 26.7 % (ref 36.6–50.3)
HGB BLD-MCNC: 8.6 G/DL (ref 12.1–17)
IMM GRANULOCYTES # BLD AUTO: 0.2 K/UL (ref 0–0.04)
IMM GRANULOCYTES NFR BLD AUTO: 2 % (ref 0–0.5)
LYMPHOCYTES # BLD: 1.5 K/UL (ref 0.8–3.5)
LYMPHOCYTES NFR BLD: 17 % (ref 12–49)
MCH RBC QN AUTO: 30.2 PG (ref 26–34)
MCHC RBC AUTO-ENTMCNC: 32.2 G/DL (ref 30–36.5)
MCV RBC AUTO: 93.7 FL (ref 80–99)
MONOCYTES # BLD: 0.7 K/UL (ref 0–1)
MONOCYTES NFR BLD: 8 % (ref 5–13)
NEUTS SEG # BLD: 6.6 K/UL (ref 1.8–8)
NEUTS SEG NFR BLD: 73 % (ref 32–75)
NRBC # BLD: 0 K/UL (ref 0–0.01)
NRBC BLD-RTO: 0 PER 100 WBC
PHOSPHATE SERPL-MCNC: 3.4 MG/DL (ref 2.6–4.7)
PLATELET # BLD AUTO: 244 K/UL (ref 150–400)
PMV BLD AUTO: 9.6 FL (ref 8.9–12.9)
POTASSIUM SERPL-SCNC: 4.7 MMOL/L (ref 3.5–5.1)
RBC # BLD AUTO: 2.85 M/UL (ref 4.1–5.7)
SODIUM SERPL-SCNC: 139 MMOL/L (ref 136–145)
WBC # BLD AUTO: 9 K/UL (ref 4.1–11.1)

## 2024-03-10 PROCEDURE — 6360000002 HC RX W HCPCS: Performed by: NURSE PRACTITIONER

## 2024-03-10 PROCEDURE — 36415 COLL VENOUS BLD VENIPUNCTURE: CPT

## 2024-03-10 PROCEDURE — 6370000000 HC RX 637 (ALT 250 FOR IP): Performed by: INTERNAL MEDICINE

## 2024-03-10 PROCEDURE — 6370000000 HC RX 637 (ALT 250 FOR IP): Performed by: HOSPITALIST

## 2024-03-10 PROCEDURE — 94761 N-INVAS EAR/PLS OXIMETRY MLT: CPT

## 2024-03-10 PROCEDURE — 94640 AIRWAY INHALATION TREATMENT: CPT

## 2024-03-10 PROCEDURE — 1100000000 HC RM PRIVATE

## 2024-03-10 PROCEDURE — 2580000003 HC RX 258: Performed by: HOSPITALIST

## 2024-03-10 PROCEDURE — 80069 RENAL FUNCTION PANEL: CPT

## 2024-03-10 PROCEDURE — 85025 COMPLETE CBC W/AUTO DIFF WBC: CPT

## 2024-03-10 PROCEDURE — 6370000000 HC RX 637 (ALT 250 FOR IP): Performed by: NURSE PRACTITIONER

## 2024-03-10 RX ORDER — METHYLPREDNISOLONE SODIUM SUCCINATE 40 MG/ML
40 INJECTION, POWDER, LYOPHILIZED, FOR SOLUTION INTRAMUSCULAR; INTRAVENOUS EVERY 12 HOURS
Status: DISCONTINUED | OUTPATIENT
Start: 2024-03-10 | End: 2024-03-11

## 2024-03-10 RX ORDER — GUAIFENESIN 600 MG/1
600 TABLET, EXTENDED RELEASE ORAL 2 TIMES DAILY
Status: DISCONTINUED | OUTPATIENT
Start: 2024-03-10 | End: 2024-03-12 | Stop reason: HOSPADM

## 2024-03-10 RX ORDER — IPRATROPIUM BROMIDE AND ALBUTEROL SULFATE 2.5; .5 MG/3ML; MG/3ML
1 SOLUTION RESPIRATORY (INHALATION)
Status: DISCONTINUED | OUTPATIENT
Start: 2024-03-10 | End: 2024-03-12 | Stop reason: HOSPADM

## 2024-03-10 RX ADMIN — FOLIC ACID 1 MG: 1 TABLET ORAL at 09:15

## 2024-03-10 RX ADMIN — IPRATROPIUM BROMIDE AND ALBUTEROL SULFATE 1 DOSE: .5; 3 SOLUTION RESPIRATORY (INHALATION) at 21:03

## 2024-03-10 RX ADMIN — SODIUM CHLORIDE, PRESERVATIVE FREE 5 ML: 5 INJECTION INTRAVENOUS at 21:28

## 2024-03-10 RX ADMIN — THIAMINE HCL TAB 100 MG 100 MG: 100 TAB at 09:16

## 2024-03-10 RX ADMIN — METHYLPREDNISOLONE SODIUM SUCCINATE 40 MG: 40 INJECTION INTRAMUSCULAR; INTRAVENOUS at 21:28

## 2024-03-10 RX ADMIN — IPRATROPIUM BROMIDE AND ALBUTEROL SULFATE 1 DOSE: .5; 3 SOLUTION RESPIRATORY (INHALATION) at 08:25

## 2024-03-10 RX ADMIN — GUAIFENESIN 600 MG: 600 TABLET, EXTENDED RELEASE ORAL at 21:28

## 2024-03-10 RX ADMIN — AMLODIPINE BESYLATE 5 MG: 5 TABLET ORAL at 09:15

## 2024-03-10 RX ADMIN — FINASTERIDE 5 MG: 5 TABLET, FILM COATED ORAL at 09:15

## 2024-03-10 RX ADMIN — BACITRACIN: 500 OINTMENT TOPICAL at 09:46

## 2024-03-10 RX ADMIN — BACITRACIN: 500 OINTMENT TOPICAL at 21:28

## 2024-03-10 RX ADMIN — METOPROLOL TARTRATE 25 MG: 25 TABLET, FILM COATED ORAL at 09:15

## 2024-03-10 RX ADMIN — ASPIRIN 81 MG: 81 TABLET, COATED ORAL at 09:15

## 2024-03-10 RX ADMIN — LEVOFLOXACIN 500 MG: 500 TABLET, FILM COATED ORAL at 09:15

## 2024-03-10 RX ADMIN — METOPROLOL TARTRATE 25 MG: 25 TABLET, FILM COATED ORAL at 21:28

## 2024-03-10 RX ADMIN — IPRATROPIUM BROMIDE AND ALBUTEROL SULFATE 1 DOSE: .5; 3 SOLUTION RESPIRATORY (INHALATION) at 13:26

## 2024-03-10 RX ADMIN — GUAIFENESIN 600 MG: 600 TABLET, EXTENDED RELEASE ORAL at 11:51

## 2024-03-10 RX ADMIN — PANTOPRAZOLE SODIUM 40 MG: 40 TABLET, DELAYED RELEASE ORAL at 06:44

## 2024-03-10 RX ADMIN — PREDNISONE 20 MG: 5 TABLET ORAL at 09:15

## 2024-03-10 RX ADMIN — SODIUM CHLORIDE, PRESERVATIVE FREE 10 ML: 5 INJECTION INTRAVENOUS at 09:16

## 2024-03-10 NOTE — PROGRESS NOTES
Patient's daughter at bedside stating she lives in [place], [place] and would like to inquire about patient going to SNF in [facility]. She states he also has other family there as well. Patient is agreeable.     Patient's daughter, Raisa (Terese) number is [phone]. Patient is agreeable for daughter to be contacted in regards to discharge planning

## 2024-03-10 NOTE — PROGRESS NOTES
Hospitalist Progress Note    NAME:   Agustín Willis   : 1945   MRN: [id number]     Date/Time: 3/10/2024 9:23 AM  Patient PCP: Gerry Taylor MD    Estimated discharge date:>48 hours  Barriers:pulmonary recommendations, placement      HOSPITAL COURSE:    Agsutín Willis is  78 y.o. male with benign prostatic disease, cervical radiculopathy, COPD, cocaine abuse, HLD, HTN, osteoarthritis, seasonal allergies, and SARTHAK who was admitted to the hospital with a chief complaint of an AMS. Patient was recently admitted to [facility] on , with the same presentation. Patient was treated and discharged on 15 February to [facility]. During his last admission APS was involved due to social and financial issues that were concerning.    Patient was discharged from the [facility] a few days ago. Patient was apparently staying in a motel/hotel with a family member as they did not having a stable housing option. The family member went to run errands and when she returned he was on lying on the floor unresponsive.  EMS was called and the patient was transported to the ED. In the ED, the patient  was hemodynamically stable  with no signs of  respiratory distress. Noticed to have an injury on the anterior left thigh. Patient passing clots with urination, urology consult requested. 400 cc of bloody urine noted in the canister.   CT head negative. CT C-spine, CT maxillofacial showed no acute fracture or subluxation. He was recently found with influenza, positive for COVID-19. Cxr showed subtle fullness in the right perihilar region, airspace disease vs atelectasis. Patient with diffuse inspiratory/expiratory wheezing this morning. Will add IV Solu-medrol. Continue oral abx. MB normal, continue current diet. PT/OT recommending SNF, awaiting for authorization. On room air maintaining saturations; however he continues to have wheezing and congestion. Will resume IV Solu-Medrol and add Mucinex 600 mg  BID. Awaiting auth for placement.     Assessment / Plan:    Acute metabolic encephalopathy:   Improved  No obvious signs of infection.   UA negative  Cxr, no acute abnormalities  CT chest/abdomen bibasilar airspace disease L>R    Telemetry      Essential hypertension:   Blood pressure improved  Continue metoprolol and low dose amolodipine 5 mg daily  Add hydralazine for SBP > 160  Continue to monitor     Anemia iron deficiency:   Hgb 8.6, stable  Continue the supplementation of vitamin add B12 folic acid levels.     Polysubstance abuse:  UDS positive for cocaine      Chronic kidney disease stage III:  Cr 1.34, stable  Continue to monitor closely     Benign prostatic disease/prostatic hyperplasia  Hypertrophy with Outflow obstruction  Urology following, appreciate recommendations  Continues with hematuria but has significantly improved  Continue Proscar 5 mg BID  CT abdomen/pelvis showed no renal stones or hydronephrosis. Non-emergent CT IVP as an outpatient is recommended as follow-up  Refused cystoscopy    COPD  Continue DuoNebs while awake  Continue IV Solu-Medrol  Continue Mucinex  Continue Acapella   Pulmonary following, appreciate recommendation    FIDEL  Cr 1.49, baseline appears to be 1.2  Improved with IVF    Medical Decision Making:     [x] High (any 2)    A. Problems (any 1)  [x] Acute/Chronic Illness/injury posing threat to life or bodily function:    [] Severe exacerbation of chronic illness:    ---------------------------------------------------------------------  B. Risk of Treatment (any 1)   [] Drugs/treatments that require intensive monitoring for toxicity include:    [] IV ABX requiring serial renal monitoring for nephrotoxicity:     [] IV Narcotic analgesia for adverse drug reaction  [] Aggressive IV diuresis requiring serial monitoring for renal impairment and electrolyte derangements  [] Critical electrolyte abnormalities requiring IV replacement and close serial monitoring  [] Insulin -

## 2024-03-10 NOTE — PROGRESS NOTES
Spiritual Care Assessment/Progress Note  [facility]    Name: Agustín Willis MRN: [id number]    Age: 78 y.o.     Sex: male   Language: English     Date: 3/10/2024            Total Time Calculated: 7 min              Spiritual Assessment begun in [facility] MED/SURG  Service Provided For:: Patient not available  Referral/Consult From:: Rounding  Encounter Overview/Reason : Attempted Encounter    Spiritual beliefs:      [] Involved in a yamini tradition/spiritual practice:      [] Supported by a yamini community:      [] Claims no spiritual orientation:      [] Seeking spiritual identity:           [] Adheres to an individual form of spirituality:      [x] Not able to assess:                Identified resources for coping and support system:   Support System: Unknown       [] Prayer                  [] Devotional reading               [] Music                  [] Guided Imagery     [] Pet visits                                        [] Other: (COMMENT)     Specific area/focus of visit   Encounter:    Crisis:    Spiritual/Emotional needs:    Ritual, Rites and Sacraments:    Grief, Loss, and Adjustments:    Ethics/Mediation:    Behavioral Health:    Palliative Care:    Advance Care Planning:      Plan/Referrals: Other (Comment) ( is available if needed)    Narrative: Conferred with [facility], prior to attempted visit.  attempted to visit pt for spiritual assessment on 5W; pt is asleep.  left pastoral msg in pt's room notifying pt of attempted visit. provided ministry of presence.    Please contact Spiritual Care for any emotional/spiritual needs and/or further referrals. Thank you.    . Lora Salcido MDIV   can be reached by calling the  at [facility]  [phone]

## 2024-03-10 NOTE — PROGRESS NOTES
PULMONARY NOTE  [facility]    Name: Agustín Willis MRN: [id number]   : 1945 Hospital: [facility]   Date: 3/10/2024  Admission date: 3/3/2024 Hospital Day: 8       HPI:     Patient Active Problem List   Diagnosis    Weakness of both legs    Insomnia    Shoulder pain    Low back pain    Vasovagal syncope    Tinnitus of right ear    Bronchitis    Alcohol abuse    Malignant hypertensive heart disease without heart failure    Pain in left knee    Dizziness and giddiness    Osteoarthritis of knee    Diarrhea    Hypercholesterolemia    Ankle pain    Pain in breast    Prediabetes    Sleep apnea    Seasonal allergic rhinitis    Chronic obstructive lung disease (HCC)    Cocaine abuse (HCC)    Kidney disease    Benign prostatic disease    Benign prostatic hyperplasia    Weakness of both arms    Lumbar herniated disc    Cervical radiculopathy    Hypertensive disorder    Thrombocytopenia (HCC)    Macrocytic anemia    Chronic renal disease, stage III (HCC)    Weakness generalized    Bilateral lower extremity edema    Syncope and collapse    SARTHAK on CPAP    Unsteady gait when walking    Ankle edema, bilateral    Drug abuse counseling and surveillance of drug abuser    Alcohol use, unspecified with intoxication, uncomplicated (HCC)    Thrombocytopenia, unspecified (HCC)    Stage 3a chronic kidney disease (HCC)    Heart murmur, systolic    Atrial fibrillation (HCC)    Cardiac arrhythmia    Persistent cough    Non-seasonal allergic rhinitis    Edema, lower extremity    Encounter for immunization    FIDEL (acute kidney injury) (HCC)    Altered mental status    Gross hematuria             [x] High complexity decision making was performed  [x] See my orders for details      Subjective/Initial History:     I was asked by Guillermo Martinez MD to see Agustín Willis  a 78 y.o.   male in consultation     Excerpts from admission 3/3/2024 or consult notes as follows:   78-year-old male came in because

## 2024-03-11 LAB
ALBUMIN SERPL-MCNC: 2 G/DL (ref 3.5–5)
ALBUMIN/GLOB SERPL: 0.6 (ref 1.1–2.2)
ALP SERPL-CCNC: 71 U/L (ref 45–117)
ALT SERPL-CCNC: 30 U/L (ref 12–78)
ANION GAP SERPL CALC-SCNC: 2 MMOL/L (ref 5–15)
AST SERPL W P-5'-P-CCNC: 18 U/L (ref 15–37)
BASOPHILS # BLD: 0 K/UL (ref 0–0.1)
BASOPHILS NFR BLD: 0 % (ref 0–1)
BILIRUB SERPL-MCNC: 0.2 MG/DL (ref 0.2–1)
BUN SERPL-MCNC: 55 MG/DL (ref 6–20)
BUN/CREAT SERPL: 40 (ref 12–20)
CA-I BLD-MCNC: 7.9 MG/DL (ref 8.5–10.1)
CHLORIDE SERPL-SCNC: 112 MMOL/L (ref 97–108)
CO2 SERPL-SCNC: 24 MMOL/L (ref 21–32)
CREAT SERPL-MCNC: 1.38 MG/DL (ref 0.7–1.3)
DIFFERENTIAL METHOD BLD: ABNORMAL
EOSINOPHIL # BLD: 0 K/UL (ref 0–0.4)
EOSINOPHIL NFR BLD: 0 % (ref 0–7)
ERYTHROCYTE [DISTWIDTH] IN BLOOD BY AUTOMATED COUNT: 13.2 % (ref 11.5–14.5)
GLOBULIN SER CALC-MCNC: 3.5 G/DL (ref 2–4)
GLUCOSE SERPL-MCNC: 120 MG/DL (ref 65–100)
HCT VFR BLD AUTO: 27.1 % (ref 36.6–50.3)
HGB BLD-MCNC: 8.6 G/DL (ref 12.1–17)
IMM GRANULOCYTES # BLD AUTO: 0.1 K/UL (ref 0–0.04)
IMM GRANULOCYTES NFR BLD AUTO: 1 % (ref 0–0.5)
LYMPHOCYTES # BLD: 1.7 K/UL (ref 0.8–3.5)
LYMPHOCYTES NFR BLD: 16 % (ref 12–49)
MCH RBC QN AUTO: 29.8 PG (ref 26–34)
MCHC RBC AUTO-ENTMCNC: 31.7 G/DL (ref 30–36.5)
MCV RBC AUTO: 93.8 FL (ref 80–99)
MONOCYTES # BLD: 0.5 K/UL (ref 0–1)
MONOCYTES NFR BLD: 5 % (ref 5–13)
NEUTS SEG # BLD: 8.2 K/UL (ref 1.8–8)
NEUTS SEG NFR BLD: 78 % (ref 32–75)
NRBC # BLD: 0 K/UL (ref 0–0.01)
NRBC BLD-RTO: 0 PER 100 WBC
PLATELET # BLD AUTO: 235 K/UL (ref 150–400)
PMV BLD AUTO: 9.4 FL (ref 8.9–12.9)
POTASSIUM SERPL-SCNC: 5 MMOL/L (ref 3.5–5.1)
PROT SERPL-MCNC: 5.5 G/DL (ref 6.4–8.2)
RBC # BLD AUTO: 2.89 M/UL (ref 4.1–5.7)
SODIUM SERPL-SCNC: 138 MMOL/L (ref 136–145)
WBC # BLD AUTO: 10.5 K/UL (ref 4.1–11.1)

## 2024-03-11 PROCEDURE — 2580000003 HC RX 258: Performed by: HOSPITALIST

## 2024-03-11 PROCEDURE — 6360000002 HC RX W HCPCS: Performed by: NURSE PRACTITIONER

## 2024-03-11 PROCEDURE — 6370000000 HC RX 637 (ALT 250 FOR IP): Performed by: HOSPITALIST

## 2024-03-11 PROCEDURE — 94667 MNPJ CHEST WALL 1ST: CPT

## 2024-03-11 PROCEDURE — 1100000000 HC RM PRIVATE

## 2024-03-11 PROCEDURE — 6370000000 HC RX 637 (ALT 250 FOR IP): Performed by: NURSE PRACTITIONER

## 2024-03-11 PROCEDURE — 97530 THERAPEUTIC ACTIVITIES: CPT

## 2024-03-11 PROCEDURE — 85025 COMPLETE CBC W/AUTO DIFF WBC: CPT

## 2024-03-11 PROCEDURE — 6370000000 HC RX 637 (ALT 250 FOR IP): Performed by: STUDENT IN AN ORGANIZED HEALTH CARE EDUCATION/TRAINING PROGRAM

## 2024-03-11 PROCEDURE — 2580000003 HC RX 258: Performed by: STUDENT IN AN ORGANIZED HEALTH CARE EDUCATION/TRAINING PROGRAM

## 2024-03-11 PROCEDURE — 94640 AIRWAY INHALATION TREATMENT: CPT

## 2024-03-11 PROCEDURE — 80053 COMPREHEN METABOLIC PANEL: CPT

## 2024-03-11 PROCEDURE — 6370000000 HC RX 637 (ALT 250 FOR IP): Performed by: INTERNAL MEDICINE

## 2024-03-11 PROCEDURE — 97535 SELF CARE MNGMENT TRAINING: CPT

## 2024-03-11 PROCEDURE — 94761 N-INVAS EAR/PLS OXIMETRY MLT: CPT

## 2024-03-11 PROCEDURE — 36415 COLL VENOUS BLD VENIPUNCTURE: CPT

## 2024-03-11 RX ORDER — SODIUM CHLORIDE 9 MG/ML
INJECTION, SOLUTION INTRAVENOUS CONTINUOUS
Status: DISCONTINUED | OUTPATIENT
Start: 2024-03-11 | End: 2024-03-12 | Stop reason: HOSPADM

## 2024-03-11 RX ORDER — BUDESONIDE AND FORMOTEROL FUMARATE DIHYDRATE 160; 4.5 UG/1; UG/1
2 AEROSOL RESPIRATORY (INHALATION)
Status: DISCONTINUED | OUTPATIENT
Start: 2024-03-11 | End: 2024-03-12 | Stop reason: HOSPADM

## 2024-03-11 RX ORDER — FERROUS SULFATE 325(65) MG
325 TABLET ORAL
Status: DISCONTINUED | OUTPATIENT
Start: 2024-03-12 | End: 2024-03-12 | Stop reason: HOSPADM

## 2024-03-11 RX ORDER — PREDNISONE 5 MG/1
20 TABLET ORAL 2 TIMES DAILY
Status: DISCONTINUED | OUTPATIENT
Start: 2024-03-11 | End: 2024-03-12 | Stop reason: HOSPADM

## 2024-03-11 RX ADMIN — THIAMINE HCL TAB 100 MG 100 MG: 100 TAB at 09:01

## 2024-03-11 RX ADMIN — PANTOPRAZOLE SODIUM 40 MG: 40 TABLET, DELAYED RELEASE ORAL at 05:46

## 2024-03-11 RX ADMIN — FINASTERIDE 5 MG: 5 TABLET, FILM COATED ORAL at 09:01

## 2024-03-11 RX ADMIN — IPRATROPIUM BROMIDE AND ALBUTEROL SULFATE 1 DOSE: .5; 3 SOLUTION RESPIRATORY (INHALATION) at 07:50

## 2024-03-11 RX ADMIN — IPRATROPIUM BROMIDE AND ALBUTEROL SULFATE 1 DOSE: .5; 3 SOLUTION RESPIRATORY (INHALATION) at 13:05

## 2024-03-11 RX ADMIN — BACITRACIN: 500 OINTMENT TOPICAL at 11:55

## 2024-03-11 RX ADMIN — HYDRALAZINE HYDROCHLORIDE 10 MG: 20 INJECTION INTRAMUSCULAR; INTRAVENOUS at 23:28

## 2024-03-11 RX ADMIN — Medication 2 PUFF: at 20:19

## 2024-03-11 RX ADMIN — ASPIRIN 81 MG: 81 TABLET, COATED ORAL at 09:00

## 2024-03-11 RX ADMIN — BACITRACIN: 500 OINTMENT TOPICAL at 20:48

## 2024-03-11 RX ADMIN — GUAIFENESIN 600 MG: 600 TABLET, EXTENDED RELEASE ORAL at 20:45

## 2024-03-11 RX ADMIN — Medication 2 PUFF: at 13:05

## 2024-03-11 RX ADMIN — METHYLPREDNISOLONE SODIUM SUCCINATE 40 MG: 40 INJECTION INTRAMUSCULAR; INTRAVENOUS at 09:01

## 2024-03-11 RX ADMIN — FOLIC ACID 1 MG: 1 TABLET ORAL at 09:01

## 2024-03-11 RX ADMIN — GUAIFENESIN 600 MG: 600 TABLET, EXTENDED RELEASE ORAL at 09:00

## 2024-03-11 RX ADMIN — SODIUM CHLORIDE, PRESERVATIVE FREE 10 ML: 5 INJECTION INTRAVENOUS at 09:02

## 2024-03-11 RX ADMIN — IPRATROPIUM BROMIDE AND ALBUTEROL SULFATE 1 DOSE: .5; 3 SOLUTION RESPIRATORY (INHALATION) at 20:18

## 2024-03-11 RX ADMIN — AMLODIPINE BESYLATE 5 MG: 5 TABLET ORAL at 09:00

## 2024-03-11 RX ADMIN — METOPROLOL TARTRATE 25 MG: 25 TABLET, FILM COATED ORAL at 20:45

## 2024-03-11 RX ADMIN — PREDNISONE 20 MG: 5 TABLET ORAL at 11:53

## 2024-03-11 RX ADMIN — SODIUM CHLORIDE, PRESERVATIVE FREE 10 ML: 5 INJECTION INTRAVENOUS at 20:48

## 2024-03-11 RX ADMIN — SODIUM CHLORIDE: 9 INJECTION, SOLUTION INTRAVENOUS at 10:33

## 2024-03-11 RX ADMIN — PREDNISONE 20 MG: 5 TABLET ORAL at 20:45

## 2024-03-11 ASSESSMENT — ENCOUNTER SYMPTOMS
BACK PAIN: 0
ABDOMINAL PAIN: 0
SHORTNESS OF BREATH: 0
VOMITING: 0
DIARRHEA: 0
COUGH: 1
NAUSEA: 0

## 2024-03-11 ASSESSMENT — PAIN SCALES - WONG BAKER
WONGBAKER_NUMERICALRESPONSE: NO HURT
WONGBAKER_NUMERICALRESPONSE: 0
WONGBAKER_NUMERICALRESPONSE: NO HURT
WONGBAKER_NUMERICALRESPONSE: 0

## 2024-03-11 ASSESSMENT — PAIN SCALES - GENERAL
PAINLEVEL_OUTOF10: 0
PAINLEVEL_OUTOF10: 0

## 2024-03-11 NOTE — CARE COORDINATION
CM received a phone call from unit secretary regarding pt's transportation to SNF.     Cm reached out to [facility]. [facility] will call 5W nurses station to let unit secretary or pt's nurse know if they can provide transport or not. If [facility] is unable to approve transport, then unit secretary will call hospital to home to arrange stretcher transport. Nurse is aware of this plan. CM  - Ren is aware of this plan as well.

## 2024-03-11 NOTE — PROGRESS NOTES
Talk to Maryse at [facility] and let her know that the patient would not be discharged today and they will put the patient on will call for 03/12/2024. Nurse, Mary MERCADO notified of the change.

## 2024-03-11 NOTE — WOUND CARE
Wound Care Note:      Wound Care into see patient because of Left heel wound    Patient resting in bed, states that he has had the wound to his heel for a while now. States that it does not bother him at all and does not drain.     Unstageable PI to left heel, covered with dry eschar. There is no erythema, induration or fluctuation noted to area. Recommend cleansing with NS and then cover with doubled over xeroform and foam dressing. Dressing to be changed every other day and PRN for soiling.     Wound Care Documentation:  Wound 03/11/24 Heel Left (Active)   Wound Image   03/11/24 1113   Wound Etiology Pressure Unstageable 03/11/24 1113   Dressing Status New dressing applied 03/11/24 1113   Dressing/Treatment Xeroform;Foam 03/11/24 1113   Wound Assessment Eschar dry 03/11/24 1113   Drainage Amount None (dry) 03/11/24 1113   Odor None 03/11/24 1113   Mindi-wound Assessment Intact 03/11/24 1113   Margins Attached edges 03/11/24 1113   Number of days: 0       Use foam body alignment wedge to turn patient q2h at approximately 30 degree angle to offload sacrum to prevent pressure related skin injury.  Do not position directly on greater trochanter.       Boot are to be applied daily and kept on at all times while in bed for offloading and to prevent pressure injuries.    Float heels on pillow at all times while in bed, place pillow long ways under patient leg so knee is supported and heel is hanging off edge of pillow.      Transition of Care: Plan to follow weekly while admitted to hospital

## 2024-03-11 NOTE — CARE COORDINATION
CM has reviewed pt chart    DCP: [facility] pending auth    0804: CM sent message to [facility] requesting update on auth    0823: CM read note from nurse stating daughter would like to be contacted to discuss poss SNF placement in [place], [place]. CM to f/u with dtr.    1038: CM called pt dtr, no answer, voicemail left. Awaiting return call.    1410: CM called pt dtr again, no answer. Awaiting return call.     1511: CM received return call from pt dtr and she would like pt to be placed closer to her in [place], [place]. CM explained we could try, but auth has been started at [facility]. Dtr says pending progress poss could take pt home with her and HH at [address] , [place] [address]. CM to send referrals in [place], [place].    1540: CM spoke with pt dtr again and informed that [facility] has received auth, and we would get him over there today, and if they want to switch facilities once there then they could work on that. CM informed dtr that her info would be passed along to the facility so that he can be setup to dc to her home once cleared to dc from SNF. Dtr expressed understanding.

## 2024-03-11 NOTE — CARE COORDINATION
Transition of Care Plan:    RUR: not available, calculating  Prior Level of Functioning: home self care  Disposition: SNF at [facility]  If SNF or IPR: Date FOC offered: 3/7  Date FOC received: 3/7  Accepting facility: [facility]  Date authorization started with reference number: 3/8, ref # [id number]  Date authorization received and expires: 3/11  Follow up appointments: unit secretary to setup as needed  DME needed: none  Transportation at discharge: TBD  IM/IMM Medicare/[facility] letter given: yes  Is patient a [place] and connected with [facility]? no  If yes, was  transfer form completed and VA notified? N/a  Caregiver Contact: spoke with pt dtr and sister  Discharge Caregiver contacted prior to discharge? yes  Care Conference needed? no  Barriers to discharge: none    Primary nurse can call report to [phone], pt going to room 110B Unit 1.

## 2024-03-11 NOTE — PROGRESS NOTES
Hospitalist Progress Note    NAME:   Agustín Willis   : 1945   MRN: [id number]     Date/Time: 3/11/2024 8:52 AM  Patient PCP: Gerry Taylor MD    Estimated discharge date:24-48 hours  Barriers:placement      HOSPITAL COURSE:    Agustín Willis is  78 y.o. male with benign prostatic disease, cervical radiculopathy, COPD, cocaine abuse, HLD, HTN, osteoarthritis, seasonal allergies, and SARTHAK who was admitted to the hospital with a chief complaint of an AMS. Patient was recently admitted to [facility] on , with the same presentation. Patient was treated and discharged on 15 February to [facility]. During his last admission APS was involved due to social and financial issues that were concerning.    Patient was discharged from the [facility] a few days ago. Patient was apparently staying in a motel/hotel with a family member as they did not having a stable housing option. The family member went to run errands and when she returned he was on lying on the floor unresponsive.  EMS was called and the patient was transported to the ED. In the ED, the patient  was hemodynamically stable  with no signs of  respiratory distress. Noticed to have an injury on the anterior left thigh. Patient passing clots with urination, urology consult requested. 400 cc of bloody urine noted in the canister.  Was recommended further dilation and inpatient cystoscopy however patient declined, may follow-up with urology as outpatient basis.  CT head negative. CT C-spine, CT maxillofacial showed no acute fracture or subluxation. He was recently found with influenza, positive for COVID-19. Cxr showed subtle fullness in the right perihilar region, airspace disease vs atelectasis. Patient with diffuse inspiratory/expiratory wheezing. Continue oral abx. MB normal, continue current diet. PT/OT recommending SNF, awaiting for authorization, family requesting possible placement in [place]. On room air  monitoring for renal impairment and electrolyte derangements  [] Critical electrolyte abnormalities requiring IV replacement and close serial monitoring  [] Insulin - monitoring serial FSBS for Hypoglycemic adverse drug reaction  [] Other - [x] Toxicity monitoring of anti-hypertensive or antiarrhythmic medications for hypotension/hypertension/arrhythmias.  Frequent blood pressure monitoring, telemetry/EKGs. PO Metoprolol 25 mg BID.  [] Change in code status:    [] Decision to escalate care:    [] Major surgery/procedure with associated risk factors:    ----------------------------------------------------------------------  C. Data (any 2)  [x] Discussed current management and discharge planning options with Case Management.  [x] Discussed management of the case with:  Patient, nursing  [] Telemetry personally reviewed and interpreted as documented above    [] Imaging personally reviewed and interpreted, includes:    [] Data Review (any 3)  [x] All available Consultant notes from yesterday/today were reviewed  [x] All current labs were reviewed and interpreted for clinical significance   [x] Appropriate follow-up labs were ordered  [] Collateral history obtained from:  chart review      Code Status: full  DVT Prophylaxis: Lovenox  GI Prophylaxis:none    Subjective:     Chief Complaint / Reason for Physician Visit  Patient was seen and examined at the bedside. A&OX3. Patient continues to have productive cough. He denies dyspnea. On room air and maintaining saturations. Otherwise feels back to baseline. Discussed with RN events overnight.       Objective:     VITALS:   Last 24hrs VS reviewed since prior progress note. Most recent are:  Patient Vitals for the past 24 hrs:   BP Temp Temp src Pulse Resp SpO2   03/11/24 0805 (!) 140/89 97.7 °F (36.5 °C) Oral 60 20 99 %   03/11/24 0750 -- -- -- -- -- 97 %   03/10/24 2107 -- -- -- -- -- 98 %   03/10/24 2103 -- -- -- -- -- 98 %   03/10/24 1958 (!) 143/91 97.8 °F (36.6 °C)

## 2024-03-11 NOTE — PROGRESS NOTES
PULMONARY NOTE  [facility]    Name: Agustín Willis MRN: [id number]   : 1945 Hospital: [facility]   Date: 3/11/2024  Admission date: 3/3/2024 Hospital Day: 9       HPI:     Patient Active Problem List   Diagnosis    Weakness of both legs    Insomnia    Shoulder pain    Low back pain    Vasovagal syncope    Tinnitus of right ear    Bronchitis    Alcohol abuse    Malignant hypertensive heart disease without heart failure    Pain in left knee    Dizziness and giddiness    Osteoarthritis of knee    Diarrhea    Hypercholesterolemia    Ankle pain    Pain in breast    Prediabetes    Sleep apnea    Seasonal allergic rhinitis    Chronic obstructive lung disease (HCC)    Cocaine abuse (HCC)    Kidney disease    Benign prostatic disease    Benign prostatic hyperplasia    Weakness of both arms    Lumbar herniated disc    Cervical radiculopathy    Hypertensive disorder    Thrombocytopenia (HCC)    Macrocytic anemia    Chronic renal disease, stage III (HCC)    Weakness generalized    Bilateral lower extremity edema    Syncope and collapse    SARTHAK on CPAP    Unsteady gait when walking    Ankle edema, bilateral    Drug abuse counseling and surveillance of drug abuser    Alcohol use, unspecified with intoxication, uncomplicated (HCC)    Thrombocytopenia, unspecified (HCC)    Stage 3a chronic kidney disease (HCC)    Heart murmur, systolic    Atrial fibrillation (HCC)    Cardiac arrhythmia    Persistent cough    Non-seasonal allergic rhinitis    Edema, lower extremity    Encounter for immunization    FIDEL (acute kidney injury) (HCC)    Altered mental status    Gross hematuria             [x] High complexity decision making was performed  [x] See my orders for details      Subjective/Initial History:     I was asked by Guillermo Martinez MD to see Agustín Willis  a 78 y.o.   male in consultation     Excerpts from admission 3/3/2024 or consult notes as follows:   78-year-old male came in because  treatment  Patient is on Zithromax unable to give Augmentin secondary to penicillin allergy will change to Levaquin  Hx of EtOH abuse start patient on thiamine folic acid B12  Patient on Protonix  Patient refused inpatient cystoscopy     3/7 now patient is on room air tolerating well intermittent confusion  3/8 will change Solu-Medrol dose continue with DuoNeb high risk for aspiration  3/9 will discontinue Solu-Medrol start patient on prednisone  3/10 he started wheezing again started on Solu-Medrol most likely having silent aspiration continue with nebulizer treatment  3/11 wheezing diminished discontinue Solu-Medrol start patient prednisone discharge planning    Jeremy Ramos MD

## 2024-03-11 NOTE — PROGRESS NOTES
Comprehensive Nutrition Assessment    Type and Reason for Visit:  Initial, RD Nutrition Re-Screen/LOS (x8 days)    Nutrition Recommendations/Plan:   Continue diet.  Ensure HP x1/d.  Continue ot monitor and document PO and ONS intakes, BM in I/Os.     Malnutrition Assessment:  Malnutrition Status:  No malnutrition (03/11/24 1631)    Context:  Chronic Illness     Findings of the 6 clinical characteristics of malnutrition:  Energy Intake:  No significant decrease in energy intake  Weight Loss:  No significant weight loss     Body Fat Loss:  No significant body fat loss     Muscle Mass Loss:  No significant muscle mass loss    Fluid Accumulation:  No significant fluid accumulation (r/t medical illness)     Strength:  Not Performed    Nutrition Assessment:    Admitted for AMS, gross hematuria. Screened for LOS. No noted h/o weight loss nor poor intakes PTA. RD obtained CBW t visit, Pt endorsed no weight loss. Good appetite/intakes w/ >75% of meals w/ feeding assistance from Nsg. Continue diet. WCN note finding +PI- indicative of increased EENs. Labs: H/H 8.6/27.1, Cl 112, BUN 55, GFR 52, Ca 7.9. Meds: NS, B1, B9, prednisone, PPI, duoneb, mucinex, proscar, aspirin, norvasc.    Nutrition Related Findings:    NFPE w/o acute findings, nourished. No N/V/D/C nor swallowing difficulties reported.+chewing difficulty, SLP rec'd SBS/Thin Liq diet. Last BM 3/10. +2 LLE, RUE; +1 RLE, LUE edema noted. Wound Type: Pressure Injury, Unstageable (L. Heel.)       Current Nutrition Intake & Therapies:    Average Meal Intake: %  Average Supplements Intake: None Ordered  ADULT DIET; Dysphagia - Soft and Bite Sized; extra gravy    Anthropometric Measures:  Height: 185.4 cm (6' 1\")  Ideal Body Weight (IBW): 184 lbs (84 kg)       Current Body Weight: 93.4 kg (206 lb) (3/11, RD obtained.), 112 % IBW. Weight Source: Bed Scale  Current BMI (kg/m2): 27.2  Usual Body Weight: 83.9 kg (185 lb)  % Weight Change (Calculated): 11.4  Weight  Adjustment For: No Adjustment                 BMI Categories: Overweight (BMI 25.0-29.9)    Estimated Daily Nutrient Needs:  Energy Requirements Based On: Formula  Weight Used for Energy Requirements: Current  Energy (kcal/day): 2034 kcal/d (MSJx1.2SF, wound)  Weight Used for Protein Requirements: Current  Protein (g/day): 112 gm/d (1.2 gm/kg, wound)  Method Used for Fluid Requirements: 1 ml/kcal  Fluid (ml/day): 2034 mL/d    Nutrition Diagnosis:   Increased nutrient needs related to increase demand for energy/nutrients as evidenced by wounds    Nutrition Interventions:   Food and/or Nutrient Delivery: Continue Current Diet, Start Oral Nutrition Supplement  Nutrition Education/Counseling: No recommendation at this time  Coordination of Nutrition Care: Continue to monitor while inpatient, Feeding Assistance/Environment Change, Swallow Evaluation  Plan of Care discussed with: RN, Pt    Goals:     Goals: Meet at least 75% of estimated needs, PO intake 75% or greater, within 7 days, other (specify)  Specify Other Goals: Improved skin integrity aeb woun healing x7 days.    Nutrition Monitoring and Evaluation:   Behavioral-Environmental Outcomes: None Identified  Food/Nutrient Intake Outcomes: Diet Advancement/Tolerance, Food and Nutrient Intake, Supplement Intake  Physical Signs/Symptoms Outcomes: Biochemical Data, Meal Time Behavior, Skin, Weight, Chewing or Swallowing    Discharge Planning:    Continue current diet, Continue Oral Nutrition Supplement     Cristina Smith RD  Contact: ext. [phone].

## 2024-03-11 NOTE — PROGRESS NOTES
OCCUPATIONAL THERAPY TREATMENT  Patient: Agustín Willis (78 y.o. male)  Date: 3/11/2024  Primary Diagnosis: Altered mental status [R41.82]  Generalized weakness [R53.1]  Injury of head, initial encounter [S09.90XA]  Non-traumatic rhabdomyolysis [M62.82]       Precautions: Fall Risk, Bed Alarm                Recommendations for nursing mobility: Encourage HEP in prep for ADLs/mobility; see handout for details, Frequent repositioning to prevent skin breakdown, Use of bed/chair alarm for safety, and Assist x2    In place during session: Peripheral IV and External Catheter  Chart, occupational therapy assessment, plan of care, and goals were reviewed.    ASSESSMENT  Patient continues with skilled OT services and is progressing towards goals.  Pt received semi-supine in bed upon arrival, AXO x 3 and agreeable to WALTER/PTA tx at this time. Pt cooperative and demonstrated fair effort during activities. Pt noted with improved bed mobility<>EOB with decreased assistance with verbal/manual cues for proper technique to reach for bed rail. Good dynamic sitting balance while performing grooming/UB dressing with assist to carina gown over L shoulder d/t UE weakness. Pt completed STS and side step with mod A x2 with slight R lateral/posterior lean with verbal/manual cues for correct hand placement and RW mgmt for safety. In standing,pt req'd TA for toileting hygiene d/t decreased dynamic standing balance. Pt stating \"I didn't know I went to the bathroom\". Education provided on the importance of being aware when voiding to prevent skin breakdown, with fair understanding.  (See below for objective details and assist levels)     Overall, pt limited by generalized weakness and decreased dynamic standing balance that interferes with performance in ADL's and functional tf's safely.  Will continue to progress. Potential barriers for safe discharge pt has poor safety awareness, pt has impaired cognition, pt is a high fall risk, pt is not safe  time to complete;Verbal cueing  UE Dressing Skilled Clinical Factors: [place]/donning hospital gown while EOB with assist with gown over L shoulder       Toileting: Dependent/Total;Increased time to complete  Toileting Skilled Clinical Factors: Posterior gab care while standing and side lying             Pain Ratin/10 R knee  Pain Intervention(s):   rest and repositioning    Activity Tolerance:   Fair  and requires rest breaks    After treatment patient left in no apparent distress:   Bed locked and returned to lowest position, Patient left in no apparent distress in bed, Call bell within reach, Bed/ chair alarm activated, and Side rails x3, and nsg updated     COMMUNICATION/EDUCATION:   The patient’s plan of care was discussed with: Physical therapy assistant and Registered nurse Co-tx with PTA for increased pt/clinician safety with OOB activity         Patient Education  Education Given To: Patient  Education Provided: Role of Therapy;Transfer Training;Fall Prevention Strategies;Equipment;Orientation  Education Method: Verbal  Barriers to Learning: Cognition  Education Outcome: Continued education needed;Verbalized understanding;Demonstrated understanding    Thank you for this referral.  LESLIE Ribeiro  Minutes: 27

## 2024-03-11 NOTE — PLAN OF CARE
PHYSICAL THERAPY TREATMENT     Patient: Agustín Willis (78 y.o. male)  Date: 3/11/2024  Diagnosis: Altered mental status [R41.82]  Generalized weakness [R53.1]  Injury of head, initial encounter [S09.90XA]  Non-traumatic rhabdomyolysis [M62.82] Altered mental status      Precautions: Fall Risk, Bed Alarm                      Recommendations for nursing mobility: Encourage HEP in prep for ADLs/mobility; see handout for details, Frequent repositioning to prevent skin breakdown, Use of bed/chair alarm for safety, LE elevation for management of edema, Use of BSC for toileting , AD and gt belt for bed to chair , and Assist x2    In place during session: Peripheral IV and External Catheter  Chart, physical therapy assessment, plan of care and goals were reviewed.  ASSESSMENT  Patient continues with skilled PT services and is slowlyprogressing towards goals. Pt received semi supine upon PT/OT arrival, agreeable to session. (See below for objective details and assist levels).     Overall pt tolerated session fair today with bed mobility and Tfs. Improvement noted with all mobility this session and pt. Required less assistance. Pt. Was leaning to R slightly with a posterior lean as well while standing with RW. Pt. Required mod/max assist for standing balance. Pt. Only could take a couple of side  steps along HOB with RW due to pt.  needing to have bowel movement. Placed pt. On bedpan. Assisted pt. After pt. Was finished. Pt. Returned to supine Min A X 2.  Will continue to benefit from skilled PT services, and will continue to progress as tolerated. Potential barriers for safe discharge: pts current support system is unable to meet their requirements for physical assistance, pt has poor safety awareness, pt has impaired cognition, pt is a high fall risk, pt is not safe to be alone, and concern for pt safely navigating or managing the home environment. Current PT DC recommendation Skilled Nursing Facility once     Long Arc Quads 2 5 [x] [] [] []    Marching 2 5 [] [x] [] []       [] [] [] []     Reviewed all LE exercises as stated above /Instructed pt to perform 2-3 times a day.  Pain Ratin/10  reported  Pain Intervention(s):   nursing notified and repositioning    Activity Tolerance:   Fair  and requires rest breaks    After treatment patient left in no apparent distress:   Bed locked and returned to lowest position, Patient left in no apparent distress in bed, Call bell within reach, Bed/ chair alarm activated, Side rails x3, and Updated patient's board on functional status and mobility recommendations, and [place] updated     COMMUNICATION/COLLABORATION:   The patient’s plan of care was discussed with: Occupational therapy assistant and Registered nurse    Patient Education  Education Given To: Patient  Education Provided: Role of Therapy;Plan of Care;Home Exercise Program;Transfer Training;Equipment;Energy Conservation  Education Provided Comments: LE exercises and using RW  Education Method: Demonstration;Verbal;Teach Back  Barriers to Learning: Cognition  Education Outcome: Verbalized understanding;Continued education needed    PT/OT sessions occurred together for increased safety of pt and clinician.     Rekha Martinez, PTA   10:35-11:02

## 2024-03-12 VITALS
BODY MASS INDEX: 25.18 KG/M2 | OXYGEN SATURATION: 92 % | DIASTOLIC BLOOD PRESSURE: 88 MMHG | TEMPERATURE: 97.3 F | SYSTOLIC BLOOD PRESSURE: 127 MMHG | RESPIRATION RATE: 18 BRPM | HEART RATE: 60 BPM | WEIGHT: 190 LBS | HEIGHT: 73 IN

## 2024-03-12 LAB
ALBUMIN SERPL-MCNC: 2.2 G/DL (ref 3.5–5)
ALBUMIN/GLOB SERPL: 0.6 (ref 1.1–2.2)
ALP SERPL-CCNC: 79 U/L (ref 45–117)
ALT SERPL-CCNC: 27 U/L (ref 12–78)
ANION GAP SERPL CALC-SCNC: 3 MMOL/L (ref 5–15)
AST SERPL W P-5'-P-CCNC: 16 U/L (ref 15–37)
BASOPHILS # BLD: 0 K/UL (ref 0–0.1)
BASOPHILS NFR BLD: 0 % (ref 0–1)
BILIRUB SERPL-MCNC: 0.2 MG/DL (ref 0.2–1)
BUN SERPL-MCNC: 52 MG/DL (ref 6–20)
BUN/CREAT SERPL: 38 (ref 12–20)
CA-I BLD-MCNC: 7.9 MG/DL (ref 8.5–10.1)
CHLORIDE SERPL-SCNC: 112 MMOL/L (ref 97–108)
CO2 SERPL-SCNC: 22 MMOL/L (ref 21–32)
CREAT SERPL-MCNC: 1.36 MG/DL (ref 0.7–1.3)
DIFFERENTIAL METHOD BLD: ABNORMAL
EOSINOPHIL # BLD: 0 K/UL (ref 0–0.4)
EOSINOPHIL NFR BLD: 0 % (ref 0–7)
ERYTHROCYTE [DISTWIDTH] IN BLOOD BY AUTOMATED COUNT: 13.5 % (ref 11.5–14.5)
GLOBULIN SER CALC-MCNC: 3.6 G/DL (ref 2–4)
GLUCOSE SERPL-MCNC: 152 MG/DL (ref 65–100)
HCT VFR BLD AUTO: 28.6 % (ref 36.6–50.3)
HGB BLD-MCNC: 9.2 G/DL (ref 12.1–17)
IMM GRANULOCYTES # BLD AUTO: 0.2 K/UL (ref 0–0.04)
IMM GRANULOCYTES NFR BLD AUTO: 2 % (ref 0–0.5)
LYMPHOCYTES # BLD: 1.3 K/UL (ref 0.8–3.5)
LYMPHOCYTES NFR BLD: 11 % (ref 12–49)
MCH RBC QN AUTO: 30.3 PG (ref 26–34)
MCHC RBC AUTO-ENTMCNC: 32.2 G/DL (ref 30–36.5)
MCV RBC AUTO: 94.1 FL (ref 80–99)
MONOCYTES # BLD: 0.5 K/UL (ref 0–1)
MONOCYTES NFR BLD: 4 % (ref 5–13)
NEUTS SEG # BLD: 9.9 K/UL (ref 1.8–8)
NEUTS SEG NFR BLD: 83 % (ref 32–75)
NRBC # BLD: 0 K/UL (ref 0–0.01)
NRBC BLD-RTO: 0 PER 100 WBC
PLATELET # BLD AUTO: 270 K/UL (ref 150–400)
PMV BLD AUTO: 9.5 FL (ref 8.9–12.9)
POTASSIUM SERPL-SCNC: 4.8 MMOL/L (ref 3.5–5.1)
PROT SERPL-MCNC: 5.8 G/DL (ref 6.4–8.2)
RBC # BLD AUTO: 3.04 M/UL (ref 4.1–5.7)
SODIUM SERPL-SCNC: 137 MMOL/L (ref 136–145)
WBC # BLD AUTO: 11.9 K/UL (ref 4.1–11.1)

## 2024-03-12 PROCEDURE — 6370000000 HC RX 637 (ALT 250 FOR IP): Performed by: HOSPITALIST

## 2024-03-12 PROCEDURE — 85025 COMPLETE CBC W/AUTO DIFF WBC: CPT

## 2024-03-12 PROCEDURE — 6370000000 HC RX 637 (ALT 250 FOR IP): Performed by: NURSE PRACTITIONER

## 2024-03-12 PROCEDURE — 94640 AIRWAY INHALATION TREATMENT: CPT

## 2024-03-12 PROCEDURE — 2580000003 HC RX 258: Performed by: HOSPITALIST

## 2024-03-12 PROCEDURE — 94761 N-INVAS EAR/PLS OXIMETRY MLT: CPT

## 2024-03-12 PROCEDURE — 6370000000 HC RX 637 (ALT 250 FOR IP): Performed by: INTERNAL MEDICINE

## 2024-03-12 PROCEDURE — 36415 COLL VENOUS BLD VENIPUNCTURE: CPT

## 2024-03-12 PROCEDURE — 80053 COMPREHEN METABOLIC PANEL: CPT

## 2024-03-12 PROCEDURE — 6370000000 HC RX 637 (ALT 250 FOR IP): Performed by: STUDENT IN AN ORGANIZED HEALTH CARE EDUCATION/TRAINING PROGRAM

## 2024-03-12 PROCEDURE — 94668 MNPJ CHEST WALL SBSQ: CPT

## 2024-03-12 RX ORDER — GINSENG 100 MG
CAPSULE ORAL
Qty: 14.2 G | Refills: 0 | Status: SHIPPED
Start: 2024-03-12 | End: 2024-03-22

## 2024-03-12 RX ORDER — PANTOPRAZOLE SODIUM 40 MG/1
40 TABLET, DELAYED RELEASE ORAL
Qty: 30 TABLET | Refills: 1 | Status: SHIPPED
Start: 2024-03-13

## 2024-03-12 RX ORDER — BUDESONIDE AND FORMOTEROL FUMARATE DIHYDRATE 160; 4.5 UG/1; UG/1
2 AEROSOL RESPIRATORY (INHALATION)
Qty: 10.2 G | Refills: 1 | Status: SHIPPED
Start: 2024-03-12

## 2024-03-12 RX ORDER — BENZONATATE 100 MG/1
100 CAPSULE ORAL 3 TIMES DAILY PRN
Qty: 21 CAPSULE | Refills: 0 | Status: SHIPPED
Start: 2024-03-12 | End: 2024-03-19

## 2024-03-12 RX ORDER — GUAIFENESIN 600 MG/1
600 TABLET, EXTENDED RELEASE ORAL 2 TIMES DAILY
Qty: 10 TABLET | Refills: 0 | Status: SHIPPED
Start: 2024-03-12

## 2024-03-12 RX ORDER — AMLODIPINE BESYLATE 5 MG/1
5 TABLET ORAL DAILY
Qty: 30 TABLET | Refills: 1 | Status: SHIPPED
Start: 2024-03-13

## 2024-03-12 RX ORDER — PREDNISONE 20 MG/1
20 TABLET ORAL 2 TIMES DAILY
Qty: 10 TABLET | Refills: 0 | Status: SHIPPED
Start: 2024-03-12 | End: 2024-03-17

## 2024-03-12 RX ADMIN — AMLODIPINE BESYLATE 5 MG: 5 TABLET ORAL at 08:10

## 2024-03-12 RX ADMIN — FINASTERIDE 5 MG: 5 TABLET, FILM COATED ORAL at 08:02

## 2024-03-12 RX ADMIN — GUAIFENESIN 600 MG: 600 TABLET, EXTENDED RELEASE ORAL at 08:02

## 2024-03-12 RX ADMIN — PANTOPRAZOLE SODIUM 40 MG: 40 TABLET, DELAYED RELEASE ORAL at 08:02

## 2024-03-12 RX ADMIN — ASPIRIN 81 MG: 81 TABLET, COATED ORAL at 08:02

## 2024-03-12 RX ADMIN — THIAMINE HCL TAB 100 MG 100 MG: 100 TAB at 08:02

## 2024-03-12 RX ADMIN — FOLIC ACID 1 MG: 1 TABLET ORAL at 08:02

## 2024-03-12 RX ADMIN — PREDNISONE 20 MG: 5 TABLET ORAL at 08:01

## 2024-03-12 RX ADMIN — IPRATROPIUM BROMIDE AND ALBUTEROL SULFATE 1 DOSE: .5; 3 SOLUTION RESPIRATORY (INHALATION) at 07:48

## 2024-03-12 RX ADMIN — METOPROLOL TARTRATE 25 MG: 25 TABLET, FILM COATED ORAL at 08:10

## 2024-03-12 RX ADMIN — FERROUS SULFATE TAB 325 MG (65 MG ELEMENTAL FE) 325 MG: 325 (65 FE) TAB at 08:02

## 2024-03-12 RX ADMIN — SODIUM CHLORIDE, PRESERVATIVE FREE 10 ML: 5 INJECTION INTRAVENOUS at 08:14

## 2024-03-12 RX ADMIN — Medication 2 PUFF: at 07:48

## 2024-03-12 RX ADMIN — IPRATROPIUM BROMIDE AND ALBUTEROL SULFATE 1 DOSE: .5; 3 SOLUTION RESPIRATORY (INHALATION) at 13:22

## 2024-03-12 NOTE — CARE COORDINATION
CM has reviewed pt chart    DCP: [facility] auth approved 3/11    0820: CM sent message to [facility] asking how long is auth good for, awaiting reply    0856: CM received message auth good throu 3/13    1048: Pt transport set for 1400.    Transition of Care Plan:    RUR: 24%  Prior Level of Functioning: home self care  Disposition: [facility] for SNF  If SNF or IPR: Date FOC offered: 3/7  Date FOC received: 3/7  Accepting facility: [facility]  Date authorization started with reference number: 3/11 REF # [id number]   Date authorization received and expires: 3/11  Follow up appointments: unit secretary to setup as needed  DME needed: none  Transportation at discharge: [facility] 1400 pickup time  IM/IMM Medicare/ letter given: yes  Is patient a [place] and connected with [facility]? no  If yes, was [place] transfer form completed and [facility] notified? N/a  Caregiver Contact: sister and daughter  Discharge Caregiver contacted prior to discharge? yes  Care Conference needed? no  Barriers to discharge: none    Primary nurse can call report to [phone], pt going to room 110B Unit 1.

## 2024-03-12 NOTE — PROGRESS NOTES
PULMONARY NOTE  [facility]    Name: Agustín Willis MRN: [id number]   : 1945 Hospital: [facility]   Date: 3/12/2024  Admission date: 3/3/2024 Hospital Day: 10       HPI:     Patient Active Problem List   Diagnosis    Weakness of both legs    Insomnia    Shoulder pain    Low back pain    Vasovagal syncope    Tinnitus of right ear    Bronchitis    Alcohol abuse    Malignant hypertensive heart disease without heart failure    Pain in left knee    Dizziness and giddiness    Osteoarthritis of knee    Diarrhea    Hypercholesterolemia    Ankle pain    Pain in breast    Prediabetes    Sleep apnea    Seasonal allergic rhinitis    Chronic obstructive lung disease (HCC)    Cocaine abuse (HCC)    Kidney disease    Benign prostatic disease    Benign prostatic hyperplasia    Weakness of both arms    Lumbar herniated disc    Cervical radiculopathy    Hypertensive disorder    Thrombocytopenia (HCC)    Macrocytic anemia    Chronic renal disease, stage III (HCC)    Weakness generalized    Bilateral lower extremity edema    Syncope and collapse    SARTHAK on CPAP    Unsteady gait when walking    Ankle edema, bilateral    Drug abuse counseling and surveillance of drug abuser    Alcohol use, unspecified with intoxication, uncomplicated (HCC)    Thrombocytopenia, unspecified (HCC)    Stage 3a chronic kidney disease (HCC)    Heart murmur, systolic    Atrial fibrillation (HCC)    Cardiac arrhythmia    Persistent cough    Non-seasonal allergic rhinitis    Edema, lower extremity    Encounter for immunization    FIDEL (acute kidney injury) (HCC)    Altered mental status    Gross hematuria             [x] High complexity decision making was performed  [x] See my orders for details      Subjective/Initial History:     I was asked by Guillermo Martinez MD to see Agustín Willis  a 78 y.o.   male in consultation     Excerpts from admission 3/3/2024 or consult notes as follows:   78-year-old male came in  Insomnia, Kidney disease, Low back pain, Osteoarthritis of knee, Sleep apnea, and Vasovagal syncope.  PSH:   has no past surgical history on file.   FHX: family history includes Hypertension in his mother.   SHX:  reports that he has quit smoking. He has never used smokeless tobacco. He reports current alcohol use. He reports current drug use. Frequency: 3.00 times per week. Drug: Cocaine.     ROS:    Unable to obtain       Objective:     Vital Signs: Telemetry:    normal sinus rhythm Intake/Output:   BP (!) 142/91   Pulse (!) 113   Temp 97.3 °F (36.3 °C) (Oral)   Resp 20   Ht 1.854 m (6' 1\")   Wt 86.2 kg (190 lb)   SpO2 98%   BMI 25.07 kg/m²     Temp (24hrs), Av.9 °F (36.6 °C), Min:97.3 °F (36.3 °C), Max:98.3 °F (36.8 °C)          O2 Device: None (Room air)       Wt Readings from Last 4 Encounters:   24 86.2 kg (190 lb)   24 86.4 kg (190 lb 7.6 oz)   24 88.5 kg (195 lb)   10/06/23 97.1 kg (214 lb)          Intake/Output Summary (Last 24 hours) at 3/12/2024 1031  Last data filed at 3/12/2024 0900  Gross per 24 hour   Intake 440 ml   Output --   Net 440 ml         Last shift:       07 -  190  In: 240 [P.O.:240]  Out: -   Last 3 shifts: 03/10 190 -  0700  In: 440 [P.O.:440]  Out: 1300 [Urine:1300]       Physical Exam:     Physical Exam  Constitutional:       Comments: Not oriented to time and place   HENT:      Head: Normocephalic and atraumatic.      Nose: Nose normal.      Mouth/Throat:      Mouth: Mucous membranes are moist.   Eyes:      Pupils: Pupils are equal, round, and reactive to light.   Cardiovascular:      Rate and Rhythm: Normal rate and regular rhythm.      Pulses: Normal pulses.      Heart sounds: Normal heart sounds.   Pulmonary:      Effort: Pulmonary effort is normal.   Abdominal:      General: Abdomen is flat. Bowel sounds are normal.      Palpations: Abdomen is soft.   Musculoskeletal:         General: Normal range of motion.      Cervical back:

## 2024-03-12 NOTE — PROGRESS NOTES
Report called to RONALDO Calero at [place]. All questions and concerns were answered at this time. Pts IV removed for discharge. Pt transported with personal belongings.

## 2024-03-12 NOTE — DISCHARGE SUMMARY
Discharge Summary    Name: Agustín Willis  [id number]  YOB: 1945 (Age: 78 y.o.)   Date of Admission: 3/3/2024  Date of Discharge: 3/12/2024  Attending Physician: Bladimir Montague MD    Discharge Diagnosis:   Principal Problem:    Altered mental status  Active Problems:    Gross hematuria  BPH  COPD  Hypertension  Hyperlipidemia  Iron deficiency anemia  Cocaine abuse  Acute respiratory failure with hypoxia  Resolved Problems:   AMS        Consultations:  IP WOUND CARE NURSE CONSULT TO EVAL  IP CONSULT TO UROLOGY  IP CONSULT TO UROLOGY  IP CONSULT TO PULMONOLOGY  IP WOUND CARE NURSE CONSULT TO EVAL      Brief Admission HistoryBrief Hospital Course by Main Problems:   Agustín Willis is  78 y.o. male with benign prostatic disease, cervical radiculopathy, COPD, cocaine abuse, HLD, HTN, osteoarthritis, seasonal allergies, and SARTHAK who was admitted to the hospital with a chief complaint of an AMS. Patient was recently admitted to [facility] on February 5, with the same presentation. Patient was treated and discharged on 15 February to [facility]. During his last admission APS was involved due to social and financial issues that were concerning.    Patient was discharged from the [facility] a few days ago. Patient was apparently staying in a motel/hotel with a family member as they did not having a stable housing option. The family member went to run errands and when she returned he was on lying on the floor unresponsive.  EMS was called and the patient was transported to the ED. In the ED, the patient  was hemodynamically stable  with no signs of  respiratory distress. Noticed to have an injury on the anterior left thigh. Patient passing clots with urination, urology consult requested. 400 cc of bloody urine noted in the canister.  Was recommended further dilation and inpatient cystoscopy however patient declined, may follow-up with urology as  coordinating this discharge (includes going over instructions, follow-up, prescriptions, and preparing report for sign off to her PCP) :  35 minutes

## 2024-03-12 NOTE — DISCHARGE INSTR - COC
Continuity of Care Form    Patient Name: Agustín Willis   :  1945  MRN:  [id number]    Admit date:  3/3/2024  Discharge date:  3/12/2024      Code Status Order: Full Code   Advance Directives:     Admitting Physician:  Guillermo Martinez MD  PCP: Gerry Taylor MD    Discharging Nurse: Sarita Ford  Discharging Hospital Unit/Room#: 564/02  Discharging Unit Phone Number: [phone]      Emergency Contact:   Extended Emergency Contact Information  Primary Emergency Contact: Obdulio Cho  Home Phone: [phone]  Mobile Phone: [phone]  Relation: Brother/Sister  Secondary Emergency Contact: IsraelNichole  Mobile Phone: [phone]  Relation: Niece/Nephew   needed? No    Past Surgical History:  History reviewed. No pertinent surgical history.    Immunization History:   Immunization History   Administered Date(s) Administered    COVID-19, MODERNA BLUE border, Primary or Immunocompromised, (age 12y+), IM, 100 mcg/0.5mL 2021, 2021    Influenza Virus Vaccine 2016, 10/26/2019, 10/30/2019    Influenza, FLUAD, (age 65 y+), Adjuvanted, 0.5mL 2021, 10/06/2023    Influenza, FLUCELVAX, (age 6 mo+), MDCK, MDV, 0.5mL 2020    Influenza, High Dose (Fluzone 65 yrs and older) 2018    PPD Test 2023       Active Problems:  Patient Active Problem List   Diagnosis Code    Weakness of both legs R29.898    Insomnia G47.00    Shoulder pain M25.519    Low back pain M54.50    Vasovagal syncope R55    Tinnitus of right ear H93.11    Bronchitis J40    Alcohol abuse F10.10    Malignant hypertensive heart disease without heart failure I11.9    Pain in left knee M25.562    Dizziness and giddiness R42    Osteoarthritis of knee M17.9    Diarrhea R19.7    Hypercholesterolemia E78.00    Ankle pain M25.579    Pain in breast N64.4    Prediabetes R73.03    Sleep apnea G47.30    Seasonal allergic rhinitis J30.2    Chronic obstructive lung disease (HCC) J44.9    Cocaine abuse (HCC) F14.10

## 2024-03-15 NOTE — PROGRESS NOTES
Physician Progress Note      PATIENT:               YOSVANY FITCH  [facility] #:                  [id number]  :                       1945  ADMIT DATE:       3/3/2024 12:20 PM  DISCH DATE:        3/12/2024 3:33 PM  RESPONDING  PROVIDER #:        STEW ADAIR          QUERY TEXT:    Patient admitted with AMS.  Noted documentation of Acute Respiratory failure   as an active hospital problem in the DS only. ?Please document in progress   notes the clinical indicators to support this diagnosis on current admission   or document if this diagnosis of Acute Respiratory failure (is PMH only or has   been ruled out after study).?Please update active hospital problems   appropriately to reflect response.    The medical record reflects the following:  Risk Factors: Encephalopathy, PNA, COPD.    Clinical Indicators:  DS: Discharge Diagnosis:  Principal Problem:  Altered mental status  Active Problems:  Gross hematuria  BPH  COPD  Hypertension  Hyperlipidemia  Iron deficiency anemia  Cocaine abuse  Acute respiratory failure with hypoxia    Treatment: Pulse Ox monitoring.  .  Thank you,  HENRIETTA Basilio, RN, CRCR, CDI Specialist  [email or web address]  Can also be reached on [employer].  Options provided:  -- Acute Respiratory failure has been ruled out after study  -- Acute Respiratory failure is currently being treated/evaluated as evidenced   by, Please document clinical support.  -- Acute Respiratory failure is PMH only  -- Other - I will add my own diagnosis  -- Disagree - Not applicable / Not valid  -- Disagree - Clinically unable to determine / Unknown  -- Refer to Clinical Documentation Reviewer    PROVIDER RESPONSE TEXT:    Acute Respiratory failure has been ruled out after study.    Query created by: Beverly Vaughn on 3/13/2024 3:49 PM      Electronically signed by:  STEW ADAIR 3/15/2024 5:03 PM

## 2024-05-24 ENCOUNTER — APPOINTMENT (OUTPATIENT)
Facility: HOSPITAL | Age: 79
End: 2024-05-24
Payer: MEDICARE

## 2024-05-24 ENCOUNTER — HOSPITAL ENCOUNTER (EMERGENCY)
Facility: HOSPITAL | Age: 79
Discharge: NURSING, REHAB OR LONG-TERM CARE | End: 2024-05-24
Attending: STUDENT IN AN ORGANIZED HEALTH CARE EDUCATION/TRAINING PROGRAM
Payer: MEDICARE

## 2024-05-24 VITALS
OXYGEN SATURATION: 98 % | RESPIRATION RATE: 14 BRPM | SYSTOLIC BLOOD PRESSURE: 115 MMHG | HEIGHT: 68 IN | WEIGHT: 180 LBS | BODY MASS INDEX: 27.28 KG/M2 | HEART RATE: 58 BPM | TEMPERATURE: 97.5 F | DIASTOLIC BLOOD PRESSURE: 98 MMHG

## 2024-05-24 DIAGNOSIS — S09.90XA CLOSED HEAD INJURY, INITIAL ENCOUNTER: Primary | ICD-10-CM

## 2024-05-24 DIAGNOSIS — S01.01XA LACERATION OF SCALP, INITIAL ENCOUNTER: ICD-10-CM

## 2024-05-24 DIAGNOSIS — S00.83XA TRAUMATIC HEMATOMA OF FOREHEAD, INITIAL ENCOUNTER: ICD-10-CM

## 2024-05-24 PROCEDURE — 72125 CT NECK SPINE W/O DYE: CPT

## 2024-05-24 PROCEDURE — 99284 EMERGENCY DEPT VISIT MOD MDM: CPT

## 2024-05-24 PROCEDURE — 12001 RPR S/N/AX/GEN/TRNK 2.5CM/<: CPT

## 2024-05-24 PROCEDURE — 70450 CT HEAD/BRAIN W/O DYE: CPT

## 2024-05-24 ASSESSMENT — PAIN - FUNCTIONAL ASSESSMENT: PAIN_FUNCTIONAL_ASSESSMENT: NONE - DENIES PAIN

## 2024-05-24 ASSESSMENT — PAIN SCALES - GENERAL
PAINLEVEL_OUTOF10: 0
PAINLEVEL_OUTOF10: 7

## 2024-05-24 ASSESSMENT — PAIN DESCRIPTION - LOCATION: LOCATION: HEAD

## 2024-05-24 NOTE — ED TRIAGE NOTES
Pt arrives via EMS from [facility] home after falling out of bed this morning. Pt has laceration to left forehead. Unknown whether he takes blood thinner, he denies LOC.

## 2024-05-24 NOTE — ED PROVIDER NOTES
by: Ivan Yen MD  Procedures     PROCEDURE: Laceration Repair    Performed by: Ivan Yen MD  Date: 5/24/2024    - Consent obtained from patient prior to the procedure.     An appropriate time out was taken documenting proper procedure, location, and patient. My hands were washed immediately prior to the procedure. I wore sterile gloves throughout the procedure.    Laceration repair:  Location: forehead  Length before repair: 2cm  Shape: linear  Layered repair?: no  Heavy contamination?: no  Required debridement?: no  Debris required substantial irrigation?: no  Laceration is hemostatic  Neurovascular exam intact  Anesthesia achieved via NONE. Amount of anesthetic use:NONE  Wound is irrigated thoroughly with sterile saline  Laceration repaired using dermabond  Number of sutures or staples: none  Laceration length after repair: none    Patient tolerated procedure without any complications. Wound is hemostatic and margins are well approximated. No signs of neurovascular compromise on repeat exam post procedure.    The patient presents with a laceration(s) as described above that was managed as described above. On exam after the procedure, there was no definitive evidence for associated complications such as tendon, nerve, or arterial compromise. The possibility of residual foreign body was discussed with patient despite irrigation. Instructions were given to return with any increasing discharge, extending erythema, fever, nausea/vomiting, or any other changes. The likelihood of scarring was discussed. I ensured understanding that all lacerations will leave a varying degree of scarring and optimal outcome/cosmetic appearance can never be guaranteed.     Instructions were given to return/call with any perceived weakness or numbness/tingling that persists more than 1 day and return to the ER or call with any questions concerning new or worsening symptoms. More in-depth discharge instructions regarding follow up were

## 2024-05-24 NOTE — ED NOTES
Pt up for discharge, return to [facility].    Writer called report to Nataliya, Pending Transportation.

## 2024-05-24 NOTE — DISCHARGE INSTRUCTIONS
Thank you!    Thank you for allowing me to care for you in the emergency department.  I sincerely hope that you are satisfied with your visit today.  It is my goal to provide you with excellent care.    Below you will find a list of your labs and imaging from your visit today if applicable. Should you have any questions regarding these results please do not hesitate to call the emergency department. Please review [email or web address] for a more detailed result list since the below list may not be comprehensive. Instructions on how to sign up to [email or web address] should be provided in this packet.    Labs -  No results found for this or any previous visit (from the past 12 hour(s)).    Radiologic Studies -   CT HEAD WO CONTRAST   Final Result      No acute intracranial abnormality.         CT CERVICAL SPINE WO CONTRAST   Final Result   No acute abnormality. Stable multilevel degenerative changes.             If you feel that you have not received excellent quality care or timely care, please ask to speak to the nurse manager. Please choose us in the future for your continued health care needs.   ------------------------------------------------------------------------------------------------------------  The exam and treatment you received in the Emergency Department were for an urgent problem and are not intended as complete care. It is important that you follow-up with a doctor, nurse practitioner, or physician assistant to:  (1) confirm your diagnosis,  (2) re-evaluation of changes in your illness and treatment, and  (3) for ongoing care.  If your symptoms become worse or you do not improve as expected and you are unable to reach your usual health care provider, you should return to the Emergency Department. We are available 24 hours a day.     Please take your discharge instructions with you when you go to your follow-up appointment.     If a prescription has been provided, please have it filled as soon as possible to prevent a delay

## 2024-05-29 ENCOUNTER — APPOINTMENT (OUTPATIENT)
Facility: HOSPITAL | Age: 79
DRG: 870 | End: 2024-05-29
Payer: MEDICARE

## 2024-05-29 ENCOUNTER — HOSPITAL ENCOUNTER (INPATIENT)
Facility: HOSPITAL | Age: 79
LOS: 27 days | Discharge: HOSPICE | DRG: 870 | End: 2024-06-25
Attending: STUDENT IN AN ORGANIZED HEALTH CARE EDUCATION/TRAINING PROGRAM | Admitting: FAMILY MEDICINE
Payer: MEDICARE

## 2024-05-29 DIAGNOSIS — J96.01 ACUTE RESPIRATORY FAILURE WITH HYPOXIA (HCC): ICD-10-CM

## 2024-05-29 DIAGNOSIS — R06.03 RESPIRATORY DISTRESS: ICD-10-CM

## 2024-05-29 DIAGNOSIS — I49.9 CARDIAC ARRHYTHMIA, UNSPECIFIED CARDIAC ARRHYTHMIA TYPE: ICD-10-CM

## 2024-05-29 DIAGNOSIS — R06.02 SHORTNESS OF BREATH: ICD-10-CM

## 2024-05-29 DIAGNOSIS — R07.9 CHEST PAIN, UNSPECIFIED TYPE: ICD-10-CM

## 2024-05-29 DIAGNOSIS — I46.9 CARDIAC ARREST (HCC): ICD-10-CM

## 2024-05-29 PROBLEM — J96.00 ACUTE RESPIRATORY FAILURE (HCC): Status: ACTIVE | Noted: 2024-05-29

## 2024-05-29 LAB
ALBUMIN SERPL-MCNC: 2.2 G/DL (ref 3.5–5)
ALBUMIN/GLOB SERPL: 0.7 (ref 1.1–2.2)
ALP SERPL-CCNC: 58 U/L (ref 45–117)
ALT SERPL-CCNC: 11 U/L (ref 12–78)
AMPHET UR QL SCN: NEGATIVE
ANION GAP SERPL CALC-SCNC: 11 MMOL/L (ref 5–15)
ANION GAP SERPL CALC-SCNC: 9 MMOL/L (ref 5–15)
APPEARANCE UR: ABNORMAL
AST SERPL W P-5'-P-CCNC: 18 U/L (ref 15–37)
BACTERIA URNS QL MICRO: NEGATIVE /HPF
BARBITURATES UR QL SCN: NEGATIVE
BASE DEFICIT BLD-SCNC: 14.4 MMOL/L
BASE DEFICIT BLD-SCNC: 6.1 MMOL/L
BASOPHILS # BLD: 0 K/UL (ref 0–0.1)
BASOPHILS NFR BLD: 0 % (ref 0–1)
BENZODIAZ UR QL: NEGATIVE
BILIRUB SERPL-MCNC: 0.3 MG/DL (ref 0.2–1)
BILIRUB UR QL: NEGATIVE
BNP SERPL-MCNC: 677 PG/ML
BUN SERPL-MCNC: 58 MG/DL (ref 6–20)
BUN SERPL-MCNC: 67 MG/DL (ref 6–20)
BUN/CREAT SERPL: 25 (ref 12–20)
BUN/CREAT SERPL: 28 (ref 12–20)
CA-I BLD-MCNC: 1.11 MMOL/L (ref 1.12–1.32)
CA-I BLD-MCNC: 7.2 MG/DL (ref 8.5–10.1)
CA-I BLD-MCNC: 8.5 MG/DL (ref 8.5–10.1)
CANNABINOIDS UR QL SCN: NEGATIVE
CHLORIDE BLD-SCNC: 109 MMOL/L (ref 98–107)
CHLORIDE SERPL-SCNC: 112 MMOL/L (ref 97–108)
CHLORIDE SERPL-SCNC: 114 MMOL/L (ref 97–108)
CO2 BLD-SCNC: 14 MMOL/L
CO2 SERPL-SCNC: 15 MMOL/L (ref 21–32)
CO2 SERPL-SCNC: 23 MMOL/L (ref 21–32)
COCAINE UR QL SCN: NEGATIVE
COLOR UR: ABNORMAL
CREAT SERPL-MCNC: 2.06 MG/DL (ref 0.7–1.3)
CREAT SERPL-MCNC: 2.67 MG/DL (ref 0.7–1.3)
CREAT UR-MCNC: 2.38 MG/DL (ref 0.6–1.3)
DIFFERENTIAL METHOD BLD: ABNORMAL
EOSINOPHIL # BLD: 0 K/UL (ref 0–0.4)
EOSINOPHIL NFR BLD: 0 % (ref 0–7)
EPITH CASTS URNS QL MICRO: ABNORMAL /LPF
ERYTHROCYTE [DISTWIDTH] IN BLOOD BY AUTOMATED COUNT: 14.1 % (ref 11.5–14.5)
GLOBULIN SER CALC-MCNC: 3.3 G/DL (ref 2–4)
GLUCOSE BLD STRIP.AUTO-MCNC: 133 MG/DL (ref 65–100)
GLUCOSE BLD STRIP.AUTO-MCNC: 273 MG/DL (ref 65–100)
GLUCOSE SERPL-MCNC: 113 MG/DL (ref 65–100)
GLUCOSE SERPL-MCNC: 219 MG/DL (ref 65–100)
GLUCOSE UR STRIP.AUTO-MCNC: NEGATIVE MG/DL
HCO3 BLD-SCNC: 14.2 MMOL/L (ref 19–28)
HCO3 BLD-SCNC: 21 MMOL/L (ref 19–28)
HCT VFR BLD AUTO: 37.1 % (ref 36.6–50.3)
HGB BLD-MCNC: 11.3 G/DL (ref 12.1–17)
HGB UR QL STRIP: ABNORMAL
HYALINE CASTS URNS QL MICRO: ABNORMAL /LPF (ref 0–5)
IMM GRANULOCYTES # BLD AUTO: 0 K/UL
IMM GRANULOCYTES NFR BLD AUTO: 0 %
KETONES UR QL STRIP.AUTO: NEGATIVE MG/DL
LACTATE BLD-SCNC: 6.39 MMOL/L (ref 0.4–2)
LACTATE BLD-SCNC: 6.58 MMOL/L (ref 0.4–2)
LEUKOCYTE ESTERASE UR QL STRIP.AUTO: NEGATIVE
LYMPHOCYTES # BLD: 0.6 K/UL (ref 0.8–3.5)
LYMPHOCYTES NFR BLD: 10 % (ref 12–49)
Lab: NORMAL
MCH RBC QN AUTO: 29 PG (ref 26–34)
MCHC RBC AUTO-ENTMCNC: 30.5 G/DL (ref 30–36.5)
MCV RBC AUTO: 95.4 FL (ref 80–99)
METAMYELOCYTES NFR BLD MANUAL: 3 %
METHADONE UR QL: NEGATIVE
MONOCYTES # BLD: 0.1 K/UL (ref 0–1)
MONOCYTES NFR BLD: 2 % (ref 5–13)
MUCOUS THREADS URNS QL MICRO: ABNORMAL /LPF
NEUTS BAND NFR BLD MANUAL: 11 % (ref 0–6)
NEUTS SEG # BLD: 5.3 K/UL (ref 1.8–8)
NEUTS SEG NFR BLD: 74 % (ref 32–75)
NITRITE UR QL STRIP.AUTO: NEGATIVE
NRBC # BLD: 0 K/UL (ref 0–0.01)
NRBC BLD-RTO: 0 PER 100 WBC
OPIATES UR QL: NEGATIVE
PCO2 BLD: 42.2 MMHG (ref 35–45)
PCO2 BLD: 46.8 MMHG (ref 35–45)
PCP UR QL: NEGATIVE
PERFORMED BY:: ABNORMAL
PH BLD: 7.14 (ref 7.35–7.45)
PH BLD: 7.26 (ref 7.35–7.45)
PH UR STRIP: 5 (ref 5–8)
PLATELET # BLD AUTO: 289 K/UL (ref 150–400)
PMV BLD AUTO: 9.8 FL (ref 8.9–12.9)
PO2 BLD: 49 MMHG (ref 75–100)
PO2 BLD: 63 MMHG (ref 75–100)
POTASSIUM BLD-SCNC: 6.2 MMOL/L (ref 3.5–5.5)
POTASSIUM SERPL-SCNC: 4.7 MMOL/L (ref 3.5–5.1)
POTASSIUM SERPL-SCNC: 5.3 MMOL/L (ref 3.5–5.1)
PROCALCITONIN SERPL-MCNC: 0.22 NG/ML
PROT SERPL-MCNC: 5.5 G/DL (ref 6.4–8.2)
PROT UR STRIP-MCNC: 30 MG/DL
RBC # BLD AUTO: 3.89 M/UL (ref 4.1–5.7)
RBC #/AREA URNS HPF: >100 /HPF (ref 0–5)
RBC MORPH BLD: ABNORMAL
SAO2 % BLD: 72 %
SAO2 % BLD: 88 %
SODIUM BLD-SCNC: 137 MMOL/L (ref 136–145)
SODIUM SERPL-SCNC: 140 MMOL/L (ref 136–145)
SODIUM SERPL-SCNC: 144 MMOL/L (ref 136–145)
SP GR UR REFRACTOMETRY: 1.02 (ref 1–1.03)
SPECIMEN SITE: ABNORMAL
SPECIMEN TYPE: ABNORMAL
TROPONIN I SERPL HS-MCNC: 15 NG/L (ref 0–76)
URINE CULTURE IF INDICATED: ABNORMAL
UROBILINOGEN UR QL STRIP.AUTO: 2 EU/DL (ref 0.1–1)
WBC # BLD AUTO: 6.2 K/UL (ref 4.1–11.1)
WBC URNS QL MICRO: ABNORMAL /HPF (ref 0–4)

## 2024-05-29 PROCEDURE — 89220 SPUTUM SPECIMEN COLLECTION: CPT

## 2024-05-29 PROCEDURE — 2500000003 HC RX 250 WO HCPCS: Performed by: INTERNAL MEDICINE

## 2024-05-29 PROCEDURE — 31500 INSERT EMERGENCY AIRWAY: CPT

## 2024-05-29 PROCEDURE — 02HV33Z INSERTION OF INFUSION DEVICE INTO SUPERIOR VENA CAVA, PERCUTANEOUS APPROACH: ICD-10-PCS | Performed by: STUDENT IN AN ORGANIZED HEALTH CARE EDUCATION/TRAINING PROGRAM

## 2024-05-29 PROCEDURE — 87070 CULTURE OTHR SPECIMN AEROBIC: CPT

## 2024-05-29 PROCEDURE — 96365 THER/PROPH/DIAG IV INF INIT: CPT

## 2024-05-29 PROCEDURE — 6370000000 HC RX 637 (ALT 250 FOR IP): Performed by: FAMILY MEDICINE

## 2024-05-29 PROCEDURE — 2000000000 HC ICU R&B

## 2024-05-29 PROCEDURE — 6360000002 HC RX W HCPCS: Performed by: INTERNAL MEDICINE

## 2024-05-29 PROCEDURE — 84484 ASSAY OF TROPONIN QUANT: CPT

## 2024-05-29 PROCEDURE — 85025 COMPLETE CBC W/AUTO DIFF WBC: CPT

## 2024-05-29 PROCEDURE — 82803 BLOOD GASES ANY COMBINATION: CPT

## 2024-05-29 PROCEDURE — 2580000003 HC RX 258: Performed by: INTERNAL MEDICINE

## 2024-05-29 PROCEDURE — 87086 URINE CULTURE/COLONY COUNT: CPT

## 2024-05-29 PROCEDURE — 80307 DRUG TEST PRSMV CHEM ANLYZR: CPT

## 2024-05-29 PROCEDURE — 82962 GLUCOSE BLOOD TEST: CPT

## 2024-05-29 PROCEDURE — 87641 MR-STAPH DNA AMP PROBE: CPT

## 2024-05-29 PROCEDURE — 6360000002 HC RX W HCPCS: Performed by: FAMILY MEDICINE

## 2024-05-29 PROCEDURE — 5A1955Z RESPIRATORY VENTILATION, GREATER THAN 96 CONSECUTIVE HOURS: ICD-10-PCS | Performed by: INTERNAL MEDICINE

## 2024-05-29 PROCEDURE — 82947 ASSAY GLUCOSE BLOOD QUANT: CPT

## 2024-05-29 PROCEDURE — 2700000000 HC OXYGEN THERAPY PER DAY

## 2024-05-29 PROCEDURE — 82330 ASSAY OF CALCIUM: CPT

## 2024-05-29 PROCEDURE — 87205 SMEAR GRAM STAIN: CPT

## 2024-05-29 PROCEDURE — 2580000003 HC RX 258: Performed by: FAMILY MEDICINE

## 2024-05-29 PROCEDURE — 2500000003 HC RX 250 WO HCPCS: Performed by: FAMILY MEDICINE

## 2024-05-29 PROCEDURE — 36556 INSERT NON-TUNNEL CV CATH: CPT

## 2024-05-29 PROCEDURE — 96375 TX/PRO/DX INJ NEW DRUG ADDON: CPT

## 2024-05-29 PROCEDURE — 87040 BLOOD CULTURE FOR BACTERIA: CPT

## 2024-05-29 PROCEDURE — 83880 ASSAY OF NATRIURETIC PEPTIDE: CPT

## 2024-05-29 PROCEDURE — 99291 CRITICAL CARE FIRST HOUR: CPT

## 2024-05-29 PROCEDURE — 6360000002 HC RX W HCPCS: Performed by: STUDENT IN AN ORGANIZED HEALTH CARE EDUCATION/TRAINING PROGRAM

## 2024-05-29 PROCEDURE — 3E033XZ INTRODUCTION OF VASOPRESSOR INTO PERIPHERAL VEIN, PERCUTANEOUS APPROACH: ICD-10-PCS | Performed by: INTERNAL MEDICINE

## 2024-05-29 PROCEDURE — 71045 X-RAY EXAM CHEST 1 VIEW: CPT

## 2024-05-29 PROCEDURE — 99223 1ST HOSP IP/OBS HIGH 75: CPT | Performed by: INTERNAL MEDICINE

## 2024-05-29 PROCEDURE — 2500000003 HC RX 250 WO HCPCS

## 2024-05-29 PROCEDURE — 6360000002 HC RX W HCPCS

## 2024-05-29 PROCEDURE — 83605 ASSAY OF LACTIC ACID: CPT

## 2024-05-29 PROCEDURE — 81001 URINALYSIS AUTO W/SCOPE: CPT

## 2024-05-29 PROCEDURE — 2500000003 HC RX 250 WO HCPCS: Performed by: STUDENT IN AN ORGANIZED HEALTH CARE EDUCATION/TRAINING PROGRAM

## 2024-05-29 PROCEDURE — 5A12012 PERFORMANCE OF CARDIAC OUTPUT, SINGLE, MANUAL: ICD-10-PCS | Performed by: STUDENT IN AN ORGANIZED HEALTH CARE EDUCATION/TRAINING PROGRAM

## 2024-05-29 PROCEDURE — 93005 ELECTROCARDIOGRAM TRACING: CPT | Performed by: STUDENT IN AN ORGANIZED HEALTH CARE EDUCATION/TRAINING PROGRAM

## 2024-05-29 PROCEDURE — 80048 BASIC METABOLIC PNL TOTAL CA: CPT

## 2024-05-29 PROCEDURE — 6370000000 HC RX 637 (ALT 250 FOR IP): Performed by: INTERNAL MEDICINE

## 2024-05-29 PROCEDURE — 0BH17EZ INSERTION OF ENDOTRACHEAL AIRWAY INTO TRACHEA, VIA NATURAL OR ARTIFICIAL OPENING: ICD-10-PCS | Performed by: STUDENT IN AN ORGANIZED HEALTH CARE EDUCATION/TRAINING PROGRAM

## 2024-05-29 PROCEDURE — 2580000003 HC RX 258: Performed by: STUDENT IN AN ORGANIZED HEALTH CARE EDUCATION/TRAINING PROGRAM

## 2024-05-29 PROCEDURE — 84145 PROCALCITONIN (PCT): CPT

## 2024-05-29 PROCEDURE — 80053 COMPREHEN METABOLIC PANEL: CPT

## 2024-05-29 PROCEDURE — 76770 US EXAM ABDO BACK WALL COMP: CPT

## 2024-05-29 PROCEDURE — 99285 EMERGENCY DEPT VISIT HI MDM: CPT

## 2024-05-29 PROCEDURE — 94002 VENT MGMT INPAT INIT DAY: CPT

## 2024-05-29 PROCEDURE — 84295 ASSAY OF SERUM SODIUM: CPT

## 2024-05-29 PROCEDURE — 84132 ASSAY OF SERUM POTASSIUM: CPT

## 2024-05-29 RX ORDER — FENTANYL CITRATE-0.9 % NACL/PF 10 MCG/ML
25-200 PLASTIC BAG, INJECTION (ML) INTRAVENOUS CONTINUOUS
Status: DISCONTINUED | OUTPATIENT
Start: 2024-05-29 | End: 2024-06-07

## 2024-05-29 RX ORDER — SODIUM CHLORIDE 9 MG/ML
INJECTION, SOLUTION INTRAVENOUS CONTINUOUS
Status: DISCONTINUED | OUTPATIENT
Start: 2024-05-29 | End: 2024-05-29

## 2024-05-29 RX ORDER — DEXTROSE MONOHYDRATE 100 MG/ML
INJECTION, SOLUTION INTRAVENOUS CONTINUOUS PRN
Status: DISCONTINUED | OUTPATIENT
Start: 2024-05-29 | End: 2024-06-23

## 2024-05-29 RX ORDER — POLYETHYLENE GLYCOL 3350 17 G/17G
17 POWDER, FOR SOLUTION ORAL DAILY PRN
Status: DISCONTINUED | OUTPATIENT
Start: 2024-05-29 | End: 2024-06-25 | Stop reason: HOSPADM

## 2024-05-29 RX ORDER — PHENYLEPHRINE HCL IN 0.9% NACL 1 MG/10 ML
SYRINGE (ML) INTRAVENOUS
Status: COMPLETED
Start: 2024-05-29 | End: 2024-05-29

## 2024-05-29 RX ORDER — HEPARIN SODIUM 5000 [USP'U]/ML
5000 INJECTION, SOLUTION INTRAVENOUS; SUBCUTANEOUS EVERY 8 HOURS SCHEDULED
Status: DISCONTINUED | OUTPATIENT
Start: 2024-05-29 | End: 2024-05-31

## 2024-05-29 RX ORDER — DEXMEDETOMIDINE HYDROCHLORIDE 4 UG/ML
INJECTION, SOLUTION INTRAVENOUS
Status: DISPENSED
Start: 2024-05-29 | End: 2024-05-30

## 2024-05-29 RX ORDER — SODIUM BICARBONATE 650 MG/1
325 TABLET ORAL 4 TIMES DAILY
Status: DISCONTINUED | OUTPATIENT
Start: 2024-05-29 | End: 2024-06-02

## 2024-05-29 RX ORDER — DEXMEDETOMIDINE HYDROCHLORIDE 4 UG/ML
.1-1.5 INJECTION, SOLUTION INTRAVENOUS CONTINUOUS
Status: DISCONTINUED | OUTPATIENT
Start: 2024-05-29 | End: 2024-06-07

## 2024-05-29 RX ORDER — ROCURONIUM BROMIDE 10 MG/ML
100 INJECTION, SOLUTION INTRAVENOUS ONCE
Status: COMPLETED | OUTPATIENT
Start: 2024-05-29 | End: 2024-05-29

## 2024-05-29 RX ORDER — GAUZE BANDAGE 2" X 2"
100 BANDAGE TOPICAL DAILY
Status: DISCONTINUED | OUTPATIENT
Start: 2024-05-29 | End: 2024-06-23

## 2024-05-29 RX ORDER — SODIUM CHLORIDE 0.9 % (FLUSH) 0.9 %
5-40 SYRINGE (ML) INJECTION EVERY 12 HOURS SCHEDULED
Status: DISCONTINUED | OUTPATIENT
Start: 2024-05-29 | End: 2024-06-23

## 2024-05-29 RX ORDER — IPRATROPIUM BROMIDE AND ALBUTEROL SULFATE 2.5; .5 MG/3ML; MG/3ML
1 SOLUTION RESPIRATORY (INHALATION)
Status: DISCONTINUED | OUTPATIENT
Start: 2024-05-30 | End: 2024-06-05

## 2024-05-29 RX ORDER — FERROUS SULFATE 325(65) MG
325 TABLET ORAL
Status: DISCONTINUED | OUTPATIENT
Start: 2024-05-30 | End: 2024-06-22

## 2024-05-29 RX ORDER — 0.9 % SODIUM CHLORIDE 0.9 %
INTRAVENOUS SOLUTION INTRAVENOUS CONTINUOUS PRN
Status: COMPLETED | OUTPATIENT
Start: 2024-05-29 | End: 2024-05-29

## 2024-05-29 RX ORDER — METHYLPREDNISOLONE SODIUM SUCCINATE 40 MG/ML
40 INJECTION, POWDER, LYOPHILIZED, FOR SOLUTION INTRAMUSCULAR; INTRAVENOUS EVERY 8 HOURS
Status: DISCONTINUED | OUTPATIENT
Start: 2024-05-29 | End: 2024-06-02

## 2024-05-29 RX ORDER — EPINEPHRINE IN SOD CHLOR,ISO 1 MG/10 ML
SYRINGE (ML) INTRAVENOUS DAILY PRN
Status: COMPLETED | OUTPATIENT
Start: 2024-05-29 | End: 2024-05-29

## 2024-05-29 RX ORDER — IPRATROPIUM BROMIDE AND ALBUTEROL SULFATE 2.5; .5 MG/3ML; MG/3ML
1 SOLUTION RESPIRATORY (INHALATION)
Status: DISCONTINUED | OUTPATIENT
Start: 2024-05-29 | End: 2024-05-29

## 2024-05-29 RX ORDER — FINASTERIDE 5 MG/1
5 TABLET, FILM COATED ORAL DAILY
Status: DISCONTINUED | OUTPATIENT
Start: 2024-05-29 | End: 2024-06-23

## 2024-05-29 RX ORDER — CALCIUM CHLORIDE 100 MG/ML
INJECTION INTRAVENOUS; INTRAVENTRICULAR DAILY PRN
Status: COMPLETED | OUTPATIENT
Start: 2024-05-29 | End: 2024-05-29

## 2024-05-29 RX ORDER — BENZONATATE 100 MG/1
100 CAPSULE ORAL 3 TIMES DAILY PRN
Status: ON HOLD | COMMUNITY
End: 2024-07-01 | Stop reason: HOSPADM

## 2024-05-29 RX ORDER — SODIUM CHLORIDE 0.9 % (FLUSH) 0.9 %
5-40 SYRINGE (ML) INJECTION PRN
Status: DISCONTINUED | OUTPATIENT
Start: 2024-05-29 | End: 2024-06-25 | Stop reason: HOSPADM

## 2024-05-29 RX ORDER — MAGNESIUM SULFATE HEPTAHYDRATE 500 MG/ML
INJECTION, SOLUTION INTRAMUSCULAR; INTRAVENOUS DAILY PRN
Status: COMPLETED | OUTPATIENT
Start: 2024-05-29 | End: 2024-05-29

## 2024-05-29 RX ORDER — NOREPINEPHRINE BITARTRATE 0.06 MG/ML
1-100 INJECTION, SOLUTION INTRAVENOUS CONTINUOUS
Status: DISCONTINUED | OUTPATIENT
Start: 2024-05-29 | End: 2024-06-07

## 2024-05-29 RX ORDER — SODIUM CHLORIDE 9 MG/ML
INJECTION, SOLUTION INTRAVENOUS PRN
Status: DISCONTINUED | OUTPATIENT
Start: 2024-05-29 | End: 2024-06-23

## 2024-05-29 RX ORDER — ONDANSETRON 2 MG/ML
4 INJECTION INTRAMUSCULAR; INTRAVENOUS EVERY 6 HOURS PRN
Status: DISCONTINUED | OUTPATIENT
Start: 2024-05-29 | End: 2024-06-25 | Stop reason: HOSPADM

## 2024-05-29 RX ORDER — ETOMIDATE 2 MG/ML
20 INJECTION INTRAVENOUS
Status: COMPLETED | OUTPATIENT
Start: 2024-05-29 | End: 2024-05-29

## 2024-05-29 RX ORDER — FOLIC ACID 1 MG/1
1 TABLET ORAL DAILY
Status: DISCONTINUED | OUTPATIENT
Start: 2024-05-29 | End: 2024-06-22

## 2024-05-29 RX ORDER — ASPIRIN 81 MG/1
81 TABLET ORAL DAILY
Status: DISCONTINUED | OUTPATIENT
Start: 2024-05-29 | End: 2024-06-14

## 2024-05-29 RX ORDER — SODIUM CHLORIDE 450 MG/100ML
INJECTION, SOLUTION INTRAVENOUS CONTINUOUS
Status: DISCONTINUED | OUTPATIENT
Start: 2024-05-29 | End: 2024-05-29 | Stop reason: ALTCHOICE

## 2024-05-29 RX ORDER — ACETAMINOPHEN 650 MG/1
650 SUPPOSITORY RECTAL EVERY 6 HOURS PRN
Status: DISCONTINUED | OUTPATIENT
Start: 2024-05-29 | End: 2024-06-25 | Stop reason: HOSPADM

## 2024-05-29 RX ORDER — 0.9 % SODIUM CHLORIDE 0.9 %
30 INTRAVENOUS SOLUTION INTRAVENOUS ONCE
Status: DISCONTINUED | OUTPATIENT
Start: 2024-05-29 | End: 2024-06-25 | Stop reason: HOSPADM

## 2024-05-29 RX ORDER — ACETAMINOPHEN 325 MG/1
650 TABLET ORAL EVERY 6 HOURS PRN
Status: DISCONTINUED | OUTPATIENT
Start: 2024-05-29 | End: 2024-06-25 | Stop reason: HOSPADM

## 2024-05-29 RX ORDER — AMIODARONE HYDROCHLORIDE 150 MG/3ML
INJECTION, SOLUTION INTRAVENOUS DAILY PRN
Status: COMPLETED | OUTPATIENT
Start: 2024-05-29 | End: 2024-05-29

## 2024-05-29 RX ORDER — OMEPRAZOLE 20 MG/1
20 CAPSULE, DELAYED RELEASE ORAL DAILY
Status: ON HOLD | COMMUNITY
End: 2024-07-01 | Stop reason: HOSPADM

## 2024-05-29 RX ORDER — GLUCAGON 1 MG/ML
1 KIT INJECTION PRN
Status: DISCONTINUED | OUTPATIENT
Start: 2024-05-29 | End: 2024-06-23

## 2024-05-29 RX ORDER — ONDANSETRON 4 MG/1
4 TABLET, ORALLY DISINTEGRATING ORAL EVERY 8 HOURS PRN
Status: DISCONTINUED | OUTPATIENT
Start: 2024-05-29 | End: 2024-06-25 | Stop reason: HOSPADM

## 2024-05-29 RX ORDER — NOREPINEPHRINE BITARTRATE 0.06 MG/ML
INJECTION, SOLUTION INTRAVENOUS
Status: COMPLETED
Start: 2024-05-29 | End: 2024-05-29

## 2024-05-29 RX ADMIN — MEROPENEM 1000 MG: 1 INJECTION, POWDER, FOR SOLUTION INTRAVENOUS at 18:11

## 2024-05-29 RX ADMIN — SODIUM ZIRCONIUM CYCLOSILICATE 10 G: 10 POWDER, FOR SUSPENSION ORAL at 18:12

## 2024-05-29 RX ADMIN — SODIUM CHLORIDE 1000 ML: 9 INJECTION, SOLUTION INTRAVENOUS at 13:20

## 2024-05-29 RX ADMIN — SODIUM BICARBONATE 325 MG: 650 TABLET ORAL at 17:23

## 2024-05-29 RX ADMIN — EPINEPHRINE 1 MG: 0.1 INJECTION INTRACARDIAC; INTRAVENOUS at 13:09

## 2024-05-29 RX ADMIN — FOLIC ACID 1 MG: 1 TABLET ORAL at 17:23

## 2024-05-29 RX ADMIN — Medication 25 MCG/MIN: at 13:00

## 2024-05-29 RX ADMIN — METHYLPREDNISOLONE SODIUM SUCCINATE 40 MG: 40 INJECTION INTRAMUSCULAR; INTRAVENOUS at 23:47

## 2024-05-29 RX ADMIN — DEXMEDETOMIDINE HYDROCHLORIDE 0.2 MCG/KG/HR: 400 INJECTION, SOLUTION INTRAVENOUS at 22:01

## 2024-05-29 RX ADMIN — NOREPINEPHRINE BITARTRATE 25 MCG/MIN: 0.06 INJECTION, SOLUTION INTRAVENOUS at 13:00

## 2024-05-29 RX ADMIN — SODIUM CHLORIDE, PRESERVATIVE FREE 10 ML: 5 INJECTION INTRAVENOUS at 20:13

## 2024-05-29 RX ADMIN — METHYLPREDNISOLONE SODIUM SUCCINATE 40 MG: 40 INJECTION INTRAMUSCULAR; INTRAVENOUS at 15:56

## 2024-05-29 RX ADMIN — ROCURONIUM BROMIDE 100 MG: 10 INJECTION, SOLUTION INTRAVENOUS at 12:56

## 2024-05-29 RX ADMIN — ASPIRIN 81 MG: 81 TABLET, COATED ORAL at 17:23

## 2024-05-29 RX ADMIN — HEPARIN SODIUM 5000 UNITS: 5000 INJECTION INTRAVENOUS; SUBCUTANEOUS at 21:17

## 2024-05-29 RX ADMIN — EPINEPHRINE 1 MG: 0.1 INJECTION INTRACARDIAC; INTRAVENOUS at 13:12

## 2024-05-29 RX ADMIN — SODIUM CHLORIDE 1000 ML: 9 INJECTION, SOLUTION INTRAVENOUS at 13:19

## 2024-05-29 RX ADMIN — AMIODARONE HYDROCHLORIDE 300 MG: 50 INJECTION, SOLUTION INTRAVENOUS at 13:13

## 2024-05-29 RX ADMIN — IPRATROPIUM BROMIDE AND ALBUTEROL SULFATE 1 DOSE: .5; 3 SOLUTION RESPIRATORY (INHALATION) at 19:43

## 2024-05-29 RX ADMIN — SODIUM BICARBONATE: 84 INJECTION, SOLUTION INTRAVENOUS at 18:32

## 2024-05-29 RX ADMIN — EPINEPHRINE 1 MG: 0.1 INJECTION INTRACARDIAC; INTRAVENOUS at 13:06

## 2024-05-29 RX ADMIN — Medication: at 12:56

## 2024-05-29 RX ADMIN — HEPARIN SODIUM 5000 UNITS: 5000 INJECTION INTRAVENOUS; SUBCUTANEOUS at 17:23

## 2024-05-29 RX ADMIN — THIAMINE HCL TAB 100 MG 100 MG: 100 TAB at 17:23

## 2024-05-29 RX ADMIN — MAGNESIUM SULFATE HEPTAHYDRATE 2000 MG: 500 INJECTION, SOLUTION INTRAMUSCULAR; INTRAVENOUS at 13:15

## 2024-05-29 RX ADMIN — VANCOMYCIN HYDROCHLORIDE 2000 MG: 1 INJECTION, POWDER, LYOPHILIZED, FOR SOLUTION INTRAVENOUS at 14:44

## 2024-05-29 RX ADMIN — SODIUM BICARBONATE: 84 INJECTION, SOLUTION INTRAVENOUS at 23:05

## 2024-05-29 RX ADMIN — Medication 25 MCG/MIN: at 13:19

## 2024-05-29 RX ADMIN — SODIUM BICARBONATE 50 MEQ: 84 INJECTION, SOLUTION INTRAVENOUS at 13:08

## 2024-05-29 RX ADMIN — CALCIUM CHLORIDE 1000 MG: 100 INJECTION, SOLUTION INTRAVENOUS at 13:09

## 2024-05-29 RX ADMIN — VASOPRESSIN 0.03 UNITS/MIN: 20 INJECTION, SOLUTION INTRAVENOUS at 14:39

## 2024-05-29 RX ADMIN — ETOMIDATE 20 MG: 2 INJECTION, SOLUTION INTRAVENOUS at 12:56

## 2024-05-29 RX ADMIN — PIPERACILLIN AND TAZOBACTAM 4500 MG: 4; .5 INJECTION, POWDER, LYOPHILIZED, FOR SOLUTION INTRAVENOUS at 13:35

## 2024-05-29 RX ADMIN — EPINEPHRINE 1 MG: 0.1 INJECTION INTRACARDIAC; INTRAVENOUS at 13:15

## 2024-05-29 RX ADMIN — SODIUM BICARBONATE 100 MEQ: 84 INJECTION, SOLUTION INTRAVENOUS at 17:23

## 2024-05-29 ASSESSMENT — PULMONARY FUNCTION TESTS
PIF_VALUE: 37
PIF_VALUE: 41
PIF_VALUE: 38
PIF_VALUE: 26

## 2024-05-29 NOTE — CONSULTS
Consult Note            Date:5/29/2024        Patient Name:Agustín Willis     YOB: 1945     Age:78 y.o.    Consults Infectious Disease    Chief Complaint     Chief Complaint   Patient presents with    Respiratory Distress      Sepsis    History Obtained From   reason patient could not give history:  altered mental status and on ventilator    History of Present Illness    This is an 78 year old male with COPD, CKD, brought from [facility] because of respiratory distress after vomiting spell.  He is afebrile with abnormal chest exam. WBC normal but with 11% bands and elevated procal as well as elevated lactic acid. No urinalysis. CXR showed right upper lobe airspace disease and mild bilateral interstitial opacities  which were felt to be unchanged.  Patient seen in the ED where he has been intubated and sedated, placed on vasopressors.  Past Medical History     Past Medical History:   Diagnosis Date    Benign prostatic disease 8/3/2020    Hypertrophy with Outflow Obstruction    Benign prostatic hyperplasia 8/3/2020    Cervical radiculopathy 8/3/2020    Chronic obstructive lung disease (HCC) 8/3/2020    Cocaine abuse (HCC) 8/3/2020    Dizziness and giddiness 8/3/2020    Hypercholesterolemia 8/3/2020    Hypertensive disorder 8/3/2020    Insomnia 8/3/2020    Kidney disease 8/3/2020    Low back pain 8/3/2020    Osteoarthritis of knee 8/3/2020    Sleep apnea 8/3/2020    Vasovagal syncope 8/3/2020        Past Surgical History   No past surgical history on file.     Medications     Prior to Admission medications    Medication Sig Start Date End Date Taking? Authorizing Provider   apixaban (ELIQUIS) 2.5 MG TABS tablet Take 1 tablet by mouth 2 times daily   Yes ProviderAllie MD   benzonatate (TESSALON) 100 MG capsule Take 1 capsule by mouth 3 times daily as needed for Cough   Yes Provider, MD Allie   omeprazole (PRILOSEC) 20 MG delayed release capsule Take 1 capsule by mouth daily   Yes

## 2024-05-29 NOTE — PROGRESS NOTES
Vancomycin Dosing Consult  Agustín Willis is a 78 y.o. male with upper respiratory infection and aspiration pneumonia. Pharmacy was consulted by Dr. Arredondo to dose and monitor vancomycin. Today is day 1.    Antibiotic regimen: Vancomycin + merrem    No data recorded.    Recent Labs     05/29/24  1300 05/29/24  1301   WBC  --  6.2   CREATININE 2.38* 2.06*   BUN  --  58*     Est CrCl: 28 mL/min  Concomitant nephrotoxic drugs: None    Cultures:   5/29 Blood x 2      MRSA Swab: Not ordered, patient already received first dose of vancomycin    Target range: Re-dose for random level <15 mcg/mL    Recent level history:  Date/Time Dose & Interval Measured Level (mcg/mL) Associated AUC/ANGELINE              Assessment/Plan:   Brought in due to respiratory distress after vomitting after breakfast.  Hx of COPD.  FIDEL CKD Stage III.  Baseline of renal function unknown, currently 28 ml/min, Scr 2.08.  Cultures pending.  Afebrile.  Consulted Nephrologist, is not dialysis.  Given 2 gram LD of IV Vancomycin.  Collect random level and evaluate on 5/30 at 1300.  Re-dose if level < 15mcg/ml  Order renal panel x 3 days.  Antimicrobial stop date 5 days

## 2024-05-29 NOTE — ED PROVIDER NOTES
[facility] EMERGENCY DEPT  EMERGENCY DEPARTMENT HISTORY AND PHYSICAL EXAM      Date: 5/29/2024  Patient Name: Agustín Willis  MRN: [id number]  YOB: 1945  Date of evaluation: 5/29/2024  Provider: Rafi Sweeney MD   Note Started: 2:49 PM EDT 5/29/24    HISTORY OF PRESENT ILLNESS     Chief Complaint   Patient presents with    Respiratory Distress       History Provided By: EMS    HPI: Agustín Willis is a 78 y.o. male presents from nursing home for evaluation of respiratory distress.  Patient was noted to have episode of vomiting, after which his saturations dropped.  Was not saturating in the 70s, being bagged by EMS.  Patient nonverbal.    PAST MEDICAL HISTORY   Past Medical History:  Past Medical History:   Diagnosis Date    Benign prostatic disease 8/3/2020    Hypertrophy with Outflow Obstruction    Benign prostatic hyperplasia 8/3/2020    Cervical radiculopathy 8/3/2020    Chronic obstructive lung disease (HCC) 8/3/2020    Cocaine abuse (HCC) 8/3/2020    Dizziness and giddiness 8/3/2020    Hypercholesterolemia 8/3/2020    Hypertensive disorder 8/3/2020    Insomnia 8/3/2020    Kidney disease 8/3/2020    Low back pain 8/3/2020    Osteoarthritis of knee 8/3/2020    Sleep apnea 8/3/2020    Vasovagal syncope 8/3/2020       Past Surgical History:  No past surgical history on file.    Family History:  Family History   Problem Relation Age of Onset    Hypertension Mother        Social History:  Social History     Tobacco Use    Smoking status: Former    Smokeless tobacco: Never   Substance Use Topics    Alcohol use: Yes    Drug use: Yes     Frequency: 3.0 times per week     Types: Cocaine       Allergies:  Allergies   Allergen Reactions    Penicillin G Other (See Comments)     Pt states he passes out    Sulfamethoxazole-Trimethoprim      Pt states he passes out       PCP: Mikaela Arredondo MD    Current Meds:   Current Facility-Administered Medications   Medication Dose Route Frequency Provider Last Rate Last Admin     multiple defibrillations, rhythm continue to show ventricular fibrillation.  please see nursing note for details.    Patient did regain pulse after approximately 10 minutes of ACLS.    I placed a left-sided internal jugular line.  Placement complicated by initial puncturing of carotid, after holding pressure for approximately 2 minutes I do not note any expanding hematoma, on reattempt successfully cannulated left internal jugular.    Sepsis alert was initiated at triage, basic lab work, cardiac enzymes, blood cultures ordered, 30 mL/kg of lactated Ringer's were ordered additionally patient started on Levophed drip.  Patient remained hypotensive, vasopressin drip initiated.    Will admit patient to Dr. Arredondo for respiratory arrest, cardiac arrest, suspected aspiration      Records Reviewed (source and summary of external notes): Prior medical records and Nursing notes    Vitals:    Vitals:    05/29/24 1445 05/29/24 1500 05/29/24 1505 05/29/24 1510   BP: 115/81 94/68 93/70 92/70   Pulse: 77 72 69 75   Resp: 15 15 14 14   SpO2: (!) 87% (!) 84% (!) 88% (!) 87%   Weight:       Height:            ED COURSE  ED Course as of 05/29/24 1531   Wed May 29, 2024   1412 Chest x-ray reviewed by myself, ET tube in proper position, left-sided internal jugular terminates above right atrium.  Right middle and upper lobe infiltrate noted [PZ]   1502 I spoke with Obdulio Cho, listed MADDI, patient's sister, I imformed her of events today, and patient's critical condition.  MS Cho lives [place], will contact patient's daughter in [place].  Patient has no known advanced directives. [PZ]      ED Course User Index  [PZ] Rafi Sweeney MD       SEPSIS Reassessment: Sepsis Reassessment:    The patient meets sepsis criteria with a suspected source of Pneumonia/Respiratory  Cultures and antibiotics were initiated sequentially and appropriately as per orders.   Fluid resuscitation volume with 30ml/kg crystalloid bolus was: GIVEN: the

## 2024-05-29 NOTE — CONSULTS
Nephrology Consultation          Patient: Agustín Willis MRN: [id number]  SSN: [id number]    YOB: 1945  Age: 78 y.o.  Sex: male      Subjective:   Reason for the consultation.  Acute kidney injury/metabolic acidosis/hyperkalemia  History of present illness.  The patient is 78-year-old man with underlying history of COPD, BPH, hypertension, hyperlipidemia, cervical radiculopathy, chronic kidney disease stage III was brought from the nursing home due to respiratory distress after he vomiting post breakfast requiring intubation and then later had cardiac arrest went into V-fib status post DC cardioversion in the ER.  On arrival in the ER he was hypotensive.  He received 2.4 L fluid boluses.  He is currently on 2 pressor support along with initiation of IV antibiotics.  Initial chemistry showed elevated BUN of 58 and creatinine 2.06 from creatinine of 1.3 on 3/12/2024 along with severe metabolic acidosis which prompted a nephrology consultation.  He was given IV sodium bicarbonate 100 mEq in the ER.  Chest x-ray did not show any evidence of pulmonary edema but upper lobe airspace disease.  No noticed lower extremity swelling.  Not sure about urine output as patient has no Barnett catheter placement.    Past Medical History:   Diagnosis Date    Benign prostatic disease 8/3/2020    Hypertrophy with Outflow Obstruction    Benign prostatic hyperplasia 8/3/2020    Cervical radiculopathy 8/3/2020    Chronic obstructive lung disease (HCC) 8/3/2020    Cocaine abuse (HCC) 8/3/2020    Dizziness and giddiness 8/3/2020    Hypercholesterolemia 8/3/2020    Hypertensive disorder 8/3/2020    Insomnia 8/3/2020    Kidney disease 8/3/2020    Low back pain 8/3/2020    Osteoarthritis of knee 8/3/2020    Sleep apnea 8/3/2020    Vasovagal syncope 8/3/2020     No past surgical history on file.   Family History   Problem Relation Age of Onset    Hypertension Mother      Social History     Tobacco Use    Smoking status:  disintegrating tablet 4 mg  4 mg Oral Q8H PRN Mikaela Arredondo MD        Or    ondansetron (ZOFRAN) injection 4 mg  4 mg IntraVENous Q6H PRN Mikaela Arredondo MD        polyethylene glycol (GLYCOLAX) packet 17 g  17 g Oral Daily PRN Mikaela Arredondo MD        acetaminophen (TYLENOL) tablet 650 mg  650 mg Oral Q6H PRN Mikaela Arredondo MD        Or    acetaminophen (TYLENOL) suppository 650 mg  650 mg Rectal Q6H PRN iMkaela Arredondo MD        heparin (porcine) injection 5,000 Units  5,000 Units SubCUTAneous 3 times per day Mikaela Arredondo MD        meropenem (MERREM) 1,000 mg in sodium chloride 0.9 % 100 mL IVPB (mini-bag)  1,000 mg IntraVENous Q8H Mikaela Arredondo MD        sodium bicarbonate 8.4 % injection 100 mEq  100 mEq IntraVENous Once Mikaela Arredondo MD        glucose chewable tablet 16 g  4 tablet Oral PRN Mikaela Arredondo MD        dextrose bolus 10% 125 mL  125 mL IntraVENous PRN Mikaela Arredondo MD        Or    dextrose bolus 10% 250 mL  250 mL IntraVENous PRN Mikaela Arredondo MD        glucagon injection 1 mg  1 mg SubCUTAneous PRN Mikaela Arredondo MD        dextrose 10 % infusion   IntraVENous Continuous PRMikaela Blanca MD        ipratropium 0.5 mg-albuterol 2.5 mg (DUONEB) nebulizer solution 1 Dose  1 Dose Inhalation Q4H WA RT Mikaela Arredondo MD        sodium bicarbonate 150 mEq in dextrose 5 % 1,000 mL infusion   IntraVENous Continuous Sol Ferraro MD         Current Outpatient Medications   Medication Sig Dispense Refill    apixaban (ELIQUIS) 2.5 MG TABS tablet Take 1 tablet by mouth 2 times daily      benzonatate (TESSALON) 100 MG capsule Take 1 capsule by mouth 3 times daily as needed for Cough      omeprazole (PRILOSEC) 20 MG delayed release capsule Take 1 capsule by mouth daily      amLODIPine (NORVASC) 5 MG tablet Take 1 tablet by mouth daily 30 tablet 1    budesonide-formoterol (SYMBICORT) 160-4.5 MCG/ACT AERO Inhale 2 puffs into the lungs in the morning and 2 puffs in the  the dialysis today    Metabolic acidosis  High anion gap  Secondary to type A lactic acidosis  Lactic is 6.9  CO2 15 only  IV sodium bicarbonate 100 mEq post in the ER  Will start on bicarb drip    Hyperkalemia  Mild  K is 5.3  Lokelma 10 g x 1 dose  Starting on bicarb drip    Cardiac arrest  V-fib  DC shock  On ventilatory support  On 2 pressor support    Acute hypoxic respiratory failure  Aspiration pneumonia  Underlying COPD  On IV antibiotics  On 2 pressors    Thank you for the consultation.      Plan:       Signed By: Sol Ferraro MD     May 29, 2024

## 2024-05-29 NOTE — H&P
History and Physical    NAME:   Agustín Willis   :  1945   MRN:  [id number]     Date/Time: 2024 4:48 PM    Patient PCP: Mikaela Arredondo MD  ______________________________________________________________________       Subjective:     CHIEF COMPLAINT:     Cardiac arrest        HISTORY OF PRESENT ILLNESS:     General Daily Progress Note  Patient is a 78 y.o. year old male past medical history of COPD BPH cervical radiculopathy cocaine abuse hypertension hyperlipidemia seasonal allergies SVT came to emergency room with respiratory arrest, patient was at nursing home after eating breakfast he vomited everywhere likely aspirated oxygen saturation was 70% initially put on nonrebreather patient was intubated in the ER patient was hypotensive started Levophed 10 patient have a cardiac arrest went to V-fib received shock x 4 received epi and amiodarone  Was started on Levophed and vasopressin received 2 L of IV fluid, received IV Zosyn and vancomycin in the ER    Patient admitted to the ICU for further workup  Patient on ventilator/unresponsive    Past Medical History:   Diagnosis Date    Benign prostatic disease 8/3/2020    Hypertrophy with Outflow Obstruction    Benign prostatic hyperplasia 8/3/2020    Cervical radiculopathy 8/3/2020    Chronic obstructive lung disease (HCC) 8/3/2020    Cocaine abuse (HCC) 8/3/2020    Dizziness and giddiness 8/3/2020    Hypercholesterolemia 8/3/2020    Hypertensive disorder 8/3/2020    Insomnia 8/3/2020    Kidney disease 8/3/2020    Low back pain 8/3/2020    Osteoarthritis of knee 8/3/2020    Sleep apnea 8/3/2020    Vasovagal syncope 8/3/2020        No past surgical history on file.    Social History     Tobacco Use    Smoking status: Former    Smokeless tobacco: Never   Substance Use Topics    Alcohol use: Yes        Family History   Problem Relation Age of Onset    Hypertension Mother        Allergies   Allergen Reactions    Penicillin G Other (See Comments)     Pt states he  - 0.01 K/uL    Neutrophils % 74 32 - 75 %    Band Neutrophils 11 (H) 0 - 6 %    Lymphocytes % 10 (L) 12 - 49 %    Monocytes % 2 (L) 5 - 13 %    Eosinophils % 0 0 - 7 %    Basophils % 0 0 - 1 %    Metamyelocytes 3 (H) 0 %    Immature Granulocytes % 0 %    Neutrophils Absolute 5.3 1.8 - 8.0 K/UL    Lymphocytes Absolute 0.6 (L) 0.8 - 3.5 K/UL    Monocytes Absolute 0.1 0.0 - 1.0 K/UL    Eosinophils Absolute 0.0 0.0 - 0.4 K/UL    Basophils Absolute 0.0 0.0 - 0.1 K/UL    Immature Granulocytes Absolute 0.0 K/UL    Differential Type Manual      RBC Comment Normocytic, Normochromic     Comprehensive Metabolic Panel    Collection Time: 05/29/24  1:01 PM   Result Value Ref Range    Sodium 140 136 - 145 mmol/L    Potassium 5.3 (H) 3.5 - 5.1 mmol/L    Chloride 114 (H) 97 - 108 mmol/L    CO2 15 (LL) 21 - 32 mmol/L    Anion Gap 11 5 - 15 mmol/L    Glucose 219 (H) 65 - 100 mg/dL    BUN 58 (H) 6 - 20 mg/dL    Creatinine 2.06 (H) 0.70 - 1.30 mg/dL    BUN/Creatinine Ratio 28 (H) 12 - 20      Est, Glom Filt Rate 32 (L) >60 ml/min/1.73m2    Calcium 7.2 (L) 8.5 - 10.1 mg/dL    Total Bilirubin 0.3 0.2 - 1.0 mg/dL    AST 18 15 - 37 U/L    ALT 11 (L) 12 - 78 U/L    Alk Phosphatase 58 45 - 117 U/L    Total Protein 5.5 (L) 6.4 - 8.2 g/dL    Albumin 2.2 (L) 3.5 - 5.0 g/dL    Globulin 3.3 2.0 - 4.0 g/dL    Albumin/Globulin Ratio 0.7 (L) 1.1 - 2.2     Procalcitonin    Collection Time: 05/29/24  1:01 PM   Result Value Ref Range    Procalcitonin 0.22 (H) 0 ng/mL   Troponin    Collection Time: 05/29/24  1:01 PM   Result Value Ref Range    Troponin, High Sensitivity 15 0 - 76 ng/L           Xray Result (most recent):  XR CHEST PORTABLE    Result Date: 5/29/2024  Left IJ central venous catheter placement. No pneumothorax. Right upper lobe airspace disease and mild bilateral interstitial opacities unchanged.    XR CHEST PORTABLE    Result Date: 5/29/2024  1. ET tube tip is angled towards the right mainstem bronchus and is 1 cm superior to the level of  0    metoprolol tartrate (LOPRESSOR) 25 MG tablet, Take 1 tablet by mouth 2 times daily, Disp: 60 tablet, Rfl: 1    thiamine mononitrate (THIAMINE) 100 MG tablet, Take 1 tablet by mouth daily, Disp: 30 tablet, Rfl: 0    albuterol sulfate HFA (PROVENTIL;VENTOLIN;PROAIR) 108 (90 Base) MCG/ACT inhaler, Inhale 2 puffs into the lungs every 6 hours as needed for Wheezing or Shortness of Breath, Disp: 18 g, Rfl: 1    finasteride (PROSCAR) 5 MG tablet, Take 1 tablet by mouth daily, Disp: , Rfl:     simvastatin (ZOCOR) 10 MG tablet, TAKE 1 TABLET BY MOUTH EVERYDAY AT BEDTIME, Disp: , Rfl:     sodium bicarbonate 325 MG tablet, Take 1 tablet by mouth 4 times daily, Disp: , Rfl:        Discussed with the patient's sister patient is full code      ________________________________________________________________________    Signed: Mikaela Arredondo MD

## 2024-05-29 NOTE — CARE COORDINATION
05/29/24 1534   Service Assessment   Patient Orientation Unable to Assess  (Intubated)   Cognition Other (see comment)   History Provided By Medical Record;Child/Family  (Sister Obdulio Cho)   Primary Caregiver Other (Comment)  (Facility)   Accompanied By/Relationship Pt alone during visit. CM spoke with sister Obdulio Cho via phone.   Support Systems Family Members   Patient's Healthcare Decision Maker is: Legal Next of Kin   PCP Verified by CM Yes  (Mikaela Arredondo - seen @ the facility.)   Last Visit to PCP Within last 3 months   Prior Functional Level Assistance with the following:;Bathing;Dressing;Toileting;Feeding;Cooking;Housework;Shopping;Mobility  (W/C/bed.)   Current Functional Level Assistance with the following:;Bathing;Dressing;Toileting;Feeding;Cooking;Housework;Shopping;Mobility  (W/C/bed.)   Can patient return to prior living arrangement Yes   Ability to make needs known: Unable   Family able to assist with home care needs: Other (comment)  (Facility)   Would you like for me to discuss the discharge plan with any other family members/significant others, and if so, who? Yes  (Sister Obdulio Cho)   Financial Resources Medicare   Community Resources None   CM/SW Referral Other (see comment)  (None)   Social/Functional History   Lives With Other (comment)  (Facility.)   Type of Home Facility  (Sister Obdulio Cho  verbally consented to Choice Letter via phone to return to [facility]/Referral.)   Home Layout One level   Home Access Level entry   Bathroom Shower/Tub Walk-in shower   Bathroom Toilet Handicap height   Bathroom Equipment Grab bars in shower   Bathroom Accessibility Wheelchair accessible   Home Equipment Wheelchair - Manual   Receives Help From Family;Other (comment)  (Facility)   ADL Assistance Needs assistance   Toileting Needs assistance   Homemaking Assistance Needs assistance   Homemaking Responsibilities Yes   Ambulation Assistance Non-ambulatory  (W/C/bed.)   Transfer Assistance Needs  assistance   Active  No   Occupation Retired   Discharge Planning   Type of Residence Skilled Nursing Facility   Living Arrangements Other (Comment)   Current Services Prior To Admission Transportation;Skilled Nursing Facility   Potential Assistance Needed Skilled Nursing Facility;Transportation   DME Ordered? No   Potential Assistance Purchasing Medications No   Type of Home Care Services None   Patient expects to be discharged to: Skilled nursing facility   One/Two Story Residence One story   History of falls? 0   Services At/After Discharge   Transition of Care Consult (CM Consult) SNF;Transportation Assistance   Partner SNF No   Reason Why Partner SNF Not Chosen Location  (Pt from [facility])   Services At/After Discharge Transport;Skilled Nursing Facility (SNF)   [facility] Resource Information Provided? No   Mode of Transport at Discharge BLS   Confirm Follow Up Transport Other (see comment)     Pt intubated. CM spoke with sister ( Obdulio Cho @ [phone]) via phone & D/C Plan @ this time is to return to [facility] via transport - sister gave verbal consent via phone to Choice Letter to return to [facility]. Referral sent via [facility]. Total care/w/c/bed..    Advance Care Planning     General Advance Care Planning (ACP) Conversation    Date of Conversation: 5/29/2024  Conducted with:   Other persons present:     Healthcare Decision Maker:   Primary Decision Maker: Obdulio Cho - Brother/Sister - [phone]  Click here to complete Healthcare Decision Makers including selection of the Healthcare Decision Maker Relationship (ie \"Primary\").       Content/Action Overview:    Reviewed DNR/DNI and patient         Length of Voluntary ACP Conversation in minutes:      Analilia Vela RN

## 2024-05-29 NOTE — PROGRESS NOTES
Spiritual Care Assessment/Progress Note  [facility]    Name: Agustín Willis MRN: [id number]    Age: 78 y.o.     Sex: male   Language: English     Date: 5/29/2024            Total Time Calculated: 15 min              Spiritual Assessment begun in [facility] EMERGENCY DEPT  Service Provided For: Patient  Referral/Consult From: Other (comment) (Crisis page)  Encounter Overview/Reason: Crisis    Spiritual beliefs:      [] Involved in a yamini tradition/spiritual practice:      [] Supported by a yamini community:      [] Claims no spiritual orientation:      [] Seeking spiritual identity:           [] Adheres to an individual form of spirituality:      [x] Not able to assess:                Identified resources for coping and support system:   Support System: Unknown       [] Prayer                  [] Devotional reading               [] Music                  [] Guided Imagery     [] Pet visits                                        [] Other: (COMMENT)     Specific area/focus of visit   Encounter:    Crisis:    Spiritual/Emotional needs: Type: Spiritual Support  Ritual, Rites and Sacraments:    Grief, Loss, and Adjustments:    Ethics/Mediation:    Behavioral Health:    Palliative Care:    Advance Care Planning:      Plan/Referrals: Other (Comment) ( follow up is available by referral)    Narrative:  responded to [facility] [place] in ER, T2.  remained for pastoral support. No family present at the time.  is available for follow up support as needed.     LISA GoodDiv, M.S, [facility]   can be reached by calling the  at [facility]   [phone]

## 2024-05-29 NOTE — CONSULTS
IMPRESSION:   Acute hypoxic respiratory failure  Cardiac arrest  Severe sepsis with septic shock  Aspiration pneumonia  COPD  History of cocaine abuse polysubstance abuse in the past  Pt is at high risk of sudden decline and decompensation with life threatening consequenses and continued end organ dysfunction and failure  Pt is critically ill. Time spent with pt and staff actively rendering care, managing pt and coordinating care as stated below; 55 minutes, exclusive of any procedures      RECOMMENDATIONS/PLAN:   ICU monitoring  Ventilator for mechanical life support and prevent respiratory arrest with protective lung strategies  Chest x-ray shows right lung opacity ET tube has been repositioned it was close to the right mainstem bronchi  Status post cardiac arrest patient coded and he was intubated and received epinephrine and ACLS protocol ROSC is obtained 10 minutes code was run  Now patient is on vasopressin and Levophed  He is wheezing start patient on IV Solu-Medrol nebulizer treatment  Agree with Empiric IV antibiotics pending culture results   Follow culture results Zosyn and vancomycin  CVP monitoring  IV vasopressors for circulatory shock refractory to fluids to maintain SBP> 90  Transfuse prn to maintain Hgb > 7  Labs to follow electrolytes, renal function and and blood counts  Bronchial hygiene with respiratory therapy techniques, bronchodilators  Pt needs IV fluids with additives and Drug therapy requiring intensive monitoring for toxicity  Prescription drug management with home med reconciliation reviewed  DVT, SUP prophylaxis  Will be available to assist in medical management while in the CCU pending disposition     [x] High complexity decision making was performed  [x] See my orders for details  HPI  78-year-old male nursing home resident came in because of shortness of breath respiratory distress patient has episodes of vomiting after eating breakfast this morning subsequently came to the  patchy airspace consolidation.       This care involved high complexity decision making which includes independently reviewing the patient's past medical records, current laboratory results, medication profiles that were immediately available to me and actual Xray images at the bedside in order to assess, support vital system function, and to treat this degree of vital organ system failure, and to prevent further life threatening deterioration of the patient’s condition. I was in direct communication with the nursing staff throughout this time.    Medical Decision Making Today  Reviewed the flowsheet and previous days notes  Reviewed and summarized records or history from previous days note or discussions with staff, family  Parenteral controlled substances - Reviewed/ Adjusted / Weaned / Started  High Risk Drug therapy requiring intensive monitoring for toxicity: eg steroids, pressors, antibiotics  Review and order of Clinical lab tests  Review and Order of Radiology tests  Review and Order of Medicine tests  Independent visualization of radiologic Images  Reviewed Ventilator / NiPPV  I have personally reviewed the patients ECG / Telemetry  Diagnostic endoscopies with identified risk factors    I have provided total of 55  minutes of critical care time rendering care exclusive of any procedures. During this entire length of time the patient's condition was unstable, unpredictable and critically ill in the CCU/ ICU. I was immediately available to the patient whose care required several interactions with nursing, multidisciplinary team members leading to multiple interventions with fluid resuscitation and medication adjustments to optimize respiratory support, hemodynamic treatment, medication changes based on repeat labs results, reviews, exams and assessments. The reason for providing this level of medical care was due to a critical illness that impaired one or more vital organ systems, such that there was a high

## 2024-05-30 ENCOUNTER — APPOINTMENT (OUTPATIENT)
Facility: HOSPITAL | Age: 79
DRG: 870 | End: 2024-05-30
Attending: FAMILY MEDICINE
Payer: MEDICARE

## 2024-05-30 ENCOUNTER — APPOINTMENT (OUTPATIENT)
Facility: HOSPITAL | Age: 79
DRG: 870 | End: 2024-05-30
Payer: MEDICARE

## 2024-05-30 ENCOUNTER — APPOINTMENT (OUTPATIENT)
Facility: HOSPITAL | Age: 79
DRG: 870 | End: 2024-05-30
Attending: INTERNAL MEDICINE
Payer: MEDICARE

## 2024-05-30 LAB
ALBUMIN SERPL-MCNC: 1.9 G/DL (ref 3.5–5)
ALBUMIN SERPL-MCNC: 2 G/DL (ref 3.5–5)
ALBUMIN SERPL-MCNC: 2.1 G/DL (ref 3.5–5)
ALBUMIN SERPL-MCNC: 2.2 G/DL (ref 3.5–5)
ALBUMIN/GLOB SERPL: 0.6 (ref 1.1–2.2)
ALP SERPL-CCNC: 49 U/L (ref 45–117)
ALT SERPL-CCNC: 12 U/L (ref 12–78)
ANION GAP SERPL CALC-SCNC: 12 MMOL/L (ref 5–15)
ANION GAP SERPL CALC-SCNC: 7 MMOL/L (ref 5–15)
ANION GAP SERPL CALC-SCNC: 8 MMOL/L (ref 5–15)
ANION GAP SERPL CALC-SCNC: 9 MMOL/L (ref 5–15)
ARTERIAL PATENCY WRIST A: YES
AST SERPL W P-5'-P-CCNC: 24 U/L (ref 15–37)
BASE DEFICIT BLDA-SCNC: 8.2 MMOL/L
BASOPHILS # BLD: 0 K/UL (ref 0–0.1)
BASOPHILS NFR BLD: 0 % (ref 0–1)
BDY SITE: ABNORMAL
BILIRUB SERPL-MCNC: 0.6 MG/DL (ref 0.2–1)
BODY TEMPERATURE: 97.6
BUN SERPL-MCNC: 60 MG/DL (ref 6–20)
BUN SERPL-MCNC: 66 MG/DL (ref 6–20)
BUN SERPL-MCNC: 72 MG/DL (ref 6–20)
BUN SERPL-MCNC: 73 MG/DL (ref 6–20)
BUN/CREAT SERPL: 23 (ref 12–20)
BUN/CREAT SERPL: 24 (ref 12–20)
BUN/CREAT SERPL: 25 (ref 12–20)
BUN/CREAT SERPL: 25 (ref 12–20)
CA-I BLD-MCNC: 1.11 MMOL/L (ref 1.13–1.32)
CA-I BLD-MCNC: 7.8 MG/DL (ref 8.5–10.1)
CA-I BLD-MCNC: 8 MG/DL (ref 8.5–10.1)
CA-I BLD-MCNC: 8.1 MG/DL (ref 8.5–10.1)
CA-I BLD-MCNC: 8.3 MG/DL (ref 8.5–10.1)
CHLORIDE SERPL-SCNC: 109 MMOL/L (ref 97–108)
CHLORIDE SERPL-SCNC: 112 MMOL/L (ref 97–108)
CHLORIDE SERPL-SCNC: 112 MMOL/L (ref 97–108)
CHLORIDE SERPL-SCNC: 113 MMOL/L (ref 97–108)
CO2 SERPL-SCNC: 19 MMOL/L (ref 21–32)
CO2 SERPL-SCNC: 24 MMOL/L (ref 21–32)
CO2 SERPL-SCNC: 24 MMOL/L (ref 21–32)
CO2 SERPL-SCNC: 25 MMOL/L (ref 21–32)
COHGB MFR BLD: 0.5 % (ref 1–2)
CREAT SERPL-MCNC: 2.52 MG/DL (ref 0.7–1.3)
CREAT SERPL-MCNC: 2.68 MG/DL (ref 0.7–1.3)
CREAT SERPL-MCNC: 2.9 MG/DL (ref 0.7–1.3)
CREAT SERPL-MCNC: 3.09 MG/DL (ref 0.7–1.3)
CRP SERPL-MCNC: 10.5 MG/DL (ref 0–0.3)
D DIMER PPP FEU-MCNC: >20 UG/ML(FEU)
DATE LAST DOSE: NORMAL
DIFFERENTIAL METHOD BLD: ABNORMAL
DOSE AMOUNT: NORMAL UNITS
ECHO BSA: 1.98 M2
ECHO EST RA PRESSURE: 15 MMHG
ECHO RIGHT VENTRICULAR SYSTOLIC PRESSURE (RVSP): 40 MMHG
ECHO RV BASAL DIMENSION: 4 CM
ECHO RV MID DIMENSION: 3.9 CM
ECHO TV REGURGITANT MAX VELOCITY: 2.5 M/S
EKG ATRIAL RATE: 89 BPM
EKG DIAGNOSIS: NORMAL
EKG P AXIS: 64 DEGREES
EKG P-R INTERVAL: 204 MS
EKG Q-T INTERVAL: 410 MS
EKG QRS DURATION: 196 MS
EKG QTC CALCULATION (BAZETT): 498 MS
EKG R AXIS: -39 DEGREES
EKG T AXIS: 50 DEGREES
EKG VENTRICULAR RATE: 89 BPM
EOSINOPHIL # BLD: 0 K/UL (ref 0–0.4)
EOSINOPHIL NFR BLD: 0 % (ref 0–7)
ERYTHROCYTE [DISTWIDTH] IN BLOOD BY AUTOMATED COUNT: 14.1 % (ref 11.5–14.5)
ERYTHROCYTE [DISTWIDTH] IN BLOOD BY AUTOMATED COUNT: 14.2 % (ref 11.5–14.5)
ERYTHROCYTE [DISTWIDTH] IN BLOOD BY AUTOMATED COUNT: 14.4 % (ref 11.5–14.5)
ERYTHROCYTE [DISTWIDTH] IN BLOOD BY AUTOMATED COUNT: 14.5 % (ref 11.5–14.5)
FIO2 ON VENT: 75 %
GAS FLOW.O2 SETTING OXYMISER: 14
GLOBULIN SER CALC-MCNC: 3.2 G/DL (ref 2–4)
GLUCOSE BLD STRIP.AUTO-MCNC: 173 MG/DL (ref 65–100)
GLUCOSE SERPL-MCNC: 128 MG/DL (ref 65–100)
GLUCOSE SERPL-MCNC: 158 MG/DL (ref 65–100)
GLUCOSE SERPL-MCNC: 166 MG/DL (ref 65–100)
GLUCOSE SERPL-MCNC: 172 MG/DL (ref 65–100)
HCO3 BLDA-SCNC: 17 MMOL/L (ref 22–26)
HCT VFR BLD AUTO: 26.8 % (ref 36.6–50.3)
HCT VFR BLD AUTO: 28.7 % (ref 36.6–50.3)
HCT VFR BLD AUTO: 28.8 % (ref 36.6–50.3)
HCT VFR BLD AUTO: 30 % (ref 36.6–50.3)
HGB BLD-MCNC: 8.5 G/DL (ref 12.1–17)
HGB BLD-MCNC: 9.1 G/DL (ref 12.1–17)
HGB BLD-MCNC: 9.2 G/DL (ref 12.1–17)
HGB BLD-MCNC: 9.3 G/DL (ref 12.1–17)
IMM GRANULOCYTES # BLD AUTO: 0 K/UL
IMM GRANULOCYTES NFR BLD AUTO: 0 %
INR PPP: 1.5 (ref 0.9–1.1)
LYMPHOCYTES # BLD: 0.7 K/UL (ref 0.8–3.5)
LYMPHOCYTES # BLD: 0.8 K/UL (ref 0.8–3.5)
LYMPHOCYTES # BLD: 1.2 K/UL (ref 0.8–3.5)
LYMPHOCYTES # BLD: 1.3 K/UL (ref 0.8–3.5)
LYMPHOCYTES NFR BLD: 10 % (ref 12–49)
LYMPHOCYTES NFR BLD: 18 % (ref 12–49)
LYMPHOCYTES NFR BLD: 23 % (ref 12–49)
LYMPHOCYTES NFR BLD: 8 % (ref 12–49)
MAGNESIUM SERPL-MCNC: 2.1 MG/DL (ref 1.6–2.4)
MAGNESIUM SERPL-MCNC: 2.2 MG/DL (ref 1.6–2.4)
MAGNESIUM SERPL-MCNC: 2.3 MG/DL (ref 1.6–2.4)
MCH RBC QN AUTO: 28.9 PG (ref 26–34)
MCH RBC QN AUTO: 29 PG (ref 26–34)
MCH RBC QN AUTO: 29.3 PG (ref 26–34)
MCH RBC QN AUTO: 29.3 PG (ref 26–34)
MCHC RBC AUTO-ENTMCNC: 31 G/DL (ref 30–36.5)
MCHC RBC AUTO-ENTMCNC: 31.7 G/DL (ref 30–36.5)
MCHC RBC AUTO-ENTMCNC: 31.7 G/DL (ref 30–36.5)
MCHC RBC AUTO-ENTMCNC: 31.9 G/DL (ref 30–36.5)
MCV RBC AUTO: 91.2 FL (ref 80–99)
MCV RBC AUTO: 91.7 FL (ref 80–99)
MCV RBC AUTO: 92.3 FL (ref 80–99)
MCV RBC AUTO: 93.5 FL (ref 80–99)
METAMYELOCYTES NFR BLD MANUAL: 1 %
METAMYELOCYTES NFR BLD MANUAL: 1 %
METAMYELOCYTES NFR BLD MANUAL: 8 %
METHGB MFR BLD: 0.3 % (ref 0–1.4)
MONOCYTES # BLD: 0.1 K/UL (ref 0–1)
MONOCYTES # BLD: 0.3 K/UL (ref 0–1)
MONOCYTES # BLD: 0.4 K/UL (ref 0–1)
MONOCYTES # BLD: 1 K/UL (ref 0–1)
MONOCYTES NFR BLD: 4 % (ref 5–13)
MONOCYTES NFR BLD: 4 % (ref 5–13)
MONOCYTES NFR BLD: 5 % (ref 5–13)
MONOCYTES NFR BLD: 8 % (ref 5–13)
MRSA DNA SPEC QL NAA+PROBE: DETECTED
MYELOCYTES NFR BLD MANUAL: 1 %
MYELOCYTES NFR BLD MANUAL: 1 %
NEUTS BAND NFR BLD MANUAL: 14 % (ref 0–6)
NEUTS BAND NFR BLD MANUAL: 4 % (ref 0–6)
NEUTS BAND NFR BLD MANUAL: 9 % (ref 0–6)
NEUTS BAND NFR BLD MANUAL: 9 % (ref 0–6)
NEUTS SEG # BLD: 10.3 K/UL (ref 1.8–8)
NEUTS SEG # BLD: 2.5 K/UL (ref 1.8–8)
NEUTS SEG # BLD: 4.7 K/UL (ref 1.8–8)
NEUTS SEG # BLD: 7.3 K/UL (ref 1.8–8)
NEUTS SEG NFR BLD: 59 % (ref 32–75)
NEUTS SEG NFR BLD: 66 % (ref 32–75)
NEUTS SEG NFR BLD: 71 % (ref 32–75)
NEUTS SEG NFR BLD: 76 % (ref 32–75)
NRBC # BLD: 0 K/UL (ref 0–0.01)
NRBC BLD-RTO: 0 PER 100 WBC
OXYHGB MFR BLD: 91.4 % (ref 95–99)
PCO2 BLDA: 35 MMHG (ref 35–45)
PEEP RESPIRATORY: 8
PERFORMED BY:: ABNORMAL
PERFORMED BY:: ABNORMAL
PH BLDA: 7.31 (ref 7.35–7.45)
PHOSPHATE SERPL-MCNC: 5.4 MG/DL (ref 2.6–4.7)
PHOSPHATE SERPL-MCNC: 6.2 MG/DL (ref 2.6–4.7)
PHOSPHATE SERPL-MCNC: 7.2 MG/DL (ref 2.6–4.7)
PHOSPHATE SERPL-MCNC: 7.2 MG/DL (ref 2.6–4.7)
PLATELET # BLD AUTO: 173 K/UL (ref 150–400)
PLATELET # BLD AUTO: 180 K/UL (ref 150–400)
PLATELET # BLD AUTO: 186 K/UL (ref 150–400)
PLATELET # BLD AUTO: 202 K/UL (ref 150–400)
PMV BLD AUTO: 10.2 FL (ref 8.9–12.9)
PMV BLD AUTO: 10.2 FL (ref 8.9–12.9)
PMV BLD AUTO: 10.4 FL (ref 8.9–12.9)
PMV BLD AUTO: 9.8 FL (ref 8.9–12.9)
PO2 BLDA: 66 MMHG (ref 80–100)
POTASSIUM SERPL-SCNC: 5.4 MMOL/L (ref 3.5–5.1)
POTASSIUM SERPL-SCNC: 5.6 MMOL/L (ref 3.5–5.1)
POTASSIUM SERPL-SCNC: 5.8 MMOL/L (ref 3.5–5.1)
POTASSIUM SERPL-SCNC: 5.9 MMOL/L (ref 3.5–5.1)
PROCALCITONIN SERPL-MCNC: 72.72 NG/ML
PROT SERPL-MCNC: 5.2 G/DL (ref 6.4–8.2)
PROTHROMBIN TIME: 18.6 SEC (ref 11.9–14.6)
RBC # BLD AUTO: 2.94 M/UL (ref 4.1–5.7)
RBC # BLD AUTO: 3.11 M/UL (ref 4.1–5.7)
RBC # BLD AUTO: 3.14 M/UL (ref 4.1–5.7)
RBC # BLD AUTO: 3.21 M/UL (ref 4.1–5.7)
RBC MORPH BLD: ABNORMAL
SAO2 % BLD: 92 % (ref 95–99)
SAO2% DEVICE SAO2% SENSOR NAME: ABNORMAL
SODIUM SERPL-SCNC: 141 MMOL/L (ref 136–145)
SODIUM SERPL-SCNC: 144 MMOL/L (ref 136–145)
SODIUM SERPL-SCNC: 144 MMOL/L (ref 136–145)
SODIUM SERPL-SCNC: 145 MMOL/L (ref 136–145)
SPECIMEN SITE: ABNORMAL
VANCOMYCIN SERPL-MCNC: 18 UG/ML
VT SETTING VENT: 450
WBC # BLD AUTO: 12.9 K/UL (ref 4.1–11.1)
WBC # BLD AUTO: 3.4 K/UL (ref 4.1–11.1)
WBC # BLD AUTO: 6.7 K/UL (ref 4.1–11.1)
WBC # BLD AUTO: 8.6 K/UL (ref 4.1–11.1)

## 2024-05-30 PROCEDURE — 2580000003 HC RX 258: Performed by: FAMILY MEDICINE

## 2024-05-30 PROCEDURE — 36556 INSERT NON-TUNNEL CV CATH: CPT

## 2024-05-30 PROCEDURE — 82330 ASSAY OF CALCIUM: CPT

## 2024-05-30 PROCEDURE — 6360000002 HC RX W HCPCS: Performed by: FAMILY MEDICINE

## 2024-05-30 PROCEDURE — 2500000003 HC RX 250 WO HCPCS: Performed by: INTERNAL MEDICINE

## 2024-05-30 PROCEDURE — 71045 X-RAY EXAM CHEST 1 VIEW: CPT

## 2024-05-30 PROCEDURE — 82803 BLOOD GASES ANY COMBINATION: CPT

## 2024-05-30 PROCEDURE — 84100 ASSAY OF PHOSPHORUS: CPT

## 2024-05-30 PROCEDURE — 80202 ASSAY OF VANCOMYCIN: CPT

## 2024-05-30 PROCEDURE — 06H033Z INSERTION OF INFUSION DEVICE INTO INFERIOR VENA CAVA, PERCUTANEOUS APPROACH: ICD-10-PCS

## 2024-05-30 PROCEDURE — 2580000003 HC RX 258: Performed by: INTERNAL MEDICINE

## 2024-05-30 PROCEDURE — 82962 GLUCOSE BLOOD TEST: CPT

## 2024-05-30 PROCEDURE — 90945 DIALYSIS ONE EVALUATION: CPT

## 2024-05-30 PROCEDURE — 6370000000 HC RX 637 (ALT 250 FOR IP): Performed by: INTERNAL MEDICINE

## 2024-05-30 PROCEDURE — 84145 PROCALCITONIN (PCT): CPT

## 2024-05-30 PROCEDURE — 51798 US URINE CAPACITY MEASURE: CPT

## 2024-05-30 PROCEDURE — 6370000000 HC RX 637 (ALT 250 FOR IP): Performed by: FAMILY MEDICINE

## 2024-05-30 PROCEDURE — 2500000003 HC RX 250 WO HCPCS: Performed by: FAMILY MEDICINE

## 2024-05-30 PROCEDURE — 36600 WITHDRAWAL OF ARTERIAL BLOOD: CPT

## 2024-05-30 PROCEDURE — 76937 US GUIDE VASCULAR ACCESS: CPT

## 2024-05-30 PROCEDURE — 86140 C-REACTIVE PROTEIN: CPT

## 2024-05-30 PROCEDURE — 85379 FIBRIN DEGRADATION QUANT: CPT

## 2024-05-30 PROCEDURE — 94003 VENT MGMT INPAT SUBQ DAY: CPT

## 2024-05-30 PROCEDURE — 5A1D90Z PERFORMANCE OF URINARY FILTRATION, CONTINUOUS, GREATER THAN 18 HOURS PER DAY: ICD-10-PCS | Performed by: INTERNAL MEDICINE

## 2024-05-30 PROCEDURE — 6360000002 HC RX W HCPCS: Performed by: INTERNAL MEDICINE

## 2024-05-30 PROCEDURE — 80053 COMPREHEN METABOLIC PANEL: CPT

## 2024-05-30 PROCEDURE — 36415 COLL VENOUS BLD VENIPUNCTURE: CPT

## 2024-05-30 PROCEDURE — 2000000000 HC ICU R&B

## 2024-05-30 PROCEDURE — 85610 PROTHROMBIN TIME: CPT

## 2024-05-30 PROCEDURE — 93306 TTE W/DOPPLER COMPLETE: CPT

## 2024-05-30 PROCEDURE — 6360000002 HC RX W HCPCS

## 2024-05-30 PROCEDURE — 2700000000 HC OXYGEN THERAPY PER DAY

## 2024-05-30 PROCEDURE — 99233 SBSQ HOSP IP/OBS HIGH 50: CPT | Performed by: INTERNAL MEDICINE

## 2024-05-30 PROCEDURE — 80069 RENAL FUNCTION PANEL: CPT

## 2024-05-30 PROCEDURE — 85025 COMPLETE CBC W/AUTO DIFF WBC: CPT

## 2024-05-30 PROCEDURE — 94640 AIRWAY INHALATION TREATMENT: CPT

## 2024-05-30 PROCEDURE — 83735 ASSAY OF MAGNESIUM: CPT

## 2024-05-30 RX ORDER — 0.9 % SODIUM CHLORIDE 0.9 %
500 INTRAVENOUS SOLUTION INTRAVENOUS ONCE
Status: COMPLETED | OUTPATIENT
Start: 2024-05-30 | End: 2024-05-30

## 2024-05-30 RX ORDER — HEPARIN SODIUM 1000 [USP'U]/ML
INJECTION, SOLUTION INTRAVENOUS; SUBCUTANEOUS PRN
Status: DISCONTINUED | OUTPATIENT
Start: 2024-05-30 | End: 2024-06-21

## 2024-05-30 RX ORDER — CALCIUM CHLORIDE, MAGNESIUM CHLORIDE, DEXTROSE MONOHYDRATE, LACTIC ACID, SODIUM CHLORIDE, SODIUM BICARBONATE AND POTASSIUM CHLORIDE 5.15; 2.03; 22; 5.4; 6.46; 3.09; .157 G/L; G/L; G/L; G/L; G/L; G/L; G/L
INJECTION INTRAVENOUS CONTINUOUS
Status: DISCONTINUED | OUTPATIENT
Start: 2024-05-30 | End: 2024-06-02

## 2024-05-30 RX ORDER — PROPOFOL 10 MG/ML
5-50 INJECTION, EMULSION INTRAVENOUS CONTINUOUS
Status: DISCONTINUED | OUTPATIENT
Start: 2024-05-30 | End: 2024-06-07

## 2024-05-30 RX ORDER — SODIUM CHLORIDE 9 MG/ML
INJECTION, SOLUTION INTRAVENOUS CONTINUOUS
Status: DISCONTINUED | OUTPATIENT
Start: 2024-05-30 | End: 2024-06-07

## 2024-05-30 RX ORDER — PROPOFOL 10 MG/ML
INJECTION, EMULSION INTRAVENOUS
Status: COMPLETED
Start: 2024-05-30 | End: 2024-05-30

## 2024-05-30 RX ADMIN — MUPIROCIN: 20 OINTMENT TOPICAL at 19:35

## 2024-05-30 RX ADMIN — SODIUM CHLORIDE 500 ML: 9 INJECTION, SOLUTION INTRAVENOUS at 05:49

## 2024-05-30 RX ADMIN — CALCIUM CHLORIDE, MAGNESIUM CHLORIDE, DEXTROSE MONOHYDRATE, LACTIC ACID, SODIUM CHLORIDE, SODIUM BICARBONATE AND POTASSIUM CHLORIDE: 5.15; 2.03; 22; 5.4; 6.46; 3.09; .157 INJECTION INTRAVENOUS at 20:09

## 2024-05-30 RX ADMIN — PROPOFOL 30 MCG/KG/MIN: 10 INJECTION, EMULSION INTRAVENOUS at 19:34

## 2024-05-30 RX ADMIN — ASPIRIN 81 MG: 81 TABLET, COATED ORAL at 08:30

## 2024-05-30 RX ADMIN — SODIUM CHLORIDE, PRESERVATIVE FREE 10 ML: 5 INJECTION INTRAVENOUS at 20:09

## 2024-05-30 RX ADMIN — HEPARIN SODIUM 5000 UNITS: 5000 INJECTION INTRAVENOUS; SUBCUTANEOUS at 05:50

## 2024-05-30 RX ADMIN — IPRATROPIUM BROMIDE AND ALBUTEROL SULFATE 1 DOSE: .5; 3 SOLUTION RESPIRATORY (INHALATION) at 07:54

## 2024-05-30 RX ADMIN — DEXMEDETOMIDINE HYDROCHLORIDE 0.4 MCG/KG/HR: 400 INJECTION, SOLUTION INTRAVENOUS at 08:29

## 2024-05-30 RX ADMIN — FERROUS SULFATE TAB 325 MG (65 MG ELEMENTAL FE) 325 MG: 325 (65 FE) TAB at 08:30

## 2024-05-30 RX ADMIN — MUPIROCIN: 20 OINTMENT TOPICAL at 14:00

## 2024-05-30 RX ADMIN — VASOPRESSIN 0.03 UNITS/MIN: 20 INJECTION, SOLUTION INTRAVENOUS at 12:50

## 2024-05-30 RX ADMIN — CALCIUM CHLORIDE, MAGNESIUM CHLORIDE, DEXTROSE MONOHYDRATE, LACTIC ACID, SODIUM CHLORIDE, SODIUM BICARBONATE AND POTASSIUM CHLORIDE: 5.15; 2.03; 22; 5.4; 6.46; 3.09; .157 INJECTION INTRAVENOUS at 13:24

## 2024-05-30 RX ADMIN — PROPOFOL 30 MCG/KG/MIN: 10 INJECTION, EMULSION INTRAVENOUS at 13:36

## 2024-05-30 RX ADMIN — PROPOFOL 20 MCG/KG/MIN: 10 INJECTION, EMULSION INTRAVENOUS at 08:15

## 2024-05-30 RX ADMIN — IPRATROPIUM BROMIDE AND ALBUTEROL SULFATE 1 DOSE: .5; 3 SOLUTION RESPIRATORY (INHALATION) at 02:06

## 2024-05-30 RX ADMIN — SODIUM BICARBONATE 325 MG: 650 TABLET ORAL at 08:30

## 2024-05-30 RX ADMIN — MEROPENEM 1000 MG: 1 INJECTION, POWDER, FOR SOLUTION INTRAVENOUS at 05:50

## 2024-05-30 RX ADMIN — SODIUM BICARBONATE 325 MG: 650 TABLET ORAL at 12:50

## 2024-05-30 RX ADMIN — FINASTERIDE 5 MG: 5 TABLET, FILM COATED ORAL at 08:30

## 2024-05-30 RX ADMIN — SODIUM BICARBONATE 325 MG: 650 TABLET ORAL at 17:57

## 2024-05-30 RX ADMIN — METHYLPREDNISOLONE SODIUM SUCCINATE 40 MG: 40 INJECTION INTRAMUSCULAR; INTRAVENOUS at 14:00

## 2024-05-30 RX ADMIN — MEROPENEM 1000 MG: 1 INJECTION, POWDER, FOR SOLUTION INTRAVENOUS at 17:57

## 2024-05-30 RX ADMIN — SODIUM CHLORIDE: 9 INJECTION, SOLUTION INTRAVENOUS at 13:23

## 2024-05-30 RX ADMIN — CALCIUM CHLORIDE, MAGNESIUM CHLORIDE, DEXTROSE MONOHYDRATE, LACTIC ACID, SODIUM CHLORIDE, SODIUM BICARBONATE AND POTASSIUM CHLORIDE: 5.15; 2.03; 22; 5.4; 6.46; 3.09; .157 INJECTION INTRAVENOUS at 13:23

## 2024-05-30 RX ADMIN — SODIUM BICARBONATE 325 MG: 650 TABLET ORAL at 19:35

## 2024-05-30 RX ADMIN — SODIUM BICARBONATE 100 MEQ: 84 INJECTION, SOLUTION INTRAVENOUS at 05:49

## 2024-05-30 RX ADMIN — HEPARIN SODIUM 5000 UNITS: 5000 INJECTION INTRAVENOUS; SUBCUTANEOUS at 21:07

## 2024-05-30 RX ADMIN — IPRATROPIUM BROMIDE AND ALBUTEROL SULFATE 1 DOSE: .5; 3 SOLUTION RESPIRATORY (INHALATION) at 21:45

## 2024-05-30 RX ADMIN — IPRATROPIUM BROMIDE AND ALBUTEROL SULFATE 1 DOSE: .5; 3 SOLUTION RESPIRATORY (INHALATION) at 13:43

## 2024-05-30 RX ADMIN — Medication 20 MCG/MIN: at 05:43

## 2024-05-30 RX ADMIN — SODIUM CHLORIDE, PRESERVATIVE FREE 10 ML: 5 INJECTION INTRAVENOUS at 10:29

## 2024-05-30 RX ADMIN — DEXMEDETOMIDINE HYDROCHLORIDE 0.2 MCG/KG/HR: 400 INJECTION, SOLUTION INTRAVENOUS at 23:33

## 2024-05-30 RX ADMIN — METHYLPREDNISOLONE SODIUM SUCCINATE 40 MG: 40 INJECTION INTRAMUSCULAR; INTRAVENOUS at 23:04

## 2024-05-30 RX ADMIN — THIAMINE HCL TAB 100 MG 100 MG: 100 TAB at 08:30

## 2024-05-30 RX ADMIN — METHYLPREDNISOLONE SODIUM SUCCINATE 40 MG: 40 INJECTION INTRAMUSCULAR; INTRAVENOUS at 08:29

## 2024-05-30 RX ADMIN — VANCOMYCIN HYDROCHLORIDE 1250 MG: 1.25 INJECTION, POWDER, LYOPHILIZED, FOR SOLUTION INTRAVENOUS at 16:10

## 2024-05-30 RX ADMIN — FOLIC ACID 1 MG: 1 TABLET ORAL at 08:30

## 2024-05-30 RX ADMIN — VASOPRESSIN 0.03 UNITS/MIN: 20 INJECTION, SOLUTION INTRAVENOUS at 00:10

## 2024-05-30 RX ADMIN — HEPARIN SODIUM 5000 UNITS: 5000 INJECTION INTRAVENOUS; SUBCUTANEOUS at 14:00

## 2024-05-30 RX ADMIN — SODIUM BICARBONATE: 84 INJECTION, SOLUTION INTRAVENOUS at 08:30

## 2024-05-30 ASSESSMENT — PULMONARY FUNCTION TESTS
PIF_VALUE: 19
PIF_VALUE: 21
PIF_VALUE: 22
PIF_VALUE: 19
PIF_VALUE: 24
PIF_VALUE: 23
PIF_VALUE: 22
PIF_VALUE: 23
PIF_VALUE: 22
PIF_VALUE: 21
PIF_VALUE: 22
PIF_VALUE: 23
PIF_VALUE: 23
PIF_VALUE: 24
PIF_VALUE: 21
PIF_VALUE: 23
PIF_VALUE: 26
PIF_VALUE: 23
PIF_VALUE: 23
PIF_VALUE: 22
PIF_VALUE: 18
PIF_VALUE: 22

## 2024-05-30 ASSESSMENT — PAIN SCALES - GENERAL: PAINLEVEL_OUTOF10: 0

## 2024-05-30 NOTE — PROGRESS NOTES
Progress Note  Date:2024       Room:[facility]  Patient Name:Agustín Willis     YOB: 1945     Age:78 y.o.        Subjective    Subjective  Patient followed septic shock with concern for aspiration pneumonia. He is no longer hypothermic.  Procal and CRP markedly elevated.  MRSA nasal PCR positive. Blood, sputum, urine culture pending.  Currently on Vancomycin and Meropenem.     Objective         Vitals Last 24 Hours:  TEMPERATURE:  Temp  Av.7 °F (35.9 °C)  Min: 94.4 °F (34.7 °C)  Max: 97.9 °F (36.6 °C)  RESPIRATIONS RANGE: Resp  Av.1  Min: 12  Max: 31  PULSE OXIMETRY RANGE: SpO2  Av.6 %  Min: 79 %  Max: 100 %  PULSE RANGE: Pulse  Av.5  Min: 53  Max: 144  BLOOD PRESSURE RANGE: Systolic (24hrs), Av , Min:60 , Max:231   ; Diastolic (24hrs), Av, Min:21, Max:116         Objective  Vitals and nursing note reviewed.   Constitutional:       General: He is in acute distress.      Appearance: He is ill-appearing and toxic-appearing.   HENT:      Head: Normocephalic and atraumatic.      Right Ear: External ear normal.      Left Ear: External ear normal.      Nose: Nose normal.      Mouth/Throat:      Comments: ET Tube, orogastric tube  Eyes:      Comments: Closed    Cardiovascular:      Rate and Rhythm: Normal rate and regular rhythm.      Heart sounds: No murmur heard.  Pulmonary:      Breath sounds: Rhonchi present. No wheezing or rales.   Abdominal:      General: Bowel sounds are normal. There is no distension.      Palpations: Abdomen is soft.      Tenderness: There is no abdominal tenderness.   Genitourinary:     Comments: No urinary device  Musculoskeletal:      Cervical back: Neck supple.      Right lower leg: No edema.      Left lower leg: Edema present.   Skin:     Findings: No rash.   Neurological:      Comments: intubated   Psychiatric:      Comments: intubated      Labs/Imaging/Diagnostics    Labs:  CBC:  Recent Labs     24  1301 24  0340   WBC 6.2 3.4*   RBC

## 2024-05-30 NOTE — CONSULTS
IMPRESSION:   Acute hypoxic respiratory failure  Cardiac arrest  Severe sepsis with septic shock  Aspiration pneumonia  Acute kidney injury  Hyperkalemia  COPD  History of cocaine abuse polysubstance abuse in the past  Pt is at high risk of sudden decline and decompensation with life threatening consequenses and continued end organ dysfunction and failure  Pt is critically ill. Time spent with pt and staff actively rendering care, managing pt and coordinating care as stated below; 30 minutes, exclusive of any procedures      RECOMMENDATIONS/PLAN:   ICU monitoring  Ventilator for mechanical life support and prevent respiratory arrest with protective lung strategies  ABG acceptable pCO2 35 pO2 66 on 75% FiO2  Chest x-ray shows right lung opacity ET tube has been repositioned it was close to the right mainstem bronchi  Status post cardiac arrest patient coded and he was intubated and received epinephrine and ACLS protocol ROSC is obtained 10 minutes code was run  Renal function worse potassium elevated leaning towards dialysis or CVVH  Now patient is on Levophed  He is wheezing start patient on IV Solu-Medrol nebulizer treatment  Agree with Empiric IV antibiotics pending culture results   Follow culture results Zosyn and vancomycin  Will be available to assist in medical management while in the CCU pending disposition     [x] High complexity decision making was performed  [x] See my orders for details  HPI  78-year-old male nursing home resident came in because of shortness of breath respiratory distress patient has episodes of vomiting after eating breakfast this morning subsequently came to the hospital saturation was in 70s he was back to the emergency room subsequently intubated and then he coded hemodynamically unstable now he is on 2 pressors vasopressin and Levophed denies intubated on ventilator critical care consult was called, past medical history of polysubstance abuse cocaine abuse COPD hypertension

## 2024-05-30 NOTE — PROGRESS NOTES
Patient is followed by [facility] cardiology.  Will refer consult to [facility] for continuity of care.

## 2024-05-30 NOTE — PROGRESS NOTES
Nephrology f/u          Patient: Agustín Willis MRN: [id number]  SSN: [id number]    YOB: 1945  Age: 78 y.o.  Sex: male      Subjective:   I have seen the patient in ICU  As per ICU nurse patient has started this morning  He is on ventilatory support with FiO2 60%  Worsening FIDEL  No urine output overnight  On single pressor support    I will start on CRRT  I have spoken to his sister on the phone.    Past Medical History:   Diagnosis Date    Benign prostatic disease 8/3/2020    Hypertrophy with Outflow Obstruction    Benign prostatic hyperplasia 8/3/2020    Cervical radiculopathy 8/3/2020    Chronic obstructive lung disease (HCC) 8/3/2020    Cocaine abuse (HCC) 8/3/2020    Dizziness and giddiness 8/3/2020    Hypercholesterolemia 8/3/2020    Hypertensive disorder 8/3/2020    Insomnia 8/3/2020    Kidney disease 8/3/2020    Low back pain 8/3/2020    Osteoarthritis of knee 8/3/2020    Sleep apnea 8/3/2020    Vasovagal syncope 8/3/2020     No past surgical history on file.   Family History   Problem Relation Age of Onset    Hypertension Mother      Social History     Tobacco Use    Smoking status: Former    Smokeless tobacco: Never   Substance Use Topics    Alcohol use: Yes      Current Facility-Administered Medications   Medication Dose Route Frequency Provider Last Rate Last Admin    propofol 1000 MG/100ML infusion             propofol infusion  5-50 mcg/kg/min IntraVENous Continuous Jeremy Ramos MD        prismaSol BGK 2/3.5 dialysis solution   Dialysis Continuous Sol Ferraro MD        heparin (porcine) injection 1,100-1,900 Units  1,100-1,900 Units IntraCATHeter PRN Sol Ferraro MD        And    heparin (porcine) injection 1,100-1,900 Units  1,100-1,900 Units IntraCATHeter PRN Sol Ferraro MD        prismaSol BGK 2/3.5 dialysis solution   Dialysis Continuous Sol Ferraro MD        0.9 % sodium chloride infusion   Dialysis Continuous Sol Ferraro MD        dexmedeTOMIDine  MD Mikaela        Or    ondansetron (ZOFRAN) injection 4 mg  4 mg IntraVENous Q6H PRN Mikaela Arredondo MD        polyethylene glycol (GLYCOLAX) packet 17 g  17 g Oral Daily PRN Mikaela Arredondo MD        acetaminophen (TYLENOL) tablet 650 mg  650 mg Oral Q6H PRN Mikaela Arredondo MD        Or    acetaminophen (TYLENOL) suppository 650 mg  650 mg Rectal Q6H PRN Mikaela Arredondo MD        heparin (porcine) injection 5,000 Units  5,000 Units SubCUTAneous 3 times per day Mikaela Arredondo MD   5,000 Units at 05/30/24 0550    glucose chewable tablet 16 g  4 tablet Oral PRN Mikaela Arredondo MD        dextrose bolus 10% 125 mL  125 mL IntraVENous PRN Mikaela Arredondo MD        Or    dextrose bolus 10% 250 mL  250 mL IntraVENous PRN Mikaela Arredondo MD        glucagon injection 1 mg  1 mg SubCUTAneous PRN Mikaela Arredondo MD        dextrose 10 % infusion   IntraVENous Continuous PRN Mikaela Arredondo MD        meropenem (MERREM) 1,000 mg in sodium chloride 0.9 % 100 mL IVPB (mini-bag)  1,000 mg IntraVENous Q12H Mikaela Arredondo MD   Stopped at 05/30/24 0848    vancomycin random - please collect level on 5/30 at 1300. Thank you.  1 each Other Once Mikaela Arredondo MD        vancomycin (VANCOCIN) intermittent dosing (placeholder)  1 each Other RX Placeholder Mikaela Arredondo MD        sodium bicarbonate 50 mEq in sodium chloride 0.45 % 1,000 mL infusion   IntraVENous Continuous Sol Ferraro  mL/hr at 05/30/24 0830 New Bag at 05/30/24 0830    ipratropium 0.5 mg-albuterol 2.5 mg (DUONEB) nebulizer solution 1 Dose  1 Dose Inhalation Q6H RT Jeremy Ramos MD   1 Dose at 05/30/24 0754        Allergies   Allergen Reactions    Penicillin G Other (See Comments)     Pt states he passes out    Sulfamethoxazole-Trimethoprim      Pt states he passes out       Review of Systems:  Unable to obtain    Objective:     Vitals:    05/30/24 0645 05/30/24 0700 05/30/24 0756 05/30/24 0843   BP: 95/69 105/72     Pulse: 60 60 67

## 2024-05-30 NOTE — CARE COORDINATION
CM reviewed Pt medicals, Pt comes from [facility].    CM sent updated medicals.     Pt will return to [facility] when medically stable.

## 2024-05-30 NOTE — PROGRESS NOTES
Vancomycin Dosing Consult  Agustín Willis is a 78 y.o. male with upper respiratory infection and aspiration pneumonia. Pharmacy was consulted by Dr. Arredondo to dose and monitor vancomycin. Today is day 2.    Antibiotic regimen: Vancomycin + meropenem    Temp (24hrs), Av.6 °F (35.9 °C), Min:94.4 °F (34.7 °C), Max:97.9 °F (36.6 °C)    Recent Labs     24  1301 24  1835 24  0340 24  1120   WBC 6.2  --  3.4* 6.7   CREATININE 2.06* 2.67* 3.09* 2.90*   BUN 58* 67* 72* 73*     Est CrCl: 22 mL/min,   Concomitant nephrotoxic drugs: None    Cultures:    blood x 2: NGTD, prelim   urine: pending   sputum: pending    MRSA Swab: Detected     Target range: Trough 15-20 mcg/mL (Continuous Renal Replacement Therapy)    Recent level history:  Date/Time Dose & Interval Measured Level (mcg/mL) Associated AUC/ANGELINE    1305 2000 mg x 1 18 N/a        Assessment/Plan:   Patient started on CRRT.   Vancomycin 1250 mg IV q24h was started.  Level is scheduled for  1500  Antimicrobial stop date 5 days      Bernie Alfaro, PharmD, BCPS, BCCCP  Clinical Pharmacist   [phone]

## 2024-05-30 NOTE — CONSULTS
CARDIOLOGY CONSULTATION    REASON FOR CONSULT: Cardiac arrest    REQUESTING PROVIDER: Dr. Arredondo     CHIEF COMPLAINT:  Respiratory distress    HISTORY OF PRESENT ILLNESS:  Agustín Willis is a 78 y.o. year-old male with past medical history significant for hypertension, COPD, cocaine abuse, CKD who was evaluated today due to cardiac arrest. Patient initially presented to the ED via EMS from nursing home due to respiratory distress after vomiting breakfast. Required intubation in ED. Apparently with VF arrest in ED, shock x4. Remains intubated, sedated in ICU. On pressor support.     Records from hospital admission course thus far reviewed.      Telemetry reviewed.       INPATIENT MEDICATIONS:  Home medications reviewed.    Current Facility-Administered Medications:     propofol infusion, 5-50 mcg/kg/min, IntraVENous, Continuous, Jeremy Ramos MD, Last Rate: 15.8 mL/hr at 05/30/24 1336, 30 mcg/kg/min at 05/30/24 1336    prismaSol BGK 2/3.5 dialysis solution, , Dialysis, Continuous, Sol Ferraro MD, Last Rate: 1,000 mL/hr at 05/30/24 1324, New Bag at 05/30/24 1324    heparin (porcine) injection 1,100-1,900 Units, 1,100-1,900 Units, IntraCATHeter, PRN **AND** heparin (porcine) injection 1,100-1,900 Units, 1,100-1,900 Units, IntraCATHeter, PRN, Sol Ferraro MD    prismaSol BGK 2/3.5 dialysis solution, , Dialysis, Continuous, Sol Ferraro MD, Last Rate: 1,000 mL/hr at 05/30/24 1323, New Bag at 05/30/24 1323    0.9 % sodium chloride infusion *CRRT PBP*, , Dialysis, Continuous, Sol Ferraro MD, Last Rate: 200 mL/hr at 05/30/24 1323, New Bag at 05/30/24 1323    mupirocin (BACTROBAN) 2 % ointment, , Each Nostril, BID, Riley Alonzo MD, Given at 05/30/24 1400    dexmedeTOMIDine (PRECEDEX) 400 mcg in sodium chloride 0.9 % 100 mL infusion, 0.1-1.5 mcg/kg/hr, IntraVENous, Continuous, Mikaela Arredondo MD, Last Rate: 8.2 mL/hr at 05/30/24 0829, 0.4 mcg/kg/hr at 05/30/24 0829    fentaNYL (SUBLIMAZE) infusion  MD Mikaela    acetaminophen (TYLENOL) tablet 650 mg, 650 mg, Oral, Q6H PRN **OR** acetaminophen (TYLENOL) suppository 650 mg, 650 mg, Rectal, Q6H PRN, Mikaela Arredondo MD    heparin (porcine) injection 5,000 Units, 5,000 Units, SubCUTAneous, 3 times per day, Mikaela Arredondo MD, 5,000 Units at 05/30/24 1400    glucose chewable tablet 16 g, 4 tablet, Oral, PRN, Mikaela Arredondo MD    dextrose bolus 10% 125 mL, 125 mL, IntraVENous, PRN **OR** dextrose bolus 10% 250 mL, 250 mL, IntraVENous, PRN, Mikaela Arredondo MD    glucagon injection 1 mg, 1 mg, SubCUTAneous, PRN, Mikaela Arredondo MD    dextrose 10 % infusion, , IntraVENous, Continuous PRN, Mikaela Arredondo MD    [COMPLETED] meropenem (MERREM) 1,000 mg in sodium chloride 0.9 % 100 mL IVPB (mini-bag), 1,000 mg, IntraVENous, Once, Stopped at 05/29/24 1846 **FOLLOWED BY** meropenem (MERREM) 1,000 mg in sodium chloride 0.9 % 100 mL IVPB (mini-bag), 1,000 mg, IntraVENous, Q12H, Mikaela Arredondo MD, Stopped at 05/30/24 0848    vancomycin (VANCOCIN) intermittent dosing (placeholder), 1 each, Other, RX Placeholder, Mikaela Arredondo MD    ipratropium 0.5 mg-albuterol 2.5 mg (DUONEB) nebulizer solution 1 Dose, 1 Dose, Inhalation, Q6H RT, Jeremy Ramos MD, 1 Dose at 05/30/24 1343     ALLERGIES:  Allergies reviewed with the patient,  Allergies   Allergen Reactions    Penicillin G Other (See Comments)     Pt states he passes out    Sulfamethoxazole-Trimethoprim      Pt states he passes out    .      FAMILY HISTORY:  Family history reviewed.        SOCIAL HISTORY:  Notable for former tobacco use, heavy alcohol and illicit drug use.      REVIEW OF SYSTEMS:  Complete review of systems performed, pertinents noted above, all other systems are negative.    PHYSICAL EXAMINATION:    General:  Intubated, sedated  Cardiovascular:  RRR  Respiratory:  Lungs are diminished  Abdomen:  Rounded, nontender  Extremities:  Mild lower extremity edema  Skin:  Dry, warm  Psych:  Normal

## 2024-05-30 NOTE — PROGRESS NOTES
General Daily Progress Note      Patient Name:   Agustín Willis       YOB: 1945       Age:  78 y.o.      Admit Date: 5/29/2024      Subjective:   Patient is a 78 y.o. year old male past medical history of COPD BPH cervical radiculopathy cocaine abuse hypertension hyperlipidemia seasonal allergies SVT came to emergency room with respiratory arrest, patient was at nursing home after eating breakfast he vomited everywhere likely aspirated oxygen saturation was 70% initially put on nonrebreather patient was intubated in the ER patient was hypotensive started Levophed 10 patient have a cardiac arrest went to V-fib received shock x 4 received epi and amiodarone  Was started on Levophed and vasopressin received 2 L of IV fluid, received IV Zosyn and vancomycin in the ER     Patient admitted to the ICU for further workup  Patient on ventilator/unresponsive       5/30    Patient on ventilator propofol and Precedex drip  Hypotension on Levophed and vasopressin  Patient has no urine output last night received 500 bolus    Nephrology decided for start CRRT IR team for dialysis cath  BUN 72 creatinine 3.09  CRP 10.5  MRSA candice           Objective:     Vitals:    05/30/24 0843   BP:    Pulse: 77   Resp: 15   Temp:    SpO2: 97%        Recent Results (from the past 24 hour(s))   POC Blood Gas and Chemistry    Collection Time: 05/29/24  1:00 PM   Result Value Ref Range    POC pH 7.14 (LL) 7.35 - 7.45      POC pCO2 42.2 35.0 - 45.0 mmHg    POC PO2 49 (LL) 75 - 100 mmHg    POC Sodium 137 136 - 145 mmol/L    POC Potassium 6.2 (HH) 3.5 - 5.5 mmol/L    POC Ionized Calcium 1.11 (L) 1.12 - 1.32 mmol/L    POC Glucose 273 (H) 65 - 100 mg/dL    POC Chloride 109 (H) 98 - 107 MMOL/L    POC Creatinine 2.38 (H) 0.6 - 1.3 MG/DL    eGFR, POC 27 (L) >60 ml/min/1.73m2    Base Deficit (POC) 14.4 mmol/L    POC HCO3 14.2 (L) 19.0 - 28.0 mmol/L    POC TCO2 14 MMOL/L    Source Venous      Performed by: Genaro Curran (PCT)     POC Lactic

## 2024-05-31 ENCOUNTER — APPOINTMENT (OUTPATIENT)
Facility: HOSPITAL | Age: 79
DRG: 870 | End: 2024-05-31
Payer: MEDICARE

## 2024-05-31 ENCOUNTER — APPOINTMENT (OUTPATIENT)
Facility: HOSPITAL | Age: 79
DRG: 870 | End: 2024-05-31
Attending: INTERNAL MEDICINE
Payer: MEDICARE

## 2024-05-31 LAB
ALBUMIN SERPL-MCNC: 2 G/DL (ref 3.5–5)
ALBUMIN SERPL-MCNC: 2.1 G/DL (ref 3.5–5)
ALBUMIN SERPL-MCNC: 2.1 G/DL (ref 3.5–5)
ALBUMIN SERPL-MCNC: 2.2 G/DL (ref 3.5–5)
ALBUMIN/GLOB SERPL: 0.4 (ref 1.1–2.2)
ALP SERPL-CCNC: 52 U/L (ref 45–117)
ALT SERPL-CCNC: 14 U/L (ref 12–78)
ANION GAP SERPL CALC-SCNC: 6 MMOL/L (ref 5–15)
ANION GAP SERPL CALC-SCNC: 7 MMOL/L (ref 5–15)
APTT PPP: 37.3 SEC (ref 21.2–34.1)
ARTERIAL PATENCY WRIST A: YES
AST SERPL W P-5'-P-CCNC: 22 U/L (ref 15–37)
BACTERIA SPEC CULT: NORMAL
BACTERIA SPEC CULT: NORMAL
BASE DEFICIT BLDA-SCNC: 3.8 MMOL/L
BASOPHILS # BLD: 0 K/UL (ref 0–0.1)
BASOPHILS # BLD: 0 K/UL (ref 0–0.1)
BASOPHILS NFR BLD: 0 % (ref 0–1)
BASOPHILS NFR BLD: 0 % (ref 0–1)
BDY SITE: ABNORMAL
BILIRUB SERPL-MCNC: 0.5 MG/DL (ref 0.2–1)
BODY TEMPERATURE: 95.7
BUN SERPL-MCNC: 37 MG/DL (ref 6–20)
BUN SERPL-MCNC: 38 MG/DL (ref 6–20)
BUN SERPL-MCNC: 42 MG/DL (ref 6–20)
BUN SERPL-MCNC: 48 MG/DL (ref 6–20)
BUN SERPL-MCNC: 52 MG/DL (ref 6–20)
BUN SERPL-MCNC: 53 MG/DL (ref 6–20)
BUN/CREAT SERPL: 21 (ref 12–20)
BUN/CREAT SERPL: 21 (ref 12–20)
BUN/CREAT SERPL: 22 (ref 12–20)
BUN/CREAT SERPL: 22 (ref 12–20)
BUN/CREAT SERPL: 23 (ref 12–20)
BUN/CREAT SERPL: 23 (ref 12–20)
CA-I BLD-MCNC: 1.13 MMOL/L (ref 1.13–1.32)
CA-I BLD-MCNC: 8.4 MG/DL (ref 8.5–10.1)
CA-I BLD-MCNC: 8.5 MG/DL (ref 8.5–10.1)
CA-I BLD-MCNC: 8.5 MG/DL (ref 8.5–10.1)
CA-I BLD-MCNC: 8.6 MG/DL (ref 8.5–10.1)
CA-I BLD-MCNC: 8.6 MG/DL (ref 8.5–10.1)
CA-I BLD-MCNC: 8.7 MG/DL (ref 8.5–10.1)
CHLORIDE SERPL-SCNC: 106 MMOL/L (ref 97–108)
CHLORIDE SERPL-SCNC: 106 MMOL/L (ref 97–108)
CHLORIDE SERPL-SCNC: 107 MMOL/L (ref 97–108)
CHLORIDE SERPL-SCNC: 107 MMOL/L (ref 97–108)
CHLORIDE SERPL-SCNC: 108 MMOL/L (ref 97–108)
CHLORIDE SERPL-SCNC: 109 MMOL/L (ref 97–108)
CO2 SERPL-SCNC: 24 MMOL/L (ref 21–32)
CO2 SERPL-SCNC: 25 MMOL/L (ref 21–32)
COHGB MFR BLD: 0.3 % (ref 1–2)
CREAT SERPL-MCNC: 1.61 MG/DL (ref 0.7–1.3)
CREAT SERPL-MCNC: 1.78 MG/DL (ref 0.7–1.3)
CREAT SERPL-MCNC: 2.02 MG/DL (ref 0.7–1.3)
CREAT SERPL-MCNC: 2.15 MG/DL (ref 0.7–1.3)
CREAT SERPL-MCNC: 2.33 MG/DL (ref 0.7–1.3)
CREAT SERPL-MCNC: 2.38 MG/DL (ref 0.7–1.3)
CRP SERPL-MCNC: 29.8 MG/DL (ref 0–0.3)
DATE LAST DOSE: NORMAL
DIFFERENTIAL METHOD BLD: ABNORMAL
DIFFERENTIAL METHOD BLD: ABNORMAL
DOSE AMOUNT: NORMAL UNITS
ECHO BSA: 1.98 M2
EKG ATRIAL RATE: 133 BPM
EKG DIAGNOSIS: NORMAL
EKG Q-T INTERVAL: 346 MS
EKG QRS DURATION: 98 MS
EKG QTC CALCULATION (BAZETT): 506 MS
EKG R AXIS: -6 DEGREES
EKG T AXIS: 201 DEGREES
EKG VENTRICULAR RATE: 129 BPM
EOSINOPHIL # BLD: 0 K/UL (ref 0–0.4)
EOSINOPHIL # BLD: 0 K/UL (ref 0–0.4)
EOSINOPHIL NFR BLD: 0 % (ref 0–7)
EOSINOPHIL NFR BLD: 0 % (ref 0–7)
ERYTHROCYTE [DISTWIDTH] IN BLOOD BY AUTOMATED COUNT: 14.2 % (ref 11.5–14.5)
ERYTHROCYTE [DISTWIDTH] IN BLOOD BY AUTOMATED COUNT: 14.4 % (ref 11.5–14.5)
FERRITIN SERPL-MCNC: 1184 NG/ML (ref 26–388)
FIO2 ON VENT: 40 %
GAS FLOW.O2 SETTING OXYMISER: 14
GLOBULIN SER CALC-MCNC: 4.5 G/DL (ref 2–4)
GLUCOSE SERPL-MCNC: 159 MG/DL (ref 65–100)
GLUCOSE SERPL-MCNC: 159 MG/DL (ref 65–100)
GLUCOSE SERPL-MCNC: 163 MG/DL (ref 65–100)
GLUCOSE SERPL-MCNC: 163 MG/DL (ref 65–100)
GLUCOSE SERPL-MCNC: 172 MG/DL (ref 65–100)
GLUCOSE SERPL-MCNC: 184 MG/DL (ref 65–100)
GRAM STN SPEC: NORMAL
HCO3 BLDA-SCNC: 21 MMOL/L (ref 22–26)
HCT VFR BLD AUTO: 25.8 % (ref 36.6–50.3)
HCT VFR BLD AUTO: 26.5 % (ref 36.6–50.3)
HCT VFR BLD AUTO: 27.1 % (ref 36.6–50.3)
HCT VFR BLD AUTO: 29 % (ref 36.6–50.3)
HGB BLD-MCNC: 8.2 G/DL (ref 12.1–17)
HGB BLD-MCNC: 8.3 G/DL (ref 12.1–17)
HGB BLD-MCNC: 8.6 G/DL (ref 12.1–17)
HGB BLD-MCNC: 9 G/DL (ref 12.1–17)
IMM GRANULOCYTES # BLD AUTO: 0 K/UL
IMM GRANULOCYTES # BLD AUTO: 0 K/UL
IMM GRANULOCYTES NFR BLD AUTO: 0 %
IMM GRANULOCYTES NFR BLD AUTO: 0 %
INR PPP: 1.5 (ref 0.9–1.1)
IRON SATN MFR SERPL: 12 % (ref 20–50)
IRON SERPL-MCNC: 18 UG/DL (ref 35–150)
LYMPHOCYTES # BLD: 1.5 K/UL (ref 0.8–3.5)
LYMPHOCYTES # BLD: 1.5 K/UL (ref 0.8–3.5)
LYMPHOCYTES NFR BLD: 7 % (ref 12–49)
LYMPHOCYTES NFR BLD: 7 % (ref 12–49)
Lab: NORMAL
Lab: NORMAL
MAGNESIUM SERPL-MCNC: 1.8 MG/DL (ref 1.6–2.4)
MAGNESIUM SERPL-MCNC: 1.9 MG/DL (ref 1.6–2.4)
MCH RBC QN AUTO: 28.6 PG (ref 26–34)
MCH RBC QN AUTO: 28.8 PG (ref 26–34)
MCH RBC QN AUTO: 29.4 PG (ref 26–34)
MCH RBC QN AUTO: 29.4 PG (ref 26–34)
MCHC RBC AUTO-ENTMCNC: 31 G/DL (ref 30–36.5)
MCHC RBC AUTO-ENTMCNC: 31.3 G/DL (ref 30–36.5)
MCHC RBC AUTO-ENTMCNC: 31.7 G/DL (ref 30–36.5)
MCHC RBC AUTO-ENTMCNC: 31.8 G/DL (ref 30–36.5)
MCV RBC AUTO: 92 FL (ref 80–99)
MCV RBC AUTO: 92.1 FL (ref 80–99)
MCV RBC AUTO: 92.5 FL (ref 80–99)
MCV RBC AUTO: 92.5 FL (ref 80–99)
METAMYELOCYTES NFR BLD MANUAL: 3 %
METAMYELOCYTES NFR BLD MANUAL: 4 %
METHGB MFR BLD: 0.3 % (ref 0–1.4)
MONOCYTES # BLD: 0.9 K/UL (ref 0–1)
MONOCYTES # BLD: 1 K/UL (ref 0–1)
MONOCYTES NFR BLD: 4 % (ref 5–13)
MONOCYTES NFR BLD: 5 % (ref 5–13)
NEUTS BAND NFR BLD MANUAL: 18 % (ref 0–6)
NEUTS BAND NFR BLD MANUAL: 7 % (ref 0–6)
NEUTS SEG # BLD: 17.6 K/UL (ref 1.8–8)
NEUTS SEG # BLD: 18.7 K/UL (ref 1.8–8)
NEUTS SEG NFR BLD: 66 % (ref 32–75)
NEUTS SEG NFR BLD: 79 % (ref 32–75)
NRBC # BLD: 0 K/UL (ref 0–0.01)
NRBC # BLD: 0 K/UL (ref 0–0.01)
NRBC # BLD: 0.04 K/UL (ref 0–0.01)
NRBC # BLD: 0.05 K/UL (ref 0–0.01)
NRBC BLD-RTO: 0 PER 100 WBC
NRBC BLD-RTO: 0 PER 100 WBC
NRBC BLD-RTO: 0.2 PER 100 WBC
NRBC BLD-RTO: 0.2 PER 100 WBC
OXYHGB MFR BLD: 93.4 % (ref 95–99)
PCO2 BLDA: 37 MMHG (ref 35–45)
PEEP RESPIRATORY: 7
PERFORMED BY:: ABNORMAL
PH BLDA: 7.38 (ref 7.35–7.45)
PHOSPHATE SERPL-MCNC: 3.5 MG/DL (ref 2.6–4.7)
PHOSPHATE SERPL-MCNC: 4 MG/DL (ref 2.6–4.7)
PHOSPHATE SERPL-MCNC: 4.4 MG/DL (ref 2.6–4.7)
PHOSPHATE SERPL-MCNC: 4.7 MG/DL (ref 2.6–4.7)
PHOSPHATE SERPL-MCNC: 4.7 MG/DL (ref 2.6–4.7)
PLATELET # BLD AUTO: 149 K/UL (ref 150–400)
PLATELET # BLD AUTO: 153 K/UL (ref 150–400)
PLATELET # BLD AUTO: 154 K/UL (ref 150–400)
PLATELET # BLD AUTO: 183 K/UL (ref 150–400)
PMV BLD AUTO: 10.2 FL (ref 8.9–12.9)
PMV BLD AUTO: 10.5 FL (ref 8.9–12.9)
PMV BLD AUTO: 10.5 FL (ref 8.9–12.9)
PMV BLD AUTO: 10.6 FL (ref 8.9–12.9)
PO2 BLDA: 71 MMHG (ref 80–100)
POTASSIUM SERPL-SCNC: 4.2 MMOL/L (ref 3.5–5.1)
POTASSIUM SERPL-SCNC: 4.4 MMOL/L (ref 3.5–5.1)
POTASSIUM SERPL-SCNC: 4.6 MMOL/L (ref 3.5–5.1)
POTASSIUM SERPL-SCNC: 5 MMOL/L (ref 3.5–5.1)
POTASSIUM SERPL-SCNC: 5.1 MMOL/L (ref 3.5–5.1)
POTASSIUM SERPL-SCNC: 5.1 MMOL/L (ref 3.5–5.1)
PROCALCITONIN SERPL-MCNC: 50.14 NG/ML
PROT SERPL-MCNC: 6.5 G/DL (ref 6.4–8.2)
PROTHROMBIN TIME: 18.4 SEC (ref 11.9–14.6)
RBC # BLD AUTO: 2.79 M/UL (ref 4.1–5.7)
RBC # BLD AUTO: 2.88 M/UL (ref 4.1–5.7)
RBC # BLD AUTO: 2.93 M/UL (ref 4.1–5.7)
RBC # BLD AUTO: 3.15 M/UL (ref 4.1–5.7)
RBC MORPH BLD: ABNORMAL
SAO2 % BLD: 94 % (ref 95–99)
SAO2% DEVICE SAO2% SENSOR NAME: ABNORMAL
SODIUM SERPL-SCNC: 137 MMOL/L (ref 136–145)
SODIUM SERPL-SCNC: 138 MMOL/L (ref 136–145)
SODIUM SERPL-SCNC: 139 MMOL/L (ref 136–145)
SODIUM SERPL-SCNC: 141 MMOL/L (ref 136–145)
SPECIMEN SITE: ABNORMAL
THERAPEUTIC RANGE: ABNORMAL SEC (ref 82–109)
TIBC SERPL-MCNC: 148 UG/DL (ref 250–450)
UFH PPP CHRO-ACNC: 0.76 IU/ML
UFH PPP CHRO-ACNC: >1.1 IU/ML
VANCOMYCIN SERPL-MCNC: 15.2 UG/ML
VT SETTING VENT: 450
WBC # BLD AUTO: 15.4 K/UL (ref 4.1–11.1)
WBC # BLD AUTO: 17.4 K/UL (ref 4.1–11.1)
WBC # BLD AUTO: 20.9 K/UL (ref 4.1–11.1)
WBC # BLD AUTO: 21.8 K/UL (ref 4.1–11.1)

## 2024-05-31 PROCEDURE — 2580000003 HC RX 258: Performed by: FAMILY MEDICINE

## 2024-05-31 PROCEDURE — 93005 ELECTROCARDIOGRAM TRACING: CPT | Performed by: INTERNAL MEDICINE

## 2024-05-31 PROCEDURE — 99233 SBSQ HOSP IP/OBS HIGH 50: CPT | Performed by: INTERNAL MEDICINE

## 2024-05-31 PROCEDURE — 82330 ASSAY OF CALCIUM: CPT

## 2024-05-31 PROCEDURE — 2500000003 HC RX 250 WO HCPCS: Performed by: FAMILY MEDICINE

## 2024-05-31 PROCEDURE — 86140 C-REACTIVE PROTEIN: CPT

## 2024-05-31 PROCEDURE — 2700000000 HC OXYGEN THERAPY PER DAY

## 2024-05-31 PROCEDURE — 80053 COMPREHEN METABOLIC PANEL: CPT

## 2024-05-31 PROCEDURE — 6370000000 HC RX 637 (ALT 250 FOR IP): Performed by: FAMILY MEDICINE

## 2024-05-31 PROCEDURE — 82728 ASSAY OF FERRITIN: CPT

## 2024-05-31 PROCEDURE — 80202 ASSAY OF VANCOMYCIN: CPT

## 2024-05-31 PROCEDURE — 84145 PROCALCITONIN (PCT): CPT

## 2024-05-31 PROCEDURE — 83735 ASSAY OF MAGNESIUM: CPT

## 2024-05-31 PROCEDURE — 36415 COLL VENOUS BLD VENIPUNCTURE: CPT

## 2024-05-31 PROCEDURE — 2500000003 HC RX 250 WO HCPCS: Performed by: INTERNAL MEDICINE

## 2024-05-31 PROCEDURE — 36600 WITHDRAWAL OF ARTERIAL BLOOD: CPT

## 2024-05-31 PROCEDURE — 6370000000 HC RX 637 (ALT 250 FOR IP): Performed by: INTERNAL MEDICINE

## 2024-05-31 PROCEDURE — 85025 COMPLETE CBC W/AUTO DIFF WBC: CPT

## 2024-05-31 PROCEDURE — 94003 VENT MGMT INPAT SUBQ DAY: CPT

## 2024-05-31 PROCEDURE — 85520 HEPARIN ASSAY: CPT

## 2024-05-31 PROCEDURE — 71045 X-RAY EXAM CHEST 1 VIEW: CPT

## 2024-05-31 PROCEDURE — 6360000002 HC RX W HCPCS: Performed by: FAMILY MEDICINE

## 2024-05-31 PROCEDURE — 80069 RENAL FUNCTION PANEL: CPT

## 2024-05-31 PROCEDURE — 93970 EXTREMITY STUDY: CPT

## 2024-05-31 PROCEDURE — 90945 DIALYSIS ONE EVALUATION: CPT

## 2024-05-31 PROCEDURE — 6360000002 HC RX W HCPCS: Performed by: INTERNAL MEDICINE

## 2024-05-31 PROCEDURE — 82803 BLOOD GASES ANY COMBINATION: CPT

## 2024-05-31 PROCEDURE — 83540 ASSAY OF IRON: CPT

## 2024-05-31 PROCEDURE — 85730 THROMBOPLASTIN TIME PARTIAL: CPT

## 2024-05-31 PROCEDURE — 85027 COMPLETE CBC AUTOMATED: CPT

## 2024-05-31 PROCEDURE — 94640 AIRWAY INHALATION TREATMENT: CPT

## 2024-05-31 PROCEDURE — 2580000003 HC RX 258: Performed by: INTERNAL MEDICINE

## 2024-05-31 PROCEDURE — 85610 PROTHROMBIN TIME: CPT

## 2024-05-31 PROCEDURE — 2000000000 HC ICU R&B

## 2024-05-31 RX ORDER — HEPARIN SODIUM 10000 [USP'U]/100ML
5-30 INJECTION, SOLUTION INTRAVENOUS CONTINUOUS
Status: DISCONTINUED | OUTPATIENT
Start: 2024-05-31 | End: 2024-06-21

## 2024-05-31 RX ORDER — HEPARIN SODIUM 1000 [USP'U]/ML
2000 INJECTION, SOLUTION INTRAVENOUS; SUBCUTANEOUS PRN
Status: DISCONTINUED | OUTPATIENT
Start: 2024-05-31 | End: 2024-06-21

## 2024-05-31 RX ORDER — HEPARIN SODIUM 1000 [USP'U]/ML
60 INJECTION, SOLUTION INTRAVENOUS; SUBCUTANEOUS ONCE
Status: DISCONTINUED | OUTPATIENT
Start: 2024-05-31 | End: 2024-05-31

## 2024-05-31 RX ORDER — HEPARIN SODIUM 1000 [USP'U]/ML
4000 INJECTION, SOLUTION INTRAVENOUS; SUBCUTANEOUS PRN
Status: DISCONTINUED | OUTPATIENT
Start: 2024-05-31 | End: 2024-06-21

## 2024-05-31 RX ADMIN — HEPARIN SODIUM 5000 UNITS: 5000 INJECTION INTRAVENOUS; SUBCUTANEOUS at 05:04

## 2024-05-31 RX ADMIN — CALCIUM CHLORIDE, MAGNESIUM CHLORIDE, DEXTROSE MONOHYDRATE, LACTIC ACID, SODIUM CHLORIDE, SODIUM BICARBONATE AND POTASSIUM CHLORIDE: 5.15; 2.03; 22; 5.4; 6.46; 3.09; .157 INJECTION INTRAVENOUS at 22:20

## 2024-05-31 RX ADMIN — FINASTERIDE 5 MG: 5 TABLET, FILM COATED ORAL at 08:13

## 2024-05-31 RX ADMIN — SODIUM CHLORIDE, PRESERVATIVE FREE 10 ML: 5 INJECTION INTRAVENOUS at 20:04

## 2024-05-31 RX ADMIN — VASOPRESSIN 0.03 UNITS/MIN: 20 INJECTION, SOLUTION INTRAVENOUS at 00:28

## 2024-05-31 RX ADMIN — METHYLPREDNISOLONE SODIUM SUCCINATE 40 MG: 40 INJECTION INTRAMUSCULAR; INTRAVENOUS at 22:31

## 2024-05-31 RX ADMIN — IPRATROPIUM BROMIDE AND ALBUTEROL SULFATE 1 DOSE: .5; 3 SOLUTION RESPIRATORY (INHALATION) at 01:55

## 2024-05-31 RX ADMIN — IPRATROPIUM BROMIDE AND ALBUTEROL SULFATE 1 DOSE: .5; 3 SOLUTION RESPIRATORY (INHALATION) at 08:48

## 2024-05-31 RX ADMIN — IPRATROPIUM BROMIDE AND ALBUTEROL SULFATE 1 DOSE: .5; 3 SOLUTION RESPIRATORY (INHALATION) at 19:46

## 2024-05-31 RX ADMIN — PROPOFOL 30 MCG/KG/MIN: 10 INJECTION, EMULSION INTRAVENOUS at 01:41

## 2024-05-31 RX ADMIN — SODIUM CHLORIDE, PRESERVATIVE FREE 10 ML: 5 INJECTION INTRAVENOUS at 08:04

## 2024-05-31 RX ADMIN — AMIODARONE HYDROCHLORIDE 1 MG/MIN: 1.8 INJECTION, SOLUTION INTRAVENOUS at 09:59

## 2024-05-31 RX ADMIN — FERROUS SULFATE TAB 325 MG (65 MG ELEMENTAL FE) 325 MG: 325 (65 FE) TAB at 08:13

## 2024-05-31 RX ADMIN — SODIUM BICARBONATE 325 MG: 650 TABLET ORAL at 17:17

## 2024-05-31 RX ADMIN — CALCIUM CHLORIDE, MAGNESIUM CHLORIDE, DEXTROSE MONOHYDRATE, LACTIC ACID, SODIUM CHLORIDE, SODIUM BICARBONATE AND POTASSIUM CHLORIDE: 5.15; 2.03; 22; 5.4; 6.46; 3.09; .157 INJECTION INTRAVENOUS at 01:38

## 2024-05-31 RX ADMIN — MEROPENEM 1000 MG: 1 INJECTION, POWDER, FOR SOLUTION INTRAVENOUS at 17:17

## 2024-05-31 RX ADMIN — METHYLPREDNISOLONE SODIUM SUCCINATE 40 MG: 40 INJECTION INTRAMUSCULAR; INTRAVENOUS at 15:12

## 2024-05-31 RX ADMIN — MUPIROCIN: 20 OINTMENT TOPICAL at 20:04

## 2024-05-31 RX ADMIN — HEPARIN SODIUM 11 UNITS/KG/HR: 10000 INJECTION, SOLUTION INTRAVENOUS at 10:09

## 2024-05-31 RX ADMIN — THIAMINE HCL TAB 100 MG 100 MG: 100 TAB at 08:13

## 2024-05-31 RX ADMIN — SODIUM BICARBONATE 325 MG: 650 TABLET ORAL at 20:04

## 2024-05-31 RX ADMIN — VASOPRESSIN 0.02 UNITS/MIN: 20 INJECTION, SOLUTION INTRAVENOUS at 18:02

## 2024-05-31 RX ADMIN — CALCIUM CHLORIDE, MAGNESIUM CHLORIDE, DEXTROSE MONOHYDRATE, LACTIC ACID, SODIUM CHLORIDE, SODIUM BICARBONATE AND POTASSIUM CHLORIDE: 5.15; 2.03; 22; 5.4; 6.46; 3.09; .157 INJECTION INTRAVENOUS at 06:55

## 2024-05-31 RX ADMIN — SODIUM BICARBONATE 325 MG: 650 TABLET ORAL at 13:04

## 2024-05-31 RX ADMIN — VANCOMYCIN HYDROCHLORIDE 1250 MG: 1.25 INJECTION, POWDER, LYOPHILIZED, FOR SOLUTION INTRAVENOUS at 15:13

## 2024-05-31 RX ADMIN — DEXMEDETOMIDINE HYDROCHLORIDE 0.2 MCG/KG/HR: 400 INJECTION, SOLUTION INTRAVENOUS at 08:05

## 2024-05-31 RX ADMIN — CALCIUM CHLORIDE, MAGNESIUM CHLORIDE, DEXTROSE MONOHYDRATE, LACTIC ACID, SODIUM CHLORIDE, SODIUM BICARBONATE AND POTASSIUM CHLORIDE: 5.15; 2.03; 22; 5.4; 6.46; 3.09; .157 INJECTION INTRAVENOUS at 11:57

## 2024-05-31 RX ADMIN — ASPIRIN 81 MG: 81 TABLET, COATED ORAL at 08:13

## 2024-05-31 RX ADMIN — Medication 8 MCG/MIN: at 08:04

## 2024-05-31 RX ADMIN — FOLIC ACID 1 MG: 1 TABLET ORAL at 08:13

## 2024-05-31 RX ADMIN — METHYLPREDNISOLONE SODIUM SUCCINATE 40 MG: 40 INJECTION INTRAMUSCULAR; INTRAVENOUS at 08:04

## 2024-05-31 RX ADMIN — AMIODARONE HYDROCHLORIDE 150 MG: 1.5 INJECTION, SOLUTION INTRAVENOUS at 09:46

## 2024-05-31 RX ADMIN — VASOPRESSIN 0.03 UNITS/MIN: 20 INJECTION, SOLUTION INTRAVENOUS at 09:03

## 2024-05-31 RX ADMIN — PROPOFOL 30 MCG/KG/MIN: 10 INJECTION, EMULSION INTRAVENOUS at 13:04

## 2024-05-31 RX ADMIN — MEROPENEM 1000 MG: 1 INJECTION, POWDER, FOR SOLUTION INTRAVENOUS at 05:04

## 2024-05-31 RX ADMIN — AMIODARONE HYDROCHLORIDE 0.5 MG/MIN: 1.8 INJECTION, SOLUTION INTRAVENOUS at 16:03

## 2024-05-31 RX ADMIN — PROPOFOL 30 MCG/KG/MIN: 10 INJECTION, EMULSION INTRAVENOUS at 08:05

## 2024-05-31 RX ADMIN — MUPIROCIN: 20 OINTMENT TOPICAL at 08:13

## 2024-05-31 RX ADMIN — CALCIUM CHLORIDE, MAGNESIUM CHLORIDE, DEXTROSE MONOHYDRATE, LACTIC ACID, SODIUM CHLORIDE, SODIUM BICARBONATE AND POTASSIUM CHLORIDE: 5.15; 2.03; 22; 5.4; 6.46; 3.09; .157 INJECTION INTRAVENOUS at 17:08

## 2024-05-31 RX ADMIN — CALCIUM CHLORIDE, MAGNESIUM CHLORIDE, DEXTROSE MONOHYDRATE, LACTIC ACID, SODIUM CHLORIDE, SODIUM BICARBONATE AND POTASSIUM CHLORIDE: 5.15; 2.03; 22; 5.4; 6.46; 3.09; .157 INJECTION INTRAVENOUS at 11:58

## 2024-05-31 RX ADMIN — SODIUM BICARBONATE 325 MG: 650 TABLET ORAL at 08:13

## 2024-05-31 RX ADMIN — IPRATROPIUM BROMIDE AND ALBUTEROL SULFATE 1 DOSE: .5; 3 SOLUTION RESPIRATORY (INHALATION) at 13:51

## 2024-05-31 RX ADMIN — CALCIUM CHLORIDE, MAGNESIUM CHLORIDE, DEXTROSE MONOHYDRATE, LACTIC ACID, SODIUM CHLORIDE, SODIUM BICARBONATE AND POTASSIUM CHLORIDE: 5.15; 2.03; 22; 5.4; 6.46; 3.09; .157 INJECTION INTRAVENOUS at 17:09

## 2024-05-31 ASSESSMENT — PULMONARY FUNCTION TESTS
PIF_VALUE: 22
PIF_VALUE: 21
PIF_VALUE: 20
PIF_VALUE: 21
PIF_VALUE: 20
PIF_VALUE: 19
PIF_VALUE: 20
PIF_VALUE: 20
PIF_VALUE: 22
PIF_VALUE: 21
PIF_VALUE: 19
PIF_VALUE: 21
PIF_VALUE: 21
PIF_VALUE: 22
PIF_VALUE: 20
PIF_VALUE: 22
PIF_VALUE: 20
PIF_VALUE: 21
PIF_VALUE: 21
PIF_VALUE: 22
PIF_VALUE: 22
PIF_VALUE: 20
PIF_VALUE: 20
PIF_VALUE: 22
PIF_VALUE: 21
PIF_VALUE: 21
PIF_VALUE: 20
PIF_VALUE: 21

## 2024-05-31 ASSESSMENT — PAIN SCALES - GENERAL
PAINLEVEL_OUTOF10: 0

## 2024-05-31 NOTE — PROGRESS NOTES
Heparin Infusion Initiation  Agustín Willis is a 78 y.o. male starting heparin for:  atrial fibrillation  Heparin dosing: order for weight based protocol  Initial Dosing Weight: 87.6 kg (Recorded body weight)  Recent Labs     05/30/24  1120 05/30/24  1615 05/30/24  2215 05/31/24  0220    186 180 183   HGB 8.5* 9.2* 9.1* 9.0*   INR 1.5*  --   --   --      Factor Xa inhibitor/LMWH use within the past 72 hours? No  If yes, date and time of last administration: N/A  Hypertriglyceridemia (> 690 mg/dL) or hyperbilirubinemia (> 37 mg/dL conjugated bilirubin, >14 mg/dL unconjugated bilirubin) present? No  Recent Labs     05/31/24  0220   BILITOT 0.5       Assessment/Plan:   Heparin to be monitored using anti-Xa for duration of therapy  Initial bolus ordered: No  Starting rate:  11 unit/kg/hr  PRN boluses entered: Yes

## 2024-05-31 NOTE — PROGRESS NOTES
Vancomycin Dosing Consult  Agustín Willis is a 78 y.o. male with upper respiratory infection and aspiration pneumonia. Pharmacy was consulted by Dr. Arredondo to dose and monitor vancomycin. Today is day 3.    Antibiotic regimen: Vancomycin + meropenem    Temp (24hrs), Av.4 °F (35.8 °C), Min:94.4 °F (34.7 °C), Max:98.4 °F (36.9 °C)    Recent Labs     24  0220 24  0615 24  1000 24  1605   WBC 15.4*  --  17.4* 20.9*   CREATININE 2.38*  2.33* 2.15* 2.02* 1.78*   BUN 52*  53* 48* 42* 38*     Est CrCl: 22 mL/min, CRRT  Concomitant nephrotoxic drugs: None    Cultures:    blood x 2: NGTD, prelim   urine: negative, final   sputum: GPC in pairs, GPRs, normal respiratory doug, prelim    MRSA Swab: Detected     Target range: Trough 15-20 mcg/mL (Continuous Renal Replacement Therapy)    Recent level history:  Date/Time Dose & Interval Measured Level (mcg/mL) Associated AUC/ANGELINE    1305 2000 mg x 1 18 N/a    1505 1250 mg IV q24h 15.2         Assessment/Plan:   Patient continues on CRRT.   Level is within goal. Continue vancomycin 1250 mg IV q24h  Repeat level is scheduled for  1500  Antimicrobial stop date 5 days

## 2024-05-31 NOTE — PLAN OF CARE
Problem: Safety - Adult  Goal: Free from fall injury  Outcome: Progressing     Problem: Discharge Planning  Goal: Discharge to home or other facility with appropriate resources  Outcome: Progressing     Problem: Chronic Conditions and Co-morbidities  Goal: Patient's chronic conditions and co-morbidity symptoms are monitored and maintained or improved  Outcome: Progressing     Problem: Skin/Tissue Integrity  Goal: Absence of new skin breakdown  Description: 1.  Monitor for areas of redness and/or skin breakdown  2.  Assess vascular access sites hourly  3.  Every 4-6 hours minimum:  Change oxygen saturation probe site  4.  Every 4-6 hours:  If on nasal continuous positive airway pressure, respiratory therapy assess nares and determine need for appliance change or resting period.  Outcome: Progressing     Problem: Neurosensory - Adult  Goal: Achieves stable or improved neurological status  Outcome: Progressing  Goal: Absence of seizures  Outcome: Progressing  Goal: Remains free of injury related to seizures activity  Outcome: Progressing  Goal: Achieves maximal functionality and self care  Outcome: Progressing     Problem: Respiratory - Adult  Goal: Achieves optimal ventilation and oxygenation  Outcome: Progressing     Problem: Cardiovascular - Adult  Goal: Maintains optimal cardiac output and hemodynamic stability  Outcome: Progressing  Goal: Absence of cardiac dysrhythmias or at baseline  Outcome: Progressing     Problem: Skin/Tissue Integrity - Adult  Goal: Skin integrity remains intact  Outcome: Progressing  Goal: Incisions, wounds, or drain sites healing without S/S of infection  Outcome: Progressing  Goal: Oral mucous membranes remain intact  Outcome: Progressing     Problem: Musculoskeletal - Adult  Goal: Return mobility to safest level of function  Outcome: Progressing  Goal: Maintain proper alignment of affected body part  Outcome: Progressing  Goal: Return ADL status to a safe level of function  Outcome:

## 2024-05-31 NOTE — CARE COORDINATION
CM reviewed Pt medicals, Pt remains on the vent.    Pt comes from [facility] where he is a LT Resident.    CM will follow for D/C needs.

## 2024-05-31 NOTE — CONSULTS
IMPRESSION:   Acute hypoxic respiratory failure  Cardiac arrest  A-fib with RVR  Severe sepsis with septic shock  Aspiration pneumonia  Acute kidney injury now on CVVH  Hyperkalemia  COPD  MRSA nasal colonization  History of cocaine abuse polysubstance abuse in the past  Pt is at high risk of sudden decline and decompensation with life threatening consequenses and continued end organ dysfunction and failure  Pt is critically ill. Time spent with pt and staff actively rendering care, managing pt and coordinating care as stated below; 30 minutes, exclusive of any procedures      RECOMMENDATIONS/PLAN:   ICU monitoring  Ventilator for mechanical life support and prevent respiratory arrest with protective lung strategies  ABG acceptable pCO2 35 pO2 66 on 75% FiO2  Chest x-ray shows right lung opacity ET tube has been repositioned it was close to the right mainstem bronchi chest x-ray shows patchy infiltrate in the right side  Pneumonia patient is on vancomycin and meropenem  Will start patient on IV amiodarone   Patient was started on CVVH  Status post cardiac arrest patient coded and he was intubated and received epinephrine and ACLS protocol ROSC is obtained 10 minutes code was run  Renal function worse potassium elevated leaning towards dialysis or CVVH  Now patient is on Levophed and vaso   He is wheezing start patient on IV Solu-Medrol nebulizer treatment  Agree with Empiric IV antibiotics pending culture results   Follow culture results Zosyn and vancomycin  Will be available to assist in medical management while in the CCU pending disposition     [x] High complexity decision making was performed  [x] See my orders for details  HPI  78-year-old male nursing home resident came in because of shortness of breath respiratory distress patient has episodes of vomiting after eating breakfast this morning subsequently came to the hospital saturation was in 70s he was back to the emergency room subsequently intubated and  MD        prismaSol BGK 2/3.5 dialysis solution   Dialysis Continuous Sol Ferraro MD 1,000 mL/hr at 05/31/24 0655 New Bag at 05/31/24 0655    0.9 % sodium chloride infusion *CRRT PBP*   Dialysis Continuous Sol Ferraro  mL/hr at 05/30/24 1323 New Bag at 05/30/24 1323    mupirocin (BACTROBAN) 2 % ointment   Each Nostril BID Riley Alonzo MD   Given at 05/30/24 1935    vancomycin (VANCOCIN) 1,250 mg in sodium chloride 0.9 % 250 mL IVPB ([id number])  15 mg/kg IntraVENous Q24H Mikaela Arredondo MD   Stopped at 05/30/24 1752    Vancomycin - please draw level 5/31 1500.  Thanks!   Other Once Mikaela Arredondo MD        dexmedeTOMIDine (PRECEDEX) 400 mcg in sodium chloride 0.9 % 100 mL infusion  0.1-1.5 mcg/kg/hr IntraVENous Continuous Mikaela Arredondo MD 4.1 mL/hr at 05/30/24 2333 0.2 mcg/kg/hr at 05/30/24 2333    fentaNYL (SUBLIMAZE) infusion 1000 mcg/100mL   mcg/hr IntraVENous Continuous Mikaela Arredondo MD   Held at 05/29/24 1404    sodium chloride 0.9 % bolus 2,448 mL  30 mL/kg IntraVENous Once Mikaela Arredondo MD   Held at 05/29/24 1405    VASOpressin 20 Units in sodium chloride 0.9 % 100 mL infusion  0.01-0.03 Units/min IntraVENous Continuous Mikaela Arredondo MD 9 mL/hr at 05/31/24 0028 0.03 Units/min at 05/31/24 0028    norepinephrine (LEVOPHED) 16 mg in sodium chloride 0.9 % 250 mL infusion  1-100 mcg/min IntraVENous Continuous Mikaela Arredondo MD 9.4 mL/hr at 05/31/24 0655 10 mcg/min at 05/31/24 0655    methylPREDNISolone sodium succ (SOLU-MEDROL) injection 40 mg  40 mg IntraVENous Q8H Mikaela Arredondo MD   40 mg at 05/30/24 2304    aspirin EC tablet 81 mg  81 mg Oral Daily Mikaela Arredondo MD   81 mg at 05/30/24 0830    ferrous sulfate (IRON 325) tablet 325 mg  325 mg Oral Daily with breakfast Mikaela Arredondo MD   325 mg at 05/30/24 0830    finasteride (PROSCAR) tablet 5 mg  5 mg Oral Daily Mikaela Arredondo MD   5 mg at 05/30/24 0830    folic acid (FOLVITE) tablet 1 mg  1 mg Oral  acceptable still hemodynamically unstable on pressors chest x-ray shows extensive infiltrate in the right side mostly upper lobe left lung clear  5/31 hemodynamically unstable on Levophed and vasopressin, remain intubated on 40% FiO2 assist-control mode pCO2 37 pO2 71, started on CVVH creatinine improved to 2.15, chest x-ray shows ET tube 2 cm above the gina lung is fully inflated left-sided clear right-sided has patchy infiltrate lower lobe and some congestive changes extreme, nasal MRSA now on Bactroban continue with meropenem and vancomycin

## 2024-05-31 NOTE — PROGRESS NOTES
Progress Note  Date:2024       Room:[facility]  Patient Name:Agustín Willis     YOB: 1945     Age:78 y.o.        Subjective    Subjective  Patient followed septic shock with concern for aspiration pneumonia. He remains hypothermic.  Procal and CRP markedly elevated.  MRSA nasal PCR positive. Blood, sputum cultures pending; urine culture no growth.  Currently on Vancomycin and Meropenem.     Objective         Vitals Last 24 Hours:  TEMPERATURE:  Temp  Av.4 °F (35.8 °C)  Min: 94.4 °F (34.7 °C)  Max: 98.4 °F (36.9 °C)  RESPIRATIONS RANGE: Resp  Av.1  Min: 14  Max: 38  PULSE OXIMETRY RANGE: SpO2  Av.2 %  Min: 95 %  Max: 100 %  PULSE RANGE: Pulse  Av.9  Min: 62  Max: 136  BLOOD PRESSURE RANGE: Systolic (24hrs), Av , Min:66 , Max:188   ; Diastolic (24hrs), Av, Min:48, Max:119         Objective:  Vital signs: (most recent): Blood pressure (!) 144/101, pulse 82, temperature (!) 94.4 °F (34.7 °C), temperature source Esophageal, resp. rate 14, height 1.73 m (5' 8.11\"), weight 87.6 kg (193 lb 2 oz), SpO2 99 %.      Vitals and nursing note reviewed.   Constitutional:       General: He is in acute distress.      Appearance: He is ill-appearing and toxic-appearing.   HENT:      Head: Normocephalic and atraumatic.      Right Ear: External ear normal.      Left Ear: External ear normal.      Nose: Nose normal.      Mouth/Throat:      Comments: ET Tube, orogastric tube  Eyes:      Comments: Closed    Cardiovascular:      Rate and Rhythm: Normal rate and regular rhythm.      Heart sounds: No murmur heard.  Pulmonary:      Breath sounds: Rhonchi present. No wheezing or rales.   Abdominal:      General: Bowel sounds are normal. There is no distension.      Palpations: Abdomen is soft.      Tenderness: There is no abdominal tenderness.   Genitourinary:     Comments: No urinary device  Musculoskeletal:      Cervical back: Neck supple.      Right lower leg: No edema.      Left lower leg: Edema  leukocytosis, elevated lactic acid and procal/CRP  Presumptive aspiration pneumonia, primarily right lung, sputum culture pending  Acute hypoxic respiratory failure, intubated  Hyperglycemia  FIDEL  Penicillin allergy  7.   MRSA nasal colonization on nasal Mupirocin     Comment:  CXR appears to be improving.  Sputum culture still pending but may only grow normal doug. WBC increasing but he is Solumedrol.  Procal is decreasing but CRP increased. .     Plan   1. Continue  IV Meropenem and Vancomycin; discontinue Vancomycin if no MRSA in sputum culture  2. Follow-up blood, sputum culture  3.  In am, repeat CBC, procal and CRP  4.  Continue  Mupirocin nasal ointment      Electronically signed by Riley Alonzo MD

## 2024-05-31 NOTE — PROGRESS NOTES
CARDIOLOGY PROGRESS NOTE      Patient Name: Agustín Willis  Age: 78 y.o.  Gender:male  :1945  MRN: [id number]    Agustín Willis is a 78 y.o. year-old male with past medical history significant for hypertension, COPD, cocaine abuse, CKD who was evaluated today due to cardiac arrest. Patient initially presented to the ED via EMS from nursing home due to respiratory distress after vomiting breakfast. Required intubation in ED. Apparently with VF arrest in ED, shock x4. Remains intubated, sedated in ICU. On pressor support.     Patient seen and examined. Remains intubated, sedated. Apparently with Afib RVR early this morning. Started on Amio gtt. Remains on CRRT.     Telemetry reviewed.    Pertinent review of systems items noted above, all other systems are negative. Current medications reviewed.    Physical Examination    Allergies   Allergen Reactions    Penicillin G Other (See Comments)     Pt states he passes out    Sulfamethoxazole-Trimethoprim      Pt states he passes out     Vitals:    24 1500   BP: (!) 126/99   Pulse: 90   Resp: 14   Temp: (!) 94.4 °F (34.7 °C)   SpO2:      Vital signs are stable  No apparent distress.  Heart has an irregularly irregular rhythm  Lungs are coarse  Abdomen is soft, nontender, normal bowel sounds.  Extremities have mid edema  Skin is dry and warm.  Normal affect    Labs reviewed:  Recent Results (from the past 12 hour(s))   POC Blood Gas & Ionized Calcium    Collection Time: 24  5:41 AM   Result Value Ref Range    pH, Arterial 7.38 7.35 - 7.45      pCO2, Arterial 37 35 - 45 mmHg    pO2, Arterial 71 (L) 80 - 100 mmHg    HCO3, Arterial 21 (L) 22 - 26 mmol/L    Base deficit, arterial blood 3.8 mmol/L    O2 Sat, Arterial 94 (L) 95 - 99 %    Calcium, Ionized 1.13 1.13 - 1.32 mmol/L    O2 Method VENT      FIO2 Arterial 40 %    POC TIDAL VOLUME 450.0      Set Rate, POC 14      POC PEEP/CPA 7.0      Source Arterial      Site Right Brachial      Efrain Test YES

## 2024-05-31 NOTE — PROGRESS NOTES
General Daily Progress Note      Patient Name:   Agustín Willis       YOB: 1945       Age:  78 y.o.      Admit Date: 5/29/2024      Subjective:   Patient is a 78 y.o. year old male past medical history of COPD BPH cervical radiculopathy cocaine abuse hypertension hyperlipidemia seasonal allergies SVT came to emergency room with respiratory arrest, patient was at nursing home after eating breakfast he vomited everywhere likely aspirated oxygen saturation was 70% initially put on nonrebreather patient was intubated in the ER patient was hypotensive started Levophed 10 patient have a cardiac arrest went to V-fib received shock x 4 received epi and amiodarone  Was started on Levophed and vasopressin received 2 L of IV fluid, received IV Zosyn and vancomycin in the ER     Patient admitted to the ICU for further workup  Patient on ventilator/unresponsive       5/30    Patient on ventilator propofol and Precedex drip  Hypotension on Levophed and vasopressin  Patient has no urine output last night received 500 bolus    Nephrology decided for start CRRT IR team for dialysis cath  BUN 72 creatinine 3.09  CRP 10.5  MRSA nares    5/31    Patient on ventilator 40% O2 propofol Precedex levo and vasopressin drip  Patient on A-fib with rapid ventricular rate ordered amiodarone bolus and started on drip  Patient getting CRRT  No urine output  D-dimers greater than 20           Objective:     Vitals:    05/31/24 0855   BP:    Pulse: 98   Resp: 14   Temp:    SpO2: 100%        Recent Results (from the past 24 hour(s))   Echo (TTE) complete (PRN contrast/bubble/strain/3D)    Collection Time: 05/30/24 10:45 AM   Result Value Ref Range    Body Surface Area 1.98 m2    RV Basal Dimension 4.0 cm    RV Mid Dimension 3.9 cm    Est. RA Pressure 15 mmHg    TR Max Velocity 2.50 m/s    RVSP 40 mmHg   CBC with Auto Differential    Collection Time: 05/30/24 11:20 AM   Result Value Ref Range    WBC 6.7 4.1 - 11.1 K/uL    RBC 2.94 (L)

## 2024-05-31 NOTE — PROGRESS NOTES
Nephrology f/u          Patient: Agustín Willis MRN: [id number]  SSN: [id number]    YOB: 1945  Age: 78 y.o.  Sex: male      Subjective:   I have seen the patient in ICU  On vent and under sedation  FiO2 40%  On 2 pressors  On CRRT with no UF    Past Medical History:   Diagnosis Date    Benign prostatic disease 8/3/2020    Hypertrophy with Outflow Obstruction    Benign prostatic hyperplasia 8/3/2020    Cervical radiculopathy 8/3/2020    Chronic obstructive lung disease (HCC) 8/3/2020    Cocaine abuse (HCC) 8/3/2020    Dizziness and giddiness 8/3/2020    Hypercholesterolemia 8/3/2020    Hypertensive disorder 8/3/2020    Insomnia 8/3/2020    Kidney disease 8/3/2020    Low back pain 8/3/2020    Osteoarthritis of knee 8/3/2020    Sleep apnea 8/3/2020    Vasovagal syncope 8/3/2020     Past Surgical History:   Procedure Laterality Date    IR NONTUNNELED VASCULAR CATHETER  5/30/2024    IR NONTUNNELED VASCULAR CATHETER 5/30/2024 Stephy Abreu APRN - NP SSR RAD ANGIO IR      Family History   Problem Relation Age of Onset    Hypertension Mother      Social History     Tobacco Use    Smoking status: Former    Smokeless tobacco: Never   Substance Use Topics    Alcohol use: Yes      Current Facility-Administered Medications   Medication Dose Route Frequency Provider Last Rate Last Admin    amiodarone (NEXTERONE) 150 mg in dextrose 5% 100 ml  150 mg IntraVENous Once Jeremy Ramos MD        Followed by    amiodarone (NEXTERONE) 360 mg in dextrose 5% 200 ml  1 mg/min IntraVENous Continuous Jeremy Ramos MD        Followed by    amiodarone (NEXTERONE) 360 mg in dextrose 5% 200 ml  0.5 mg/min IntraVENous Continuous Jeremy Ramos MD        heparin (porcine) injection 4,000 Units  4,000 Units IntraVENous PRN Mikaela Arredondo MD        heparin (porcine) injection 2,000 Units  2,000 Units IntraVENous PRN Mikaela Arredondo MD        heparin 25,000 units in dextrose 5% 250 mL (premix) infusion  5-30  respiratory failure  Aspiration pneumonia  Underlying COPD  On IV antibiotics  On FiO2 40%    Anemia   hemoglobin 9.0  Iron studies and ferritin    Atrial fibrillation  Getting started on IV heparin drip  Plan:       Signed By: Sol Ferraro MD     May 31, 2024

## 2024-06-01 ENCOUNTER — APPOINTMENT (OUTPATIENT)
Facility: HOSPITAL | Age: 79
DRG: 870 | End: 2024-06-01
Payer: MEDICARE

## 2024-06-01 LAB
ALBUMIN SERPL-MCNC: 2 G/DL (ref 3.5–5)
ALBUMIN SERPL-MCNC: 2 G/DL (ref 3.5–5)
ALBUMIN/GLOB SERPL: 0.5 (ref 1.1–2.2)
ALP SERPL-CCNC: 63 U/L (ref 45–117)
ALT SERPL-CCNC: 11 U/L (ref 12–78)
ANION GAP SERPL CALC-SCNC: 5 MMOL/L (ref 5–15)
ANION GAP SERPL CALC-SCNC: 6 MMOL/L (ref 5–15)
ARTERIAL PATENCY WRIST A: YES
AST SERPL W P-5'-P-CCNC: 15 U/L (ref 15–37)
BASE DEFICIT BLDA-SCNC: 2.9 MMOL/L
BASOPHILS # BLD: 0 K/UL (ref 0–0.1)
BASOPHILS NFR BLD: 0 % (ref 0–1)
BDY SITE: ABNORMAL
BILIRUB SERPL-MCNC: 0.4 MG/DL (ref 0.2–1)
BNP SERPL-MCNC: 1112 PG/ML
BODY TEMPERATURE: 94.7
BUN SERPL-MCNC: 31 MG/DL (ref 6–20)
BUN SERPL-MCNC: 33 MG/DL (ref 6–20)
BUN/CREAT SERPL: 21 (ref 12–20)
BUN/CREAT SERPL: 21 (ref 12–20)
CA-I BLD-MCNC: 8.7 MG/DL (ref 8.5–10.1)
CA-I BLD-MCNC: 8.8 MG/DL (ref 8.5–10.1)
CHLORIDE SERPL-SCNC: 106 MMOL/L (ref 97–108)
CHLORIDE SERPL-SCNC: 107 MMOL/L (ref 97–108)
CO2 SERPL-SCNC: 25 MMOL/L (ref 21–32)
CO2 SERPL-SCNC: 26 MMOL/L (ref 21–32)
COHGB MFR BLD: 0.3 % (ref 1–2)
CREAT SERPL-MCNC: 1.45 MG/DL (ref 0.7–1.3)
CREAT SERPL-MCNC: 1.57 MG/DL (ref 0.7–1.3)
CRP SERPL-MCNC: 20.2 MG/DL (ref 0–0.3)
DIFFERENTIAL METHOD BLD: ABNORMAL
EOSINOPHIL # BLD: 0.2 K/UL (ref 0–0.4)
EOSINOPHIL NFR BLD: 1 % (ref 0–7)
ERYTHROCYTE [DISTWIDTH] IN BLOOD BY AUTOMATED COUNT: 14.1 % (ref 11.5–14.5)
ERYTHROCYTE [DISTWIDTH] IN BLOOD BY AUTOMATED COUNT: 14.3 % (ref 11.5–14.5)
FIO2 ON VENT: 40 %
GAS FLOW.O2 SETTING OXYMISER: 14
GLOBULIN SER CALC-MCNC: 4.2 G/DL (ref 2–4)
GLUCOSE BLD STRIP.AUTO-MCNC: 140 MG/DL (ref 65–100)
GLUCOSE BLD STRIP.AUTO-MCNC: 141 MG/DL (ref 65–100)
GLUCOSE BLD STRIP.AUTO-MCNC: 142 MG/DL (ref 65–100)
GLUCOSE BLD STRIP.AUTO-MCNC: 142 MG/DL (ref 65–100)
GLUCOSE SERPL-MCNC: 149 MG/DL (ref 65–100)
GLUCOSE SERPL-MCNC: 151 MG/DL (ref 65–100)
HCO3 BLDA-SCNC: 22 MMOL/L (ref 22–26)
HCT VFR BLD AUTO: 25 % (ref 36.6–50.3)
HCT VFR BLD AUTO: 25.4 % (ref 36.6–50.3)
HGB BLD-MCNC: 8.1 G/DL (ref 12.1–17)
HGB BLD-MCNC: 8.1 G/DL (ref 12.1–17)
IMM GRANULOCYTES # BLD AUTO: 0 K/UL
IMM GRANULOCYTES NFR BLD AUTO: 0 %
LYMPHOCYTES # BLD: 1.3 K/UL (ref 0.8–3.5)
LYMPHOCYTES NFR BLD: 6 % (ref 12–49)
MCH RBC QN AUTO: 29.2 PG (ref 26–34)
MCH RBC QN AUTO: 29.8 PG (ref 26–34)
MCHC RBC AUTO-ENTMCNC: 31.9 G/DL (ref 30–36.5)
MCHC RBC AUTO-ENTMCNC: 32.4 G/DL (ref 30–36.5)
MCV RBC AUTO: 91.7 FL (ref 80–99)
MCV RBC AUTO: 91.9 FL (ref 80–99)
METHGB MFR BLD: 0.4 % (ref 0–1.4)
MONOCYTES # BLD: 0.9 K/UL (ref 0–1)
MONOCYTES NFR BLD: 4 % (ref 5–13)
NEUTS BAND NFR BLD MANUAL: 10 % (ref 0–6)
NEUTS SEG # BLD: 19.5 K/UL (ref 1.8–8)
NEUTS SEG NFR BLD: 79 % (ref 32–75)
NRBC # BLD: 0.05 K/UL (ref 0–0.01)
NRBC # BLD: 0.08 K/UL (ref 0–0.01)
NRBC BLD-RTO: 0.2 PER 100 WBC
NRBC BLD-RTO: 0.4 PER 100 WBC
OXYHGB MFR BLD: 95.8 % (ref 95–99)
PCO2 BLDA: 35 MMHG (ref 35–45)
PEEP RESPIRATORY: 7
PERFORMED BY:: ABNORMAL
PH BLDA: 7.41 (ref 7.35–7.45)
PHOSPHATE SERPL-MCNC: 3.1 MG/DL (ref 2.6–4.7)
PHOSPHATE SERPL-MCNC: 3.3 MG/DL (ref 2.6–4.7)
PLATELET # BLD AUTO: 138 K/UL (ref 150–400)
PLATELET # BLD AUTO: 144 K/UL (ref 150–400)
PMV BLD AUTO: 10.5 FL (ref 8.9–12.9)
PMV BLD AUTO: 10.5 FL (ref 8.9–12.9)
PO2 BLDA: 85 MMHG (ref 80–100)
POTASSIUM SERPL-SCNC: 3.8 MMOL/L (ref 3.5–5.1)
POTASSIUM SERPL-SCNC: 4.1 MMOL/L (ref 3.5–5.1)
PROCALCITONIN SERPL-MCNC: 35.77 NG/ML
PROT SERPL-MCNC: 6.2 G/DL (ref 6.4–8.2)
RBC # BLD AUTO: 2.72 M/UL (ref 4.1–5.7)
RBC # BLD AUTO: 2.77 M/UL (ref 4.1–5.7)
RBC MORPH BLD: ABNORMAL
SAO2 % BLD: 97 % (ref 95–99)
SAO2% DEVICE SAO2% SENSOR NAME: ABNORMAL
SODIUM SERPL-SCNC: 137 MMOL/L (ref 136–145)
SODIUM SERPL-SCNC: 138 MMOL/L (ref 136–145)
SPECIMEN SITE: ABNORMAL
UFH PPP CHRO-ACNC: 0.51 IU/ML
UFH PPP CHRO-ACNC: 0.65 IU/ML
UFH PPP CHRO-ACNC: 1 IU/ML
VENTILATION MODE VENT: ABNORMAL
VT SETTING VENT: 450
WBC # BLD AUTO: 21.9 K/UL (ref 4.1–11.1)
WBC # BLD AUTO: 22.3 K/UL (ref 4.1–11.1)

## 2024-06-01 PROCEDURE — 6360000002 HC RX W HCPCS: Performed by: FAMILY MEDICINE

## 2024-06-01 PROCEDURE — 94640 AIRWAY INHALATION TREATMENT: CPT

## 2024-06-01 PROCEDURE — 36600 WITHDRAWAL OF ARTERIAL BLOOD: CPT

## 2024-06-01 PROCEDURE — 2700000000 HC OXYGEN THERAPY PER DAY

## 2024-06-01 PROCEDURE — 71045 X-RAY EXAM CHEST 1 VIEW: CPT

## 2024-06-01 PROCEDURE — 82962 GLUCOSE BLOOD TEST: CPT

## 2024-06-01 PROCEDURE — 36415 COLL VENOUS BLD VENIPUNCTURE: CPT

## 2024-06-01 PROCEDURE — 84145 PROCALCITONIN (PCT): CPT

## 2024-06-01 PROCEDURE — 83880 ASSAY OF NATRIURETIC PEPTIDE: CPT

## 2024-06-01 PROCEDURE — 6370000000 HC RX 637 (ALT 250 FOR IP): Performed by: FAMILY MEDICINE

## 2024-06-01 PROCEDURE — 51701 INSERT BLADDER CATHETER: CPT

## 2024-06-01 PROCEDURE — 94003 VENT MGMT INPAT SUBQ DAY: CPT

## 2024-06-01 PROCEDURE — 2500000003 HC RX 250 WO HCPCS: Performed by: INTERNAL MEDICINE

## 2024-06-01 PROCEDURE — 80069 RENAL FUNCTION PANEL: CPT

## 2024-06-01 PROCEDURE — 84100 ASSAY OF PHOSPHORUS: CPT

## 2024-06-01 PROCEDURE — 51798 US URINE CAPACITY MEASURE: CPT

## 2024-06-01 PROCEDURE — 6360000002 HC RX W HCPCS: Performed by: INTERNAL MEDICINE

## 2024-06-01 PROCEDURE — 80053 COMPREHEN METABOLIC PANEL: CPT

## 2024-06-01 PROCEDURE — 85027 COMPLETE CBC AUTOMATED: CPT

## 2024-06-01 PROCEDURE — 6370000000 HC RX 637 (ALT 250 FOR IP): Performed by: INTERNAL MEDICINE

## 2024-06-01 PROCEDURE — 85520 HEPARIN ASSAY: CPT

## 2024-06-01 PROCEDURE — 85025 COMPLETE CBC W/AUTO DIFF WBC: CPT

## 2024-06-01 PROCEDURE — 99232 SBSQ HOSP IP/OBS MODERATE 35: CPT | Performed by: INTERNAL MEDICINE

## 2024-06-01 PROCEDURE — 2580000003 HC RX 258: Performed by: FAMILY MEDICINE

## 2024-06-01 PROCEDURE — 2000000000 HC ICU R&B

## 2024-06-01 PROCEDURE — 94761 N-INVAS EAR/PLS OXIMETRY MLT: CPT

## 2024-06-01 PROCEDURE — 82803 BLOOD GASES ANY COMBINATION: CPT

## 2024-06-01 PROCEDURE — 86140 C-REACTIVE PROTEIN: CPT

## 2024-06-01 PROCEDURE — 2580000003 HC RX 258: Performed by: INTERNAL MEDICINE

## 2024-06-01 PROCEDURE — 90945 DIALYSIS ONE EVALUATION: CPT

## 2024-06-01 PROCEDURE — 2500000003 HC RX 250 WO HCPCS: Performed by: FAMILY MEDICINE

## 2024-06-01 RX ADMIN — THIAMINE HCL TAB 100 MG 100 MG: 100 TAB at 08:10

## 2024-06-01 RX ADMIN — MUPIROCIN: 20 OINTMENT TOPICAL at 20:49

## 2024-06-01 RX ADMIN — PROPOFOL 5 MCG/KG/MIN: 10 INJECTION, EMULSION INTRAVENOUS at 22:46

## 2024-06-01 RX ADMIN — CALCIUM CHLORIDE, MAGNESIUM CHLORIDE, DEXTROSE MONOHYDRATE, LACTIC ACID, SODIUM CHLORIDE, SODIUM BICARBONATE AND POTASSIUM CHLORIDE: 5.15; 2.03; 22; 5.4; 6.46; 3.09; .157 INJECTION INTRAVENOUS at 08:57

## 2024-06-01 RX ADMIN — METHYLPREDNISOLONE SODIUM SUCCINATE 40 MG: 40 INJECTION INTRAMUSCULAR; INTRAVENOUS at 15:17

## 2024-06-01 RX ADMIN — METHYLPREDNISOLONE SODIUM SUCCINATE 40 MG: 40 INJECTION INTRAMUSCULAR; INTRAVENOUS at 06:24

## 2024-06-01 RX ADMIN — SODIUM CHLORIDE, PRESERVATIVE FREE 10 ML: 5 INJECTION INTRAVENOUS at 20:50

## 2024-06-01 RX ADMIN — AMIODARONE HYDROCHLORIDE 0.5 MG/MIN: 1.8 INJECTION, SOLUTION INTRAVENOUS at 03:05

## 2024-06-01 RX ADMIN — CALCIUM CHLORIDE, MAGNESIUM CHLORIDE, DEXTROSE MONOHYDRATE, LACTIC ACID, SODIUM CHLORIDE, SODIUM BICARBONATE AND POTASSIUM CHLORIDE: 5.15; 2.03; 22; 5.4; 6.46; 3.09; .157 INJECTION INTRAVENOUS at 18:39

## 2024-06-01 RX ADMIN — MEROPENEM 1000 MG: 1 INJECTION, POWDER, FOR SOLUTION INTRAVENOUS at 05:20

## 2024-06-01 RX ADMIN — SODIUM CHLORIDE, PRESERVATIVE FREE 10 ML: 5 INJECTION INTRAVENOUS at 08:11

## 2024-06-01 RX ADMIN — PROPOFOL 25 MCG/KG/MIN: 10 INJECTION, EMULSION INTRAVENOUS at 13:38

## 2024-06-01 RX ADMIN — CALCIUM CHLORIDE, MAGNESIUM CHLORIDE, DEXTROSE MONOHYDRATE, LACTIC ACID, SODIUM CHLORIDE, SODIUM BICARBONATE AND POTASSIUM CHLORIDE: 5.15; 2.03; 22; 5.4; 6.46; 3.09; .157 INJECTION INTRAVENOUS at 13:39

## 2024-06-01 RX ADMIN — CALCIUM CHLORIDE, MAGNESIUM CHLORIDE, DEXTROSE MONOHYDRATE, LACTIC ACID, SODIUM CHLORIDE, SODIUM BICARBONATE AND POTASSIUM CHLORIDE: 5.15; 2.03; 22; 5.4; 6.46; 3.09; .157 INJECTION INTRAVENOUS at 23:57

## 2024-06-01 RX ADMIN — DEXMEDETOMIDINE HYDROCHLORIDE 0.2 MCG/KG/HR: 400 INJECTION, SOLUTION INTRAVENOUS at 03:06

## 2024-06-01 RX ADMIN — SODIUM BICARBONATE 325 MG: 650 TABLET ORAL at 16:51

## 2024-06-01 RX ADMIN — FOLIC ACID 1 MG: 1 TABLET ORAL at 08:10

## 2024-06-01 RX ADMIN — METHYLPREDNISOLONE SODIUM SUCCINATE 40 MG: 40 INJECTION INTRAMUSCULAR; INTRAVENOUS at 22:49

## 2024-06-01 RX ADMIN — IPRATROPIUM BROMIDE AND ALBUTEROL SULFATE 1 DOSE: .5; 3 SOLUTION RESPIRATORY (INHALATION) at 09:28

## 2024-06-01 RX ADMIN — ASPIRIN 81 MG: 81 TABLET, COATED ORAL at 08:08

## 2024-06-01 RX ADMIN — MEROPENEM 1000 MG: 1 INJECTION, POWDER, FOR SOLUTION INTRAVENOUS at 16:51

## 2024-06-01 RX ADMIN — MUPIROCIN: 20 OINTMENT TOPICAL at 08:10

## 2024-06-01 RX ADMIN — SODIUM CHLORIDE: 9 INJECTION, SOLUTION INTRAVENOUS at 19:53

## 2024-06-01 RX ADMIN — PROPOFOL 30 MCG/KG/MIN: 10 INJECTION, EMULSION INTRAVENOUS at 07:35

## 2024-06-01 RX ADMIN — FINASTERIDE 5 MG: 5 TABLET, FILM COATED ORAL at 08:09

## 2024-06-01 RX ADMIN — AMIODARONE HYDROCHLORIDE 0.5 MG/MIN: 1.8 INJECTION, SOLUTION INTRAVENOUS at 14:16

## 2024-06-01 RX ADMIN — CALCIUM CHLORIDE, MAGNESIUM CHLORIDE, DEXTROSE MONOHYDRATE, LACTIC ACID, SODIUM CHLORIDE, SODIUM BICARBONATE AND POTASSIUM CHLORIDE: 5.15; 2.03; 22; 5.4; 6.46; 3.09; .157 INJECTION INTRAVENOUS at 03:38

## 2024-06-01 RX ADMIN — IPRATROPIUM BROMIDE AND ALBUTEROL SULFATE 1 DOSE: .5; 3 SOLUTION RESPIRATORY (INHALATION) at 20:34

## 2024-06-01 RX ADMIN — IPRATROPIUM BROMIDE AND ALBUTEROL SULFATE 1 DOSE: .5; 3 SOLUTION RESPIRATORY (INHALATION) at 13:45

## 2024-06-01 RX ADMIN — CALCIUM CHLORIDE, MAGNESIUM CHLORIDE, DEXTROSE MONOHYDRATE, LACTIC ACID, SODIUM CHLORIDE, SODIUM BICARBONATE AND POTASSIUM CHLORIDE: 5.15; 2.03; 22; 5.4; 6.46; 3.09; .157 INJECTION INTRAVENOUS at 03:39

## 2024-06-01 RX ADMIN — FERROUS SULFATE TAB 325 MG (65 MG ELEMENTAL FE) 325 MG: 325 (65 FE) TAB at 06:24

## 2024-06-01 RX ADMIN — SODIUM BICARBONATE 325 MG: 650 TABLET ORAL at 13:37

## 2024-06-01 RX ADMIN — VASOPRESSIN 0.01 UNITS/MIN: 20 INJECTION, SOLUTION INTRAVENOUS at 12:31

## 2024-06-01 RX ADMIN — IPRATROPIUM BROMIDE AND ALBUTEROL SULFATE 1 DOSE: .5; 3 SOLUTION RESPIRATORY (INHALATION) at 02:06

## 2024-06-01 RX ADMIN — SODIUM BICARBONATE 325 MG: 650 TABLET ORAL at 20:49

## 2024-06-01 RX ADMIN — SODIUM BICARBONATE 325 MG: 650 TABLET ORAL at 08:10

## 2024-06-01 ASSESSMENT — PULMONARY FUNCTION TESTS
PIF_VALUE: 21
PIF_VALUE: 20
PIF_VALUE: 20
PIF_VALUE: 19
PIF_VALUE: 21
PIF_VALUE: 19
PIF_VALUE: 21
PIF_VALUE: 20
PIF_VALUE: 21
PIF_VALUE: 20
PIF_VALUE: 20
PIF_VALUE: 21
PIF_VALUE: 20
PIF_VALUE: 20
PIF_VALUE: 19
PIF_VALUE: 19
PIF_VALUE: 18
PIF_VALUE: 18
PIF_VALUE: 20
PIF_VALUE: 19
PIF_VALUE: 19
PIF_VALUE: 20
PIF_VALUE: 19
PIF_VALUE: 21
PIF_VALUE: 20
PIF_VALUE: 20
PIF_VALUE: 19
PIF_VALUE: 20
PIF_VALUE: 19
PIF_VALUE: 20
PIF_VALUE: 21

## 2024-06-01 ASSESSMENT — PAIN SCALES - GENERAL
PAINLEVEL_OUTOF10: 0

## 2024-06-01 NOTE — CONSULTS
IMPRESSION:   Acute hypoxic respiratory failure FiO2 now 40%  Cardiac arrest  A-fib with RVR on amiodarone  Severe sepsis with septic shock on pressors  Aspiration pneumonia  Acute kidney injury now on CVVH  Hyperkalemia resolved  COPD no wheezing  MRSA nasal colonization  History of cocaine abuse polysubstance abuse in the past  Pt is at high risk of sudden decline and decompensation with life threatening consequenses and continued end organ dysfunction and failure  Pt is critically ill. Time spent with pt and staff actively rendering care, managing pt and coordinating care as stated below; 30 minutes, exclusive of any procedures      RECOMMENDATIONS/PLAN:   ICU monitoring  Ventilator for mechanical life support and prevent respiratory arrest with protective lung strategies  FiO2 down to 40%  Chest x-ray with continued right lower infiltrate CTA chest showed bilateral infiltrates consistent with aspiration  Remains on Merrem and vancomycin sputum with normal doug  Heart rate controlled on IV amiodarone  Patient remains on CVVH  Status post cardiac arrest patient coded and he was intubated and received epinephrine and ACLS protocol ROSC is obtained 10 minutes   Remains on norepinephrine 3 mics vasopressin 0.2 units  No wheezing continue nebulized albuterol Atrovent will decrease IV Solu-Medrol       [x] High complexity decision making was performed  [x] See my orders for details  HPI  78-year-old male nursing home resident came in because of shortness of breath respiratory distress patient has episodes of vomiting after eating breakfast this morning subsequently came to the hospital saturation was in 70s he was back to the emergency room subsequently intubated and then he coded hemodynamically unstable now he is on 2 pressors vasopressin and Levophed denies intubated on ventilator critical care consult was called, past medical history of polysubstance abuse cocaine abuse COPD hypertension insomnia      PMH:  has  IntraVENous Q12H Mikaela Arredondo MD   Stopped at 06/01/24 0820    vancomycin (VANCOCIN) intermittent dosing (placeholder)  1 each Other RX Placeholder Mikaela Arredondo MD        ipratropium 0.5 mg-albuterol 2.5 mg (DUONEB) nebulizer solution 1 Dose  1 Dose Inhalation Q6H RT Jeremy Ramos MD   1 Dose at 06/01/24 0928        ARTERIAL BLOOD GAS  Recent Labs     06/01/24  0411   PHART 7.41   CZW5LNK 35   PO2ART 85   COF8JVP 22   FIO2A 40   I6UOTAGP 97   OXYHEM 95.8   CARBOXHGBART 0.3*   METHGBART 0.4   TEMP 94.7     6/1-A/C 14  PEEP 7 FiO2 40% with pO2 85 pCO2 35 pH 7.41  Labs:    Recent Labs     05/30/24  1120 05/30/24  1615 05/30/24 2215 05/31/24  0220 05/31/24  1000 05/31/24  1605 05/31/24 2050 06/01/24  0045 06/01/24  0555   WBC 6.7   < > 12.9*   < > 17.4*   < > 21.8* 21.9* 22.3*   HGB 8.5*   < > 9.1*   < > 8.6*   < > 8.3* 8.1* 8.1*      < > 180   < > 153   < > 149* 144* 138*   INR 1.5*  --   --   --  1.5*  --   --   --   --    APTT  --   --   --   --  37.3*  --   --   --   --    DDIMER  --   --  >20.00*  --   --   --   --   --   --     < > = values in this interval not displayed.     Procalcitonin 35.7, 50.1, 72.7,  Recent Labs     05/30/24  0340 05/30/24  1120 05/30/24 2215 05/31/24  0220 05/31/24  0615 05/31/24  1000 05/31/24  1605 05/31/24 2050 06/01/24  0140 06/01/24  0555      < > 141 138  138   < > 139 138 137 138 137   K 5.9*   < > 5.4* 5.1  5.1   < > 4.6 4.4 4.2 4.1 3.8   *   < > 109* 107  106   < > 108 107 106 106 107   CO2 19*   < > 25 24  25   < > 24 25 24 26 25   GLUCOSE 128*   < > 166* 159*  163*   < > 184* 172* 163* 149* 151*   BUN 72*   < > 60* 52*  53*   < > 42* 38* 37* 33* 31*   CREATININE 3.09*   < > 2.52* 2.38*  2.33*   < > 2.02* 1.78* 1.61* 1.57* 1.45*   CALCIUM 8.3*   < > 7.8* 8.4*  8.5   < > 8.5 8.6 8.7 8.7 8.8   MG  --    < > 2.1  --   --  1.9 1.8  --   --   --    PHOS  --    < > 5.4* 4.7   < > 4.4 4.0 3.5 3.3 3.1   BILITOT 0.6  --   --  0.5  --

## 2024-06-01 NOTE — PROGRESS NOTES
CARDIOLOGY PROGRESS NOTE      Patient Name: Agustín Willis  Age: 78 y.o.  Gender:male  :1945  MRN: [id number]    Agustín Willis is a 78 y.o. year-old male with past medical history significant for hypertension, COPD, cocaine abuse, CKD who was evaluated today due to cardiac arrest. Patient initially presented to the ED via EMS from nursing home due to respiratory distress after vomiting breakfast. Required intubation in ED. Apparently with VF arrest in ED, shock x4.     Patient seen and examined. Remains intubated, sedated. On levo at 3 and vaso 0.02, amiodarone, heparin.     Telemetry reviewed.    Unable to obtain ROS as patient is intubated and sedated. Current medications reviewed.    Physical Examination    Allergies   Allergen Reactions    Penicillin G Other (See Comments)     Pt states he passes out    Sulfamethoxazole-Trimethoprim      Pt states he passes out     Vitals:    24 1132   BP:    Pulse: 83   Resp: 14   Temp:    SpO2: 100%     Vital signs are stable on pressors  Intubated and sedated  Heart has an irregularly irregular rhythm  Lungs are coarse  Abdomen is soft  Extremities have mild edema  Skin is dry    Labs reviewed:  Recent Results (from the past 12 hour(s))   CBC with Auto Differential    Collection Time: 24 12:45 AM   Result Value Ref Range    WBC 21.9 (H) 4.1 - 11.1 K/uL    RBC 2.72 (L) 4.10 - 5.70 M/uL    Hemoglobin 8.1 (L) 12.1 - 17.0 g/dL    Hematocrit 25.0 (L) 36.6 - 50.3 %    MCV 91.9 80.0 - 99.0 FL    MCH 29.8 26.0 - 34.0 PG    MCHC 32.4 30.0 - 36.5 g/dL    RDW 14.3 11.5 - 14.5 %    Platelets 144 (L) 150 - 400 K/uL    MPV 10.5 8.9 - 12.9 FL    Nucleated RBCs 0.2 (H) 0.0  WBC    nRBC 0.05 (H) 0.00 - 0.01 K/uL    Neutrophils % 79 (H) 32 - 75 %    Band Neutrophils 10 (H) 0 - 6 %    Lymphocytes % 6 (L) 12 - 49 %    Monocytes % 4 (L) 5 - 13 %    Eosinophils % 1 0 - 7 %    Basophils % 0 0 - 1 %    Immature Granulocytes % 0 %    Neutrophils Absolute 19.5 (H) 1.8 - 8.0  K/UL    Lymphocytes Absolute 1.3 0.8 - 3.5 K/UL    Monocytes Absolute 0.9 0.0 - 1.0 K/UL    Eosinophils Absolute 0.2 0.0 - 0.4 K/UL    Basophils Absolute 0.0 0.0 - 0.1 K/UL    Immature Granulocytes Absolute 0.0 K/UL    Differential Type Manual      RBC Comment Brooks cells  1+       Heparin, Anti-XA    Collection Time: 06/01/24 12:45 AM   Result Value Ref Range    Heparin Xa,LMWH and Unfrac 1.00 (HH) IU/mL   Renal Function Panel    Collection Time: 06/01/24  1:40 AM   Result Value Ref Range    Sodium 138 136 - 145 mmol/L    Potassium 4.1 3.5 - 5.1 mmol/L    Chloride 106 97 - 108 mmol/L    CO2 26 21 - 32 mmol/L    Anion Gap 6 5 - 15 mmol/L    Glucose 149 (H) 65 - 100 mg/dL    BUN 33 (H) 6 - 20 mg/dL    Creatinine 1.57 (H) 0.70 - 1.30 mg/dL    BUN/Creatinine Ratio 21 (H) 12 - 20      Est, Glom Filt Rate 44 (L) >60 ml/min/1.73m2    Calcium 8.7 8.5 - 10.1 mg/dL    Phosphorus 3.3 2.6 - 4.7 mg/dL    Albumin 2.0 (L) 3.5 - 5.0 g/dL   Blood Gas, Arterial    Collection Time: 06/01/24  4:11 AM   Result Value Ref Range    pH, Arterial 7.41 7.35 - 7.45      pCO2, Arterial 35 35 - 45 mmHg    pO2, Arterial 85 80 - 100 mmHg    O2 Sat, Arterial 97 95 - 99 %    HCO3, Arterial 22 22 - 26 mmol/L    Base deficit, arterial blood 2.9 mmol/L    O2 Method VENT      FIO2 Arterial 40 %    Mode ASSIST CONTROL      POC TIDAL VOLUME 450.0      Set Rate, POC 14      POC PEEP/CPA 7.0      Source Arterial      Site Right Brachial      Efrain Test YES      Carboxyhgb, Arterial 0.3 (L) 1 - 2 %    Methemoglobin, Arterial 0.4 0 - 1.4 %    Oxyhemoglobin 95.8 95 - 99 %    Performed by: Heath Hinkle     Temperature 94.7     Heparin, Anti-Xa    Collection Time: 06/01/24  5:55 AM   Result Value Ref Range    Heparin Xa,LMWH and Unfrac 0.65 IU/mL   Procalcitonin    Collection Time: 06/01/24  5:55 AM   Result Value Ref Range    Procalcitonin 35.77 (H) 0 ng/mL   C-Reactive Protein    Collection Time: 06/01/24  5:55 AM   Result Value Ref Range    CRP 20.20 (H)

## 2024-06-01 NOTE — PROGRESS NOTES
Nephrology f/u          Patient: Agustín Willis MRN: [id number]  SSN: [id number]    YOB: 1945  Age: 78 y.o.  Sex: male      Subjective:   I have seen the patient in ICU  On vent and under sedation  FiO2 40%  On 2 pressors  On CRRT with no UF    Past Medical History:   Diagnosis Date    Benign prostatic disease 8/3/2020    Hypertrophy with Outflow Obstruction    Benign prostatic hyperplasia 8/3/2020    Cervical radiculopathy 8/3/2020    Chronic obstructive lung disease (HCC) 8/3/2020    Cocaine abuse (HCC) 8/3/2020    Dizziness and giddiness 8/3/2020    Hypercholesterolemia 8/3/2020    Hypertensive disorder 8/3/2020    Insomnia 8/3/2020    Kidney disease 8/3/2020    Low back pain 8/3/2020    Osteoarthritis of knee 8/3/2020    Sleep apnea 8/3/2020    Vasovagal syncope 8/3/2020     Past Surgical History:   Procedure Laterality Date    IR NONTUNNELED VASCULAR CATHETER  5/30/2024    IR NONTUNNELED VASCULAR CATHETER 5/30/2024 Stephy Abreu APRN - NP SSR RAD ANGIO IR      Family History   Problem Relation Age of Onset    Hypertension Mother      Social History     Tobacco Use    Smoking status: Former    Smokeless tobacco: Never   Substance Use Topics    Alcohol use: Yes      Current Facility-Administered Medications   Medication Dose Route Frequency Provider Last Rate Last Admin    amiodarone (NEXTERONE) 360 mg in dextrose 5% 200 ml  0.5 mg/min IntraVENous Continuous Jeremy Ramos MD 16.7 mL/hr at 06/01/24 0305 0.5 mg/min at 06/01/24 0305    heparin (porcine) injection 4,000 Units  4,000 Units IntraVENous PRN Mikaela Arredondo MD        heparin (porcine) injection 2,000 Units  2,000 Units IntraVENous PRN Mikaela Arredondo MD        heparin 25,000 units in dextrose 5% 250 mL (premix) infusion  5-30 Units/kg/hr IntraVENous Continuous Mikaela Arredondo MD 4.4 mL/hr at 06/01/24 0145 5 Units/kg/hr at 06/01/24 0145    [START ON 6/2/2024] Vancomycin - please draw level 6/2 1500.  Thanks!   Other Once  Mikaela Arredondo MD        propofol infusion  5-50 mcg/kg/min IntraVENous Continuous Jeremy Ramos MD 15.8 mL/hr at 06/01/24 0735 30 mcg/kg/min at 06/01/24 0735    prismaSol BGK 2/3.5 dialysis solution   Dialysis Continuous Sol Ferraro MD 1,000 mL/hr at 06/01/24 0857 New Bag at 06/01/24 0857    heparin (porcine) injection 1,100-1,900 Units  1,100-1,900 Units IntraCATHeter PRN Sol Ferraro MD        And    heparin (porcine) injection 1,100-1,900 Units  1,100-1,900 Units IntraCATHeter PRN Sol Ferraro MD        prismaSol BGK 2/3.5 dialysis solution   Dialysis Continuous Sol Ferraro MD 1,000 mL/hr at 06/01/24 0857 New Bag at 06/01/24 0857    0.9 % sodium chloride infusion *CRRT PBP*   Dialysis Continuous Sol Ferraro  mL/hr at 05/30/24 1323 New Bag at 05/30/24 1323    mupirocin (BACTROBAN) 2 % ointment   Each Nostril BID Riley Alonzo MD   Given at 06/01/24 0810    vancomycin (VANCOCIN) 1,250 mg in sodium chloride 0.9 % 250 mL IVPB ([id number])  15 mg/kg IntraVENous Q24H Mikaela Arredondo MD   Stopped at 05/31/24 1622    dexmedeTOMIDine (PRECEDEX) 400 mcg in sodium chloride 0.9 % 100 mL infusion  0.1-1.5 mcg/kg/hr IntraVENous Continuous Mikaela Arredondo MD 4.1 mL/hr at 06/01/24 0306 0.2 mcg/kg/hr at 06/01/24 0306    fentaNYL (SUBLIMAZE) infusion 1000 mcg/100mL   mcg/hr IntraVENous Continuous Mikaela Arredondo MD   Held at 05/29/24 1404    sodium chloride 0.9 % bolus 2,448 mL  30 mL/kg IntraVENous Once Mikaela Arredondo MD   Held at 05/29/24 1405    VASOpressin 20 Units in sodium chloride 0.9 % 100 mL infusion  0.01-0.03 Units/min IntraVENous Continuous Mikaela Arredondo MD 6 mL/hr at 05/31/24 1802 0.02 Units/min at 05/31/24 1802    norepinephrine (LEVOPHED) 16 mg in sodium chloride 0.9 % 250 mL infusion  1-100 mcg/min IntraVENous Continuous Mikaela Arredondo MD 5.6 mL/hr at 05/31/24 2325 6 mcg/min at 05/31/24 2325    methylPREDNISolone sodium succ (SOLU-MEDROL) injection 40 mg  40 mg

## 2024-06-01 NOTE — PROGRESS NOTES
Infectious Disease Progress Note           Subjective:   Pt seen on f/u for Dr. Alonzo. He is being followed by ID for septic shock due to aspiration PNA.  Blood Cx, sputum and urine Cxs () have been negative. Nasal MRSA screen was positive on . He is on vent support. ICU Staff deny acute overnight events, clinically stable per staff  Objective:   Physical Exam:     /69   Pulse 83   Temp (!) 93.6 °F (34.2 °C) (Oral)   Resp 14   Ht 1.73 m (5' 8.11\")   Wt 92.3 kg (203 lb 7.8 oz)   SpO2 100%   BMI 30.84 kg/m²  O2 Flow Rate (L/min): 100 L/min O2 Device: Ventilator    Temp (24hrs), Av.7 °F (34.8 °C), Min:93.6 °F (34.2 °C), Max:95.5 °F (35.3 °C)    701 -  190  In: 60   Out: 273    1901 -  07  In: 3461.7 [I.V.:2942]  Out: 2378     General: NAD, alert, intubated and sedated   HEENT: ELDA, Moist mucosa, ETT in place  Lungs: CTA b/l, decreased at the bases, no wheeze/rhonchi   Heart: S1S2+, RRR, no murmur  Abdo: Soft, NT, ND, +BS   : No indwelling damico cath  Exts: +edema, b/l upper ext swelling   Skin: sig wounds obvious to me, back and sacrum not examined (multiple lines)     Data Review:       Recent Days:    Recent Labs     24  0045 24  0555   WBC 21.8* 21.9* 22.3*   HGB 8.3* 8.1* 8.1*   HCT 26.5* 25.0* 25.4*   * 144* 138*     Recent Labs     24  0140 24  0555   BUN 37* 33* 31*   CREATININE 1.61* 1.57* 1.45*       Lab Results   Component Value Date/Time    CRP 20.20 2024 05:55 AM          Microbiology     Results       Procedure Component Value Units Date/Time    MRSA by PCR [[id number]]     Order Status: Canceled Specimen: Nares     Culture, Respiratory [[id number]] Collected: 24 2300    Order Status: Completed Specimen: Sputum Updated: 24 0708     Special Requests No Special Requests        Gram Stain No Epithelial cells seen         Occasional WBCs seen                vein temporary dialysis catheter.  A post procedure chest x-ray is pending.     XR CHEST 1 VIEW    Result Date: 5/30/2024  1. Increasing bibasilar opacities left greater than right most likely due to atelectasis. 2. Decrease in consolidation in the right upper lobe. 3. The ET tube could be retracted 1.5 to 2 cm.    XR CHEST PORTABLE    Result Date: 5/29/2024  Left IJ central venous catheter placement. No pneumothorax. Right upper lobe airspace disease and mild bilateral interstitial opacities unchanged.    XR CHEST PORTABLE    Result Date: 5/29/2024  1. ET tube tip is angled towards the right mainstem bronchus and is 1 cm superior to the level of the gina. Recommend repositioning. 2. Right lung patchy airspace consolidation.      Assessment/Plan     Acute hypoxic respiratory failure due to presumptive aspiration PNA w right infiltrates on chest imaging        Stable right pulmonary edema on todays CXR (06/01). Remains intubated/Sedated         Light normal doug isolated from sputum Cx 5/29        Afebrile, WBC of 22.3, CRP 20.20 (29.80 on 05/31), Procal 35.77 (72.72 on 05/30)         Persistent leukocytosis likely due to steroid therapy, remains on Solu-Medrol 40 mg Q8        Continue on Meropenem given PCN allergy, will d/c Vanc since MRSA not isolated from sputum Cx and inflammatory markers are trending down on serial labs         Routine labs in the morning, including CRP and Procal     2. 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  Sepsis/septic shock, remains on pressor support, intubated       Normal doug isolated from sputum Cx.  Blood and urine Cx are neg       Leukocytosis from steroid therapy. Continue on antibiotics as above     4. FIDEL, on RRT, CRRT, minimal UO, no damico     5. PCN allergy, tolerating Meropenem           Moses Gonzalez MD    6/1/2024

## 2024-06-01 NOTE — PLAN OF CARE
Problem: Safety - Adult  Goal: Free from fall injury  Outcome: Progressing     Problem: Discharge Planning  Goal: Discharge to home or other facility with appropriate resources  Outcome: Progressing     Problem: Chronic Conditions and Co-morbidities  Goal: Patient's chronic conditions and co-morbidity symptoms are monitored and maintained or improved  Outcome: Progressing     Problem: Skin/Tissue Integrity  Goal: Absence of new skin breakdown  Description: 1.  Monitor for areas of redness and/or skin breakdown  2.  Assess vascular access sites hourly  3.  Every 4-6 hours minimum:  Change oxygen saturation probe site  4.  Every 4-6 hours:  If on nasal continuous positive airway pressure, respiratory therapy assess nares and determine need for appliance change or resting period.  Outcome: Progressing     Problem: Neurosensory - Adult  Goal: Achieves stable or improved neurological status  Outcome: Progressing  Goal: Absence of seizures  Outcome: Progressing  Goal: Remains free of injury related to seizures activity  Outcome: Progressing  Goal: Achieves maximal functionality and self care  Outcome: Progressing     Problem: Respiratory - Adult  Goal: Achieves optimal ventilation and oxygenation  Outcome: Progressing     Problem: Cardiovascular - Adult  Goal: Maintains optimal cardiac output and hemodynamic stability  Outcome: Progressing  Goal: Absence of cardiac dysrhythmias or at baseline  Outcome: Progressing     Problem: Skin/Tissue Integrity - Adult  Goal: Skin integrity remains intact  Outcome: Progressing  Goal: Incisions, wounds, or drain sites healing without S/S of infection  Outcome: Progressing  Goal: Oral mucous membranes remain intact  Outcome: Progressing     Problem: Musculoskeletal - Adult  Goal: Return mobility to safest level of function  Outcome: Progressing  Goal: Maintain proper alignment of affected body part  Outcome: Progressing  Goal: Return ADL status to a safe level of function  Outcome:  Progressing     Problem: Gastrointestinal - Adult  Goal: Minimal or absence of nausea and vomiting  Outcome: Progressing  Goal: Maintains or returns to baseline bowel function  Outcome: Progressing  Goal: Maintains adequate nutritional intake  Outcome: Progressing  Goal: Establish and maintain optimal ostomy function  Outcome: Progressing     Problem: Genitourinary - Adult  Goal: Absence of urinary retention  Outcome: Progressing  Goal: Urinary catheter remains patent  Outcome: Progressing     Problem: Infection - Adult  Goal: Absence of infection at discharge  Outcome: Progressing  Goal: Absence of infection during hospitalization  Outcome: Progressing  Goal: Absence of fever/infection during anticipated neutropenic period  Outcome: Progressing     Problem: Metabolic/Fluid and Electrolytes - Adult  Goal: Electrolytes maintained within normal limits  Outcome: Progressing  Goal: Hemodynamic stability and optimal renal function maintained  Outcome: Progressing  Goal: Glucose maintained within prescribed range  Outcome: Progressing     Problem: Hematologic - Adult  Goal: Maintains hematologic stability  Outcome: Progressing     Problem: Pain  Goal: Verbalizes/displays adequate comfort level or baseline comfort level  Outcome: Progressing     Problem: Spiritual Care  Goal: Pt/Family able to move forward in process of forgiving self, others, and/or higher power  Description: INTERVENTIONS:  1. Assist patient/family to overcome blocks to healing by use of spiritual practices (prayer, meditation, guided imagery, reiki, breath work, etc).  2. De-myth guilt and help patient/family identify possible irrational spiritual/cultural beliefs and values.  3. Explore possibilities of making amends & reconciliation with self, others, and/or a greater power.  4. Guide patient/family in identifying painful feelings of guilt.  5. Help patient/famiy explore and identify spiritual beliefs, cultural understandings or values that may help  or hinder letting go of issue.  6. Help patient/family explore feelings of anger, bitterness, resentment.  7. Help patient/family identify and examine the situation in which these feelings are experienced.  8. Help patient/family identify destructive displacement of feelings onto other individuals.  9. Invite use of sacraments/rituals/ceremonies as appropriate (e.g. - confession, anointing, smudging).  10. Refer patient/family to formal counseling and/or to yamini community for further support work.  Outcome: Progressing

## 2024-06-01 NOTE — PROGRESS NOTES
General Daily Progress Note      Patient Name:   Agustín Willis       YOB: 1945       Age:  78 y.o.      Admit Date: 5/29/2024      Subjective:   Patient is a 78 y.o. year old male past medical history of COPD BPH cervical radiculopathy cocaine abuse hypertension hyperlipidemia seasonal allergies SVT came to emergency room with respiratory arrest, patient was at nursing home after eating breakfast he vomited everywhere likely aspirated oxygen saturation was 70% initially put on nonrebreather patient was intubated in the ER patient was hypotensive started Levophed 10 patient have a cardiac arrest went to V-fib received shock x 4 received epi and amiodarone  Was started on Levophed and vasopressin received 2 L of IV fluid, received IV Zosyn and vancomycin in the ER     Patient admitted to the ICU for further workup  Patient on ventilator/unresponsive       5/30    Patient on ventilator propofol and Precedex drip  Hypotension on Levophed and vasopressin  Patient has no urine output last night received 500 bolus    Nephrology decided for start CRRT IR team for dialysis cath  BUN 72 creatinine 3.09  CRP 10.5  MRSA nares    5/31    Patient on ventilator 40% O2 propofol Precedex levo and vasopressin drip  Patient on A-fib with rapid ventricular rate ordered amiodarone bolus and started on drip  Patient getting CRRT  No urine output  D-dimers greater than 20     6/1    Patient on ventilator with propofol Precedex levo and vasopressin drip  Patient A-fib rate controlled with amiodarone drip  Patient currently on CRRT      Objective:     Vitals:    06/01/24 1030   BP: 102/69   Pulse: 92   Resp: 14   Temp:    SpO2: 100%        Recent Results (from the past 24 hour(s))   Vascular duplex lower extremity venous bilateral    Collection Time: 05/31/24 12:15 PM   Result Value Ref Range    Body Surface Area 1.98 m2   Vancomycin Level, Random    Collection Time: 05/31/24  3:05 PM   Result Value Ref Range    Vancomycin  06/01/24 0624    finasteride (PROSCAR) tablet 5 mg, 5 mg, Oral, Daily, Mikaela Arredondo MD, 5 mg at 06/01/24 0809    folic acid (FOLVITE) tablet 1 mg, 1 mg, Oral, Daily, Mikaela Arredondo MD, 1 mg at 06/01/24 0810    sodium bicarbonate tablet 325 mg, 325 mg, Oral, 4x Daily, Mikaela Arredondo MD, 325 mg at 06/01/24 0810    thiamine mononitrate tablet 100 mg, 100 mg, Oral, Daily, Mikaela Arredondo MD, 100 mg at 06/01/24 0810    sodium chloride flush 0.9 % injection 5-40 mL, 5-40 mL, IntraVENous, 2 times per day, Mikaela Arredondo MD, 10 mL at 06/01/24 0811    sodium chloride flush 0.9 % injection 5-40 mL, 5-40 mL, IntraVENous, PRN, Mikaela Arredondo MD    0.9 % sodium chloride infusion, , IntraVENous, PRN, Mikaela Arredondo MD    ondansetron (ZOFRAN-ODT) disintegrating tablet 4 mg, 4 mg, Oral, Q8H PRN **OR** ondansetron (ZOFRAN) injection 4 mg, 4 mg, IntraVENous, Q6H PRN, Mikaela Arredondo MD    polyethylene glycol (GLYCOLAX) packet 17 g, 17 g, Oral, Daily PRN, Mikaela Arredondo MD    acetaminophen (TYLENOL) tablet 650 mg, 650 mg, Oral, Q6H PRN **OR** acetaminophen (TYLENOL) suppository 650 mg, 650 mg, Rectal, Q6H PRN, Mikaela Arredondo MD    glucose chewable tablet 16 g, 4 tablet, Oral, PRN, Mikaela Arredondo MD    dextrose bolus 10% 125 mL, 125 mL, IntraVENous, PRN **OR** dextrose bolus 10% 250 mL, 250 mL, IntraVENous, PRN, Mikaela Arredondo MD    glucagon injection 1 mg, 1 mg, SubCUTAneous, PRN, Mikaela Arredondo MD    dextrose 10 % infusion, , IntraVENous, Continuous PRN, Mikaela Arredondo MD    [COMPLETED] meropenem (MERREM) 1,000 mg in sodium chloride 0.9 % 100 mL IVPB (mini-bag), 1,000 mg, IntraVENous, Once, Stopped at 05/29/24 1846 **FOLLOWED BY** meropenem (MERREM) 1,000 mg in sodium chloride 0.9 % 100 mL IVPB (mini-bag), 1,000 mg, IntraVENous, Q12H, Mikaela Arredondo MD, Stopped at 06/01/24 0820    vancomycin (VANCOCIN) intermittent dosing (rey), 1 each, Other, RX Placeholder, Mikaela Arredondo MD

## 2024-06-01 NOTE — PLAN OF CARE
Problem: Safety - Adult  Goal: Free from fall injury  Outcome: Progressing     Problem: Discharge Planning  Goal: Discharge to home or other facility with appropriate resources  Outcome: Progressing  Flowsheets (Taken 6/1/2024 0730)  Discharge to home or other facility with appropriate resources: Identify barriers to discharge with patient and caregiver     Problem: Chronic Conditions and Co-morbidities  Goal: Patient's chronic conditions and co-morbidity symptoms are monitored and maintained or improved  Outcome: Progressing  Flowsheets (Taken 6/1/2024 0730)  Care Plan - Patient's Chronic Conditions and Co-Morbidity Symptoms are Monitored and Maintained or Improved: Monitor and assess patient's chronic conditions and comorbid symptoms for stability, deterioration, or improvement     Problem: Skin/Tissue Integrity  Goal: Absence of new skin breakdown  Description: 1.  Monitor for areas of redness and/or skin breakdown  2.  Assess vascular access sites hourly  3.  Every 4-6 hours minimum:  Change oxygen saturation probe site  4.  Every 4-6 hours:  If on nasal continuous positive airway pressure, respiratory therapy assess nares and determine need for appliance change or resting period.  Outcome: Progressing     Problem: Neurosensory - Adult  Goal: Achieves stable or improved neurological status  Outcome: Progressing  Flowsheets (Taken 6/1/2024 0730)  Achieves stable or improved neurological status: Assess for and report changes in neurological status  Goal: Absence of seizures  Outcome: Progressing  Flowsheets (Taken 6/1/2024 0730)  Absence of seizures: Monitor for seizure activity.  If seizure occurs, document type and location of movements and any associated apnea  Goal: Remains free of injury related to seizures activity  Outcome: Progressing  Flowsheets (Taken 6/1/2024 0730)  Remains free of injury related to seizure activity: Maintain airway, patient safety  and administer oxygen as ordered  Goal: Achieves  hospitalization  Outcome: Progressing  Flowsheets (Taken 6/1/2024 0730)  Absence of infection during hospitalization: Assess and monitor for signs and symptoms of infection  Goal: Absence of fever/infection during anticipated neutropenic period  Outcome: Progressing  Flowsheets (Taken 6/1/2024 0730)  Absence of fever/infection during anticipated neutropenic period: Monitor white blood cell count     Problem: Metabolic/Fluid and Electrolytes - Adult  Goal: Electrolytes maintained within normal limits  Outcome: Progressing  Flowsheets (Taken 6/1/2024 0730)  Electrolytes maintained within normal limits: Monitor labs and assess patient for signs and symptoms of electrolyte imbalances  Goal: Hemodynamic stability and optimal renal function maintained  Outcome: Progressing  Flowsheets (Taken 6/1/2024 0730)  Hemodynamic stability and optimal renal function maintained: Monitor labs and assess for signs and symptoms of volume excess or deficit  Goal: Glucose maintained within prescribed range  Outcome: Progressing  Flowsheets (Taken 6/1/2024 0730)  Glucose maintained within prescribed range: Monitor blood glucose as ordered     Problem: Hematologic - Adult  Goal: Maintains hematologic stability  Outcome: Progressing  Flowsheets (Taken 6/1/2024 0730)  Maintains hematologic stability: Assess for signs and symptoms of bleeding or hemorrhage     Problem: Pain  Goal: Verbalizes/displays adequate comfort level or baseline comfort level  Outcome: Progressing     Problem: Spiritual Care  Goal: Pt/Family able to move forward in process of forgiving self, others, and/or higher power  Description: INTERVENTIONS:  1. Assist patient/family to overcome blocks to healing by use of spiritual practices (prayer, meditation, guided imagery, reiki, breath work, etc).  2. De-myth guilt and help patient/family identify possible irrational spiritual/cultural beliefs and values.  3. Explore possibilities of making amends & reconciliation with self,  others, and/or a greater power.  4. Guide patient/family in identifying painful feelings of guilt.  5. Help patient/famiy explore and identify spiritual beliefs, cultural understandings or values that may help or hinder letting go of issue.  6. Help patient/family explore feelings of anger, bitterness, resentment.  7. Help patient/family identify and examine the situation in which these feelings are experienced.  8. Help patient/family identify destructive displacement of feelings onto other individuals.  9. Invite use of sacraments/rituals/ceremonies as appropriate (e.g. - confession, anointing, smudging).  10. Refer patient/family to formal counseling and/or to yamini community for further support work.  Outcome: Progressing  Flowsheets (Taken 6/1/2024 6209)  Patient/family able to move forward in process of forgiving self, others, and/or higher power: Assist patient to overcome blocks to healing by use of spiritual practices (prayer, meditation, guided imagery, reiki, breath work, etc)

## 2024-06-01 NOTE — PROGRESS NOTES
General Daily Progress Note      Patient Name:   Agustín Willis       YOB: 1945       Age:  78 y.o.      Admit Date: 5/29/2024      Subjective:   Patient is a 78 y.o. year old male past medical history of COPD BPH cervical radiculopathy cocaine abuse hypertension hyperlipidemia seasonal allergies SVT came to emergency room with respiratory arrest, patient was at nursing home after eating breakfast he vomited everywhere likely aspirated oxygen saturation was 70% initially put on nonrebreather patient was intubated in the ER patient was hypotensive started Levophed 10 patient have a cardiac arrest went to V-fib received shock x 4 received epi and amiodarone  Was started on Levophed and vasopressin received 2 L of IV fluid, received IV Zosyn and vancomycin in the ER     Patient admitted to the ICU for further workup  Patient on ventilator/unresponsive       5/30    Patient on ventilator propofol and Precedex drip  Hypotension on Levophed and vasopressin  Patient has no urine output last night received 500 bolus    Nephrology decided for start CRRT IR team for dialysis cath  BUN 72 creatinine 3.09  CRP 10.5  MRSA nares    5/31    Patient on ventilator 40% O2 propofol Precedex levo and vasopressin drip  Patient on A-fib with rapid ventricular rate ordered amiodarone bolus and started on drip  Patient getting CRRT  No urine output  D-dimers greater than 20     6/1    Patient on ventilator with propofol Precedex levo and vasopressin drip  Patient A-fib rate controlled with amiodarone drip  Patient currently on CRRT  Patient seen agree with above    Objective:     Vitals:    06/01/24 1132   BP:    Pulse: 83   Resp: 14   Temp:    SpO2: 100%        Recent Results (from the past 24 hour(s))   Vancomycin Level, Random    Collection Time: 05/31/24  3:05 PM   Result Value Ref Range    Vancomycin Rm 15.2 ug/mL    Dose Date/Time Not provided      DOSE AMOUNT Not provided Units   Heparin, Anti-XA    Collection Time:  05/31/24  4:05 PM   Result Value Ref Range    Heparin Xa,LMWH and Unfrac >1.10 (HH) IU/mL   CBC with Auto Differential    Collection Time: 05/31/24  4:05 PM   Result Value Ref Range    WBC 20.9 (H) 4.1 - 11.1 K/uL    RBC 2.79 (L) 4.10 - 5.70 M/uL    Hemoglobin 8.2 (L) 12.1 - 17.0 g/dL    Hematocrit 25.8 (L) 36.6 - 50.3 %    MCV 92.5 80.0 - 99.0 FL    MCH 29.4 26.0 - 34.0 PG    MCHC 31.8 30.0 - 36.5 g/dL    RDW 14.4 11.5 - 14.5 %    Platelets 154 150 - 400 K/uL    MPV 10.5 8.9 - 12.9 FL    Nucleated RBCs 0.2 (H) 0.0  WBC    nRBC 0.04 (H) 0.00 - 0.01 K/uL    Neutrophils % 66 32 - 75 %    Band Neutrophils 18 (H) 0 - 6 %    Lymphocytes % 7 (L) 12 - 49 %    Monocytes % 5 5 - 13 %    Eosinophils % 0 0 - 7 %    Basophils % 0 0 - 1 %    Metamyelocytes 4 (H) 0 %    Immature Granulocytes % 0 %    Neutrophils Absolute 17.6 (H) 1.8 - 8.0 K/UL    Lymphocytes Absolute 1.5 0.8 - 3.5 K/UL    Monocytes Absolute 1.0 0.0 - 1.0 K/UL    Eosinophils Absolute 0.0 0.0 - 0.4 K/UL    Basophils Absolute 0.0 0.0 - 0.1 K/UL    Immature Granulocytes Absolute 0.0 K/UL    Differential Type Manual      RBC Comment Anisocytosis  1+        RBC Comment Microcytosis  1+        RBC Comment Weston cells  1+       Renal Function Panel    Collection Time: 05/31/24  4:05 PM   Result Value Ref Range    Sodium 138 136 - 145 mmol/L    Potassium 4.4 3.5 - 5.1 mmol/L    Chloride 107 97 - 108 mmol/L    CO2 25 21 - 32 mmol/L    Anion Gap 6 5 - 15 mmol/L    Glucose 172 (H) 65 - 100 mg/dL    BUN 38 (H) 6 - 20 mg/dL    Creatinine 1.78 (H) 0.70 - 1.30 mg/dL    BUN/Creatinine Ratio 21 (H) 12 - 20      Est, Glom Filt Rate 38 (L) >60 ml/min/1.73m2    Calcium 8.6 8.5 - 10.1 mg/dL    Phosphorus 4.0 2.6 - 4.7 mg/dL    Albumin 2.0 (L) 3.5 - 5.0 g/dL   Magnesium    Collection Time: 05/31/24  4:05 PM   Result Value Ref Range    Magnesium 1.8 1.6 - 2.4 mg/dL   CBC with Auto Differential    Collection Time: 05/31/24  8:50 PM   Result Value Ref Range    WBC 21.8 (H) 4.1 -  tablet 1 mg, 1 mg, Oral, Daily, Mikaela Arredondo MD, 1 mg at 06/01/24 0810    sodium bicarbonate tablet 325 mg, 325 mg, Oral, 4x Daily, Mikaela Arredondo MD, 325 mg at 06/01/24 0810    thiamine mononitrate tablet 100 mg, 100 mg, Oral, Daily, Mikaela Arredondo MD, 100 mg at 06/01/24 0810    sodium chloride flush 0.9 % injection 5-40 mL, 5-40 mL, IntraVENous, 2 times per day, Mikaela Arredondo MD, 10 mL at 06/01/24 0811    sodium chloride flush 0.9 % injection 5-40 mL, 5-40 mL, IntraVENous, PRN, Mikaela Arredondo MD    0.9 % sodium chloride infusion, , IntraVENous, PRN, Mikaela Arredondo MD    ondansetron (ZOFRAN-ODT) disintegrating tablet 4 mg, 4 mg, Oral, Q8H PRN **OR** ondansetron (ZOFRAN) injection 4 mg, 4 mg, IntraVENous, Q6H PRN, Mikaela Arredondo MD    polyethylene glycol (GLYCOLAX) packet 17 g, 17 g, Oral, Daily PRN, Mikaela Arredondo MD    acetaminophen (TYLENOL) tablet 650 mg, 650 mg, Oral, Q6H PRN **OR** acetaminophen (TYLENOL) suppository 650 mg, 650 mg, Rectal, Q6H PRN, Mikaela Arredondo MD    glucose chewable tablet 16 g, 4 tablet, Oral, PRN, Mikaela Arredondo MD    dextrose bolus 10% 125 mL, 125 mL, IntraVENous, PRN **OR** dextrose bolus 10% 250 mL, 250 mL, IntraVENous, PRN, Mikaela Arredondo MD    glucagon injection 1 mg, 1 mg, SubCUTAneous, PRN, Mikaela Arredondo MD    dextrose 10 % infusion, , IntraVENous, Continuous PRN, Mikaela Arredondo MD    [COMPLETED] meropenem (MERREM) 1,000 mg in sodium chloride 0.9 % 100 mL IVPB (mini-bag), 1,000 mg, IntraVENous, Once, Stopped at 05/29/24 1846 **FOLLOWED BY** meropenem (MERREM) 1,000 mg in sodium chloride 0.9 % 100 mL IVPB (mini-bag), 1,000 mg, IntraVENous, Q12H, Mikaela Arredondo MD, Stopped at 06/01/24 0820    ipratropium 0.5 mg-albuterol 2.5 mg (DUONEB) nebulizer solution 1 Dose, 1 Dose, Inhalation, Q6H RT, Jeremy Ramos MD, 1 Dose at 06/01/24 0928

## 2024-06-02 ENCOUNTER — APPOINTMENT (OUTPATIENT)
Facility: HOSPITAL | Age: 79
DRG: 870 | End: 2024-06-02
Payer: MEDICARE

## 2024-06-02 LAB
ALBUMIN SERPL-MCNC: 2 G/DL (ref 3.5–5)
ALBUMIN SERPL-MCNC: 2 G/DL (ref 3.5–5)
ANION GAP SERPL CALC-SCNC: 5 MMOL/L (ref 5–15)
ANION GAP SERPL CALC-SCNC: 7 MMOL/L (ref 5–15)
ARTERIAL PATENCY WRIST A: YES
BASE DEFICIT BLDA-SCNC: 0.7 MMOL/L
BASOPHILS # BLD: 0 K/UL (ref 0–0.1)
BASOPHILS NFR BLD: 0 % (ref 0–1)
BDY SITE: ABNORMAL
BODY TEMPERATURE: 93
BUN SERPL-MCNC: 23 MG/DL (ref 6–20)
BUN SERPL-MCNC: 23 MG/DL (ref 6–20)
BUN/CREAT SERPL: 21 (ref 12–20)
BUN/CREAT SERPL: 21 (ref 12–20)
CA-I BLD-MCNC: 8.3 MG/DL (ref 8.5–10.1)
CA-I BLD-MCNC: 9 MG/DL (ref 8.5–10.1)
CHLORIDE SERPL-SCNC: 104 MMOL/L (ref 97–108)
CHLORIDE SERPL-SCNC: 107 MMOL/L (ref 97–108)
CO2 SERPL-SCNC: 24 MMOL/L (ref 21–32)
CO2 SERPL-SCNC: 26 MMOL/L (ref 21–32)
COHGB MFR BLD: 0.1 % (ref 1–2)
CREAT SERPL-MCNC: 1.09 MG/DL (ref 0.7–1.3)
CREAT SERPL-MCNC: 1.11 MG/DL (ref 0.7–1.3)
CRP SERPL-MCNC: 13.7 MG/DL (ref 0–0.3)
DIFFERENTIAL METHOD BLD: ABNORMAL
EOSINOPHIL # BLD: 0 K/UL (ref 0–0.4)
EOSINOPHIL NFR BLD: 0 % (ref 0–7)
ERYTHROCYTE [DISTWIDTH] IN BLOOD BY AUTOMATED COUNT: 14 % (ref 11.5–14.5)
FIO2 ON VENT: 30 %
GAS FLOW.O2 SETTING OXYMISER: 14
GLUCOSE BLD STRIP.AUTO-MCNC: 127 MG/DL (ref 65–100)
GLUCOSE SERPL-MCNC: 115 MG/DL (ref 65–100)
GLUCOSE SERPL-MCNC: 134 MG/DL (ref 65–100)
HCO3 BLDA-SCNC: 24 MMOL/L (ref 22–26)
HCT VFR BLD AUTO: 24.7 % (ref 36.6–50.3)
HGB BLD-MCNC: 8 G/DL (ref 12.1–17)
IMM GRANULOCYTES # BLD AUTO: 0 K/UL
IMM GRANULOCYTES NFR BLD AUTO: 0 %
LYMPHOCYTES # BLD: 0.4 K/UL (ref 0.8–3.5)
LYMPHOCYTES NFR BLD: 2 % (ref 12–49)
MAGNESIUM SERPL-MCNC: 1.8 MG/DL (ref 1.6–2.4)
MCH RBC QN AUTO: 28.9 PG (ref 26–34)
MCHC RBC AUTO-ENTMCNC: 32.4 G/DL (ref 30–36.5)
MCV RBC AUTO: 89.2 FL (ref 80–99)
METHGB MFR BLD: 0.3 % (ref 0–1.4)
MONOCYTES # BLD: 0.8 K/UL (ref 0–1)
MONOCYTES NFR BLD: 4 % (ref 5–13)
NEUTS BAND NFR BLD MANUAL: 2 % (ref 0–6)
NEUTS SEG # BLD: 19.5 K/UL (ref 1.8–8)
NEUTS SEG NFR BLD: 92 % (ref 32–75)
NRBC # BLD: 0.28 K/UL (ref 0–0.01)
NRBC BLD-RTO: 1.4 PER 100 WBC
OXYHGB MFR BLD: 94 % (ref 95–99)
PCO2 BLDA: 32 MMHG (ref 35–45)
PEEP RESPIRATORY: 5
PERFORMED BY:: ABNORMAL
PERFORMED BY:: ABNORMAL
PH BLDA: 7.47 (ref 7.35–7.45)
PHOSPHATE SERPL-MCNC: 1.8 MG/DL (ref 2.6–4.7)
PHOSPHATE SERPL-MCNC: 2.3 MG/DL (ref 2.6–4.7)
PLATELET # BLD AUTO: 122 K/UL (ref 150–400)
PMV BLD AUTO: 10.6 FL (ref 8.9–12.9)
PO2 BLDA: 60 MMHG (ref 80–100)
POTASSIUM SERPL-SCNC: 3.3 MMOL/L (ref 3.5–5.1)
POTASSIUM SERPL-SCNC: 3.9 MMOL/L (ref 3.5–5.1)
PROCALCITONIN SERPL-MCNC: 27.56 NG/ML
RBC # BLD AUTO: 2.77 M/UL (ref 4.1–5.7)
RBC MORPH BLD: ABNORMAL
SAO2 % BLD: 94 % (ref 95–99)
SAO2% DEVICE SAO2% SENSOR NAME: ABNORMAL
SODIUM SERPL-SCNC: 135 MMOL/L (ref 136–145)
SODIUM SERPL-SCNC: 138 MMOL/L (ref 136–145)
SPECIMEN SITE: ABNORMAL
UFH PPP CHRO-ACNC: 0.45 IU/ML
VENTILATION MODE VENT: ABNORMAL
VT SETTING VENT: 450
WBC # BLD AUTO: 20.7 K/UL (ref 4.1–11.1)

## 2024-06-02 PROCEDURE — 6360000002 HC RX W HCPCS: Performed by: FAMILY MEDICINE

## 2024-06-02 PROCEDURE — 6370000000 HC RX 637 (ALT 250 FOR IP): Performed by: INTERNAL MEDICINE

## 2024-06-02 PROCEDURE — 6360000002 HC RX W HCPCS: Performed by: INTERNAL MEDICINE

## 2024-06-02 PROCEDURE — 2500000003 HC RX 250 WO HCPCS: Performed by: FAMILY MEDICINE

## 2024-06-02 PROCEDURE — 84145 PROCALCITONIN (PCT): CPT

## 2024-06-02 PROCEDURE — 90945 DIALYSIS ONE EVALUATION: CPT

## 2024-06-02 PROCEDURE — 51798 US URINE CAPACITY MEASURE: CPT

## 2024-06-02 PROCEDURE — 2500000003 HC RX 250 WO HCPCS: Performed by: INTERNAL MEDICINE

## 2024-06-02 PROCEDURE — 2580000003 HC RX 258: Performed by: FAMILY MEDICINE

## 2024-06-02 PROCEDURE — 86140 C-REACTIVE PROTEIN: CPT

## 2024-06-02 PROCEDURE — 99232 SBSQ HOSP IP/OBS MODERATE 35: CPT | Performed by: INTERNAL MEDICINE

## 2024-06-02 PROCEDURE — 36415 COLL VENOUS BLD VENIPUNCTURE: CPT

## 2024-06-02 PROCEDURE — 6370000000 HC RX 637 (ALT 250 FOR IP): Performed by: FAMILY MEDICINE

## 2024-06-02 PROCEDURE — 82803 BLOOD GASES ANY COMBINATION: CPT

## 2024-06-02 PROCEDURE — 2580000003 HC RX 258: Performed by: INTERNAL MEDICINE

## 2024-06-02 PROCEDURE — 71045 X-RAY EXAM CHEST 1 VIEW: CPT

## 2024-06-02 PROCEDURE — 85025 COMPLETE CBC W/AUTO DIFF WBC: CPT

## 2024-06-02 PROCEDURE — 94003 VENT MGMT INPAT SUBQ DAY: CPT

## 2024-06-02 PROCEDURE — 94761 N-INVAS EAR/PLS OXIMETRY MLT: CPT

## 2024-06-02 PROCEDURE — 83735 ASSAY OF MAGNESIUM: CPT

## 2024-06-02 PROCEDURE — 85520 HEPARIN ASSAY: CPT

## 2024-06-02 PROCEDURE — 94640 AIRWAY INHALATION TREATMENT: CPT

## 2024-06-02 PROCEDURE — 2000000000 HC ICU R&B

## 2024-06-02 PROCEDURE — 36600 WITHDRAWAL OF ARTERIAL BLOOD: CPT

## 2024-06-02 PROCEDURE — 80069 RENAL FUNCTION PANEL: CPT

## 2024-06-02 PROCEDURE — 2700000000 HC OXYGEN THERAPY PER DAY

## 2024-06-02 PROCEDURE — 82962 GLUCOSE BLOOD TEST: CPT

## 2024-06-02 RX ORDER — POTASSIUM CHLORIDE 7.45 MG/ML
10 INJECTION INTRAVENOUS
Status: COMPLETED | OUTPATIENT
Start: 2024-06-02 | End: 2024-06-02

## 2024-06-02 RX ORDER — CALCIUM CHLORIDE, MAGNESIUM CHLORIDE, DEXTROSE MONOHYDRATE, LACTIC ACID, SODIUM CHLORIDE, SODIUM BICARBONATE AND POTASSIUM CHLORIDE 5.15; 2.03; 22; 5.4; 6.46; 3.09; .157 G/L; G/L; G/L; G/L; G/L; G/L; G/L
INJECTION INTRAVENOUS CONTINUOUS
Status: DISCONTINUED | OUTPATIENT
Start: 2024-06-02 | End: 2024-06-07

## 2024-06-02 RX ORDER — METHYLPREDNISOLONE SODIUM SUCCINATE 40 MG/ML
20 INJECTION, POWDER, LYOPHILIZED, FOR SOLUTION INTRAMUSCULAR; INTRAVENOUS EVERY 12 HOURS
Status: DISCONTINUED | OUTPATIENT
Start: 2024-06-02 | End: 2024-06-10

## 2024-06-02 RX ADMIN — FERROUS SULFATE TAB 325 MG (65 MG ELEMENTAL FE) 325 MG: 325 (65 FE) TAB at 08:00

## 2024-06-02 RX ADMIN — MEROPENEM 1000 MG: 1 INJECTION, POWDER, FOR SOLUTION INTRAVENOUS at 05:02

## 2024-06-02 RX ADMIN — SODIUM CHLORIDE, PRESERVATIVE FREE 10 ML: 5 INJECTION INTRAVENOUS at 08:01

## 2024-06-02 RX ADMIN — MEROPENEM 1000 MG: 1 INJECTION, POWDER, FOR SOLUTION INTRAVENOUS at 16:57

## 2024-06-02 RX ADMIN — POTASSIUM CHLORIDE 10 MEQ: 7.46 INJECTION, SOLUTION INTRAVENOUS at 10:26

## 2024-06-02 RX ADMIN — SODIUM CHLORIDE: 9 INJECTION, SOLUTION INTRAVENOUS at 23:21

## 2024-06-02 RX ADMIN — POTASSIUM BICARBONATE 40 MEQ: 782 TABLET, EFFERVESCENT ORAL at 08:00

## 2024-06-02 RX ADMIN — AMIODARONE HYDROCHLORIDE 0.5 MG/MIN: 1.8 INJECTION, SOLUTION INTRAVENOUS at 03:17

## 2024-06-02 RX ADMIN — THIAMINE HCL TAB 100 MG 100 MG: 100 TAB at 08:00

## 2024-06-02 RX ADMIN — SODIUM CHLORIDE: 9 INJECTION, SOLUTION INTRAVENOUS at 00:55

## 2024-06-02 RX ADMIN — CALCIUM CHLORIDE, MAGNESIUM CHLORIDE, DEXTROSE MONOHYDRATE, LACTIC ACID, SODIUM CHLORIDE, SODIUM BICARBONATE AND POTASSIUM CHLORIDE: 5.15; 2.03; 22; 5.4; 6.46; 3.09; .157 INJECTION INTRAVENOUS at 20:52

## 2024-06-02 RX ADMIN — FINASTERIDE 5 MG: 5 TABLET, FILM COATED ORAL at 08:00

## 2024-06-02 RX ADMIN — CALCIUM CHLORIDE, MAGNESIUM CHLORIDE, DEXTROSE MONOHYDRATE, LACTIC ACID, SODIUM CHLORIDE, SODIUM BICARBONATE AND POTASSIUM CHLORIDE: 5.15; 2.03; 22; 5.4; 6.46; 3.09; .157 INJECTION INTRAVENOUS at 05:03

## 2024-06-02 RX ADMIN — SODIUM CHLORIDE, PRESERVATIVE FREE 10 ML: 5 INJECTION INTRAVENOUS at 19:57

## 2024-06-02 RX ADMIN — POTASSIUM & SODIUM PHOSPHATES POWDER PACK 280-160-250 MG 500 MG: 280-160-250 PACK at 15:45

## 2024-06-02 RX ADMIN — HEPARIN SODIUM 5 UNITS/KG/HR: 10000 INJECTION, SOLUTION INTRAVENOUS at 03:20

## 2024-06-02 RX ADMIN — POTASSIUM CHLORIDE 10 MEQ: 7.46 INJECTION, SOLUTION INTRAVENOUS at 09:10

## 2024-06-02 RX ADMIN — FOLIC ACID 1 MG: 1 TABLET ORAL at 08:00

## 2024-06-02 RX ADMIN — SODIUM BICARBONATE 325 MG: 650 TABLET ORAL at 08:00

## 2024-06-02 RX ADMIN — CALCIUM CHLORIDE, MAGNESIUM CHLORIDE, DEXTROSE MONOHYDRATE, LACTIC ACID, SODIUM CHLORIDE, SODIUM BICARBONATE AND POTASSIUM CHLORIDE: 5.15; 2.03; 22; 5.4; 6.46; 3.09; .157 INJECTION INTRAVENOUS at 15:45

## 2024-06-02 RX ADMIN — IPRATROPIUM BROMIDE AND ALBUTEROL SULFATE 1 DOSE: .5; 3 SOLUTION RESPIRATORY (INHALATION) at 01:59

## 2024-06-02 RX ADMIN — MUPIROCIN: 20 OINTMENT TOPICAL at 08:00

## 2024-06-02 RX ADMIN — CALCIUM CHLORIDE, MAGNESIUM CHLORIDE, DEXTROSE MONOHYDRATE, LACTIC ACID, SODIUM CHLORIDE, SODIUM BICARBONATE AND POTASSIUM CHLORIDE: 3.68; 3.05; 22; 5.4; 6.46; 3.09; .314 INJECTION INTRAVENOUS at 09:10

## 2024-06-02 RX ADMIN — POTASSIUM CHLORIDE 10 MEQ: 7.46 INJECTION, SOLUTION INTRAVENOUS at 11:37

## 2024-06-02 RX ADMIN — IPRATROPIUM BROMIDE AND ALBUTEROL SULFATE 1 DOSE: .5; 3 SOLUTION RESPIRATORY (INHALATION) at 20:07

## 2024-06-02 RX ADMIN — POTASSIUM CHLORIDE 10 MEQ: 7.46 INJECTION, SOLUTION INTRAVENOUS at 08:00

## 2024-06-02 RX ADMIN — ASPIRIN 81 MG: 81 TABLET, COATED ORAL at 08:00

## 2024-06-02 RX ADMIN — IPRATROPIUM BROMIDE AND ALBUTEROL SULFATE 1 DOSE: .5; 3 SOLUTION RESPIRATORY (INHALATION) at 08:40

## 2024-06-02 RX ADMIN — METHYLPREDNISOLONE SODIUM SUCCINATE 40 MG: 40 INJECTION INTRAMUSCULAR; INTRAVENOUS at 07:59

## 2024-06-02 RX ADMIN — METHYLPREDNISOLONE SODIUM SUCCINATE 20 MG: 40 INJECTION INTRAMUSCULAR; INTRAVENOUS at 19:57

## 2024-06-02 RX ADMIN — DEXMEDETOMIDINE HYDROCHLORIDE 0.1 MCG/KG/HR: 400 INJECTION, SOLUTION INTRAVENOUS at 03:14

## 2024-06-02 RX ADMIN — PROPOFOL 10 MCG/KG/MIN: 10 INJECTION, EMULSION INTRAVENOUS at 14:25

## 2024-06-02 RX ADMIN — POLYETHYLENE GLYCOL 3350 17 G: 17 POWDER, FOR SOLUTION ORAL at 21:00

## 2024-06-02 RX ADMIN — AMIODARONE HYDROCHLORIDE 0.5 MG/MIN: 1.8 INJECTION, SOLUTION INTRAVENOUS at 14:25

## 2024-06-02 RX ADMIN — POTASSIUM & SODIUM PHOSPHATES POWDER PACK 280-160-250 MG 500 MG: 280-160-250 PACK at 20:57

## 2024-06-02 RX ADMIN — POTASSIUM & SODIUM PHOSPHATES POWDER PACK 280-160-250 MG 500 MG: 280-160-250 PACK at 10:26

## 2024-06-02 RX ADMIN — IPRATROPIUM BROMIDE AND ALBUTEROL SULFATE 1 DOSE: .5; 3 SOLUTION RESPIRATORY (INHALATION) at 14:36

## 2024-06-02 RX ADMIN — VASOPRESSIN 0.01 UNITS/MIN: 20 INJECTION, SOLUTION INTRAVENOUS at 23:38

## 2024-06-02 RX ADMIN — Medication 3 MCG/MIN: at 03:19

## 2024-06-02 RX ADMIN — MUPIROCIN: 20 OINTMENT TOPICAL at 19:57

## 2024-06-02 ASSESSMENT — PULMONARY FUNCTION TESTS
PIF_VALUE: 18
PIF_VALUE: 19
PIF_VALUE: 16
PIF_VALUE: 18
PIF_VALUE: 19
PIF_VALUE: 18
PIF_VALUE: 19
PIF_VALUE: 18
PIF_VALUE: 17
PIF_VALUE: 18
PIF_VALUE: 18
PIF_VALUE: 16
PIF_VALUE: 19
PIF_VALUE: 15
PIF_VALUE: 16
PIF_VALUE: 17
PIF_VALUE: 17
PIF_VALUE: 18

## 2024-06-02 ASSESSMENT — PAIN SCALES - GENERAL
PAINLEVEL_OUTOF10: 0

## 2024-06-02 NOTE — PROGRESS NOTES
General Daily Progress Note      Patient Name:   Agustín Willsi       YOB: 1945       Age:  78 y.o.      Admit Date: 5/29/2024      Subjective:   Patient is a 78 y.o. year old male past medical history of COPD BPH cervical radiculopathy cocaine abuse hypertension hyperlipidemia seasonal allergies SVT came to emergency room with respiratory arrest, patient was at nursing home after eating breakfast he vomited everywhere likely aspirated oxygen saturation was 70% initially put on nonrebreather patient was intubated in the ER patient was hypotensive started Levophed 10 patient have a cardiac arrest went to V-fib received shock x 4 received epi and amiodarone  Was started on Levophed and vasopressin received 2 L of IV fluid, received IV Zosyn and vancomycin in the ER     Patient admitted to the ICU for further workup  Patient on ventilator/unresponsive       5/30    Patient on ventilator propofol and Precedex drip  Hypotension on Levophed and vasopressin  Patient has no urine output last night received 500 bolus    Nephrology decided for start CRRT IR team for dialysis cath  BUN 72 creatinine 3.09  CRP 10.5  MRSA nares    5/31    Patient on ventilator 40% O2 propofol Precedex levo and vasopressin drip  Patient on A-fib with rapid ventricular rate ordered amiodarone bolus and started on drip  Patient getting CRRT  No urine output  D-dimers greater than 20     6/1    Patient on ventilator with propofol Precedex levo and vasopressin drip  Patient A-fib rate controlled with amiodarone drip  Patient currently on CRRT    6/2    Patient on ventilator    Patient getting CRRT  Heart rate controlled on IV amiodarone currently in A-fib    Seen by pulmonology today FiO2 down to 35%  No wheezing will continue nebulized albuterol/Atrovent, will decrease IV Solu-Medrol      Objective:     Vitals:    06/02/24 0846   BP:    Pulse: 71   Resp: 14   Temp:    SpO2: 99%        Recent Results (from the past 24 hour(s))   POCT  shocks  Septic shock with hypotension  Acute hypoxic respiratory failure on ventilator  Aspiration pneumonia  COPD  History of cocaine abuse in the past  History of hypertension hyperlipidemia  Acute on chronic kidney disease  Lactic acidosis  MRSA nares  A-fib with rapid ventricular rate  Left Ventricle: Normal left ventricular systolic function with a visually estimated EF of 65 - 70%. Left ventricle size is normal. Increased wall thickness. Mild septal thickening. Moderate hypokinesis of the following segments: mid inferoseptal. Normal diastolic function.    Right Ventricle: Right ventricle is moderately dilated.    Aortic Valve: Moderate to severe regurgitation.    Left Atrium: Left atrium is mildly dilated. Left atrial volume index is normal (16-34 mL/m2).    Right Atrium: Right atrium is mildly dilated.    Image quality is good. Procedure performed with the patient in a supine position      Plan:     IR consult for dialysis cath starting CRRT  Continue vasopressin and Levophed for hypotension  On propofol and Precedex for sedation  On meropenem and vancomycin    Aspirin 81 mg daily ferrous sulfate 325 mg daily Proscar 5 mg daily folic acid 1 mg daily DuoNeb nebulizer every 6 hours sodium bicarb 325 4 times a day  Thiamine 100 mg daily  IV Solu-Medrol 40 mg every 8    Started heparin drip  Doppler studies  Amiodarone drip    Continue CRRT    I spent critical care time 35 minutes independent        Current Facility-Administered Medications:     prismaSol BGK 4/2.5 dialysis solution, , Dialysis, Continuous, Sol Ferraro MD, Last Rate: 1,000 mL/hr at 06/02/24 0910, New Bag at 06/02/24 0910    prismaSol BGK 4/2.5 dialysis solution, , Dialysis, Continuous, Sol Ferraro MD, Last Rate: 1,000 mL/hr at 06/02/24 0910, New Bag at 06/02/24 0910    potassium bicarb-citric acid (EFFER-K) effervescent tablet 40 mEq, 40 mEq, Oral, Q4H, Sol Ferraro MD, 40 mEq at 06/02/24 0800    potassium chloride 10 mEq/100 mL IVPB

## 2024-06-02 NOTE — PROGRESS NOTES
General Daily Progress Note      Patient Name:   Agustín Willis       YOB: 1945       Age:  78 y.o.      Admit Date: 5/29/2024      Subjective:   Patient is a 78 y.o. year old male past medical history of COPD BPH cervical radiculopathy cocaine abuse hypertension hyperlipidemia seasonal allergies SVT came to emergency room with respiratory arrest, patient was at nursing home after eating breakfast he vomited everywhere likely aspirated oxygen saturation was 70% initially put on nonrebreather patient was intubated in the ER patient was hypotensive started Levophed 10 patient have a cardiac arrest went to V-fib received shock x 4 received epi and amiodarone  Was started on Levophed and vasopressin received 2 L of IV fluid, received IV Zosyn and vancomycin in the ER     Patient admitted to the ICU for further workup  Patient on ventilator/unresponsive       5/30    Patient on ventilator propofol and Precedex drip  Hypotension on Levophed and vasopressin  Patient has no urine output last night received 500 bolus    Nephrology decided for start CRRT IR team for dialysis cath  BUN 72 creatinine 3.09  CRP 10.5  MRSA nares    5/31    Patient on ventilator 40% O2 propofol Precedex levo and vasopressin drip  Patient on A-fib with rapid ventricular rate ordered amiodarone bolus and started on drip  Patient getting CRRT  No urine output  D-dimers greater than 20     6/1    Patient on ventilator with propofol Precedex levo and vasopressin drip  Patient A-fib rate controlled with amiodarone drip  Patient currently on CRRT    6/2    Patient on ventilator    Patient getting CRRT  Heart rate controlled on IV amiodarone currently in A-fib    Seen by pulmonology today FiO2 down to 35%  No wheezing will continue nebulized albuterol/Atrovent, will decrease IV Solu-Medrol  Patient was seen and agree with above assessment    Objective:     Vitals:    06/02/24 1157   BP:    Pulse: 99   Resp: 19   Temp:    SpO2: 99%      dextrose bolus 10% 125 mL, 125 mL, IntraVENous, PRN **OR** dextrose bolus 10% 250 mL, 250 mL, IntraVENous, PRN, Mikaela Arredondo MD    glucagon injection 1 mg, 1 mg, SubCUTAneous, PRN, Mikaela Arredondo MD    dextrose 10 % infusion, , IntraVENous, Continuous PRN, Mikaela Arredondo MD    [COMPLETED] meropenem (MERREM) 1,000 mg in sodium chloride 0.9 % 100 mL IVPB (mini-bag), 1,000 mg, IntraVENous, Once, Stopped at 05/29/24 1846 **FOLLOWED BY** meropenem (MERREM) 1,000 mg in sodium chloride 0.9 % 100 mL IVPB (mini-bag), 1,000 mg, IntraVENous, Q12H, Mikaela Arredondo MD, Stopped at 06/02/24 0802    ipratropium 0.5 mg-albuterol 2.5 mg (DUONEB) nebulizer solution 1 Dose, 1 Dose, Inhalation, Q6H RT, Jeremy Ramos MD, 1 Dose at 06/02/24 0840

## 2024-06-02 NOTE — PROGRESS NOTES
Infectious Disease Progress Note           Subjective:   Pt seen and examined at bedside. On CRRT, vent and pressor support. Follows commands off sedation per staff.  Episodes of hypothermia, but on CRRT, also mildly tachycardic.  WBC trending down on today's labs.  Like normal doug isolated from sputum culture on .  250 cc of urine was drained after straight cath per ICU staff.   Objective:   Physical Exam:     /79   Pulse 92   Temp (!) 96.2 °F (35.7 °C) (Esophageal)   Resp 15   Ht 1.73 m (5' 8.11\")   Wt 92.7 kg (204 lb 5.9 oz)   SpO2 99%   BMI 30.97 kg/m²  O2 Flow Rate (L/min): 100 L/min O2 Device: Ventilator    Temp (24hrs), Av.5 °F (34.2 °C), Min:92.1 °F (33.4 °C), Max:96.2 °F (35.7 °C)    701 -  1900  In: -   Out: 942    1901 -  0700  In: 2012.4 [I.V.:1653.2]  Out: 3283 [Urine:225]    General: NAD, alert, intubated and sedated   HEENT: ELDA, Moist mucosa, ETT in place, left IJ triple-lumen, right IJ HD cath  Lungs: Decreased at bases, no wheeze/rhonchi  Heart: S1S2+, RRR, no murmur  Abdo: Soft, NT, ND, +BS   : No indwelling damico cath  Exts: Decreased upper and lower extremity swelling  Skin: No significant wounds  Data Review:       Recent Days:    Recent Labs     24  0045 24  0555 24  0236   WBC 21.9* 22.3* 20.7*   HGB 8.1* 8.1* 8.0*   HCT 25.0* 25.4* 24.7*   * 138* 122*       Recent Labs     24  0555 24  0236 24  1135   BUN 31* 23* 23*   CREATININE 1.45* 1.09 1.11         Lab Results   Component Value Date/Time    CRP 13.70 2024 02:36 AM          Microbiology     Results       Procedure Component Value Units Date/Time    MRSA by PCR [[id number]]     Order Status: Canceled Specimen: Nares     Culture, Respiratory [[id number]] Collected: 24 2300    Order Status: Completed Specimen: Sputum Updated: 24 0708     Special Requests No Special Requests        Gram Stain No Epithelial cells

## 2024-06-02 NOTE — PROGRESS NOTES
CARDIOLOGY PROGRESS NOTE      Patient Name: Agustín Willis  Age: 78 y.o.  Gender:male  :1945  MRN: [id number]    Agustín Willis is a 78 y.o. year-old male with past medical history significant for hypertension, COPD, cocaine abuse, CKD who was evaluated today due to cardiac arrest. Patient initially presented to the ED via EMS from nursing home due to respiratory distress after vomiting breakfast. Required intubation in ED. Apparently with VF arrest in ED, shock x4.     Patient seen and examined. Remains intubated, sedated. On levo and vaso, amiodarone, heparin.  RN at the bedside. On CRRT.    Telemetry reviewed.    Unable to obtain ROS as patient is intubated and sedated. Current medications reviewed.    Physical Examination    Allergies   Allergen Reactions    Penicillin G Other (See Comments)     Pt states he passes out    Sulfamethoxazole-Trimethoprim      Pt states he passes out     Vitals:    24 1157   BP:    Pulse: 99   Resp: 19   Temp:    SpO2: 99%     Vital signs are stable on pressors  Intubated and sedated  Heart has an irregularly irregular rhythm  Lungs are coarse  Abdomen is soft  Extremities have mild edema  Skin is dry    Labs reviewed:  Recent Results (from the past 12 hour(s))   Renal Function Panel    Collection Time: 24  2:36 AM   Result Value Ref Range    Sodium 135 (L) 136 - 145 mmol/L    Potassium 3.3 (L) 3.5 - 5.1 mmol/L    Chloride 104 97 - 108 mmol/L    CO2 24 21 - 32 mmol/L    Anion Gap 7 5 - 15 mmol/L    Glucose 134 (H) 65 - 100 mg/dL    BUN 23 (H) 6 - 20 mg/dL    Creatinine 1.09 0.70 - 1.30 mg/dL    BUN/Creatinine Ratio 21 (H) 12 - 20      Est, Glom Filt Rate 69 >60 ml/min/1.73m2    Calcium 9.0 8.5 - 10.1 mg/dL    Phosphorus 2.3 (L) 2.6 - 4.7 mg/dL    Albumin 2.0 (L) 3.5 - 5.0 g/dL   Magnesium    Collection Time: 24  2:36 AM   Result Value Ref Range    Magnesium 1.8 1.6 - 2.4 mg/dL   CBC with Auto Differential    Collection Time: 24  2:36 AM   Result Value  by: Dani Walsh     Temperature 93.0     Renal Function Panel    Collection Time: 06/02/24 11:35 AM   Result Value Ref Range    Sodium 138 136 - 145 mmol/L    Potassium 3.9 3.5 - 5.1 mmol/L    Chloride 107 97 - 108 mmol/L    CO2 26 21 - 32 mmol/L    Anion Gap 5 5 - 15 mmol/L    Glucose 115 (H) 65 - 100 mg/dL    BUN 23 (H) 6 - 20 mg/dL    Creatinine 1.11 0.70 - 1.30 mg/dL    BUN/Creatinine Ratio 21 (H) 12 - 20      Est, Glom Filt Rate 67 >60 ml/min/1.73m2    Calcium 8.3 (L) 8.5 - 10.1 mg/dL    Phosphorus 1.8 (L) 2.6 - 4.7 mg/dL    Albumin 2.0 (L) 3.5 - 5.0 g/dL   POCT Glucose    Collection Time: 06/02/24 11:52 AM   Result Value Ref Range    POC Glucose 127 (H) 65 - 100 mg/dL    Performed by: LIVIER LYONS         Case discussed with Dr. Thomas and our impression and recommendations are as follows:  Cardiac arrest, VF per report  Repeat echo this admission with EF 65-70%, moderate hypokinesis mid inferoseptal segment   Issues with hyperkalemia, now corrected  Troponin negative initially, pending repeat   New RBBB  Venous duplex negative for DVT   May need ischemic evaluation with cardiac catheterization pending course  Aortic valve regurgitation, previously mild to moderate in Feb. 2024. Noted as moderate to severe on repeat echo. Will continue to monitor with serial echos     Elevated Ddimer, >20. Venous duplex negative for DVT. Defer further chest imaging to primary/pulm, remains on heparin gtt.  Atrial fibrillation with RVR, rate in the 100s, continue amiodarone gtt and heparin gtt. Continue telemetry. Keep electrolytes WNL.   Hypotension, wean pressors as able   Aspiration pneumonia, per pulmonary   FIDEL on CKD, CRRT per nephrology  COPD, per pulmonary/primary   Hx polysubstance abuse, UDS negative this visit      Please do not hesitate to call if additional questions arise.    ROMULO SOLIS, APRN - NP  6/2/2024

## 2024-06-02 NOTE — PROGRESS NOTES
Nephrology f/u          Patient: Agustín Willis MRN: [id number]  SSN: [id number]    YOB: 1945  Age: 78 y.o.  Sex: male      Subjective:   I have seen the patient in ICU  On vent and under sedation  FiO2 40%  On 2 pressors  On CRRT with  cc/hr  No filter clotting reported  K 3.3    Past Medical History:   Diagnosis Date    Benign prostatic disease 8/3/2020    Hypertrophy with Outflow Obstruction    Benign prostatic hyperplasia 8/3/2020    Cervical radiculopathy 8/3/2020    Chronic obstructive lung disease (HCC) 8/3/2020    Cocaine abuse (HCC) 8/3/2020    Dizziness and giddiness 8/3/2020    Hypercholesterolemia 8/3/2020    Hypertensive disorder 8/3/2020    Insomnia 8/3/2020    Kidney disease 8/3/2020    Low back pain 8/3/2020    Osteoarthritis of knee 8/3/2020    Sleep apnea 8/3/2020    Vasovagal syncope 8/3/2020     Past Surgical History:   Procedure Laterality Date    IR NONTUNNELED VASCULAR CATHETER  5/30/2024    IR NONTUNNELED VASCULAR CATHETER 5/30/2024 Stephy Abreu APRN - NP SSR RAD ANGIO IR      Family History   Problem Relation Age of Onset    Hypertension Mother      Social History     Tobacco Use    Smoking status: Former    Smokeless tobacco: Never   Substance Use Topics    Alcohol use: Yes      Current Facility-Administered Medications   Medication Dose Route Frequency Provider Last Rate Last Admin    prismaSol BGK 4/2.5 dialysis solution   Dialysis Continuous Sol Ferraro MD 1,000 mL/hr at 06/02/24 0910 New Bag at 06/02/24 0910    prismaSol BGK 4/2.5 dialysis solution   Dialysis Continuous Sol Ferraro MD 1,000 mL/hr at 06/02/24 0910 New Bag at 06/02/24 0910    potassium bicarb-citric acid (EFFER-K) effervescent tablet 40 mEq  40 mEq Oral Q4H Sol Ferraro MD   40 mEq at 06/02/24 0800    potassium chloride 10 mEq/100 mL IVPB (Peripheral Line)  10 mEq IntraVENous Q1H Sol Ferraro  mL/hr at 06/02/24 1026 10 mEq at 06/02/24 1026    potassium & sodium phosphates       dextrose 10 % infusion   IntraVENous Continuous PRN Mikaela Arredondo MD        meropenem (MERREM) 1,000 mg in sodium chloride 0.9 % 100 mL IVPB (mini-bag)  1,000 mg IntraVENous Q12H Mikaela Arredondo MD   Stopped at 06/02/24 0802    ipratropium 0.5 mg-albuterol 2.5 mg (DUONEB) nebulizer solution 1 Dose  1 Dose Inhalation Q6H RT Jeremy Ramos MD   1 Dose at 06/02/24 0840        Allergies   Allergen Reactions    Penicillin G Other (See Comments)     Pt states he passes out    Sulfamethoxazole-Trimethoprim      Pt states he passes out       Review of Systems:  Unable to obtain    Objective:     Vitals:    06/02/24 0630 06/02/24 0700 06/02/24 0840 06/02/24 0846   BP: 116/77 113/81     Pulse: 71 67  71   Resp: 14 14  14   Temp: (!) 94.3 °F (34.6 °C)      TempSrc: Esophageal      SpO2:   99% 99%   Weight:       Height:            Physical Exam:  General: Unresponsive  Eyes: sclera anicteric  Oral Cavity: ET tube  Neck: no JVD  Respiratory.  On ventilatory support  Heart: normal sounds  Abdomen: soft and non tender   : no damico  Lower Extremities: no edema  Skin: no rash  Neuro: Unresponsive        Assessment/Plan:     Acute kidney injury on chronic kidney disease stage IIIa  Secondary to ischemic ATN from septic shock  On admission BUN/creatinine 58/2.06  Worsening FIDEL with BUN/creatinine peaked at72/3.09  Became anuric  Last creatinine 1.36 on 3/12/2024  Volume down and hypotensive requiring  pressor support  Received fluid boluses in the ER  Renal ultrasound no hydronephrosis  Continue CRRT and will increase  cc per hour  Will change to 4K bath (hypokalemia)    Hypokalemia  Potassium 3.3  Will change to 4K bath  IV and p.o. KCl replacement    Metabolic acidosis  High anion gap  Secondary to type A lactic acidosis  Lactic 6.9  Received IV sodium bicarbonate 100 mEq post in the ER  resolved  Off bicarb drip    Hyperkalemia  From FIDEL  K was 5.9  Received Lokelma 10 g x 1 dose  Resolved    Cardiac

## 2024-06-02 NOTE — PLAN OF CARE
Problem: Safety - Adult  Goal: Free from fall injury  6/2/2024 1459 by Leeann Collins RN  Outcome: Progressing  6/2/2024 0610 by Emely Frank RN  Outcome: Progressing     Problem: Discharge Planning  Goal: Discharge to home or other facility with appropriate resources  Outcome: Progressing     Problem: Chronic Conditions and Co-morbidities  Goal: Patient's chronic conditions and co-morbidity symptoms are monitored and maintained or improved  Outcome: Progressing     Problem: Skin/Tissue Integrity  Goal: Absence of new skin breakdown  Description: 1.  Monitor for areas of redness and/or skin breakdown  2.  Assess vascular access sites hourly  3.  Every 4-6 hours minimum:  Change oxygen saturation probe site  4.  Every 4-6 hours:  If on nasal continuous positive airway pressure, respiratory therapy assess nares and determine need for appliance change or resting period.  Outcome: Progressing     Problem: Neurosensory - Adult  Goal: Achieves stable or improved neurological status  Outcome: Progressing  Flowsheets (Taken 6/2/2024 0730)  Achieves stable or improved neurological status: Assess for and report changes in neurological status  Goal: Absence of seizures  6/2/2024 1459 by Leeann Collins RN  Outcome: Progressing  Flowsheets (Taken 6/2/2024 0730)  Absence of seizures: Monitor for seizure activity.  If seizure occurs, document type and location of movements and any associated apnea  6/2/2024 0610 by Emely Frank RN  Outcome: Progressing  Goal: Remains free of injury related to seizures activity  6/2/2024 1459 by Leeann Collins RN  Outcome: Progressing  Flowsheets (Taken 6/2/2024 0730)  Remains free of injury related to seizure activity: Maintain airway, patient safety  and administer oxygen as ordered  6/2/2024 0610 by Emely Frank RN  Outcome: Progressing  Goal: Achieves maximal functionality and self care  Outcome: Progressing  Flowsheets (Taken 6/2/2024

## 2024-06-03 ENCOUNTER — APPOINTMENT (OUTPATIENT)
Facility: HOSPITAL | Age: 79
DRG: 870 | End: 2024-06-03
Payer: MEDICARE

## 2024-06-03 ENCOUNTER — HOSPITAL ENCOUNTER (INPATIENT)
Facility: HOSPITAL | Age: 79
Discharge: HOME | DRG: 870 | End: 2024-06-05
Attending: FAMILY MEDICINE
Payer: MEDICARE

## 2024-06-03 LAB
ALBUMIN SERPL-MCNC: 2.2 G/DL (ref 3.5–5)
ANION GAP SERPL CALC-SCNC: 6 MMOL/L (ref 5–15)
ARTERIAL PATENCY WRIST A: YES
BASE DEFICIT BLDA-SCNC: 3.5 MMOL/L
BASOPHILS # BLD: 0 K/UL (ref 0–0.1)
BASOPHILS NFR BLD: 0 % (ref 0–1)
BDY SITE: ABNORMAL
BODY TEMPERATURE: 94.6
BUN SERPL-MCNC: 21 MG/DL (ref 6–20)
BUN/CREAT SERPL: 19 (ref 12–20)
CA-I BLD-MCNC: 8.9 MG/DL (ref 8.5–10.1)
CHLORIDE SERPL-SCNC: 104 MMOL/L (ref 97–108)
CO2 SERPL-SCNC: 24 MMOL/L (ref 21–32)
COHGB MFR BLD: 0.1 % (ref 1–2)
CREAT SERPL-MCNC: 1.11 MG/DL (ref 0.7–1.3)
CRP SERPL-MCNC: 9 MG/DL (ref 0–0.3)
DIFFERENTIAL METHOD BLD: ABNORMAL
ECHO BSA: 1.98 M2
EOSINOPHIL # BLD: 0 K/UL (ref 0–0.4)
EOSINOPHIL NFR BLD: 0 % (ref 0–7)
ERYTHROCYTE [DISTWIDTH] IN BLOOD BY AUTOMATED COUNT: 14.1 % (ref 11.5–14.5)
FIO2 ON VENT: 35 %
GAS FLOW.O2 SETTING OXYMISER: 14
GLUCOSE BLD STRIP.AUTO-MCNC: 111 MG/DL (ref 65–100)
GLUCOSE BLD STRIP.AUTO-MCNC: 122 MG/DL (ref 65–100)
GLUCOSE BLD STRIP.AUTO-MCNC: 99 MG/DL (ref 65–100)
GLUCOSE SERPL-MCNC: 141 MG/DL (ref 65–100)
HCO3 BLDA-SCNC: 21 MMOL/L (ref 22–26)
HCT VFR BLD AUTO: 24.5 % (ref 36.6–50.3)
HGB BLD-MCNC: 8 G/DL (ref 12.1–17)
IMM GRANULOCYTES # BLD AUTO: 0 K/UL
IMM GRANULOCYTES NFR BLD AUTO: 0 %
LYMPHOCYTES # BLD: 2 K/UL (ref 0.8–3.5)
LYMPHOCYTES NFR BLD: 9 % (ref 12–49)
MAGNESIUM SERPL-MCNC: 1.8 MG/DL (ref 1.6–2.4)
MCH RBC QN AUTO: 29.2 PG (ref 26–34)
MCHC RBC AUTO-ENTMCNC: 32.7 G/DL (ref 30–36.5)
MCV RBC AUTO: 89.4 FL (ref 80–99)
METHGB MFR BLD: 0.3 % (ref 0–1.4)
MONOCYTES # BLD: 1.1 K/UL (ref 0–1)
MONOCYTES NFR BLD: 5 % (ref 5–13)
NEUTS SEG # BLD: 19.4 K/UL (ref 1.8–8)
NEUTS SEG NFR BLD: 86 % (ref 32–75)
NRBC # BLD: 0.7 K/UL (ref 0–0.01)
NRBC BLD-RTO: 3.1 PER 100 WBC
OXYHGB MFR BLD: 95.4 % (ref 95–99)
PCO2 BLDA: 33 MMHG (ref 35–45)
PEEP RESPIRATORY: 5
PERFORMED BY:: ABNORMAL
PERFORMED BY:: NORMAL
PH BLDA: 7.42 (ref 7.35–7.45)
PHOSPHATE SERPL-MCNC: 2.1 MG/DL (ref 2.6–4.7)
PLATELET # BLD AUTO: 124 K/UL (ref 150–400)
PMV BLD AUTO: 10.9 FL (ref 8.9–12.9)
PO2 BLDA: 75 MMHG (ref 80–100)
POTASSIUM SERPL-SCNC: 3.7 MMOL/L (ref 3.5–5.1)
PROCALCITONIN SERPL-MCNC: 11.42 NG/ML
RBC # BLD AUTO: 2.74 M/UL (ref 4.1–5.7)
RBC MORPH BLD: ABNORMAL
RBC MORPH BLD: ABNORMAL
SAO2 % BLD: 96 % (ref 95–99)
SAO2% DEVICE SAO2% SENSOR NAME: ABNORMAL
SODIUM SERPL-SCNC: 134 MMOL/L (ref 136–145)
SPECIMEN SITE: ABNORMAL
UFH PPP CHRO-ACNC: 0.26 IU/ML
UFH PPP CHRO-ACNC: 0.47 IU/ML
UFH PPP CHRO-ACNC: 0.55 IU/ML
VENTILATION MODE VENT: ABNORMAL
VT SETTING VENT: 450
WBC # BLD AUTO: 22.5 K/UL (ref 4.1–11.1)

## 2024-06-03 PROCEDURE — 2580000003 HC RX 258: Performed by: INTERNAL MEDICINE

## 2024-06-03 PROCEDURE — 80069 RENAL FUNCTION PANEL: CPT

## 2024-06-03 PROCEDURE — 85025 COMPLETE CBC W/AUTO DIFF WBC: CPT

## 2024-06-03 PROCEDURE — 6360000002 HC RX W HCPCS: Performed by: INTERNAL MEDICINE

## 2024-06-03 PROCEDURE — 2580000003 HC RX 258: Performed by: FAMILY MEDICINE

## 2024-06-03 PROCEDURE — 6360000002 HC RX W HCPCS: Performed by: FAMILY MEDICINE

## 2024-06-03 PROCEDURE — 36600 WITHDRAWAL OF ARTERIAL BLOOD: CPT

## 2024-06-03 PROCEDURE — 83735 ASSAY OF MAGNESIUM: CPT

## 2024-06-03 PROCEDURE — 2500000003 HC RX 250 WO HCPCS: Performed by: INTERNAL MEDICINE

## 2024-06-03 PROCEDURE — 94640 AIRWAY INHALATION TREATMENT: CPT

## 2024-06-03 PROCEDURE — 6370000000 HC RX 637 (ALT 250 FOR IP): Performed by: INTERNAL MEDICINE

## 2024-06-03 PROCEDURE — 94003 VENT MGMT INPAT SUBQ DAY: CPT

## 2024-06-03 PROCEDURE — 71045 X-RAY EXAM CHEST 1 VIEW: CPT

## 2024-06-03 PROCEDURE — 36415 COLL VENOUS BLD VENIPUNCTURE: CPT

## 2024-06-03 PROCEDURE — 82803 BLOOD GASES ANY COMBINATION: CPT

## 2024-06-03 PROCEDURE — 86140 C-REACTIVE PROTEIN: CPT

## 2024-06-03 PROCEDURE — 90945 DIALYSIS ONE EVALUATION: CPT

## 2024-06-03 PROCEDURE — 82962 GLUCOSE BLOOD TEST: CPT

## 2024-06-03 PROCEDURE — 2000000000 HC ICU R&B

## 2024-06-03 PROCEDURE — 99233 SBSQ HOSP IP/OBS HIGH 50: CPT | Performed by: INTERNAL MEDICINE

## 2024-06-03 PROCEDURE — 85520 HEPARIN ASSAY: CPT

## 2024-06-03 PROCEDURE — 84145 PROCALCITONIN (PCT): CPT

## 2024-06-03 PROCEDURE — 6370000000 HC RX 637 (ALT 250 FOR IP): Performed by: FAMILY MEDICINE

## 2024-06-03 PROCEDURE — 93970 EXTREMITY STUDY: CPT

## 2024-06-03 RX ORDER — ASPIRIN 81 MG/1
81 TABLET, CHEWABLE ORAL DAILY
Status: DISCONTINUED | OUTPATIENT
Start: 2024-06-03 | End: 2024-06-23

## 2024-06-03 RX ADMIN — SODIUM CHLORIDE, PRESERVATIVE FREE 10 ML: 5 INJECTION INTRAVENOUS at 09:11

## 2024-06-03 RX ADMIN — HEPARIN SODIUM 1400 UNITS: 1000 INJECTION INTRAVENOUS; SUBCUTANEOUS at 12:31

## 2024-06-03 RX ADMIN — MUPIROCIN: 20 OINTMENT TOPICAL at 09:11

## 2024-06-03 RX ADMIN — CALCIUM CHLORIDE, MAGNESIUM CHLORIDE, DEXTROSE MONOHYDRATE, LACTIC ACID, SODIUM CHLORIDE, SODIUM BICARBONATE AND POTASSIUM CHLORIDE: 5.15; 2.03; 22; 5.4; 6.46; 3.09; .157 INJECTION INTRAVENOUS at 01:54

## 2024-06-03 RX ADMIN — CALCIUM CHLORIDE, MAGNESIUM CHLORIDE, DEXTROSE MONOHYDRATE, LACTIC ACID, SODIUM CHLORIDE, SODIUM BICARBONATE AND POTASSIUM CHLORIDE: 5.15; 2.03; 22; 5.4; 6.46; 3.09; .157 INJECTION INTRAVENOUS at 07:00

## 2024-06-03 RX ADMIN — AMIODARONE HYDROCHLORIDE 0.5 MG/MIN: 1.8 INJECTION, SOLUTION INTRAVENOUS at 12:35

## 2024-06-03 RX ADMIN — MUPIROCIN: 20 OINTMENT TOPICAL at 20:23

## 2024-06-03 RX ADMIN — CALCIUM CHLORIDE, MAGNESIUM CHLORIDE, DEXTROSE MONOHYDRATE, LACTIC ACID, SODIUM CHLORIDE, SODIUM BICARBONATE AND POTASSIUM CHLORIDE: 5.15; 2.03; 22; 5.4; 6.46; 3.09; .157 INJECTION INTRAVENOUS at 06:59

## 2024-06-03 RX ADMIN — SODIUM CHLORIDE, PRESERVATIVE FREE 10 ML: 5 INJECTION INTRAVENOUS at 20:23

## 2024-06-03 RX ADMIN — METHYLPREDNISOLONE SODIUM SUCCINATE 20 MG: 40 INJECTION INTRAMUSCULAR; INTRAVENOUS at 09:10

## 2024-06-03 RX ADMIN — FOLIC ACID 1 MG: 1 TABLET ORAL at 09:10

## 2024-06-03 RX ADMIN — EPOETIN ALFA-EPBX 10000 UNITS: 10000 INJECTION, SOLUTION INTRAVENOUS; SUBCUTANEOUS at 18:25

## 2024-06-03 RX ADMIN — PROPOFOL 10 MCG/KG/MIN: 10 INJECTION, EMULSION INTRAVENOUS at 16:54

## 2024-06-03 RX ADMIN — SODIUM CHLORIDE: 9 INJECTION, SOLUTION INTRAVENOUS at 03:40

## 2024-06-03 RX ADMIN — HEPARIN SODIUM 1100 UNITS: 1000 INJECTION INTRAVENOUS; SUBCUTANEOUS at 12:26

## 2024-06-03 RX ADMIN — HEPARIN SODIUM 7 UNITS/KG/HR: 10000 INJECTION, SOLUTION INTRAVENOUS at 04:29

## 2024-06-03 RX ADMIN — AMIODARONE HYDROCHLORIDE 0.5 MG/MIN: 1.8 INJECTION, SOLUTION INTRAVENOUS at 03:05

## 2024-06-03 RX ADMIN — THIAMINE HCL TAB 100 MG 100 MG: 100 TAB at 09:11

## 2024-06-03 RX ADMIN — METHYLPREDNISOLONE SODIUM SUCCINATE 20 MG: 40 INJECTION INTRAMUSCULAR; INTRAVENOUS at 20:23

## 2024-06-03 RX ADMIN — IPRATROPIUM BROMIDE AND ALBUTEROL SULFATE 1 DOSE: .5; 3 SOLUTION RESPIRATORY (INHALATION) at 14:50

## 2024-06-03 RX ADMIN — IPRATROPIUM BROMIDE AND ALBUTEROL SULFATE 1 DOSE: .5; 3 SOLUTION RESPIRATORY (INHALATION) at 19:49

## 2024-06-03 RX ADMIN — ASPIRIN 81 MG 81 MG: 81 TABLET ORAL at 09:10

## 2024-06-03 RX ADMIN — IPRATROPIUM BROMIDE AND ALBUTEROL SULFATE 1 DOSE: .5; 3 SOLUTION RESPIRATORY (INHALATION) at 01:35

## 2024-06-03 RX ADMIN — FINASTERIDE 5 MG: 5 TABLET, FILM COATED ORAL at 09:11

## 2024-06-03 RX ADMIN — MEROPENEM 1000 MG: 1 INJECTION, POWDER, FOR SOLUTION INTRAVENOUS at 05:06

## 2024-06-03 RX ADMIN — POTASSIUM PHOSPHATE, MONOBASIC POTASSIUM PHOSPHATE, DIBASIC 20 MMOL: 224; 236 INJECTION, SOLUTION, CONCENTRATE INTRAVENOUS at 11:06

## 2024-06-03 RX ADMIN — POTASSIUM BICARBONATE 40 MEQ: 782 TABLET, EFFERVESCENT ORAL at 12:00

## 2024-06-03 RX ADMIN — MEROPENEM 1000 MG: 1 INJECTION, POWDER, FOR SOLUTION INTRAVENOUS at 17:11

## 2024-06-03 RX ADMIN — HEPARIN SODIUM 2000 UNITS: 1000 INJECTION INTRAVENOUS; SUBCUTANEOUS at 04:30

## 2024-06-03 RX ADMIN — PROPOFOL 15 MCG/KG/MIN: 10 INJECTION, EMULSION INTRAVENOUS at 01:54

## 2024-06-03 RX ADMIN — FERROUS SULFATE TAB 325 MG (65 MG ELEMENTAL FE) 325 MG: 325 (65 FE) TAB at 09:11

## 2024-06-03 RX ADMIN — SODIUM CHLORIDE: 9 INJECTION, SOLUTION INTRAVENOUS at 08:11

## 2024-06-03 RX ADMIN — IPRATROPIUM BROMIDE AND ALBUTEROL SULFATE 1 DOSE: .5; 3 SOLUTION RESPIRATORY (INHALATION) at 07:54

## 2024-06-03 ASSESSMENT — PULMONARY FUNCTION TESTS
PIF_VALUE: 15
PIF_VALUE: 19
PIF_VALUE: 19
PIF_VALUE: 18
PIF_VALUE: 19
PIF_VALUE: 19
PIF_VALUE: 27
PIF_VALUE: 18
PIF_VALUE: 18
PIF_VALUE: 19
PIF_VALUE: 18

## 2024-06-03 ASSESSMENT — PAIN SCALES - GENERAL
PAINLEVEL_OUTOF10: 0

## 2024-06-03 NOTE — CARE COORDINATION
CM reviewed Pt medicals, Pt remains on the vent.     Pt comes from [facility] where he is a LT Resident.     CM will follow for D/C needs.

## 2024-06-03 NOTE — PROGRESS NOTES
STAT CTA chest order noted from cardiology to rule out PE based on elevated d-dimer from 5/30/24. Patient is already on heparin drip for AF, vent requirements decreasing. Patient is anuric with ongoing renal failure, just removed from CRRT today. Discussed with nephrology, benefits of IV contrast do not outweigh risks at this point. VQ scan can be done if necessary.

## 2024-06-03 NOTE — CONSULTS
IMPRESSION:   Acute hypoxic respiratory failure FiO2 now 35%  Cardiac arrest  A-fib with RVR on amiodarone  Severe sepsis with septic shock on pressors  Aspiration pneumonia  Acute kidney injury now on CVVH  Hypophosphatemia to be repleted  Hypokalemia to be repleted  Hyperkalemia resolved  COPD no wheezing  MRSA nasal colonization  History of cocaine abuse polysubstance abuse in the past  Pt is critically ill. Time spent with pt and staff actively rendering care, managing pt and coordinating care as stated below; 30 minutes, exclusive of any procedures      RECOMMENDATIONS/PLAN:   ICU monitoring  Ventilator for mechanical life support and prevent respiratory arrest with protective lung strategies  FiO2 down to 35%  Chest x-ray with continued right lower infiltrate CTA chest showed bilateral infiltrates consistent with aspiration  Remains on Merrem and vancomycin sputum with normal doug  Heart rate controlled on IV amiodarone  Patient remains on CVVH  Status post cardiac arrest patient coded and he was intubated and received epinephrine and ACLS protocol ROSC is obtained 10 minutes   Remains on norepinephrine 3 mics   No wheezing continue nebulized albuterol Atrovent will decrease IV Solu-Medrol     [x] High complexity decision making was performed  [x] See my orders for details  HPI  78-year-old male nursing home resident came in because of shortness of breath respiratory distress patient has episodes of vomiting after eating breakfast this morning subsequently came to the hospital saturation was in 70s he was back to the emergency room subsequently intubated and then he coded hemodynamically unstable now he is on 2 pressors vasopressin and Levophed denies intubated on ventilator critical care consult was called, past medical history of polysubstance abuse cocaine abuse COPD hypertension insomnia      PMH:  has a past medical history of Benign prostatic disease, Benign prostatic hyperplasia, Cervical  mL    5 35 %  18 cmH2O           Vital Signs: Telemetry:    normal sinus rhythm Intake/Output:   /79   Pulse 70   Temp (!) 94.6 °F (34.8 °C) (Esophageal)   Resp 14   Ht 1.73 m (5' 8.11\")   Wt 90.2 kg (198 lb 13.7 oz)   SpO2 99%   BMI 30.14 kg/m²     Temp (24hrs), Av.6 °F (35.3 °C), Min:94.6 °F (34.8 °C), Max:96.4 °F (35.8 °C)        O2 Device: Ventilator O2 Flow Rate (L/min): 100 L/min       Wt Readings from Last 4 Encounters:   24 90.2 kg (198 lb 13.7 oz)   24 81.6 kg (180 lb)   24 86.2 kg (190 lb)   24 86.4 kg (190 lb 7.6 oz)          Intake/Output Summary (Last 24 hours) at 6/3/2024 0737  Last data filed at 6/3/2024 0700  Gross per 24 hour   Intake 842.23 ml   Output 3594 ml   Net -2751.77 ml         Last shift:      No intake/output data recorded.  Last 3 shifts:  1901 -  0700  In: 1875.7 [I.V.:1815.2]  Out: 4842        Physical Exam:     General: Intubated on vent on propofol and Precedex unresponsive  HEENT: NCAT,  Eyes: anicteric; conjunctiva clear doll's eye reflex present  Neck: no nodes, no cuff leak, trach midline; no accessory MM use.  Chest: no deformity,   Cardiac: Irregular rhythm rate controlled on IV amiodarone  Lungs: Few rhonchi right lateral otherwise clear lungs no wheezing today  Abd: soft, NT, normal BS  Ext: Lower extremity edema; no joint swelling; No clubbing  : clear urine  Neuro: sedated with Precedex and propofol unresponsive doll's eye reflex present flaccid extremities  Psych- unable to assess  Skin: warm, dry, no cyanosis;   Pulses: Brachial and radial pulses intact  Capillary: Normal capillary refill      DATA:  No results found for this or any previous visit.    24    ECHO (TTE) LIMITED (PRN CONTRAST/BUBBLE/STRAIN/3D) 2024 11:12 AM (Final)    Interpretation Summary    Left Ventricle: Normal left ventricular systolic function with a visually estimated EF of 65 - 70%. EF by 2D Simpsons Biplane is 68%. Left ventricle size  pneumothorax. Right   upper lobe airspace disease and mild bilateral interstitial opacities unchanged.      XR CHEST PORTABLE   Final Result   1. ET tube tip is angled towards the right mainstem bronchus and is 1 cm   superior to the level of the gina. Recommend repositioning.   2. Right lung patchy airspace consolidation.      IR ULTRASOUND GUIDANCE VASCULAR ACCESS    (Results Pending)   XR CHEST PORTABLE    (Results Pending)   Vascular duplex upper extremity venous bilateral    (Results Pending)       XR CHEST PORTABLE    Result Date: 5/29/2024  EXAM: XR CHEST PORTABLE INDICATION: Line placement COMPARISON: 5/29/2024 FINDINGS: A portable AP radiograph of the chest was obtained at 1347 hours. Right upper lobe airspace disease.. Bilateral interstitial and airspace opacities.. Left IJ central venous catheter placement with the tip in the region of the mid SVC. No pneumothorax. Other tubes and lines are stable. The bones and soft tissues are grossly within normal limits.     Left IJ central venous catheter placement. No pneumothorax. Right upper lobe airspace disease and mild bilateral interstitial opacities unchanged.    XR CHEST PORTABLE    Result Date: 5/29/2024  INDICATION: Intubation Portable AP view of the chest. Direct comparison made to prior chest x-ray dated March 2024. Cardiomediastinal silhouette is stable. ETT tube tip is angled toward the right mainstem bronchus and is 1 cm superior to the level of the gina. NG tube extends the stomach. There is patchy airspace consolidation throughout the right lung. There is very mild left basilar atelectasis. No pleural fluid is visualized but there is no pneumothorax.     1. ET tube tip is angled towards the right mainstem bronchus and is 1 cm superior to the level of the gina. Recommend repositioning. 2. Right lung patchy airspace consolidation.     5/30 remain intubated on ventilator potassium elevated ABG acceptable still hemodynamically unstable on pressors

## 2024-06-03 NOTE — PROGRESS NOTES
CARDIOLOGY PROGRESS NOTE      Patient Name: Augstín Willis  Age: 78 y.o.  Gender:male  :1945  MRN: [id number]    Agustín Willis is a 78 y.o. year-old male with past medical history significant for hypertension, COPD, cocaine abuse, CKD who was evaluated today due to cardiac arrest. Patient initially presented to the ED via EMS from nursing home due to respiratory distress after vomiting breakfast. Required intubation in ED. Apparently with VF arrest in ED, shock x4.     Patient seen and examined. Remains intubated, sedated. On levo, amiodarone, heparin. RN at the bedside. On CRRT.    Telemetry reviewed.    Unable to obtain ROS as patient is intubated and sedated. Current medications reviewed.    Physical Examination    Allergies   Allergen Reactions    Penicillin G Other (See Comments)     Pt states he passes out    Sulfamethoxazole-Trimethoprim      Pt states he passes out     Vitals:    24 1117   BP:    Pulse: 76   Resp: 15   Temp:    SpO2: 98%     Vital signs are stable on pressors  Intubated and sedated  Heart has an irregularly irregular rhythm  Lungs are coarse  Abdomen is soft  Extremities have mild edema  Skin is dry    Labs reviewed:  Recent Results (from the past 12 hour(s))   Heparin, Anti-Xa    Collection Time: 24  2:54 AM   Result Value Ref Range    Heparin Xa,LMWH and Unfrac 0.26 IU/mL   Renal Function Panel    Collection Time: 24  2:54 AM   Result Value Ref Range    Sodium 134 (L) 136 - 145 mmol/L    Potassium 3.7 3.5 - 5.1 mmol/L    Chloride 104 97 - 108 mmol/L    CO2 24 21 - 32 mmol/L    Anion Gap 6 5 - 15 mmol/L    Glucose 141 (H) 65 - 100 mg/dL    BUN 21 (H) 6 - 20 mg/dL    Creatinine 1.11 0.70 - 1.30 mg/dL    BUN/Creatinine Ratio 19 12 - 20      Est, Glom Filt Rate 67 >60 ml/min/1.73m2    Calcium 8.9 8.5 - 10.1 mg/dL    Phosphorus 2.1 (L) 2.6 - 4.7 mg/dL    Albumin 2.2 (L) 3.5 - 5.0 g/dL   Procalcitonin    Collection Time: 24  2:54 AM   Result Value Ref Range     Temperature 94.6     Heparin, Anti-XA    Collection Time: 06/03/24 10:15 AM   Result Value Ref Range    Heparin Xa,LMWH and Unfrac 0.55 IU/mL   POCT Glucose    Collection Time: 06/03/24 12:40 PM   Result Value Ref Range    POC Glucose 122 (H) 65 - 100 mg/dL    Performed by: Saroj Kingsley         Case discussed with Dr. Huffman and our impression and recommendations are as follows:  Cardiac arrest, VF per report  Repeat echo this admission with EF 65-70%, moderate hypokinesis mid inferoseptal segment   Issues with hyperkalemia, now corrected  Troponin negative   New RBBB  Venous duplex negative for DVT   May need ischemic evaluation with cardiac catheterization pending course  Aortic valve regurgitation, previously mild to moderate in Feb. 2024. Noted as moderate to severe on repeat echo. Will continue to monitor with serial echos     Elevated Ddimer, >20. Venous duplex negative for DVT. Defer further chest imaging to primary/pulm, remains on heparin gtt.  Atrial fibrillation with RVR, rate in the 70-80s, continue amiodarone gtt and heparin gtt. Continue telemetry. Keep electrolytes WNL.   Hypotension, wean pressors as able   Aspiration pneumonia, per pulmonary   FIDEL on CKD, CRRT per nephrology  COPD, per pulmonary/primary   Hx polysubstance abuse, UDS negative this visit      Please do not hesitate to call if additional questions arise.    DONNELL MCKEON, APRN - NP  6/3/2024

## 2024-06-03 NOTE — PROGRESS NOTES
1055 - spoke to Dr. Ferraro about continued need for CRRT, ordered to stop therapy after current dialysate bags finish.    12:20 - CRRT discontinued at this time. All blood rinsed back to patient. Both lumens of HD cath heparin locked per MAR.

## 2024-06-03 NOTE — PROGRESS NOTES
General Daily Progress Note      Patient Name:   Agustín Willis       YOB: 1945       Age:  78 y.o.      Admit Date: 5/29/2024      Subjective:   Patient is a 78 y.o. year old male past medical history of COPD BPH cervical radiculopathy cocaine abuse hypertension hyperlipidemia seasonal allergies SVT came to emergency room with respiratory arrest, patient was at nursing home after eating breakfast he vomited everywhere likely aspirated oxygen saturation was 70% initially put on nonrebreather patient was intubated in the ER patient was hypotensive started Levophed 10 patient have a cardiac arrest went to V-fib received shock x 4 received epi and amiodarone  Was started on Levophed and vasopressin received 2 L of IV fluid, received IV Zosyn and vancomycin in the ER     Patient admitted to the ICU for further workup  Patient on ventilator/unresponsive       5/30    Patient on ventilator propofol and Precedex drip  Hypotension on Levophed and vasopressin  Patient has no urine output last night received 500 bolus    Nephrology decided for start CRRT IR team for dialysis cath  BUN 72 creatinine 3.09  CRP 10.5  MRSA nares    5/31    Patient on ventilator 40% O2 propofol Precedex levo and vasopressin drip  Patient on A-fib with rapid ventricular rate ordered amiodarone bolus and started on drip  Patient getting CRRT  No urine output  D-dimers greater than 20    6/3    Patient on ventilator 45% O2 receiving CRRT patient on propofol drip  Patient on amiodarone and Levophed drip  Patient heparin and Precedex drip  Patient is hypothermic    WBC count 22.5           Objective:     Vitals:    06/03/24 0900   BP: 92/66   Pulse: 88   Resp: 14   Temp:    SpO2: 99%        Recent Results (from the past 24 hour(s))   Renal Function Panel    Collection Time: 06/02/24 11:35 AM   Result Value Ref Range    Sodium 138 136 - 145 mmol/L    Potassium 3.9 3.5 - 5.1 mmol/L    Chloride 107 97 - 108 mmol/L    CO2 26 21 - 32 mmol/L  19.4 (H) 1.8 - 8.0 K/UL    Lymphocytes Absolute 2.0 0.8 - 3.5 K/UL    Monocytes Absolute 1.1 (H) 0.0 - 1.0 K/UL    Eosinophils Absolute 0.0 0.0 - 0.4 K/UL    Basophils Absolute 0.0 0.0 - 0.1 K/UL    Immature Granulocytes Absolute 0.0 K/UL    Differential Type Manual      RBC Comment Macrocytosis  1+        RBC Comment Anisocytosis  1+       C-Reactive Protein    Collection Time: 06/03/24  2:54 AM   Result Value Ref Range    CRP 9.00 (H) 0.00 - 0.30 mg/dL   Blood Gas, Arterial    Collection Time: 06/03/24  3:08 AM   Result Value Ref Range    pH, Arterial 7.42 7.35 - 7.45      pCO2, Arterial 33 (L) 35 - 45 mmHg    pO2, Arterial 75 (L) 80 - 100 mmHg    O2 Sat, Arterial 96 95 - 99 %    HCO3, Arterial 21 (L) 22 - 26 mmol/L    Base deficit, arterial blood 3.5 mmol/L    O2 Method VENT      FIO2 Arterial 35 %    Mode ASSIST CONTROL      POC TIDAL VOLUME 450.0      Set Rate, POC 14      POC PEEP/CPA 5.0      Source Arterial      Site Right Radial      Efrain Test YES      Carboxyhgb, Arterial 0.1 (L) 1 - 2 %    Methemoglobin, Arterial 0.3 0 - 1.4 %    Oxyhemoglobin 95.4 95 - 99 %    Performed by: Dani Walsh     Temperature 94.6         Results       Procedure Component Value Units Date/Time    MRSA by PCR [[id number]]     Order Status: Canceled Specimen: Nares     Culture, Respiratory [[id number]] Collected: 05/29/24 2300    Order Status: Completed Specimen: Sputum Updated: 06/01/24 0708     Special Requests No Special Requests        Gram Stain No Epithelial cells seen         Occasional WBCs seen               1+ Gram positive cocci in pairs            1+ Gram Positive Rods        Culture       Light Normal respiratory doug          MRSA by PCR [[id number]]  (Abnormal) Collected: 05/29/24 2300    Order Status: Completed Specimen: Swab Updated: 05/30/24 0038     MRSA by PCR DETECTED        Comment: CALLED TO AND READ BACK BY TELMA MERCADO AT 0038 [place] [facility] [place]       Culture, Urine [[id number]] Collected: 05/29/24 2019    Order  2/3.5 dialysis solution, , Dialysis, Continuous, Sol Ferraro MD, Last Rate: 1,000 mL/hr at 24 0700, New Bag at 24 0700    prismaSol BGK 2/3.5 dialysis solution, , Dialysis, Continuous, Sol Ferraro MD, Last Rate: 1,000 mL/hr at 2459, New Bag at 24 06    [COMPLETED] amiodarone (NEXTERONE) 150 mg in dextrose 5% 100 ml, 150 mg, IntraVENous, Once, Stopped at 24 **FOLLOWED BY** [] amiodarone (NEXTERONE) 360 mg in dextrose 5% 200 ml, 1 mg/min, IntraVENous, Continuous, Last Rate: 33.3 mL/hr at 24, 1 mg/min at 24 **FOLLOWED BY** amiodarone (NEXTERONE) 360 mg in dextrose 5% 200 ml, 0.5 mg/min, IntraVENous, Continuous, Jeremy Ramos MD, Last Rate: 16.7 mL/hr at 24 0305, 0.5 mg/min at 24 0305    heparin (porcine) injection 4,000 Units, 4,000 Units, IntraVENous, PRNMaria Elena Abdul, MD    heparin (porcine) injection 2,000 Units, 2,000 Units, IntraVENous, PRN, Mikaela Arredondo MD, 2,000 Units at 24 0430    heparin 25,000 units in dextrose 5% 250 mL (premix) infusion, 5-30 Units/kg/hr, IntraVENous, Continuous, Mikaela Arredondo MD, Last Rate: 6.1 mL/hr at 24 042, 7 Units/kg/hr at 24 042    propofol infusion, 5-50 mcg/kg/min, IntraVENous, Continuous, Jeremy Ramos MD, Last Rate: 5.3 mL/hr at 24 0349, 10 mcg/kg/min at 24 0349    heparin (porcine) injection 1,100-1,900 Units, 1,100-1,900 Units, IntraCATHeter, PRN **AND** heparin (porcine) injection 1,100-1,900 Units, 1,100-1,900 Units, IntraCATHeter, PRN, Sol Ferraro MD    0.9 % sodium chloride infusion *CRRT PBP*, , Dialysis, Continuous, Sol Ferraro MD, Last Rate: 200 mL/hr at 24 0811, New Bag at 24 0811    mupirocin (BACTROBAN) 2 % ointment, , Each Nostril, BID, Riley Alonzo MD, Given at 24 0911    dexmedeTOMIDine (PRECEDEX) 400 mcg in sodium chloride 0.9 % 100 mL infusion, 0.1-1.5 mcg/kg/hr, IntraVENous, Continuous,

## 2024-06-03 NOTE — PROGRESS NOTES
Nephrology f/u          Patient: Agustín Willis MRN: [id number]  SSN: [id number]    YOB: 1945  Age: 78 y.o.  Sex: male      Subjective:   I have seen the patient in ICU.  On vent and under sedation  FiO2 30%  On 2 pressors  On IV amiodarone and IV heparin drips  On CRRT with  cc/hr  No filter clotting reported  Resolved hypokalemia  Hypo-phosphatemia  Worsening anemia    Past Medical History:   Diagnosis Date    Benign prostatic disease 8/3/2020    Hypertrophy with Outflow Obstruction    Benign prostatic hyperplasia 8/3/2020    Cervical radiculopathy 8/3/2020    Chronic obstructive lung disease (HCC) 8/3/2020    Cocaine abuse (HCC) 8/3/2020    Dizziness and giddiness 8/3/2020    Hypercholesterolemia 8/3/2020    Hypertensive disorder 8/3/2020    Insomnia 8/3/2020    Kidney disease 8/3/2020    Low back pain 8/3/2020    Osteoarthritis of knee 8/3/2020    Sleep apnea 8/3/2020    Vasovagal syncope 8/3/2020     Past Surgical History:   Procedure Laterality Date    IR NONTUNNELED VASCULAR CATHETER  5/30/2024    IR NONTUNNELED VASCULAR CATHETER 5/30/2024 Stephy Abreu APRN - NP SSR RAD ANGIO IR      Family History   Problem Relation Age of Onset    Hypertension Mother      Social History     Tobacco Use    Smoking status: Former    Smokeless tobacco: Never   Substance Use Topics    Alcohol use: Yes      Current Facility-Administered Medications   Medication Dose Route Frequency Provider Last Rate Last Admin    aspirin chewable tablet 81 mg  81 mg Per NG tube Daily Jeremy Ramos MD   81 mg at 06/03/24 0910    methylPREDNISolone sodium succ (SOLU-MEDROL) injection 20 mg  20 mg IntraVENous Q12H Hamilton Miranda MD   20 mg at 06/03/24 0910    epoetin radha-epbx (RETACRIT) injection 10,000 Units  10,000 Units SubCUTAneous Once per day on Mon Wed Fri Sol Ferraro MD        prismaSol BGK 2/3.5 dialysis solution   Dialysis Continuous Sol Ferraro MD 1,000 mL/hr at 06/03/24 0700 New Bag at  06/03/24 0700    prismaSol BGK 2/3.5 dialysis solution   Dialysis Continuous Sol Ferraro MD 1,000 mL/hr at 06/03/24 0659 New Bag at 06/03/24 0659    amiodarone (NEXTERONE) 360 mg in dextrose 5% 200 ml  0.5 mg/min IntraVENous Continuous Jeremy Ramos MD 16.7 mL/hr at 06/03/24 0305 0.5 mg/min at 06/03/24 0305    heparin (porcine) injection 4,000 Units  4,000 Units IntraVENous PRN Mikaela Arredondo MD        heparin (porcine) injection 2,000 Units  2,000 Units IntraVENous PRN Mikaela Arredondo MD   2,000 Units at 06/03/24 0430    heparin 25,000 units in dextrose 5% 250 mL (premix) infusion  5-30 Units/kg/hr IntraVENous Continuous Mikaela Arredondo MD 6.1 mL/hr at 06/03/24 0429 7 Units/kg/hr at 06/03/24 0429    propofol infusion  5-50 mcg/kg/min IntraVENous Continuous Jeremy Ramos MD 5.3 mL/hr at 06/03/24 0349 10 mcg/kg/min at 06/03/24 0349    heparin (porcine) injection 1,100-1,900 Units  1,100-1,900 Units IntraCATHeter PRN Sol Ferraro MD        And    heparin (porcine) injection 1,100-1,900 Units  1,100-1,900 Units IntraCATHeter PRN Sol Ferraro MD        0.9 % sodium chloride infusion *CRRT PBP*   Dialysis Continuous Sol Ferraro  mL/hr at 06/03/24 0811 New Bag at 06/03/24 0811    mupirocin (BACTROBAN) 2 % ointment   Each Nostril BID Riley Alonzo MD   Given at 06/03/24 0911    dexmedeTOMIDine (PRECEDEX) 400 mcg in sodium chloride 0.9 % 100 mL infusion  0.1-1.5 mcg/kg/hr IntraVENous Continuous Mikaela Arredondo MD 2 mL/hr at 06/02/24 0314 0.1 mcg/kg/hr at 06/02/24 0314    fentaNYL (SUBLIMAZE) infusion 1000 mcg/100mL   mcg/hr IntraVENous Continuous Mikaela Arredondo MD   Held at 05/29/24 1404    sodium chloride 0.9 % bolus 2,448 mL  30 mL/kg IntraVENous Once Mikaela Arredondo MD   Held at 05/29/24 1405    VASOpressin 20 Units in sodium chloride 0.9 % 100 mL infusion  0.01-0.03 Units/min IntraVENous Continuous Mikaela Arredondo MD   Stopped at 06/03/24 0821    norepinephrine  chloride 0.9 % 100 mL IVPB (mini-bag)  1,000 mg IntraVENous Q12H Mikaela Arredondo MD   Stopped at 06/03/24 0806    ipratropium 0.5 mg-albuterol 2.5 mg (DUONEB) nebulizer solution 1 Dose  1 Dose Inhalation Q6H RT Jeremy Ramos MD   1 Dose at 06/03/24 0754        Allergies   Allergen Reactions    Penicillin G Other (See Comments)     Pt states he passes out    Sulfamethoxazole-Trimethoprim      Pt states he passes out       Review of Systems:  Unable to obtain    Objective:     Vitals:    06/03/24 0700 06/03/24 0755 06/03/24 0800 06/03/24 0900   BP: 100/80  106/76 92/66   Pulse: 70 75 76 88   Resp: 14 14 14 14   Temp: (!) 94.7 °F (34.8 °C)      TempSrc: Esophageal      SpO2: 99% 99% 99% 99%   Weight:       Height:            Physical Exam:  General: Unresponsive  Eyes: sclera anicteric  Oral Cavity: ET tube  Neck: no JVD  Respiratory.  On ventilatory support  Heart: normal sounds  Abdomen: soft and non tender   : no damico  Lower Extremities: no edema  Skin: no rash  Neuro: Unresponsive        Assessment/Plan:     Acute kidney injury on chronic kidney disease stage IIIa  2/2 ischemic ATN anuric from septic shock  On admission BUN/creatinine 58/2.06 and peaked at 72/3.09  Baseline creatinine 1.36 on 3/12/2024  Hemodynamically unstable and on 2 pressors  Renal ultrasound no hydronephrosis  Continue CRRT with  cc per hour    Hypokalemia  Potassium 3.3-->3.7  Out of 4K bath  P.o. KCl 40 mEq daily    Metabolic acidosis  High anion gap  Secondary to type A lactic acidosis  Lactic 6.9  Received IV sodium bicarbonate 100 mEq post in the ER  resolved  Off bicarb drip    Cardiac arrest  V-fib  DC shock cardioversion  On ventilatory support  On pressor support    Acute hypoxic respiratory failure  Aspiration pneumonia  Underlying COPD  On IV antibiotics  On FiO2 40%    Anemia   hemoglobin 8.1  Iron saturation 12 and ferritin 1100   Started on Epo subcu    Atrial fibrillation  on IV heparin drip  On IV amiodarone

## 2024-06-04 ENCOUNTER — APPOINTMENT (OUTPATIENT)
Facility: HOSPITAL | Age: 79
DRG: 870 | End: 2024-06-04
Payer: MEDICARE

## 2024-06-04 LAB
ALBUMIN SERPL-MCNC: 2.1 G/DL (ref 3.5–5)
ALBUMIN/GLOB SERPL: 0.6 (ref 1.1–2.2)
ALP SERPL-CCNC: 65 U/L (ref 45–117)
ALT SERPL-CCNC: 10 U/L (ref 12–78)
ANION GAP SERPL CALC-SCNC: 6 MMOL/L (ref 5–15)
ARTERIAL PATENCY WRIST A: YES
AST SERPL W P-5'-P-CCNC: 14 U/L (ref 15–37)
BACTERIA SPEC CULT: NORMAL
BACTERIA SPEC CULT: NORMAL
BASE DEFICIT BLDA-SCNC: 2.1 MMOL/L
BDY SITE: ABNORMAL
BILIRUB SERPL-MCNC: 0.3 MG/DL (ref 0.2–1)
BODY TEMPERATURE: 99
BUN SERPL-MCNC: 37 MG/DL (ref 6–20)
BUN/CREAT SERPL: 19 (ref 12–20)
CA-I BLD-MCNC: 8 MG/DL (ref 8.5–10.1)
CHLORIDE SERPL-SCNC: 107 MMOL/L (ref 97–108)
CO2 SERPL-SCNC: 26 MMOL/L (ref 21–32)
COHGB MFR BLD: 0.3 % (ref 1–2)
CREAT SERPL-MCNC: 1.97 MG/DL (ref 0.7–1.3)
ERYTHROCYTE [DISTWIDTH] IN BLOOD BY AUTOMATED COUNT: 14.3 % (ref 11.5–14.5)
FIO2 ON VENT: 35 %
GAS FLOW.O2 SETTING OXYMISER: 14
GLOBULIN SER CALC-MCNC: 3.3 G/DL (ref 2–4)
GLUCOSE BLD STRIP.AUTO-MCNC: 113 MG/DL (ref 65–100)
GLUCOSE BLD STRIP.AUTO-MCNC: 134 MG/DL (ref 65–100)
GLUCOSE SERPL-MCNC: 99 MG/DL (ref 65–100)
HCO3 BLDA-SCNC: 22 MMOL/L (ref 22–26)
HCT VFR BLD AUTO: 23.8 % (ref 36.6–50.3)
HGB BLD-MCNC: 7.7 G/DL (ref 12.1–17)
Lab: NORMAL
Lab: NORMAL
MAGNESIUM SERPL-MCNC: 1.8 MG/DL (ref 1.6–2.4)
MAGNESIUM SERPL-MCNC: 1.8 MG/DL (ref 1.6–2.4)
MCH RBC QN AUTO: 29.2 PG (ref 26–34)
MCHC RBC AUTO-ENTMCNC: 32.4 G/DL (ref 30–36.5)
MCV RBC AUTO: 90.2 FL (ref 80–99)
METHGB MFR BLD: 0.6 % (ref 0–1.4)
NRBC # BLD: 0.61 K/UL (ref 0–0.01)
NRBC BLD-RTO: 4 PER 100 WBC
OXYHGB MFR BLD: 93.8 % (ref 95–99)
PCO2 BLDA: 31 MMHG (ref 35–45)
PEEP RESPIRATORY: 5
PERFORMED BY:: ABNORMAL
PH BLDA: 7.46 (ref 7.35–7.45)
PHOSPHATE SERPL-MCNC: 4.2 MG/DL (ref 2.6–4.7)
PLATELET # BLD AUTO: 115 K/UL (ref 150–400)
PMV BLD AUTO: 10.9 FL (ref 8.9–12.9)
PO2 BLDA: 80 MMHG (ref 80–100)
POTASSIUM SERPL-SCNC: 4.1 MMOL/L (ref 3.5–5.1)
PROT SERPL-MCNC: 5.4 G/DL (ref 6.4–8.2)
RBC # BLD AUTO: 2.64 M/UL (ref 4.1–5.7)
SAO2 % BLD: 95 % (ref 95–99)
SAO2% DEVICE SAO2% SENSOR NAME: ABNORMAL
SODIUM SERPL-SCNC: 139 MMOL/L (ref 136–145)
SPECIMEN SITE: ABNORMAL
UFH PPP CHRO-ACNC: 0.34 IU/ML
VENTILATION MODE VENT: ABNORMAL
VT SETTING VENT: 450
WBC # BLD AUTO: 15.3 K/UL (ref 4.1–11.1)

## 2024-06-04 PROCEDURE — 2580000003 HC RX 258: Performed by: FAMILY MEDICINE

## 2024-06-04 PROCEDURE — 6370000000 HC RX 637 (ALT 250 FOR IP): Performed by: FAMILY MEDICINE

## 2024-06-04 PROCEDURE — 2500000003 HC RX 250 WO HCPCS: Performed by: HOSPITALIST

## 2024-06-04 PROCEDURE — 36415 COLL VENOUS BLD VENIPUNCTURE: CPT

## 2024-06-04 PROCEDURE — 2500000003 HC RX 250 WO HCPCS: Performed by: FAMILY MEDICINE

## 2024-06-04 PROCEDURE — 6370000000 HC RX 637 (ALT 250 FOR IP): Performed by: INTERNAL MEDICINE

## 2024-06-04 PROCEDURE — 6360000002 HC RX W HCPCS

## 2024-06-04 PROCEDURE — 94003 VENT MGMT INPAT SUBQ DAY: CPT

## 2024-06-04 PROCEDURE — 82803 BLOOD GASES ANY COMBINATION: CPT

## 2024-06-04 PROCEDURE — 80053 COMPREHEN METABOLIC PANEL: CPT

## 2024-06-04 PROCEDURE — 6360000002 HC RX W HCPCS: Performed by: INTERNAL MEDICINE

## 2024-06-04 PROCEDURE — 36600 WITHDRAWAL OF ARTERIAL BLOOD: CPT

## 2024-06-04 PROCEDURE — 99233 SBSQ HOSP IP/OBS HIGH 50: CPT | Performed by: INTERNAL MEDICINE

## 2024-06-04 PROCEDURE — 83735 ASSAY OF MAGNESIUM: CPT

## 2024-06-04 PROCEDURE — 2000000000 HC ICU R&B

## 2024-06-04 PROCEDURE — 85520 HEPARIN ASSAY: CPT

## 2024-06-04 PROCEDURE — 84100 ASSAY OF PHOSPHORUS: CPT

## 2024-06-04 PROCEDURE — 85027 COMPLETE CBC AUTOMATED: CPT

## 2024-06-04 PROCEDURE — 6360000002 HC RX W HCPCS: Performed by: FAMILY MEDICINE

## 2024-06-04 PROCEDURE — 2500000003 HC RX 250 WO HCPCS

## 2024-06-04 PROCEDURE — 71045 X-RAY EXAM CHEST 1 VIEW: CPT

## 2024-06-04 PROCEDURE — 82962 GLUCOSE BLOOD TEST: CPT

## 2024-06-04 PROCEDURE — 94640 AIRWAY INHALATION TREATMENT: CPT

## 2024-06-04 RX ORDER — AMIODARONE HYDROCHLORIDE 200 MG/1
400 TABLET ORAL 2 TIMES DAILY
Status: DISCONTINUED | OUTPATIENT
Start: 2024-06-04 | End: 2024-06-23

## 2024-06-04 RX ORDER — PHENYLEPHRINE HCL IN 0.9% NACL 50MG/250ML
10-300 PLASTIC BAG, INJECTION (ML) INTRAVENOUS CONTINUOUS
Status: DISCONTINUED | OUTPATIENT
Start: 2024-06-04 | End: 2024-06-07

## 2024-06-04 RX ORDER — PHENYLEPHRINE HCL IN 0.9% NACL 50MG/250ML
PLASTIC BAG, INJECTION (ML) INTRAVENOUS
Status: COMPLETED
Start: 2024-06-04 | End: 2024-06-04

## 2024-06-04 RX ORDER — MAGNESIUM SULFATE 1 G/100ML
1000 INJECTION INTRAVENOUS ONCE
Status: COMPLETED | OUTPATIENT
Start: 2024-06-04 | End: 2024-06-04

## 2024-06-04 RX ADMIN — METHYLPREDNISOLONE SODIUM SUCCINATE 20 MG: 40 INJECTION INTRAMUSCULAR; INTRAVENOUS at 09:38

## 2024-06-04 RX ADMIN — FOLIC ACID 1 MG: 1 TABLET ORAL at 09:42

## 2024-06-04 RX ADMIN — IPRATROPIUM BROMIDE AND ALBUTEROL SULFATE 1 DOSE: .5; 3 SOLUTION RESPIRATORY (INHALATION) at 13:54

## 2024-06-04 RX ADMIN — AMIODARONE HYDROCHLORIDE 0.5 MG/MIN: 1.8 INJECTION, SOLUTION INTRAVENOUS at 16:18

## 2024-06-04 RX ADMIN — DEXMEDETOMIDINE HYDROCHLORIDE 0.4 MCG/KG/HR: 400 INJECTION, SOLUTION INTRAVENOUS at 01:20

## 2024-06-04 RX ADMIN — IPRATROPIUM BROMIDE AND ALBUTEROL SULFATE 1 DOSE: .5; 3 SOLUTION RESPIRATORY (INHALATION) at 00:41

## 2024-06-04 RX ADMIN — FINASTERIDE 5 MG: 5 TABLET, FILM COATED ORAL at 09:42

## 2024-06-04 RX ADMIN — METHYLPREDNISOLONE SODIUM SUCCINATE 20 MG: 40 INJECTION INTRAMUSCULAR; INTRAVENOUS at 20:05

## 2024-06-04 RX ADMIN — ASPIRIN 81 MG 81 MG: 81 TABLET ORAL at 09:42

## 2024-06-04 RX ADMIN — IPRATROPIUM BROMIDE AND ALBUTEROL SULFATE 1 DOSE: .5; 3 SOLUTION RESPIRATORY (INHALATION) at 06:14

## 2024-06-04 RX ADMIN — IPRATROPIUM BROMIDE AND ALBUTEROL SULFATE 1 DOSE: .5; 3 SOLUTION RESPIRATORY (INHALATION) at 20:37

## 2024-06-04 RX ADMIN — SODIUM CHLORIDE, PRESERVATIVE FREE 10 ML: 5 INJECTION INTRAVENOUS at 09:43

## 2024-06-04 RX ADMIN — HEPARIN SODIUM 7 UNITS/KG/HR: 10000 INJECTION, SOLUTION INTRAVENOUS at 05:05

## 2024-06-04 RX ADMIN — FERROUS SULFATE TAB 325 MG (65 MG ELEMENTAL FE) 325 MG: 325 (65 FE) TAB at 09:41

## 2024-06-04 RX ADMIN — SODIUM CHLORIDE, PRESERVATIVE FREE 10 ML: 5 INJECTION INTRAVENOUS at 20:05

## 2024-06-04 RX ADMIN — DEXMEDETOMIDINE HYDROCHLORIDE 0.5 MCG/KG/HR: 400 INJECTION, SOLUTION INTRAVENOUS at 11:31

## 2024-06-04 RX ADMIN — AMIODARONE HYDROCHLORIDE 1 MG/MIN: 1.8 INJECTION, SOLUTION INTRAVENOUS at 09:20

## 2024-06-04 RX ADMIN — THIAMINE HCL TAB 100 MG 100 MG: 100 TAB at 09:43

## 2024-06-04 RX ADMIN — AMIODARONE HYDROCHLORIDE 1 MG/MIN: 1.8 INJECTION, SOLUTION INTRAVENOUS at 03:35

## 2024-06-04 RX ADMIN — Medication 10 MCG/MIN: at 01:55

## 2024-06-04 RX ADMIN — MEROPENEM 1000 MG: 1 INJECTION, POWDER, FOR SOLUTION INTRAVENOUS at 17:49

## 2024-06-04 RX ADMIN — MAGNESIUM SULFATE HEPTAHYDRATE 1000 MG: 1 INJECTION, SOLUTION INTRAVENOUS at 14:39

## 2024-06-04 RX ADMIN — MEROPENEM 1000 MG: 1 INJECTION, POWDER, FOR SOLUTION INTRAVENOUS at 05:07

## 2024-06-04 RX ADMIN — POTASSIUM BICARBONATE 40 MEQ: 782 TABLET, EFFERVESCENT ORAL at 09:40

## 2024-06-04 RX ADMIN — PROPOFOL 20 MCG/KG/MIN: 10 INJECTION, EMULSION INTRAVENOUS at 16:06

## 2024-06-04 ASSESSMENT — PULMONARY FUNCTION TESTS
PIF_VALUE: 13
PIF_VALUE: 19
PIF_VALUE: 20
PIF_VALUE: 13
PIF_VALUE: 19
PIF_VALUE: 19
PIF_VALUE: 13
PIF_VALUE: 23
PIF_VALUE: 18
PIF_VALUE: 18
PIF_VALUE: 19
PIF_VALUE: 13
PIF_VALUE: 19
PIF_VALUE: 18
PIF_VALUE: 19
PIF_VALUE: 18

## 2024-06-04 ASSESSMENT — PAIN SCALES - GENERAL: PAINLEVEL_OUTOF10: 0

## 2024-06-04 NOTE — PROGRESS NOTES
Nephrology f/u          Patient: Agustín Willis MRN: [id number]  SSN: [id number]    YOB: 1945  Age: 78 y.o.  Sex: male      Subjective:   I have seen the patient in ICU.  On vent and under sedation  FiO2 30%  On single pressor  On IV amiodarone and IV heparin drips  Off CRRT     Past Medical History:   Diagnosis Date    Benign prostatic disease 8/3/2020    Hypertrophy with Outflow Obstruction    Benign prostatic hyperplasia 8/3/2020    Cervical radiculopathy 8/3/2020    Chronic obstructive lung disease (HCC) 8/3/2020    Cocaine abuse (HCC) 8/3/2020    Dizziness and giddiness 8/3/2020    Hypercholesterolemia 8/3/2020    Hypertensive disorder 8/3/2020    Insomnia 8/3/2020    Kidney disease 8/3/2020    Low back pain 8/3/2020    Osteoarthritis of knee 8/3/2020    Sleep apnea 8/3/2020    Vasovagal syncope 8/3/2020     Past Surgical History:   Procedure Laterality Date    IR NONTUNNELED VASCULAR CATHETER  5/30/2024    IR NONTUNNELED VASCULAR CATHETER 5/30/2024 Stephy Abreu APRN - NP SSR RAD ANGIO IR      Family History   Problem Relation Age of Onset    Hypertension Mother      Social History     Tobacco Use    Smoking status: Former    Smokeless tobacco: Never   Substance Use Topics    Alcohol use: Yes      Current Facility-Administered Medications   Medication Dose Route Frequency Provider Last Rate Last Admin    phenylephrine (ROSSANA-SYNEPHRINE) 50 mg/250 mL infusion   mcg/min IntraVENous Continuous Javan Lilly MD 9 mL/hr at 06/04/24 0946 30 mcg/min at 06/04/24 0946    aspirin chewable tablet 81 mg  81 mg Per NG tube Daily Jeremy Ramos MD   81 mg at 06/04/24 0942    potassium bicarb-citric acid (EFFER-K) effervescent tablet 40 mEq  40 mEq Oral Daily Sol Ferraro MD   40 mEq at 06/04/24 0940    methylPREDNISolone sodium succ (SOLU-MEDROL) injection 20 mg  20 mg IntraVENous Q12H Hamilton Miranda MD   20 mg at 06/04/24 0938    epoetin radha-epbx (RETACRIT) injection 10,000 Units

## 2024-06-04 NOTE — PROGRESS NOTES
Progress Note  Date:2024       Room:[facility]  Patient Name:Agustín Willis     YOB: 1945     Age:78 y.o.        Subjective    Subjective  Patient followed septic shock with presumptive aspiration pneumonia. He has low grade temperatures.  Procal and CRP decreasing.  Blood cultures negative.  Sputum culture grew normal doug and uurine culture no growth.  Currently on IV Meropenem alone.      Objective         Vitals Last 24 Hours:  TEMPERATURE:  Temp  Av.7 °F (37.1 °C)  Min: 98.1 °F (36.7 °C)  Max: 99.1 °F (37.3 °C)  RESPIRATIONS RANGE: Resp  Avg: 15.3  Min: 12  Max: 30  PULSE OXIMETRY RANGE: SpO2  Av.9 %  Min: 97 %  Max: 100 %  PULSE RANGE: Pulse  Av  Min: 53  Max: 135  BLOOD PRESSURE RANGE: Systolic (24hrs), Av , Min:62 , Max:143   ; Diastolic (24hrs), Av, Min:43, Max:104         Objective:  Vital signs: (most recent): Blood pressure 121/70, pulse 54, temperature 98.7 °F (37.1 °C), temperature source Esophageal, resp. rate 14, height 1.73 m (5' 8.11\"), weight 90.2 kg (198 lb 13.7 oz), SpO2 99 %.      Vitals and nursing note reviewed.   Constitutional:       General: He is in acute distress.      Appearance: He is ill-appearing and toxic-appearing.   HENT:      Head: Normocephalic and atraumatic.      Right Ear: External ear normal.      Left Ear: External ear normal.      Nose: Nose normal.      Mouth/Throat:      Comments: ET Tube, orogastric tube  Eyes:      Comments: Closed    Cardiovascular:      Rate and Rhythm: Normal rate and regular rhythm.      Heart sounds: No murmur heard.  Pulmonary:      Breath sounds: Rhonchi present. No wheezing or rales.   Abdominal:      General: Bowel sounds are normal. There is no distension.      Palpations: Abdomen is soft.      Tenderness: There is no abdominal tenderness.   Genitourinary:     Comments: No urinary device  Musculoskeletal:      Cervical back: Neck supple.      Right lower leg: No edema.      Left lower leg: Edema present.  IV Meropenem  Acute hypoxic respiratory failure, intubated  Hyperglycemia  FIDEL  Penicillin allergy  7.   MRSA nasal colonization status post nasal Mupirocin     Comment:   WBC decreasing but he remains on Solumedrol.  Procal and CRP decreasing.        Plan   1. Continue  IV Meropenem  8 more days  2.  In am, repeat CBC, procal and CRP     Electronically signed by Riley Alonzo MD

## 2024-06-04 NOTE — PROGRESS NOTES
Comprehensive Nutrition Assessment    Type and Reason for Visit:  Initial, NPO/Clear Liquid    Nutrition Recommendations/Plan:   Continue NPO  Initiate Vital 1.2 (peptide based) @ 20ml/h, advance by 20ml q8h to goal of 50ml/h + 30ml FWF q4h or per MD  At goal provides 1440 kcal (87%), 90g pro (83%), 1155ml (77%)  Consider more aggressive bowel regimen 2/2 constipation   Monitor TF intake, tolerance and bms and document in I/Os     Malnutrition Assessment:  Malnutrition Status:  Mild malnutrition (06/04/24 0223)    Context:  Acute Illness     Findings of the 6 clinical characteristics of malnutrition:  Energy Intake:  50% or less of estimated energy requirements for 5 or more days  Weight Loss:  No significant weight loss     Body Fat Loss:  Unable to assess     Muscle Mass Loss:  Unable to assess    Fluid Accumulation:  Unable to assess     Strength:  Not Performed    Nutrition Assessment:    Pt admitted after cardiac arrest, on vent, receiving levo @ 2mics being weaned off propofol. Plan to d/c CRRT today. OGT in place, per MD randle to start TF. See recs above, meds and labs reviewed, low K replaced. Will follow per protocol.    Nutrition Related Findings:    NFPE deferred 2/2 intubation. No N/V/D +C, LBM 5/29? On bowel reg. Pt previously on SBS/Thin Liq diet. +2 generalized edema noted. Wound Type: None       Current Nutrition Intake & Therapies:    Average Meal Intake: NPO  Average Supplements Intake: NPO  Diet NPO  ADULT TUBE FEEDING; Orogastric; Peptide Based; Continuous; 20; Yes; 20; Q 8 hours; 50; 30; Q 4 hours    Anthropometric Measures:  Height: 173 cm (5' 8.11\")  Ideal Body Weight (IBW): 155 lbs (70 kg)       Current Body Weight: 90.2 kg (198 lb 13.7 oz), 128.3 % IBW. Weight Source: Bed Scale  Current BMI (kg/m2): 30.1  Usual Body Weight: 93.4 kg (206 lb) (2 months ago)  % Weight Change (Calculated): -3.5  Weight Adjustment For: No Adjustment                 BMI Categories: Obese Class 1 (BMI  30.0-34.9)    Estimated Daily Nutrient Needs:  Energy Requirements Based On: Formula  Weight Used for Energy Requirements: Current  Energy (kcal/day): ~1653 kcal/day (PSU 2010)  Weight Used for Protein Requirements: Current  Protein (g/day): 108- 135g (1.2- 1.5g pro/kg)  Method Used for Fluid Requirements: 1 ml/kcal  Fluid (ml/day): 1500ml adult min or per MD    Nutrition Diagnosis:   Inadequate oral intake related to impaired respiratory function as evidenced by intubation, nutrition support - enteral nutrition    Nutrition Interventions:   Food and/or Nutrient Delivery: Continue NPO, Start Tube Feeding  Nutrition Education/Counseling: No recommendation at this time  Coordination of Nutrition Care: Continue to monitor while inpatient  Plan of Care discussed with: MD    Goals:     Goals: Meet at least 75% of estimated needs, Tolerate nutrition support at goal rate, by next RD assessment       Nutrition Monitoring and Evaluation:   Behavioral-Environmental Outcomes: None Identified  Food/Nutrient Intake Outcomes: Enteral Nutrition Intake/Tolerance  Physical Signs/Symptoms Outcomes: Biochemical Data, Constipation, Hemodynamic Status, Weight    Discharge Planning:    Too soon to determine     Dionne Goodson RD  Contact: [phone]

## 2024-06-04 NOTE — PROGRESS NOTES
Progress Note  Date:2024       Room:[facility]  Patient Name:Agustín Willis     YOB: 1945     Age:78 y.o.        Subjective    Subjective  Patient followed septic shock with concern for aspiration pneumonia. He remains hypothermic.  Procal and CRP markedly elevated.  MRSA nasal PCR positive. Blood, sputum cultures pending; urine culture no growth.  Currently on Vancomycin and Meropenem.     Objective         Vitals Last 24 Hours:  TEMPERATURE:  Temp  Av.8 °F (36.6 °C)  Min: 94.7 °F (34.8 °C)  Max: 99.1 °F (37.3 °C)  RESPIRATIONS RANGE: Resp  Av.8  Min: 11  Max: 30  PULSE OXIMETRY RANGE: SpO2  Av %  Min: 98 %  Max: 100 %  PULSE RANGE: Pulse  Av.3  Min: 66  Max: 135  BLOOD PRESSURE RANGE: Systolic (24hrs), Av , Min:67 , Max:119   ; Diastolic (24hrs), Av, Min:49, Max:104         Objective:  Vital signs: (most recent): Blood pressure 93/65, pulse 82, temperature 98.9 °F (37.2 °C), temperature source Esophageal, resp. rate 14, height 1.73 m (5' 8.11\"), weight 90.2 kg (198 lb 13.7 oz), SpO2 99 %.      Vitals and nursing note reviewed.   Constitutional:       General: He is in acute distress.      Appearance: He is ill-appearing and toxic-appearing.   HENT:      Head: Normocephalic and atraumatic.      Right Ear: External ear normal.      Left Ear: External ear normal.      Nose: Nose normal.      Mouth/Throat:      Comments: ET Tube, orogastric tube  Eyes:      Comments: Closed    Cardiovascular:      Rate and Rhythm: Normal rate and regular rhythm.      Heart sounds: No murmur heard.  Pulmonary:      Breath sounds: Rhonchi present. No wheezing or rales.   Abdominal:      General: Bowel sounds are normal. There is no distension.      Palpations: Abdomen is soft.      Tenderness: There is no abdominal tenderness.   Genitourinary:     Comments: No urinary device  Musculoskeletal:      Cervical back: Neck supple.      Right lower leg: No edema.      Left lower leg: Edema present.  hypoxic respiratory failure, intubated  Hyperglycemia  FIDEL  Penicillin allergy  7.   MRSA nasal colonization on nasal Mupirocin     Comment:   WBC decreasing but he remains on Solumedrol.  Procal and CRP decreasing.  Now off Vancomycin.     Plan   1. Continue  IV Meropenem    2. Follow-up blood cultures  3.  In am, repeat CBC, procal and CRP  4.  Mupirocin nasal ointment  completed today    Electronically signed by Riley Alonzo MD

## 2024-06-04 NOTE — PLAN OF CARE
Discussed with patient's sister that CRRT would not be restarted today and if patient is able to come off of pressors, patient will receive Hemodialysis tomorrow

## 2024-06-04 NOTE — CONSULTS
IMPRESSION:   Acute hypoxic respiratory failure FiO2 now 35%  Cardiac arrest  A-fib with RVR on amiodarone  Severe sepsis with septic shock on pressors  Aspiration pneumonia  Acute kidney injury now on CVVH  Hypophosphatemia to be repleted  Hypokalemia to be repleted  Hyperkalemia resolved  COPD no wheezing  MRSA nasal colonization  History of cocaine abuse polysubstance abuse in the past  Pt is critically ill. Time spent with pt and staff actively rendering care, managing pt and coordinating care as stated below; 30 minutes, exclusive of any procedures      RECOMMENDATIONS/PLAN:   ICU monitoring  Ventilator for mechanical life support and prevent respiratory arrest with protective lung strategies  FiO2 down to 35%  Chest x-ray with continued right lower infiltrate CTA chest showed bilateral infiltrates consistent with aspiration  Remains on Merrem and vancomycin sputum with normal doug  Heart rate controlled on IV amiodarone  Patient remains on CVVH  Status post cardiac arrest patient coded and he was intubated and received epinephrine and ACLS protocol ROSC is obtained 10 minutes   Remains on norepinephrine 3 mics   No wheezing continue nebulized albuterol Atrovent will decrease IV Solu-Medrol     [x] High complexity decision making was performed  [x] See my orders for details  HPI  78-year-old male nursing home resident came in because of shortness of breath respiratory distress patient has episodes of vomiting after eating breakfast this morning subsequently came to the hospital saturation was in 70s he was back to the emergency room subsequently intubated and then he coded hemodynamically unstable now he is on 2 pressors vasopressin and Levophed denies intubated on ventilator critical care consult was called, past medical history of polysubstance abuse cocaine abuse COPD hypertension insomnia      PMH:  has a past medical history of Benign prostatic disease, Benign prostatic hyperplasia, Cervical          XR CHEST 1 VIEW   Final Result   1. Increasing bibasilar opacities left greater than right most likely due to   atelectasis.   2. Decrease in consolidation in the right upper lobe.   3. The ET tube could be retracted 1.5 to 2 cm.      US RETROPERITONEAL COMPLETE   Final Result   1. Atrophic kidneys. No gross hydronephrosis.   2. Small volume of ascites.         XR CHEST PORTABLE   Final Result   Left IJ central venous catheter placement. No pneumothorax. Right   upper lobe airspace disease and mild bilateral interstitial opacities unchanged.      XR CHEST PORTABLE   Final Result   1. ET tube tip is angled towards the right mainstem bronchus and is 1 cm   superior to the level of the gina. Recommend repositioning.   2. Right lung patchy airspace consolidation.      IR ULTRASOUND GUIDANCE VASCULAR ACCESS    (Results Pending)   CTA CHEST W WO CONTRAST    (Results Pending)       XR CHEST PORTABLE    Result Date: 5/29/2024  EXAM: XR CHEST PORTABLE INDICATION: Line placement COMPARISON: 5/29/2024 FINDINGS: A portable AP radiograph of the chest was obtained at 1347 hours. Right upper lobe airspace disease.. Bilateral interstitial and airspace opacities.. Left IJ central venous catheter placement with the tip in the region of the mid SVC. No pneumothorax. Other tubes and lines are stable. The bones and soft tissues are grossly within normal limits.     Left IJ central venous catheter placement. No pneumothorax. Right upper lobe airspace disease and mild bilateral interstitial opacities unchanged.    XR CHEST PORTABLE    Result Date: 5/29/2024  INDICATION: Intubation Portable AP view of the chest. Direct comparison made to prior chest x-ray dated March 2024. Cardiomediastinal silhouette is stable. ETT tube tip is angled toward the right mainstem bronchus and is 1 cm superior to the level of the gina. NG tube extends the stomach. There is patchy airspace consolidation throughout the right lung. There is very mild  left basilar atelectasis. No pleural fluid is visualized but there is no pneumothorax.     1. ET tube tip is angled towards the right mainstem bronchus and is 1 cm superior to the level of the gina. Recommend repositioning. 2. Right lung patchy airspace consolidation.     5/30 remain intubated on ventilator potassium elevated ABG acceptable still hemodynamically unstable on pressors chest x-ray shows extensive infiltrate in the right side mostly upper lobe left lung clear  5/31 hemodynamically unstable on Levophed and vasopressin, remain intubated on 40% FiO2 assist-control mode pCO2 37 pO2 71, started on CVVH creatinine improved to 2.15, chest x-ray shows ET tube 2 cm above the gina lung is fully inflated left-sided clear right-sided has patchy infiltrate lower lobe and some congestive changes extreme, nasal MRSA now on Bactroban continue with meropenem and vancomycin  6/1 remains on pressors on the ventilator sedated on propofol and Precedex.  Right lower lobe infiltrate unchanged.  WBC count remains elevated with procalcitonin improving remains on Merrem and vancomycin nasal MRSA smear was positive but sputum only shows normal doug so far  6/2 remains on norepinephrine for blood pressure support hypothermic yesterday now on warming blanket.  Continues with CRRT with fluid removal.  Potassium and phosphorus to be repleted  6/3 remain intubated on ventilator getting CRRT, ABG acceptable pCO2 33 pO2 75 on 35% FiO2 on assist-control mode on meropenem will continue to wean pressors  6/4 on assist-control mode pCO2 31 pO2 80 on 35% FiO2 getting IV fluid 200 cc/h on amiodarone hemodynamically unstable on Levophed and NEOS epinephrine   Time of care 30 minutes

## 2024-06-04 NOTE — PLAN OF CARE
Problem: Cardiovascular - Adult  Goal: Absence of cardiac dysrhythmias or at baseline  Outcome: Not Progressing  Flowsheets (Taken 6/4/2024 0220)  Absence of cardiac dysrhythmias or at baseline:   Monitor cardiac rate and rhythm   Administer antiarrhythmia medication and electrolyte replacement as ordered     Problem: Safety - Adult  Goal: Free from fall injury  Outcome: Progressing  Flowsheets (Taken 5/30/2024 0500 by Linda Sears RN)  Free From Fall Injury: Instruct family/caregiver on patient safety     Problem: Neurosensory - Adult  Goal: Achieves stable or improved neurological status  Outcome: Progressing  Flowsheets (Taken 6/4/2024 0220)  Achieves stable or improved neurological status:   Assess for and report changes in neurological status   Maintain blood pressure and fluid volume within ordered parameters to optimize cerebral perfusion and minimize risk of hemorrhage   Monitor temperature, glucose, and sodium. Initiate appropriate interventions as ordered     Problem: Respiratory - Adult  Goal: Achieves optimal ventilation and oxygenation  Outcome: Progressing  Flowsheets (Taken 6/4/2024 0220)  Achieves optimal ventilation and oxygenation:   Assess for changes in respiratory status   Position to facilitate oxygenation and minimize respiratory effort   Assess the need for suctioning and aspirate as needed   Respiratory therapy support as indicated     Problem: Cardiovascular - Adult  Goal: Maintains optimal cardiac output and hemodynamic stability  Outcome: Progressing  Flowsheets (Taken 6/4/2024 0220)  Maintains optimal cardiac output and hemodynamic stability:   Monitor blood pressure and heart rate   Assess for signs of decreased cardiac output   Administer vasoactive medications as ordered     Problem: Skin/Tissue Integrity - Adult  Goal: Skin integrity remains intact  Outcome: Progressing  Flowsheets (Taken 6/4/2024 0220)  Skin Integrity Remains Intact:   Monitor for areas of redness and/or skin

## 2024-06-04 NOTE — PLAN OF CARE
Patient is progressing towards extubation, patient passed SAT/SBT x 2 attempts today.  No extubation ordered.

## 2024-06-04 NOTE — CARE COORDINATION
CM reviewed Pt medicals, Pt remains on the vent.     Pt comes from [facility] where he is a LTC Resident.     CM will follow for D/C needs.                 [facility] stated that they would like to try for auth.

## 2024-06-04 NOTE — PROGRESS NOTES
General Daily Progress Note      Patient Name:   Agustín Willis       YOB: 1945       Age:  78 y.o.      Admit Date: 5/29/2024      Subjective:   Patient is a 78 y.o. year old male past medical history of COPD BPH cervical radiculopathy cocaine abuse hypertension hyperlipidemia seasonal allergies SVT came to emergency room with respiratory arrest, patient was at nursing home after eating breakfast he vomited everywhere likely aspirated oxygen saturation was 70% initially put on nonrebreather patient was intubated in the ER patient was hypotensive started Levophed 10 patient have a cardiac arrest went to V-fib received shock x 4 received epi and amiodarone  Was started on Levophed and vasopressin received 2 L of IV fluid, received IV Zosyn and vancomycin in the ER     Patient admitted to the ICU for further workup  Patient on ventilator/unresponsive       5/30    Patient on ventilator propofol and Precedex drip  Hypotension on Levophed and vasopressin  Patient has no urine output last night received 500 bolus    Nephrology decided for start CRRT IR team for dialysis cath  BUN 72 creatinine 3.09  CRP 10.5  MRSA nares    5/31    Patient on ventilator 40% O2 propofol Precedex levo and vasopressin drip  Patient on A-fib with rapid ventricular rate ordered amiodarone bolus and started on drip  Patient getting CRRT  No urine output  D-dimers greater than 20    6/3    Patient on ventilator 45% O2 receiving CRRT patient on propofol drip  Patient on amiodarone and Levophed drip  Patient heparin and Precedex drip  Patient is hypothermic    WBC count 22.5    6/4    Patient on ventilator 45% O2 on Levophed drip and amiodarone drip and heparin drip and Precedex drip getting NG tube feeding  BUN 37 creatinine 1.97 WBCs 15  Doppler studies positive for acute DVT of the left brachial vein left radial vein and left ulnar vein    Seen by infectious disease recommended continue IV meropenem           Objective:  IntraVENous, PRN, Mikaela Arredondo MD    heparin (porcine) injection 2,000 Units, 2,000 Units, IntraVENous, PRN, Mikaela Arredondo MD, 2,000 Units at 06/03/24 0430    heparin 25,000 units in dextrose 5% 250 mL (premix) infusion, 5-30 Units/kg/hr, IntraVENous, Continuous, Mikaela Arredondo MD, Last Rate: 6.1 mL/hr at 06/04/24 0700, 7 Units/kg/hr at 06/04/24 0700    propofol infusion, 5-50 mcg/kg/min, IntraVENous, Continuous, Jeremy Ramos MD, Stopped at 06/03/24 1825    heparin (porcine) injection 1,100-1,900 Units, 1,100-1,900 Units, IntraCATHeter, PRN, 1,400 Units at 06/03/24 1231 **AND** heparin (porcine) injection 1,100-1,900 Units, 1,100-1,900 Units, IntraCATHeter, PRN, Sol Ferraro MD, 1,100 Units at 06/03/24 1226    0.9 % sodium chloride infusion *CRRT PBP*, , Dialysis, Continuous, Sol Ferraro MD, Last Rate: 200 mL/hr at 06/03/24 0811, New Bag at 06/03/24 0811    dexmedeTOMIDine (PRECEDEX) 400 mcg in sodium chloride 0.9 % 100 mL infusion, 0.1-1.5 mcg/kg/hr, IntraVENous, Continuous, Mikaela Arredondo MD, Last Rate: 10.2 mL/hr at 06/04/24 1131, 0.5 mcg/kg/hr at 06/04/24 1131    fentaNYL (SUBLIMAZE) infusion 1000 mcg/100mL,  mcg/hr, IntraVENous, Continuous, Mikaela Arredondo MD, Held at 05/29/24 1404    sodium chloride 0.9 % bolus 2,448 mL, 30 mL/kg, IntraVENous, Once, Mikaela Arredondo MD, Held at 05/29/24 1405    VASOpressin 20 Units in sodium chloride 0.9 % 100 mL infusion, 0.01-0.03 Units/min, IntraVENous, Continuous, Mikaela Arredondo MD, Stopped at 06/03/24 0821    norepinephrine (LEVOPHED) 16 mg in sodium chloride 0.9 % 250 mL infusion, 1-100 mcg/min, IntraVENous, Continuous, Mikaela Arredondo MD, Stopped at 06/04/24 0200    [Held by provider] aspirin EC tablet 81 mg, 81 mg, Oral, Daily, Mikaela Arredondo MD, 81 mg at 06/02/24 0800    ferrous sulfate (IRON 325) tablet 325 mg, 325 mg, Oral, Daily with breakfast, Mikaela Arredondo MD, 325 mg at 06/04/24 0941    finasteride (PROSCAR) tablet 5

## 2024-06-04 NOTE — PROGRESS NOTES
CARDIOLOGY PROGRESS NOTE      Patient Name: Agustín Willis  Age: 78 y.o.  Gender:male  :1945  MRN: [id number]    Agustín Willis is a 78 y.o. year-old male with past medical history significant for hypertension, COPD, cocaine abuse, CKD who was evaluated today due to cardiac arrest. Patient initially presented to the ED via EMS from nursing home due to respiratory distress after vomiting breakfast. Required intubation in ED. Apparently with VF arrest in ED, shock x4.     Patient seen and examined. Remains intubated, sedated. On Silas, amiodarone, heparin. Amio gtt increased to 1 overnight given elevated rates. RN at the bedside.     Telemetry reviewed.    Unable to obtain ROS as patient is intubated and sedated. Current medications reviewed.    Physical Examination    Allergies   Allergen Reactions    Penicillin G Other (See Comments)     Pt states he passes out    Sulfamethoxazole-Trimethoprim      Pt states he passes out     Vitals:    24 1149   BP:    Pulse: 76   Resp: 14   Temp:    SpO2: 100%     Vital signs are stable on pressors  Intubated and sedated  Heart has an irregularly irregular rhythm  Lungs are coarse  Abdomen is soft  Extremities have mild edema  Skin is dry    Labs reviewed:  Recent Results (from the past 12 hour(s))   Blood Gas, Arterial    Collection Time: 24  2:26 AM   Result Value Ref Range    pH, Arterial 7.46 (H) 7.35 - 7.45      pCO2, Arterial 31 (L) 35 - 45 mmHg    pO2, Arterial 80 80 - 100 mmHg    O2 Sat, Arterial 95 95 - 99 %    HCO3, Arterial 22 22 - 26 mmol/L    Base deficit, arterial blood 2.1 mmol/L    O2 Method VENT      FIO2 Arterial 35 %    Mode ASSIST CONTROL      POC TIDAL VOLUME 450.0      Set Rate, POC 14      POC PEEP/CPA 5.0      Source Arterial      Site Right Radial      Efrain Test YES      Carboxyhgb, Arterial 0.3 (L) 1 - 2 %    Methemoglobin, Arterial 0.6 0 - 1.4 %    Oxyhemoglobin 93.8 (L) 95 - 99 %    Performed by: Jeferson Flores     Temperature 99.0

## 2024-06-04 NOTE — PROGRESS NOTES
Pt maintaining HR in 130's, unable to reach cardio on call. Per Dr. Lilly pt is to switch to Silas from Levo and increase Amiodarone to 1. See MAR for changes.

## 2024-06-05 ENCOUNTER — APPOINTMENT (OUTPATIENT)
Facility: HOSPITAL | Age: 79
DRG: 870 | End: 2024-06-05
Payer: MEDICARE

## 2024-06-05 LAB
ALBUMIN SERPL-MCNC: 2 G/DL (ref 3.5–5)
ALBUMIN/GLOB SERPL: 0.5 (ref 1.1–2.2)
ALP SERPL-CCNC: 79 U/L (ref 45–117)
ALT SERPL-CCNC: 15 U/L (ref 12–78)
ANION GAP SERPL CALC-SCNC: 6 MMOL/L (ref 5–15)
ARTERIAL PATENCY WRIST A: YES
AST SERPL W P-5'-P-CCNC: 28 U/L (ref 15–37)
BASE DEFICIT BLDA-SCNC: 3.3 MMOL/L
BDY SITE: ABNORMAL
BILIRUB SERPL-MCNC: 0.4 MG/DL (ref 0.2–1)
BODY TEMPERATURE: 96.4
BUN SERPL-MCNC: 54 MG/DL (ref 6–20)
BUN/CREAT SERPL: 23 (ref 12–20)
CA-I BLD-MCNC: 8.1 MG/DL (ref 8.5–10.1)
CHLORIDE SERPL-SCNC: 103 MMOL/L (ref 97–108)
CO2 SERPL-SCNC: 24 MMOL/L (ref 21–32)
COHGB MFR BLD: 0.3 % (ref 1–2)
CREAT SERPL-MCNC: 2.35 MG/DL (ref 0.7–1.3)
CRP SERPL-MCNC: 12.3 MG/DL (ref 0–0.3)
ERYTHROCYTE [DISTWIDTH] IN BLOOD BY AUTOMATED COUNT: 14.7 % (ref 11.5–14.5)
FIO2 ON VENT: 35 %
GAS FLOW.O2 SETTING OXYMISER: 14
GLOBULIN SER CALC-MCNC: 4 G/DL (ref 2–4)
GLUCOSE BLD STRIP.AUTO-MCNC: 110 MG/DL (ref 65–100)
GLUCOSE BLD STRIP.AUTO-MCNC: 81 MG/DL (ref 65–100)
GLUCOSE SERPL-MCNC: 150 MG/DL (ref 65–100)
HCO3 BLDA-SCNC: 21 MMOL/L (ref 22–26)
HCT VFR BLD AUTO: 25.7 % (ref 36.6–50.3)
HGB BLD-MCNC: 8.3 G/DL (ref 12.1–17)
MCH RBC QN AUTO: 29.1 PG (ref 26–34)
MCHC RBC AUTO-ENTMCNC: 32.3 G/DL (ref 30–36.5)
MCV RBC AUTO: 90.2 FL (ref 80–99)
METHGB MFR BLD: 0.5 % (ref 0–1.4)
NRBC # BLD: 0.2 K/UL (ref 0–0.01)
NRBC BLD-RTO: 1.3 PER 100 WBC
OXYHGB MFR BLD: 95.1 % (ref 95–99)
PCO2 BLDA: 33 MMHG (ref 35–45)
PEEP RESPIRATORY: 5
PERFORMED BY:: ABNORMAL
PERFORMED BY:: ABNORMAL
PERFORMED BY:: NORMAL
PH BLDA: 7.41 (ref 7.35–7.45)
PHOSPHATE SERPL-MCNC: 4.8 MG/DL (ref 2.6–4.7)
PLATELET # BLD AUTO: 119 K/UL (ref 150–400)
PMV BLD AUTO: 11.6 FL (ref 8.9–12.9)
PO2 BLDA: 86 MMHG (ref 80–100)
POTASSIUM SERPL-SCNC: 4.8 MMOL/L (ref 3.5–5.1)
PROCALCITONIN SERPL-MCNC: 6.56 NG/ML
PROT SERPL-MCNC: 6 G/DL (ref 6.4–8.2)
RBC # BLD AUTO: 2.85 M/UL (ref 4.1–5.7)
SAO2 % BLD: 96 % (ref 95–99)
SAO2% DEVICE SAO2% SENSOR NAME: ABNORMAL
SODIUM SERPL-SCNC: 133 MMOL/L (ref 136–145)
SPECIMEN SITE: ABNORMAL
UFH PPP CHRO-ACNC: 0.31 IU/ML
VT SETTING VENT: 450
WBC # BLD AUTO: 16 K/UL (ref 4.1–11.1)

## 2024-06-05 PROCEDURE — 2580000003 HC RX 258: Performed by: FAMILY MEDICINE

## 2024-06-05 PROCEDURE — 94761 N-INVAS EAR/PLS OXIMETRY MLT: CPT

## 2024-06-05 PROCEDURE — 94003 VENT MGMT INPAT SUBQ DAY: CPT

## 2024-06-05 PROCEDURE — 71045 X-RAY EXAM CHEST 1 VIEW: CPT

## 2024-06-05 PROCEDURE — 6370000000 HC RX 637 (ALT 250 FOR IP): Performed by: STUDENT IN AN ORGANIZED HEALTH CARE EDUCATION/TRAINING PROGRAM

## 2024-06-05 PROCEDURE — 5A1D70Z PERFORMANCE OF URINARY FILTRATION, INTERMITTENT, LESS THAN 6 HOURS PER DAY: ICD-10-PCS | Performed by: INTERNAL MEDICINE

## 2024-06-05 PROCEDURE — 82962 GLUCOSE BLOOD TEST: CPT

## 2024-06-05 PROCEDURE — 2700000000 HC OXYGEN THERAPY PER DAY

## 2024-06-05 PROCEDURE — 6360000002 HC RX W HCPCS: Performed by: INTERNAL MEDICINE

## 2024-06-05 PROCEDURE — 85520 HEPARIN ASSAY: CPT

## 2024-06-05 PROCEDURE — 86140 C-REACTIVE PROTEIN: CPT

## 2024-06-05 PROCEDURE — 6360000002 HC RX W HCPCS: Performed by: HOSPITALIST

## 2024-06-05 PROCEDURE — 84100 ASSAY OF PHOSPHORUS: CPT

## 2024-06-05 PROCEDURE — 2000000000 HC ICU R&B

## 2024-06-05 PROCEDURE — 36600 WITHDRAWAL OF ARTERIAL BLOOD: CPT

## 2024-06-05 PROCEDURE — 85027 COMPLETE CBC AUTOMATED: CPT

## 2024-06-05 PROCEDURE — 6370000000 HC RX 637 (ALT 250 FOR IP): Performed by: INTERNAL MEDICINE

## 2024-06-05 PROCEDURE — 94640 AIRWAY INHALATION TREATMENT: CPT

## 2024-06-05 PROCEDURE — 99233 SBSQ HOSP IP/OBS HIGH 50: CPT | Performed by: INTERNAL MEDICINE

## 2024-06-05 PROCEDURE — 82803 BLOOD GASES ANY COMBINATION: CPT

## 2024-06-05 PROCEDURE — 80053 COMPREHEN METABOLIC PANEL: CPT

## 2024-06-05 PROCEDURE — 84145 PROCALCITONIN (PCT): CPT

## 2024-06-05 PROCEDURE — 6360000002 HC RX W HCPCS: Performed by: FAMILY MEDICINE

## 2024-06-05 PROCEDURE — 6370000000 HC RX 637 (ALT 250 FOR IP): Performed by: FAMILY MEDICINE

## 2024-06-05 RX ORDER — IPRATROPIUM BROMIDE AND ALBUTEROL SULFATE 2.5; .5 MG/3ML; MG/3ML
1 SOLUTION RESPIRATORY (INHALATION)
Status: DISCONTINUED | OUTPATIENT
Start: 2024-06-06 | End: 2024-06-06

## 2024-06-05 RX ADMIN — FINASTERIDE 5 MG: 5 TABLET, FILM COATED ORAL at 10:30

## 2024-06-05 RX ADMIN — PROPOFOL 20 MCG/KG/MIN: 10 INJECTION, EMULSION INTRAVENOUS at 00:07

## 2024-06-05 RX ADMIN — METHYLPREDNISOLONE SODIUM SUCCINATE 20 MG: 40 INJECTION INTRAMUSCULAR; INTRAVENOUS at 20:29

## 2024-06-05 RX ADMIN — HEPARIN SODIUM 7 UNITS/KG/HR: 10000 INJECTION, SOLUTION INTRAVENOUS at 20:39

## 2024-06-05 RX ADMIN — ASPIRIN 81 MG 81 MG: 81 TABLET ORAL at 10:30

## 2024-06-05 RX ADMIN — EPOETIN ALFA-EPBX 10000 UNITS: 10000 INJECTION, SOLUTION INTRAVENOUS; SUBCUTANEOUS at 17:24

## 2024-06-05 RX ADMIN — IPRATROPIUM BROMIDE AND ALBUTEROL SULFATE 1 DOSE: .5; 3 SOLUTION RESPIRATORY (INHALATION) at 14:03

## 2024-06-05 RX ADMIN — IPRATROPIUM BROMIDE AND ALBUTEROL SULFATE 1 DOSE: .5; 3 SOLUTION RESPIRATORY (INHALATION) at 07:38

## 2024-06-05 RX ADMIN — SODIUM CHLORIDE, PRESERVATIVE FREE 10 ML: 5 INJECTION INTRAVENOUS at 20:41

## 2024-06-05 RX ADMIN — THIAMINE HCL TAB 100 MG 100 MG: 100 TAB at 10:30

## 2024-06-05 RX ADMIN — IPRATROPIUM BROMIDE AND ALBUTEROL SULFATE 1 DOSE: .5; 3 SOLUTION RESPIRATORY (INHALATION) at 03:06

## 2024-06-05 RX ADMIN — PROPOFOL 20 MCG/KG/MIN: 10 INJECTION, EMULSION INTRAVENOUS at 06:04

## 2024-06-05 RX ADMIN — MEROPENEM 1000 MG: 1 INJECTION, POWDER, FOR SOLUTION INTRAVENOUS at 05:00

## 2024-06-05 RX ADMIN — FERROUS SULFATE TAB 325 MG (65 MG ELEMENTAL FE) 325 MG: 325 (65 FE) TAB at 10:30

## 2024-06-05 RX ADMIN — AMIODARONE HYDROCHLORIDE 400 MG: 200 TABLET ORAL at 10:30

## 2024-06-05 RX ADMIN — METHYLPREDNISOLONE SODIUM SUCCINATE 20 MG: 40 INJECTION INTRAMUSCULAR; INTRAVENOUS at 10:30

## 2024-06-05 RX ADMIN — Medication 20 MCG/MIN: at 04:38

## 2024-06-05 RX ADMIN — SODIUM CHLORIDE, PRESERVATIVE FREE 10 ML: 5 INJECTION INTRAVENOUS at 10:30

## 2024-06-05 RX ADMIN — FOLIC ACID 1 MG: 1 TABLET ORAL at 10:30

## 2024-06-05 RX ADMIN — IPRATROPIUM BROMIDE AND ALBUTEROL SULFATE 1 DOSE: .5; 3 SOLUTION RESPIRATORY (INHALATION) at 20:59

## 2024-06-05 ASSESSMENT — PULMONARY FUNCTION TESTS
PIF_VALUE: 19
PIF_VALUE: 18
PIF_VALUE: 19
PIF_VALUE: 18
PIF_VALUE: 20
PIF_VALUE: 19
PIF_VALUE: 17
PIF_VALUE: 19
PIF_VALUE: 18
PIF_VALUE: 18
PIF_VALUE: 19
PIF_VALUE: 20
PIF_VALUE: 11
PIF_VALUE: 19
PIF_VALUE: 18
PIF_VALUE: 19
PIF_VALUE: 18
PIF_VALUE: 18
PIF_VALUE: 20
PIF_VALUE: 11
PIF_VALUE: 19

## 2024-06-05 NOTE — PROGRESS NOTES
1130hrs-assumed care of patient, report received from ROGELIO Whelan    1200hrs-patient extubated to 4L with Spo2 of 97%. OGT removed as well during extubation tube feeding stopped.

## 2024-06-05 NOTE — PROGRESS NOTES
Nephrology f/u          Patient: Agustín Willis MRN: [id number]  SSN: [id number]    YOB: 1945  Age: 78 y.o.  Sex: male      Subjective:   I have seen the patient in ICU.  On vent and under sedation  FiO2 30%  On single pressor trending down  On IV amiodarone and IV heparin drips  Off CRRT for 2 days   Noticed him to start urinating  Cr 2.3 (from 1.9 yesterday)    Past Medical History:   Diagnosis Date    Benign prostatic disease 8/3/2020    Hypertrophy with Outflow Obstruction    Benign prostatic hyperplasia 8/3/2020    Cervical radiculopathy 8/3/2020    Chronic obstructive lung disease (HCC) 8/3/2020    Cocaine abuse (HCC) 8/3/2020    Dizziness and giddiness 8/3/2020    Hypercholesterolemia 8/3/2020    Hypertensive disorder 8/3/2020    Insomnia 8/3/2020    Kidney disease 8/3/2020    Low back pain 8/3/2020    Osteoarthritis of knee 8/3/2020    Sleep apnea 8/3/2020    Vasovagal syncope 8/3/2020     Past Surgical History:   Procedure Laterality Date    IR NONTUNNELED VASCULAR CATHETER  5/30/2024    IR NONTUNNELED VASCULAR CATHETER 5/30/2024 Stephy Abreu APRN - NP SSR RAD ANGIO IR      Family History   Problem Relation Age of Onset    Hypertension Mother      Social History     Tobacco Use    Smoking status: Former    Smokeless tobacco: Never   Substance Use Topics    Alcohol use: Yes      Current Facility-Administered Medications   Medication Dose Route Frequency Provider Last Rate Last Admin    [START ON 6/6/2024] meropenem (MERREM) 500 mg in sodium chloride 0.9 % 100 mL IVPB (mini-bag)  500 mg IntraVENous Q24H Jeremy Ramos MD        phenylephrine (ROSSANA-SYNEPHRINE) 50 mg/250 mL infusion   mcg/min IntraVENous Continuous Javan Lilly MD   Stopped at 06/05/24 1035    amiodarone (CORDARONE) tablet 400 mg  400 mg Oral BID Gerry Wilson MD   400 mg at 06/05/24 1030    aspirin chewable tablet 81 mg  81 mg Per NG tube Daily Jeremy Ramos MD   81 mg at 06/05/24 1030     methylPREDNISolone sodium succ (SOLU-MEDROL) injection 20 mg  20 mg IntraVENous Q12H Hamilton Miranda MD   20 mg at 06/05/24 1030    epoetin radha-epbx (RETACRIT) injection 10,000 Units  10,000 Units SubCUTAneous Once per day on Mon Wed Fri Sol Ferraro MD   10,000 Units at 06/03/24 1825    prismaSol BGK 2/3.5 dialysis solution   Dialysis Continuous Sol Ferraro MD 1,000 mL/hr at 06/03/24 0700 New Bag at 06/03/24 0700    prismaSol BGK 2/3.5 dialysis solution   Dialysis Continuous Sol Ferraro MD 1,000 mL/hr at 06/03/24 0659 New Bag at 06/03/24 0659    heparin (porcine) injection 4,000 Units  4,000 Units IntraVENous PRN Mikaela Arredondo MD        heparin (porcine) injection 2,000 Units  2,000 Units IntraVENous PRN Mikaela Arredondo MD   2,000 Units at 06/03/24 0430    heparin 25,000 units in dextrose 5% 250 mL (premix) infusion  5-30 Units/kg/hr IntraVENous Continuous Mikaela Arredondo MD 6.1 mL/hr at 06/05/24 0510 7 Units/kg/hr at 06/05/24 0510    propofol infusion  5-50 mcg/kg/min IntraVENous Continuous Jeremy Ramos MD   Stopped at 06/05/24 1200    heparin (porcine) injection 1,100-1,900 Units  1,100-1,900 Units IntraCATHeter PRN Sol Ferraro MD   1,400 Units at 06/03/24 1231    And    heparin (porcine) injection 1,100-1,900 Units  1,100-1,900 Units IntraCATHeter PRN Sol Ferraro MD   1,100 Units at 06/03/24 1226    0.9 % sodium chloride infusion *CRRT PBP*   Dialysis Continuous Sol Ferraro  mL/hr at 06/03/24 0811 New Bag at 06/03/24 0811    dexmedeTOMIDine (PRECEDEX) 400 mcg in sodium chloride 0.9 % 100 mL infusion  0.1-1.5 mcg/kg/hr IntraVENous Continuous Mikaela Arredondo MD 10.2 mL/hr at 06/04/24 1610 0.5 mcg/kg/hr at 06/04/24 1610    fentaNYL (SUBLIMAZE) infusion 1000 mcg/100mL   mcg/hr IntraVENous Continuous Mikaela Arredondo MD   Held at 05/29/24 1404    sodium chloride 0.9 % bolus 2,448 mL  30 mL/kg IntraVENous Once Mikaela Arredondo MD   Held at 05/29/24 1405    VASOpressin 20

## 2024-06-05 NOTE — PROGRESS NOTES
Physician Progress Note      PATIENT:               YOSVANY FITCH  CSN #:                  [id number]  :                       1945  ADMIT DATE:       2024 12:47 PM  DISCH DATE:  RESPONDING  PROVIDER #:        Mikaela River MD          QUERY TEXT:    Good morning  Patient admitted following cardiac arrest.    If possible, please document in progress notes and discharge summary the cause   of cardiac arrest:    The medical record reflects the following:  Risk Factors: age, Vfib, sepsis, aspiration pneumonia, acute respiratory   failure  Clinical Indicators: Patient resented with low O2 sats after vomiting at NH.   Saturating in the 70s, being bagged by EMS. In ED noted with Tachycardia,   respiratory distress, rhonchi. hypotension, wheezing  Intubated and became less responsive. Pulses lost, Code BLUE called and ACLS   initiated  Regained pulse after approximately 10 minutes of ACLS   ED note \"received epinephrine, sodium bicarb, calcium gluconate,   amiodarone, multiple defibrillations, rhythm continue to show ventricular   fibrillation\"   EKG-Sinus rhythm with Premature supraventricular complexes. Left axis   deviation. Right bundle branch block  Treatment: daily labs, EKG, CPR, IV Epi, sodium bicarb, calcium gluconate,   amiodarone, defibrillation    Thank you  Galilea Ledesma RN CDI  [phone]  Options provided:  -- Cardiac Arrest due to AMI  -- Cardiac Arrest due to VFib  -- Cardiac Arrest due to Acute Respiratory Failure  -- Other - I will add my own diagnosis  -- Disagree - Not applicable / Not valid  -- Disagree - Clinically unable to determine / Unknown  -- Refer to Clinical Documentation Reviewer    PROVIDER RESPONSE TEXT:    This patient had cardiac arrest due to Ventricular fibrillation.    Query created by: Galilea Ledesma on 2024 7:38 AM      Electronically signed by:  Mikaela River MD 2024 8:05 AM

## 2024-06-05 NOTE — PROGRESS NOTES
Meropenem Extended-Infusion Dosing/Monitoring  Current regimen:  1 g every 12 hours    Recent Labs     06/03/24  0254 06/04/24  0500 06/05/24  0251   CREATININE 1.11 1.97* 2.35*   BUN 21* 37* 54*       Estimated CrCl:  28 mL/min; HD prn    Plan: Change to meropenem 0.5g IV over 180 minutes every 24 hours per [facility] P&T Committee Protocol with respect to extended-infusion ?-lactam antibiotics. Pharmacy will continue to monitor patient daily and will make dosage adjustments based upon changing renal function.

## 2024-06-05 NOTE — CONSULTS
IMPRESSION:   Acute hypoxic respiratory failure FiO2 now 35%  Cardiac arrest  A-fib with RVR on amiodarone  Severe sepsis with septic shock on pressors  Aspiration pneumonia  Acute kidney injury now on CVVH  Hypophosphatemia to be repleted  Hypokalemia to be repleted  Hyperkalemia resolved  COPD no wheezing  MRSA nasal colonization  History of cocaine abuse polysubstance abuse in the past  Pt is critically ill. Time spent with pt and staff actively rendering care, managing pt and coordinating care as stated below; 30 minutes, exclusive of any procedures      RECOMMENDATIONS/PLAN:   ICU monitoring  Ventilator for mechanical life support and prevent respiratory arrest with protective lung strategies  FiO2 down to 35%  Chest x-ray with continued right lower infiltrate CTA chest showed bilateral infiltrates consistent with aspiration  Remains on Merrem and vancomycin sputum with normal doug  Heart rate controlled on IV amiodarone  Renal function improving off CVVH as needed dialysis  Status post cardiac arrest patient coded and he was intubated and received epinephrine and ACLS protocol ROSC is obtained 10 minutes   Remains on norepinephrine 3 mics   No wheezing continue nebulized albuterol Atrovent will decrease IV Solu-Medrol     [x] High complexity decision making was performed  [x] See my orders for details  HPI  78-year-old male nursing home resident came in because of shortness of breath respiratory distress patient has episodes of vomiting after eating breakfast this morning subsequently came to the hospital saturation was in 70s he was back to the emergency room subsequently intubated and then he coded hemodynamically unstable now he is on 2 pressors vasopressin and Levophed denies intubated on ventilator critical care consult was called, past medical history of polysubstance abuse cocaine abuse COPD hypertension insomnia      PMH:  has a past medical history of Benign prostatic disease, Benign prostatic    SEN2SCU 33*   PO2ART 86   BLM0JER 21*   FIO2A 35   [id number] 96   OXYHEM 95.1   CARBOXHGBART 0.3*   METHGBART 0.5   TEMP 96.4     6/2 a/C14  PEEP 5 FiO2 35% with pO2 60 pCO2 32 pH 7.47   6/1-A/C 14  PEEP 7 FiO2 40% with pO2 85 pCO2 35 pH 7.41  Labs:    Recent Labs     06/03/24  0254 06/04/24  0500 06/05/24  0251   WBC 22.5* 15.3* 16.0*   HGB 8.0* 7.7* 8.3*   * 115* 119*     Procalcitonin 35.7, 50.1, 72.7,  Recent Labs     06/03/24  0254 06/04/24  0500 06/04/24  1330 06/05/24  0251   * 139  --  133*   K 3.7 4.1  --  4.8    107  --  103   CO2 24 26  --  24   GLUCOSE 141* 99  --  150*   BUN 21* 37*  --  54*   CREATININE 1.11 1.97*  --  2.35*   CALCIUM 8.9 8.0*  --  8.1*   MG 1.8 1.8 1.8  --    PHOS 2.1* 4.2  --  4.8*   BILITOT  --  0.3  --  0.4   AST  --  14*  --  28   ALT  --  10*  --  15         Lab Results   Component Value Date/Time    BNP 1,018 01/04/2023 07:35 AM      No results found for: \"TSH\"   Results       Procedure Component Value Units Date/Time    MRSA by PCR [[id number]]     Order Status: Canceled Specimen: Nares     Culture, Respiratory [[id number]] Collected: 05/29/24 2300    Order Status: Completed Specimen: Sputum Updated: 06/01/24 0708     Special Requests No Special Requests        Gram Stain No Epithelial cells seen         Occasional WBCs seen               1+ Gram positive cocci in pairs            1+ Gram Positive Rods        Culture       Light Normal respiratory doug          MRSA by PCR [[id number]]  (Abnormal) Collected: 05/29/24 2300    Order Status: Completed Specimen: Swab Updated: 05/30/24 0038     MRSA by PCR DETECTED        Comment: CALLED TO AND READ BACK BY TELMA RN AT 0038 BY [facility] [place]       Culture, Urine [[id number]] Collected: 05/29/24 2019    Order Status: Completed Specimen: Urine Updated: 05/31/24 1231     Special Requests No Special Requests        Culture No Growth (<1000 cfu/mL)       Culture, Wound [[id number]] Collected: 05/29/24 2019

## 2024-06-05 NOTE — CARE COORDINATION
Chart reviewed. Patient remains on vent. DC plan will be to return to [facility] on discharge. Patient is a LT resident however if possible they would like to try for auth if appropriate. CM will continue to follow.

## 2024-06-05 NOTE — PROGRESS NOTES
General Daily Progress Note      Patient Name:   Agustín Willis       YOB: 1945       Age:  78 y.o.      Admit Date: 5/29/2024      Subjective:   Patient is a 78 y.o. year old male past medical history of COPD BPH cervical radiculopathy cocaine abuse hypertension hyperlipidemia seasonal allergies SVT came to emergency room with respiratory arrest, patient was at nursing home after eating breakfast he vomited everywhere likely aspirated oxygen saturation was 70% initially put on nonrebreather patient was intubated in the ER patient was hypotensive started Levophed 10 patient have a cardiac arrest went to V-fib received shock x 4 received epi and amiodarone  Was started on Levophed and vasopressin received 2 L of IV fluid, received IV Zosyn and vancomycin in the ER     Patient admitted to the ICU for further workup  Patient on ventilator/unresponsive       5/30    Patient on ventilator propofol and Precedex drip  Hypotension on Levophed and vasopressin  Patient has no urine output last night received 500 bolus    Nephrology decided for start CRRT IR team for dialysis cath  BUN 72 creatinine 3.09  CRP 10.5  MRSA nares    5/31    Patient on ventilator 40% O2 propofol Precedex levo and vasopressin drip  Patient on A-fib with rapid ventricular rate ordered amiodarone bolus and started on drip  Patient getting CRRT  No urine output  D-dimers greater than 20    6/3    Patient on ventilator 45% O2 receiving CRRT patient on propofol drip  Patient on amiodarone and Levophed drip  Patient heparin and Precedex drip  Patient is hypothermic    WBC count 22.5    6/4    Patient on ventilator 45% O2 on Levophed drip and amiodarone drip and heparin drip and Precedex drip getting NG tube feeding  BUN 37 creatinine 1.97 WBCs 15  Doppler studies positive for acute DVT of the left brachial vein left radial vein and left ulnar vein    Seen by infectious disease recommended continue IV meropenem    6/5    General ventilated  2,000 Units at 06/03/24 0430    heparin 25,000 units in dextrose 5% 250 mL (premix) infusion, 5-30 Units/kg/hr, IntraVENous, Continuous, Mikaela Arredondo MD, Last Rate: 6.1 mL/hr at 06/05/24 0510, 7 Units/kg/hr at 06/05/24 0510    propofol infusion, 5-50 mcg/kg/min, IntraVENous, Continuous, Jeremy Ramos MD, Last Rate: 10.5 mL/hr at 06/05/24 0604, 20 mcg/kg/min at 06/05/24 0604    heparin (porcine) injection 1,100-1,900 Units, 1,100-1,900 Units, IntraCATHeter, PRN, 1,400 Units at 06/03/24 1231 **AND** heparin (porcine) injection 1,100-1,900 Units, 1,100-1,900 Units, IntraCATHeter, PRN, Sol Ferraro MD, 1,100 Units at 06/03/24 1226    0.9 % sodium chloride infusion *CRRT PBP*, , Dialysis, Continuous, Sol Ferraro MD, Last Rate: 200 mL/hr at 06/03/24 0811, New Bag at 06/03/24 0811    dexmedeTOMIDine (PRECEDEX) 400 mcg in sodium chloride 0.9 % 100 mL infusion, 0.1-1.5 mcg/kg/hr, IntraVENous, Continuous, Mikaela Arredondo MD, Last Rate: 10.2 mL/hr at 06/04/24 1610, 0.5 mcg/kg/hr at 06/04/24 1610    fentaNYL (SUBLIMAZE) infusion 1000 mcg/100mL,  mcg/hr, IntraVENous, Continuous, Mikaela Arredondo MD, Held at 05/29/24 1404    sodium chloride 0.9 % bolus 2,448 mL, 30 mL/kg, IntraVENous, Once, Mikaela Arredondo MD, Held at 05/29/24 1405    VASOpressin 20 Units in sodium chloride 0.9 % 100 mL infusion, 0.01-0.03 Units/min, IntraVENous, Continuous, Mikaela Arredondo MD, Stopped at 06/03/24 0821    norepinephrine (LEVOPHED) 16 mg in sodium chloride 0.9 % 250 mL infusion, 1-100 mcg/min, IntraVENous, Continuous, Mikaela Arredondo MD, Stopped at 06/04/24 0200    [Held by provider] aspirin EC tablet 81 mg, 81 mg, Oral, Daily, Mikaela Arredondo MD, 81 mg at 06/02/24 0800    ferrous sulfate (IRON 325) tablet 325 mg, 325 mg, Oral, Daily with breakfast, Mikaela Arredondo MD, 325 mg at 06/05/24 1030    finasteride (PROSCAR) tablet 5 mg, 5 mg, Oral, Daily, Mikaela Arredondo MD, 5 mg at 06/05/24 1030    folic acid (FOLVITE)

## 2024-06-05 NOTE — PROGRESS NOTES
Pt extubated to 4l NC. Pt is currently stable with vitals HR: 87 RR: 25 SpO2: 97% Breath sounds presented with rhonchi. Will give breathing treatment and deep suction.

## 2024-06-05 NOTE — PROGRESS NOTES
CARDIOLOGY PROGRESS NOTE      Patient Name: Agustín Willis  Age: 78 y.o.  Gender:male  :1945  MRN: [id number]    Agustín Willis is a 78 y.o. year-old male with past medical history significant for hypertension, COPD, cocaine abuse, CKD who was evaluated today due to cardiac arrest. Patient initially presented to the ED via EMS from nursing home due to respiratory distress after vomiting breakfast. Required intubation in ED. Apparently with VF arrest in ED, shock x4.     Patient seen and examined. Remains intubated, sedated. No longer on pressor support. On amiodarone, heparin. RN at the bedside.     Telemetry reviewed.    Unable to obtain ROS as patient is intubated and sedated. Current medications reviewed.    Physical Examination    Allergies   Allergen Reactions    Penicillin G Other (See Comments)     Pt states he passes out    Sulfamethoxazole-Trimethoprim      Pt states he passes out     Vitals:    24 1407   BP:    Pulse: 76   Resp: 24   Temp:    SpO2: 98%     Vital signs are stable on pressors  Intubated and sedated  Heart has an irregularly irregular rhythm  Lungs are coarse  Abdomen is soft  Extremities have mild edema  Skin is dry    Labs reviewed:  Recent Results (from the past 12 hour(s))   Heparin, Anti-Xa    Collection Time: 24  2:51 AM   Result Value Ref Range    Heparin Xa,LMWH and Unfrac 0.31 IU/mL   CBC    Collection Time: 24  2:51 AM   Result Value Ref Range    WBC 16.0 (H) 4.1 - 11.1 K/uL    RBC 2.85 (L) 4.10 - 5.70 M/uL    Hemoglobin 8.3 (L) 12.1 - 17.0 g/dL    Hematocrit 25.7 (L) 36.6 - 50.3 %    MCV 90.2 80.0 - 99.0 FL    MCH 29.1 26.0 - 34.0 PG    MCHC 32.3 30.0 - 36.5 g/dL    RDW 14.7 (H) 11.5 - 14.5 %    Platelets 119 (L) 150 - 400 K/uL    MPV 11.6 8.9 - 12.9 FL    Nucleated RBCs 1.3 (H) 0.0  WBC    nRBC 0.20 (H) 0.00 - 0.01 K/uL   Comprehensive Metabolic Panel    Collection Time: 24  2:51 AM   Result Value Ref Range    Sodium 133 (L) 136 - 145 mmol/L

## 2024-06-05 NOTE — PROGRESS NOTES
Progress Note  Date:2024       Room:[facility]  Patient Name:Agustín Willis     YOB: 1945     Age:78 y.o.        Subjective    Subjective  Patient followed septic shock with presumptive aspiration pneumonia. He has low grade temperatures.  Procal and CRP decreasing.  Blood cultures negative.  Sputum culture grew normal doug and uurine culture no growth.  Currently on IV Meropenem alone.  Patient now extubated and alert but still confused.      Objective         Vitals Last 24 Hours:  TEMPERATURE:  Temp  Av.5 °F (36.4 °C)  Min: 96.3 °F (35.7 °C)  Max: 98.7 °F (37.1 °C)  RESPIRATIONS RANGE: Resp  Avg: 15  Min: 12  Max: 22  PULSE OXIMETRY RANGE: SpO2  Av %  Min: 95 %  Max: 100 %  PULSE RANGE: Pulse  Av.1  Min: 50  Max: 81  BLOOD PRESSURE RANGE: Systolic (24hrs), Av , Min:62 , Max:145   ; Diastolic (24hrs), Av, Min:43, Max:132         Objective:  Vital signs: (most recent): Blood pressure 99/73, pulse 78, temperature 97 °F (36.1 °C), temperature source Esophageal, resp. rate 19, height 1.73 m (5' 8.11\"), weight 91.4 kg (201 lb 8 oz), SpO2 100 %.      Vitals and nursing note reviewed.   Constitutional:       General: He is in acute distress.      Appearance: He is ill-appearing and toxic-appearing.   HENT:      Head: Normocephalic and atraumatic.      Right Ear: External ear normal.      Left Ear: External ear normal.      Nose: Nose normal.      Mouth/Throat:      Comments: Nasal cannula O2  Eyes:      Comments: Open    Cardiovascular:      Rate and Rhythm: Normal rate and regular rhythm.      Heart sounds: No murmur heard.  Pulmonary:      Breath sounds: Rhonchi present. No wheezing or rales.   Abdominal:      General: Bowel sounds are normal. There is no distension.      Palpations: Abdomen is soft.      Tenderness: There is no abdominal tenderness.   Genitourinary:  external urinary device with slightly turbid urine  Musculoskeletal:      Cervical back: Neck supple.      Right  hypothermia, leukocytosis, elevated lactic acid and procal/CRP  Presumptive aspiration pneumonia, primarily right lung, sputum culture grew normal doug, on Day #7 IV Meropenem  Acute hypoxic respiratory failure, intubated  Hyperglycemia  FIDEL  Penicillin allergy  7.   MRSA nasal colonization status post nasal Mupirocin     Comment:   WBC decreasing but he remains on Solumedrol.  Procal decreasing but CRP increased slightly.  Will trend.         Plan   1. Continue  IV Meropenem  7 more days  2.  In am, repeat CBC, procal and CRP     Electronically signed by Riley Alonzo MD

## 2024-06-06 ENCOUNTER — APPOINTMENT (OUTPATIENT)
Facility: HOSPITAL | Age: 79
DRG: 870 | End: 2024-06-06
Payer: MEDICARE

## 2024-06-06 LAB
ALBUMIN SERPL-MCNC: 2.1 G/DL (ref 3.5–5)
ALBUMIN/GLOB SERPL: 0.5 (ref 1.1–2.2)
ALP SERPL-CCNC: 85 U/L (ref 45–117)
ALT SERPL-CCNC: 18 U/L (ref 12–78)
ANION GAP SERPL CALC-SCNC: 8 MMOL/L (ref 5–15)
ANION GAP SERPL CALC-SCNC: 9 MMOL/L (ref 5–15)
APPEARANCE UR: ABNORMAL
ARTERIAL PATENCY WRIST A: ABNORMAL
AST SERPL W P-5'-P-CCNC: 23 U/L (ref 15–37)
BACTERIA URNS QL MICRO: NEGATIVE /HPF
BASE DEFICIT BLDA-SCNC: 3.6 MMOL/L
BDY SITE: ABNORMAL
BILIRUB SERPL-MCNC: 0.5 MG/DL (ref 0.2–1)
BILIRUB UR QL: NEGATIVE
BUN SERPL-MCNC: 74 MG/DL (ref 6–20)
BUN SERPL-MCNC: 77 MG/DL (ref 6–20)
BUN/CREAT SERPL: 32 (ref 12–20)
BUN/CREAT SERPL: 35 (ref 12–20)
CA-I BLD-MCNC: 8.6 MG/DL (ref 8.5–10.1)
CA-I BLD-MCNC: 8.7 MG/DL (ref 8.5–10.1)
CHLORIDE SERPL-SCNC: 106 MMOL/L (ref 97–108)
CHLORIDE SERPL-SCNC: 106 MMOL/L (ref 97–108)
CO2 SERPL-SCNC: 22 MMOL/L (ref 21–32)
CO2 SERPL-SCNC: 23 MMOL/L (ref 21–32)
COHGB MFR BLD: 0.1 % (ref 1–2)
COLOR UR: ABNORMAL
CREAT SERPL-MCNC: 2.2 MG/DL (ref 0.7–1.3)
CREAT SERPL-MCNC: 2.32 MG/DL (ref 0.7–1.3)
CRP SERPL-MCNC: 16.2 MG/DL (ref 0–0.3)
EPITH CASTS URNS QL MICRO: ABNORMAL /LPF
ERYTHROCYTE [DISTWIDTH] IN BLOOD BY AUTOMATED COUNT: 15.7 % (ref 11.5–14.5)
GAS FLOW.O2 O2 DELIVERY SYS: 4 L/MIN
GLOBULIN SER CALC-MCNC: 4.6 G/DL (ref 2–4)
GLUCOSE BLD STRIP.AUTO-MCNC: 101 MG/DL (ref 65–100)
GLUCOSE BLD STRIP.AUTO-MCNC: 112 MG/DL (ref 65–100)
GLUCOSE SERPL-MCNC: 113 MG/DL (ref 65–100)
GLUCOSE SERPL-MCNC: 119 MG/DL (ref 65–100)
GLUCOSE UR STRIP.AUTO-MCNC: NEGATIVE MG/DL
HCO3 BLDA-SCNC: 20 MMOL/L (ref 22–26)
HCT VFR BLD AUTO: 26.5 % (ref 36.6–50.3)
HGB BLD-MCNC: 8.6 G/DL (ref 12.1–17)
HGB UR QL STRIP: ABNORMAL
HYALINE CASTS URNS QL MICRO: ABNORMAL /LPF (ref 0–5)
KETONES UR QL STRIP.AUTO: NEGATIVE MG/DL
LEUKOCYTE ESTERASE UR QL STRIP.AUTO: NEGATIVE
MAGNESIUM SERPL-MCNC: 2 MG/DL (ref 1.6–2.4)
MCH RBC QN AUTO: 28.9 PG (ref 26–34)
MCHC RBC AUTO-ENTMCNC: 32.5 G/DL (ref 30–36.5)
MCV RBC AUTO: 88.9 FL (ref 80–99)
METHGB MFR BLD: 0.4 % (ref 0–1.4)
MUCOUS THREADS URNS QL MICRO: ABNORMAL /LPF
NITRITE UR QL STRIP.AUTO: NEGATIVE
NRBC # BLD: 0.07 K/UL (ref 0–0.01)
NRBC BLD-RTO: 0.4 PER 100 WBC
OXYHGB MFR BLD: 95.5 % (ref 95–99)
PCO2 BLDA: 33 MMHG (ref 35–45)
PERFORMED BY:: ABNORMAL
PH BLDA: 7.41 (ref 7.35–7.45)
PH UR STRIP: 5 (ref 5–8)
PHOSPHATE SERPL-MCNC: 5.1 MG/DL (ref 2.6–4.7)
PLATELET # BLD AUTO: 115 K/UL (ref 150–400)
PMV BLD AUTO: 11.2 FL (ref 8.9–12.9)
PO2 BLDA: 86 MMHG (ref 80–100)
POTASSIUM SERPL-SCNC: 5 MMOL/L (ref 3.5–5.1)
POTASSIUM SERPL-SCNC: 5.4 MMOL/L (ref 3.5–5.1)
PROCALCITONIN SERPL-MCNC: 3.99 NG/ML
PROT SERPL-MCNC: 6.7 G/DL (ref 6.4–8.2)
PROT UR STRIP-MCNC: 30 MG/DL
RBC # BLD AUTO: 2.98 M/UL (ref 4.1–5.7)
RBC #/AREA URNS HPF: ABNORMAL /HPF (ref 0–5)
SAO2 % BLD: 96 % (ref 95–99)
SAO2% DEVICE SAO2% SENSOR NAME: ABNORMAL
SODIUM SERPL-SCNC: 136 MMOL/L (ref 136–145)
SODIUM SERPL-SCNC: 138 MMOL/L (ref 136–145)
SP GR UR REFRACTOMETRY: 1.02 (ref 1–1.03)
SPECIMEN SITE: ABNORMAL
UFH PPP CHRO-ACNC: 0.3 IU/ML
UROBILINOGEN UR QL STRIP.AUTO: 0.1 EU/DL (ref 0.1–1)
WBC # BLD AUTO: 17.6 K/UL (ref 4.1–11.1)
WBC URNS QL MICRO: ABNORMAL /HPF (ref 0–4)

## 2024-06-06 PROCEDURE — 82962 GLUCOSE BLOOD TEST: CPT

## 2024-06-06 PROCEDURE — 80053 COMPREHEN METABOLIC PANEL: CPT

## 2024-06-06 PROCEDURE — 86140 C-REACTIVE PROTEIN: CPT

## 2024-06-06 PROCEDURE — 2000000000 HC ICU R&B

## 2024-06-06 PROCEDURE — 84100 ASSAY OF PHOSPHORUS: CPT

## 2024-06-06 PROCEDURE — 99233 SBSQ HOSP IP/OBS HIGH 50: CPT | Performed by: INTERNAL MEDICINE

## 2024-06-06 PROCEDURE — 36600 WITHDRAWAL OF ARTERIAL BLOOD: CPT

## 2024-06-06 PROCEDURE — 71045 X-RAY EXAM CHEST 1 VIEW: CPT

## 2024-06-06 PROCEDURE — 6370000000 HC RX 637 (ALT 250 FOR IP): Performed by: INTERNAL MEDICINE

## 2024-06-06 PROCEDURE — 2580000003 HC RX 258: Performed by: FAMILY MEDICINE

## 2024-06-06 PROCEDURE — 94761 N-INVAS EAR/PLS OXIMETRY MLT: CPT

## 2024-06-06 PROCEDURE — 6360000002 HC RX W HCPCS: Performed by: INTERNAL MEDICINE

## 2024-06-06 PROCEDURE — 80048 BASIC METABOLIC PNL TOTAL CA: CPT

## 2024-06-06 PROCEDURE — 81001 URINALYSIS AUTO W/SCOPE: CPT

## 2024-06-06 PROCEDURE — 6370000000 HC RX 637 (ALT 250 FOR IP): Performed by: FAMILY MEDICINE

## 2024-06-06 PROCEDURE — 2580000003 HC RX 258: Performed by: INTERNAL MEDICINE

## 2024-06-06 PROCEDURE — 82803 BLOOD GASES ANY COMBINATION: CPT

## 2024-06-06 PROCEDURE — 92610 EVALUATE SWALLOWING FUNCTION: CPT

## 2024-06-06 PROCEDURE — 85520 HEPARIN ASSAY: CPT

## 2024-06-06 PROCEDURE — 84145 PROCALCITONIN (PCT): CPT

## 2024-06-06 PROCEDURE — 83735 ASSAY OF MAGNESIUM: CPT

## 2024-06-06 PROCEDURE — 2700000000 HC OXYGEN THERAPY PER DAY

## 2024-06-06 PROCEDURE — 6370000000 HC RX 637 (ALT 250 FOR IP): Performed by: STUDENT IN AN ORGANIZED HEALTH CARE EDUCATION/TRAINING PROGRAM

## 2024-06-06 PROCEDURE — 94640 AIRWAY INHALATION TREATMENT: CPT

## 2024-06-06 PROCEDURE — 85027 COMPLETE CBC AUTOMATED: CPT

## 2024-06-06 RX ORDER — IPRATROPIUM BROMIDE AND ALBUTEROL SULFATE 2.5; .5 MG/3ML; MG/3ML
1 SOLUTION RESPIRATORY (INHALATION)
Status: DISCONTINUED | OUTPATIENT
Start: 2024-06-06 | End: 2024-06-11

## 2024-06-06 RX ADMIN — IPRATROPIUM BROMIDE AND ALBUTEROL SULFATE 1 DOSE: .5; 3 SOLUTION RESPIRATORY (INHALATION) at 04:19

## 2024-06-06 RX ADMIN — IPRATROPIUM BROMIDE AND ALBUTEROL SULFATE 1 DOSE: .5; 3 SOLUTION RESPIRATORY (INHALATION) at 08:18

## 2024-06-06 RX ADMIN — METHYLPREDNISOLONE SODIUM SUCCINATE 20 MG: 40 INJECTION INTRAMUSCULAR; INTRAVENOUS at 08:56

## 2024-06-06 RX ADMIN — IPRATROPIUM BROMIDE AND ALBUTEROL SULFATE 1 DOSE: .5; 3 SOLUTION RESPIRATORY (INHALATION) at 01:03

## 2024-06-06 RX ADMIN — MEROPENEM 500 MG: 500 INJECTION, POWDER, FOR SOLUTION INTRAVENOUS at 05:12

## 2024-06-06 RX ADMIN — IPRATROPIUM BROMIDE AND ALBUTEROL SULFATE 1 DOSE: 2.5; .5 SOLUTION RESPIRATORY (INHALATION) at 13:49

## 2024-06-06 RX ADMIN — THIAMINE HCL TAB 100 MG 100 MG: 100 TAB at 12:25

## 2024-06-06 RX ADMIN — SODIUM CHLORIDE, PRESERVATIVE FREE 10 ML: 5 INJECTION INTRAVENOUS at 08:56

## 2024-06-06 RX ADMIN — AMIODARONE HYDROCHLORIDE 400 MG: 200 TABLET ORAL at 12:25

## 2024-06-06 RX ADMIN — IPRATROPIUM BROMIDE AND ALBUTEROL SULFATE 1 DOSE: 2.5; .5 SOLUTION RESPIRATORY (INHALATION) at 20:50

## 2024-06-06 RX ADMIN — AMIODARONE HYDROCHLORIDE 400 MG: 200 TABLET ORAL at 20:18

## 2024-06-06 RX ADMIN — METHYLPREDNISOLONE SODIUM SUCCINATE 20 MG: 40 INJECTION INTRAMUSCULAR; INTRAVENOUS at 20:17

## 2024-06-06 RX ADMIN — SODIUM CHLORIDE, PRESERVATIVE FREE 10 ML: 5 INJECTION INTRAVENOUS at 20:26

## 2024-06-06 RX ADMIN — FOLIC ACID 1 MG: 1 TABLET ORAL at 12:26

## 2024-06-06 RX ADMIN — FINASTERIDE 5 MG: 5 TABLET, FILM COATED ORAL at 12:26

## 2024-06-06 RX ADMIN — ASPIRIN 81 MG 81 MG: 81 TABLET ORAL at 12:26

## 2024-06-06 ASSESSMENT — PAIN SCALES - GENERAL
PAINLEVEL_OUTOF10: 0
PAINLEVEL_OUTOF10: 0

## 2024-06-06 NOTE — CARE COORDINATION
CM reviewed Pt medicals, Pt is a LTC Resident at [facility].    Pt is alert and awake and talking on nasal cannula.     [facility] would like to try for auth. Will need PT/OT eval/recommendations.    [facility] stated, \"Nataliya Joya  [facility] - 11:02 AM  The Nurse said he required max asst. from the staff with ADL's, he is wc bound and required total asst. with mobility. He was able to feed himself once set up.Nataliya Joya\"      CM will follow for D/C needs.     11:40  CM call Pt sister to discuss code status with her. Pt sister stated that a full code means \"you jump start\" his heart.    Pt sister stated that she would like for Pt to remain a full code.

## 2024-06-06 NOTE — PROGRESS NOTES
General Daily Progress Note      Patient Name:   Agustín Willis       YOB: 1945       Age:  78 y.o.      Admit Date: 5/29/2024      Subjective:   Patient is a 78 y.o. year old male past medical history of COPD BPH cervical radiculopathy cocaine abuse hypertension hyperlipidemia seasonal allergies SVT came to emergency room with respiratory arrest, patient was at nursing home after eating breakfast he vomited everywhere likely aspirated oxygen saturation was 70% initially put on nonrebreather patient was intubated in the ER patient was hypotensive started Levophed 10 patient have a cardiac arrest went to V-fib received shock x 4 received epi and amiodarone  Was started on Levophed and vasopressin received 2 L of IV fluid, received IV Zosyn and vancomycin in the ER     Patient admitted to the ICU for further workup  Patient on ventilator/unresponsive       5/30    Patient on ventilator propofol and Precedex drip  Hypotension on Levophed and vasopressin  Patient has no urine output last night received 500 bolus    Nephrology decided for start CRRT IR team for dialysis cath  BUN 72 creatinine 3.09  CRP 10.5  MRSA nares    5/31    Patient on ventilator 40% O2 propofol Precedex levo and vasopressin drip  Patient on A-fib with rapid ventricular rate ordered amiodarone bolus and started on drip  Patient getting CRRT  No urine output  D-dimers greater than 20    6/3    Patient on ventilator 45% O2 receiving CRRT patient on propofol drip  Patient on amiodarone and Levophed drip  Patient heparin and Precedex drip  Patient is hypothermic    WBC count 22.5    6/4    Patient on ventilator 45% O2 on Levophed drip and amiodarone drip and heparin drip and Precedex drip getting NG tube feeding  BUN 37 creatinine 1.97 WBCs 15  Doppler studies positive for acute DVT of the left brachial vein left radial vein and left ulnar vein    Seen by infectious disease recommended continue IV meropenem    6/5    General ventilated  regurgitation.    Left Atrium: Left atrium is mildly dilated. Left atrial volume index is normal (16-34 mL/m2).    Right Atrium: Right atrium is mildly dilated.    Image quality is good. Procedure performed with the patient in a supine position      Plan:       Amiodarone 400 mg twice a day  Aspirin 81 mg daily  Ferrous sulfate 325 mg daily  Finasteride 5 mg daily  Folic acid 1 mg daily  DuoNeb nebulizer every 6 hours  Meropenem 1 g every 12 hours  Thiamine 100 mg daily  Solu-Medrol 20 mg every 12 hours      Patient is full code    Speech therapy consult      Current Facility-Administered Medications:     ipratropium 0.5 mg-albuterol 2.5 mg (DUONEB) nebulizer solution 1 Dose, 1 Dose, Inhalation, Q6H RT, Jeremy Ramos MD    phenylephrine (ROSSANA-SYNEPHRINE) 50 mg/250 mL infusion,  mcg/min, IntraVENous, Continuous, Javan Lilly MD, Stopped at 06/05/24 1035    amiodarone (CORDARONE) tablet 400 mg, 400 mg, Oral, BID, Gerry Wilson MD, 400 mg at 06/05/24 1030    aspirin chewable tablet 81 mg, 81 mg, Per NG tube, Daily, Jeremy Ramos MD, 81 mg at 06/05/24 1030    methylPREDNISolone sodium succ (SOLU-MEDROL) injection 20 mg, 20 mg, IntraVENous, Q12H, Hamilton Miranda MD, 20 mg at 06/06/24 0856    epoetin radha-epbx (RETACRIT) injection 10,000 Units, 10,000 Units, SubCUTAneous, Once per day on Mon Wed Fri, Sol Ferraro MD, 10,000 Units at 06/05/24 1724    prismaSol BGK 2/3.5 dialysis solution, , Dialysis, Continuous, Sol Ferraro MD, Last Rate: 1,000 mL/hr at 06/03/24 0700, New Bag at 06/03/24 0700    prismaSol BGK 2/3.5 dialysis solution, , Dialysis, Continuous, Sol Ferraro MD, Last Rate: 1,000 mL/hr at 06/03/24 0659, New Bag at 06/03/24 0659    heparin (porcine) injection 4,000 Units, 4,000 Units, IntraVENous, Maria Elena AMAYA Abdul, MD    heparin (porcine) injection 2,000 Units, 2,000 Units, IntraVENous, Maria Elena AMAYA Abdul, MD, 2,000 Units at 06/03/24 0430    heparin 25,000 units in dextrose

## 2024-06-06 NOTE — CONSULTS
IMPRESSION:   Acute hypoxic respiratory failure extubated on nasal cannula  Cardiac arrest  A-fib with RVR on amiodarone  Severe sepsis with septic shock on pressors  Aspiration pneumonia  Acute kidney injury now on CVVH  Hypophosphatemia to be repleted  Hypokalemia to be repleted  Hyperkalemia resolved  COPD no wheezing  MRSA nasal colonization  History of cocaine abuse polysubstance abuse in the past  Pt is critically ill. Time spent with pt and staff actively rendering care, managing pt and coordinating care as stated below; 30 minutes, exclusive of any procedures      RECOMMENDATIONS/PLAN:   ICU monitoring  Extubated yesterday on 6/5 now on nasal cannula alert awake he is not on oxygen at home  Will get speech evaluation  Chest x-ray with continued right lower infiltrate CTA chest showed bilateral infiltrates consistent with aspiration  Remains on Merrem and vancomycin sputum with normal doug  Heart rate controlled on IV amiodarone  Renal function improving off CVVH as needed dialysis  Status post cardiac arrest patient coded and he was intubated and received epinephrine and ACLS protocol ROSC is obtained 10 minutes   Patient is off pressors  No wheezing continue nebulized albuterol Atrovent will decrease IV Solu-Medrol     [x] High complexity decision making was performed  [x] See my orders for details  HPI  78-year-old male nursing home resident came in because of shortness of breath respiratory distress patient has episodes of vomiting after eating breakfast this morning subsequently came to the hospital saturation was in 70s he was back to the emergency room subsequently intubated and then he coded hemodynamically unstable now he is on 2 pressors vasopressin and Levophed denies intubated on ventilator critical care consult was called, past medical history of polysubstance abuse cocaine abuse COPD hypertension insomnia      PMH:  has a past medical history of Benign prostatic disease, Benign prostatic  hyperplasia, Cervical radiculopathy, Chronic obstructive lung disease (HCC), Cocaine abuse (HCC), Dizziness and giddiness, Hypercholesterolemia, Hypertensive disorder, Insomnia, Kidney disease, Low back pain, Osteoarthritis of knee, Sleep apnea, and Vasovagal syncope.    PSH:   has a past surgical history that includes IR NONTUNNELED VASCULAR CATHETER > 5 YEARS (5/30/2024).     FHX: family history includes Hypertension in his mother.     SHX:  reports that he has quit smoking. He has never used smokeless tobacco. He reports current alcohol use. He reports current drug use. Frequency: 3.00 times per week. Drug: Cocaine.    ALL:   Allergies   Allergen Reactions    Penicillin G Other (See Comments)     Pt states he passes out    Sulfamethoxazole-Trimethoprim      Pt states he passes out        MEDS:   [x] Reviewed - As Below   [] Not reviewed    Current Facility-Administered Medications   Medication Dose Route Frequency Provider Last Rate Last Admin    ipratropium 0.5 mg-albuterol 2.5 mg (DUONEB) nebulizer solution 1 Dose  1 Dose Inhalation Q4H RT Mikaela Arredondo MD   1 Dose at 06/06/24 0818    phenylephrine (ROSSANA-SYNEPHRINE) 50 mg/250 mL infusion   mcg/min IntraVENous Continuous Javan Lilly MD   Stopped at 06/05/24 1035    amiodarone (CORDARONE) tablet 400 mg  400 mg Oral BID Gerry Wilson MD   400 mg at 06/05/24 1030    aspirin chewable tablet 81 mg  81 mg Per NG tube Daily Jeremy Ramos MD   81 mg at 06/05/24 1030    methylPREDNISolone sodium succ (SOLU-MEDROL) injection 20 mg  20 mg IntraVENous Q12H Hamilton Miranda MD   20 mg at 06/05/24 2029    epoetin radha-epbx (RETACRIT) injection 10,000 Units  10,000 Units SubCUTAneous Once per day on Mon Wed Fri Sol Ferraro MD   10,000 Units at 06/05/24 1724    prismaSol BGK 2/3.5 dialysis solution   Dialysis Continuous Sol Ferraro MD 1,000 mL/hr at 06/03/24 0700 New Bag at 06/03/24 0700    prismaSol BGK 2/3.5 dialysis solution   Dialysis  lung clear  5/31 hemodynamically unstable on Levophed and vasopressin, remain intubated on 40% FiO2 assist-control mode pCO2 37 pO2 71, started on CVVH creatinine improved to 2.15, chest x-ray shows ET tube 2 cm above the gina lung is fully inflated left-sided clear right-sided has patchy infiltrate lower lobe and some congestive changes extreme, nasal MRSA now on Bactroban continue with meropenem and vancomycin  6/1 remains on pressors on the ventilator sedated on propofol and Precedex.  Right lower lobe infiltrate unchanged.  WBC count remains elevated with procalcitonin improving remains on Merrem and vancomycin nasal MRSA smear was positive but sputum only shows normal doug so far  6/2 remains on norepinephrine for blood pressure support hypothermic yesterday now on warming blanket.  Continues with CRRT with fluid removal.  Potassium and phosphorus to be repleted  6/3 remain intubated on ventilator getting CRRT, ABG acceptable pCO2 33 pO2 75 on 35% FiO2 on assist-control mode on meropenem will continue to wean pressors  6/4 on assist-control mode pCO2 31 pO2 80 on 35% FiO2 getting IV fluid 200 cc/h on amiodarone hemodynamically unstable on Levophed and NEOS epinephrine   6/5 remain intubated on ventilator we will do sedation vacation ABG stentable 35% FiO2 still on vasopressors now on phenylephrine   6/6 alert awake talking on nasal cannula extubated on 6/5 patient on meropenem speech evaluation he is not on oxygen at home  Time of care 30 minutes

## 2024-06-06 NOTE — PLAN OF CARE
Speech LAnguage Pathology Dysphagia EVALUATION    Patient: Agustín Willis (78 y.o. male)  Date: 6/6/2024  Primary Diagnosis: Cardiac arrest ([facility]) [I46.9]  Acute respiratory failure ([facility]) [J96.00]  Respiratory distress [R06.03]  Chest pain, unspecified type [R07.9]       Precautions: Aspiration, fall                  DIET RECOMMENDATIONS: Puree and mildly thick liquids, meds crushed if able in applesauce or pudding,    SWALLOW SAFETY PRECAUTIONS: Rec slow rate of intake, small bites/sips, 6 small meals, double swallow, liquid wash, remain upright 1 hour after PO and do not eat 3 hours before bedtime.       ASSESSMENT :  Based on the objective data described below, the patient presents with mild oropharyngeal dysphagia and concerns for esophageal dysphagia.   Patient denies dysphagia and does not recall vomiting before admission.   Patient recalls previous MBS but unable to recall aspiration/GERD strategies and did not follow up with GI.  Oral phase c/b mild reduction in bolus control w/ thin w/ anterior and lateral spillage this resolves w/ mildly thick liquids. Pharyngeal phase c/b rapid ingestion w/ thin improved control w/ mildly thick.   Overt s/s of pen/asp observed w/ thin via straw c/b wet cough.   Educated on strategies.   Patient will benefit from skilled intervention to address the above impairments.    GOALS:    Problem: SLP Adult - Impaired Swallowing  Goal: By Discharge: Advance to least restrictive diet without signs or symptoms of aspiration for planned discharge setting.  See evaluation for individualized goals.  Description: Speech Therapy Swallow Goals  Initiated 6/6/2024  -Patient will tolerate puree diet with mildly thick liquids without clinical indicators of aspiration given no cues within 7 day(s).        -Patient will tolerate PO trials without clinical indicators of aspiration given no cues within 7 day(s).      -Patient will participate in modified barium swallow study within 7 day(s).       -Patient will demonstrate understanding of swallow safety precautions and aspiration precautions, diet recs with no cues within 7day(s).    -Patient/caregiver goal: to eat           Outcome: Progressing        PLAN :  Recommendations and Planned Interventions:  DIET RECOMMENDATIONS: Puree and mildly thick liquids, meds crushed if able in applesauce or pudding,    SWALLOW SAFETY PRECAUTIONS: Rec slow rate of intake, small bites/sips, 6 small meals, double swallow, liquid wash, remain upright 1 hour after PO and do not eat 3 hours before bedtime.      Acute SLP Services: Yes, patient will be followed by speech-language pathology 5x/week to address goals. Patient's rehabilitation potential is considered to be Fair.    Discharge Recommendations: Skilled Nursing Facility     SUBJECTIVE:   Patient reports he is hungry.     OBJECTIVE:   Patient admitted in respiratory distress following vomiting episode during meal was intubated on 5/29 and extubated on 6/5  Past Medical History:   Diagnosis Date    Benign prostatic disease 8/3/2020    Hypertrophy with Outflow Obstruction    Benign prostatic hyperplasia 8/3/2020    Cervical radiculopathy 8/3/2020    Chronic obstructive lung disease (HCC) 8/3/2020    Cocaine abuse (HCC) 8/3/2020    Dizziness and giddiness 8/3/2020    Hypercholesterolemia 8/3/2020    Hypertensive disorder 8/3/2020    Insomnia 8/3/2020    Kidney disease 8/3/2020    Low back pain 8/3/2020    Osteoarthritis of knee 8/3/2020    Sleep apnea 8/3/2020    Vasovagal syncope 8/3/2020     Past Surgical History:   Procedure Laterality Date    IR NONTUNNELED VASCULAR CATHETER  5/30/2024    IR NONTUNNELED VASCULAR CATHETER 5/30/2024 Stephy Abreu APRN - NP SSR RAD ANGIO IR   MBS on 3/3/24 by Dianna Hodges CCC-SLP:  ASSESSMENT :  Based on the objective data described below, the patient presents with mild oropharyngeal swallow fxn w/ concerns for esophageal dysphagia.  Oral phase c/b reduced mastication w/ mild oral  impairment  P.O. Trials:  Consistencies Administered: Thin - cup;Thin - straw;Thin - teaspoon;Mildly Thick - straw;Mildly Thick - cup;Mildly Thick- teaspoon;Ice Chips;Pureed       Voice:   Hoarse s/p extubation    After Treatment:  Patient left in no apparent distress in bed, Call bell left within reach, Nursing notified, and Updated patient's board with:  diet recommendations and aspiration precautions     Pain:  VAS (numerical) 0    COMMUNICATION/EDUCATION:   Swallow safety precautions, Aspiration precautions, GERD precautions, Diet recommendations, Compensatory strategies, and Prognosis and SLP POC education provided to Patient and Nurse via explanation and teach back, all questions/concerns addressed. Patient requires reinforcement.    The patient's plan of care including recommendations, planned interventions, and recommended diet changes were discussed with: Registered nurse and Physician.    Patient/family have participated as able in goal setting and plan of care and Patient/family agree to work toward stated goals and plan of care    Thank you,  Dianna Hodges M.S., M.Ed., CCC-SLP  Minutes: 24

## 2024-06-06 NOTE — PROGRESS NOTES
CARDIOLOGY PROGRESS NOTE      Patient Name: Agustín Willis  Age: 78 y.o.  Gender:male  :1945  MRN: [id number]    Agustín Willis is a 78 y.o. year-old male with past medical history significant for hypertension, COPD, cocaine abuse, CKD who was evaluated today due to cardiac arrest. Patient initially presented to the ED via EMS from nursing home due to respiratory distress after vomiting breakfast. Required intubation in ED. Apparently with VF arrest in ED, shock x4.     Patient seen and examined. Extubated.  Denies chest pain.  Breathing stable.  On heparin.      Telemetry reviewed.    ROS as above. Current medications reviewed.    Physical Examination    Allergies   Allergen Reactions    Penicillin G Other (See Comments)     Pt states he passes out    Sulfamethoxazole-Trimethoprim      Pt states he passes out     Vitals:    24 1407   BP:    Pulse: 76   Resp: 24   Temp:    SpO2: 98%     Vital signs are stable   In no distress  Heart has an irregularly irregular rhythm  Lungs are coarse  Abdomen is soft  Extremities have mild edema  Skin is dry    Labs reviewed:  Recent Results (from the past 12 hour(s))   Heparin, Anti-Xa    Collection Time: 24  2:51 AM   Result Value Ref Range    Heparin Xa,LMWH and Unfrac 0.31 IU/mL   CBC    Collection Time: 24  2:51 AM   Result Value Ref Range    WBC 16.0 (H) 4.1 - 11.1 K/uL    RBC 2.85 (L) 4.10 - 5.70 M/uL    Hemoglobin 8.3 (L) 12.1 - 17.0 g/dL    Hematocrit 25.7 (L) 36.6 - 50.3 %    MCV 90.2 80.0 - 99.0 FL    MCH 29.1 26.0 - 34.0 PG    MCHC 32.3 30.0 - 36.5 g/dL    RDW 14.7 (H) 11.5 - 14.5 %    Platelets 119 (L) 150 - 400 K/uL    MPV 11.6 8.9 - 12.9 FL    Nucleated RBCs 1.3 (H) 0.0  WBC    nRBC 0.20 (H) 0.00 - 0.01 K/uL   Comprehensive Metabolic Panel    Collection Time: 24  2:51 AM   Result Value Ref Range    Sodium 133 (L) 136 - 145 mmol/L    Potassium 4.8 3.5 - 5.1 mmol/L    Chloride 103 97 - 108 mmol/L    CO2 24 21 - 32 mmol/L    Anion  Gap 6 5 - 15 mmol/L    Glucose 150 (H) 65 - 100 mg/dL    BUN 54 (H) 6 - 20 mg/dL    Creatinine 2.35 (H) 0.70 - 1.30 mg/dL    BUN/Creatinine Ratio 23 (H) 12 - 20      Est, Glom Filt Rate 27 (L) >60 ml/min/1.73m2    Calcium 8.1 (L) 8.5 - 10.1 mg/dL    Total Bilirubin 0.4 0.2 - 1.0 mg/dL    AST 28 15 - 37 U/L    ALT 15 12 - 78 U/L    Alk Phosphatase 79 45 - 117 U/L    Total Protein 6.0 (L) 6.4 - 8.2 g/dL    Albumin 2.0 (L) 3.5 - 5.0 g/dL    Globulin 4.0 2.0 - 4.0 g/dL    Albumin/Globulin Ratio 0.5 (L) 1.1 - 2.2     C-Reactive Protein    Collection Time: 06/05/24  2:51 AM   Result Value Ref Range    CRP 12.30 (H) 0.00 - 0.30 mg/dL   Procalcitonin    Collection Time: 06/05/24  2:51 AM   Result Value Ref Range    Procalcitonin 6.56 (H) 0 ng/mL   Phosphorus    Collection Time: 06/05/24  2:51 AM   Result Value Ref Range    Phosphorus 4.8 (H) 2.6 - 4.7 mg/dL   Blood Gas, Arterial    Collection Time: 06/05/24  3:34 AM   Result Value Ref Range    pH, Arterial 7.41 7.35 - 7.45      pCO2, Arterial 33 (L) 35 - 45 mmHg    pO2, Arterial 86 80 - 100 mmHg    O2 Sat, Arterial 96 95 - 99 %    HCO3, Arterial 21 (L) 22 - 26 mmol/L    Base deficit, arterial blood 3.3 mmol/L    O2 Method VENT      FIO2 Arterial 35 %    POC TIDAL VOLUME 450.0      Set Rate, POC 14      POC PEEP/CPA 5.0      Source Arterial      Site Right Radial      Efrain Test YES      Carboxyhgb, Arterial 0.3 (L) 1 - 2 %    Methemoglobin, Arterial 0.5 0 - 1.4 %    Oxyhemoglobin 95.1 95 - 99 %    Performed by: Jm Su     Temperature 96.4     POCT Glucose    Collection Time: 06/05/24 12:05 PM   Result Value Ref Range    POC Glucose 110 (H) 65 - 100 mg/dL    Performed by: Bret Prasad         Case discussed with Dr. Huffman and our impression and recommendations are as follows:  Cardiac arrest, VF per report but no strips  Repeat echo this admission with EF 65-70%, moderate hypokinesis mid inferoseptal segment   Issues with hyperkalemia, now corrected  Troponin negative

## 2024-06-06 NOTE — PROGRESS NOTES
Progress Note  Date:2024       Room:[facility]  Patient Name:Agustín Willis     YOB: 1945     Age:78 y.o.        Subjective    Subjective  Patient followed septic shock with presumptive aspiration pneumonia. He has low grade temperatures.  Procal and CRP decreasing.  Blood cultures negative.  Sputum culture grew normal doug and uurine culture no growth.  Currently on IV Meropenem alone.  Patient is more alert today and affirming a mild cough. Still a bit confused.  Complaining about lying in bed all the time.      Objective         Vitals Last 24 Hours:  TEMPERATURE:  Temp  Av.2 °F (36.2 °C)  Min: 96.3 °F (35.7 °C)  Max: 97.8 °F (36.6 °C)  RESPIRATIONS RANGE: Resp  Av.8  Min: 16  Max: 27  PULSE OXIMETRY RANGE: SpO2  Av.6 %  Min: 96 %  Max: 100 %  PULSE RANGE: Pulse  Av.9  Min: 70  Max: 91  BLOOD PRESSURE RANGE: Systolic (24hrs), Av , Min:114 , Max:158   ; Diastolic (24hrs), Av, Min:80, Max:127         Objective:  Vital signs: (most recent): Blood pressure (!) 129/91, pulse 79, temperature 97.8 °F (36.6 °C), temperature source Axillary, resp. rate 20, height 1.73 m (5' 8.11\"), weight 91 kg (200 lb 9.9 oz), SpO2 97 %.      Vitals and nursing note reviewed.   Constitutional:       General: He is in acute distress.      Appearance: He is ill-appearing and toxic-appearing.   HENT:      Head: Normocephalic and atraumatic.      Right Ear: External ear normal.      Left Ear: External ear normal.      Nose: Nose normal.      Mouth/Throat: drooling     Comments: Nasal cannula O2  Eyes:      Comments: Open    Cardiovascular:      Rate and Rhythm: Normal rate and regular rhythm.      Heart sounds: No murmur heard.  Pulmonary:      Breath sounds: Rhonchi present. No wheezing or rales.   Abdominal:      General: Bowel sounds are normal. There is no distension.      Palpations: Abdomen is soft.      Tenderness: There is no abdominal tenderness.   Genitourinary:  external urinary device  Acute respiratory failure (HCC) 5/29/2024 Yes    Cardiac arrest (HCC) 5/29/2024 Yes      Severe sepsis/septic shock with hypothermia, leukocytosis, elevated lactic acid and procal/CRP  Presumptive aspiration pneumonia, primarily right lung, sputum culture grew normal doug, on Day #8 IV Meropenem  Acute hypoxic respiratory failure, intubated  Hyperglycemia  FIDEL  Penicillin allergy  7.   MRSA nasal colonization status post nasal Mupirocin     Comment:   WBC remains elevated on Solumedrol.  Procal decreasing but CRP continues to increase.         Plan   1. Continue  IV Meropenem  6 more days  2.  In am, repeat CBC, procal and CRP  3.   Repeat urinalysis     Electronically signed by Riley Alonzo MD

## 2024-06-06 NOTE — PROGRESS NOTES
Nephrology f/u          Patient: Agustín Willis MRN: [id number]  SSN: [id number]    YOB: 1945  Age: 78 y.o.  Sex: male      Subjective:   I have seen the patient in ICU.  Extubated  awake  Off pressor  Uop picking up    Past Medical History:   Diagnosis Date    Benign prostatic disease 8/3/2020    Hypertrophy with Outflow Obstruction    Benign prostatic hyperplasia 8/3/2020    Cervical radiculopathy 8/3/2020    Chronic obstructive lung disease (HCC) 8/3/2020    Cocaine abuse (HCC) 8/3/2020    Dizziness and giddiness 8/3/2020    Hypercholesterolemia 8/3/2020    Hypertensive disorder 8/3/2020    Insomnia 8/3/2020    Kidney disease 8/3/2020    Low back pain 8/3/2020    Osteoarthritis of knee 8/3/2020    Sleep apnea 8/3/2020    Vasovagal syncope 8/3/2020     Past Surgical History:   Procedure Laterality Date    IR NONTUNNELED VASCULAR CATHETER  5/30/2024    IR NONTUNNELED VASCULAR CATHETER 5/30/2024 Stephy Abreu APRN - NP SSR RAD ANGIO IR      Family History   Problem Relation Age of Onset    Hypertension Mother      Social History     Tobacco Use    Smoking status: Former    Smokeless tobacco: Never   Substance Use Topics    Alcohol use: Yes      Current Facility-Administered Medications   Medication Dose Route Frequency Provider Last Rate Last Admin    ipratropium 0.5 mg-albuterol 2.5 mg (DUONEB) nebulizer solution 1 Dose  1 Dose Inhalation Q6H RT Jeremy Ramos MD   1 Dose at 06/06/24 1349    phenylephrine (ROSSANA-SYNEPHRINE) 50 mg/250 mL infusion   mcg/min IntraVENous Continuous Javan Lilly MD   Stopped at 06/05/24 1035    amiodarone (CORDARONE) tablet 400 mg  400 mg Oral BID Gerry Wilson MD   400 mg at 06/06/24 1225    aspirin chewable tablet 81 mg  81 mg Per NG tube Daily Jeremy Ramos MD   81 mg at 06/06/24 1226    methylPREDNISolone sodium succ (SOLU-MEDROL) injection 20 mg  20 mg IntraVENous Q12H Hamilton Miranda MD   20 mg at 06/06/24 0856    epoetin radha-epbx  (See Comments)     Pt states he passes out    Sulfamethoxazole-Trimethoprim      Pt states he passes out       Review of Systems:  Unable to obtain    Objective:     Vitals:    06/06/24 0818 06/06/24 0900 06/06/24 1100 06/06/24 1352   BP:  (!) 152/98     Pulse: 79 91     Resp: 22 20     Temp:   97.8 °F (36.6 °C)    TempSrc:   Axillary    SpO2: 99% 97%  99%   Weight:       Height:            Physical Exam:  General: Unresponsive  Eyes: sclera anicteric  Oral Cavity: ET tube  Neck: no JVD  Respiratory.  On ventilatory support  Heart: normal sounds  Abdomen: soft and non tender   : no damico  Lower Extremities: no edema  Skin: no rash  Neuro: Unresponsive        Assessment/Plan:     Acute kidney injury on chronic kidney disease stage IIIa  2/2 ischemic ATN anuric from septic shock  On admission BUN/creatinine 58/2.06 and peaked at 72/3.09  Baseline creatinine 1.36 on 3/12/2024  Hemodynamically unstable and on 2 pressors  Renal ultrasound no hydronephrosis  Off CRRT since 6/03  No indication for the dialysis today  Creatinine is 2.3 and no volume overload  UOP picking up    Hyperkalemia  Mild  K 5.4  Repeat  5.0     Metabolic acidosis  High anion gap  Secondary to type A lactic acidosis  Lactic 6.9  Received IV sodium bicarbonate 100 mEq post in the ER  resolved  Off bicarb drip    Cardiac arrest  V-fib  DC shock cardioversion  On ventilatory support  On pressor support    Acute hypoxic respiratory failure  Aspiration pneumonia  Underlying COPD  On IV antibiotics  S/p extubated    Anemia   hemoglobin 8.6  Iron saturation 12 and ferritin 1100   Started on Epo subcu    Atrial fibrillation  on IV heparin drip  Goal K is 4.0    Hypophosphatemia  Phosphorus 2.1->4.1  Received IV K-Phos 20 mmol x 1  Plan:       Signed By: Sol Ferraro MD     June 6, 2024

## 2024-06-07 LAB
ALBUMIN SERPL-MCNC: 2 G/DL (ref 3.5–5)
ALBUMIN/GLOB SERPL: 0.4 (ref 1.1–2.2)
ALP SERPL-CCNC: 91 U/L (ref 45–117)
ALT SERPL-CCNC: 18 U/L (ref 12–78)
ANION GAP SERPL CALC-SCNC: 6 MMOL/L (ref 5–15)
AST SERPL W P-5'-P-CCNC: 18 U/L (ref 15–37)
BILIRUB SERPL-MCNC: 0.4 MG/DL (ref 0.2–1)
BUN SERPL-MCNC: 79 MG/DL (ref 6–20)
BUN/CREAT SERPL: 34 (ref 12–20)
CA-I BLD-MCNC: 8.9 MG/DL (ref 8.5–10.1)
CHLORIDE SERPL-SCNC: 106 MMOL/L (ref 97–108)
CO2 SERPL-SCNC: 24 MMOL/L (ref 21–32)
CREAT SERPL-MCNC: 2.31 MG/DL (ref 0.7–1.3)
CRP SERPL-MCNC: 18.7 MG/DL (ref 0–0.3)
ERYTHROCYTE [DISTWIDTH] IN BLOOD BY AUTOMATED COUNT: 15.9 % (ref 11.5–14.5)
GLOBULIN SER CALC-MCNC: 4.5 G/DL (ref 2–4)
GLUCOSE BLD STRIP.AUTO-MCNC: 94 MG/DL (ref 65–100)
GLUCOSE BLD STRIP.AUTO-MCNC: 98 MG/DL (ref 65–100)
GLUCOSE SERPL-MCNC: 110 MG/DL (ref 65–100)
HCT VFR BLD AUTO: 25 % (ref 36.6–50.3)
HGB BLD-MCNC: 8.3 G/DL (ref 12.1–17)
MCH RBC QN AUTO: 29.5 PG (ref 26–34)
MCHC RBC AUTO-ENTMCNC: 33.2 G/DL (ref 30–36.5)
MCV RBC AUTO: 89 FL (ref 80–99)
NRBC # BLD: 0.07 K/UL (ref 0–0.01)
NRBC BLD-RTO: 0.4 PER 100 WBC
PERFORMED BY:: NORMAL
PERFORMED BY:: NORMAL
PHOSPHATE SERPL-MCNC: 5.1 MG/DL (ref 2.6–4.7)
PLATELET # BLD AUTO: 136 K/UL (ref 150–400)
PMV BLD AUTO: 11.9 FL (ref 8.9–12.9)
POTASSIUM SERPL-SCNC: 5.2 MMOL/L (ref 3.5–5.1)
PROCALCITONIN SERPL-MCNC: 2.45 NG/ML
PROT SERPL-MCNC: 6.5 G/DL (ref 6.4–8.2)
RBC # BLD AUTO: 2.81 M/UL (ref 4.1–5.7)
SODIUM SERPL-SCNC: 136 MMOL/L (ref 136–145)
UFH PPP CHRO-ACNC: 0.16 IU/ML
UFH PPP CHRO-ACNC: 0.25 IU/ML
WBC # BLD AUTO: 18.3 K/UL (ref 4.1–11.1)

## 2024-06-07 PROCEDURE — 6370000000 HC RX 637 (ALT 250 FOR IP): Performed by: STUDENT IN AN ORGANIZED HEALTH CARE EDUCATION/TRAINING PROGRAM

## 2024-06-07 PROCEDURE — 99232 SBSQ HOSP IP/OBS MODERATE 35: CPT | Performed by: INTERNAL MEDICINE

## 2024-06-07 PROCEDURE — 85520 HEPARIN ASSAY: CPT

## 2024-06-07 PROCEDURE — 6360000002 HC RX W HCPCS: Performed by: INTERNAL MEDICINE

## 2024-06-07 PROCEDURE — 85027 COMPLETE CBC AUTOMATED: CPT

## 2024-06-07 PROCEDURE — 6370000000 HC RX 637 (ALT 250 FOR IP): Performed by: FAMILY MEDICINE

## 2024-06-07 PROCEDURE — 86140 C-REACTIVE PROTEIN: CPT

## 2024-06-07 PROCEDURE — 82962 GLUCOSE BLOOD TEST: CPT

## 2024-06-07 PROCEDURE — 6360000002 HC RX W HCPCS: Performed by: FAMILY MEDICINE

## 2024-06-07 PROCEDURE — 2700000000 HC OXYGEN THERAPY PER DAY

## 2024-06-07 PROCEDURE — 80053 COMPREHEN METABOLIC PANEL: CPT

## 2024-06-07 PROCEDURE — 2580000003 HC RX 258: Performed by: FAMILY MEDICINE

## 2024-06-07 PROCEDURE — 36415 COLL VENOUS BLD VENIPUNCTURE: CPT

## 2024-06-07 PROCEDURE — 2060000000 HC ICU INTERMEDIATE R&B

## 2024-06-07 PROCEDURE — 84145 PROCALCITONIN (PCT): CPT

## 2024-06-07 PROCEDURE — 94640 AIRWAY INHALATION TREATMENT: CPT

## 2024-06-07 PROCEDURE — 84100 ASSAY OF PHOSPHORUS: CPT

## 2024-06-07 PROCEDURE — 6370000000 HC RX 637 (ALT 250 FOR IP): Performed by: INTERNAL MEDICINE

## 2024-06-07 PROCEDURE — 94761 N-INVAS EAR/PLS OXIMETRY MLT: CPT

## 2024-06-07 RX ADMIN — AMIODARONE HYDROCHLORIDE 400 MG: 200 TABLET ORAL at 10:33

## 2024-06-07 RX ADMIN — FOLIC ACID 1 MG: 1 TABLET ORAL at 10:33

## 2024-06-07 RX ADMIN — THIAMINE HCL TAB 100 MG 100 MG: 100 TAB at 10:34

## 2024-06-07 RX ADMIN — IPRATROPIUM BROMIDE AND ALBUTEROL SULFATE 1 DOSE: 2.5; .5 SOLUTION RESPIRATORY (INHALATION) at 02:03

## 2024-06-07 RX ADMIN — FINASTERIDE 5 MG: 5 TABLET, FILM COATED ORAL at 10:34

## 2024-06-07 RX ADMIN — METHYLPREDNISOLONE SODIUM SUCCINATE 20 MG: 40 INJECTION INTRAMUSCULAR; INTRAVENOUS at 10:34

## 2024-06-07 RX ADMIN — HEPARIN SODIUM 2000 UNITS: 1000 INJECTION INTRAVENOUS; SUBCUTANEOUS at 06:43

## 2024-06-07 RX ADMIN — SODIUM CHLORIDE, PRESERVATIVE FREE 10 ML: 5 INJECTION INTRAVENOUS at 20:43

## 2024-06-07 RX ADMIN — EPOETIN ALFA-EPBX 10000 UNITS: 10000 INJECTION, SOLUTION INTRAVENOUS; SUBCUTANEOUS at 20:07

## 2024-06-07 RX ADMIN — HEPARIN SODIUM 9 UNITS/KG/HR: 10000 INJECTION, SOLUTION INTRAVENOUS at 14:26

## 2024-06-07 RX ADMIN — IPRATROPIUM BROMIDE AND ALBUTEROL SULFATE 1 DOSE: 2.5; .5 SOLUTION RESPIRATORY (INHALATION) at 14:07

## 2024-06-07 RX ADMIN — ASPIRIN 81 MG 81 MG: 81 TABLET ORAL at 10:34

## 2024-06-07 RX ADMIN — FERROUS SULFATE TAB 325 MG (65 MG ELEMENTAL FE) 325 MG: 325 (65 FE) TAB at 10:34

## 2024-06-07 RX ADMIN — IPRATROPIUM BROMIDE AND ALBUTEROL SULFATE 1 DOSE: 2.5; .5 SOLUTION RESPIRATORY (INHALATION) at 09:20

## 2024-06-07 RX ADMIN — IPRATROPIUM BROMIDE AND ALBUTEROL SULFATE 1 DOSE: 2.5; .5 SOLUTION RESPIRATORY (INHALATION) at 21:13

## 2024-06-07 RX ADMIN — METHYLPREDNISOLONE SODIUM SUCCINATE 20 MG: 40 INJECTION INTRAMUSCULAR; INTRAVENOUS at 20:43

## 2024-06-07 RX ADMIN — AMIODARONE HYDROCHLORIDE 400 MG: 200 TABLET ORAL at 20:43

## 2024-06-07 RX ADMIN — HEPARIN SODIUM 2000 UNITS: 1000 INJECTION INTRAVENOUS; SUBCUTANEOUS at 18:17

## 2024-06-07 ASSESSMENT — PAIN SCALES - GENERAL
PAINLEVEL_OUTOF10: 0
PAINLEVEL_OUTOF10: 0

## 2024-06-07 NOTE — PROGRESS NOTES
PT eval order received and acknowledged. Pt screened , per chart review and nsg, pt is w/c bound requiring total A for functional mobility.  PT evaluation order will be discontinued at this time as pt has no acute PT needs. Please reorder PT if pt functional status changes. Thank you.    [facility] AM-PAC™ “6 Clicks”         Basic Mobility Inpatient Short Form  How much difficulty does the patient currently have... Unable A Lot A Little None   1.  Turning over in bed (including adjusting bedclothes, sheets and blankets)?   [x] 1   [] 2   [] 3   [] 4   2.  Sitting down on and standing up from a chair with arms ( e.g., wheelchair, bedside commode, etc.)   [x] 1   [] 2   [] 3   [] 4   3.  Moving from lying on back to sitting on the side of the bed?   [x] 1   [] 2   [] 3   [] 4          How much help from another person does the patient currently need... Total A Lot A Little None   4.  Moving to and from a bed to a chair (including a wheelchair)?   [x] 1   [] 2   [] 3   [] 4   5.  Need to walk in hospital room?   [x] 1   [] 2   [] 3   [] 4   6.  Climbing 3-5 steps with a railing?   [x] 1   [] 2   [] 3   [] 4   © 2007, Trustees of [facility], under license to [employer]. All rights reserved     Score:  Initial: 6/24 Most Recent: X (Date: 6/7/2024 )   Interpretation of Tool:  Represents activities that are increasingly more difficult (i.e. Bed mobility, Transfers, Gait).  Score 24 23 22-20 19-15 14-10 9-7 6   Modifier CH CI CJ CK CL CM CN

## 2024-06-07 NOTE — PROGRESS NOTES
CARDIOLOGY PROGRESS NOTE      Patient Name: Agustín Willis  Age: 78 y.o.  Gender:male  :1945  MRN: [id number]    Agustín Willis is a 78 y.o. year-old male with past medical history significant for hypertension, COPD, cocaine abuse, CKD who was evaluated today due to cardiac arrest. Patient initially presented to the ED via EMS from nursing home due to respiratory distress after vomiting breakfast. Required intubation in ED. Apparently with VF arrest in ED, shock x4.     Patient seen and examined. Seems more alert today.  Denies chest pain.  Breathing stable.  On heparin gtt.  No specific complaints reported.    Telemetry reviewed.    ROS as above. Current medications reviewed.    Physical Examination    Allergies   Allergen Reactions    Penicillin G Other (See Comments)     Pt states he passes out    Sulfamethoxazole-Trimethoprim      Pt states he passes out     Vitals:    24 1407   BP:    Pulse: 76   Resp: 24   Temp:    SpO2: 98%     Vital signs are stable   In no distress  Heart has a regular rate and rhythm  Lungs are coarse  Abdomen is soft  Extremities have mild edema  Skin is dry    Labs reviewed:  Recent Results (from the past 12 hour(s))   Heparin, Anti-Xa    Collection Time: 24  2:51 AM   Result Value Ref Range    Heparin Xa,LMWH and Unfrac 0.31 IU/mL   CBC    Collection Time: 24  2:51 AM   Result Value Ref Range    WBC 16.0 (H) 4.1 - 11.1 K/uL    RBC 2.85 (L) 4.10 - 5.70 M/uL    Hemoglobin 8.3 (L) 12.1 - 17.0 g/dL    Hematocrit 25.7 (L) 36.6 - 50.3 %    MCV 90.2 80.0 - 99.0 FL    MCH 29.1 26.0 - 34.0 PG    MCHC 32.3 30.0 - 36.5 g/dL    RDW 14.7 (H) 11.5 - 14.5 %    Platelets 119 (L) 150 - 400 K/uL    MPV 11.6 8.9 - 12.9 FL    Nucleated RBCs 1.3 (H) 0.0  WBC    nRBC 0.20 (H) 0.00 - 0.01 K/uL   Comprehensive Metabolic Panel    Collection Time: 24  2:51 AM   Result Value Ref Range    Sodium 133 (L) 136 - 145 mmol/L    Potassium 4.8 3.5 - 5.1 mmol/L    Chloride 103 97 -  hyperkalemia, now corrected  Troponin negative   New RBBB-VQ and cta not done  Plan for stress Monday given hope for renal recovery, please make NPO Sunday evening  Aortic valve regurgitation, previously mild to moderate in Feb. 2024. Noted as moderate to severe on repeat echo. Will continue to monitor with serial echos     Elevated Ddimer, >20. Venous duplex upper extremities positive for DVT, negative in the lower extremities. Recommended chest imaging but defer to primary/pulm, remains on heparin gtt.    Atrial fibrillation with RVR, now SR, continue heparin gtt. On oral amiodarone.  Continue telemetry. Keep electrolytes WNL.   Hypotension, BP acceptable  Aspiration pneumonia, per pulmonary   FIDEL on CKD, CRRT per nephrology  COPD, per pulmonary/primary   Hx polysubstance abuse, UDS negative this visit      Please do not hesitate to call if additional questions arise.    DONNELL MCKEON, APRN - NP  6/5/2024

## 2024-06-07 NOTE — PROGRESS NOTES
OT eval order received and acknowledged. Pt screened and per chart, patient is wheelchair bound, total A for transfers, and requiring max A for ADLs/self care tasks. OT evaluation order will therefore be discontinued this pt has no acute OT needs. Please reorder OT if pt's functional status changes. Thank you.

## 2024-06-07 NOTE — PROGRESS NOTES
Progress Note  Date:2024       Room:[facility]  Patient Name:Agustín Willis     YOB: 1945     Age:79 y.o.        Subjective    Subjective  Patient followed septic shock with presumptive aspiration pneumonia. He has low grade temperatures.  Procal and CRP decreasing.  Blood cultures negative.  Sputum culture grew normal doug and urine culture no growth.  Currently on IV Meropenem alone.  Patient transferred out of the ICU and is resting comfortably.  Apparently he has been eating well and celebrating  a birthday today.       Objective         Vitals Last 24 Hours:  TEMPERATURE:  Temp  Av.8 °F (36.6 °C)  Min: 97.3 °F (36.3 °C)  Max: 98.5 °F (36.9 °C)  RESPIRATIONS RANGE: Resp  Av.5  Min: 14  Max: 26  PULSE OXIMETRY RANGE: SpO2  Av.3 %  Min: 94 %  Max: 99 %  PULSE RANGE: Pulse  Av.3  Min: 75  Max: 86  BLOOD PRESSURE RANGE: Systolic (24hrs), Av , Min:107 , Max:151   ; Diastolic (24hrs), Av, Min:74, Max:116         Objective:  Vital signs: (most recent): Blood pressure 124/78, pulse 87, temperature 98.5 °F (36.9 °C), temperature source Oral, resp. rate 20, height 1.73 m (5' 8.11\"), weight 86.5 kg (190 lb 11.2 oz), SpO2 96 %.      Vitals and nursing note reviewed.   Constitutional:       General: He is in acute distress.      Appearance: He is ill-appearing and toxic-appearing.   HENT:      Head: Normocephalic and atraumatic.      Right Ear: External ear normal.      Left Ear: External ear normal.      Nose: Nose normal.      Mouth/Throat: drooling     Comments: Nasal cannula O2  Eyes:      Comments: Open    Cardiovascular:      Rate and Rhythm: Normal rate and regular rhythm.      Heart sounds: No murmur heard.  Pulmonary:      Breath sounds: Rhonchi present. No wheezing or rales.   Abdominal:      General: Bowel sounds are normal. There is no distension.      Palpations: Abdomen is soft.      Tenderness: There is no abdominal tenderness.   Genitourinary:  external urinary device  (5/29)  No growth FINAL  Urine culture (5/29) No growth FINAL  Sputum culture (5/29) Light normal respiratory doug FINAL    Imaging Last 24 Hours:          US RETROPERITONEAL COMPLETE    Result Date: 5/30/2024  Limited RENAL ULTRASOUND INDICATION: Renal failure COMPARISON: CT 3/4/2024. TECHNIQUE: Limited sonography of the kidneys, retroperitoneum, and bladder was performed. FINDINGS: The kidneys are atrophic. No gross hydronephrosis. The left kidney is poorly visualized. There is a small volume of ascites.     1. Atrophic kidneys. No gross hydronephrosis. 2. Small volume of ascites.     XR CHEST PORTABLE    Result Date: 6/6/2024    Stable bibasilar atelectasis and low lung volumes. Stable lines and tubes, except for extubation and removal of gastric tube                .    Assessment//Plan           Hospital Problems             Last Modified POA    * (Principal) Acute respiratory failure (HCC) 5/29/2024 Yes    Cardiac arrest ([facility]) 5/29/2024 Yes      Severe sepsis/septic shock with hypothermia, leukocytosis, elevated lactic acid and procal/CRP  Presumptive aspiration pneumonia, primarily right lung, sputum culture grew normal doug, on Day #9 IV Meropenem  Acute hypoxic respiratory failure, intubated  Hyperglycemia  FIDEL  Penicillin allergy  7.   MRSA nasal colonization status post nasal Mupirocin     Comment:   WBC remains elevated on Solumedrol.  Procal decreasing but CRP continues to increase.    UA showed mild pyuria but no bacteria or yeasts.     Plan   1. Continue  IV Meropenem  5 more days  2.  In am, repeat CBC, procal and CRP  3.  Monitor closely for possible UTI     Electronically signed by Riley Alonzo MD

## 2024-06-07 NOTE — CONSULTS
IMPRESSION:   Acute hypoxic respiratory failure extubated on nasal cannula  Cardiac arrest  A-fib with RVR on amiodarone  Severe sepsis with septic shock on pressors  Aspiration pneumonia  Acute kidney injury status post CVVH  Hypophosphatemia to be repleted  Hypokalemia to be repleted  Hyperkalemia resolved  COPD no wheezing  MRSA nasal colonization  History of cocaine abuse polysubstance abuse in the past  Pt is critically ill. Time spent with pt and staff actively rendering care, managing pt and coordinating care as stated below; 30 minutes, exclusive of any procedures      RECOMMENDATIONS/PLAN:   ICU monitoring  Extubated yesterday on 6/5 now on nasal cannula alert awake he is not on oxygen at home  Will get speech evaluation  Chest x-ray with continued right lower infiltrate CTA chest showed bilateral infiltrates consistent with aspiration  Remains on Merrem and vancomycin sputum with normal doug  Heart rate controlled on IV amiodarone  Renal function improving off CVVH as needed dialysis  Status post cardiac arrest patient coded and he was intubated and received epinephrine and ACLS protocol ROSC is obtained 10 minutes   Patient is off pressors  No wheezing continue nebulized albuterol Atrovent will decrease IV Solu-Medrol     [x] High complexity decision making was performed  [x] See my orders for details  HPI  78-year-old male nursing home resident came in because of shortness of breath respiratory distress patient has episodes of vomiting after eating breakfast this morning subsequently came to the hospital saturation was in 70s he was back to the emergency room subsequently intubated and then he coded hemodynamically unstable now he is on 2 pressors vasopressin and Levophed denies intubated on ventilator critical care consult was called, past medical history of polysubstance abuse cocaine abuse COPD hypertension insomnia      PMH:  has a past medical history of Benign prostatic disease, Benign prostatic  2 cm.      US RETROPERITONEAL COMPLETE   Final Result   1. Atrophic kidneys. No gross hydronephrosis.   2. Small volume of ascites.         XR CHEST PORTABLE   Final Result   Left IJ central venous catheter placement. No pneumothorax. Right   upper lobe airspace disease and mild bilateral interstitial opacities unchanged.      XR CHEST PORTABLE   Final Result   1. ET tube tip is angled towards the right mainstem bronchus and is 1 cm   superior to the level of the gina. Recommend repositioning.   2. Right lung patchy airspace consolidation.      IR ULTRASOUND GUIDANCE VASCULAR ACCESS    (Results Pending)       XR CHEST PORTABLE    Result Date: 5/29/2024  EXAM: XR CHEST PORTABLE INDICATION: Line placement COMPARISON: 5/29/2024 FINDINGS: A portable AP radiograph of the chest was obtained at 1347 hours. Right upper lobe airspace disease.. Bilateral interstitial and airspace opacities.. Left IJ central venous catheter placement with the tip in the region of the mid SVC. No pneumothorax. Other tubes and lines are stable. The bones and soft tissues are grossly within normal limits.     Left IJ central venous catheter placement. No pneumothorax. Right upper lobe airspace disease and mild bilateral interstitial opacities unchanged.    XR CHEST PORTABLE    Result Date: 5/29/2024  INDICATION: Intubation Portable AP view of the chest. Direct comparison made to prior chest x-ray dated March 2024. Cardiomediastinal silhouette is stable. ETT tube tip is angled toward the right mainstem bronchus and is 1 cm superior to the level of the gina. NG tube extends the stomach. There is patchy airspace consolidation throughout the right lung. There is very mild left basilar atelectasis. No pleural fluid is visualized but there is no pneumothorax.     1. ET tube tip is angled towards the right mainstem bronchus and is 1 cm superior to the level of the gina. Recommend repositioning. 2. Right lung patchy airspace consolidation.     5/30

## 2024-06-07 NOTE — PROGRESS NOTES
General Daily Progress Note      Patient Name:   Agustín Willis       YOB: 1945       Age:  79 y.o.      Admit Date: 5/29/2024      Subjective:   Patient is a 78 y.o. year old male past medical history of COPD BPH cervical radiculopathy cocaine abuse hypertension hyperlipidemia seasonal allergies SVT came to emergency room with respiratory arrest, patient was at nursing home after eating breakfast he vomited everywhere likely aspirated oxygen saturation was 70% initially put on nonrebreather patient was intubated in the ER patient was hypotensive started Levophed 10 patient have a cardiac arrest went to V-fib received shock x 4 received epi and amiodarone  Was started on Levophed and vasopressin received 2 L of IV fluid, received IV Zosyn and vancomycin in the ER     Patient admitted to the ICU for further workup  Patient on ventilator/unresponsive       5/30    Patient on ventilator propofol and Precedex drip  Hypotension on Levophed and vasopressin  Patient has no urine output last night received 500 bolus    Nephrology decided for start CRRT IR team for dialysis cath  BUN 72 creatinine 3.09  CRP 10.5  MRSA nares    5/31    Patient on ventilator 40% O2 propofol Precedex levo and vasopressin drip  Patient on A-fib with rapid ventricular rate ordered amiodarone bolus and started on drip  Patient getting CRRT  No urine output  D-dimers greater than 20    6/3    Patient on ventilator 45% O2 receiving CRRT patient on propofol drip  Patient on amiodarone and Levophed drip  Patient heparin and Precedex drip  Patient is hypothermic    WBC count 22.5    6/4    Patient on ventilator 45% O2 on Levophed drip and amiodarone drip and heparin drip and Precedex drip getting NG tube feeding  BUN 37 creatinine 1.97 WBCs 15  Doppler studies positive for acute DVT of the left brachial vein left radial vein and left ulnar vein    Seen by infectious disease recommended continue IV meropenem    6/5    General ventilated  06/03/24 0811, New Bag at 06/03/24 0811    dexmedeTOMIDine (PRECEDEX) 400 mcg in sodium chloride 0.9 % 100 mL infusion, 0.1-1.5 mcg/kg/hr, IntraVENous, Continuous, Mikaela Arredondo MD, Last Rate: 10.2 mL/hr at 06/04/24 1610, 0.5 mcg/kg/hr at 06/04/24 1610    fentaNYL (SUBLIMAZE) infusion 1000 mcg/100mL,  mcg/hr, IntraVENous, Continuous, Mikaela Arredondo MD, Held at 05/29/24 1404    sodium chloride 0.9 % bolus 2,448 mL, 30 mL/kg, IntraVENous, Once, Mikaela Arredondo MD, Held at 05/29/24 1405    VASOpressin 20 Units in sodium chloride 0.9 % 100 mL infusion, 0.01-0.03 Units/min, IntraVENous, Continuous, Mikaela Arredondo MD, Stopped at 06/03/24 0821    norepinephrine (LEVOPHED) 16 mg in sodium chloride 0.9 % 250 mL infusion, 1-100 mcg/min, IntraVENous, Continuous, Mikaela Arredondo MD, Stopped at 06/04/24 0200    [Held by provider] aspirin EC tablet 81 mg, 81 mg, Oral, Daily, Mikaela Arredondo MD, 81 mg at 06/02/24 0800    ferrous sulfate (IRON 325) tablet 325 mg, 325 mg, Oral, Daily with breakfast, Mikaela Arredondo MD, 325 mg at 06/05/24 1030    finasteride (PROSCAR) tablet 5 mg, 5 mg, Oral, Daily, Mikaela Arredondo MD, 5 mg at 06/06/24 1226    folic acid (FOLVITE) tablet 1 mg, 1 mg, Oral, Daily, Mikaela Arredondo MD, 1 mg at 06/06/24 1226    thiamine mononitrate tablet 100 mg, 100 mg, Oral, Daily, Mikaela Arredondo MD, 100 mg at 06/06/24 1225    sodium chloride flush 0.9 % injection 5-40 mL, 5-40 mL, IntraVENous, 2 times per day, Mikaela Arredondo MD, 10 mL at 06/06/24 2026    sodium chloride flush 0.9 % injection 5-40 mL, 5-40 mL, IntraVENous, PRN, Mikaela Arredondo MD    0.9 % sodium chloride infusion, , IntraVENous, PRN, Mikaela Arredondo MD    ondansetron (ZOFRAN-ODT) disintegrating tablet 4 mg, 4 mg, Oral, Q8H PRN **OR** ondansetron (ZOFRAN) injection 4 mg, 4 mg, IntraVENous, Q6H PRN, Mikaela Arredondo MD    polyethylene glycol (GLYCOLAX) packet 17 g, 17 g, Oral, Daily PRN, Mikaela Arredondo MD, 17 g at

## 2024-06-07 NOTE — PROGRESS NOTES
Comprehensive Nutrition Assessment    Type and Reason for Visit:  Reassess    Nutrition Recommendations/Plan:   Diet per SLP  Ensure plus HP 1x/day   Consider more aggressive bowel regimen 2/2 constipation  Monitor po intake, ONS acceptance and bms and document in I/Os     Malnutrition Assessment:  Malnutrition Status:  Mild malnutrition (06/04/24 0223)    Context:  Acute Illness     Findings of the 6 clinical characteristics of malnutrition:  Energy Intake:  50% or less of estimated energy requirements for 5 or more days  Weight Loss:  No significant weight loss     Body Fat Loss:  Unable to assess     Muscle Mass Loss:  Unable to assess    Fluid Accumulation:  Unable to assess     Strength:  Not Performed    Nutrition Assessment:    Pt admitted after cardiac arrest, on vent, receiving levo @ 2mics being weaned off propofol. Plan to d/c CRRT today. OGT in place, per MD randle to start TF. (6/6) F/u. Pt recently extubated, SLP cleared for puree + mild thick liqs. RD to initiate ONS to help meet needs. Meds and labs reviewed.    Nutrition Related Findings:    NFPE deferred 2/2 to contact iso, no overt deficits noted. No N/V/D +C, LBM 5/29?? Laxative available PRN. Pt previously on SBS/Thin Liq diet, now on puree/nectar thick liqs.  +2 generalized edema noted. Wound Type: None       Current Nutrition Intake & Therapies:    Average Meal Intake: %  Average Supplements Intake: None Ordered  ADULT DIET; Dysphagia - Pureed; Mildly Thick (Nectar)  ADULT ORAL NUTRITION SUPPLEMENT; Breakfast; Standard High Calorie/High Protein Oral Supplement    Anthropometric Measures:  Height: 173 cm (5' 8.11\")  Ideal Body Weight (IBW): 155 lbs (70 kg)       Current Body Weight: 90.2 kg (198 lb 13.7 oz), 128.3 % IBW. Weight Source: Bed Scale  Current BMI (kg/m2): 30.1  Usual Body Weight: 93.4 kg (206 lb) (2 months ago)  % Weight Change (Calculated): -3.5  Weight Adjustment For: No Adjustment                 BMI Categories: Obese

## 2024-06-07 NOTE — PROGRESS NOTES
Nephrology f/u          Patient: Agustín Willis MRN: [id number]  SSN: [id number]    YOB: 1945  Age: 79 y.o.  Sex: male      Subjective:   I have seen the patient in ICU.  Extubated  awake  Off pressor  Uop picking up    Past Medical History:   Diagnosis Date    Benign prostatic disease 8/3/2020    Hypertrophy with Outflow Obstruction    Benign prostatic hyperplasia 8/3/2020    Cervical radiculopathy 8/3/2020    Chronic obstructive lung disease (HCC) 8/3/2020    Cocaine abuse (HCC) 8/3/2020    Dizziness and giddiness 8/3/2020    Hypercholesterolemia 8/3/2020    Hypertensive disorder 8/3/2020    Insomnia 8/3/2020    Kidney disease 8/3/2020    Low back pain 8/3/2020    Osteoarthritis of knee 8/3/2020    Sleep apnea 8/3/2020    Vasovagal syncope 8/3/2020     Past Surgical History:   Procedure Laterality Date    IR NONTUNNELED VASCULAR CATHETER  5/30/2024    IR NONTUNNELED VASCULAR CATHETER 5/30/2024 Stephy Abreu APRN - NP SSR RAD ANGIO IR      Family History   Problem Relation Age of Onset    Hypertension Mother      Social History     Tobacco Use    Smoking status: Former    Smokeless tobacco: Never   Substance Use Topics    Alcohol use: Yes      Current Facility-Administered Medications   Medication Dose Route Frequency Provider Last Rate Last Admin    ipratropium 0.5 mg-albuterol 2.5 mg (DUONEB) nebulizer solution 1 Dose  1 Dose Inhalation Q6H RT Jeremy Ramos MD   1 Dose at 06/07/24 1407    amiodarone (CORDARONE) tablet 400 mg  400 mg Oral BID Gerry Wilson MD   400 mg at 06/07/24 1033    aspirin chewable tablet 81 mg  81 mg Per NG tube Daily Jeremy Ramos MD   81 mg at 06/07/24 1034    methylPREDNISolone sodium succ (SOLU-MEDROL) injection 20 mg  20 mg IntraVENous Q12H Hamilton Miranda MD   20 mg at 06/07/24 1034    epoetin radha-epbx (RETACRIT) injection 10,000 Units  10,000 Units SubCUTAneous Once per day on Mon Wed Fri Sol Ferraro MD   10,000 Units at 06/05/24 1724

## 2024-06-07 NOTE — CARE COORDINATION
CM reviewed Pt medicals, Pt is a LTC Resident at [facility].     Pt is alert and awake and talking on nasal cannula.      [facility] would like to try for auth. Will need PT/OT eval/recommendations.

## 2024-06-08 LAB
ALBUMIN SERPL-MCNC: 1.9 G/DL (ref 3.5–5)
ANION GAP SERPL CALC-SCNC: 7 MMOL/L (ref 5–15)
BUN SERPL-MCNC: 93 MG/DL (ref 6–20)
BUN/CREAT SERPL: 46 (ref 12–20)
CA-I BLD-MCNC: 8.9 MG/DL (ref 8.5–10.1)
CHLORIDE SERPL-SCNC: 109 MMOL/L (ref 97–108)
CO2 SERPL-SCNC: 24 MMOL/L (ref 21–32)
CREAT SERPL-MCNC: 2.03 MG/DL (ref 0.7–1.3)
ERYTHROCYTE [DISTWIDTH] IN BLOOD BY AUTOMATED COUNT: 15.7 % (ref 11.5–14.5)
GLUCOSE BLD STRIP.AUTO-MCNC: 105 MG/DL (ref 65–100)
GLUCOSE BLD STRIP.AUTO-MCNC: 119 MG/DL (ref 65–100)
GLUCOSE SERPL-MCNC: 107 MG/DL (ref 65–100)
HCT VFR BLD AUTO: 24.6 % (ref 36.6–50.3)
HGB BLD-MCNC: 8.2 G/DL (ref 12.1–17)
MCH RBC QN AUTO: 29.4 PG (ref 26–34)
MCHC RBC AUTO-ENTMCNC: 33.3 G/DL (ref 30–36.5)
MCV RBC AUTO: 88.2 FL (ref 80–99)
NRBC # BLD: 0.05 K/UL (ref 0–0.01)
NRBC BLD-RTO: 0.2 PER 100 WBC
PERFORMED BY:: ABNORMAL
PERFORMED BY:: ABNORMAL
PHOSPHATE SERPL-MCNC: 4.7 MG/DL (ref 2.6–4.7)
PLATELET # BLD AUTO: 179 K/UL (ref 150–400)
PMV BLD AUTO: 10.6 FL (ref 8.9–12.9)
POTASSIUM SERPL-SCNC: 5.4 MMOL/L (ref 3.5–5.1)
RBC # BLD AUTO: 2.79 M/UL (ref 4.1–5.7)
SODIUM SERPL-SCNC: 140 MMOL/L (ref 136–145)
UFH PPP CHRO-ACNC: 0.32 IU/ML
UFH PPP CHRO-ACNC: 0.34 IU/ML
WBC # BLD AUTO: 20.2 K/UL (ref 4.1–11.1)

## 2024-06-08 PROCEDURE — 94640 AIRWAY INHALATION TREATMENT: CPT

## 2024-06-08 PROCEDURE — 6370000000 HC RX 637 (ALT 250 FOR IP): Performed by: INTERNAL MEDICINE

## 2024-06-08 PROCEDURE — 85520 HEPARIN ASSAY: CPT

## 2024-06-08 PROCEDURE — 90935 HEMODIALYSIS ONE EVALUATION: CPT

## 2024-06-08 PROCEDURE — 85027 COMPLETE CBC AUTOMATED: CPT

## 2024-06-08 PROCEDURE — 36415 COLL VENOUS BLD VENIPUNCTURE: CPT

## 2024-06-08 PROCEDURE — 6370000000 HC RX 637 (ALT 250 FOR IP): Performed by: STUDENT IN AN ORGANIZED HEALTH CARE EDUCATION/TRAINING PROGRAM

## 2024-06-08 PROCEDURE — 82962 GLUCOSE BLOOD TEST: CPT

## 2024-06-08 PROCEDURE — 6360000002 HC RX W HCPCS: Performed by: FAMILY MEDICINE

## 2024-06-08 PROCEDURE — 6370000000 HC RX 637 (ALT 250 FOR IP): Performed by: FAMILY MEDICINE

## 2024-06-08 PROCEDURE — 2580000003 HC RX 258: Performed by: FAMILY MEDICINE

## 2024-06-08 PROCEDURE — 6360000002 HC RX W HCPCS: Performed by: INTERNAL MEDICINE

## 2024-06-08 PROCEDURE — 80069 RENAL FUNCTION PANEL: CPT

## 2024-06-08 PROCEDURE — 94761 N-INVAS EAR/PLS OXIMETRY MLT: CPT

## 2024-06-08 PROCEDURE — 2709999900 HC NON-CHARGEABLE SUPPLY

## 2024-06-08 PROCEDURE — 2060000000 HC ICU INTERMEDIATE R&B

## 2024-06-08 PROCEDURE — 99232 SBSQ HOSP IP/OBS MODERATE 35: CPT | Performed by: INTERNAL MEDICINE

## 2024-06-08 PROCEDURE — 2700000000 HC OXYGEN THERAPY PER DAY

## 2024-06-08 RX ORDER — SODIUM POLYSTYRENE SULFONATE 15 G/60ML
30 SUSPENSION ORAL; RECTAL ONCE
Status: COMPLETED | OUTPATIENT
Start: 2024-06-08 | End: 2024-06-08

## 2024-06-08 RX ORDER — SODIUM POLYSTYRENE SULFONATE 4.1 MEQ/G
30 POWDER, FOR SUSPENSION ORAL; RECTAL ONCE
Status: DISCONTINUED | OUTPATIENT
Start: 2024-06-08 | End: 2024-06-08

## 2024-06-08 RX ADMIN — FERROUS SULFATE TAB 325 MG (65 MG ELEMENTAL FE) 325 MG: 325 (65 FE) TAB at 09:36

## 2024-06-08 RX ADMIN — SODIUM CHLORIDE, PRESERVATIVE FREE 10 ML: 5 INJECTION INTRAVENOUS at 09:40

## 2024-06-08 RX ADMIN — FOLIC ACID 1 MG: 1 TABLET ORAL at 09:36

## 2024-06-08 RX ADMIN — IPRATROPIUM BROMIDE AND ALBUTEROL SULFATE 1 DOSE: 2.5; .5 SOLUTION RESPIRATORY (INHALATION) at 12:10

## 2024-06-08 RX ADMIN — FINASTERIDE 5 MG: 5 TABLET, FILM COATED ORAL at 09:36

## 2024-06-08 RX ADMIN — IPRATROPIUM BROMIDE AND ALBUTEROL SULFATE 1 DOSE: 2.5; .5 SOLUTION RESPIRATORY (INHALATION) at 20:51

## 2024-06-08 RX ADMIN — THIAMINE HCL TAB 100 MG 100 MG: 100 TAB at 09:36

## 2024-06-08 RX ADMIN — METHYLPREDNISOLONE SODIUM SUCCINATE 20 MG: 40 INJECTION INTRAMUSCULAR; INTRAVENOUS at 20:49

## 2024-06-08 RX ADMIN — AMIODARONE HYDROCHLORIDE 400 MG: 200 TABLET ORAL at 20:19

## 2024-06-08 RX ADMIN — SODIUM CHLORIDE, PRESERVATIVE FREE 10 ML: 5 INJECTION INTRAVENOUS at 20:20

## 2024-06-08 RX ADMIN — METHYLPREDNISOLONE SODIUM SUCCINATE 20 MG: 40 INJECTION INTRAMUSCULAR; INTRAVENOUS at 08:41

## 2024-06-08 RX ADMIN — SODIUM POLYSTYRENE SULFONATE 30 G: 15 SUSPENSION ORAL; RECTAL at 12:02

## 2024-06-08 RX ADMIN — ASPIRIN 81 MG 81 MG: 81 TABLET ORAL at 09:36

## 2024-06-08 RX ADMIN — HEPARIN SODIUM 11 UNITS/KG/HR: 10000 INJECTION, SOLUTION INTRAVENOUS at 16:06

## 2024-06-08 RX ADMIN — IPRATROPIUM BROMIDE AND ALBUTEROL SULFATE 1 DOSE: 2.5; .5 SOLUTION RESPIRATORY (INHALATION) at 01:18

## 2024-06-08 RX ADMIN — AMIODARONE HYDROCHLORIDE 400 MG: 200 TABLET ORAL at 09:36

## 2024-06-08 ASSESSMENT — PAIN SCALES - GENERAL: PAINLEVEL_OUTOF10: 0

## 2024-06-08 NOTE — PROGRESS NOTES
4 Eyes Skin Assessment     NAME:  Agustín Willis  YOB: 1945  MEDICAL RECORD NUMBER:  [id number]    The patient is being assessed for  Transfer to New Unit    I agree that at least one RN has performed a thorough Head to Toe Skin Assessment on the patient. ALL assessment sites listed below have been assessed.      Areas assessed by both nurses:    Head, Face, Ears, Shoulders, Back, Chest, Arms, Elbows, Hands, Sacrum. Buttock, Coccyx, Ischium, and Legs. Feet and Heels Pt has healing skin tears on LUE. Forehead noted with old bruise.         Does the Patient have a Wound? No noted wound(s)       Luisito Prevention initiated by RN: Yes  Wound Care Orders initiated by RN: No    Pressure Injury (Stage 3,4, Unstageable, DTI, NWPT, and Complex wounds) if present, place Wound referral order by RN under : No    New Ostomies, if present place, Ostomy referral order under : No     Nurse 1 eSignature: Electronically signed by Carmencita Smith RN on 6/7/24 at 8:04 PM EDT    **SHARE this note so that the co-signing nurse can place an eSignature**    Nurse 2 eSignature: Electronically signed by Ilda Champagne RN on 6/7/24 at 8:06 PM EDT

## 2024-06-08 NOTE — CONSULTS
IMPRESSION:   Acute hypoxic respiratory failure extubated on nasal cannula  Cardiac arrest  A-fib with RVR on amiodarone  Severe sepsis with septic shock on pressors  Aspiration pneumonia  Acute kidney injury status post CVVH  Hypophosphatemia to be repleted  Hypokalemia to be repleted  Hyperkalemia resolved  COPD no wheezing  MRSA nasal colonization  History of cocaine abuse polysubstance abuse in the past  Pt is critically ill. Time spent with pt and staff actively rendering care, managing pt and coordinating care as stated below; 30 minutes, exclusive of any procedures      RECOMMENDATIONS/PLAN:   ICU monitoring  Extubated yesterday on 6/5 now on nasal cannula alert awake he is not on oxygen at home  Will get speech evaluation  Chest x-ray with continued right lower infiltrate CTA chest showed bilateral infiltrates consistent with aspiration  Remains on Merrem and vancomycin sputum with normal doug  Heart rate controlled on IV amiodarone  Renal function improving off CVVH as needed dialysis  Status post cardiac arrest patient coded and he was intubated and received epinephrine and ACLS protocol ROSC is obtained 10 minutes   Patient is off pressors  No wheezing continue nebulized albuterol Atrovent      [x] High complexity decision making was performed  [x] See my orders for details  HPI  78-year-old male nursing home resident came in because of shortness of breath respiratory distress patient has episodes of vomiting after eating breakfast this morning subsequently came to the hospital saturation was in 70s he was back to the emergency room subsequently intubated and then he coded hemodynamically unstable now he is on 2 pressors vasopressin and Levophed denies intubated on ventilator critical care consult was called, past medical history of polysubstance abuse cocaine abuse COPD hypertension insomnia      PMH:  has a past medical history of Benign prostatic disease, Benign prostatic hyperplasia, Cervical  assess  Skin: warm, dry, no cyanosis;   Pulses: Brachial and radial pulses intact  Capillary: Normal capillary refill      DATA:  No results found for this or any previous visit.    02/05/24    ECHO (TTE) LIMITED (PRN CONTRAST/BUBBLE/STRAIN/3D) 02/07/2024 11:12 AM (Final)    Interpretation Summary    Left Ventricle: Normal left ventricular systolic function with a visually estimated EF of 65 - 70%. EF by 2D Simpsons Biplane is 68%. Left ventricle size is normal. LVIDd index is 2.07 cm/m2. Moderately increased wall thickness. Moderate septal thickening. Mild posterior thickening. Mass index 2D is 131.8 g/m2. Normal wall motion. Normal diastolic function.    Aortic Valve: Trileaflet valve. Mild sclerosis of the aortic valve cusp. Mild to moderate regurgitation with a centrally directed jet. AV PHT is 894.0 ms.    Mitral Valve: Mildly thickened leaflet. Mild regurgitation with a centrally directed jet.    Image quality is fair. Contrast used: Definity. Limited Doppler study due to patient's condition, limited study due to patient's ability to tolerate test and procedure performed with the patient in a sitting position.    Signed by: Panfilo Huffman MD on 2/7/2024 11:12 AM       MAR reviewed and pertinent medications noted or modified as needed    MEDS:   Current Facility-Administered Medications   Medication Dose Route Frequency Provider Last Rate Last Admin    ipratropium 0.5 mg-albuterol 2.5 mg (DUONEB) nebulizer solution 1 Dose  1 Dose Inhalation Q6H RT Jeremy Ramos MD   1 Dose at 06/08/24 0118    amiodarone (CORDARONE) tablet 400 mg  400 mg Oral BID Gerry Wilson MD   400 mg at 06/07/24 2043    aspirin chewable tablet 81 mg  81 mg Per NG tube Daily Jeremy Ramos MD   81 mg at 06/07/24 1034    methylPREDNISolone sodium succ (SOLU-MEDROL) injection 20 mg  20 mg IntraVENous Q12H Hamilton Miranda MD   20 mg at 06/08/24 0841    epoetin radha-epbx (RETACRIT) injection 10,000 Units  10,000 Units SubCUTAneous  on warming blanket.  Continues with CRRT with fluid removal.  Potassium and phosphorus to be repleted  6/3 remain intubated on ventilator getting CRRT, ABG acceptable pCO2 33 pO2 75 on 35% FiO2 on assist-control mode on meropenem will continue to wean pressors  6/4 on assist-control mode pCO2 31 pO2 80 on 35% FiO2 getting IV fluid 200 cc/h on amiodarone hemodynamically unstable on Levophed and NEOS epinephrine   6/5 remain intubated on ventilator we will do sedation vacation ABG stentable 35% FiO2 still on vasopressors now on phenylephrine   6/6 alert awake talking on nasal cannula extubated on 6/5 patient on meropenem speech evaluation he is not on oxygen at home  6/7 oral congestion continue suctioning, alert awake on nasal cannula continue with the suctioning high risk for aspiration on IV heparin and meropenem\  6/8 alert awake on oxygen via nasal cannula chest x-ray atelectasis lower lung volume on Merrem  Time of care 30 minutes

## 2024-06-08 NOTE — PROGRESS NOTES
Nephrology f/u          Patient: Agustín Willis MRN: [id number]  SSN: [id number]    YOB: 1945  Age: 79 y.o.  Sex: male      Subjective:   I have seen the patient at the bedside  He looks somnolent today  He is on nasal cannula 3 L now  Noticed to have swelling in his extremities  Worsening BUN  Potassium 5.4  Will plan for dialysis today  Past Medical History:   Diagnosis Date    Benign prostatic disease 8/3/2020    Hypertrophy with Outflow Obstruction    Benign prostatic hyperplasia 8/3/2020    Cervical radiculopathy 8/3/2020    Chronic obstructive lung disease (HCC) 8/3/2020    Cocaine abuse (HCC) 8/3/2020    Dizziness and giddiness 8/3/2020    Hypercholesterolemia 8/3/2020    Hypertensive disorder 8/3/2020    Insomnia 8/3/2020    Kidney disease 8/3/2020    Low back pain 8/3/2020    Osteoarthritis of knee 8/3/2020    Sleep apnea 8/3/2020    Vasovagal syncope 8/3/2020     Past Surgical History:   Procedure Laterality Date    IR NONTUNNELED VASCULAR CATHETER  5/30/2024    IR NONTUNNELED VASCULAR CATHETER 5/30/2024 Stephy Abreu APRN - NP SSR RAD ANGIO IR      Family History   Problem Relation Age of Onset    Hypertension Mother      Social History     Tobacco Use    Smoking status: Former    Smokeless tobacco: Never   Substance Use Topics    Alcohol use: Yes      Current Facility-Administered Medications   Medication Dose Route Frequency Provider Last Rate Last Admin    ipratropium 0.5 mg-albuterol 2.5 mg (DUONEB) nebulizer solution 1 Dose  1 Dose Inhalation Q6H RT Jeremy Ramos MD   1 Dose at 06/08/24 1210    amiodarone (CORDARONE) tablet 400 mg  400 mg Oral BID Gerry Wilson MD   400 mg at 06/08/24 0936    aspirin chewable tablet 81 mg  81 mg Per NG tube Daily Jeremy Ramos MD   81 mg at 06/08/24 0936    methylPREDNISolone sodium succ (SOLU-MEDROL) injection 20 mg  20 mg IntraVENous Q12H Hamilton Miranda MD   20 mg at 06/08/24 0841    epoetin radha-epbx (RETACRIT) injection  10,000 Units  10,000 Units SubCUTAneous Once per day on Mon Wed Fri Sol Ferraro MD   10,000 Units at 06/07/24 2007    heparin (porcine) injection 4,000 Units  4,000 Units IntraVENous PRN Mikaela Arredondo MD        heparin (porcine) injection 2,000 Units  2,000 Units IntraVENous PRN Mikaela Arredondo MD   2,000 Units at 06/07/24 1817    heparin 25,000 units in dextrose 5% 250 mL (premix) infusion  5-30 Units/kg/hr IntraVENous Continuous Mikaela Arredondo MD 9.6 mL/hr at 06/08/24 0736 11 Units/kg/hr at 06/08/24 0736    heparin (porcine) injection 1,100-1,900 Units  1,100-1,900 Units IntraCATHeter PRN Sol Ferraro MD   1,400 Units at 06/03/24 1231    And    heparin (porcine) injection 1,100-1,900 Units  1,100-1,900 Units IntraCATHeter PRN Sol Ferraro MD   1,100 Units at 06/03/24 1226    sodium chloride 0.9 % bolus 2,448 mL  30 mL/kg IntraVENous Once Mikaela Arredondo MD   Held at 05/29/24 1405    [Held by provider] aspirin EC tablet 81 mg  81 mg Oral Daily Mikaela Arredondo MD   81 mg at 06/02/24 0800    ferrous sulfate (IRON 325) tablet 325 mg  325 mg Oral Daily with breakfast Mikaela Arredondo MD   325 mg at 06/08/24 0936    finasteride (PROSCAR) tablet 5 mg  5 mg Oral Daily Mikaela Arredondo MD   5 mg at 06/08/24 0936    folic acid (FOLVITE) tablet 1 mg  1 mg Oral Daily Mikaela Arredondo MD   1 mg at 06/08/24 0936    thiamine mononitrate tablet 100 mg  100 mg Oral Daily Mikaela Arredondo MD   100 mg at 06/08/24 0936    sodium chloride flush 0.9 % injection 5-40 mL  5-40 mL IntraVENous 2 times per day Mikaela Arredondo MD   10 mL at 06/08/24 0940    sodium chloride flush 0.9 % injection 5-40 mL  5-40 mL IntraVENous PRN Mikaela Arredondo MD        0.9 % sodium chloride infusion   IntraVENous PRN Mikaela Arredondo MD        ondansetron (ZOFRAN-ODT) disintegrating tablet 4 mg  4 mg Oral Q8H PRN Mikaela Arredondo MD        Or    ondansetron (ZOFRAN) injection 4 mg  4 mg IntraVENous Q6H PRN Mikaela Arredondo MD

## 2024-06-08 NOTE — PROGRESS NOTES
General Daily Progress Note      Patient Name:   Agustín Willis       YOB: 1945       Age:  79 y.o.      Admit Date: 5/29/2024      Subjective:   Patient is a 78 y.o. year old male past medical history of COPD BPH cervical radiculopathy cocaine abuse hypertension hyperlipidemia seasonal allergies SVT came to emergency room with respiratory arrest, patient was at nursing home after eating breakfast he vomited everywhere likely aspirated oxygen saturation was 70% initially put on nonrebreather patient was intubated in the ER patient was hypotensive started Levophed 10 patient have a cardiac arrest went to V-fib received shock x 4 received epi and amiodarone  Was started on Levophed and vasopressin received 2 L of IV fluid, received IV Zosyn and vancomycin in the ER     Patient admitted to the ICU for further workup  Patient on ventilator/unresponsive       5/30    Patient on ventilator propofol and Precedex drip  Hypotension on Levophed and vasopressin  Patient has no urine output last night received 500 bolus    Nephrology decided for start CRRT IR team for dialysis cath  BUN 72 creatinine 3.09  CRP 10.5  MRSA nares    5/31    Patient on ventilator 40% O2 propofol Precedex levo and vasopressin drip  Patient on A-fib with rapid ventricular rate ordered amiodarone bolus and started on drip  Patient getting CRRT  No urine output  D-dimers greater than 20    6/3    Patient on ventilator 45% O2 receiving CRRT patient on propofol drip  Patient on amiodarone and Levophed drip  Patient heparin and Precedex drip  Patient is hypothermic    WBC count 22.5    6/4    Patient on ventilator 45% O2 on Levophed drip and amiodarone drip and heparin drip and Precedex drip getting NG tube feeding  BUN 37 creatinine 1.97 WBCs 15  Doppler studies positive for acute DVT of the left brachial vein left radial vein and left ulnar vein    Seen by infectious disease recommended continue IV meropenem    6/5    General ventilated

## 2024-06-08 NOTE — PLAN OF CARE
Problem: Safety - Adult  Goal: Free from fall injury  Outcome: Progressing     Problem: Discharge Planning  Goal: Discharge to home or other facility with appropriate resources  Outcome: Progressing  Flowsheets (Taken 6/7/2024 0800 by Mague March, RN)  Discharge to home or other facility with appropriate resources:   Identify barriers to discharge with patient and caregiver   Arrange for needed discharge resources and transportation as appropriate   Identify discharge learning needs (meds, wound care, etc)   Arrange for interpreters to assist at discharge as needed   Refer to discharge planning if patient needs post-hospital services based on physician order or complex needs related to functional status, cognitive ability or social support system     Problem: Chronic Conditions and Co-morbidities  Goal: Patient's chronic conditions and co-morbidity symptoms are monitored and maintained or improved  Outcome: Progressing  Flowsheets (Taken 6/7/2024 0800 by Mague March, RN)  Care Plan - Patient's Chronic Conditions and Co-Morbidity Symptoms are Monitored and Maintained or Improved:   Monitor and assess patient's chronic conditions and comorbid symptoms for stability, deterioration, or improvement   Collaborate with multidisciplinary team to address chronic and comorbid conditions and prevent exacerbation or deterioration   Update acute care plan with appropriate goals if chronic or comorbid symptoms are exacerbated and prevent overall improvement and discharge     Problem: Skin/Tissue Integrity  Goal: Absence of new skin breakdown  Description: 1.  Monitor for areas of redness and/or skin breakdown  2.  Assess vascular access sites hourly  3.  Every 4-6 hours minimum:  Change oxygen saturation probe site  4.  Every 4-6 hours:  If on nasal continuous positive airway pressure, respiratory therapy assess nares and determine need for appliance change or resting period.  Outcome: Progressing     Problem:    Problem: Spiritual Care  Goal: Pt/Family able to move forward in process of forgiving self, others, and/or higher power  Description: INTERVENTIONS:  1. Assist patient/family to overcome blocks to healing by use of spiritual practices (prayer, meditation, guided imagery, reiki, breath work, etc).  2. De-myth guilt and help patient/family identify possible irrational spiritual/cultural beliefs and values.  3. Explore possibilities of making amends & reconciliation with self, others, and/or a greater power.  4. Guide patient/family in identifying painful feelings of guilt.  5. Help patient/famiy explore and identify spiritual beliefs, cultural understandings or values that may help or hinder letting go of issue.  6. Help patient/family explore feelings of anger, bitterness, resentment.  7. Help patient/family identify and examine the situation in which these feelings are experienced.  8. Help patient/family identify destructive displacement of feelings onto other individuals.  9. Invite use of sacraments/rituals/ceremonies as appropriate (e.g. - confession, anointing, smudging).  10. Refer patient/family to formal counseling and/or to yamini community for further support work.  Outcome: Progressing  Flowsheets (Taken 6/7/2024 1515 by Carmencita Smith RN)  Patient/family able to move forward in process of forgiving self, others, and/or higher power: Assist patient to overcome blocks to healing by use of spiritual practices (prayer, meditation, guided imagery, reiki, breath work, etc)

## 2024-06-08 NOTE — PROGRESS NOTES
Progress Note  Date:2024       Room:[facility]  Patient Name:Agustín Willis     YOB: 1945     Age:79 y.o.        Subjective    Subjective  Patient followed septic shock with presumptive aspiration pneumonia. He has low grade temperatures.  Procal and CRP decreasing.  Blood cultures negative.  Sputum culture grew normal doug and urine culture no growth.  Currently on IV Meropenem alone.  Patient resting comfortably with minimal verbal interaction.        Objective         Vitals Last 24 Hours:  TEMPERATURE:  Temp  Av °F (36.7 °C)  Min: 97.5 °F (36.4 °C)  Max: 98.4 °F (36.9 °C)  RESPIRATIONS RANGE: Resp  Av.9  Min: 18  Max: 22  PULSE OXIMETRY RANGE: SpO2  Av.9 %  Min: 72 %  Max: 100 %  PULSE RANGE: Pulse  Av.1  Min: 79  Max: 97  BLOOD PRESSURE RANGE: Systolic (24hrs), Av , Min:111 , Max:167   ; Diastolic (24hrs), Av, Min:76, Max:100         Objective:  Vital signs: (most recent): Blood pressure 124/87, pulse 80, temperature 97.5 °F (36.4 °C), temperature source Axillary, resp. rate 22, height 1.73 m (5' 8.11\"), weight 86 kg (189 lb 9.5 oz), SpO2 95 %.      Vitals and nursing note reviewed.   Constitutional:       General: He is in acute distress.      Appearance: He is ill-appearing and toxic-appearing.   HENT:      Head: Normocephalic and atraumatic.      Right Ear: External ear normal.      Left Ear: External ear normal.      Nose: Nose normal.      Mouth/Throat: drooling     Comments: Nasal cannula O2  Eyes:      Comments: Open    Cardiovascular:      Rate and Rhythm: Normal rate and regular rhythm.      Heart sounds: No murmur heard.  Pulmonary:      Breath sounds: Rhonchi present. No wheezing or rales.   Abdominal:      General: Bowel sounds are normal. There is no distension.      Palpations: Abdomen is soft.      Tenderness: There is no abdominal tenderness.   Genitourinary:  external urinary device with slightly turbid urine  Musculoskeletal:      Cervical back: Neck

## 2024-06-09 LAB
ALBUMIN SERPL-MCNC: 1.8 G/DL (ref 3.5–5)
ALBUMIN/GLOB SERPL: 0.4 (ref 1.1–2.2)
ALP SERPL-CCNC: 93 U/L (ref 45–117)
ALT SERPL-CCNC: 16 U/L (ref 12–78)
ANION GAP SERPL CALC-SCNC: 6 MMOL/L (ref 5–15)
AST SERPL W P-5'-P-CCNC: 11 U/L (ref 15–37)
BILIRUB SERPL-MCNC: 0.4 MG/DL (ref 0.2–1)
BUN SERPL-MCNC: 68 MG/DL (ref 6–20)
BUN/CREAT SERPL: 37 (ref 12–20)
CA-I BLD-MCNC: 8.7 MG/DL (ref 8.5–10.1)
CHLORIDE SERPL-SCNC: 106 MMOL/L (ref 97–108)
CO2 SERPL-SCNC: 28 MMOL/L (ref 21–32)
CREAT SERPL-MCNC: 1.86 MG/DL (ref 0.7–1.3)
CRP SERPL-MCNC: 17 MG/DL (ref 0–0.3)
ERYTHROCYTE [DISTWIDTH] IN BLOOD BY AUTOMATED COUNT: 15.8 % (ref 11.5–14.5)
GLOBULIN SER CALC-MCNC: 4.8 G/DL (ref 2–4)
GLUCOSE BLD STRIP.AUTO-MCNC: 138 MG/DL (ref 65–100)
GLUCOSE BLD STRIP.AUTO-MCNC: 152 MG/DL (ref 65–100)
GLUCOSE SERPL-MCNC: 106 MG/DL (ref 65–100)
HCT VFR BLD AUTO: 24.4 % (ref 36.6–50.3)
HGB BLD-MCNC: 7.9 G/DL (ref 12.1–17)
MCH RBC QN AUTO: 29.3 PG (ref 26–34)
MCHC RBC AUTO-ENTMCNC: 32.4 G/DL (ref 30–36.5)
MCV RBC AUTO: 90.4 FL (ref 80–99)
NRBC # BLD: 0.06 K/UL (ref 0–0.01)
NRBC BLD-RTO: 0.3 PER 100 WBC
PERFORMED BY:: ABNORMAL
PERFORMED BY:: ABNORMAL
PHOSPHATE SERPL-MCNC: 4.6 MG/DL (ref 2.6–4.7)
PLATELET # BLD AUTO: 217 K/UL (ref 150–400)
PMV BLD AUTO: 11.2 FL (ref 8.9–12.9)
POTASSIUM SERPL-SCNC: 4.2 MMOL/L (ref 3.5–5.1)
PROCALCITONIN SERPL-MCNC: 0.71 NG/ML
PROT SERPL-MCNC: 6.6 G/DL (ref 6.4–8.2)
RBC # BLD AUTO: 2.7 M/UL (ref 4.1–5.7)
SODIUM SERPL-SCNC: 140 MMOL/L (ref 136–145)
UFH PPP CHRO-ACNC: 0.14 IU/ML
UFH PPP CHRO-ACNC: 0.35 IU/ML
UFH PPP CHRO-ACNC: 0.45 IU/ML
WBC # BLD AUTO: 17.5 K/UL (ref 4.1–11.1)

## 2024-06-09 PROCEDURE — 6370000000 HC RX 637 (ALT 250 FOR IP): Performed by: INTERNAL MEDICINE

## 2024-06-09 PROCEDURE — 6370000000 HC RX 637 (ALT 250 FOR IP): Performed by: FAMILY MEDICINE

## 2024-06-09 PROCEDURE — 82962 GLUCOSE BLOOD TEST: CPT

## 2024-06-09 PROCEDURE — 85027 COMPLETE CBC AUTOMATED: CPT

## 2024-06-09 PROCEDURE — 6370000000 HC RX 637 (ALT 250 FOR IP): Performed by: STUDENT IN AN ORGANIZED HEALTH CARE EDUCATION/TRAINING PROGRAM

## 2024-06-09 PROCEDURE — 6360000002 HC RX W HCPCS: Performed by: INTERNAL MEDICINE

## 2024-06-09 PROCEDURE — 99232 SBSQ HOSP IP/OBS MODERATE 35: CPT | Performed by: INTERNAL MEDICINE

## 2024-06-09 PROCEDURE — 2580000003 HC RX 258: Performed by: FAMILY MEDICINE

## 2024-06-09 PROCEDURE — 94640 AIRWAY INHALATION TREATMENT: CPT

## 2024-06-09 PROCEDURE — 84100 ASSAY OF PHOSPHORUS: CPT

## 2024-06-09 PROCEDURE — 6360000002 HC RX W HCPCS: Performed by: FAMILY MEDICINE

## 2024-06-09 PROCEDURE — 86140 C-REACTIVE PROTEIN: CPT

## 2024-06-09 PROCEDURE — 94761 N-INVAS EAR/PLS OXIMETRY MLT: CPT

## 2024-06-09 PROCEDURE — 80053 COMPREHEN METABOLIC PANEL: CPT

## 2024-06-09 PROCEDURE — 84145 PROCALCITONIN (PCT): CPT

## 2024-06-09 PROCEDURE — 2700000000 HC OXYGEN THERAPY PER DAY

## 2024-06-09 PROCEDURE — 92526 ORAL FUNCTION THERAPY: CPT

## 2024-06-09 PROCEDURE — 1100000000 HC RM PRIVATE

## 2024-06-09 PROCEDURE — 85520 HEPARIN ASSAY: CPT

## 2024-06-09 PROCEDURE — 36415 COLL VENOUS BLD VENIPUNCTURE: CPT

## 2024-06-09 RX ADMIN — SODIUM CHLORIDE, PRESERVATIVE FREE 10 ML: 5 INJECTION INTRAVENOUS at 09:24

## 2024-06-09 RX ADMIN — ASPIRIN 81 MG 81 MG: 81 TABLET ORAL at 09:24

## 2024-06-09 RX ADMIN — FERROUS SULFATE TAB 325 MG (65 MG ELEMENTAL FE) 325 MG: 325 (65 FE) TAB at 09:24

## 2024-06-09 RX ADMIN — IPRATROPIUM BROMIDE AND ALBUTEROL SULFATE 1 DOSE: 2.5; .5 SOLUTION RESPIRATORY (INHALATION) at 01:03

## 2024-06-09 RX ADMIN — AMIODARONE HYDROCHLORIDE 400 MG: 200 TABLET ORAL at 09:24

## 2024-06-09 RX ADMIN — IPRATROPIUM BROMIDE AND ALBUTEROL SULFATE 1 DOSE: 2.5; .5 SOLUTION RESPIRATORY (INHALATION) at 19:15

## 2024-06-09 RX ADMIN — SODIUM CHLORIDE, PRESERVATIVE FREE 10 ML: 5 INJECTION INTRAVENOUS at 20:40

## 2024-06-09 RX ADMIN — HEPARIN SODIUM 13 UNITS/KG/HR: 10000 INJECTION, SOLUTION INTRAVENOUS at 16:19

## 2024-06-09 RX ADMIN — FOLIC ACID 1 MG: 1 TABLET ORAL at 09:24

## 2024-06-09 RX ADMIN — IPRATROPIUM BROMIDE AND ALBUTEROL SULFATE 1 DOSE: 2.5; .5 SOLUTION RESPIRATORY (INHALATION) at 06:39

## 2024-06-09 RX ADMIN — IPRATROPIUM BROMIDE AND ALBUTEROL SULFATE 1 DOSE: 2.5; .5 SOLUTION RESPIRATORY (INHALATION) at 14:14

## 2024-06-09 RX ADMIN — HEPARIN SODIUM 2000 UNITS: 1000 INJECTION INTRAVENOUS; SUBCUTANEOUS at 07:34

## 2024-06-09 RX ADMIN — METHYLPREDNISOLONE SODIUM SUCCINATE 20 MG: 40 INJECTION INTRAMUSCULAR; INTRAVENOUS at 20:40

## 2024-06-09 RX ADMIN — AMIODARONE HYDROCHLORIDE 400 MG: 200 TABLET ORAL at 20:40

## 2024-06-09 RX ADMIN — ACETAMINOPHEN 650 MG: 325 TABLET ORAL at 20:47

## 2024-06-09 RX ADMIN — THIAMINE HCL TAB 100 MG 100 MG: 100 TAB at 09:24

## 2024-06-09 RX ADMIN — FINASTERIDE 5 MG: 5 TABLET, FILM COATED ORAL at 09:24

## 2024-06-09 RX ADMIN — METHYLPREDNISOLONE SODIUM SUCCINATE 20 MG: 40 INJECTION INTRAMUSCULAR; INTRAVENOUS at 09:24

## 2024-06-09 ASSESSMENT — PAIN SCALES - GENERAL
PAINLEVEL_OUTOF10: 3
PAINLEVEL_OUTOF10: 5

## 2024-06-09 ASSESSMENT — PAIN DESCRIPTION - LOCATION: LOCATION: GENERALIZED

## 2024-06-09 ASSESSMENT — PAIN DESCRIPTION - DESCRIPTORS: DESCRIPTORS: DISCOMFORT

## 2024-06-09 ASSESSMENT — PAIN - FUNCTIONAL ASSESSMENT: PAIN_FUNCTIONAL_ASSESSMENT: ACTIVITIES ARE NOT PREVENTED

## 2024-06-09 NOTE — PROGRESS NOTES
Nephrology f/u          Patient: Agustín Willis MRN: [id number]  SSN: [id number]    YOB: 1945  Age: 79 y.o.  Sex: male      Subjective:   I have seen the patient at the bedside  The patient was dialyzed yesterday  2 L fluid was removed  He said he feels better today  Resolved hyperkalemia  On nasal cannula 2 L    Past Medical History:   Diagnosis Date    Benign prostatic disease 8/3/2020    Hypertrophy with Outflow Obstruction    Benign prostatic hyperplasia 8/3/2020    Cervical radiculopathy 8/3/2020    Chronic obstructive lung disease (HCC) 8/3/2020    Cocaine abuse (HCC) 8/3/2020    Dizziness and giddiness 8/3/2020    Hypercholesterolemia 8/3/2020    Hypertensive disorder 8/3/2020    Insomnia 8/3/2020    Kidney disease 8/3/2020    Low back pain 8/3/2020    Osteoarthritis of knee 8/3/2020    Sleep apnea 8/3/2020    Vasovagal syncope 8/3/2020     Past Surgical History:   Procedure Laterality Date    IR NONTUNNELED VASCULAR CATHETER  5/30/2024    IR NONTUNNELED VASCULAR CATHETER 5/30/2024 Stephy Abreu, KELLEY - NP SSR RAD ANGIO IR      Family History   Problem Relation Age of Onset    Hypertension Mother      Social History     Tobacco Use    Smoking status: Former    Smokeless tobacco: Never   Substance Use Topics    Alcohol use: Yes      Current Facility-Administered Medications   Medication Dose Route Frequency Provider Last Rate Last Admin    ipratropium 0.5 mg-albuterol 2.5 mg (DUONEB) nebulizer solution 1 Dose  1 Dose Inhalation Q6H RT Jeremy Ramos MD   1 Dose at 06/09/24 0639    amiodarone (CORDARONE) tablet 400 mg  400 mg Oral BID Gerry Wilson MD   400 mg at 06/09/24 0924    aspirin chewable tablet 81 mg  81 mg Per NG tube Daily Jeremy Ramos MD   81 mg at 06/09/24 0924    methylPREDNISolone sodium succ (SOLU-MEDROL) injection 20 mg  20 mg IntraVENous Q12H Hamilton Miranda MD   20 mg at 06/09/24 0924    epoetin radha-epbx (RETACRIT) injection 10,000 Units  10,000 Units  SubCUTAneous Once per day on Mon Wed Fri Sol Ferraro MD   10,000 Units at 06/07/24 2007    heparin (porcine) injection 4,000 Units  4,000 Units IntraVENous PRN Mikaela Arredondo MD        heparin (porcine) injection 2,000 Units  2,000 Units IntraVENous PRN Mikaela Arredondo MD   2,000 Units at 06/09/24 0734    heparin 25,000 units in dextrose 5% 250 mL (premix) infusion  5-30 Units/kg/hr IntraVENous Continuous Mikaela Arredondo MD 11.4 mL/hr at 06/09/24 0733 13 Units/kg/hr at 06/09/24 0733    heparin (porcine) injection 1,100-1,900 Units  1,100-1,900 Units IntraCATHeter PRN Sol Ferraro MD   1,400 Units at 06/03/24 1231    And    heparin (porcine) injection 1,100-1,900 Units  1,100-1,900 Units IntraCATHeter PRN Sol Ferraro MD   1,100 Units at 06/03/24 1226    sodium chloride 0.9 % bolus 2,448 mL  30 mL/kg IntraVENous Once Mikaela Arredondo MD   Held at 05/29/24 1405    [Held by provider] aspirin EC tablet 81 mg  81 mg Oral Daily Mikaela Arredondo MD   81 mg at 06/02/24 0800    ferrous sulfate (IRON 325) tablet 325 mg  325 mg Oral Daily with breakfast Mikaela Arredondo MD   325 mg at 06/09/24 0924    finasteride (PROSCAR) tablet 5 mg  5 mg Oral Daily Mikaela Arredondo MD   5 mg at 06/09/24 0924    folic acid (FOLVITE) tablet 1 mg  1 mg Oral Daily Mikaela Arredondo MD   1 mg at 06/09/24 0924    thiamine mononitrate tablet 100 mg  100 mg Oral Daily Mikaela Arredondo MD   100 mg at 06/09/24 0924    sodium chloride flush 0.9 % injection 5-40 mL  5-40 mL IntraVENous 2 times per day Mikaela Arredondo MD   10 mL at 06/09/24 0924    sodium chloride flush 0.9 % injection 5-40 mL  5-40 mL IntraVENous PRN Mikaela Arredondo MD        0.9 % sodium chloride infusion   IntraVENous PRN Mikaela Arredondo MD        ondansetron (ZOFRAN-ODT) disintegrating tablet 4 mg  4 mg Oral Q8H PRN Mikaela Arredondo MD        Or    ondansetron (ZOFRAN) injection 4 mg  4 mg IntraVENous Q6H PRN Mikaela Arredondo MD        polyethylene glycol

## 2024-06-09 NOTE — PROGRESS NOTES
General Daily Progress Note      Patient Name:   Agustín Willis       YOB: 1945       Age:  79 y.o.      Admit Date: 5/29/2024      Subjective:   Patient is a 78 y.o. year old male past medical history of COPD BPH cervical radiculopathy cocaine abuse hypertension hyperlipidemia seasonal allergies SVT came to emergency room with respiratory arrest, patient was at nursing home after eating breakfast he vomited everywhere likely aspirated oxygen saturation was 70% initially put on nonrebreather patient was intubated in the ER patient was hypotensive started Levophed 10 patient have a cardiac arrest went to V-fib received shock x 4 received epi and amiodarone  Was started on Levophed and vasopressin received 2 L of IV fluid, received IV Zosyn and vancomycin in the ER     Patient admitted to the ICU for further workup  Patient on ventilator/unresponsive       5/30    Patient on ventilator propofol and Precedex drip  Hypotension on Levophed and vasopressin  Patient has no urine output last night received 500 bolus    Nephrology decided for start CRRT IR team for dialysis cath  BUN 72 creatinine 3.09  CRP 10.5  MRSA nares    5/31    Patient on ventilator 40% O2 propofol Precedex levo and vasopressin drip  Patient on A-fib with rapid ventricular rate ordered amiodarone bolus and started on drip  Patient getting CRRT  No urine output  D-dimers greater than 20    6/3    Patient on ventilator 45% O2 receiving CRRT patient on propofol drip  Patient on amiodarone and Levophed drip  Patient heparin and Precedex drip  Patient is hypothermic    WBC count 22.5    6/4    Patient on ventilator 45% O2 on Levophed drip and amiodarone drip and heparin drip and Precedex drip getting NG tube feeding  BUN 37 creatinine 1.97 WBCs 15  Doppler studies positive for acute DVT of the left brachial vein left radial vein and left ulnar vein    Seen by infectious disease recommended continue IV meropenem    6/5    General ventilated  5% O2 on propofol drip and heparin drip    6/6    Patient extubated awake on heparin drip  Potassium 5.4 BUN 74 creatinine 2.32    6/7    Patient alert awake feeling much better started on heparin drip    6/8    Patient alert and awake    /97  Potassium 5.4  WBC 20.2 compared to 18.3 yesterday    CXR  Stable bibasilar atelectasis and low lung volumes. Stable lines and tubes,  except for extubation and removal of gastric tube    Patient seen by nephrology, pulmonology, and infectious disease    6/9    Vitals are within normal limits    Creatinine 1.86  CRP is 17.00  WBC 17.5    Patient seen by nephrology, cardiology, and infectious disease    Objective:     Vitals:    06/09/24 0823   BP: 115/78   Pulse: 79   Resp: 20   Temp: 98.4 °F (36.9 °C)   SpO2: 93%        Recent Results (from the past 24 hour(s))   POCT Glucose    Collection Time: 06/08/24  5:03 PM   Result Value Ref Range    POC Glucose 105 (H) 65 - 100 mg/dL    Performed by: COURTNEY PEÑALOZA    Heparin, Anti-XA    Collection Time: 06/08/24  5:10 PM   Result Value Ref Range    Heparin Xa,LMWH and Unfrac 0.32 IU/mL   POCT Glucose    Collection Time: 06/08/24  7:58 PM   Result Value Ref Range    POC Glucose 119 (H) 65 - 100 mg/dL    Performed by: Vijay Little    CBC    Collection Time: 06/09/24  5:28 AM   Result Value Ref Range    WBC 17.5 (H) 4.1 - 11.1 K/uL    RBC 2.70 (L) 4.10 - 5.70 M/uL    Hemoglobin 7.9 (L) 12.1 - 17.0 g/dL    Hematocrit 24.4 (L) 36.6 - 50.3 %    MCV 90.4 80.0 - 99.0 FL    MCH 29.3 26.0 - 34.0 PG    MCHC 32.4 30.0 - 36.5 g/dL    RDW 15.8 (H) 11.5 - 14.5 %    Platelets 217 150 - 400 K/uL    MPV 11.2 8.9 - 12.9 FL    Nucleated RBCs 0.3 (H) 0.0  WBC    nRBC 0.06 (H) 0.00 - 0.01 K/uL   Comprehensive Metabolic Panel    Collection Time: 06/09/24  5:28 AM   Result Value Ref Range    Sodium 140 136 - 145 mmol/L    Potassium 4.2 3.5 - 5.1 mmol/L    Chloride 106 97 - 108 mmol/L    CO2 28 21 - 32 mmol/L    Anion Gap 6 5 - 15 mmol/L     06/06/2024 09:15 PM    CLARITYU Turbid 04/05/2023 10:28 PM    GLUCOSEU Negative 06/06/2024 09:15 PM    GLUCOSEU Negative 03/03/2024 02:12 PM    BILIRUBINUR Negative 06/06/2024 09:15 PM    KETUA Negative 06/06/2024 09:15 PM    BLOODU Moderate 06/06/2024 09:15 PM    PHUR 5.0 06/06/2024 09:15 PM    PHUR 5.0 04/05/2023 10:28 PM    PROTEINU 30 06/06/2024 09:15 PM    NITRU Negative 06/06/2024 09:15 PM    LEUKOCYTESUR Negative 06/06/2024 09:15 PM         Assessment:     Acute DVT of the left arm  Cardiac arrest  Start post V-fib with 4 shocks  Septic shock with hypotension  Acute hypoxic respiratory failure extubated  Aspiration pneumonia  COPD  History of cocaine abuse in the past  History of hypertension hyperlipidemia  Acute on chronic kidney disease  Lactic acidosis  Hypokalemia  MRSA nares  A-fib with rapid ventricular rate  Left Ventricle: Normal left ventricular systolic function with a visually estimated EF of 65 - 70%. Left ventricle size is normal. Increased wall thickness. Mild septal thickening. Moderate hypokinesis of the following segments: mid inferoseptal. Normal diastolic function.    Right Ventricle: Right ventricle is moderately dilated.    Aortic Valve: Moderate to severe regurgitation.    Left Atrium: Left atrium is mildly dilated. Left atrial volume index is normal (16-34 mL/m2).    Right Atrium: Right atrium is mildly dilated.    Image quality is good. Procedure performed with the patient in a supine position      Plan:       Amiodarone 400 mg twice a day  Aspirin 81 mg daily  Ferrous sulfate 325 mg daily  Finasteride 5 mg daily  Folic acid 1 mg daily  DuoNeb nebulizer every 6 hours  Meropenem 1 g every 12 hours  Thiamine 100 mg daily  Solu-Medrol 20 mg every 12 hours      PT OT consult    Follow-up with the nephrology regarding electrolyte imbalance.  Patient hemodynamically unstable on 2 pressors.  Indication for dialysis UOP picking up via nephrology.  Plan for hemodialysis via

## 2024-06-09 NOTE — PLAN OF CARE
Speech LAnguage Pathology Dysphagia TREATMENT    Patient: Agustín Willis (79 y.o. male)  Date: 6/9/2024  Primary Diagnosis: Cardiac arrest ([facility]) [I46.9]  Acute respiratory failure ([facility]) [J96.00]  Respiratory distress [R06.03]  Chest pain, unspecified type [R07.9]       Precautions: Aspiration Risk                    ASSESSMENT :  Patient positioned upright with pillow under the left side of his head, alert, oriented x2, and agreeable to treatment. Nurse reports he has been doing well with eating, drinking, and taking meds but has a baseline cough. Patient asked for orange juice and SLP provided the juice with a mildly thick texture.    Oral phase: noted left side anterior spillage that's noted due to neck positioning; overall functional manipulation of bolus. Pharyngeal phase: double swallows, noted baseline cough and throat clearing; but no overall s/s aspiration.    Patient will benefit from skilled intervention to address the above impairments.     PLAN :  Recommendations and Planned Interventions:  Diet: Puree and mildly thick liquids  ST to follow POC. Aspiration and GERD precautions.     Acute SLP Services: Yes, SLP will continue to follow per plan of care.    Discharge Recommendations: To Be Determined     SUBJECTIVE:   Thank you.    OBJECTIVE:     Past Medical History:   Diagnosis Date    Benign prostatic disease 8/3/2020    Hypertrophy with Outflow Obstruction    Benign prostatic hyperplasia 8/3/2020    Cervical radiculopathy 8/3/2020    Chronic obstructive lung disease (HCC) 8/3/2020    Cocaine abuse ([facility]) 8/3/2020    Dizziness and giddiness 8/3/2020    Hypercholesterolemia 8/3/2020    Hypertensive disorder 8/3/2020    Insomnia 8/3/2020    Kidney disease 8/3/2020    Low back pain 8/3/2020    Osteoarthritis of knee 8/3/2020    Sleep apnea 8/3/2020    Vasovagal syncope 8/3/2020     Past Surgical History:   Procedure Laterality Date    IR NONTUNNELED VASCULAR CATHETER  5/30/2024    IR NONTUNNELED VASCULAR

## 2024-06-09 NOTE — PROGRESS NOTES
Nursing supervisor inquired if the patient can be transferred to [facility] with a  west nurse. Primary notified Dr. Arredondo regarding the plan but he refused the transfer.

## 2024-06-09 NOTE — PROGRESS NOTES
Progress Note  Date:2024       Room:[facility]  Patient Name:Agustín Willis     YOB: 1945     Age:79 y.o.        Subjective    Subjective  Patient followed septic shock with presumptive aspiration pneumonia. He has low grade temperatures.  WBC elevated on Solumedrol.  Procal and CRP decreasing.  Blood cultures negative.  Sputum culture grew normal doug and urine culture no growth.  It appears that Meropenem was discontinued on 24.  Patient resting comfortably with nebulizer in place at this time.       Objective         Vitals Last 24 Hours:  TEMPERATURE:  Temp  Av °F (36.7 °C)  Min: 97.7 °F (36.5 °C)  Max: 98.4 °F (36.9 °C)  RESPIRATIONS RANGE: Resp  Av  Min: 20  Max: 22  PULSE OXIMETRY RANGE: SpO2  Av.8 %  Min: 92 %  Max: 99 %  PULSE RANGE: Pulse  Av.4  Min: 79  Max: 86  BLOOD PRESSURE RANGE: Systolic (24hrs), Av , Min:109 , Max:163   ; Diastolic (24hrs), Av, Min:74, Max:94         Objective:  Vital signs: (most recent): Blood pressure 115/78, pulse 79, temperature 98.4 °F (36.9 °C), temperature source Axillary, resp. rate 20, height 1.73 m (5' 8.11\"), weight 90.5 kg (199 lb 8.3 oz), SpO2 93 %.      Vitals and nursing note reviewed.   Constitutional:       General: He is in acute distress.      Appearance: He is ill-appearing and toxic-appearing.   HENT:      Head: Normocephalic and atraumatic.      Right Ear: External ear normal.      Left Ear: External ear normal.      Nose: Nose normal.      Mouth/Throat: drooling     Comments Nebulizer in place  Cardiovascular:      Rate and Rhythm: Normal rate and regular rhythm.      Heart sounds: No murmur heard.  Pulmonary:      Breath sounds: Rhonchi present. No wheezing or rales.   Abdominal:      General: Bowel sounds are normal. There is no distension.      Palpations: Abdomen is soft.      Tenderness: There is no abdominal tenderness.   Genitourinary:  external urinary device with slightly turbid urine  Musculoskeletal:       Cervical back: Neck supple.      Right lower leg: No edema.      Left lower leg: Edema present.   Skin:     Findings: No rash.   Neurological: nonfocal, moderate confusion  Psychiatric: normal behavior      Labs/Imaging/Diagnostics    Labs:  CBC:  Recent Labs     06/07/24  0504 06/08/24  0600 06/09/24  0528   WBC 18.3* 20.2* 17.5*   RBC 2.81* 2.79* 2.70*   HGB 8.3* 8.2* 7.9*   HCT 25.0* 24.6* 24.4*   MCV 89.0 88.2 90.4   RDW 15.9* 15.7* 15.8*   * 179 217       CHEMISTRIES:  Recent Labs     06/06/24  1324 06/07/24  0504 06/08/24  0600 06/09/24  0528    136 140 140   K 5.0 5.2* 5.4* 4.2    106 109* 106   CO2 23 24 24 28   BUN 77* 79* 93* 68*   CREATININE 2.20* 2.31* 2.03* 1.86*   GLUCOSE 113* 110* 107* 106*   PHOS  --  5.1* 4.7 4.6   MG 2.0  --   --   --          Lactic acid 6.39      Procal 0.71 <2.45 <3.99 <6.56 <11.42 <27.56 <35.77 <50.14 <72.72 <0.22           CRP 17.00 <18.70 < 16.20 < 12.30 <9.00 < 13.70 <20.20 <29.80 <10.50         MRSA nasal PCR Positive    Blood cultures (5/29) No growth FINAL  Blood cultures (5/29)  No growth FINAL  Urine culture (5/29) No growth FINAL  Sputum culture (5/29) Light normal respiratory doug FINAL    Imaging Last 24 Hours:          US RETROPERITONEAL COMPLETE    Result Date: 5/30/2024  Limited RENAL ULTRASOUND INDICATION: Renal failure COMPARISON: CT 3/4/2024. TECHNIQUE: Limited sonography of the kidneys, retroperitoneum, and bladder was performed. FINDINGS: The kidneys are atrophic. No gross hydronephrosis. The left kidney is poorly visualized. There is a small volume of ascites.     1. Atrophic kidneys. No gross hydronephrosis. 2. Small volume of ascites.     XR CHEST PORTABLE    Result Date: 6/6/2024    Stable bibasilar atelectasis and low lung volumes. Stable lines and tubes, except for extubation and removal of gastric tube                .    Assessment//Plan           Hospital Problems             Last Modified POA    * (Principal) Acute respiratory  failure ([facility]) 5/29/2024 Yes    Cardiac arrest ([facility]) 5/29/2024 Yes      Severe sepsis/septic shock with hypothermia, leukocytosis, elevated lactic acid and procal/CRP, resolving  Presumptive aspiration pneumonia, primarily right lung, sputum culture grew normal doug, status post 9 days of IV Meropenem  Acute hypoxic respiratory failure, intubated  Hyperglycemia  FIDEL  Penicillin allergy  7.   MRSA nasal colonization status post nasal Mupirocin     Comment:   It appears that Meropenem was discontinued 3 days ago.  He has remained afebrile. WBC remains elevated but he is on Solumedrol.  Procal decreasing and CRP now decreasing.       Plan   1. Meropenem discontinued on 6/6/24  2. Will continue to follow for now  3.  In am, repeat CBC, procal and CRP       Electronically signed by Riley Alonzo MD

## 2024-06-09 NOTE — CONSULTS
IMPRESSION:   Acute hypoxic respiratory failure extubated on nasal cannula  Cardiac arrest  A-fib with RVR on amiodarone  Severe sepsis with septic shock on pressors  Aspiration pneumonia  Acute kidney injury status post CVVH  Hypophosphatemia to be repleted  Hypokalemia to be repleted  Hyperkalemia resolved  COPD no wheezing  MRSA nasal colonization  History of cocaine abuse polysubstance abuse in the past  Pt is critically ill. Time spent with pt and staff actively rendering care, managing pt and coordinating care as stated below; 30 minutes, exclusive of any procedures      RECOMMENDATIONS/PLAN:   ICU monitoring  Extubated yesterday on 6/5 now on nasal cannula alert awake he is not on oxygen at home  Speech had seen the patient  Chest x-ray with continued right lower infiltrate CTA chest showed bilateral infiltrates consistent with aspiration  Remains on Merrem and vancomycin sputum with normal doug  Heart rate controlled on IV amiodarone  Renal function improving off CVVH as needed dialysis  Status post cardiac arrest patient coded and he was intubated and received epinephrine and ACLS protocol ROSC is obtained 10 minutes   Patient is off pressors  No wheezing continue nebulized albuterol Atrovent      [x] High complexity decision making was performed  [x] See my orders for details  HPI  78-year-old male nursing home resident came in because of shortness of breath respiratory distress patient has episodes of vomiting after eating breakfast this morning subsequently came to the hospital saturation was in 70s he was back to the emergency room subsequently intubated and then he coded hemodynamically unstable now he is on 2 pressors vasopressin and Levophed denies intubated on ventilator critical care consult was called, past medical history of polysubstance abuse cocaine abuse COPD hypertension insomnia      PMH:  has a past medical history of Benign prostatic disease, Benign prostatic hyperplasia, Cervical  venous bilateral   Final Result      XR CHEST PORTABLE   Final Result   Unchanged right perihilar and left basilar opacities.      XR CHEST PORTABLE   Final Result      Unchanged radiographic appearance.         XR CHEST 1 VIEW   Final Result   Stable right pulmonary edema.      Vascular duplex lower extremity venous bilateral   Final Result      XR CHEST 1 VIEW   Final Result   Right lung interstitial and airspace disease likely represents asymmetric edema.      XR CHEST PORTABLE   Final Result   1. No pneumothorax   2. The ET tube is 2 cm above the gina this could be retracted 2 cm.      IR NONTUNNELED VASCULAR CATHETER > 5 YEARS   Final Result   Technically successful ultrasound guided placement of a right internal jugular   vein temporary dialysis catheter.  A post procedure chest x-ray is pending.         XR CHEST 1 VIEW   Final Result   1. Increasing bibasilar opacities left greater than right most likely due to   atelectasis.   2. Decrease in consolidation in the right upper lobe.   3. The ET tube could be retracted 1.5 to 2 cm.      US RETROPERITONEAL COMPLETE   Final Result   1. Atrophic kidneys. No gross hydronephrosis.   2. Small volume of ascites.         XR CHEST PORTABLE   Final Result   Left IJ central venous catheter placement. No pneumothorax. Right   upper lobe airspace disease and mild bilateral interstitial opacities unchanged.      XR CHEST PORTABLE   Final Result   1. ET tube tip is angled towards the right mainstem bronchus and is 1 cm   superior to the level of the gina. Recommend repositioning.   2. Right lung patchy airspace consolidation.      IR ULTRASOUND GUIDANCE VASCULAR ACCESS    (Results Pending)       XR CHEST PORTABLE    Result Date: 5/29/2024  EXAM: XR CHEST PORTABLE INDICATION: Line placement COMPARISON: 5/29/2024 FINDINGS: A portable AP radiograph of the chest was obtained at 1347 hours. Right upper lobe airspace disease.. Bilateral interstitial and airspace opacities.. Left

## 2024-06-09 NOTE — PLAN OF CARE
Problem: Safety - Adult  Goal: Free from fall injury  Outcome: Progressing     Problem: Discharge Planning  Goal: Discharge to home or other facility with appropriate resources  Outcome: Progressing  Flowsheets (Taken 6/8/2024 0815 by Velma Greene, RN)  Discharge to home or other facility with appropriate resources:   Identify barriers to discharge with patient and caregiver   Arrange for needed discharge resources and transportation as appropriate   Identify discharge learning needs (meds, wound care, etc)   Arrange for interpreters to assist at discharge as needed   Refer to discharge planning if patient needs post-hospital services based on physician order or complex needs related to functional status, cognitive ability or social support system     Problem: Chronic Conditions and Co-morbidities  Goal: Patient's chronic conditions and co-morbidity symptoms are monitored and maintained or improved  Outcome: Progressing  Flowsheets (Taken 6/8/2024 0815 by Velma Greene, RN)  Care Plan - Patient's Chronic Conditions and Co-Morbidity Symptoms are Monitored and Maintained or Improved:   Monitor and assess patient's chronic conditions and comorbid symptoms for stability, deterioration, or improvement   Collaborate with multidisciplinary team to address chronic and comorbid conditions and prevent exacerbation or deterioration   Update acute care plan with appropriate goals if chronic or comorbid symptoms are exacerbated and prevent overall improvement and discharge     Problem: Skin/Tissue Integrity  Goal: Absence of new skin breakdown  Description: 1.  Monitor for areas of redness and/or skin breakdown  2.  Assess vascular access sites hourly  3.  Every 4-6 hours minimum:  Change oxygen saturation probe site  4.  Every 4-6 hours:  If on nasal continuous positive airway pressure, respiratory therapy assess nares and determine need for appliance change or resting period.  Outcome: Progressing     Problem:  spiritual beliefs, cultural understandings or values that may help or hinder letting go of issue.  6. Help patient/family explore feelings of anger, bitterness, resentment.  7. Help patient/family identify and examine the situation in which these feelings are experienced.  8. Help patient/family identify destructive displacement of feelings onto other individuals.  9. Invite use of sacraments/rituals/ceremonies as appropriate (e.g. - confession, anointing, smudging).  10. Refer patient/family to formal counseling and/or to [facility] for further support work.  Outcome: Progressing  Flowsheets (Taken 6/8/2024 0815 by Velma Greene RN)  Patient/family able to move forward in process of forgiving self, others, and/or higher power:   Assist patient to overcome blocks to healing by use of spiritual practices (prayer, meditation, guided imagery, reiki, breath work, etc)   De-myth guilt and help patient/family identify possible irrational spiritual/cultural beliefs and values   Explore possibilities of making amends and reconciliation with self, others, and/or a greater power   Guide patient/family in identifying painful feelings of guilt   Help patient explore and identify spiritual beliefs, cultural understandings or values that may help or hinder letting go of issue   Help patient explore feelings of anger, bitterness, resentment   Help patient/family identify and examine the situation in which these feelings are experienced   Help patient/family identify destructive displacement of feelings onto other individuals   Invite use of sacraments/rituals/ceremonies as appropriate (e.g. - confession, anointing, smudging)   Refer patient to formal counseling and/or to [facility] for further support work

## 2024-06-09 NOTE — PROGRESS NOTES
4 Eyes Skin Assessment     NAME:  Agustín Willis  YOB: 1945  MEDICAL RECORD NUMBER:  [id number]    The patient is being assessed for  Transfer to New Unit    I agree that at least one RN has performed a thorough Head to Toe Skin Assessment on the patient. ALL assessment sites listed below have been assessed.      Areas assessed by both nurses:    Head, Face, Ears, Shoulders, Back, Chest, Arms, Elbows, Hands, Sacrum. Buttock, Coccyx, Ischium, Legs. Feet and Heels, and Under Medical Devices         Does the Patient have a Wound? Yes wound(s) were present on assessment. LDA wound assessment was Initiated and completed by RN       Luisito Prevention initiated by RN: Yes  Wound Care Orders initiated by RN: No    Pressure Injury (Stage 3,4, Unstageable, DTI, NWPT, and Complex wounds) if present, place Wound referral order by RN under : No    New Ostomies, if present place, Ostomy referral order under : No     Nurse 1 eSignature: Electronically signed by Nohelia Michele RN on 6/9/24 at 3:35 PM EDT    **SHARE this note so that the co-signing nurse can place an eSignature**    Nurse 2 eSignature: {Esignature:[id number]}

## 2024-06-09 NOTE — PROGRESS NOTES
PT eval order received and acknowledged. Pt chart reviewed and pt screened and is currently presenting with baseline totalA with all functional mobility/transfers. Pt currently presents with functional strength and ROM impairments in all extremities, UE > LE. Pt requires totalA for feeding. PT evaluation order will be discontinued at this time as pt has no acute PT needs. Please reorder PT if pt functional status changes. Thank you.    [facility] AM-PAC™ “6 Clicks”         Basic Mobility Inpatient Short Form  How much difficulty does the patient currently have... Unable A Lot A Little None   1.  Turning over in bed (including adjusting bedclothes, sheets and blankets)?   [x] 1   [] 2   [] 3   [] 4   2.  Sitting down on and standing up from a chair with arms ( e.g., wheelchair, bedside commode, etc.)   [x] 1   [] 2   [] 3   [] 4   3.  Moving from lying on back to sitting on the side of the bed?   [x] 1   [] 2   [] 3   [] 4          How much help from another person does the patient currently need... Total A Lot A Little None   4.  Moving to and from a bed to a chair (including a wheelchair)?   [x] 1   [] 2   [] 3   [] 4   5.  Need to walk in hospital room?   [x] 1   [] 2   [] 3   [] 4   6.  Climbing 3-5 steps with a railing?   [x] 1   [] 2   [] 3   [] 4   © 2007, Trustees of [facility], under license to [employer]. All rights reserved     Score:  Initial: 6/24 Most Recent: X (Date: 6/9/2024 )   Interpretation of Tool:  Represents activities that are increasingly more difficult (i.e. Bed mobility, Transfers, Gait).  Score 24 23 22-20 19-15 14-10 9-7 6   Modifier CH CI CJ CK CL CM CN

## 2024-06-10 ENCOUNTER — APPOINTMENT (OUTPATIENT)
Facility: HOSPITAL | Age: 79
DRG: 870 | End: 2024-06-10
Payer: MEDICARE

## 2024-06-10 LAB
ALBUMIN SERPL-MCNC: 1.7 G/DL (ref 3.5–5)
ALBUMIN SERPL-MCNC: 2 G/DL (ref 3.5–5)
ALBUMIN/GLOB SERPL: 0.4 (ref 1.1–2.2)
ALP SERPL-CCNC: 88 U/L (ref 45–117)
ALT SERPL-CCNC: 48 U/L (ref 12–78)
ANION GAP SERPL CALC-SCNC: 10 MMOL/L (ref 5–15)
ANION GAP SERPL CALC-SCNC: 7 MMOL/L (ref 5–15)
ARTERIAL PATENCY WRIST A: YES
AST SERPL W P-5'-P-CCNC: 68 U/L (ref 15–37)
BASE DEFICIT BLDA-SCNC: 5.4 MMOL/L
BASOPHILS # BLD: 0 K/UL (ref 0–0.1)
BASOPHILS # BLD: 0 K/UL (ref 0–0.1)
BASOPHILS NFR BLD: 0 % (ref 0–1)
BASOPHILS NFR BLD: 0 % (ref 0–1)
BDY SITE: ABNORMAL
BILIRUB SERPL-MCNC: 0.7 MG/DL (ref 0.2–1)
BODY TEMPERATURE: 98.9
BUN SERPL-MCNC: 84 MG/DL (ref 6–20)
BUN SERPL-MCNC: 93 MG/DL (ref 6–20)
BUN/CREAT SERPL: 36 (ref 12–20)
BUN/CREAT SERPL: 39 (ref 12–20)
CA-I BLD-MCNC: 8.4 MG/DL (ref 8.5–10.1)
CA-I BLD-MCNC: 8.5 MG/DL (ref 8.5–10.1)
CHLORIDE SERPL-SCNC: 108 MMOL/L (ref 97–108)
CHLORIDE SERPL-SCNC: 109 MMOL/L (ref 97–108)
CO2 SERPL-SCNC: 24 MMOL/L (ref 21–32)
CO2 SERPL-SCNC: 27 MMOL/L (ref 21–32)
COHGB MFR BLD: 0.5 % (ref 1–2)
CREAT SERPL-MCNC: 2.14 MG/DL (ref 0.7–1.3)
CREAT SERPL-MCNC: 2.6 MG/DL (ref 0.7–1.3)
CRP SERPL-MCNC: 12 MG/DL (ref 0–0.3)
DIFFERENTIAL METHOD BLD: ABNORMAL
DIFFERENTIAL METHOD BLD: ABNORMAL
ECHO BSA: 1.98 M2
ECHO IVC EXP: 1.8 CM
ECHO LV FRACTIONAL SHORTENING: 42 % (ref 28–44)
ECHO LV INTERNAL DIMENSION DIASTOLE INDEX: 1.85 CM/M2
ECHO LV INTERNAL DIMENSION DIASTOLIC: 3.8 CM (ref 4.2–5.9)
ECHO LV INTERNAL DIMENSION SYSTOLIC INDEX: 1.07 CM/M2
ECHO LV INTERNAL DIMENSION SYSTOLIC: 2.2 CM
ECHO LV IVSD: 1.2 CM (ref 0.6–1)
ECHO LV MASS 2D: 173.1 G (ref 88–224)
ECHO LV MASS INDEX 2D: 84.4 G/M2 (ref 49–115)
ECHO LV POSTERIOR WALL DIASTOLIC: 1.4 CM (ref 0.6–1)
ECHO LV RELATIVE WALL THICKNESS RATIO: 0.74
EKG ATRIAL RATE: 93 BPM
EKG DIAGNOSIS: NORMAL
EKG P AXIS: 47 DEGREES
EKG P-R INTERVAL: 142 MS
EKG Q-T INTERVAL: 350 MS
EKG QRS DURATION: 94 MS
EKG QTC CALCULATION (BAZETT): 435 MS
EKG R AXIS: -26 DEGREES
EKG T AXIS: 32 DEGREES
EKG VENTRICULAR RATE: 93 BPM
EOSINOPHIL # BLD: 0 K/UL (ref 0–0.4)
EOSINOPHIL # BLD: 0 K/UL (ref 0–0.4)
EOSINOPHIL NFR BLD: 0 % (ref 0–7)
EOSINOPHIL NFR BLD: 0 % (ref 0–7)
ERYTHROCYTE [DISTWIDTH] IN BLOOD BY AUTOMATED COUNT: 15.7 % (ref 11.5–14.5)
ERYTHROCYTE [DISTWIDTH] IN BLOOD BY AUTOMATED COUNT: 15.7 % (ref 11.5–14.5)
FIO2 ON VENT: 100 %
GAS FLOW.O2 SETTING OXYMISER: 14
GLOBULIN SER CALC-MCNC: 4.4 G/DL (ref 2–4)
GLUCOSE SERPL-MCNC: 104 MG/DL (ref 65–100)
GLUCOSE SERPL-MCNC: 182 MG/DL (ref 65–100)
HCO3 BLDA-SCNC: 20 MMOL/L (ref 22–26)
HCT VFR BLD AUTO: 22.8 % (ref 36.6–50.3)
HCT VFR BLD AUTO: 22.9 % (ref 36.6–50.3)
HGB BLD-MCNC: 7.1 G/DL (ref 12.1–17)
HGB BLD-MCNC: 7.4 G/DL (ref 12.1–17)
IMM GRANULOCYTES # BLD AUTO: 0 K/UL
IMM GRANULOCYTES # BLD AUTO: 0.1 K/UL (ref 0–0.04)
IMM GRANULOCYTES NFR BLD AUTO: 0 %
IMM GRANULOCYTES NFR BLD AUTO: 1 % (ref 0–0.5)
LYMPHOCYTES # BLD: 1.4 K/UL (ref 0.8–3.5)
LYMPHOCYTES # BLD: 1.7 K/UL (ref 0.8–3.5)
LYMPHOCYTES NFR BLD: 11 % (ref 12–49)
LYMPHOCYTES NFR BLD: 6 % (ref 12–49)
MCH RBC QN AUTO: 28.6 PG (ref 26–34)
MCH RBC QN AUTO: 29.1 PG (ref 26–34)
MCHC RBC AUTO-ENTMCNC: 31.1 G/DL (ref 30–36.5)
MCHC RBC AUTO-ENTMCNC: 32.3 G/DL (ref 30–36.5)
MCV RBC AUTO: 90.2 FL (ref 80–99)
MCV RBC AUTO: 91.9 FL (ref 80–99)
METHGB MFR BLD: 0.3 % (ref 0–1.4)
MONOCYTES # BLD: 0.7 K/UL (ref 0–1)
MONOCYTES # BLD: 1.2 K/UL (ref 0–1)
MONOCYTES NFR BLD: 3 % (ref 5–13)
MONOCYTES NFR BLD: 7 % (ref 5–13)
NEUTS BAND NFR BLD MANUAL: 4 % (ref 0–6)
NEUTS SEG # BLD: 13.2 K/UL (ref 1.8–8)
NEUTS SEG # BLD: 21.8 K/UL (ref 1.8–8)
NEUTS SEG NFR BLD: 81 % (ref 32–75)
NEUTS SEG NFR BLD: 87 % (ref 32–75)
NRBC # BLD: 0.02 K/UL (ref 0–0.01)
NRBC # BLD: 0.04 K/UL (ref 0–0.01)
NRBC BLD-RTO: 0.1 PER 100 WBC
NRBC BLD-RTO: 0.2 PER 100 WBC
OXYHGB MFR BLD: 98.4 % (ref 95–99)
PCO2 BLDA: 41 MMHG (ref 35–45)
PEEP RESPIRATORY: 5
PERFORMED BY:: ABNORMAL
PH BLDA: 7.32 (ref 7.35–7.45)
PHOSPHATE SERPL-MCNC: 5.1 MG/DL (ref 2.6–4.7)
PLATELET # BLD AUTO: 238 K/UL (ref 150–400)
PLATELET # BLD AUTO: 239 K/UL (ref 150–400)
PMV BLD AUTO: 10.1 FL (ref 8.9–12.9)
PMV BLD AUTO: 9.6 FL (ref 8.9–12.9)
PO2 BLDA: 253 MMHG (ref 80–100)
POTASSIUM SERPL-SCNC: 4.2 MMOL/L (ref 3.5–5.1)
POTASSIUM SERPL-SCNC: 4.6 MMOL/L (ref 3.5–5.1)
PROCALCITONIN SERPL-MCNC: 0.27 NG/ML
PROT SERPL-MCNC: 6.1 G/DL (ref 6.4–8.2)
RBC # BLD AUTO: 2.48 M/UL (ref 4.1–5.7)
RBC # BLD AUTO: 2.54 M/UL (ref 4.1–5.7)
RBC MORPH BLD: ABNORMAL
SAO2 % BLD: 99 % (ref 95–99)
SAO2% DEVICE SAO2% SENSOR NAME: ABNORMAL
SODIUM SERPL-SCNC: 142 MMOL/L (ref 136–145)
SODIUM SERPL-SCNC: 143 MMOL/L (ref 136–145)
SPECIMEN SITE: ABNORMAL
TROPONIN I SERPL HS-MCNC: 45 NG/L (ref 0–76)
UFH PPP CHRO-ACNC: 0.31 IU/ML
VENTILATION MODE VENT: ABNORMAL
VT SETTING VENT: 550
WBC # BLD AUTO: 16.3 K/UL (ref 4.1–11.1)
WBC # BLD AUTO: 23.9 K/UL (ref 4.1–11.1)

## 2024-06-10 PROCEDURE — A9500 TC99M SESTAMIBI: HCPCS | Performed by: INTERNAL MEDICINE

## 2024-06-10 PROCEDURE — 2000000000 HC ICU R&B

## 2024-06-10 PROCEDURE — 93005 ELECTROCARDIOGRAM TRACING: CPT | Performed by: FAMILY MEDICINE

## 2024-06-10 PROCEDURE — 36600 WITHDRAWAL OF ARTERIAL BLOOD: CPT

## 2024-06-10 PROCEDURE — 80053 COMPREHEN METABOLIC PANEL: CPT

## 2024-06-10 PROCEDURE — 2500000003 HC RX 250 WO HCPCS: Performed by: FAMILY MEDICINE

## 2024-06-10 PROCEDURE — 82803 BLOOD GASES ANY COMBINATION: CPT

## 2024-06-10 PROCEDURE — 2580000003 HC RX 258: Performed by: FAMILY MEDICINE

## 2024-06-10 PROCEDURE — 36415 COLL VENOUS BLD VENIPUNCTURE: CPT

## 2024-06-10 PROCEDURE — 85025 COMPLETE CBC W/AUTO DIFF WBC: CPT

## 2024-06-10 PROCEDURE — 84145 PROCALCITONIN (PCT): CPT

## 2024-06-10 PROCEDURE — 94761 N-INVAS EAR/PLS OXIMETRY MLT: CPT

## 2024-06-10 PROCEDURE — 94640 AIRWAY INHALATION TREATMENT: CPT

## 2024-06-10 PROCEDURE — 6360000002 HC RX W HCPCS

## 2024-06-10 PROCEDURE — 51798 US URINE CAPACITY MEASURE: CPT

## 2024-06-10 PROCEDURE — 94002 VENT MGMT INPAT INIT DAY: CPT

## 2024-06-10 PROCEDURE — 71045 X-RAY EXAM CHEST 1 VIEW: CPT

## 2024-06-10 PROCEDURE — 99233 SBSQ HOSP IP/OBS HIGH 50: CPT | Performed by: INTERNAL MEDICINE

## 2024-06-10 PROCEDURE — 6370000000 HC RX 637 (ALT 250 FOR IP): Performed by: FAMILY MEDICINE

## 2024-06-10 PROCEDURE — 6370000000 HC RX 637 (ALT 250 FOR IP): Performed by: INTERNAL MEDICINE

## 2024-06-10 PROCEDURE — 70450 CT HEAD/BRAIN W/O DYE: CPT

## 2024-06-10 PROCEDURE — 6370000000 HC RX 637 (ALT 250 FOR IP): Performed by: STUDENT IN AN ORGANIZED HEALTH CARE EDUCATION/TRAINING PROGRAM

## 2024-06-10 PROCEDURE — 86140 C-REACTIVE PROTEIN: CPT

## 2024-06-10 PROCEDURE — 93308 TTE F-UP OR LMTD: CPT

## 2024-06-10 PROCEDURE — 84484 ASSAY OF TROPONIN QUANT: CPT

## 2024-06-10 PROCEDURE — 2700000000 HC OXYGEN THERAPY PER DAY

## 2024-06-10 PROCEDURE — 85520 HEPARIN ASSAY: CPT

## 2024-06-10 PROCEDURE — 3430000000 HC RX DIAGNOSTIC RADIOPHARMACEUTICAL: Performed by: INTERNAL MEDICINE

## 2024-06-10 PROCEDURE — 31500 INSERT EMERGENCY AIRWAY: CPT

## 2024-06-10 PROCEDURE — 80069 RENAL FUNCTION PANEL: CPT

## 2024-06-10 RX ORDER — PROPOFOL 10 MG/ML
INJECTION, EMULSION INTRAVENOUS
Status: COMPLETED
Start: 2024-06-10 | End: 2024-06-10

## 2024-06-10 RX ORDER — NOREPINEPHRINE BITARTRATE 0.06 MG/ML
1-100 INJECTION, SOLUTION INTRAVENOUS CONTINUOUS
Status: DISCONTINUED | OUTPATIENT
Start: 2024-06-10 | End: 2024-06-23

## 2024-06-10 RX ORDER — LEVETIRACETAM 500 MG/5ML
1000 INJECTION, SOLUTION, CONCENTRATE INTRAVENOUS ONCE
Status: DISCONTINUED | OUTPATIENT
Start: 2024-06-10 | End: 2024-06-10

## 2024-06-10 RX ORDER — LEVETIRACETAM 500 MG/5ML
2000 INJECTION, SOLUTION, CONCENTRATE INTRAVENOUS ONCE
Status: COMPLETED | OUTPATIENT
Start: 2024-06-10 | End: 2024-06-11

## 2024-06-10 RX ORDER — TETRAKIS(2-METHOXYISOBUTYLISOCYANIDE)COPPER(I) TETRAFLUOROBORATE 1 MG/ML
10.56 INJECTION, POWDER, LYOPHILIZED, FOR SOLUTION INTRAVENOUS
Status: COMPLETED | OUTPATIENT
Start: 2024-06-10 | End: 2024-06-10

## 2024-06-10 RX ORDER — PREDNISONE 20 MG/1
20 TABLET ORAL 2 TIMES DAILY
Status: DISCONTINUED | OUTPATIENT
Start: 2024-06-10 | End: 2024-06-11

## 2024-06-10 RX ORDER — LEVETIRACETAM 500 MG/5ML
500 INJECTION, SOLUTION, CONCENTRATE INTRAVENOUS EVERY 12 HOURS
Status: DISCONTINUED | OUTPATIENT
Start: 2024-06-11 | End: 2024-06-11

## 2024-06-10 RX ORDER — PROPOFOL 10 MG/ML
5-50 INJECTION, EMULSION INTRAVENOUS CONTINUOUS
Status: DISCONTINUED | OUTPATIENT
Start: 2024-06-10 | End: 2024-06-23

## 2024-06-10 RX ADMIN — AMIODARONE HYDROCHLORIDE 400 MG: 200 TABLET ORAL at 10:16

## 2024-06-10 RX ADMIN — ASPIRIN 81 MG 81 MG: 81 TABLET ORAL at 10:17

## 2024-06-10 RX ADMIN — THIAMINE HCL TAB 100 MG 100 MG: 100 TAB at 10:17

## 2024-06-10 RX ADMIN — SODIUM CHLORIDE, PRESERVATIVE FREE 10 ML: 5 INJECTION INTRAVENOUS at 10:17

## 2024-06-10 RX ADMIN — IPRATROPIUM BROMIDE AND ALBUTEROL SULFATE 1 DOSE: 2.5; .5 SOLUTION RESPIRATORY (INHALATION) at 14:31

## 2024-06-10 RX ADMIN — SODIUM CHLORIDE, PRESERVATIVE FREE 10 ML: 5 INJECTION INTRAVENOUS at 20:54

## 2024-06-10 RX ADMIN — IPRATROPIUM BROMIDE AND ALBUTEROL SULFATE 1 DOSE: 2.5; .5 SOLUTION RESPIRATORY (INHALATION) at 19:36

## 2024-06-10 RX ADMIN — PREDNISONE 20 MG: 5 TABLET ORAL at 10:16

## 2024-06-10 RX ADMIN — Medication 5 MCG/MIN: at 15:30

## 2024-06-10 RX ADMIN — FOLIC ACID 1 MG: 1 TABLET ORAL at 10:17

## 2024-06-10 RX ADMIN — PROPOFOL 20 MCG/KG/MIN: 10 INJECTION, EMULSION INTRAVENOUS at 15:56

## 2024-06-10 RX ADMIN — FERROUS SULFATE TAB 325 MG (65 MG ELEMENTAL FE) 325 MG: 325 (65 FE) TAB at 10:17

## 2024-06-10 RX ADMIN — TETRAKIS(2-METHOXYISOBUTYLISOCYANIDE)COPPER(I) TETRAFLUOROBORATE 10.56 MILLICURIE: 1 INJECTION, POWDER, LYOPHILIZED, FOR SOLUTION INTRAVENOUS at 08:00

## 2024-06-10 RX ADMIN — FINASTERIDE 5 MG: 5 TABLET, FILM COATED ORAL at 10:17

## 2024-06-10 RX ADMIN — IPRATROPIUM BROMIDE AND ALBUTEROL SULFATE 1 DOSE: 2.5; .5 SOLUTION RESPIRATORY (INHALATION) at 02:08

## 2024-06-10 ASSESSMENT — PAIN SCALES - GENERAL
PAINLEVEL_OUTOF10: 0

## 2024-06-10 ASSESSMENT — PULMONARY FUNCTION TESTS
PIF_VALUE: 28
PIF_VALUE: 24
PIF_VALUE: 27

## 2024-06-10 NOTE — PROGRESS NOTES
Per chart review, pt is s/p code blue, transferred to ICU and intubated. Will d/c SLP consult at this time and please re-consult if/as medically appropriate.

## 2024-06-10 NOTE — PROGRESS NOTES
Nutrition Note    Patient s/p code blue will defer follow up. Consult Nutrition as appropriate.     Electronically signed by Linda Kc RD on 6/10/24 at 2:52 PM EDT    Contact: Ext: [phone] or [employer]

## 2024-06-10 NOTE — PROGRESS NOTES
1402 - rapid call; patient's HR at 39 on monitor, sats 50%, patient was not responding to sternum rub- no pulse was felt and sats were low, switched to code blue; chest compressions started  1407 - first dose of Epinephrine started; patient Intubated at this time (24 @ teeth)  1410 - second dose of Epinephrine started  1411 - pulse check; pulse was felt.  1412 - rainbow orders (CBC w/ diff, CMP, trop, arterial blood gas, chest xray 1 view)  1415 - patient transferred to ICU, report given at bedside.   1505 - Patient's sister, Juliann, was called and notified of emergency

## 2024-06-10 NOTE — CONSULTS
IMPRESSION:   Acute hypoxic respiratory failure extubated on nasal cannula 2.5 L  Status post cardiac arrest  A-fib with rate controlled  Sepsis treated with antibiotics  Aspiration pneumonia  COPD no wheezing  MRSA nasal colonization  History of cocaine abuse polysubstance abuse in the past  Pt is critically ill. Time spent with pt and staff actively rendering care, managing pt and coordinating care as stated below; 30 minutes, exclusive of any procedures      RECOMMENDATIONS/PLAN:   Extubated yesterday on 6/5 now on nasal cannula alert awake he is not on oxygen at home  Speech had seen the patient  Chest x-ray with continued right lower infiltrate CTA chest showed bilateral infiltrates consistent with aspiration  Remains on Merrem and vancomycin sputum with normal doug  Heart rate controlled on amiodarone  Renal function improving off CVVH as needed dialysis  Status post cardiac arrest patient coded   Patient is off pressors  No wheezing continue nebulized albuterol Atrovent      [x] High complexity decision making was performed  [x] See my orders for details  HPI  78-year-old male nursing home resident came in because of shortness of breath respiratory distress patient has episodes of vomiting after eating breakfast this morning subsequently came to the hospital saturation was in 70s he was back to the emergency room subsequently intubated and then he coded hemodynamically unstable now he is on 2 pressors vasopressin and Levophed denies intubated on ventilator critical care consult was called, past medical history of polysubstance abuse cocaine abuse COPD hypertension insomnia      PMH:  has a past medical history of Benign prostatic disease, Benign prostatic hyperplasia, Cervical radiculopathy, Chronic obstructive lung disease (HCC), Cocaine abuse (HCC), Dizziness and giddiness, Hypercholesterolemia, Hypertensive disorder, Insomnia, Kidney disease, Low back pain, Osteoarthritis of knee, Sleep apnea, and  interstitial opacities unchanged.    XR CHEST PORTABLE    Result Date: 5/29/2024  INDICATION: Intubation Portable AP view of the chest. Direct comparison made to prior chest x-ray dated March 2024. Cardiomediastinal silhouette is stable. ETT tube tip is angled toward the right mainstem bronchus and is 1 cm superior to the level of the gina. NG tube extends the stomach. There is patchy airspace consolidation throughout the right lung. There is very mild left basilar atelectasis. No pleural fluid is visualized but there is no pneumothorax.     1. ET tube tip is angled towards the right mainstem bronchus and is 1 cm superior to the level of the gina. Recommend repositioning. 2. Right lung patchy airspace consolidation.     5/30 remain intubated on ventilator potassium elevated ABG acceptable still hemodynamically unstable on pressors chest x-ray shows extensive infiltrate in the right side mostly upper lobe left lung clear  5/31 hemodynamically unstable on Levophed and vasopressin, remain intubated on 40% FiO2 assist-control mode pCO2 37 pO2 71, started on CVVH creatinine improved to 2.15, chest x-ray shows ET tube 2 cm above the gina lung is fully inflated left-sided clear right-sided has patchy infiltrate lower lobe and some congestive changes extreme, nasal MRSA now on Bactroban continue with meropenem and vancomycin  6/1 remains on pressors on the ventilator sedated on propofol and Precedex.  Right lower lobe infiltrate unchanged.  WBC count remains elevated with procalcitonin improving remains on Merrem and vancomycin nasal MRSA smear was positive but sputum only shows normal doug so far  6/2 remains on norepinephrine for blood pressure support hypothermic yesterday now on warming blanket.  Continues with CRRT with fluid removal.  Potassium and phosphorus to be repleted  6/3 remain intubated on ventilator getting CRRT, ABG acceptable pCO2 33 pO2 75 on 35% FiO2 on assist-control mode on meropenem will  continue to wean pressors  6/4 on assist-control mode pCO2 31 pO2 80 on 35% FiO2 getting IV fluid 200 cc/h on amiodarone hemodynamically unstable on Levophed and NEOS epinephrine   6/5 remain intubated on ventilator we will do sedation vacation ABG stentable 35% FiO2 still on vasopressors now on phenylephrine   6/6 alert awake talking on nasal cannula extubated on 6/5 patient on meropenem speech evaluation he is not on oxygen at home  6/7 oral congestion continue suctioning, alert awake on nasal cannula continue with the suctioning high risk for aspiration on IV heparin and meropenem\  6/8 alert awake on oxygen via nasal cannula chest x-ray atelectasis lower lung volume on Merrem  6/9 alert awake on nasal cannula on IV Solu-Medrol nebulizer treatment will change to prednisone in a.m.  6/10 on 2-1/2 L nasal cannula will discontinue Solu-Medrol start patient on prednisone on meropenem

## 2024-06-10 NOTE — PROGRESS NOTES
CODE BLUE    Rapid response this afternoon for poor responsiveness where he lost pulse and CPR was started  Intubated ventilated for respiratory failure  Monitor revealing PEA  Pulse regained after about 11 mins of resuscitative efforts  Transferring to ICU  Discussed with Bing Arredondo and Rachel by telephone    Please see nursing code documentation for further details

## 2024-06-10 NOTE — CARE COORDINATION
DC Plan: [facility]    Pt transferred from the ICU to .     Cm sent updated clinicals via [facility] to [facility].

## 2024-06-10 NOTE — PROGRESS NOTES
Addendum to the pulmonary note    Rapid response was called patient coded he was bradycardic CPR was initiated subsequently got intubated on ventilator patient was in PEA transferred to ICU started on propofol will get blood gases and chest x-ray will update the family regarding his condition

## 2024-06-10 NOTE — PROGRESS NOTES
Nephrology f/u          Patient: Agustín Willis MRN: [id number]  SSN: [id number]    YOB: 1945  Age: 79 y.o.  Sex: male      Subjective:   I have seen the patient in ICU   Rapid response was called due to unresponsiveness  CPR was done  Intubated and on ventilatory support    The patient was dialyzed on Saturday  2 L fluid was removed  Resolved hyperkalemia  Chest x-ray no pulmonary edema    Past Medical History:   Diagnosis Date    Benign prostatic disease 8/3/2020    Hypertrophy with Outflow Obstruction    Benign prostatic hyperplasia 8/3/2020    Cervical radiculopathy 8/3/2020    Chronic obstructive lung disease (HCC) 8/3/2020    Cocaine abuse (HCC) 8/3/2020    Dizziness and giddiness 8/3/2020    Hypercholesterolemia 8/3/2020    Hypertensive disorder 8/3/2020    Insomnia 8/3/2020    Kidney disease 8/3/2020    Low back pain 8/3/2020    Osteoarthritis of knee 8/3/2020    Sleep apnea 8/3/2020    Vasovagal syncope 8/3/2020     Past Surgical History:   Procedure Laterality Date    IR NONTUNNELED VASCULAR CATHETER  5/30/2024    IR NONTUNNELED VASCULAR CATHETER 5/30/2024 Stephy Abreu APRN - NP SSR RAD ANGIO IR      Family History   Problem Relation Age of Onset    Hypertension Mother      Social History     Tobacco Use    Smoking status: Former    Smokeless tobacco: Never   Substance Use Topics    Alcohol use: Yes      Current Facility-Administered Medications   Medication Dose Route Frequency Provider Last Rate Last Admin    predniSONE (DELTASONE) tablet 20 mg  20 mg Oral BID Jeremy Ramos MD   20 mg at 06/10/24 1016    ipratropium 0.5 mg-albuterol 2.5 mg (DUONEB) nebulizer solution 1 Dose  1 Dose Inhalation Q6H RT Jeremy Ramos MD   1 Dose at 06/10/24 0208    amiodarone (CORDARONE) tablet 400 mg  400 mg Oral BID Gerry Wilson MD   400 mg at 06/10/24 1016    aspirin chewable tablet 81 mg  81 mg Per NG tube Daily Jeremy Ramos MD   81 mg at 06/10/24 1017    epoetin radha-epbx  2K bath      PEA cardiac arrest  Rapid response was called due to unresponsiveness and no pulse  Required CPR  Reintubated on ventilatory support  On no pressor support  Chest x-ray no pulmonary edema      Status post cardiac arrest  V-fib  Status post DC shock cardioversion    Acute hypoxic respiratory failure  Aspiration pneumonia  Underlying COPD  On IV antibiotics  Chest x-ray today showed new infiltrates    Anemia   hemoglobin 7.9  Iron saturation 12 and ferritin 1100   Started on Epo subcu  Blood transfusion if Hb less than 7.0    Atrial fibrillation  on IV heparin drip  Goal K is 4.0    Hypophosphatemia  Phosphorus 2.1->4.1  Received IV K-Phos 20 mmol x 1  Plan:       Signed By: Sol Ferraro MD     Christa 10, 2024

## 2024-06-10 NOTE — PROGRESS NOTES
and low lung volumes. Stable lines and tubes,   except for extubation and removal of gastric tube..  .         Electronically signed by KATHRINE EDWARDS      XR CHEST 1 VIEW   Final Result   Shifting bibasilar atelectasis is increased on the right and   decreased on the left. Stable support devices.         XR CHEST 1 VIEW   Final Result   1. No interval change         Vascular duplex upper extremity venous bilateral   Final Result      XR CHEST PORTABLE   Final Result   Unchanged right perihilar and left basilar opacities.      XR CHEST PORTABLE   Final Result      Unchanged radiographic appearance.         XR CHEST 1 VIEW   Final Result   Stable right pulmonary edema.      Vascular duplex lower extremity venous bilateral   Final Result      XR CHEST 1 VIEW   Final Result   Right lung interstitial and airspace disease likely represents asymmetric edema.      XR CHEST PORTABLE   Final Result   1. No pneumothorax   2. The ET tube is 2 cm above the gina this could be retracted 2 cm.      IR NONTUNNELED VASCULAR CATHETER > 5 YEARS   Final Result   Technically successful ultrasound guided placement of a right internal jugular   vein temporary dialysis catheter.  A post procedure chest x-ray is pending.         XR CHEST 1 VIEW   Final Result   1. Increasing bibasilar opacities left greater than right most likely due to   atelectasis.   2. Decrease in consolidation in the right upper lobe.   3. The ET tube could be retracted 1.5 to 2 cm.      US RETROPERITONEAL COMPLETE   Final Result   1. Atrophic kidneys. No gross hydronephrosis.   2. Small volume of ascites.         XR CHEST PORTABLE   Final Result   Left IJ central venous catheter placement. No pneumothorax. Right   upper lobe airspace disease and mild bilateral interstitial opacities unchanged.      XR CHEST PORTABLE   Final Result   1. ET tube tip is angled towards the right mainstem bronchus and is 1 cm   superior to the level of the gina. Recommend  Blood Culture 2 [[id number]] Collected: 05/29/24 1333    Order Status: Completed Specimen: Blood Updated: 06/04/24 0947     Special Requests No Special Requests        Culture No growth 6 days       Blood Culture 1 [[id number]] Collected: 05/29/24 1332    Order Status: Completed Specimen: Blood Updated: 06/04/24 0947     Special Requests --        Left  Forearm       Culture No growth 6 days                XR CHEST 1 VIEW    Result Date: 5/30/2024  1. Increasing bibasilar opacities left greater than right most likely due to atelectasis. 2. Decrease in consolidation in the right upper lobe. 3. The ET tube could be retracted 1.5 to 2 cm.    XR CHEST PORTABLE    Result Date: 5/29/2024  Left IJ central venous catheter placement. No pneumothorax. Right upper lobe airspace disease and mild bilateral interstitial opacities unchanged.    XR CHEST PORTABLE    Result Date: 5/29/2024  1. ET tube tip is angled towards the right mainstem bronchus and is 1 cm superior to the level of the gina. Recommend repositioning. 2. Right lung patchy airspace consolidation.         Labs:     Recent Labs     06/09/24  0528 06/10/24  0934   WBC 17.5* 16.3*   HGB 7.9* 7.4*   HCT 24.4* 22.9*    239       Recent Labs     06/08/24  0600 06/09/24  0528 06/10/24  0934    140 143   K 5.4* 4.2 4.2   * 106 109*   CO2 24 28 27   BUN 93* 68* 84*   CREATININE 2.03* 1.86* 2.14*   GLUCOSE 107* 106* 104*   CALCIUM 8.9 8.7 8.4*   PHOS 4.7 4.6 5.1*       Recent Labs     06/09/24 0528   AST 11*   ALT 16   ALKPHOS 93   BILITOT 0.4   GLOB 4.8*       No results for input(s): \"INR\", \"PROTIME\", \"APTT\" in the last 72 hours.     No results for input(s): \"IRON\", \"TIBC\", \"FERRITIN\" in the last 72 hours.    Invalid input(s): \"PSAT\"   No results found for: \"VUYYHZRA84\", \"FOLATE\", \"RBCF\"   No results for input(s): \"PH\", \"PCO2\", \"PO2\" in the last 72 hours.  No results for input(s): \"CKMB\", \"CKMBINDEX\", \"TROPHS\" in the last 72 hours.    Invalid  06/03/24 1231 **AND** heparin (porcine) injection 1,100-1,900 Units, 1,100-1,900 Units, IntraCATHeter, PRN, Sol Ferraro MD, 1,100 Units at 06/03/24 1226    sodium chloride 0.9 % bolus 2,448 mL, 30 mL/kg, IntraVENous, Once, Mikaela Arredondo MD, Held at 05/29/24 1405    [Held by provider] aspirin EC tablet 81 mg, 81 mg, Oral, Daily, Mikaela Arredondo MD, 81 mg at 06/02/24 0800    ferrous sulfate (IRON 325) tablet 325 mg, 325 mg, Oral, Daily with breakfast, Mikaela Arredondo MD, 325 mg at 06/10/24 1017    finasteride (PROSCAR) tablet 5 mg, 5 mg, Oral, Daily, Mikaela Arredondo MD, 5 mg at 06/10/24 1017    folic acid (FOLVITE) tablet 1 mg, 1 mg, Oral, Daily, Mikaela Arredondo MD, 1 mg at 06/10/24 1017    thiamine mononitrate tablet 100 mg, 100 mg, Oral, Daily, Mikaela Arredondo MD, 100 mg at 06/10/24 1017    sodium chloride flush 0.9 % injection 5-40 mL, 5-40 mL, IntraVENous, 2 times per day, Mikaela Arredondo MD, 10 mL at 06/10/24 1017    sodium chloride flush 0.9 % injection 5-40 mL, 5-40 mL, IntraVENous, PRN, Mikaela Arredondo MD    0.9 % sodium chloride infusion, , IntraVENous, PRN, Mikaela Arredondo MD    ondansetron (ZOFRAN-ODT) disintegrating tablet 4 mg, 4 mg, Oral, Q8H PRN **OR** ondansetron (ZOFRAN) injection 4 mg, 4 mg, IntraVENous, Q6H PRN, Mikaela Arredondo MD    polyethylene glycol (GLYCOLAX) packet 17 g, 17 g, Oral, Daily PRN, Mikaela Arredondo MD, 17 g at 06/02/24 2100    acetaminophen (TYLENOL) tablet 650 mg, 650 mg, Oral, Q6H PRN, 650 mg at 06/09/24 2047 **OR** acetaminophen (TYLENOL) suppository 650 mg, 650 mg, Rectal, Q6H PRN, Mikaela Arredondo MD    glucose chewable tablet 16 g, 4 tablet, Oral, PRN, Mikaela Arredondo MD    dextrose bolus 10% 125 mL, 125 mL, IntraVENous, PRN **OR** dextrose bolus 10% 250 mL, 250 mL, IntraVENous, PRN, Mikaela Arredondo MD    glucagon injection 1 mg, 1 mg, SubCUTAneous, PRN, Mikaela Arredondo MD    dextrose 10 % infusion, , IntraVENous, Continuous PRN, Maria Elena

## 2024-06-10 NOTE — PROGRESS NOTES
responded to code Blue for support as needed. No family was present at the time.  remained present throughout the code effort.     Chaplain All Skaggs M.Div, M.S, [facility]   can be reached by calling the  at [facility]   [phone]

## 2024-06-11 ENCOUNTER — APPOINTMENT (OUTPATIENT)
Facility: HOSPITAL | Age: 79
DRG: 870 | End: 2024-06-11
Payer: MEDICARE

## 2024-06-11 LAB
ALBUMIN SERPL-MCNC: 1.8 G/DL (ref 3.5–5)
ALBUMIN/GLOB SERPL: 0.4 (ref 1.1–2.2)
ALP SERPL-CCNC: 77 U/L (ref 45–117)
ALT SERPL-CCNC: 43 U/L (ref 12–78)
ANION GAP SERPL CALC-SCNC: 7 MMOL/L (ref 5–15)
ARTERIAL PATENCY WRIST A: ABNORMAL
AST SERPL W P-5'-P-CCNC: 48 U/L (ref 15–37)
BASE EXCESS BLDA CALC-SCNC: 1 MMOL/L (ref 0–3)
BASOPHILS # BLD: 0 K/UL (ref 0–0.1)
BASOPHILS NFR BLD: 0 % (ref 0–1)
BDY SITE: ABNORMAL
BILIRUB SERPL-MCNC: 0.5 MG/DL (ref 0.2–1)
BODY TEMPERATURE: 98
BUN SERPL-MCNC: 92 MG/DL (ref 6–20)
BUN/CREAT SERPL: 38 (ref 12–20)
CA-I BLD-MCNC: 1.1 MMOL/L (ref 1.13–1.32)
CA-I BLD-MCNC: 8.3 MG/DL (ref 8.5–10.1)
CHLORIDE SERPL-SCNC: 108 MMOL/L (ref 97–108)
CO2 SERPL-SCNC: 26 MMOL/L (ref 21–32)
COHGB MFR BLD: 0.3 % (ref 1–2)
CREAT SERPL-MCNC: 2.4 MG/DL (ref 0.7–1.3)
CRP SERPL-MCNC: 12.4 MG/DL (ref 0–0.3)
DIFFERENTIAL METHOD BLD: ABNORMAL
EOSINOPHIL # BLD: 0 K/UL (ref 0–0.4)
EOSINOPHIL NFR BLD: 0 % (ref 0–7)
ERYTHROCYTE [DISTWIDTH] IN BLOOD BY AUTOMATED COUNT: 15.9 % (ref 11.5–14.5)
FIO2 ON VENT: 0.45 %
GAS FLOW.O2 SETTING OXYMISER: 14
GLOBULIN SER CALC-MCNC: 4.2 G/DL (ref 2–4)
GLUCOSE BLD STRIP.AUTO-MCNC: 145 MG/DL (ref 65–100)
GLUCOSE BLD STRIP.AUTO-MCNC: 99 MG/DL (ref 65–100)
GLUCOSE SERPL-MCNC: 103 MG/DL (ref 65–100)
HCO3 BLDA-SCNC: 24 MMOL/L (ref 22–26)
HCT VFR BLD AUTO: 22.4 % (ref 36.6–50.3)
HGB BLD-MCNC: 7.2 G/DL (ref 12.1–17)
IMM GRANULOCYTES # BLD AUTO: 0.1 K/UL (ref 0–0.04)
IMM GRANULOCYTES NFR BLD AUTO: 1 % (ref 0–0.5)
LYMPHOCYTES # BLD: 2 K/UL (ref 0.8–3.5)
LYMPHOCYTES NFR BLD: 10 % (ref 12–49)
MAGNESIUM SERPL-MCNC: 2.2 MG/DL (ref 1.6–2.4)
MCH RBC QN AUTO: 28.9 PG (ref 26–34)
MCHC RBC AUTO-ENTMCNC: 32.1 G/DL (ref 30–36.5)
MCV RBC AUTO: 90 FL (ref 80–99)
METHGB MFR BLD: 0.9 % (ref 0–1.4)
MONOCYTES # BLD: 0.8 K/UL (ref 0–1)
MONOCYTES NFR BLD: 4 % (ref 5–13)
NEUTS SEG # BLD: 16.1 K/UL (ref 1.8–8)
NEUTS SEG NFR BLD: 85 % (ref 32–75)
NRBC # BLD: 0 K/UL (ref 0–0.01)
NRBC BLD-RTO: 0 PER 100 WBC
OXYHGB MFR BLD: 93.2 % (ref 95–99)
PCO2 BLDA: 30 MMHG (ref 35–45)
PEEP RESPIRATORY: 5
PERFORMED BY:: ABNORMAL
PERFORMED BY:: ABNORMAL
PERFORMED BY:: NORMAL
PH BLDA: 7.53 (ref 7.35–7.45)
PHOSPHATE SERPL-MCNC: 4.8 MG/DL (ref 2.6–4.7)
PHOSPHATE SERPL-MCNC: 4.8 MG/DL (ref 2.6–4.7)
PLATELET # BLD AUTO: 244 K/UL (ref 150–400)
PMV BLD AUTO: 9.9 FL (ref 8.9–12.9)
PO2 BLDA: 72 MMHG (ref 80–100)
POTASSIUM SERPL-SCNC: 4 MMOL/L (ref 3.5–5.1)
PROCALCITONIN SERPL-MCNC: 5.94 NG/ML
PROT SERPL-MCNC: 6 G/DL (ref 6.4–8.2)
RBC # BLD AUTO: 2.49 M/UL (ref 4.1–5.7)
SAO2 % BLD: 94 % (ref 95–99)
SAO2% DEVICE SAO2% SENSOR NAME: ABNORMAL
SODIUM SERPL-SCNC: 141 MMOL/L (ref 136–145)
SPECIMEN SITE: ABNORMAL
TROPONIN I SERPL HS-MCNC: 68 NG/L (ref 0–76)
UFH PPP CHRO-ACNC: 0.33 IU/ML
VENTILATION MODE VENT: ABNORMAL
VT SETTING VENT: 550
WBC # BLD AUTO: 19 K/UL (ref 4.1–11.1)

## 2024-06-11 PROCEDURE — 87340 HEPATITIS B SURFACE AG IA: CPT

## 2024-06-11 PROCEDURE — 94640 AIRWAY INHALATION TREATMENT: CPT

## 2024-06-11 PROCEDURE — 6360000002 HC RX W HCPCS: Performed by: INTERNAL MEDICINE

## 2024-06-11 PROCEDURE — 84100 ASSAY OF PHOSPHORUS: CPT

## 2024-06-11 PROCEDURE — 99221 1ST HOSP IP/OBS SF/LOW 40: CPT | Performed by: PSYCHIATRY & NEUROLOGY

## 2024-06-11 PROCEDURE — 2580000003 HC RX 258: Performed by: FAMILY MEDICINE

## 2024-06-11 PROCEDURE — 36600 WITHDRAWAL OF ARTERIAL BLOOD: CPT

## 2024-06-11 PROCEDURE — 87147 CULTURE TYPE IMMUNOLOGIC: CPT

## 2024-06-11 PROCEDURE — 86140 C-REACTIVE PROTEIN: CPT

## 2024-06-11 PROCEDURE — 80053 COMPREHEN METABOLIC PANEL: CPT

## 2024-06-11 PROCEDURE — 87205 SMEAR GRAM STAIN: CPT

## 2024-06-11 PROCEDURE — 2700000000 HC OXYGEN THERAPY PER DAY

## 2024-06-11 PROCEDURE — 82962 GLUCOSE BLOOD TEST: CPT

## 2024-06-11 PROCEDURE — 2709999900 HC NON-CHARGEABLE SUPPLY

## 2024-06-11 PROCEDURE — 6360000002 HC RX W HCPCS: Performed by: FAMILY MEDICINE

## 2024-06-11 PROCEDURE — 86803 HEPATITIS C AB TEST: CPT

## 2024-06-11 PROCEDURE — 84484 ASSAY OF TROPONIN QUANT: CPT

## 2024-06-11 PROCEDURE — 82330 ASSAY OF CALCIUM: CPT

## 2024-06-11 PROCEDURE — 6370000000 HC RX 637 (ALT 250 FOR IP): Performed by: INTERNAL MEDICINE

## 2024-06-11 PROCEDURE — 71045 X-RAY EXAM CHEST 1 VIEW: CPT

## 2024-06-11 PROCEDURE — 82803 BLOOD GASES ANY COMBINATION: CPT

## 2024-06-11 PROCEDURE — 87186 SC STD MICRODIL/AGAR DIL: CPT

## 2024-06-11 PROCEDURE — 85025 COMPLETE CBC W/AUTO DIFF WBC: CPT

## 2024-06-11 PROCEDURE — 2500000003 HC RX 250 WO HCPCS: Performed by: FAMILY MEDICINE

## 2024-06-11 PROCEDURE — 99233 SBSQ HOSP IP/OBS HIGH 50: CPT | Performed by: INTERNAL MEDICINE

## 2024-06-11 PROCEDURE — 6370000000 HC RX 637 (ALT 250 FOR IP): Performed by: FAMILY MEDICINE

## 2024-06-11 PROCEDURE — 74018 RADEX ABDOMEN 1 VIEW: CPT

## 2024-06-11 PROCEDURE — 70551 MRI BRAIN STEM W/O DYE: CPT

## 2024-06-11 PROCEDURE — 51798 US URINE CAPACITY MEASURE: CPT

## 2024-06-11 PROCEDURE — 2580000003 HC RX 258: Performed by: INTERNAL MEDICINE

## 2024-06-11 PROCEDURE — 83735 ASSAY OF MAGNESIUM: CPT

## 2024-06-11 PROCEDURE — 86704 HEP B CORE ANTIBODY TOTAL: CPT

## 2024-06-11 PROCEDURE — 90935 HEMODIALYSIS ONE EVALUATION: CPT

## 2024-06-11 PROCEDURE — 2000000000 HC ICU R&B

## 2024-06-11 PROCEDURE — 86706 HEP B SURFACE ANTIBODY: CPT

## 2024-06-11 PROCEDURE — 87077 CULTURE AEROBIC IDENTIFY: CPT

## 2024-06-11 PROCEDURE — 94003 VENT MGMT INPAT SUBQ DAY: CPT

## 2024-06-11 PROCEDURE — 87070 CULTURE OTHR SPECIMN AEROBIC: CPT

## 2024-06-11 PROCEDURE — 84145 PROCALCITONIN (PCT): CPT

## 2024-06-11 PROCEDURE — 94761 N-INVAS EAR/PLS OXIMETRY MLT: CPT

## 2024-06-11 PROCEDURE — 51701 INSERT BLADDER CATHETER: CPT

## 2024-06-11 PROCEDURE — 85520 HEPARIN ASSAY: CPT

## 2024-06-11 RX ORDER — METHYLPREDNISOLONE SODIUM SUCCINATE 40 MG/ML
40 INJECTION, POWDER, LYOPHILIZED, FOR SOLUTION INTRAMUSCULAR; INTRAVENOUS EVERY 8 HOURS
Status: DISCONTINUED | OUTPATIENT
Start: 2024-06-11 | End: 2024-06-20

## 2024-06-11 RX ORDER — HEPARIN SODIUM 1000 [USP'U]/ML
2500 INJECTION, SOLUTION INTRAVENOUS; SUBCUTANEOUS PRN
Status: DISCONTINUED | OUTPATIENT
Start: 2024-06-11 | End: 2024-06-21

## 2024-06-11 RX ORDER — ACETYLCYSTEINE 200 MG/ML
600 SOLUTION ORAL; RESPIRATORY (INHALATION)
Status: DISCONTINUED | OUTPATIENT
Start: 2024-06-11 | End: 2024-06-16

## 2024-06-11 RX ORDER — IPRATROPIUM BROMIDE AND ALBUTEROL SULFATE 2.5; .5 MG/3ML; MG/3ML
1 SOLUTION RESPIRATORY (INHALATION)
Status: DISCONTINUED | OUTPATIENT
Start: 2024-06-11 | End: 2024-06-22

## 2024-06-11 RX ORDER — HEPARIN SODIUM 1000 [USP'U]/ML
2400 INJECTION, SOLUTION INTRAVENOUS; SUBCUTANEOUS PRN
Status: DISCONTINUED | OUTPATIENT
Start: 2024-06-11 | End: 2024-06-11

## 2024-06-11 RX ADMIN — LEVETIRACETAM 500 MG: 100 INJECTION INTRAVENOUS at 10:15

## 2024-06-11 RX ADMIN — ACETYLCYSTEINE 600 MG: 200 SOLUTION ORAL; RESPIRATORY (INHALATION) at 14:04

## 2024-06-11 RX ADMIN — SODIUM CHLORIDE, PRESERVATIVE FREE 10 ML: 5 INJECTION INTRAVENOUS at 09:57

## 2024-06-11 RX ADMIN — METHYLPREDNISOLONE SODIUM SUCCINATE 40 MG: 40 INJECTION INTRAMUSCULAR; INTRAVENOUS at 16:06

## 2024-06-11 RX ADMIN — ASPIRIN 81 MG 81 MG: 81 TABLET ORAL at 09:55

## 2024-06-11 RX ADMIN — IPRATROPIUM BROMIDE AND ALBUTEROL SULFATE 1 DOSE: 2.5; .5 SOLUTION RESPIRATORY (INHALATION) at 14:03

## 2024-06-11 RX ADMIN — PROPOFOL 20 MCG/KG/MIN: 10 INJECTION, EMULSION INTRAVENOUS at 20:01

## 2024-06-11 RX ADMIN — IPRATROPIUM BROMIDE AND ALBUTEROL SULFATE 1 DOSE: 2.5; .5 SOLUTION RESPIRATORY (INHALATION) at 19:38

## 2024-06-11 RX ADMIN — IPRATROPIUM BROMIDE AND ALBUTEROL SULFATE 1 DOSE: 2.5; .5 SOLUTION RESPIRATORY (INHALATION) at 07:24

## 2024-06-11 RX ADMIN — FOLIC ACID 1 MG: 1 TABLET ORAL at 09:56

## 2024-06-11 RX ADMIN — LEVETIRACETAM 2000 MG: 100 INJECTION, SOLUTION INTRAVENOUS at 00:09

## 2024-06-11 RX ADMIN — MEROPENEM 1000 MG: 1 INJECTION, POWDER, FOR SOLUTION INTRAVENOUS at 00:09

## 2024-06-11 RX ADMIN — METHYLPREDNISOLONE SODIUM SUCCINATE 40 MG: 40 INJECTION INTRAMUSCULAR; INTRAVENOUS at 09:56

## 2024-06-11 RX ADMIN — PROPOFOL 10 MCG/KG/MIN: 10 INJECTION, EMULSION INTRAVENOUS at 10:36

## 2024-06-11 RX ADMIN — IPRATROPIUM BROMIDE AND ALBUTEROL SULFATE 1 DOSE: 2.5; .5 SOLUTION RESPIRATORY (INHALATION) at 02:02

## 2024-06-11 RX ADMIN — HEPARIN SODIUM 13 UNITS/KG/HR: 10000 INJECTION, SOLUTION INTRAVENOUS at 19:21

## 2024-06-11 RX ADMIN — ACETYLCYSTEINE 600 MG: 200 SOLUTION ORAL; RESPIRATORY (INHALATION) at 19:38

## 2024-06-11 RX ADMIN — HEPARIN SODIUM 13.01 UNITS/KG/HR: 10000 INJECTION, SOLUTION INTRAVENOUS at 13:34

## 2024-06-11 RX ADMIN — SODIUM CHLORIDE, PRESERVATIVE FREE 10 ML: 5 INJECTION INTRAVENOUS at 19:59

## 2024-06-11 RX ADMIN — Medication 5 MCG/MIN: at 13:33

## 2024-06-11 RX ADMIN — PROPOFOL 5.5 MCG/KG/MIN: 10 INJECTION, EMULSION INTRAVENOUS at 16:05

## 2024-06-11 RX ADMIN — THIAMINE HCL TAB 100 MG 100 MG: 100 TAB at 09:54

## 2024-06-11 RX ADMIN — FERROUS SULFATE TAB 325 MG (65 MG ELEMENTAL FE) 325 MG: 325 (65 FE) TAB at 09:55

## 2024-06-11 RX ADMIN — HEPARIN SODIUM 2500 UNITS: 1000 INJECTION INTRAVENOUS; SUBCUTANEOUS at 15:52

## 2024-06-11 RX ADMIN — FINASTERIDE 5 MG: 5 TABLET, FILM COATED ORAL at 09:55

## 2024-06-11 ASSESSMENT — PAIN SCALES - GENERAL
PAINLEVEL_OUTOF10: 0

## 2024-06-11 ASSESSMENT — PULMONARY FUNCTION TESTS
PIF_VALUE: 28
PIF_VALUE: 21
PIF_VALUE: 24
PIF_VALUE: 23
PIF_VALUE: 20
PIF_VALUE: 19

## 2024-06-11 NOTE — DIALYSIS
Completed 3 hour treatment and tolerated 2L fluid removal.    Patient is sedated and on ventilator. Levophed re-start while on dialysis to support BP.    ROGELIO Osorio received dialysis report.

## 2024-06-11 NOTE — PROGRESS NOTES
Progress Note  Date:6/10/2024       Room:[facility]  Patient Name:Agustín Willis     YOB: 1945     Age:79 y.o.        Subjective    Subjective  Patient followed septic shock with presumptive aspiration pneumonia. Earlier today he apparently coded wit PEA, intubated and transferred back to the ICU.  He remains afebrile with persistent leukocytosis on Prednisone.         Objective         Vitals Last 24 Hours:  TEMPERATURE:  Temp  Av.1 °F (36.7 °C)  Min: 97.7 °F (36.5 °C)  Max: 98.8 °F (37.1 °C)  RESPIRATIONS RANGE: Resp  Av.4  Min: 10  Max: 18  PULSE OXIMETRY RANGE: SpO2  Av.6 %  Min: 90 %  Max: 100 %  PULSE RANGE: Pulse  Av  Min: 54  Max: 94  BLOOD PRESSURE RANGE: Systolic (24hrs), Av , Min:65 , Max:143   ; Diastolic (24hrs), Av, Min:42, Max:108         Objective:  Vital signs: (most recent): Blood pressure 111/65, pulse 58, temperature 97.9 °F (36.6 °C), temperature source Axillary, resp. rate 13, height 1.73 m (5' 8.11\"), weight 91.6 kg (201 lb 15.1 oz), SpO2 98 %.      Vitals and nursing note reviewed.   Constitutional:       General: He is in acute distress.      Appearance: He is ill-appearing and toxic-appearing.   HENT:      Head: Normocephalic and atraumatic.      Right Ear: External ear normal.      Left Ear: External ear normal.      Nose: Nose normal.      Mouth/Throat: drooling     Comments ET tube  Cardiovascular:      Rate and Rhythm: Normal rate and regular rhythm.      Heart sounds: No murmur heard.  Pulmonary:      Breath sounds: Rhonchi present. No wheezing or rales.   Abdominal:      General: Bowel sounds are normal. There is no distension.      Palpations: Abdomen is soft.      Tenderness: There is no abdominal tenderness.   Genitourinary:  external urinary device with slightly turbid urine  Musculoskeletal:      Cervical back: Neck supple.      Right lower leg: No edema.      Left lower leg: Edema present.   Skin:     Findings: No rash.   Neurological:  intubated  Psychiatric: intubated      Labs/Imaging/Diagnostics    Labs:  CBC:  Recent Labs     06/09/24  0528 06/10/24  0934 06/10/24  1559   WBC 17.5* 16.3* 23.9*   RBC 2.70* 2.54* 2.48*   HGB 7.9* 7.4* 7.1*   HCT 24.4* 22.9* 22.8*   MCV 90.4 90.2 91.9   RDW 15.8* 15.7* 15.7*    239 238       CHEMISTRIES:  Recent Labs     06/08/24  0600 06/09/24  0528 06/10/24  0934 06/10/24  1559    140 143 142   K 5.4* 4.2 4.2 4.6   * 106 109* 108   CO2 24 28 27 24   BUN 93* 68* 84* 93*   CREATININE 2.03* 1.86* 2.14* 2.60*   GLUCOSE 107* 106* 104* 182*   PHOS 4.7 4.6 5.1*  --          Lactic acid 6.39      Procal 0.27 <0.71 <2.45  <11.42 <27.56 <35.77 <50.14 <72.72 <0.22            CRP 17.00 <18.70 < 16.20 < 12.30 <9.00 < 13.70 <20.20 <29.80 <10.50          MRSA nasal PCR Positive    Blood cultures (5/29) No growth FINAL  Blood cultures (5/29)  No growth FINAL  Urine culture (5/29) No growth FINAL  Sputum culture (5/29) Light normal respiratory doug FINAL    Imaging Last 24 Hours:      XR CHEST PORTABLE    Result Date: 6/10/2024    1. Satisfactory intubation.  2. New RUL airspace disease/pneumonia.             .    Assessment//Plan           Hospital Problems             Last Modified POA    * (Principal) Acute respiratory failure (HCC) 5/29/2024 Yes    Cardiac arrest (HCC) 5/29/2024 Yes      Severe sepsis/septic shock with hypothermia, leukocytosis, elevated lactic acid and procal/CRP, resolving  Presumptive aspiration pneumonia, primarily right lung, sputum culture grew normal doug, status post 9 days of IV Meropenem  ?New RUL HCAP  Acute hypoxic respiratory failure, re- intubated  Status post cardiac arrest with PEA  FIDEL  Penicillin allergy  7.   MRSA nasal colonization status post nasal Mupirocin     Comment:  Patient has remained afebrile with decreasing procal and CRP since stopping Meropenem. That being said he has a new RUL infiltrate and warrants re-interrogation and treatment for pneumonia.

## 2024-06-11 NOTE — PROGRESS NOTES
General Daily Progress Note      Patient Name:   Agustín Willis       YOB: 1945       Age:  79 y.o.      Admit Date: 5/29/2024      Subjective:   Patient is a 78 y.o. year old male past medical history of COPD BPH cervical radiculopathy cocaine abuse hypertension hyperlipidemia seasonal allergies SVT came to emergency room with respiratory arrest, patient was at nursing home after eating breakfast he vomited everywhere likely aspirated oxygen saturation was 70% initially put on nonrebreather patient was intubated in the ER patient was hypotensive started Levophed 10 patient have a cardiac arrest went to V-fib received shock x 4 received epi and amiodarone  Was started on Levophed and vasopressin received 2 L of IV fluid, received IV Zosyn and vancomycin in the ER     Patient admitted to the ICU for further workup  Patient on ventilator/unresponsive       5/30    Patient on ventilator propofol and Precedex drip  Hypotension on Levophed and vasopressin  Patient has no urine output last night received 500 bolus    Nephrology decided for start CRRT IR team for dialysis cath  BUN 72 creatinine 3.09  CRP 10.5  MRSA nares    5/31    Patient on ventilator 40% O2 propofol Precedex levo and vasopressin drip  Patient on A-fib with rapid ventricular rate ordered amiodarone bolus and started on drip  Patient getting CRRT  No urine output  D-dimers greater than 20    6/3    Patient on ventilator 45% O2 receiving CRRT patient on propofol drip  Patient on amiodarone and Levophed drip  Patient heparin and Precedex drip  Patient is hypothermic    WBC count 22.5    6/4    Patient on ventilator 45% O2 on Levophed drip and amiodarone drip and heparin drip and Precedex drip getting NG tube feeding  BUN 37 creatinine 1.97 WBCs 15  Doppler studies positive for acute DVT of the left brachial vein left radial vein and left ulnar vein    Seen by infectious disease recommended continue IV meropenem    6/5    General ventilated  Mikaela Arredondo MD    glucose chewable tablet 16 g, 4 tablet, Oral, PRN, Mikaela Arredondo MD    dextrose bolus 10% 125 mL, 125 mL, IntraVENous, PRN **OR** dextrose bolus 10% 250 mL, 250 mL, IntraVENous, PRN, Mikaela Arredondo MD    glucagon injection 1 mg, 1 mg, SubCUTAneous, PRN, Mikaela Arredondo MD    dextrose 10 % infusion, , IntraVENous, Continuous PRN, Mikaela Arredondo MD

## 2024-06-11 NOTE — PLAN OF CARE
Problem: Discharge Planning  Goal: Discharge to home or other facility with appropriate resources  Outcome: Not Progressing     Problem: Chronic Conditions and Co-morbidities  Goal: Patient's chronic conditions and co-morbidity symptoms are monitored and maintained or improved  Outcome: Not Progressing     Problem: Neurosensory - Adult  Goal: Achieves stable or improved neurological status  Outcome: Not Progressing  Goal: Achieves maximal functionality and self care  Outcome: Not Progressing     Problem: Respiratory - Adult  Goal: Achieves optimal ventilation and oxygenation  6/11/2024 0935 by Devon Rodriguez RN  Outcome: Not Progressing  6/11/2024 0141 by Emely Frank RN  Outcome: Progressing     Problem: Musculoskeletal - Adult  Goal: Return mobility to safest level of function  Outcome: Not Progressing  Goal: Return ADL status to a safe level of function  Outcome: Not Progressing     Problem: Gastrointestinal - Adult  Goal: Minimal or absence of nausea and vomiting  Outcome: Not Progressing  Goal: Maintains adequate nutritional intake  Outcome: Not Progressing     Problem: Genitourinary - Adult  Goal: Absence of urinary retention  Outcome: Not Progressing     Problem: Infection - Adult  Goal: Absence of infection at discharge  Outcome: Not Progressing  Goal: Absence of infection during hospitalization  Outcome: Not Progressing     Problem: Metabolic/Fluid and Electrolytes - Adult  Goal: Hemodynamic stability and optimal renal function maintained  Outcome: Not Progressing     Problem: Pain  Goal: Verbalizes/displays adequate comfort level or baseline comfort level  Outcome: Not Progressing

## 2024-06-11 NOTE — PROGRESS NOTES
Progress Note  Date:2024       Room:[facility]  Patient Name:Agustín Willis     YOB: 1945     Age:79 y.o.        Subjective    Subjective  Patient followed septic shock with presumptive aspiration pneumonia.  Yesterday he coded with PEA, intubated and transferred back to the ICU.  New RUL infiltrate on CXR.  Sputum culture was sent and he was restarted on IV Meropenem.         Objective         Vitals Last 24 Hours:  TEMPERATURE:  Temp  Av.2 °F (36.8 °C)  Min: 97.9 °F (36.6 °C)  Max: 98.8 °F (37.1 °C)  RESPIRATIONS RANGE: Resp  Av.2  Min: 10  Max: 18  PULSE OXIMETRY RANGE: SpO2  Av.3 %  Min: 90 %  Max: 100 %  PULSE RANGE: Pulse  Av.2  Min: 54  Max: 94  BLOOD PRESSURE RANGE: Systolic (24hrs), Av , Min:65 , Max:143   ; Diastolic (24hrs), Av, Min:42, Max:108         Objective:  Vital signs: (most recent): Blood pressure 100/67, pulse 69, temperature 98.1 °F (36.7 °C), temperature source Axillary, resp. rate 15, height 1.73 m (5' 8.11\"), weight 86.8 kg (191 lb 5.8 oz), SpO2 99 %.      Vitals and nursing note reviewed.   Constitutional:       General: He is in acute distress.      Appearance: He is ill-appearing and toxic-appearing.   HENT:      Head: Normocephalic and atraumatic.      Right Ear: External ear normal.      Left Ear: External ear normal.      Nose: Nose normal.      Mouth/Throat: drooling     Comments ET tube/orogastric tube  Cardiovascular:      Rate and Rhythm: Normal rate and regular rhythm.      Heart sounds: No murmur heard.  Pulmonary:      Breath sounds: Rhonchi present. No wheezing or rales.   Abdominal:      General: Bowel sounds are normal. There is no distension.      Palpations: Abdomen is soft.      Tenderness: There is no abdominal tenderness.   Genitourinary:  external urinary device    Musculoskeletal:      Cervical back: Neck supple.      Right lower leg: No edema.      Left lower leg: Edema present.   Skin:     Findings: No rash.   Neurological:  intubated  Psychiatric: intubated      Labs/Imaging/Diagnostics    Labs:  CBC:  Recent Labs     06/10/24  0934 06/10/24  1559 06/11/24 0222   WBC 16.3* 23.9* 19.0*   RBC 2.54* 2.48* 2.49*   HGB 7.4* 7.1* 7.2*   HCT 22.9* 22.8* 22.4*   MCV 90.2 91.9 90.0   RDW 15.7* 15.7* 15.9*    238 244       CHEMISTRIES:  Recent Labs     06/09/24  0528 06/10/24  0934 06/10/24  1559 06/11/24 0222    143 142 141   K 4.2 4.2 4.6 4.0    109* 108 108   CO2 28 27 24 26   BUN 68* 84* 93* 92*   CREATININE 1.86* 2.14* 2.60* 2.40*   GLUCOSE 106* 104* 182* 103*   PHOS 4.6 5.1*  --  4.8*         Lactic acid 6.39      Procal 5.94 <0.27 <0.71 <2.45  <11.42 <27.56 <35.77 <50.14 <72.72 <0.22             CRP 12.40 <12.00 <17.00 <18.70 < 16.20 < 12.30 <20.20 <29.80           MRSA nasal PCR Positive    Blood cultures (5/29) No growth FINAL  Blood cultures (5/29)  No growth FINAL  Urine culture (5/29) No growth FINAL  Sputum culture (5/29) Light normal respiratory doug FINAL    Imaging Last 24 Hours:      XR CHEST PORTABLE    Result Date: 6/10/2024    1. Satisfactory intubation.  2. New RUL airspace disease/pneumonia.             .    Assessment//Plan           Hospital Problems             Last Modified POA    * (Principal) Acute respiratory failure (HCC) 5/29/2024 Yes    Cardiac arrest (HCC) 5/29/2024 Yes      Severe sepsis/septic shock with hypothermia, leukocytosis, elevated lactic acid and procal/CRP, resolving  Presumptive aspiration pneumonia, primarily right lung, sputum culture grew normal doug, status post 9 days of IV Meropenem  ?New RUL HCAP, sputum culture pending  Acute hypoxic respiratory failure, re- intubated  Status post cardiac arrest with PEA  FIDEL  Penicillin allergy  7.   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  He was restarted on Meropenem after re-intubation and new RUL infiltrate.

## 2024-06-11 NOTE — CONSULTS
06/07/24 2007    heparin (porcine) injection 4,000 Units  4,000 Units IntraVENous PRN Mikaela Arredondo MD        heparin (porcine) injection 2,000 Units  2,000 Units IntraVENous PRN Mikaela Arredondo MD   2,000 Units at 06/09/24 0734    heparin 25,000 units in dextrose 5% 250 mL (premix) infusion  5-30 Units/kg/hr IntraVENous Continuous Mikaela Arredondo MD 11.4 mL/hr at 06/11/24 0554 13 Units/kg/hr at 06/11/24 0554    heparin (porcine) injection 1,100-1,900 Units  1,100-1,900 Units IntraCATHeter PRN Sol Ferraro MD   1,400 Units at 06/03/24 1231    And    heparin (porcine) injection 1,100-1,900 Units  1,100-1,900 Units IntraCATHeter PRN Sol Ferraro MD   1,100 Units at 06/03/24 1226    sodium chloride 0.9 % bolus 2,448 mL  30 mL/kg IntraVENous Once Mikaela Arredondo MD   Held at 05/29/24 1405    [Held by provider] aspirin EC tablet 81 mg  81 mg Oral Daily Mikaela Arredondo MD   81 mg at 06/02/24 0800    ferrous sulfate (IRON 325) tablet 325 mg  325 mg Oral Daily with breakfast Mikaela Arredondo MD   325 mg at 06/10/24 1017    finasteride (PROSCAR) tablet 5 mg  5 mg Oral Daily Mikaela Arredondo MD   5 mg at 06/10/24 1017    folic acid (FOLVITE) tablet 1 mg  1 mg Oral Daily Mikaela Arredondo MD   1 mg at 06/10/24 1017    thiamine mononitrate tablet 100 mg  100 mg Oral Daily Mikaela Arredondo MD   100 mg at 06/10/24 1017    sodium chloride flush 0.9 % injection 5-40 mL  5-40 mL IntraVENous 2 times per day Mikaela Arredondo MD   10 mL at 06/10/24 2054    sodium chloride flush 0.9 % injection 5-40 mL  5-40 mL IntraVENous PRN Mikaela Arredondo MD        0.9 % sodium chloride infusion   IntraVENous PRN Mikaela Arredondo MD        ondansetron (ZOFRAN-ODT) disintegrating tablet 4 mg  4 mg Oral Q8H PRN Mikaela Arredondo MD        Or    ondansetron (ZOFRAN) injection 4 mg  4 mg IntraVENous Q6H PRN Mikaela Arredondo MD        polyethylene glycol (GLYCOLAX) packet 17 g  17 g Oral Daily PRN Mikaela Arredondo MD   17 g at  325 mg at 06/10/24 1017    finasteride (PROSCAR) tablet 5 mg  5 mg Oral Daily Mikaela Arredondo MD   5 mg at 06/10/24 1017    folic acid (FOLVITE) tablet 1 mg  1 mg Oral Daily Mikaela Arredondo MD   1 mg at 06/10/24 1017    thiamine mononitrate tablet 100 mg  100 mg Oral Daily Mikaela Arredodno MD   100 mg at 06/10/24 1017    sodium chloride flush 0.9 % injection 5-40 mL  5-40 mL IntraVENous 2 times per day Mikaela Arredondo MD   10 mL at 06/10/24 2054    sodium chloride flush 0.9 % injection 5-40 mL  5-40 mL IntraVENous PRN Mikaela Arredondo MD        0.9 % sodium chloride infusion   IntraVENous PRN Mikaela Arredondo MD        ondansetron (ZOFRAN-ODT) disintegrating tablet 4 mg  4 mg Oral Q8H PRN Mikaela Arredondo MD        Or    ondansetron (ZOFRAN) injection 4 mg  4 mg IntraVENous Q6H PRN Mikaela Arredondo MD        polyethylene glycol (GLYCOLAX) packet 17 g  17 g Oral Daily PRN Mikaela Arredondo MD   17 g at 06/02/24 2100    acetaminophen (TYLENOL) tablet 650 mg  650 mg Oral Q6H PRN Mikaela Arredondo MD   650 mg at 06/09/24 2047    Or    acetaminophen (TYLENOL) suppository 650 mg  650 mg Rectal Q6H PRN Mikaela Arredondo MD        glucose chewable tablet 16 g  4 tablet Oral PRN Mikaela Arredondo MD        dextrose bolus 10% 125 mL  125 mL IntraVENous PRN Mikaela Arredondo MD        Or    dextrose bolus 10% 250 mL  250 mL IntraVENous PRN Mikaela Arredondo MD        glucagon injection 1 mg  1 mg SubCUTAneous PRN Mikaela Arredondo MD        dextrose 10 % infusion   IntraVENous Continuous PRN Mikaela Arredondo MD            ARTERIAL BLOOD GAS  Recent Labs     06/11/24  0326   PHART 7.53*   [id number] 30*   PO2ART 72*   [id number] 24   BEART 1.0   FIO2A 0.45   K6AOHRYE 94*   OXYHEM 93.2*   CARBOXHGBART 0.3*   METHGBART 0.9   TEMP 98.0     6/2 a/C14  PEEP 5 FiO2 35% with pO2 60 pCO2 32 pH 7.47   6/1-A/C 14  PEEP 7 FiO2 40% with pO2 85 pCO2 35 pH 7.41  Labs:    Recent Labs     06/10/24  0934 06/10/24  1558  continue to wean pressors  6/4 on assist-control mode pCO2 31 pO2 80 on 35% FiO2 getting IV fluid 200 cc/h on amiodarone hemodynamically unstable on Levophed and NEOS epinephrine   6/5 remain intubated on ventilator we will do sedation vacation ABG stentable 35% FiO2 still on vasopressors now on phenylephrine   6/6 alert awake talking on nasal cannula extubated on 6/5 patient on meropenem speech evaluation he is not on oxygen at home  6/7 oral congestion continue suctioning, alert awake on nasal cannula continue with the suctioning high risk for aspiration on IV heparin and meropenem\  6/8 alert awake on oxygen via nasal cannula chest x-ray atelectasis lower lung volume on Merrem  6/9 alert awake on nasal cannula on IV Solu-Medrol nebulizer treatment will change to prednisone in a.m.  6/10 on 2-1/2 L nasal cannula will discontinue Solu-Medrol start patient on prednisone on meropenem  6/11 patient CODE BLUE yesterday reintubated now back on ventilator assist-control mode start patient on nebulizer treatment along with Mucomyst will start patient on Solu-Medrol start prednisone recent sputum C&S Gram stain chest x-ray still showed bilateral infiltrate hemoglobin 7.2, BUN 92 creatinine 2.4

## 2024-06-11 NOTE — PROGRESS NOTES
Attempted to call Ms Cho (patient's sister) to complete MRI checklist. No answer. Left message for her to call hospital.

## 2024-06-11 NOTE — CARE COORDINATION
CM reviewed Pt medicals, Pt was a code blue and was re intubated yesterday.    Pt is a LTC Resident at [facility].    CM will follow for D/C needs.     1:20  GARRETT called Pt sister to discuss code status with her again, she stated, \"why does everybody keep calling me and asking me about his code status, I want you all to do everything you can, I am not going to play God\"     Pt is to remain a FULL CODE

## 2024-06-11 NOTE — CONSULTS
RBC Comment Normocytic, Normochromic     Renal Function Panel   Result Value Ref Range    Sodium 141 136 - 145 mmol/L    Potassium 5.4 (H) 3.5 - 5.1 mmol/L    Chloride 109 (H) 97 - 108 mmol/L    CO2 25 21 - 32 mmol/L    Anion Gap 7 5 - 15 mmol/L    Glucose 166 (H) 65 - 100 mg/dL    BUN 60 (H) 6 - 20 mg/dL    Creatinine 2.52 (H) 0.70 - 1.30 mg/dL    BUN/Creatinine Ratio 24 (H) 12 - 20      Est, Glom Filt Rate 25 (L) >60 ml/min/1.73m2    Calcium 7.8 (L) 8.5 - 10.1 mg/dL    Phosphorus 5.4 (H) 2.6 - 4.7 mg/dL    Albumin 2.1 (L) 3.5 - 5.0 g/dL   Magnesium   Result Value Ref Range    Magnesium 2.1 1.6 - 2.4 mg/dL   Vancomycin Level, Random   Result Value Ref Range    Vancomycin Rm 15.2 ug/mL    Dose Date/Time Not provided      DOSE AMOUNT Not provided Units   Renal Function Panel   Result Value Ref Range    Sodium 141 136 - 145 mmol/L    Potassium 5.0 3.5 - 5.1 mmol/L    Chloride 109 (H) 97 - 108 mmol/L    CO2 25 21 - 32 mmol/L    Anion Gap 7 5 - 15 mmol/L    Glucose 159 (H) 65 - 100 mg/dL    BUN 48 (H) 6 - 20 mg/dL    Creatinine 2.15 (H) 0.70 - 1.30 mg/dL    BUN/Creatinine Ratio 22 (H) 12 - 20      Est, Glom Filt Rate 30 (L) >60 ml/min/1.73m2    Calcium 8.6 8.5 - 10.1 mg/dL    Phosphorus 4.7 2.6 - 4.7 mg/dL    Albumin 2.2 (L) 3.5 - 5.0 g/dL   CBC   Result Value Ref Range    WBC 17.4 (H) 4.1 - 11.1 K/uL    RBC 2.93 (L) 4.10 - 5.70 M/uL    Hemoglobin 8.6 (L) 12.1 - 17.0 g/dL    Hematocrit 27.1 (L) 36.6 - 50.3 %    MCV 92.5 80.0 - 99.0 FL    MCH 29.4 26.0 - 34.0 PG    MCHC 31.7 30.0 - 36.5 g/dL    RDW 14.4 11.5 - 14.5 %    Platelets 153 150 - 400 K/uL    MPV 10.2 8.9 - 12.9 FL    Nucleated RBCs 0.0 0.0  WBC    nRBC 0.00 0.00 - 0.01 K/uL   APTT   Result Value Ref Range    APTT 37.3 (H) 21.2 - 34.1 sec    Therapeutic Range   82 - 109 sec   Protime-INR   Result Value Ref Range    Protime 18.4 (H) 11.9 - 14.6 sec    INR 1.5 (H) 0.9 - 1.1     Heparin, Anti-Xa   Result Value Ref Range    Heparin Xa,LMWH and Unfrac 0.76  mmol/L    CO2 23 21 - 32 mmol/L    Anion Gap 9 5 - 15 mmol/L    Glucose 113 (H) 65 - 100 mg/dL    BUN 77 (H) 6 - 20 mg/dL    Creatinine 2.20 (H) 0.70 - 1.30 mg/dL    BUN/Creatinine Ratio 35 (H) 12 - 20      Est, Glom Filt Rate 30 (L) >60 ml/min/1.73m2    Calcium 8.6 8.5 - 10.1 mg/dL   Urinalysis with Microscopic   Result Value Ref Range    Color, UA Yellow/Straw      Appearance Turbid (A) Clear      Specific Gravity, UA 1.017 1.003 - 1.030      pH, Urine 5.0 5.0 - 8.0      Protein, UA 30 (A) Negative mg/dL    Glucose, Ur Negative Negative mg/dL    Ketones, Urine Negative Negative mg/dL    Bilirubin, Urine Negative Negative      Blood, Urine Moderate (A) Negative      Urobilinogen, Urine 0.1 0.1 - 1.0 EU/dL    Nitrite, Urine Negative Negative      Leukocyte Esterase, Urine Negative Negative      WBC, UA 10-20 0 - 4 /hpf    RBC, UA 20-50 0 - 5 /hpf    Epithelial Cells, UA Few Few /lpf    BACTERIA, URINE Negative Negative /hpf    Mucus, UA Trace (A) Negative /lpf    Hyaline Casts, UA 0-2 0 - 5 /lpf   Magnesium   Result Value Ref Range    Magnesium 2.0 1.6 - 2.4 mg/dL   Heparin, Anti-Xa   Result Value Ref Range    Heparin Xa,LMWH and Unfrac 0.16 IU/mL   C-Reactive Protein   Result Value Ref Range    CRP 18.70 (H) 0.00 - 0.30 mg/dL   Procalcitonin   Result Value Ref Range    Procalcitonin 2.45 (H) 0 ng/mL   Comprehensive Metabolic Panel   Result Value Ref Range    Sodium 136 136 - 145 mmol/L    Potassium 5.2 (H) 3.5 - 5.1 mmol/L    Chloride 106 97 - 108 mmol/L    CO2 24 21 - 32 mmol/L    Anion Gap 6 5 - 15 mmol/L    Glucose 110 (H) 65 - 100 mg/dL    BUN 79 (H) 6 - 20 mg/dL    Creatinine 2.31 (H) 0.70 - 1.30 mg/dL    BUN/Creatinine Ratio 34 (H) 12 - 20      Est, Glom Filt Rate 28 (L) >60 ml/min/1.73m2    Calcium 8.9 8.5 - 10.1 mg/dL    Total Bilirubin 0.4 0.2 - 1.0 mg/dL    AST 18 15 - 37 U/L    ALT 18 12 - 78 U/L    Alk Phosphatase 91 45 - 117 U/L    Total Protein 6.5 6.4 - 8.2 g/dL    Albumin 2.0 (L) 3.5 - 5.0 g/dL     Globulin 4.5 (H) 2.0 - 4.0 g/dL    Albumin/Globulin Ratio 0.4 (L) 1.1 - 2.2     CBC   Result Value Ref Range    WBC 18.3 (H) 4.1 - 11.1 K/uL    RBC 2.81 (L) 4.10 - 5.70 M/uL    Hemoglobin 8.3 (L) 12.1 - 17.0 g/dL    Hematocrit 25.0 (L) 36.6 - 50.3 %    MCV 89.0 80.0 - 99.0 FL    MCH 29.5 26.0 - 34.0 PG    MCHC 33.2 30.0 - 36.5 g/dL    RDW 15.9 (H) 11.5 - 14.5 %    Platelets 136 (L) 150 - 400 K/uL    MPV 11.9 8.9 - 12.9 FL    Nucleated RBCs 0.4 (H) 0.0  WBC    nRBC 0.07 (H) 0.00 - 0.01 K/uL   Phosphorus   Result Value Ref Range    Phosphorus 5.1 (H) 2.6 - 4.7 mg/dL   Heparin, Anti-XA   Result Value Ref Range    Heparin Xa,LMWH and Unfrac 0.25 IU/mL   Renal Function Panel   Result Value Ref Range    Sodium 140 136 - 145 mmol/L    Potassium 5.4 (H) 3.5 - 5.1 mmol/L    Chloride 109 (H) 97 - 108 mmol/L    CO2 24 21 - 32 mmol/L    Anion Gap 7 5 - 15 mmol/L    Glucose 107 (H) 65 - 100 mg/dL    BUN 93 (H) 6 - 20 mg/dL    Creatinine 2.03 (H) 0.70 - 1.30 mg/dL    BUN/Creatinine Ratio 46 (H) 12 - 20      Est, Glom Filt Rate 32 (L) >60 ml/min/1.73m2    Calcium 8.9 8.5 - 10.1 mg/dL    Phosphorus 4.7 2.6 - 4.7 mg/dL    Albumin 1.9 (L) 3.5 - 5.0 g/dL   CBC   Result Value Ref Range    WBC 20.2 (H) 4.1 - 11.1 K/uL    RBC 2.79 (L) 4.10 - 5.70 M/uL    Hemoglobin 8.2 (L) 12.1 - 17.0 g/dL    Hematocrit 24.6 (L) 36.6 - 50.3 %    MCV 88.2 80.0 - 99.0 FL    MCH 29.4 26.0 - 34.0 PG    MCHC 33.3 30.0 - 36.5 g/dL    RDW 15.7 (H) 11.5 - 14.5 %    Platelets 179 150 - 400 K/uL    MPV 10.6 8.9 - 12.9 FL    Nucleated RBCs 0.2 (H) 0.0  WBC    nRBC 0.05 (H) 0.00 - 0.01 K/uL   Heparin, Anti-XA   Result Value Ref Range    Heparin Xa,LMWH and Unfrac 0.34 IU/mL   Heparin, Anti-XA   Result Value Ref Range    Heparin Xa,LMWH and Unfrac 0.32 IU/mL   CBC   Result Value Ref Range    WBC 17.5 (H) 4.1 - 11.1 K/uL    RBC 2.70 (L) 4.10 - 5.70 M/uL    Hemoglobin 7.9 (L) 12.1 - 17.0 g/dL    Hematocrit 24.4 (L) 36.6 - 50.3 %    MCV 90.4 80.0 - 99.0 FL

## 2024-06-11 NOTE — PROGRESS NOTES
Nephrology f/u          Patient: Agustín Willis MRN: [id number]  SSN: [id number]    YOB: 1945  Age: 79 y.o.  Sex: male      Subjective:   I have seen the patient in ICU   On vent/unresponsive  Off pressor  On FIO2 45%  Worsening renal function  Plan to dialyze him today    Past Medical History:   Diagnosis Date    Benign prostatic disease 8/3/2020    Hypertrophy with Outflow Obstruction    Benign prostatic hyperplasia 8/3/2020    Cervical radiculopathy 8/3/2020    Chronic obstructive lung disease (HCC) 8/3/2020    Cocaine abuse (HCC) 8/3/2020    Dizziness and giddiness 8/3/2020    Hypercholesterolemia 8/3/2020    Hypertensive disorder 8/3/2020    Insomnia 8/3/2020    Kidney disease 8/3/2020    Low back pain 8/3/2020    Osteoarthritis of knee 8/3/2020    Sleep apnea 8/3/2020    Vasovagal syncope 8/3/2020     Past Surgical History:   Procedure Laterality Date    IR NONTUNNELED VASCULAR CATHETER  5/30/2024    IR NONTUNNELED VASCULAR CATHETER 5/30/2024 Stephy Abreu APRN - NP SSR RAD ANGIO IR      Family History   Problem Relation Age of Onset    Hypertension Mother      Social History     Tobacco Use    Smoking status: Former    Smokeless tobacco: Never   Substance Use Topics    Alcohol use: Yes      Current Facility-Administered Medications   Medication Dose Route Frequency Provider Last Rate Last Admin    methylPREDNISolone sodium succ (SOLU-MEDROL) injection 40 mg  40 mg IntraVENous Q8H Jeremy Ramos MD   40 mg at 06/11/24 0956    acetylcysteine (MUCOMYST) 20 % solution 600 mg  600 mg Inhalation BID RT Jeremy Ramos MD        ipratropium 0.5 mg-albuterol 2.5 mg (DUONEB) nebulizer solution 1 Dose  1 Dose Inhalation Q6H RT Jeremy Ramos MD        [START ON 6/12/2024] meropenem (MERREM) 500 mg in sodium chloride 0.9 % 100 mL IVPB (mini-bag)  500 mg IntraVENous Q24H Riley Alonzo MD        heparin (porcine) injection 2,500 Units  2,500 Units IntraCATHeter PRN Sol Ferraro MD         propofol infusion  5-50 mcg/kg/min IntraVENous Continuous Mikaela Arredondo MD 5.5 mL/hr at 06/11/24 1036 10 mcg/kg/min at 06/11/24 1036    norepinephrine (LEVOPHED) 16 mg in sodium chloride 0.9 % 250 mL infusion  1-100 mcg/min IntraVENous Continuous Mikaela Arredondo MD   Stopped at 06/11/24 0254    amiodarone (CORDARONE) tablet 400 mg  400 mg Oral BID Gerry Wilson MD   400 mg at 06/10/24 1016    aspirin chewable tablet 81 mg  81 mg Per NG tube Daily Jeremy Ramos MD   81 mg at 06/11/24 0955    epoetin radha-epbx (RETACRIT) injection 10,000 Units  10,000 Units SubCUTAneous Once per day on Mon Wed Fri Sol Ferraro MD   10,000 Units at 06/07/24 2007    heparin (porcine) injection 4,000 Units  4,000 Units IntraVENous PRN Mikaela Arredondo MD        heparin (porcine) injection 2,000 Units  2,000 Units IntraVENous PRN Mikaela Arredondo MD   2,000 Units at 06/09/24 0734    heparin 25,000 units in dextrose 5% 250 mL (premix) infusion  5-30 Units/kg/hr IntraVENous Continuous Mikaela Arredondo MD 11.4 mL/hr at 06/11/24 0554 13 Units/kg/hr at 06/11/24 0554    heparin (porcine) injection 1,100-1,900 Units  1,100-1,900 Units IntraCATHeter PRN Sol Ferraro MD   1,400 Units at 06/03/24 1231    And    heparin (porcine) injection 1,100-1,900 Units  1,100-1,900 Units IntraCATHeter PRN Sol Ferraro MD   1,100 Units at 06/03/24 1226    sodium chloride 0.9 % bolus 2,448 mL  30 mL/kg IntraVENous Once Mikaela Arredondo MD   Held at 05/29/24 1405    [Held by provider] aspirin EC tablet 81 mg  81 mg Oral Daily Mikaela Arredondo MD   81 mg at 06/02/24 0800    ferrous sulfate (IRON 325) tablet 325 mg  325 mg Oral Daily with breakfast Mikaela Arredondo MD   325 mg at 06/11/24 0955    finasteride (PROSCAR) tablet 5 mg  5 mg Oral Daily Mikalea Arredondo MD   5 mg at 06/11/24 0955    folic acid (FOLVITE) tablet 1 mg  1 mg Oral Daily Mikaela Arredondo MD   1 mg at 06/11/24 0956    thiamine mononitrate tablet 100 mg  100 mg Oral  Daily Mikaela Arredondo MD   100 mg at 06/11/24 0954    sodium chloride flush 0.9 % injection 5-40 mL  5-40 mL IntraVENous 2 times per day Mikaela Arredondo MD   10 mL at 06/11/24 0957    sodium chloride flush 0.9 % injection 5-40 mL  5-40 mL IntraVENous PRN Mikaela Arredondo MD        0.9 % sodium chloride infusion   IntraVENous PRN Mikaela Arredondo MD        ondansetron (ZOFRAN-ODT) disintegrating tablet 4 mg  4 mg Oral Q8H PRN Mikaela Arredondo MD        Or    ondansetron (ZOFRAN) injection 4 mg  4 mg IntraVENous Q6H PRN Mikaela Arredondo MD        polyethylene glycol (GLYCOLAX) packet 17 g  17 g Oral Daily PRN Mikaela Arredondo MD   17 g at 06/02/24 2100    acetaminophen (TYLENOL) tablet 650 mg  650 mg Oral Q6H PRN Mikaela Arredondo MD   650 mg at 06/09/24 2047    Or    acetaminophen (TYLENOL) suppository 650 mg  650 mg Rectal Q6H PRN Mikaela Arredondo MD        glucose chewable tablet 16 g  4 tablet Oral PRN Mikaela Arredondo MD        dextrose bolus 10% 125 mL  125 mL IntraVENous PRN Mikaela Arredondo MD        Or    dextrose bolus 10% 250 mL  250 mL IntraVENous PRN Mikaela Arredondo MD        glucagon injection 1 mg  1 mg SubCUTAneous PRN Mikaela Arredondo MD        dextrose 10 % infusion   IntraVENous Continuous PRN Mikaela Arredondo MD            Allergies   Allergen Reactions    Penicillin G Other (See Comments)     Pt states he passes out    Sulfamethoxazole-Trimethoprim      Pt states he passes out       Review of Systems:  Unable to obtain    Objective:     Vitals:    06/11/24 1200 06/11/24 1230 06/11/24 1245 06/11/24 1300   BP: 102/67 121/71 95/68 (!) 81/64   Pulse: 66 61 69 67   Resp: 14 14 15 14   Temp:  98.3 °F (36.8 °C)     TempSrc:       SpO2: 100% 100% 100% 100%   Weight:       Height:            Physical Exam:  General: On sedation  Eyes: sclera anicteric  Oral Cavity: ET tube in place  Neck: no JVD  Respiratory.  On ventilatory support  Heart: normal sounds  Abdomen: soft and non tender   :

## 2024-06-11 NOTE — PROGRESS NOTES
CARDIOLOGY PROGRESS NOTE      Patient Name: Agustín Willis  Age: 79 y.o.  Gender:male  :1945  MRN: [id number]    Agustín Willis is a 78 y.o. year-old male with past medical history significant for hypertension, COPD, cocaine abuse, CKD who was evaluated today due to cardiac arrest. Patient initially presented to the ED via EMS from nursing home due to respiratory distress after vomiting breakfast. Required intubation in ED. Apparently with VF arrest in ED, shock x4.     Patient seen and examined in ICU. S/p code blue yesterday, PEA arrest. Remains intubated, sedated. On heparin gtt     Telemetry reviewed.    ROS as above. Current medications reviewed.    Physical Examination    Allergies   Allergen Reactions    Penicillin G Other (See Comments)     Pt states he passes out    Sulfamethoxazole-Trimethoprim      Pt states he passes out     Vitals:    24 1300   BP: (!) 81/64   Pulse: 67   Resp: 14   Temp:    SpO2: 100%     Vital signs are stable   In no distress  Heart has a regular rate and rhythm  Lungs are coarse  Abdomen is soft  Extremities have mild edema  Skin is dry    Labs reviewed:  Recent Results (from the past 12 hour(s))   Heparin, Anti-XA    Collection Time: 24  2:22 AM   Result Value Ref Range    Heparin Xa,LMWH and Unfrac 0.33 IU/mL   CBC with Auto Differential    Collection Time: 24  2:22 AM   Result Value Ref Range    WBC 19.0 (H) 4.1 - 11.1 K/uL    RBC 2.49 (L) 4.10 - 5.70 M/uL    Hemoglobin 7.2 (L) 12.1 - 17.0 g/dL    Hematocrit 22.4 (L) 36.6 - 50.3 %    MCV 90.0 80.0 - 99.0 FL    MCH 28.9 26.0 - 34.0 PG    MCHC 32.1 30.0 - 36.5 g/dL    RDW 15.9 (H) 11.5 - 14.5 %    Platelets 244 150 - 400 K/uL    MPV 9.9 8.9 - 12.9 FL    Nucleated RBCs 0.0 0.0  WBC    nRBC 0.00 0.00 - 0.01 K/uL    Neutrophils % 85 (H) 32 - 75 %    Lymphocytes % 10 (L) 12 - 49 %    Monocytes % 4 (L) 5 - 13 %    Eosinophils % 0 0 - 7 %    Basophils % 0 0 - 1 %    Immature Granulocytes % 1 (H) 0 - 0.5 %

## 2024-06-11 NOTE — PROGRESS NOTES
General-rounding tele-neurology consult called in to [facility] call center at this time r/t pupil changes, ?CVA, s/p code blue 6/10.

## 2024-06-12 ENCOUNTER — APPOINTMENT (OUTPATIENT)
Facility: HOSPITAL | Age: 79
DRG: 870 | End: 2024-06-12
Payer: MEDICARE

## 2024-06-12 PROBLEM — A41.9 SEPTIC SHOCK (HCC): Status: ACTIVE | Noted: 2024-06-12

## 2024-06-12 PROBLEM — R65.21 SEPTIC SHOCK (HCC): Status: ACTIVE | Noted: 2024-06-12

## 2024-06-12 PROBLEM — J15.212 PNEUMONIA OF RIGHT UPPER LOBE DUE TO METHICILLIN RESISTANT STAPHYLOCOCCUS AUREUS (MRSA) (HCC): Status: ACTIVE | Noted: 2024-06-12

## 2024-06-12 PROBLEM — Z78.9 ADMITTED TO INTENSIVE CARE UNIT: Status: ACTIVE | Noted: 2024-06-12

## 2024-06-12 PROBLEM — J69.0 ASPIRATION PNEUMONIA OF RIGHT LUNG DUE TO GASTRIC SECRETIONS (HCC): Status: ACTIVE | Noted: 2024-06-12

## 2024-06-12 LAB
ALBUMIN SERPL-MCNC: 1.7 G/DL (ref 3.5–5)
ALBUMIN/GLOB SERPL: 0.4 (ref 1.1–2.2)
ALP SERPL-CCNC: 74 U/L (ref 45–117)
ALT SERPL-CCNC: 29 U/L (ref 12–78)
ANION GAP SERPL CALC-SCNC: 8 MMOL/L (ref 5–15)
ARTERIAL PATENCY WRIST A: YES
AST SERPL W P-5'-P-CCNC: 21 U/L (ref 15–37)
BACTERIA SPEC CULT: NORMAL
BASE EXCESS BLDA CALC-SCNC: 0.7 MMOL/L (ref 0–3)
BASOPHILS # BLD: 0 K/UL (ref 0–0.1)
BASOPHILS NFR BLD: 0 % (ref 0–1)
BDY SITE: ABNORMAL
BILIRUB SERPL-MCNC: 0.5 MG/DL (ref 0.2–1)
BODY TEMPERATURE: 98.3
BUN SERPL-MCNC: 58 MG/DL (ref 6–20)
BUN/CREAT SERPL: 32 (ref 12–20)
CA-I BLD-MCNC: 8.2 MG/DL (ref 8.5–10.1)
CHLORIDE SERPL-SCNC: 104 MMOL/L (ref 97–108)
CO2 SERPL-SCNC: 26 MMOL/L (ref 21–32)
COHGB MFR BLD: 0.3 % (ref 1–2)
CREAT SERPL-MCNC: 1.79 MG/DL (ref 0.7–1.3)
CRP SERPL-MCNC: 13.6 MG/DL (ref 0–0.3)
DIFFERENTIAL METHOD BLD: ABNORMAL
EOSINOPHIL # BLD: 0 K/UL (ref 0–0.4)
EOSINOPHIL NFR BLD: 0 % (ref 0–7)
ERYTHROCYTE [DISTWIDTH] IN BLOOD BY AUTOMATED COUNT: 16 % (ref 11.5–14.5)
FIO2 ON VENT: 40 %
GAS FLOW.O2 SETTING OXYMISER: 14
GLOBULIN SER CALC-MCNC: 4.5 G/DL (ref 2–4)
GLUCOSE SERPL-MCNC: 123 MG/DL (ref 65–100)
GRAM STN SPEC: NORMAL
HBV CORE AB SERPL QL IA: NEGATIVE
HBV SURFACE AB SER QL: NONREACTIVE
HBV SURFACE AB SER-ACNC: <3.1 MIU/ML
HBV SURFACE AG SER QL: <0.1 INDEX
HBV SURFACE AG SER QL: NEGATIVE
HCO3 BLDA-SCNC: 23 MMOL/L (ref 22–26)
HCT VFR BLD AUTO: 21.2 % (ref 36.6–50.3)
HCV AB SER IA-ACNC: 0.16 INDEX
HCV AB SERPL QL IA: NONREACTIVE
HGB BLD-MCNC: 7 G/DL (ref 12.1–17)
IMM GRANULOCYTES # BLD AUTO: 0.1 K/UL (ref 0–0.04)
IMM GRANULOCYTES NFR BLD AUTO: 1 % (ref 0–0.5)
LYMPHOCYTES # BLD: 1.1 K/UL (ref 0.8–3.5)
LYMPHOCYTES NFR BLD: 8 % (ref 12–49)
Lab: NORMAL
MCH RBC QN AUTO: 28.9 PG (ref 26–34)
MCHC RBC AUTO-ENTMCNC: 33 G/DL (ref 30–36.5)
MCV RBC AUTO: 87.6 FL (ref 80–99)
METHGB MFR BLD: 0.4 % (ref 0–1.4)
MONOCYTES # BLD: 0.4 K/UL (ref 0–1)
MONOCYTES NFR BLD: 3 % (ref 5–13)
NEUTS SEG # BLD: 12.4 K/UL (ref 1.8–8)
NEUTS SEG NFR BLD: 88 % (ref 32–75)
NRBC # BLD: 0 K/UL (ref 0–0.01)
NRBC BLD-RTO: 0 PER 100 WBC
OXYHGB MFR BLD: 96.1 % (ref 95–99)
PCO2 BLDA: 27 MMHG (ref 35–45)
PEEP RESPIRATORY: 5
PERFORMED BY:: ABNORMAL
PH BLDA: 7.54 (ref 7.35–7.45)
PHOSPHATE SERPL-MCNC: 4.8 MG/DL (ref 2.6–4.7)
PLATELET # BLD AUTO: 270 K/UL (ref 150–400)
PMV BLD AUTO: 10 FL (ref 8.9–12.9)
PO2 BLDA: 95 MMHG (ref 80–100)
POTASSIUM SERPL-SCNC: 3.8 MMOL/L (ref 3.5–5.1)
PROCALCITONIN SERPL-MCNC: 6.77 NG/ML
PROT SERPL-MCNC: 6.2 G/DL (ref 6.4–8.2)
RBC # BLD AUTO: 2.42 M/UL (ref 4.1–5.7)
SAO2 % BLD: 97 % (ref 95–99)
SAO2% DEVICE SAO2% SENSOR NAME: ABNORMAL
SODIUM SERPL-SCNC: 138 MMOL/L (ref 136–145)
SPECIMEN SITE: ABNORMAL
UFH PPP CHRO-ACNC: 0.4 IU/ML
VENTILATION MODE VENT: ABNORMAL
VT SETTING VENT: 500
WBC # BLD AUTO: 13.9 K/UL (ref 4.1–11.1)

## 2024-06-12 PROCEDURE — 6360000002 HC RX W HCPCS: Performed by: INTERNAL MEDICINE

## 2024-06-12 PROCEDURE — 85025 COMPLETE CBC W/AUTO DIFF WBC: CPT

## 2024-06-12 PROCEDURE — 2580000003 HC RX 258: Performed by: FAMILY MEDICINE

## 2024-06-12 PROCEDURE — 84100 ASSAY OF PHOSPHORUS: CPT

## 2024-06-12 PROCEDURE — 6360000002 HC RX W HCPCS: Performed by: FAMILY MEDICINE

## 2024-06-12 PROCEDURE — 80053 COMPREHEN METABOLIC PANEL: CPT

## 2024-06-12 PROCEDURE — 2000000000 HC ICU R&B

## 2024-06-12 PROCEDURE — 99233 SBSQ HOSP IP/OBS HIGH 50: CPT | Performed by: INTERNAL MEDICINE

## 2024-06-12 PROCEDURE — 71045 X-RAY EXAM CHEST 1 VIEW: CPT

## 2024-06-12 PROCEDURE — 86140 C-REACTIVE PROTEIN: CPT

## 2024-06-12 PROCEDURE — 94761 N-INVAS EAR/PLS OXIMETRY MLT: CPT

## 2024-06-12 PROCEDURE — 36600 WITHDRAWAL OF ARTERIAL BLOOD: CPT

## 2024-06-12 PROCEDURE — 6370000000 HC RX 637 (ALT 250 FOR IP): Performed by: INTERNAL MEDICINE

## 2024-06-12 PROCEDURE — 82803 BLOOD GASES ANY COMBINATION: CPT

## 2024-06-12 PROCEDURE — 6370000000 HC RX 637 (ALT 250 FOR IP): Performed by: FAMILY MEDICINE

## 2024-06-12 PROCEDURE — 2700000000 HC OXYGEN THERAPY PER DAY

## 2024-06-12 PROCEDURE — 94640 AIRWAY INHALATION TREATMENT: CPT

## 2024-06-12 PROCEDURE — 85520 HEPARIN ASSAY: CPT

## 2024-06-12 PROCEDURE — 94003 VENT MGMT INPAT SUBQ DAY: CPT

## 2024-06-12 PROCEDURE — 6370000000 HC RX 637 (ALT 250 FOR IP)

## 2024-06-12 PROCEDURE — 2580000003 HC RX 258: Performed by: INTERNAL MEDICINE

## 2024-06-12 PROCEDURE — 99232 SBSQ HOSP IP/OBS MODERATE 35: CPT | Performed by: PSYCHIATRY & NEUROLOGY

## 2024-06-12 PROCEDURE — 95816 EEG AWAKE AND DROWSY: CPT

## 2024-06-12 PROCEDURE — 84145 PROCALCITONIN (PCT): CPT

## 2024-06-12 PROCEDURE — 36415 COLL VENOUS BLD VENIPUNCTURE: CPT

## 2024-06-12 PROCEDURE — 2500000003 HC RX 250 WO HCPCS: Performed by: FAMILY MEDICINE

## 2024-06-12 RX ORDER — SCOLOPAMINE TRANSDERMAL SYSTEM 1 MG/1
1 PATCH, EXTENDED RELEASE TRANSDERMAL
Status: DISCONTINUED | OUTPATIENT
Start: 2024-06-12 | End: 2024-06-25 | Stop reason: HOSPADM

## 2024-06-12 RX ORDER — ATORVASTATIN CALCIUM 40 MG/1
40 TABLET, FILM COATED ORAL NIGHTLY
Status: DISCONTINUED | OUTPATIENT
Start: 2024-06-12 | End: 2024-06-22

## 2024-06-12 RX ORDER — ROSUVASTATIN CALCIUM 20 MG/1
40 TABLET, COATED ORAL NIGHTLY
Status: DISCONTINUED | OUTPATIENT
Start: 2024-06-12 | End: 2024-06-12 | Stop reason: SDUPTHER

## 2024-06-12 RX ADMIN — IPRATROPIUM BROMIDE AND ALBUTEROL SULFATE 1 DOSE: 2.5; .5 SOLUTION RESPIRATORY (INHALATION) at 13:49

## 2024-06-12 RX ADMIN — IPRATROPIUM BROMIDE AND ALBUTEROL SULFATE 1 DOSE: 2.5; .5 SOLUTION RESPIRATORY (INHALATION) at 09:36

## 2024-06-12 RX ADMIN — THIAMINE HCL TAB 100 MG 100 MG: 100 TAB at 10:11

## 2024-06-12 RX ADMIN — SODIUM CHLORIDE, PRESERVATIVE FREE 10 ML: 5 INJECTION INTRAVENOUS at 10:11

## 2024-06-12 RX ADMIN — METHYLPREDNISOLONE SODIUM SUCCINATE 40 MG: 40 INJECTION INTRAMUSCULAR; INTRAVENOUS at 00:08

## 2024-06-12 RX ADMIN — FERROUS SULFATE TAB 325 MG (65 MG ELEMENTAL FE) 325 MG: 325 (65 FE) TAB at 10:11

## 2024-06-12 RX ADMIN — IPRATROPIUM BROMIDE AND ALBUTEROL SULFATE 1 DOSE: 2.5; .5 SOLUTION RESPIRATORY (INHALATION) at 01:32

## 2024-06-12 RX ADMIN — FOLIC ACID 1 MG: 1 TABLET ORAL at 10:11

## 2024-06-12 RX ADMIN — Medication 2 MCG/MIN: at 16:00

## 2024-06-12 RX ADMIN — METHYLPREDNISOLONE SODIUM SUCCINATE 40 MG: 40 INJECTION INTRAMUSCULAR; INTRAVENOUS at 17:11

## 2024-06-12 RX ADMIN — METHYLPREDNISOLONE SODIUM SUCCINATE 40 MG: 40 INJECTION INTRAMUSCULAR; INTRAVENOUS at 10:11

## 2024-06-12 RX ADMIN — SODIUM CHLORIDE, PRESERVATIVE FREE 10 ML: 5 INJECTION INTRAVENOUS at 21:00

## 2024-06-12 RX ADMIN — PROPOFOL 20 MCG/KG/MIN: 10 INJECTION, EMULSION INTRAVENOUS at 19:42

## 2024-06-12 RX ADMIN — PROPOFOL 20 MCG/KG/MIN: 10 INJECTION, EMULSION INTRAVENOUS at 10:36

## 2024-06-12 RX ADMIN — VANCOMYCIN HYDROCHLORIDE 1750 MG: 1 INJECTION, POWDER, LYOPHILIZED, FOR SOLUTION INTRAVENOUS at 19:24

## 2024-06-12 RX ADMIN — HEPARIN SODIUM 13 UNITS/KG/HR: 10000 INJECTION, SOLUTION INTRAVENOUS at 15:13

## 2024-06-12 RX ADMIN — ACETYLCYSTEINE 600 MG: 200 SOLUTION ORAL; RESPIRATORY (INHALATION) at 19:21

## 2024-06-12 RX ADMIN — MEROPENEM 500 MG: 500 INJECTION, POWDER, FOR SOLUTION INTRAVENOUS at 00:08

## 2024-06-12 RX ADMIN — ASPIRIN 81 MG 81 MG: 81 TABLET ORAL at 10:11

## 2024-06-12 RX ADMIN — FINASTERIDE 5 MG: 5 TABLET, FILM COATED ORAL at 10:11

## 2024-06-12 RX ADMIN — EPOETIN ALFA-EPBX 10000 UNITS: 10000 INJECTION, SOLUTION INTRAVENOUS; SUBCUTANEOUS at 17:34

## 2024-06-12 RX ADMIN — IPRATROPIUM BROMIDE AND ALBUTEROL SULFATE 1 DOSE: 2.5; .5 SOLUTION RESPIRATORY (INHALATION) at 19:21

## 2024-06-12 RX ADMIN — PROPOFOL 30 MCG/KG/MIN: 10 INJECTION, EMULSION INTRAVENOUS at 03:20

## 2024-06-12 RX ADMIN — ACETYLCYSTEINE 600 MG: 200 SOLUTION ORAL; RESPIRATORY (INHALATION) at 09:36

## 2024-06-12 ASSESSMENT — PULMONARY FUNCTION TESTS
PIF_VALUE: 19
PIF_VALUE: 18
PIF_VALUE: 19
PIF_VALUE: 18
PIF_VALUE: 18
PIF_VALUE: 19
PIF_VALUE: 19
PIF_VALUE: 20
PIF_VALUE: 20
PIF_VALUE: 19
PIF_VALUE: 18
PIF_VALUE: 19
PIF_VALUE: 17
PIF_VALUE: 19
PIF_VALUE: 20
PIF_VALUE: 19
PIF_VALUE: 18
PIF_VALUE: 19

## 2024-06-12 ASSESSMENT — PAIN SCALES - GENERAL
PAINLEVEL_OUTOF10: 0

## 2024-06-12 NOTE — PROGRESS NOTES
Vancomycin Dosing Consult  Agustín Willis is a 79 y.o. male with MRSA PNA. Pharmacy was consulted by Dr. Alonzo to dose and monitor vancomycin. Today is day 1.    Antibiotic regimen: Vancomycin + meropenem    Temp (24hrs), Av.1 °F (36.2 °C), Min:95.6 °F (35.3 °C), Max:98.4 °F (36.9 °C)    Recent Labs     06/10/24  1559 24  0222 24  0217   WBC 23.9* 19.0* 13.9*   CREATININE 2.60* 2.40* 1.79*   BUN 93* 92* 58*     Est CrCl: n/a, FIDEL being dialyzed prn  Concomitant nephrotoxic drugs: None    Cultures:    blood x 2: negative, final   urine: negative, final   sputum: normal respiratory doug, final   sputum: MRSA, prelim   sputum: GPC in clusters, final    MRSA Swab: Detected     Target range:  Pre-HD level 15-20 mcg/ml    Assessment/Plan:   Patient with sputum culture positive for MRSA.   He has FIDEL and has been on dialysis this admission. Last HD .   Vancomycin 1750 mg x 1 ordered.  Level will be ordered pending RRT plan.  Antimicrobial stop date  per consult     Bernie Alfaro, PharmD, BCPS, BCCCP  Clinical Pharmacist   [phone]

## 2024-06-12 NOTE — PROGRESS NOTES
Obdulio Cho (pt's sister) returned phone messages. She gave me the number of the daughter of Agustín Willis. The number she has for his son does not work. I told her to tell the daughter to expect a phone call in AM.  DaughterYaneli' [phone].

## 2024-06-12 NOTE — CONSULTS
Comprehensive Nutrition Assessment    Type and Reason for Visit:  RD Nutrition Re-Screen/LOS, Consult (New Vent)    Nutrition Recommendations/Plan:   NPO, advance as medically appropriate.  Start TF of Nepro 1.8 Rickie (Renal) via OGT at goal rate of 37 mL/hr.  Flush with 130 mL H2O q4hrs.  Provides: 1598 kcal(98%), 78 gm protein(75%), 1428 mL H2O(88%).                        +290 kcal(116%) from Propofol at 11 mL/hr.    Consider phos binder as appropriate.  If additional or increased rate of pressor support required, please hold TF.    Monitor and record TF rate, flushes, tolerance, BM in I/Os.      Malnutrition Assessment:  Malnutrition Status:  No malnutrition (06/12/24 1151)    Context:  Acute Illness     Findings of the 6 clinical characteristics of malnutrition:  Energy Intake:  No significant decrease in energy intake  Weight Loss:  No significant weight loss     Body Fat Loss:  No significant body fat loss     Muscle Mass Loss:  No significant muscle mass loss    Fluid Accumulation:  No significant fluid accumulation     Strength:  Not Performed    Nutrition Assessment:    Admitted for cardiac arrest, on vent, receiving levo at 2mics being weaned off propofol. Plan to d/c CRRT today. OGT in place, per MD randle to start TF. (6/6) Pt recently extubated, SLP cleared for puree + mild thick liqs. RD to initiate ONS to help meet EENs. (6/10) Code BLUE in afternoon(1400), Pt intubated and returned to CCU. (6/12) Pt remains intubated, on very low dose levo, sedated on propofol. RD consulted for TF rec's. Pt hemodynamically stable for EN initiation, will provide regimen above. Wt loss not clinically significant, but is concerning- will trend given possibly impacted by fluid shifts. Labs: , BUN 58, Cr 1.79, GFR 38, Ca 8.2, Phos 4.8, H/H 7.0/21.2. Meds: Levophed 2 mcg, Propofol, B1, B9, FeSul, merrem, solu-medrol, duoneb.    Nutrition Related Findings:    NFPE w/o acute findings; consider edema obscuring accurate

## 2024-06-12 NOTE — PROGRESS NOTES
Progress Note  Date:2024       Room:[facility]  Patient Name:Agustín Willis     YOB: 1945     Age:79 y.o.        Subjective    Subjective  Patient followed septic shock with presumptive aspiration pneumonia.  Yesterday he coded with PEA, intubated and transferred back to the ICU.  New RUL infiltrate on CXR.  Sputum culture was sent and he was restarted on IV Meropenem, however, today his sputum is growing MRSA..         Objective         Vitals Last 24 Hours:  TEMPERATURE:  Temp  Av.1 °F (36.2 °C)  Min: 95.6 °F (35.3 °C)  Max: 98.4 °F (36.9 °C)  RESPIRATIONS RANGE: Resp  Av.8  Min: 12  Max: 25  PULSE OXIMETRY RANGE: SpO2  Av %  Min: 95 %  Max: 100 %  PULSE RANGE: Pulse  Av.4  Min: 44  Max: 77  BLOOD PRESSURE RANGE: Systolic (24hrs), Av , Min:82 , Max:168   ; Diastolic (24hrs), Av, Min:51, Max:99         Objective:  Vital signs: (most recent): Blood pressure 122/72, pulse 50, temperature 97.7 °F (36.5 °C), temperature source Axillary, resp. rate 12, height 1.73 m (5' 8.11\"), weight 86.8 kg (191 lb 5.8 oz), SpO2 99 %.      Vitals and nursing note reviewed.   Constitutional:       General: He is in acute distress.      Appearance: He is ill-appearing and toxic-appearing.   HENT:      Head: Normocephalic and atraumatic.      Right Ear: External ear normal.      Left Ear: External ear normal.      Nose: Nose normal.      Mouth/Throat: drooling     Comments ET tube/orogastric tube  Cardiovascular:      Rate and Rhythm: Normal rate and regular rhythm.      Heart sounds: No murmur heard.  Pulmonary:      Breath sounds: Rhonchi present. No wheezing or rales.   Abdominal:      General: Bowel sounds are normal. There is no distension.      Palpations: Abdomen is soft.      Tenderness: There is no abdominal tenderness.   Genitourinary:  external urinary device    Musculoskeletal:      Cervical back: Neck supple.      Right lower leg: No edema.      Left lower leg: Edema present.    Skin:     Findings: No rash.   Neurological: intubated  Psychiatric: intubated      Labs/Imaging/Diagnostics    Labs:  CBC:  Recent Labs     06/10/24  1559 06/11/24 0222 06/12/24 0217   WBC 23.9* 19.0* 13.9*   RBC 2.48* 2.49* 2.42*   HGB 7.1* 7.2* 7.0*   HCT 22.8* 22.4* 21.2*   MCV 91.9 90.0 87.6   RDW 15.7* 15.9* 16.0*    244 270       CHEMISTRIES:  Recent Labs     06/10/24  1559 06/11/24 0222 06/11/24  1030 06/12/24 0217    141  --  138   K 4.6 4.0  --  3.8    108  --  104   CO2 24 26  --  26   BUN 93* 92*  --  58*   CREATININE 2.60* 2.40*  --  1.79*   GLUCOSE 182* 103*  --  123*   PHOS  --  4.8* 4.8* 4.8*   MG  --   --  2.2  --          Lactic acid 6.39      Procal 6.77 <5.94 <0.27 <0.71 <2.45  <11.42 <27.56 <35.77 <50.14 <72.72 <0.22              CRP 13.60 <12.40 <12.00 <17.00 <18.70 < 16.20 < 12.30 <20.20 <29.80            MRSA nasal PCR Positive    Blood cultures (5/29) No growth FINAL  Blood cultures (5/29)  No growth FINAL  Urine culture (5/29) No growth FINAL  Sputum culture (5/29) Light normal respiratory doug FINAL  Sputum culture (6/11)  Heavy Preliminary report of Methicillin Resistant Staphylococcus aureus by antigen detection         Imaging Last 24 Hours:  XR CHEST PORTABLE    Result Date: 6/10/2024    1. Satisfactory intubation.  2. New RUL airspace disease/pneumonia.             .    Assessment//Plan           Hospital Problems             Last Modified POA    * (Principal) Acute respiratory failure (HCC) 5/29/2024 Yes    Cardiac arrest (HCC) 5/29/2024 Yes    Septic shock (HCC) 6/12/2024 Yes    Aspiration pneumonia of right lung due to gastric secretions (HCC) 6/12/2024 Yes    Admitted to intensive care unit 6/12/2024 Yes      Severe sepsis/septic shock with hypothermia, leukocytosis, elevated lactic acid and procal/CRP, resolving  Presumptive aspiration pneumonia, primarily right lung, sputum culture grew normal doug,  on  IV Meropenem  New RUL HCAP, secondary to

## 2024-06-12 NOTE — CONSULTS
IMPRESSION:   Acute hypoxic respiratory failure extubated on 6 5 and reintubated on 6/10  Status post cardiac arrest  A-fib with rate controlled  Septic shock  Sepsis treated with antibiotics  Aspiration pneumonia right midlung  COPD   MRSA nasal colonization  History of cocaine abuse polysubstance abuse in the past  Pt is critically ill. Time spent with pt and staff actively rendering care, managing pt and coordinating care as stated below; 30 minutes, exclusive of any procedures      RECOMMENDATIONS/PLAN:   Extubated  on 6/5 reintubated on 6/10 on ventilator sedated with propofol  ABG this morning showed pCO2 27 pO2 95 on 40% FiO2  Chest x-ray with continued right lower infiltrate CTA chest showed bilateral infiltrates consistent with aspiration  Chest x-ray shows right midlung infiltrate fluid overload congestive change left lung clear  Remains on Merrem and vancomycin sputum with normal doug  Heart rate controlled on amiodarone  Renal function improving off CVVH as needed dialysis  Status post cardiac arrest patient coded   Patient is back on pressors on Levophed  Continue with nebulizer treatment and will give 3 doses of Solu-Medrol     [x] High complexity decision making was performed  [x] See my orders for details  HPI  78-year-old male nursing home resident came in because of shortness of breath respiratory distress patient has episodes of vomiting after eating breakfast this morning subsequently came to the hospital saturation was in 70s he was back to the emergency room subsequently intubated and then he coded hemodynamically unstable now he is on 2 pressors vasopressin and Levophed denies intubated on ventilator critical care consult was called, past medical history of polysubstance abuse cocaine abuse COPD hypertension insomnia      PMH:  has a past medical history of Benign prostatic disease, Benign prostatic hyperplasia, Cervical radiculopathy, Chronic obstructive lung disease (HCC), Cocaine abuse  and   cerebellum covering multiple vascular territories, as well as small to moderate   areas of cortical infarction in the right temporoparietal junction and left   parietal lobe as above. No associated mass effect.    2. Numerous chronic microhemorrhages as on prior study again may indicate   cerebral amyloid angiopathy, with extensive chronic white matter disease.         Electronically signed by Gerardo Ugarte      XR ABDOMEN (KUB) (SINGLE AP VIEW)   Final Result   No unexpected radiopaque foreign bodies in the abdomen.      Electronically signed by Renu Carrington      XR CHEST 1 VIEW   Final Result   Grossly stable bilateral pulmonary infiltrates, right greater than left.         Electronically signed by SEMAJ Basilio      XR CHEST PORTABLE   Final Result      Bilateral lung infiltrates. Orogastric tube as described. Otherwise stable lines   and tubes.         Electronically signed by KATHRINE EDWARDS      CT HEAD WO CONTRAST   Final Result   No acute intracranial abnormality.         Electronically signed by KEERTHI HERNANDEZ      XR CHEST PORTABLE   Final Result   1. Satisfactory intubation.   2. New RUL airspace disease/pneumonia.      Electronically signed by KEERTHI HERNANDEZ      XR CHEST 1 VIEW   Final Result   Stable bibasilar atelectasis and low lung volumes. Stable lines and tubes,   except for extubation and removal of gastric tube..  .         Electronically signed by KATHRINE EDWARDS      XR CHEST 1 VIEW   Final Result   Shifting bibasilar atelectasis is increased on the right and   decreased on the left. Stable support devices.         XR CHEST 1 VIEW   Final Result   1. No interval change         Vascular duplex upper extremity venous bilateral   Final Result      XR CHEST PORTABLE   Final Result   Unchanged right perihilar and left basilar opacities.      XR CHEST PORTABLE   Final Result      Unchanged radiographic appearance.         XR CHEST 1 VIEW   Final Result   Stable right pulmonary edema.

## 2024-06-12 NOTE — PROGRESS NOTES
[facility] NEUROLOGY PROGRESS NOTE   Date of Service 6/12/2024          ASSESSMENT  Refractory coma - hypoxic ischemic, multifocal bilateral embolic appearing acute strokes in the context of PEA,cocaine abuse,Afib and suspected underlying hypercoagulable state    Yet to r/o subclinical seizures     Advanced age; NH resident  Acute hypoxic respiratory failure, ventilator dependent  Hx cocaine abuse  LUE DVT- new/o- on heparin drip  Hx afib, on eliquis and asa at home  Radiographic findings chronic micro hemorrhages- ? Amyloid angiopathy      Prognosis is poor for meaningful recovery- likely progressing towards persistent vegetative state    MRI brain-6/11- Numerous small foci of acute infarction in the supratentorial brain and  cerebellum covering multiple vascular territories, as well as small to moderate areas of cortical infarction in the right temporoparietal junction and left parietal lobe as above. No associated mass effect.Numerous chronic microhemorrhages as on prior study again may indicate  cerebral amyloid angiopathy, with extensive chronic white matter disease.     Pertinent interim neurodiagnostic studies- personally reviewed imaging and concur with the reports      PLAN  EEG remains pending  No strong indication for AED at this time. Unless EEG shows electrographic seizures or if he has clinical spells suspicious of seizures   Heparin gtt- followed by cardio    Candidacy for long term OAC is highly doubtful given probable amyloid angiopathy;   May consider watchman device and IVC filter if his mental status improves    PEG/Trach/LTAC    Recommend palliative care consult to discuss goals of care     Recall neuro prn  EEG results with be followed peripherally    SUBJECTIVE  Off sedation x 20 mins  Remains intubated. No reported seizures/myoclonus. No family at bedside      NEURO EXAM  Comatose, intubated, off sedation x 20 mins  Does not follow commands. Pupils non reactive.   No gaze

## 2024-06-12 NOTE — PROGRESS NOTES
CARDIOLOGY PROGRESS NOTE      Patient Name: Agustín Willis  Age: 79 y.o.  Gender:male  :1945  MRN: [id number]    Agustín Willis is a 78 y.o. year-old male with past medical history significant for hypertension, COPD, cocaine abuse, CKD who was evaluated today due to cardiac arrest. Patient initially presented to the ED via EMS from nursing home due to respiratory distress after vomiting breakfast. Required intubation in ED. Apparently with VF arrest in ED, shock x4.     Patient seen and examined in ICU. Remains intubated, sedated. On heparin gtt. On single pressor support. Neurology following. MRI yesterday with numerous small foci of acute infarction. EEG pending.       Telemetry reviewed.    ROS as above. Current medications reviewed.    Physical Examination    Allergies   Allergen Reactions    Penicillin G Other (See Comments)     Pt states he passes out    Sulfamethoxazole-Trimethoprim      Pt states he passes out     Vitals:    24 1235   BP:    Pulse: 63   Resp: 21   Temp:    SpO2: 98%     Vital signs are stable   In no distress  Heart has a regular rate and rhythm  Lungs are coarse  Abdomen is soft  Extremities have mild edema  Skin is dry    Labs reviewed:  Recent Results (from the past 12 hour(s))   CBC with Auto Differential    Collection Time: 24  2:17 AM   Result Value Ref Range    WBC 13.9 (H) 4.1 - 11.1 K/uL    RBC 2.42 (L) 4.10 - 5.70 M/uL    Hemoglobin 7.0 (L) 12.1 - 17.0 g/dL    Hematocrit 21.2 (L) 36.6 - 50.3 %    MCV 87.6 80.0 - 99.0 FL    MCH 28.9 26.0 - 34.0 PG    MCHC 33.0 30.0 - 36.5 g/dL    RDW 16.0 (H) 11.5 - 14.5 %    Platelets 270 150 - 400 K/uL    MPV 10.0 8.9 - 12.9 FL    Nucleated RBCs 0.0 0.0  WBC    nRBC 0.00 0.00 - 0.01 K/uL    Neutrophils % 88 (H) 32 - 75 %    Lymphocytes % 8 (L) 12 - 49 %    Monocytes % 3 (L) 5 - 13 %    Eosinophils % 0 0 - 7 %    Basophils % 0 0 - 1 %    Immature Granulocytes % 1 (H) 0 - 0.5 %    Neutrophils Absolute 12.4 (H) 1.8 - 8.0  K/UL    Lymphocytes Absolute 1.1 0.8 - 3.5 K/UL    Monocytes Absolute 0.4 0.0 - 1.0 K/UL    Eosinophils Absolute 0.0 0.0 - 0.4 K/UL    Basophils Absolute 0.0 0.0 - 0.1 K/UL    Immature Granulocytes Absolute 0.1 (H) 0.00 - 0.04 K/UL    Differential Type AUTOMATED     C-Reactive Protein    Collection Time: 06/12/24  2:17 AM   Result Value Ref Range    CRP 13.60 (H) 0.00 - 0.30 mg/dL   Procalcitonin    Collection Time: 06/12/24  2:17 AM   Result Value Ref Range    Procalcitonin 6.77 (H) 0 ng/mL   Comprehensive Metabolic Panel    Collection Time: 06/12/24  2:17 AM   Result Value Ref Range    Sodium 138 136 - 145 mmol/L    Potassium 3.8 3.5 - 5.1 mmol/L    Chloride 104 97 - 108 mmol/L    CO2 26 21 - 32 mmol/L    Anion Gap 8 5 - 15 mmol/L    Glucose 123 (H) 65 - 100 mg/dL    BUN 58 (H) 6 - 20 mg/dL    Creatinine 1.79 (H) 0.70 - 1.30 mg/dL    BUN/Creatinine Ratio 32 (H) 12 - 20      Est, Glom Filt Rate 38 (L) >60 ml/min/1.73m2    Calcium 8.2 (L) 8.5 - 10.1 mg/dL    Total Bilirubin 0.5 0.2 - 1.0 mg/dL    AST 21 15 - 37 U/L    ALT 29 12 - 78 U/L    Alk Phosphatase 74 45 - 117 U/L    Total Protein 6.2 (L) 6.4 - 8.2 g/dL    Albumin 1.7 (L) 3.5 - 5.0 g/dL    Globulin 4.5 (H) 2.0 - 4.0 g/dL    Albumin/Globulin Ratio 0.4 (L) 1.1 - 2.2     Heparin, Anti-XA    Collection Time: 06/12/24  2:17 AM   Result Value Ref Range    Heparin Xa,LMWH and Unfrac 0.40 IU/mL   Phosphorus    Collection Time: 06/12/24  2:17 AM   Result Value Ref Range    Phosphorus 4.8 (H) 2.6 - 4.7 mg/dL   Blood Gas, Arterial    Collection Time: 06/12/24  2:27 AM   Result Value Ref Range    pH, Arterial 7.54 (H) 7.35 - 7.45      pCO2, Arterial 27 (L) 35 - 45 mmHg    pO2, Arterial 95 80 - 100 mmHg    O2 Sat, Arterial 97 95 - 99 %    HCO3, Arterial 23 22 - 26 mmol/L    Base Excess, Arterial 0.7 0 - 3 mmol/L    O2 Method VENT      FIO2 Arterial 40 %    Mode ASSIST CONTROL      POC TIDAL VOLUME 500.0      Set Rate, POC 14      POC PEEP/CPA 5.0      Source Arterial

## 2024-06-12 NOTE — CARE COORDINATION
CM reviewed Pt medicals, Pt was a code blue and was re intubated yesterday.     Pt is a LTC Resident at [facility].          1:55    Pt Nephew Oswald was up here visiting Pt.  Oswald stated that Pt has two children, one lives in [place] and one lives in [place]  Oswald could not remember their name.     CM attempted to call Obdulio, Pt sister, left her a  requesting a return call.     CM asked for a [employer] Search.

## 2024-06-12 NOTE — PLAN OF CARE
Problem: Discharge Planning  Goal: Discharge to home or other facility with appropriate resources  Recent Flowsheet Documentation  Taken 6/11/2024 2000 by Jody Zepeda RN  Discharge to home or other facility with appropriate resources: Identify barriers to discharge with patient and caregiver     Problem: Chronic Conditions and Co-morbidities  Goal: Patient's chronic conditions and co-morbidity symptoms are monitored and maintained or improved  Recent Flowsheet Documentation  Taken 6/11/2024 2000 by Jody Zepeda RN  Care Plan - Patient's Chronic Conditions and Co-Morbidity Symptoms are Monitored and Maintained or Improved:   Monitor and assess patient's chronic conditions and comorbid symptoms for stability, deterioration, or improvement   Collaborate with multidisciplinary team to address chronic and comorbid conditions and prevent exacerbation or deterioration     Problem: Neurosensory - Adult  Goal: Achieves stable or improved neurological status  Recent Flowsheet Documentation  Taken 6/11/2024 2000 by Jody Zepeda RN  Achieves stable or improved neurological status:   Assess for and report changes in neurological status   Maintain blood pressure and fluid volume within ordered parameters to optimize cerebral perfusion and minimize risk of hemorrhage   Monitor temperature, glucose, and sodium. Initiate appropriate interventions as ordered  Goal: Absence of seizures  Recent Flowsheet Documentation  Taken 6/11/2024 2000 by Jody Zepeda RN  Absence of seizures: Monitor for seizure activity.  If seizure occurs, document type and location of movements and any associated apnea  Goal: Remains free of injury related to seizures activity  Recent Flowsheet Documentation  Taken 6/11/2024 2000 by Jody Zepeda RN  Remains free of injury related to seizure activity:   Maintain airway, patient safety  and administer oxygen as ordered   Monitor patient for seizure activity, document and report duration

## 2024-06-12 NOTE — PROGRESS NOTES
Nephrology f/u          Patient: Agustín Willis MRN: [id number]  SSN: [id number]    YOB: 1945  Age: 79 y.o.  Sex: male      Subjective:   I have seen the patient in ICU   On vent/unresponsive  Off pressor  On FIO2 45%  He was dialyzed yesterday    Past Medical History:   Diagnosis Date    Benign prostatic disease 8/3/2020    Hypertrophy with Outflow Obstruction    Benign prostatic hyperplasia 8/3/2020    Cervical radiculopathy 8/3/2020    Chronic obstructive lung disease (HCC) 8/3/2020    Cocaine abuse (HCC) 8/3/2020    Dizziness and giddiness 8/3/2020    Hypercholesterolemia 8/3/2020    Hypertensive disorder 8/3/2020    Insomnia 8/3/2020    Kidney disease 8/3/2020    Low back pain 8/3/2020    Osteoarthritis of knee 8/3/2020    Sleep apnea 8/3/2020    Vasovagal syncope 8/3/2020     Past Surgical History:   Procedure Laterality Date    IR NONTUNNELED VASCULAR CATHETER  5/30/2024    IR NONTUNNELED VASCULAR CATHETER 5/30/2024 Stephy Abreu APRN - NP SSR RAD ANGIO IR      Family History   Problem Relation Age of Onset    Hypertension Mother      Social History     Tobacco Use    Smoking status: Former    Smokeless tobacco: Never   Substance Use Topics    Alcohol use: Yes      Current Facility-Administered Medications   Medication Dose Route Frequency Provider Last Rate Last Admin    atorvastatin (LIPITOR) tablet 40 mg  40 mg Oral Nightly Mikaela Arredondo MD        scopolamine (TRANSDERM-SCOP) transdermal patch 1 patch  1 patch TransDERmal Q72H Darrell Calixto APRN - NP   1 patch at 06/12/24 1520    methylPREDNISolone sodium succ (SOLU-MEDROL) injection 40 mg  40 mg IntraVENous Q8H Jeremy Ramos MD   40 mg at 06/12/24 1011    acetylcysteine (MUCOMYST) 20 % solution 600 mg  600 mg Inhalation BID RT Jeremy Ramos MD   600 mg at 06/12/24 0936    ipratropium 0.5 mg-albuterol 2.5 mg (DUONEB) nebulizer solution 1 Dose  1 Dose Inhalation Q6H RT Jeremy Ramos MD   1 Dose at 06/12/24 6414     heparin (porcine) injection 2,500 Units  2,500 Units IntraCATHeter PRN Sol Ferraro MD   2,500 Units at 06/11/24 1552    propofol infusion  5-50 mcg/kg/min IntraVENous Continuous Mikaela Arredondo MD 11 mL/hr at 06/12/24 1036 20 mcg/kg/min at 06/12/24 1036    norepinephrine (LEVOPHED) 16 mg in sodium chloride 0.9 % 250 mL infusion  1-100 mcg/min IntraVENous Continuous Mikaela Arredondo MD   Stopped at 06/12/24 1130    [Held by provider] amiodarone (CORDARONE) tablet 400 mg  400 mg Oral BID Gerry Wilson MD   400 mg at 06/10/24 1016    aspirin chewable tablet 81 mg  81 mg Per NG tube Daily Jeremy Ramos MD   81 mg at 06/12/24 1011    epoetin radha-epbx (RETACRIT) injection 10,000 Units  10,000 Units SubCUTAneous Once per day on Mon Wed Fri Sol Ferraro MD   10,000 Units at 06/07/24 2007    heparin (porcine) injection 4,000 Units  4,000 Units IntraVENous PRN Mikaela Arredondo MD        heparin (porcine) injection 2,000 Units  2,000 Units IntraVENous PRN Mikaela Arredondo MD   2,000 Units at 06/09/24 0734    heparin 25,000 units in dextrose 5% 250 mL (premix) infusion  5-30 Units/kg/hr IntraVENous Continuous Mikaela Arredondo MD 11.4 mL/hr at 06/12/24 1513 13 Units/kg/hr at 06/12/24 1513    heparin (porcine) injection 1,100-1,900 Units  1,100-1,900 Units IntraCATHeter PRN Sol Ferraro MD   1,400 Units at 06/03/24 1231    And    heparin (porcine) injection 1,100-1,900 Units  1,100-1,900 Units IntraCATHeter PRN Sol Ferraro MD   1,100 Units at 06/03/24 1226    sodium chloride 0.9 % bolus 2,448 mL  30 mL/kg IntraVENous Once Mikaela Arredondo MD   Held at 05/29/24 1405    [Held by provider] aspirin EC tablet 81 mg  81 mg Oral Daily Mikaela Arredondo MD   81 mg at 06/02/24 0800    ferrous sulfate (IRON 325) tablet 325 mg  325 mg Oral Daily with breakfast Mikaela Arredondo MD   325 mg at 06/12/24 1011    finasteride (PROSCAR) tablet 5 mg  5 mg Oral Daily Mikaela Arredondo MD   5 mg at 06/12/24 1011    folic

## 2024-06-12 NOTE — PROGRESS NOTES
General Daily Progress Note      Patient Name:   Agustín Willis       YOB: 1945       Age:  79 y.o.      Admit Date: 5/29/2024      Subjective:   Patient is a 78 y.o. year old male past medical history of COPD BPH cervical radiculopathy cocaine abuse hypertension hyperlipidemia seasonal allergies SVT came to emergency room with respiratory arrest, patient was at nursing home after eating breakfast he vomited everywhere likely aspirated oxygen saturation was 70% initially put on nonrebreather patient was intubated in the ER patient was hypotensive started Levophed 10 patient have a cardiac arrest went to V-fib received shock x 4 received epi and amiodarone  Was started on Levophed and vasopressin received 2 L of IV fluid, received IV Zosyn and vancomycin in the ER     Patient admitted to the ICU for further workup  Patient on ventilator/unresponsive       5/30    Patient on ventilator propofol and Precedex drip  Hypotension on Levophed and vasopressin  Patient has no urine output last night received 500 bolus    Nephrology decided for start CRRT IR team for dialysis cath  BUN 72 creatinine 3.09  CRP 10.5  MRSA nares    5/31    Patient on ventilator 40% O2 propofol Precedex levo and vasopressin drip  Patient on A-fib with rapid ventricular rate ordered amiodarone bolus and started on drip  Patient getting CRRT  No urine output  D-dimers greater than 20    6/3    Patient on ventilator 45% O2 receiving CRRT patient on propofol drip  Patient on amiodarone and Levophed drip  Patient heparin and Precedex drip  Patient is hypothermic    WBC count 22.5    6/4    Patient on ventilator 45% O2 on Levophed drip and amiodarone drip and heparin drip and Precedex drip getting NG tube feeding  BUN 37 creatinine 1.97 WBCs 15  Doppler studies positive for acute DVT of the left brachial vein left radial vein and left ulnar vein    Seen by infectious disease recommended continue IV meropenem    6/5    General ventilated  95 80 - 100 mmHg    O2 Sat, Arterial 97 95 - 99 %    HCO3, Arterial 23 22 - 26 mmol/L    Base Excess, Arterial 0.7 0 - 3 mmol/L    O2 Method VENT      FIO2 Arterial 40 %    Mode ASSIST CONTROL      POC TIDAL VOLUME 500.0      Set Rate, POC 14      POC PEEP/CPA 5.0      Source Arterial      Site Right Radial      Efrain Test YES      Carboxyhgb, Arterial 0.3 (L) 1 - 2 %    Methemoglobin, Arterial 0.4 0 - 1.4 %    Oxyhemoglobin 96.1 95 - 99 %    Performed by: Marc Lowery     Temperature 98.3       [unfilled]        Review of Systems      Alert awake not in distress      Physical Exam:      Constitutional: Alert awake extubated  HENT:   Head: Normocephalic and atraumatic.   Eyes: Pupils are equal, round, and reactive to light. EOM are normal.   Cardiovascular: Normal rate, regular rhythm and normal heart sounds.   Pulmonary/Chest: Breath sounds normal. No wheezes. No rales.   Exhibits no tenderness.   Abdominal: Soft. Bowel sounds are normal. There is no abdominal tenderness. There is no rebound and no guarding.   Musculoskeletal: Normal range of motion.   Neurological: Alert awake extubated  XR CHEST 1 VIEW   Final Result      Ongoing left lower lobe collapse/consolidation. Similar patchy opacities   throughout the right perihilar region. Worsening hazy left basilar   atelectasis/airspace disease. New small left pleural effusion.          Electronically signed by EMIGDIO BARNES      MRI BRAIN WO CONTRAST   Final Result   1. Numerous small foci of acute infarction in the supratentorial brain and   cerebellum covering multiple vascular territories, as well as small to moderate   areas of cortical infarction in the right temporoparietal junction and left   parietal lobe as above. No associated mass effect.    2. Numerous chronic microhemorrhages as on prior study again may indicate   cerebral amyloid angiopathy, with extensive chronic white matter disease.         Electronically signed by Gerardo Ugarte      XR ABDOMEN

## 2024-06-13 ENCOUNTER — APPOINTMENT (OUTPATIENT)
Facility: HOSPITAL | Age: 79
DRG: 870 | End: 2024-06-13
Payer: MEDICARE

## 2024-06-13 LAB
ALBUMIN SERPL-MCNC: 1.8 G/DL (ref 3.5–5)
ALBUMIN/GLOB SERPL: 0.4 (ref 1.1–2.2)
ALP SERPL-CCNC: 73 U/L (ref 45–117)
ALT SERPL-CCNC: 25 U/L (ref 12–78)
ANION GAP SERPL CALC-SCNC: 10 MMOL/L (ref 5–15)
ARTERIAL PATENCY WRIST A: YES
AST SERPL W P-5'-P-CCNC: 13 U/L (ref 15–37)
BACTERIA SPEC CULT: ABNORMAL
BASE EXCESS BLDA CALC-SCNC: 1.2 MMOL/L (ref 0–3)
BDY SITE: ABNORMAL
BILIRUB SERPL-MCNC: 0.4 MG/DL (ref 0.2–1)
BODY TEMPERATURE: 98.8
BUN SERPL-MCNC: 71 MG/DL (ref 6–20)
BUN/CREAT SERPL: 32 (ref 12–20)
CA-I BLD-MCNC: 8.3 MG/DL (ref 8.5–10.1)
CHLORIDE SERPL-SCNC: 102 MMOL/L (ref 97–108)
CO2 SERPL-SCNC: 24 MMOL/L (ref 21–32)
COHGB MFR BLD: 0.3 % (ref 1–2)
CREAT SERPL-MCNC: 2.25 MG/DL (ref 0.7–1.3)
CRP SERPL-MCNC: 10 MG/DL (ref 0–0.3)
DATE LAST DOSE: NORMAL
DOSE AMOUNT: NORMAL UNITS
ERYTHROCYTE [DISTWIDTH] IN BLOOD BY AUTOMATED COUNT: 15.7 % (ref 11.5–14.5)
FIO2 ON VENT: 35 %
GAS FLOW.O2 SETTING OXYMISER: 12
GLOBULIN SER CALC-MCNC: 4.7 G/DL (ref 2–4)
GLUCOSE BLD STRIP.AUTO-MCNC: 151 MG/DL (ref 65–100)
GLUCOSE BLD STRIP.AUTO-MCNC: 156 MG/DL (ref 65–100)
GLUCOSE SERPL-MCNC: 152 MG/DL (ref 65–100)
GRAM STN SPEC: ABNORMAL
HCO3 BLDA-SCNC: 24 MMOL/L (ref 22–26)
HCT VFR BLD AUTO: 23 % (ref 36.6–50.3)
HGB BLD-MCNC: 7.6 G/DL (ref 12.1–17)
Lab: ABNORMAL
MAGNESIUM SERPL-MCNC: 2.1 MG/DL (ref 1.6–2.4)
MCH RBC QN AUTO: 28.9 PG (ref 26–34)
MCHC RBC AUTO-ENTMCNC: 33 G/DL (ref 30–36.5)
MCV RBC AUTO: 87.5 FL (ref 80–99)
METHGB MFR BLD: 0.5 % (ref 0–1.4)
NRBC # BLD: 0 K/UL (ref 0–0.01)
NRBC BLD-RTO: 0 PER 100 WBC
OXYHGB MFR BLD: 95.1 % (ref 95–99)
PCO2 BLDA: 33 MMHG (ref 35–45)
PEEP RESPIRATORY: 5
PERFORMED BY:: ABNORMAL
PH BLDA: 7.49 (ref 7.35–7.45)
PHOSPHATE SERPL-MCNC: 5.8 MG/DL (ref 2.6–4.7)
PLATELET # BLD AUTO: 303 K/UL (ref 150–400)
PMV BLD AUTO: 9.8 FL (ref 8.9–12.9)
PO2 BLDA: 88 MMHG (ref 80–100)
POTASSIUM SERPL-SCNC: 3.9 MMOL/L (ref 3.5–5.1)
PROCALCITONIN SERPL-MCNC: 5.46 NG/ML
PROT SERPL-MCNC: 6.5 G/DL (ref 6.4–8.2)
RBC # BLD AUTO: 2.63 M/UL (ref 4.1–5.7)
SAO2 % BLD: 96 % (ref 95–99)
SAO2% DEVICE SAO2% SENSOR NAME: ABNORMAL
SODIUM SERPL-SCNC: 136 MMOL/L (ref 136–145)
SPECIMEN SITE: ABNORMAL
UFH PPP CHRO-ACNC: 0.43 IU/ML
VANCOMYCIN SERPL-MCNC: 18.7 UG/ML
VENTILATION MODE VENT: ABNORMAL
VT SETTING VENT: 500
WBC # BLD AUTO: 14.4 K/UL (ref 4.1–11.1)

## 2024-06-13 PROCEDURE — 2580000003 HC RX 258: Performed by: FAMILY MEDICINE

## 2024-06-13 PROCEDURE — 6360000002 HC RX W HCPCS: Performed by: INTERNAL MEDICINE

## 2024-06-13 PROCEDURE — 36415 COLL VENOUS BLD VENIPUNCTURE: CPT

## 2024-06-13 PROCEDURE — 82962 GLUCOSE BLOOD TEST: CPT

## 2024-06-13 PROCEDURE — 6370000000 HC RX 637 (ALT 250 FOR IP): Performed by: FAMILY MEDICINE

## 2024-06-13 PROCEDURE — 6370000000 HC RX 637 (ALT 250 FOR IP): Performed by: INTERNAL MEDICINE

## 2024-06-13 PROCEDURE — 94003 VENT MGMT INPAT SUBQ DAY: CPT

## 2024-06-13 PROCEDURE — 85520 HEPARIN ASSAY: CPT

## 2024-06-13 PROCEDURE — 84100 ASSAY OF PHOSPHORUS: CPT

## 2024-06-13 PROCEDURE — 99231 SBSQ HOSP IP/OBS SF/LOW 25: CPT | Performed by: PSYCHIATRY & NEUROLOGY

## 2024-06-13 PROCEDURE — 90935 HEMODIALYSIS ONE EVALUATION: CPT

## 2024-06-13 PROCEDURE — 36600 WITHDRAWAL OF ARTERIAL BLOOD: CPT

## 2024-06-13 PROCEDURE — 94761 N-INVAS EAR/PLS OXIMETRY MLT: CPT

## 2024-06-13 PROCEDURE — 6360000002 HC RX W HCPCS: Performed by: FAMILY MEDICINE

## 2024-06-13 PROCEDURE — 85027 COMPLETE CBC AUTOMATED: CPT

## 2024-06-13 PROCEDURE — 94640 AIRWAY INHALATION TREATMENT: CPT

## 2024-06-13 PROCEDURE — 80202 ASSAY OF VANCOMYCIN: CPT

## 2024-06-13 PROCEDURE — 2000000000 HC ICU R&B

## 2024-06-13 PROCEDURE — 2709999900 HC NON-CHARGEABLE SUPPLY

## 2024-06-13 PROCEDURE — 71045 X-RAY EXAM CHEST 1 VIEW: CPT

## 2024-06-13 PROCEDURE — 2700000000 HC OXYGEN THERAPY PER DAY

## 2024-06-13 PROCEDURE — 84145 PROCALCITONIN (PCT): CPT

## 2024-06-13 PROCEDURE — 80053 COMPREHEN METABOLIC PANEL: CPT

## 2024-06-13 PROCEDURE — 82803 BLOOD GASES ANY COMBINATION: CPT

## 2024-06-13 PROCEDURE — 86140 C-REACTIVE PROTEIN: CPT

## 2024-06-13 PROCEDURE — 99233 SBSQ HOSP IP/OBS HIGH 50: CPT | Performed by: INTERNAL MEDICINE

## 2024-06-13 PROCEDURE — 83735 ASSAY OF MAGNESIUM: CPT

## 2024-06-13 RX ADMIN — PROPOFOL 25 MCG/KG/MIN: 10 INJECTION, EMULSION INTRAVENOUS at 19:19

## 2024-06-13 RX ADMIN — IPRATROPIUM BROMIDE AND ALBUTEROL SULFATE 1 DOSE: 2.5; .5 SOLUTION RESPIRATORY (INHALATION) at 01:39

## 2024-06-13 RX ADMIN — FINASTERIDE 5 MG: 5 TABLET, FILM COATED ORAL at 09:06

## 2024-06-13 RX ADMIN — PROPOFOL 20 MCG/KG/MIN: 10 INJECTION, EMULSION INTRAVENOUS at 13:29

## 2024-06-13 RX ADMIN — ACETYLCYSTEINE 600 MG: 200 SOLUTION ORAL; RESPIRATORY (INHALATION) at 20:25

## 2024-06-13 RX ADMIN — SODIUM CHLORIDE, PRESERVATIVE FREE 10 ML: 5 INJECTION INTRAVENOUS at 20:07

## 2024-06-13 RX ADMIN — HEPARIN SODIUM 2500 UNITS: 1000 INJECTION INTRAVENOUS; SUBCUTANEOUS at 12:42

## 2024-06-13 RX ADMIN — HEPARIN SODIUM 13 UNITS/KG/HR: 10000 INJECTION, SOLUTION INTRAVENOUS at 14:57

## 2024-06-13 RX ADMIN — ASPIRIN 81 MG 81 MG: 81 TABLET ORAL at 09:06

## 2024-06-13 RX ADMIN — IPRATROPIUM BROMIDE AND ALBUTEROL SULFATE 1 DOSE: 2.5; .5 SOLUTION RESPIRATORY (INHALATION) at 08:30

## 2024-06-13 RX ADMIN — METHYLPREDNISOLONE SODIUM SUCCINATE 40 MG: 40 INJECTION INTRAMUSCULAR; INTRAVENOUS at 16:57

## 2024-06-13 RX ADMIN — ACETYLCYSTEINE 600 MG: 200 SOLUTION ORAL; RESPIRATORY (INHALATION) at 08:30

## 2024-06-13 RX ADMIN — THIAMINE HCL TAB 100 MG 100 MG: 100 TAB at 09:06

## 2024-06-13 RX ADMIN — METHYLPREDNISOLONE SODIUM SUCCINATE 40 MG: 40 INJECTION INTRAMUSCULAR; INTRAVENOUS at 00:02

## 2024-06-13 RX ADMIN — SODIUM CHLORIDE, PRESERVATIVE FREE 10 ML: 5 INJECTION INTRAVENOUS at 09:07

## 2024-06-13 RX ADMIN — VANCOMYCIN HYDROCHLORIDE 750 MG: 750 INJECTION, POWDER, LYOPHILIZED, FOR SOLUTION INTRAVENOUS at 20:06

## 2024-06-13 RX ADMIN — METHYLPREDNISOLONE SODIUM SUCCINATE 40 MG: 40 INJECTION INTRAMUSCULAR; INTRAVENOUS at 09:06

## 2024-06-13 RX ADMIN — IPRATROPIUM BROMIDE AND ALBUTEROL SULFATE 1 DOSE: 2.5; .5 SOLUTION RESPIRATORY (INHALATION) at 20:25

## 2024-06-13 RX ADMIN — PROPOFOL 20 MCG/KG/MIN: 10 INJECTION, EMULSION INTRAVENOUS at 02:30

## 2024-06-13 RX ADMIN — FERROUS SULFATE TAB 325 MG (65 MG ELEMENTAL FE) 325 MG: 325 (65 FE) TAB at 09:06

## 2024-06-13 RX ADMIN — ATORVASTATIN CALCIUM 40 MG: 40 TABLET, FILM COATED ORAL at 20:07

## 2024-06-13 RX ADMIN — FOLIC ACID 1 MG: 1 TABLET ORAL at 09:06

## 2024-06-13 RX ADMIN — IPRATROPIUM BROMIDE AND ALBUTEROL SULFATE 1 DOSE: 2.5; .5 SOLUTION RESPIRATORY (INHALATION) at 14:52

## 2024-06-13 RX ADMIN — PROPOFOL 50 MCG/KG/MIN: 10 INJECTION, EMULSION INTRAVENOUS at 23:57

## 2024-06-13 ASSESSMENT — PULMONARY FUNCTION TESTS
PIF_VALUE: 22
PIF_VALUE: 19
PIF_VALUE: 31
PIF_VALUE: 19
PIF_VALUE: 19
PIF_VALUE: 20
PIF_VALUE: 18
PIF_VALUE: 21
PIF_VALUE: 19
PIF_VALUE: 18
PIF_VALUE: 14
PIF_VALUE: 18
PIF_VALUE: 20
PIF_VALUE: 20
PIF_VALUE: 18
PIF_VALUE: 25
PIF_VALUE: 19
PIF_VALUE: 21
PIF_VALUE: 19
PIF_VALUE: 19
PIF_VALUE: 11
PIF_VALUE: 20
PIF_VALUE: 20
PIF_VALUE: 38
PIF_VALUE: 19
PIF_VALUE: 21
PIF_VALUE: 19
PIF_VALUE: 13
PIF_VALUE: 20
PIF_VALUE: 20
PIF_VALUE: 38
PIF_VALUE: 19

## 2024-06-13 ASSESSMENT — PAIN DESCRIPTION - LOCATION: LOCATION: GENERALIZED

## 2024-06-13 ASSESSMENT — PAIN SCALES - GENERAL
PAINLEVEL_OUTOF10: 0

## 2024-06-13 NOTE — CARE COORDINATION
12:02PM Inbound call from patient's daughter Terese Humphries.  Daughter confirmed the patient is her father; she has a brother (Zackary Willis) whom she doesn't have the phone number for; and his current wife (Princess Willis) is NOT her mother.      Terese resides in [place]; and will come see her father on this Saturday.  Daughter voiced \"I've been to the hospital to visit my dad when he was here the last time.  I've also been to the nursing home to visit him.\"     ISRRAEL Bojorquez  [phone]    11:21AM Outbound call to patient's sister (Obdulio Cho) @ [phone].  Sister informed writer of the following:    -his wife (Princess Willis) lives in [place], [place].  She is a dialysis patient and called all of the local dialysis facilities ([place], [place]) area.  Left her name/number and requested for [facility] to contact her.    -son (Zackary Willis) lives in [place]; and the current number she has for him isn't working. Family is working on locating the son, to inform him.   -Nichole Wood is not related to her brother; as he was renting a room from her.    -he admitted to [facility] on 14 Feb 2024 for SNF and was discharged.  Admitted to long term care ([facility]) on 31 May 2024 Room [facility]     Obdulio requested for writer to call her later on this afternoon, in hopes that her sister in law (Princess) will have called her by then.      ISRRAEL Bojorquez  [phone]      11:20AM Outbound call to patient's daughter Terese Humphries @ [phone].  No answer and voice message left w/direct contact information requesting a return call.     ISRRAEL Bojorquez  [phone]    10:45AM Remains on vent.  FULL code.  Dialysis today.      ISRRAEL Bojorquez  [phone]

## 2024-06-13 NOTE — PROGRESS NOTES
CARDIOLOGY PROGRESS NOTE      Patient Name: Agustín Willis  Age: 79 y.o.  Gender:male  :1945  MRN: [id number]    Agustín Willis is a 78 y.o. year-old male with past medical history significant for hypertension, COPD, cocaine abuse, CKD who was evaluated today due to cardiac arrest. Patient initially presented to the ED via EMS from nursing home due to respiratory distress after vomiting breakfast. Required intubation in ED. Apparently with VF arrest in ED, shock x4.     Patient seen and examined in ICU. Remains intubated, weaning sedation for neuro eval. On heparin gtt. On single pressor support. Neurology following. MRI with numerous small foci of acute infarction. EEG pending.       Telemetry reviewed.    ROS as above. Current medications reviewed.    Physical Examination    Allergies   Allergen Reactions    Penicillin G Other (See Comments)     Pt states he passes out    Sulfamethoxazole-Trimethoprim      Pt states he passes out     Vitals:    24 1300   BP: 108/76   Pulse: 67   Resp: 14   Temp:    SpO2: 98%     Vital signs are stable   In no distress  Heart has a regular rate and rhythm  Lungs are coarse  Abdomen is soft  Extremities have mild edema  Skin is dry    Labs reviewed:  Recent Results (from the past 12 hour(s))   CBC    Collection Time: 24  2:21 AM   Result Value Ref Range    WBC 14.4 (H) 4.1 - 11.1 K/uL    RBC 2.63 (L) 4.10 - 5.70 M/uL    Hemoglobin 7.6 (L) 12.1 - 17.0 g/dL    Hematocrit 23.0 (L) 36.6 - 50.3 %    MCV 87.5 80.0 - 99.0 FL    MCH 28.9 26.0 - 34.0 PG    MCHC 33.0 30.0 - 36.5 g/dL    RDW 15.7 (H) 11.5 - 14.5 %    Platelets 303 150 - 400 K/uL    MPV 9.8 8.9 - 12.9 FL    Nucleated RBCs 0.0 0.0  WBC    nRBC 0.00 0.00 - 0.01 K/uL   Comprehensive Metabolic Panel    Collection Time: 24  2:21 AM   Result Value Ref Range    Sodium 136 136 - 145 mmol/L    Potassium 3.9 3.5 - 5.1 mmol/L    Chloride 102 97 - 108 mmol/L    CO2 24 21 - 32 mmol/L    Anion Gap 10 5 - 15  mmol/L    Glucose 152 (H) 65 - 100 mg/dL    BUN 71 (H) 6 - 20 mg/dL    Creatinine 2.25 (H) 0.70 - 1.30 mg/dL    BUN/Creatinine Ratio 32 (H) 12 - 20      Est, Glom Filt Rate 29 (L) >60 ml/min/1.73m2    Calcium 8.3 (L) 8.5 - 10.1 mg/dL    Total Bilirubin 0.4 0.2 - 1.0 mg/dL    AST 13 (L) 15 - 37 U/L    ALT 25 12 - 78 U/L    Alk Phosphatase 73 45 - 117 U/L    Total Protein 6.5 6.4 - 8.2 g/dL    Albumin 1.8 (L) 3.5 - 5.0 g/dL    Globulin 4.7 (H) 2.0 - 4.0 g/dL    Albumin/Globulin Ratio 0.4 (L) 1.1 - 2.2     C-Reactive Protein    Collection Time: 06/13/24  2:21 AM   Result Value Ref Range    CRP 10.00 (H) 0.00 - 0.30 mg/dL   Procalcitonin    Collection Time: 06/13/24  2:21 AM   Result Value Ref Range    Procalcitonin 5.46 (H) 0 ng/mL   Phosphorus    Collection Time: 06/13/24  2:21 AM   Result Value Ref Range    Phosphorus 5.8 (H) 2.6 - 4.7 mg/dL   Magnesium    Collection Time: 06/13/24  2:21 AM   Result Value Ref Range    Magnesium 2.1 1.6 - 2.4 mg/dL   Heparin, Anti-XA    Collection Time: 06/13/24  2:21 AM   Result Value Ref Range    Heparin Xa,LMWH and Unfrac 0.43 IU/mL   Blood Gas, Arterial    Collection Time: 06/13/24  2:31 AM   Result Value Ref Range    pH, Arterial 7.49 (H) 7.35 - 7.45      pCO2, Arterial 33 (L) 35 - 45 mmHg    pO2, Arterial 88 80 - 100 mmHg    O2 Sat, Arterial 96 95 - 99 %    HCO3, Arterial 24 22 - 26 mmol/L    Base Excess, Arterial 1.2 0 - 3 mmol/L    O2 Method VENT      FIO2 Arterial 35 %    Mode ASSIST CONTROL      POC TIDAL VOLUME 500.0      Set Rate, POC 12      POC PEEP/CPA 5.0      Source Arterial      Site Left Radial      Efrain Test YES      Carboxyhgb, Arterial 0.3 (L) 1 - 2 %    Methemoglobin, Arterial 0.5 0 - 1.4 %    Oxyhemoglobin 95.1 95 - 99 %    Performed by: Marc Lowery     Temperature 98.8     POCT Glucose    Collection Time: 06/13/24 12:03 PM   Result Value Ref Range    POC Glucose 156 (H) 65 - 100 mg/dL    Performed by: Bret Prasad         Case discussed with Dr. Huffman and

## 2024-06-13 NOTE — PROGRESS NOTES
Vancomycin Dosing Consult  Agustín Willis is a 79 y.o. male with MRSA PNA. Pharmacy was consulted by Dr. Alonzo to dose and monitor vancomycin. Today is day 2.    Antibiotic regimen: Vancomycin monotherapy    Temp (24hrs), Av.6 °F (36.4 °C), Min:96.5 °F (35.8 °C), Max:98.4 °F (36.9 °C)    Recent Labs     24  0222 24  0217 24  0221   WBC 19.0* 13.9* 14.4*   CREATININE 2.40* 1.79* 2.25*   BUN 92* 58* 71*     Est CrCl: 28 mL/min, [facility]ID[facility] being dialyzed prn (TTS schedule ordered)  Concomitant nephrotoxic drugs: None    Cultures:    blood x 2: negative, final   urine: negative, final   sputum: normal respiratory doug, final   sputum: MRSA, prelim   sputum: GPC in clusters, final    MRSA Swab: Detected     Target range:  Pre-HD level 15-20 mcg/ml    Recent level history:  Date/Time Dose & Interval Measured Level (mcg/mL) Associated AUC/ANGELINE    @ 1655 1750mg x 1 dose () 18.7 (Post-HD)         Assessment/Plan:   Patient with sputum culture positive for MRSA. Meropenem d/c'ed, remains on vancomycin. WBC trend overall down (slight increase from ), remains afebrile. Remains intubated on MV with pressor support for hemodynamic stability (FiO2 of 34% and PEEP of 4.8).   Renal function in [facility] requiring intermittent HD. Nephrology planning HD TTS, follow daily for any changes to schedule or if HD is stopped  Level resulted after HD today at 18.7 (~ 5 hours from end of HD session)  Redose with vancomycin 750mg x 1 dose  Next level scheduled for 6/15 @ 0600 (Pre-HD)  BMP ordered for   Antimicrobial stop date  per consult

## 2024-06-13 NOTE — PLAN OF CARE
Problem: Discharge Planning  Goal: Discharge to home or other facility with appropriate resources  Recent Flowsheet Documentation  Taken 6/12/2024 2000 by Jody Zepeda RN  Discharge to home or other facility with appropriate resources: Identify barriers to discharge with patient and caregiver     Problem: Chronic Conditions and Co-morbidities  Goal: Patient's chronic conditions and co-morbidity symptoms are monitored and maintained or improved  Recent Flowsheet Documentation  Taken 6/12/2024 2000 by Jody Zepeda RN  Care Plan - Patient's Chronic Conditions and Co-Morbidity Symptoms are Monitored and Maintained or Improved:   Monitor and assess patient's chronic conditions and comorbid symptoms for stability, deterioration, or improvement   Collaborate with multidisciplinary team to address chronic and comorbid conditions and prevent exacerbation or deterioration     Problem: Neurosensory - Adult  Goal: Achieves stable or improved neurological status  Recent Flowsheet Documentation  Taken 6/12/2024 2000 by Jody Zepeda RN  Achieves stable or improved neurological status:   Assess for and report changes in neurological status   Initiate measures to prevent increased intracranial pressure   Maintain blood pressure and fluid volume within ordered parameters to optimize cerebral perfusion and minimize risk of hemorrhage   Monitor temperature, glucose, and sodium. Initiate appropriate interventions as ordered  Goal: Absence of seizures  Recent Flowsheet Documentation  Taken 6/12/2024 2000 by Jody Zepeda RN  Absence of seizures: Monitor for seizure activity.  If seizure occurs, document type and location of movements and any associated apnea  Goal: Remains free of injury related to seizures activity  Recent Flowsheet Documentation  Taken 6/12/2024 2000 by Jody Zepeda RN  Remains free of injury related to seizure activity: Maintain airway, patient safety  and administer oxygen as ordered  Goal:  practices (prayer, meditation, guided imagery, reiki, breath work, etc)

## 2024-06-13 NOTE — PROGRESS NOTES
Progress Note  Date:2024       Room:[facility]  Patient Name:Agustín Willis     YOB: 1945     Age:79 y.o.        Subjective    Subjective  Patient followed septic shock with presumptive aspiration pneumonia.  Yesterday he coded with PEA, intubated and transferred back to the ICU.  New RUL infiltrate on CXR with sputum culture growing probable MRSA, now on IV Vancomycin.  CXR appears to have improved.   EEG showed diffuse slowing.          Objective         Vitals Last 24 Hours:  TEMPERATURE:  Temp  Av.5 °F (36.4 °C)  Min: 96.5 °F (35.8 °C)  Max: 98.4 °F (36.9 °C)  RESPIRATIONS RANGE: Resp  Av.3  Min: 6  Max: 21  PULSE OXIMETRY RANGE: SpO2  Av.4 %  Min: 95 %  Max: 100 %  PULSE RANGE: Pulse  Av.7  Min: 44  Max: 81  BLOOD PRESSURE RANGE: Systolic (24hrs), Av , Min:70 , Max:137   ; Diastolic (24hrs), Av, Min:52, Max:86         Objective:  Vital signs: (most recent): Blood pressure 115/78, pulse 70, temperature 97.6 °F (36.4 °C), temperature source Axillary, resp. rate 16, height 1.73 m (5' 8.11\"), weight 86.8 kg (191 lb 5.8 oz), SpO2 98 %.      Vitals and nursing note reviewed.   Constitutional:       General: He is in acute distress.      Appearance: He is ill-appearing and toxic-appearing.   HENT:      Head: Normocephalic and atraumatic.      Right Ear: External ear normal.      Left Ear: External ear normal.      Nose: Nose normal.      Mouth/Throat: drooling     Comments ET tube/orogastric tube  Cardiovascular:      Rate and Rhythm: Normal rate and regular rhythm.      Heart sounds: No murmur heard.  Pulmonary:      Breath sounds: Rhonchi present. No wheezing or rales.   Abdominal:      General: Bowel sounds are normal. There is no distension.      Palpations: Abdomen is soft.      Tenderness: There is no abdominal tenderness.   Genitourinary:  external urinary device    Musculoskeletal:      Cervical back: Neck supple.      Right lower leg: No edema.      Left lower leg:  Edema present.   Skin:     Findings: No rash.   Neurological: intubated  Psychiatric: intubated      Labs/Imaging/Diagnostics    Labs:  CBC:  Recent Labs     06/11/24 0222 06/12/24 0217 06/13/24 0221   WBC 19.0* 13.9* 14.4*   RBC 2.49* 2.42* 2.63*   HGB 7.2* 7.0* 7.6*   HCT 22.4* 21.2* 23.0*   MCV 90.0 87.6 87.5   RDW 15.9* 16.0* 15.7*    270 303       CHEMISTRIES:  Recent Labs     06/11/24 0222 06/11/24  1030 06/12/24 0217 06/13/24 0221     --  138 136   K 4.0  --  3.8 3.9     --  104 102   CO2 26  --  26 24   BUN 92*  --  58* 71*   CREATININE 2.40*  --  1.79* 2.25*   GLUCOSE 103*  --  123* 152*   PHOS 4.8* 4.8* 4.8* 5.8*   MG  --  2.2  --  2.1         Lactic acid 6.39      Procal 5.46 <6.77 <5.94 <0.27 <0.71 <2.45  <11.42 <27.56 <35.77 <50.14 <72.72 <0.22               CRP 13.60 <12.40 <12.00 <17.00 <18.70 < 16.20 < 12.30 <20.20 <29.80             MRSA nasal PCR Positive    Blood cultures (5/29) No growth FINAL  Blood cultures (5/29)  No growth FINAL  Urine culture (5/29) No growth FINAL  Sputum culture (5/29) Light normal respiratory doug FINAL  Sputum culture (6/11)  Heavy Preliminary report of Methicillin Resistant Staphylococcus aureus by antigen detection         Imaging Last 24 Hours:  XR CHEST PORTABLE    Result Date: 6/13/2024     Persistent left basilar opacity and patchy densities in the right lung.              .       EEG (6/12/24)    Assessment//Plan           Hospital Problems             Last Modified POA    * (Principal) Acute respiratory failure (HCC) 5/29/2024 Yes    Cardiac arrest (HCC) 5/29/2024 Yes    Septic shock (HCC) 6/12/2024 Yes    Aspiration pneumonia of right lung due to gastric secretions ([facility]) 6/12/2024 Yes    Admitted to intensive care unit 6/12/2024 Yes    Pneumonia of right upper lobe due to methicillin resistant Staphylococcus aureus (MRSA) ([facility]) 6/12/2024 Yes      Severe sepsis/septic shock with hypothermia, leukocytosis, elevated lactic acid and

## 2024-06-13 NOTE — PROGRESS NOTES
General Daily Progress Note      Patient Name:   Agustín Willis       YOB: 1945       Age:  79 y.o.      Admit Date: 5/29/2024      Subjective:   Patient is a 78 y.o. year old male past medical history of COPD BPH cervical radiculopathy cocaine abuse hypertension hyperlipidemia seasonal allergies SVT came to emergency room with respiratory arrest, patient was at nursing home after eating breakfast he vomited everywhere likely aspirated oxygen saturation was 70% initially put on nonrebreather patient was intubated in the ER patient was hypotensive started Levophed 10 patient have a cardiac arrest went to V-fib received shock x 4 received epi and amiodarone  Was started on Levophed and vasopressin received 2 L of IV fluid, received IV Zosyn and vancomycin in the ER     Patient admitted to the ICU for further workup  Patient on ventilator/unresponsive       5/30    Patient on ventilator propofol and Precedex drip  Hypotension on Levophed and vasopressin  Patient has no urine output last night received 500 bolus    Nephrology decided for start CRRT IR team for dialysis cath  BUN 72 creatinine 3.09  CRP 10.5  MRSA nares    5/31    Patient on ventilator 40% O2 propofol Precedex levo and vasopressin drip  Patient on A-fib with rapid ventricular rate ordered amiodarone bolus and started on drip  Patient getting CRRT  No urine output  D-dimers greater than 20    6/3    Patient on ventilator 45% O2 receiving CRRT patient on propofol drip  Patient on amiodarone and Levophed drip  Patient heparin and Precedex drip  Patient is hypothermic    WBC count 22.5    6/4    Patient on ventilator 45% O2 on Levophed drip and amiodarone drip and heparin drip and Precedex drip getting NG tube feeding  BUN 37 creatinine 1.97 WBCs 15  Doppler studies positive for acute DVT of the left brachial vein left radial vein and left ulnar vein    Seen by infectious disease recommended continue IV meropenem    6/5    General ventilated  nephrology and the pulmonologist and cardiology    I spent critical care time 35 minutes independently        Tried to call the family again today no answer left message      I spent critical care time 35 minutes independently    Tried to call the family no answer          Current Facility-Administered Medications:     atorvastatin (LIPITOR) tablet 40 mg, 40 mg, Oral, Nightly, Mikaela Arredondo MD    scopolamine (TRANSDERM-SCOP) transdermal patch 1 patch, 1 patch, TransDERmal, Q72H, Darrell Calixto APRN - NP, 1 patch at 06/12/24 1520    vancomycin (VANCOCIN) intermittent dosing (placeholder), 1 each, Other, RX Placeholder, Riley Alonzo MD    methylPREDNISolone sodium succ (SOLU-MEDROL) injection 40 mg, 40 mg, IntraVENous, Q8H, Jeremy Ramos MD, 40 mg at 06/13/24 0906    acetylcysteine (MUCOMYST) 20 % solution 600 mg, 600 mg, Inhalation, BID RT, Jeremy Ramos MD, 600 mg at 06/13/24 0830    ipratropium 0.5 mg-albuterol 2.5 mg (DUONEB) nebulizer solution 1 Dose, 1 Dose, Inhalation, Q6H RT, Jeremy Ramos MD, 1 Dose at 06/13/24 0830    heparin (porcine) injection 2,500 Units, 2,500 Units, IntraCATHeter, PRN, Sol Ferraro MD, 2,500 Units at 06/11/24 1552    propofol infusion, 5-50 mcg/kg/min, IntraVENous, Continuous, Mikaela Arredondo MD, Paused at 06/13/24 0925    norepinephrine (LEVOPHED) 16 mg in sodium chloride 0.9 % 250 mL infusion, 1-100 mcg/min, IntraVENous, Continuous, Mikaela Arredondo MD, Last Rate: 4.7 mL/hr at 06/13/24 0902, 5 mcg/min at 06/13/24 0902    [Held by provider] amiodarone (CORDARONE) tablet 400 mg, 400 mg, Oral, BID, Gerry Wilson MD, 400 mg at 06/10/24 1016    aspirin chewable tablet 81 mg, 81 mg, Per NG tube, Daily, Jeremy Ramos MD, 81 mg at 06/13/24 0906    epoetin radha-epbx (RETACRIT) injection 10,000 Units, 10,000 Units, SubCUTAneous, Once per day on Mon Wed Fri, Sol Ferraro MD, 10,000 Units at 06/12/24 1734    heparin (porcine) injection 4,000 Units, 4,000  Units, IntraVENous, PRN, Mikaela Arredondo MD    heparin (porcine) injection 2,000 Units, 2,000 Units, IntraVENous, PRN, Mikaela Arredondo MD, 2,000 Units at 06/09/24 0734    heparin 25,000 units in dextrose 5% 250 mL (premix) infusion, 5-30 Units/kg/hr, IntraVENous, Continuous, Mikaela Arredondo MD, Last Rate: 11.4 mL/hr at 06/13/24 0723, 13 Units/kg/hr at 06/13/24 0723    heparin (porcine) injection 1,100-1,900 Units, 1,100-1,900 Units, IntraCATHeter, PRN, 1,400 Units at 06/03/24 1231 **AND** heparin (porcine) injection 1,100-1,900 Units, 1,100-1,900 Units, IntraCATHeter, PRN, Sol Ferraro MD, 1,100 Units at 06/03/24 1226    sodium chloride 0.9 % bolus 2,448 mL, 30 mL/kg, IntraVENous, Once, Mikaela Arredondo MD, Held at 05/29/24 1405    [Held by provider] aspirin EC tablet 81 mg, 81 mg, Oral, Daily, Mikaela Arredondo MD, 81 mg at 06/02/24 0800    ferrous sulfate (IRON 325) tablet 325 mg, 325 mg, Oral, Daily with breakfast, Mikaela Arredondo MD, 325 mg at 06/13/24 0906    finasteride (PROSCAR) tablet 5 mg, 5 mg, Oral, Daily, Mikaela Arredondo MD, 5 mg at 06/13/24 0906    folic acid (FOLVITE) tablet 1 mg, 1 mg, Oral, Daily, Mikaela Arredondo MD, 1 mg at 06/13/24 0906    thiamine mononitrate tablet 100 mg, 100 mg, Oral, Daily, Mikaela Arredondo MD, 100 mg at 06/13/24 0906    sodium chloride flush 0.9 % injection 5-40 mL, 5-40 mL, IntraVENous, 2 times per day, Mikaela Arredondo MD, 10 mL at 06/13/24 0907    sodium chloride flush 0.9 % injection 5-40 mL, 5-40 mL, IntraVENous, PRN, Mikaela Arredondo MD    0.9 % sodium chloride infusion, , IntraVENous, PRN, Mikaela Arredondo MD    ondansetron (ZOFRAN-ODT) disintegrating tablet 4 mg, 4 mg, Oral, Q8H PRN **OR** ondansetron (ZOFRAN) injection 4 mg, 4 mg, IntraVENous, Q6H PRN, Mikaela Arredondo MD    polyethylene glycol (GLYCOLAX) packet 17 g, 17 g, Oral, Daily PRN, Mikaela Arredondo MD, 17 g at 06/02/24 2100    acetaminophen (TYLENOL) tablet 650 mg, 650 mg, Oral, Q6H PRN,

## 2024-06-13 NOTE — CONSULTS
IMPRESSION:   Acute hypoxic respiratory failure extubated on 6 5 and reintubated on 6/10  Status post cardiac arrest  A-fib with rate controlled  Septic shock  Sepsis treated with antibiotics  Aspiration pneumonia right midlung  COPD   MRSA nasal colonization  History of cocaine abuse polysubstance abuse in the past  Pt is critically ill. Time spent with pt and staff actively rendering care, managing pt and coordinating care as stated below; 30 minutes, exclusive of any procedures      RECOMMENDATIONS/PLAN:   Extubated  on 6/5 reintubated on 6/10 on ventilator sedated with propofol  ABG this morning showed pCO2 33 pO2 88 on 35% FiO2  Chest x-ray with continued right lower infiltrate CTA chest showed bilateral infiltrates consistent with aspiration  Chest x-ray shows right midlung infiltrate fluid overload congestive change left lung clear  Remains on Merrem and vancomycin sputum with normal doug  Heart rate controlled on amiodarone  Renal function improving off CVVH as needed dialysis  Status post cardiac arrest patient coded   Patient is back on pressors on Levophed blood pressure still low we will try to wean again  Continue with nebulizer treatment and will give 3 doses of Solu-Medrol     [x] High complexity decision making was performed  [x] See my orders for details  HPI  78-year-old male nursing home resident came in because of shortness of breath respiratory distress patient has episodes of vomiting after eating breakfast this morning subsequently came to the hospital saturation was in 70s he was back to the emergency room subsequently intubated and then he coded hemodynamically unstable now he is on 2 pressors vasopressin and Levophed denies intubated on ventilator critical care consult was called, past medical history of polysubstance abuse cocaine abuse COPD hypertension insomnia      PMH:  has a past medical history of Benign prostatic disease, Benign prostatic hyperplasia, Cervical radiculopathy,  cannula chest x-ray atelectasis lower lung volume on Merrem  6/9 alert awake on nasal cannula on IV Solu-Medrol nebulizer treatment will change to prednisone in a.m.  6/10 on 2-1/2 L nasal cannula will discontinue Solu-Medrol start patient on prednisone on meropenem  6/11 patient CODE BLUE yesterday reintubated now back on ventilator assist-control mode start patient on nebulizer treatment along with Mucomyst will start patient on Solu-Medrol start prednisone recent sputum C&S Gram stain chest x-ray still showed bilateral infiltrate hemoglobin 7.2, BUN 92 creatinine 2.4  6/12 remain intubated on ventilator sedated with propofol hemodynamically unstable on Levophed and also getting heparin off CRRT on dialysis atrial fibrillation rate is controlled on heparin, WBC 13.9 hemoglobin 7.0, chest x-ray shows right upper lobe infiltrate  6/13 sedated on ventilator ABG acceptable still on Levophed try to wean but if blood pressure drops we will try again, chest x-ray shows ET tube 1 cm close to the gina will withdraw 1 cm and repeat chest x-ray

## 2024-06-13 NOTE — DIALYSIS
Patient fairly tolerated dialysis, hypotension noted intradialytic, norepi increase by ARIEL, seen by Dr. Ferraro, Treatment time 3 hours. UF removed 2 Liters.  Report given to ROGELIO Zepeda.

## 2024-06-13 NOTE — PROGRESS NOTES
[facility] NEUROLOGY CONSULTATION          Chief Complaint/Admission Diagnosis: Cardiac arrest (HCC) [I46.9]  Acute respiratory failure (HCC) [J96.00]  Respiratory distress [R06.03]  Chest pain, unspecified type [R07.9]     I have been asked to see this 79 y.o. male in neurological consultation by Mikaela Murphy MD to render advice and opinion regarding coma    ?     Impression:  79-year-old man with past medical history of polysubstance abuse, atrial fibrillation, suspected underlying hypercoagulable state presented for evaluation after a PEA arrest.  His course has been complicated by ventilation dependent  Acute hypoxic respiratory failure, left upper extremity DVT, septic shock.  MRI brain without contrast revealed numerous small foci of acute infarction in the supratentorial region and in the cerebellum covering multiple vascular territories suggestive of cardio-embolic origin. EEG revealed diffuse encephalopathy. He has minimal response on exam while off sedation for 15 minutes.  Acute encephalopathy is multifactorial in the setting of current infection, FIDEL, multiple strokes, and metabolic disturbances. Prognosis is guarded.    Recommendations:   -Continue sedation wean. Medication clearance slower due to advanced age. Will need re-examination after completely being off sedation for at least 24 hours.  -Antibiotics per ID  -Correct electrolytes as necessary.  -Palliative care consult recommended to discuss goals of care.        HPI:   History was obtained from a review of the electronic record and from the patient and family.??     Review of Symptoms:     A ten system review of constitutional, cardiovascular, respiratory, musculoskeletal, endocrine, skin, HEENT, genitourinary, neurological, and psychiatric systems was obtained and is negative except as noted above in HPI.       Exam:   /67   Pulse 65   Temp 97.6 °F (36.4 °C) (Axillary)   Resp 14   Ht 1.73 m (5' 8.11\")   Wt 86.8 kg  radiopaque foreign bodies in the abdomen. Electronically signed by Renu Neely Attestation:        I discussed the risks and benefits of a telehealth visit and I obtained the patient's or legally authorized representative's informed verbal consent to conduct this assessment using telehealth tools.? All of the patient’s or legally authorized representative's questions regarding the telehealth interaction were addressed. I provided my name and disclosed to the patient or legally authorized representative my licensure, certification, or registration. ?This consultation was remotely performed at the request of Mikaela Arredondo MD with the assistance of a trained virtual health liaison working at the originating site.? The consultation used real time licensed and encrypted telehealth equipment which allowed a live video connection between my location and the patient's location.? Aspects of the evaluation that could not be adequately evaluated by virtual assessment were shared with both the patient and the consulting provider.?       A total of 60 minutes of time was spent in direct care and coordination of care with the patient.

## 2024-06-13 NOTE — PROGRESS NOTES
Nephrology f/u          Patient: Agustín Willis MRN: [id number]  SSN: [id number]    YOB: 1945  Age: 79 y.o.  Sex: male      Subjective:   I have seen the patient in ICU   On vent  On single pressor  Getting dialyzed today    Past Medical History:   Diagnosis Date    Benign prostatic disease 8/3/2020    Hypertrophy with Outflow Obstruction    Benign prostatic hyperplasia 8/3/2020    Cervical radiculopathy 8/3/2020    Chronic obstructive lung disease (HCC) 8/3/2020    Cocaine abuse (HCC) 8/3/2020    Dizziness and giddiness 8/3/2020    Hypercholesterolemia 8/3/2020    Hypertensive disorder 8/3/2020    Insomnia 8/3/2020    Kidney disease 8/3/2020    Low back pain 8/3/2020    Osteoarthritis of knee 8/3/2020    Sleep apnea 8/3/2020    Vasovagal syncope 8/3/2020     Past Surgical History:   Procedure Laterality Date    IR NONTUNNELED VASCULAR CATHETER  5/30/2024    IR NONTUNNELED VASCULAR CATHETER 5/30/2024 Stephy Abreu APRN - NP SSR RAD ANGIO IR      Family History   Problem Relation Age of Onset    Hypertension Mother      Social History     Tobacco Use    Smoking status: Former    Smokeless tobacco: Never   Substance Use Topics    Alcohol use: Yes      Current Facility-Administered Medications   Medication Dose Route Frequency Provider Last Rate Last Admin    Vancomycin Level: Please draw level on 6/13 @ 1700 (Post-HD)  1 each Other Once Mikaela Arredondo MD        atorvastatin (LIPITOR) tablet 40 mg  40 mg Oral Nightly Mikaela Arredondo MD        scopolamine (TRANSDERM-SCOP) transdermal patch 1 patch  1 patch TransDERmal Q72H Darrell Calixto APRN - NP   1 patch at 06/12/24 1520    vancomycin (VANCOCIN) intermittent dosing (placeholder)  1 each Other RX Placeholder Riley Alonzo MD        methylPREDNISolone sodium succ (SOLU-MEDROL) injection 40 mg  40 mg IntraVENous Q8H Jeremy Ramos MD   40 mg at 06/13/24 0906    acetylcysteine (MUCOMYST) 20 % solution 600 mg  600 mg Inhalation BID  RT Jeremy Ramos MD   600 mg at 06/13/24 0830    ipratropium 0.5 mg-albuterol 2.5 mg (DUONEB) nebulizer solution 1 Dose  1 Dose Inhalation Q6H RT Jeremy Ramos MD   1 Dose at 06/13/24 0830    heparin (porcine) injection 2,500 Units  2,500 Units IntraCATHeter PRN Sol Ferraro MD   2,500 Units at 06/11/24 1552    propofol infusion  5-50 mcg/kg/min IntraVENous Continuous Mikaela Arredondo MD   Paused at 06/13/24 0925    norepinephrine (LEVOPHED) 16 mg in sodium chloride 0.9 % 250 mL infusion  1-100 mcg/min IntraVENous Continuous Mikaela Arredondo MD 6.6 mL/hr at 06/13/24 1106 7 mcg/min at 06/13/24 1106    [Held by provider] amiodarone (CORDARONE) tablet 400 mg  400 mg Oral BID Gerry Wilson MD   400 mg at 06/10/24 1016    aspirin chewable tablet 81 mg  81 mg Per NG tube Daily Jeremy Ramos MD   81 mg at 06/13/24 0906    epoetin radha-epbx (RETACRIT) injection 10,000 Units  10,000 Units SubCUTAneous Once per day on Mon Wed Fri Sol Ferraro MD   10,000 Units at 06/12/24 1734    heparin (porcine) injection 4,000 Units  4,000 Units IntraVENous PRN Mikaela Arredondo MD        heparin (porcine) injection 2,000 Units  2,000 Units IntraVENous PRN Mikaela Arredondo MD   2,000 Units at 06/09/24 0734    heparin 25,000 units in dextrose 5% 250 mL (premix) infusion  5-30 Units/kg/hr IntraVENous Continuous Mikaela Arredondo MD 11.4 mL/hr at 06/13/24 0723 13 Units/kg/hr at 06/13/24 0723    heparin (porcine) injection 1,100-1,900 Units  1,100-1,900 Units IntraCATHeter MARCELLUSN Sol Ferraro MD   1,400 Units at 06/03/24 1231    And    heparin (porcine) injection 1,100-1,900 Units  1,100-1,900 Units IntraCATHeter PRN Sol Ferraro MD   1,100 Units at 06/03/24 1226    sodium chloride 0.9 % bolus 2,448 mL  30 mL/kg IntraVENous Once Mikaela Arredondo MD   Held at 05/29/24 1405    [Held by provider] aspirin EC tablet 81 mg  81 mg Oral Daily Mikaela Arredondo MD   81 mg at 06/02/24 0800    ferrous sulfate (IRON 325) tablet

## 2024-06-14 ENCOUNTER — APPOINTMENT (OUTPATIENT)
Facility: HOSPITAL | Age: 79
DRG: 870 | End: 2024-06-14
Payer: MEDICARE

## 2024-06-14 PROBLEM — G93.41 ACUTE METABOLIC ENCEPHALOPATHY: Status: ACTIVE | Noted: 2024-06-14

## 2024-06-14 LAB
ALBUMIN SERPL-MCNC: 1.8 G/DL (ref 3.5–5)
ALBUMIN/GLOB SERPL: 0.5 (ref 1.1–2.2)
ALP SERPL-CCNC: 86 U/L (ref 45–117)
ALT SERPL-CCNC: 19 U/L (ref 12–78)
ANION GAP SERPL CALC-SCNC: 7 MMOL/L (ref 5–15)
ARTERIAL PATENCY WRIST A: YES
AST SERPL W P-5'-P-CCNC: 15 U/L (ref 15–37)
BASE DEFICIT BLDA-SCNC: 0.5 MMOL/L
BASOPHILS # BLD: 0 K/UL (ref 0–0.1)
BASOPHILS NFR BLD: 0 % (ref 0–1)
BDY SITE: ABNORMAL
BILIRUB SERPL-MCNC: 0.4 MG/DL (ref 0.2–1)
BUN SERPL-MCNC: 51 MG/DL (ref 6–20)
BUN/CREAT SERPL: 26 (ref 12–20)
CA-I BLD-MCNC: 7.8 MG/DL (ref 8.5–10.1)
CHLORIDE SERPL-SCNC: 97 MMOL/L (ref 97–108)
CO2 SERPL-SCNC: 28 MMOL/L (ref 21–32)
COHGB MFR BLD: 0.1 % (ref 1–2)
CREAT SERPL-MCNC: 1.98 MG/DL (ref 0.7–1.3)
CRP SERPL-MCNC: 6.71 MG/DL (ref 0–0.3)
DIFFERENTIAL METHOD BLD: ABNORMAL
EOSINOPHIL # BLD: 0 K/UL (ref 0–0.4)
EOSINOPHIL NFR BLD: 0 % (ref 0–7)
ERYTHROCYTE [DISTWIDTH] IN BLOOD BY AUTOMATED COUNT: 15.9 % (ref 11.5–14.5)
FIO2 ON VENT: 35 %
GAS FLOW.O2 SETTING OXYMISER: 12
GLOBULIN SER CALC-MCNC: 3.8 G/DL (ref 2–4)
GLUCOSE BLD STRIP.AUTO-MCNC: 149 MG/DL (ref 65–100)
GLUCOSE BLD STRIP.AUTO-MCNC: 158 MG/DL (ref 65–100)
GLUCOSE SERPL-MCNC: 161 MG/DL (ref 65–100)
HCO3 BLDA-SCNC: 23 MMOL/L (ref 22–26)
HCT VFR BLD AUTO: 21 % (ref 36.6–50.3)
HCT VFR BLD AUTO: 24.2 % (ref 36.6–50.3)
HGB BLD-MCNC: 6.9 G/DL (ref 12.1–17)
HGB BLD-MCNC: 8 G/DL (ref 12.1–17)
IMM GRANULOCYTES # BLD AUTO: 0.2 K/UL (ref 0–0.04)
IMM GRANULOCYTES NFR BLD AUTO: 1 % (ref 0–0.5)
LYMPHOCYTES # BLD: 1 K/UL (ref 0.8–3.5)
LYMPHOCYTES NFR BLD: 6 % (ref 12–49)
MAGNESIUM SERPL-MCNC: 2 MG/DL (ref 1.6–2.4)
MCH RBC QN AUTO: 29.1 PG (ref 26–34)
MCHC RBC AUTO-ENTMCNC: 32.9 G/DL (ref 30–36.5)
MCV RBC AUTO: 88.6 FL (ref 80–99)
METHGB MFR BLD: 0.4 % (ref 0–1.4)
MONOCYTES # BLD: 0.7 K/UL (ref 0–1)
MONOCYTES NFR BLD: 4 % (ref 5–13)
NEUTS SEG # BLD: 14.9 K/UL (ref 1.8–8)
NEUTS SEG NFR BLD: 89 % (ref 32–75)
NRBC # BLD: 0.02 K/UL (ref 0–0.01)
NRBC BLD-RTO: 0.1 PER 100 WBC
OXYHGB MFR BLD: 96.3 % (ref 95–99)
PCO2 BLDA: 31 MMHG (ref 35–45)
PEEP RESPIRATORY: 5
PERFORMED BY:: ABNORMAL
PH BLDA: 7.49 (ref 7.35–7.45)
PHOSPHATE SERPL-MCNC: 4.4 MG/DL (ref 2.6–4.7)
PLATELET # BLD AUTO: 265 K/UL (ref 150–400)
PMV BLD AUTO: 10 FL (ref 8.9–12.9)
PO2 BLDA: 92 MMHG (ref 80–100)
POTASSIUM SERPL-SCNC: 3.9 MMOL/L (ref 3.5–5.1)
PROCALCITONIN SERPL-MCNC: 4.01 NG/ML
PROT SERPL-MCNC: 5.6 G/DL (ref 6.4–8.2)
RBC # BLD AUTO: 2.37 M/UL (ref 4.1–5.7)
RBC MORPH BLD: ABNORMAL
SAO2 % BLD: 97 % (ref 95–99)
SAO2% DEVICE SAO2% SENSOR NAME: ABNORMAL
SODIUM SERPL-SCNC: 132 MMOL/L (ref 136–145)
SPECIMEN SITE: ABNORMAL
UFH PPP CHRO-ACNC: 0.4 IU/ML
VENTILATION MODE VENT: ABNORMAL
VT SETTING VENT: 480
WBC # BLD AUTO: 16.8 K/UL (ref 4.1–11.1)

## 2024-06-14 PROCEDURE — 6360000002 HC RX W HCPCS: Performed by: FAMILY MEDICINE

## 2024-06-14 PROCEDURE — 85025 COMPLETE CBC W/AUTO DIFF WBC: CPT

## 2024-06-14 PROCEDURE — 6370000000 HC RX 637 (ALT 250 FOR IP): Performed by: FAMILY MEDICINE

## 2024-06-14 PROCEDURE — 99233 SBSQ HOSP IP/OBS HIGH 50: CPT | Performed by: INTERNAL MEDICINE

## 2024-06-14 PROCEDURE — 2000000000 HC ICU R&B

## 2024-06-14 PROCEDURE — 84100 ASSAY OF PHOSPHORUS: CPT

## 2024-06-14 PROCEDURE — 2700000000 HC OXYGEN THERAPY PER DAY

## 2024-06-14 PROCEDURE — 94003 VENT MGMT INPAT SUBQ DAY: CPT

## 2024-06-14 PROCEDURE — 94761 N-INVAS EAR/PLS OXIMETRY MLT: CPT

## 2024-06-14 PROCEDURE — 6370000000 HC RX 637 (ALT 250 FOR IP): Performed by: INTERNAL MEDICINE

## 2024-06-14 PROCEDURE — 94640 AIRWAY INHALATION TREATMENT: CPT

## 2024-06-14 PROCEDURE — 86900 BLOOD TYPING SEROLOGIC ABO: CPT

## 2024-06-14 PROCEDURE — 6360000002 HC RX W HCPCS: Performed by: INTERNAL MEDICINE

## 2024-06-14 PROCEDURE — 82803 BLOOD GASES ANY COMBINATION: CPT

## 2024-06-14 PROCEDURE — 86923 COMPATIBILITY TEST ELECTRIC: CPT

## 2024-06-14 PROCEDURE — 86140 C-REACTIVE PROTEIN: CPT

## 2024-06-14 PROCEDURE — P9016 RBC LEUKOCYTES REDUCED: HCPCS

## 2024-06-14 PROCEDURE — 85018 HEMOGLOBIN: CPT

## 2024-06-14 PROCEDURE — 85014 HEMATOCRIT: CPT

## 2024-06-14 PROCEDURE — 2500000003 HC RX 250 WO HCPCS: Performed by: FAMILY MEDICINE

## 2024-06-14 PROCEDURE — 86850 RBC ANTIBODY SCREEN: CPT

## 2024-06-14 PROCEDURE — 36430 TRANSFUSION BLD/BLD COMPNT: CPT

## 2024-06-14 PROCEDURE — 85520 HEPARIN ASSAY: CPT

## 2024-06-14 PROCEDURE — 71045 X-RAY EXAM CHEST 1 VIEW: CPT

## 2024-06-14 PROCEDURE — 84145 PROCALCITONIN (PCT): CPT

## 2024-06-14 PROCEDURE — 82962 GLUCOSE BLOOD TEST: CPT

## 2024-06-14 PROCEDURE — 99231 SBSQ HOSP IP/OBS SF/LOW 25: CPT | Performed by: PSYCHIATRY & NEUROLOGY

## 2024-06-14 PROCEDURE — 2580000003 HC RX 258: Performed by: FAMILY MEDICINE

## 2024-06-14 PROCEDURE — 36415 COLL VENOUS BLD VENIPUNCTURE: CPT

## 2024-06-14 PROCEDURE — 30233N1 TRANSFUSION OF NONAUTOLOGOUS RED BLOOD CELLS INTO PERIPHERAL VEIN, PERCUTANEOUS APPROACH: ICD-10-PCS | Performed by: INTERNAL MEDICINE

## 2024-06-14 PROCEDURE — 80053 COMPREHEN METABOLIC PANEL: CPT

## 2024-06-14 PROCEDURE — 86901 BLOOD TYPING SEROLOGIC RH(D): CPT

## 2024-06-14 PROCEDURE — 83735 ASSAY OF MAGNESIUM: CPT

## 2024-06-14 PROCEDURE — 36600 WITHDRAWAL OF ARTERIAL BLOOD: CPT

## 2024-06-14 RX ORDER — FENTANYL CITRATE 50 UG/ML
50 INJECTION, SOLUTION INTRAMUSCULAR; INTRAVENOUS
Status: COMPLETED | OUTPATIENT
Start: 2024-06-14 | End: 2024-06-14

## 2024-06-14 RX ORDER — MIDAZOLAM IN NACL,ISO-OSMOT/PF 50 MG/50ML
0-10 INFUSION BOTTLE (ML) INTRAVENOUS
Status: DISCONTINUED | OUTPATIENT
Start: 2024-06-14 | End: 2024-06-14

## 2024-06-14 RX ORDER — SODIUM CHLORIDE 9 MG/ML
INJECTION, SOLUTION INTRAVENOUS PRN
Status: DISCONTINUED | OUTPATIENT
Start: 2024-06-14 | End: 2024-06-23

## 2024-06-14 RX ORDER — FENTANYL CITRATE 50 UG/ML
50 INJECTION, SOLUTION INTRAMUSCULAR; INTRAVENOUS
Status: DISCONTINUED | OUTPATIENT
Start: 2024-06-14 | End: 2024-06-18

## 2024-06-14 RX ADMIN — METHYLPREDNISOLONE SODIUM SUCCINATE 40 MG: 40 INJECTION INTRAMUSCULAR; INTRAVENOUS at 16:03

## 2024-06-14 RX ADMIN — FOLIC ACID 1 MG: 1 TABLET ORAL at 08:50

## 2024-06-14 RX ADMIN — PROPOFOL 45 MCG/KG/MIN: 10 INJECTION, EMULSION INTRAVENOUS at 03:45

## 2024-06-14 RX ADMIN — HEPARIN SODIUM 13 UNITS/KG/HR: 10000 INJECTION, SOLUTION INTRAVENOUS at 05:54

## 2024-06-14 RX ADMIN — METHYLPREDNISOLONE SODIUM SUCCINATE 40 MG: 40 INJECTION INTRAMUSCULAR; INTRAVENOUS at 00:01

## 2024-06-14 RX ADMIN — FENTANYL CITRATE 50 MCG: 0.05 INJECTION, SOLUTION INTRAMUSCULAR; INTRAVENOUS at 15:04

## 2024-06-14 RX ADMIN — FENTANYL CITRATE 50 MCG: 0.05 INJECTION, SOLUTION INTRAMUSCULAR; INTRAVENOUS at 12:29

## 2024-06-14 RX ADMIN — IPRATROPIUM BROMIDE AND ALBUTEROL SULFATE 1 DOSE: 2.5; .5 SOLUTION RESPIRATORY (INHALATION) at 02:48

## 2024-06-14 RX ADMIN — ACETYLCYSTEINE 600 MG: 200 SOLUTION ORAL; RESPIRATORY (INHALATION) at 19:59

## 2024-06-14 RX ADMIN — IPRATROPIUM BROMIDE AND ALBUTEROL SULFATE 1 DOSE: 2.5; .5 SOLUTION RESPIRATORY (INHALATION) at 19:59

## 2024-06-14 RX ADMIN — ASPIRIN 81 MG 81 MG: 81 TABLET ORAL at 08:50

## 2024-06-14 RX ADMIN — SODIUM CHLORIDE, PRESERVATIVE FREE 10 ML: 5 INJECTION INTRAVENOUS at 08:50

## 2024-06-14 RX ADMIN — THIAMINE HCL TAB 100 MG 100 MG: 100 TAB at 08:51

## 2024-06-14 RX ADMIN — ATORVASTATIN CALCIUM 40 MG: 40 TABLET, FILM COATED ORAL at 21:22

## 2024-06-14 RX ADMIN — METHYLPREDNISOLONE SODIUM SUCCINATE 40 MG: 40 INJECTION INTRAMUSCULAR; INTRAVENOUS at 08:50

## 2024-06-14 RX ADMIN — FENTANYL CITRATE 50 MCG: 0.05 INJECTION, SOLUTION INTRAMUSCULAR; INTRAVENOUS at 21:27

## 2024-06-14 RX ADMIN — PROPOFOL 20 MCG/KG/MIN: 10 INJECTION, EMULSION INTRAVENOUS at 19:23

## 2024-06-14 RX ADMIN — EPOETIN ALFA-EPBX 10000 UNITS: 10000 INJECTION, SOLUTION INTRAVENOUS; SUBCUTANEOUS at 21:24

## 2024-06-14 RX ADMIN — IPRATROPIUM BROMIDE AND ALBUTEROL SULFATE 1 DOSE: 2.5; .5 SOLUTION RESPIRATORY (INHALATION) at 08:37

## 2024-06-14 RX ADMIN — ACETYLCYSTEINE 600 MG: 200 SOLUTION ORAL; RESPIRATORY (INHALATION) at 08:37

## 2024-06-14 RX ADMIN — Medication 2 MCG/MIN: at 05:54

## 2024-06-14 RX ADMIN — FERROUS SULFATE TAB 325 MG (65 MG ELEMENTAL FE) 325 MG: 325 (65 FE) TAB at 08:50

## 2024-06-14 RX ADMIN — SODIUM CHLORIDE, PRESERVATIVE FREE 10 ML: 5 INJECTION INTRAVENOUS at 21:22

## 2024-06-14 RX ADMIN — IPRATROPIUM BROMIDE AND ALBUTEROL SULFATE 1 DOSE: 2.5; .5 SOLUTION RESPIRATORY (INHALATION) at 14:02

## 2024-06-14 RX ADMIN — FENTANYL CITRATE 50 MCG: 0.05 INJECTION, SOLUTION INTRAMUSCULAR; INTRAVENOUS at 10:26

## 2024-06-14 RX ADMIN — FINASTERIDE 5 MG: 5 TABLET, FILM COATED ORAL at 08:50

## 2024-06-14 ASSESSMENT — PAIN SCALES - GENERAL
PAINLEVEL_OUTOF10: 0
PAINLEVEL_OUTOF10: 0

## 2024-06-14 ASSESSMENT — PULMONARY FUNCTION TESTS
PIF_VALUE: 44
PIF_VALUE: 21
PIF_VALUE: 22
PIF_VALUE: 20
PIF_VALUE: 21
PIF_VALUE: 30
PIF_VALUE: 19
PIF_VALUE: 19
PIF_VALUE: 21
PIF_VALUE: 23
PIF_VALUE: 16
PIF_VALUE: 18
PIF_VALUE: 25
PIF_VALUE: 19
PIF_VALUE: 29
PIF_VALUE: 25
PIF_VALUE: 19
PIF_VALUE: 18
PIF_VALUE: 21
PIF_VALUE: 21
PIF_VALUE: 19
PIF_VALUE: 25
PIF_VALUE: 24
PIF_VALUE: 25

## 2024-06-14 NOTE — PLAN OF CARE
Pt remains intubated and sedated.   Problem: Safety - Adult  Goal: Free from fall injury  Outcome: Not Progressing     Problem: Discharge Planning  Goal: Discharge to home or other facility with appropriate resources  Outcome: Not Progressing     Problem: Chronic Conditions and Co-morbidities  Goal: Patient's chronic conditions and co-morbidity symptoms are monitored and maintained or improved  Outcome: Not Progressing     Problem: Skin/Tissue Integrity  Goal: Absence of new skin breakdown  Description: 1.  Monitor for areas of redness and/or skin breakdown  2.  Assess vascular access sites hourly  3.  Every 4-6 hours minimum:  Change oxygen saturation probe site  4.  Every 4-6 hours:  If on nasal continuous positive airway pressure, respiratory therapy assess nares and determine need for appliance change or resting period.  Outcome: Not Progressing     Problem: Neurosensory - Adult  Goal: Achieves stable or improved neurological status  Outcome: Not Progressing  Goal: Absence of seizures  Outcome: Not Progressing  Goal: Remains free of injury related to seizures activity  Outcome: Not Progressing  Goal: Achieves maximal functionality and self care  Outcome: Not Progressing     Problem: Respiratory - Adult  Goal: Achieves optimal ventilation and oxygenation  Outcome: Not Progressing     Problem: Cardiovascular - Adult  Goal: Maintains optimal cardiac output and hemodynamic stability  Outcome: Not Progressing  Goal: Absence of cardiac dysrhythmias or at baseline  Outcome: Not Progressing     Problem: Skin/Tissue Integrity - Adult  Goal: Skin integrity remains intact  Outcome: Not Progressing  Goal: Incisions, wounds, or drain sites healing without S/S of infection  Outcome: Not Progressing  Goal: Oral mucous membranes remain intact  Outcome: Not Progressing     Problem: Musculoskeletal - Adult  Goal: Return mobility to safest level of function  Outcome: Not Progressing  Goal: Maintain proper alignment of affected  Maintains optimal cardiac output and hemodynamic stability  Outcome: Not Progressing  Goal: Absence of cardiac dysrhythmias or at baseline  Outcome: Not Progressing     Problem: Skin/Tissue Integrity - Adult  Goal: Skin integrity remains intact  Outcome: Not Progressing  Goal: Incisions, wounds, or drain sites healing without S/S of infection  Outcome: Not Progressing  Goal: Oral mucous membranes remain intact  Outcome: Not Progressing     Problem: Musculoskeletal - Adult  Goal: Return mobility to safest level of function  Outcome: Not Progressing  Goal: Maintain proper alignment of affected body part  Outcome: Not Progressing  Goal: Return ADL status to a safe level of function  Outcome: Not Progressing     Problem: Gastrointestinal - Adult  Goal: Minimal or absence of nausea and vomiting  Outcome: Not Progressing  Goal: Maintains or returns to baseline bowel function  Outcome: Not Progressing  Goal: Maintains adequate nutritional intake  Outcome: Not Progressing  Goal: Establish and maintain optimal ostomy function  Outcome: Not Progressing     Problem: Genitourinary - Adult  Goal: Absence of urinary retention  Outcome: Not Progressing     Problem: Infection - Adult  Goal: Absence of infection at discharge  Outcome: Not Progressing  Goal: Absence of infection during hospitalization  Outcome: Not Progressing  Goal: Absence of fever/infection during anticipated neutropenic period  Outcome: Not Progressing     Problem: Metabolic/Fluid and Electrolytes - Adult  Goal: Electrolytes maintained within normal limits  Outcome: Not Progressing  Goal: Hemodynamic stability and optimal renal function maintained  Outcome: Not Progressing  Goal: Glucose maintained within prescribed range  Outcome: Not Progressing     Problem: Hematologic - Adult  Goal: Maintains hematologic stability  Outcome: Not Progressing     Problem: Pain  Goal: Verbalizes/displays adequate comfort level or baseline comfort level  Outcome: Not Progressing

## 2024-06-14 NOTE — PROGRESS NOTES
Comprehensive Nutrition Assessment    Type and Reason for Visit:  Reassess (Interim)    Nutrition Recommendations/Plan:   NPO, advance as medically appropriate.  Modify TF to Vital AF 1.2 Rickie (Peptide Based) via OGT at 40 mL/hr, advance by 10 mL/hr every 4 hrs as tolerated to new goal rate of 50 mL/hr.  Flush with 75 mL H2O q4hrs.  Provides: 1440 kcal(93%), 90 gm protein(92%), 1422 mL H2O(92%).                        +290 kcal(112%) from Propofol at 11 mL/hr.     Consider phos binder as appropriate.    If additional or increased rate of pressor support required, please hold TF.     Continue to monitor and record TF rate, flushes, tolerance, BM in I/Os.      Malnutrition Assessment:  Malnutrition Status:  No malnutrition (06/12/24 1151)    Context:  Acute Illness       Nutrition Assessment:    Admitted for cardiac arrest, on vent, receiving levo at 2mics being weaned off propofol. Plan to d/c CRRT today. OGT in place, per MD randle to start TF. (6/6) Pt recently extubated, SLP cleared for puree + mild thick liqs. RD to initiate ONS to help meet EENs. (6/10) Code BLUE in afternoon(1400), Pt intubated and returned to CCU. (6/12) Pt remains intubated, on very low dose levo, sedated on propofol. RD consulted for TF rec's. Pt hemodynamically stable for EN initiation, will provide regimen above. Wt loss not clinically significant, but is concerning- will trend given possibly impacted by fluid shifts. (6/14) Pt remains intubated, on stable, low dose pressor support, sedated on stable propofol. TF infusing and tolerated at goal rate. RD to modify regimen to meet new EENs. Labs: Na 132, , BUN 51, Cr 1.98, GFR 33, Ca 7.8, H/H 6.9/21.0. Meds: Levophed 2 mcg, Propofol, B1, B9, FeSul, lipitor, solu-medrol, duoneb, aspirin.    Nutrition Related Findings:    NFPE w/o acute findings; consider edema obscuring accurate assessment. No N/V/D/C reported. OGT feeds tolerated. Last BM 6/12-soft. Edema worsened: +2 pitting RUE, BLE,

## 2024-06-14 NOTE — PLAN OF CARE
Problem: Discharge Planning  Goal: Discharge to home or other facility with appropriate resources  Outcome: Not Progressing     Problem: Chronic Conditions and Co-morbidities  Goal: Patient's chronic conditions and co-morbidity symptoms are monitored and maintained or improved  Outcome: Not Progressing     Problem: Neurosensory - Adult  Goal: Achieves stable or improved neurological status  Outcome: Not Progressing  Goal: Achieves maximal functionality and self care  Outcome: Not Progressing     Problem: Musculoskeletal - Adult  Goal: Return mobility to safest level of function  Outcome: Not Progressing     Problem: Discharge Planning  Goal: Discharge to home or other facility with appropriate resources  Outcome: Not Progressing     Problem: Chronic Conditions and Co-morbidities  Goal: Patient's chronic conditions and co-morbidity symptoms are monitored and maintained or improved  Outcome: Not Progressing     Problem: Neurosensory - Adult  Goal: Achieves stable or improved neurological status  Outcome: Not Progressing  Goal: Achieves maximal functionality and self care  Outcome: Not Progressing     Problem: Musculoskeletal - Adult  Goal: Return mobility to safest level of function  Outcome: Not Progressing

## 2024-06-14 NOTE — PROGRESS NOTES
0950: called patient's daughter and patient's sister to obtain consent for blood administration. Left message.  Notified .  1002: Patient's sister returned called and consent was obtained to administer blood.  Dr. Arredondo notified

## 2024-06-14 NOTE — CARE COORDINATION
Inbound call from patient's sister (Obdulio Cho).  Informed patient's wife (Princess Willis) will be coming to the hospital today to see her .  Daughter (Terese Humphries) will be here tomorrow; and family is still trying to locate his son (Zackary Willis) in [place].        Remains on ventilator.        ISRRAEL Bojorquez

## 2024-06-14 NOTE — PROGRESS NOTES
(VANCOCIN) intermittent dosing (placeholder)  1 each Other RX Placeholder Riley Alonzo MD        methylPREDNISolone sodium succ (SOLU-MEDROL) injection 40 mg  40 mg IntraVENous Q8H Jeremy Ramos MD   40 mg at 06/14/24 0850    acetylcysteine (MUCOMYST) 20 % solution 600 mg  600 mg Inhalation BID RT Jeremy Ramos MD   600 mg at 06/14/24 0837    ipratropium 0.5 mg-albuterol 2.5 mg (DUONEB) nebulizer solution 1 Dose  1 Dose Inhalation Q6H RT Jeremy Ramos MD   1 Dose at 06/14/24 0837    heparin (porcine) injection 2,500 Units  2,500 Units IntraCATHeter PRN Sol Ferraro MD   2,500 Units at 06/13/24 1242    propofol infusion  5-50 mcg/kg/min IntraVENous Continuous Mikaela Arredondo MD   Paused at 06/14/24 1020    norepinephrine (LEVOPHED) 16 mg in sodium chloride 0.9 % 250 mL infusion  1-100 mcg/min IntraVENous Continuous Mikaela Arredondo MD 1.9 mL/hr at 06/14/24 0554 2 mcg/min at 06/14/24 0554    [Held by provider] amiodarone (CORDARONE) tablet 400 mg  400 mg Oral BID Gerry Wilson MD   400 mg at 06/10/24 1016    aspirin chewable tablet 81 mg  81 mg Per NG tube Daily Jeremy Ramos MD   81 mg at 06/14/24 0850    epoetin radha-epbx (RETACRIT) injection 10,000 Units  10,000 Units SubCUTAneous Once per day on Mon Wed Fri Sol Ferraro MD   10,000 Units at 06/12/24 1734    heparin (porcine) injection 4,000 Units  4,000 Units IntraVENous PRN Mikaela Arredondo MD        heparin (porcine) injection 2,000 Units  2,000 Units IntraVENous PRN Mikaela Arredondo MD   2,000 Units at 06/09/24 0734    heparin 25,000 units in dextrose 5% 250 mL (premix) infusion  5-30 Units/kg/hr IntraVENous Continuous Mikaela Arrednodo MD 11.4 mL/hr at 06/14/24 0554 13 Units/kg/hr at 06/14/24 0554    heparin (porcine) injection 1,100-1,900 Units  1,100-1,900 Units IntraCATHeter PRN Sol Ferraro MD   1,400 Units at 06/03/24 1231    And    heparin (porcine) injection 1,100-1,900 Units  1,100-1,900 Units IntraCATHeter PRN  Sol Ferraro MD   1,100 Units at 06/03/24 1226    sodium chloride 0.9 % bolus 2,448 mL  30 mL/kg IntraVENous Once Mikaela Arredondo MD   Held at 05/29/24 1405    ferrous sulfate (IRON 325) tablet 325 mg  325 mg Oral Daily with breakfast Mikaela Arredondo MD   325 mg at 06/14/24 0850    finasteride (PROSCAR) tablet 5 mg  5 mg Oral Daily Mikaela Arredondo MD   5 mg at 06/14/24 0850    folic acid (FOLVITE) tablet 1 mg  1 mg Oral Daily Mikaela Arredondo MD   1 mg at 06/14/24 0850    thiamine mononitrate tablet 100 mg  100 mg Oral Daily Mikaela Arredondo MD   100 mg at 06/14/24 0851    sodium chloride flush 0.9 % injection 5-40 mL  5-40 mL IntraVENous 2 times per day Mikaela Arredondo MD   10 mL at 06/14/24 0850    sodium chloride flush 0.9 % injection 5-40 mL  5-40 mL IntraVENous PRN Mikaela Arredondo MD        0.9 % sodium chloride infusion   IntraVENous PRN Mikaela Arredondo MD        ondansetron (ZOFRAN-ODT) disintegrating tablet 4 mg  4 mg Oral Q8H PRN Mikaela Arredondo MD        Or    ondansetron (ZOFRAN) injection 4 mg  4 mg IntraVENous Q6H PRN Mikaela Arredondo MD        polyethylene glycol (GLYCOLAX) packet 17 g  17 g Oral Daily PRN Mikaela Arredondo MD   17 g at 06/02/24 2100    acetaminophen (TYLENOL) tablet 650 mg  650 mg Oral Q6H PRN Mikaela Arredondo MD   650 mg at 06/09/24 2047    Or    acetaminophen (TYLENOL) suppository 650 mg  650 mg Rectal Q6H PRN Mikaela Arredondo MD        glucose chewable tablet 16 g  4 tablet Oral PRN Mikaela Arredondo MD        dextrose bolus 10% 125 mL  125 mL IntraVENous PRN Mikaela Arredondo MD        Or    dextrose bolus 10% 250 mL  250 mL IntraVENous PRN Mikaela Arredondo MD        glucagon injection 1 mg  1 mg SubCUTAneous PRN Mikaela Arredondo MD        dextrose 10 % infusion   IntraVENous Continuous PRN Mikaela Arredondo MD            Allergies   Allergen Reactions    Penicillin G Other (See Comments)     Pt states he passes out    Sulfamethoxazole-Trimethoprim      Pt

## 2024-06-14 NOTE — CONSULTS
IMPRESSION:   Acute hypoxic respiratory failure extubated on 6 5 and reintubated on 6/10  Status post cardiac arrest  A-fib with rate controlled  Septic shock  Sepsis treated with antibiotics  Aspiration pneumonia right midlung  COPD   MRSA nasal colonization  History of cocaine abuse polysubstance abuse in the past  Pt is critically ill. Time spent with pt and staff actively rendering care, managing pt and coordinating care as stated below; 30 minutes, exclusive of any procedures      RECOMMENDATIONS/PLAN:   Extubated  on 6/5 reintubated on 6/10 on ventilator sedated with propofol patient is becoming bradycardic will change to either Versed or fentanyl  ABG this morning showed pCO2 33 pO2 88 on 35% FiO2  Chest x-ray with continued right lower infiltrate CTA chest showed bilateral infiltrates consistent with aspiration  Chest x-ray shows right midlung infiltrate fluid overload congestive change left lung clear  Remains on Merrem and vancomycin sputum with normal doug  Heart rate controlled on amiodarone   Renal function improving off CVVH as needed dialysis  Status post cardiac arrest patient coded   Patient is back on pressors on Levophed blood pressure still low we will try to wean again  Continue with nebulizer treatment and will give 3 doses of Solu-Medrol     [x] High complexity decision making was performed  [x] See my orders for details  HPI  78-year-old male nursing home resident came in because of shortness of breath respiratory distress patient has episodes of vomiting after eating breakfast this morning subsequently came to the hospital saturation was in 70s he was back to the emergency room subsequently intubated and then he coded hemodynamically unstable now he is on 2 pressors vasopressin and Levophed denies intubated on ventilator critical care consult was called, past medical history of polysubstance abuse cocaine abuse COPD hypertension insomnia      PMH:  has a past medical history of Benign    Temp (!) 94.8 °F (34.9 °C) (Rectal)   Resp 12   Ht 1.73 m (5' 8.11\")   Wt 82.1 kg (181 lb)   SpO2 99%   BMI 27.43 kg/m²     Temp (24hrs), Av.7 °F (35.9 °C), Min:94.8 °F (34.9 °C), Max:98.1 °F (36.7 °C)        O2 Device: Ventilator O2 Flow Rate (L/min): 2 L/min       Wt Readings from Last 4 Encounters:   24 82.1 kg (181 lb)   24 81.6 kg (180 lb)   24 86.2 kg (190 lb)   24 86.4 kg (190 lb 7.6 oz)          Intake/Output Summary (Last 24 hours) at 2024 0841  Last data filed at 2024 0000  Gross per 24 hour   Intake 4183.34 ml   Output 2600 ml   Net 1583.34 ml         Last shift:      No intake/output data recorded.  Last 3 shifts:  1901 -  0700  In: 6117.3 [I.V.:3283.9]  Out: 2900 [Urine:300]       Physical Exam:     General: Sedated on ventilator  HEENT: NCAT,  Eyes: anicteric; conjunctiva clear   Neck: no nodes, no cuff leak, trach midline; no accessory MM use.  Chest: no deformity,   Cardiac: Rate improved  Lungs: Few rhonchi right lateral   Abd: soft, NT, normal BS  Ext: Lower extremity edema; no joint swelling; No clubbing  : clear urine  Neuro: Alert awake talking  Psych- unable to assess  Skin: warm, dry, no cyanosis;   Pulses: Brachial and radial pulses intact  Capillary: Normal capillary refill      DATA:  No results found for this or any previous visit.    24    ECHO (TTE) LIMITED (PRN CONTRAST/BUBBLE/STRAIN/3D) 2024 11:12 AM (Final)    Interpretation Summary    Left Ventricle: Normal left ventricular systolic function with a visually estimated EF of 65 - 70%. EF by 2D Simpsons Biplane is 68%. Left ventricle size is normal. LVIDd index is 2.07 cm/m2. Moderately increased wall thickness. Moderate septal thickening. Mild posterior thickening. Mass index 2D is 131.8 g/m2. Normal wall motion. Normal diastolic function.    Aortic Valve: Trileaflet valve. Mild sclerosis of the aortic valve cusp. Mild to moderate regurgitation with a centrally    Electronically signed by KEERTHI HERNANDEZ      XR CHEST 1 VIEW   Final Result   Stable bibasilar atelectasis and low lung volumes. Stable lines and tubes,   except for extubation and removal of gastric tube..  .         Electronically signed by KATHRINE EDWARDS      XR CHEST 1 VIEW   Final Result   Shifting bibasilar atelectasis is increased on the right and   decreased on the left. Stable support devices.         XR CHEST 1 VIEW   Final Result   1. No interval change         Vascular duplex upper extremity venous bilateral   Final Result      XR CHEST PORTABLE   Final Result   Unchanged right perihilar and left basilar opacities.      XR CHEST PORTABLE   Final Result      Unchanged radiographic appearance.         XR CHEST 1 VIEW   Final Result   Stable right pulmonary edema.      Vascular duplex lower extremity venous bilateral   Final Result      XR CHEST 1 VIEW   Final Result   Right lung interstitial and airspace disease likely represents asymmetric edema.      XR CHEST PORTABLE   Final Result   1. No pneumothorax   2. The ET tube is 2 cm above the gina this could be retracted 2 cm.      IR NONTUNNELED VASCULAR CATHETER > 5 YEARS   Final Result   Technically successful ultrasound guided placement of a right internal jugular   vein temporary dialysis catheter.  A post procedure chest x-ray is pending.         XR CHEST 1 VIEW   Final Result   1. Increasing bibasilar opacities left greater than right most likely due to   atelectasis.   2. Decrease in consolidation in the right upper lobe.   3. The ET tube could be retracted 1.5 to 2 cm.      US RETROPERITONEAL COMPLETE   Final Result   1. Atrophic kidneys. No gross hydronephrosis.   2. Small volume of ascites.         XR CHEST PORTABLE   Final Result   Left IJ central venous catheter placement. No pneumothorax. Right   upper lobe airspace disease and mild bilateral interstitial opacities unchanged.      XR CHEST PORTABLE   Final Result   1. ET tube tip is

## 2024-06-14 NOTE — PROGRESS NOTES
General Daily Progress Note      Patient Name:   Agustín Willis       YOB: 1945       Age:  79 y.o.      Admit Date: 5/29/2024      Subjective:   Patient is a 78 y.o. year old male past medical history of COPD BPH cervical radiculopathy cocaine abuse hypertension hyperlipidemia seasonal allergies SVT came to emergency room with respiratory arrest, patient was at nursing home after eating breakfast he vomited everywhere likely aspirated oxygen saturation was 70% initially put on nonrebreather patient was intubated in the ER patient was hypotensive started Levophed 10 patient have a cardiac arrest went to V-fib received shock x 4 received epi and amiodarone  Was started on Levophed and vasopressin received 2 L of IV fluid, received IV Zosyn and vancomycin in the ER     Patient admitted to the ICU for further workup  Patient on ventilator/unresponsive       5/30    Patient on ventilator propofol and Precedex drip  Hypotension on Levophed and vasopressin  Patient has no urine output last night received 500 bolus    Nephrology decided for start CRRT IR team for dialysis cath  BUN 72 creatinine 3.09  CRP 10.5  MRSA nares    5/31    Patient on ventilator 40% O2 propofol Precedex levo and vasopressin drip  Patient on A-fib with rapid ventricular rate ordered amiodarone bolus and started on drip  Patient getting CRRT  No urine output  D-dimers greater than 20    6/3    Patient on ventilator 45% O2 receiving CRRT patient on propofol drip  Patient on amiodarone and Levophed drip  Patient heparin and Precedex drip  Patient is hypothermic    WBC count 22.5    6/4    Patient on ventilator 45% O2 on Levophed drip and amiodarone drip and heparin drip and Precedex drip getting NG tube feeding  BUN 37 creatinine 1.97 WBCs 15  Doppler studies positive for acute DVT of the left brachial vein left radial vein and left ulnar vein    Seen by infectious disease recommended continue IV meropenem    6/5    General ventilated  PRN, Mikaela Arredondo MD    glucose chewable tablet 16 g, 4 tablet, Oral, PRN, Mikaela Arredondo MD    dextrose bolus 10% 125 mL, 125 mL, IntraVENous, PRN **OR** dextrose bolus 10% 250 mL, 250 mL, IntraVENous, PRN, Mikaela Arredondo MD    glucagon injection 1 mg, 1 mg, SubCUTAneous, PRN, Mikaela Arredondo MD    dextrose 10 % infusion, , IntraVENous, Continuous PRN, Mikaela Arredondo MD

## 2024-06-14 NOTE — PROGRESS NOTES
CARDIOLOGY PROGRESS NOTE      Patient Name: Agustín Willis  Age: 79 y.o.  Gender:male  :1945  MRN: [id number]    Agustín Willis is a 78 y.o. year-old male with past medical history significant for hypertension, COPD, cocaine abuse, CKD who was evaluated today due to cardiac arrest. Patient initially presented to the ED via EMS from nursing home due to respiratory distress after vomiting breakfast. Required intubation in ED. Apparently with VF arrest in ED, shock x4.     Patient seen and examined in ICU. Remains intubated, weaning sedation for neuro eval. On heparin gtt. On single pressor support. Neurology following. MRI with numerous small foci of acute infarction.  Apparently with bradycardia overnight, sedation had been increased    Telemetry reviewed.    ROS as above. Current medications reviewed.    Physical Examination    Allergies   Allergen Reactions    Penicillin G Other (See Comments)     Pt states he passes out    Sulfamethoxazole-Trimethoprim      Pt states he passes out     Vitals:    24 1236   BP: 94/63   Pulse: 73   Resp: 13   Temp: 97.9 °F (36.6 °C)   SpO2: 95%     Vital signs are stable   In no distress  Heart has a regular rate and rhythm  Lungs are coarse  Abdomen is soft  Extremities have mild edema  Skin is dry    Labs reviewed:  Recent Results (from the past 12 hour(s))   TYPE AND SCREEN    Collection Time: 24 10:30 AM   Result Value Ref Range    Crossmatch expiration date 2024,9     ABO/Rh O Positive     Antibody Screen Negative     Unit Number [id number]     Product Code Blood Bank RC LR,2     Unit Divison 00     Dispense Status Blood Bank Issued     Unit Issue Date/Time 005577932553     Product Code Blood Bank P2889R79     Blood Bank Unit Type and Rh O POS     Blood Bank ISBT Product Blood Type 5100     Blood Bank Blood Product Expiration Date 367768970297     Transfusion Status Ok to transfuse     Crossmatch Result Compatible    POCT Glucose    Collection  to call if additional questions arise.    DONNELL MCKEON, APRN - NP  6/14/2024

## 2024-06-14 NOTE — PROGRESS NOTES
PEEP/CPA 5.0      Source Arterial      Site Right Radial      Efrain Test YES      Carboxyhgb, Arterial 0.1 (L) 1 - 2 %    Methemoglobin, Arterial 0.4 0 - 1.4 %    Oxyhemoglobin 96.3 95 - 99 %    Performed by: Cristel Huffman            Imaging:       XR CHEST 1 VIEW    Result Date: 6/14/2024  EXAM: XR CHEST 1 VIEW ACC#: [id number]  INDICATION: chf, []  COMPARISON: Chest radiograph June 13, 2024 FINDINGS: AP portable chest radiograph. Bilateral IJ catheters are in satisfactory positions. The ET tube tip is just above the gina. The NG tube tip is in the stomach. The heart mildly enlarged but stable. There is ongoing airspace opacity at the left lung base. There is patchy consolidation in the right lower lung zone similar to the previous exam. No definite effusion or pneumothorax is seen.     1. The ET tube tip is near the gina. It can be retracted 2 cm for improved positioning. 2. No interval change in bilateral lung airspace disease. Electronically signed by BREANA HAMILTON    XR CHEST 1 VIEW    Result Date: 6/13/2024  EXAM:  XR CHEST 1 VIEW INDICATION: Intubated COMPARISON: 6/13/2024 at 0243 hours. TECHNIQUE: Portable AP semiupright chest view at 0847 hours FINDINGS: The endotracheal tube terminates above the gina. The enteric tube, and bilateral IJ catheters are stable. The cardiomediastinal contours are stable. Diffuse patchy right lung opacities and left basilar opacity are unchanged. There is no pleural effusion or pneumothorax. The bones and upper abdomen are stable.     The endotracheal tube terminates above the gina. Stable bilateral lung opacities. Electronically signed by Richard Persaud    XR CHEST 1 VIEW    Result Date: 6/13/2024  EXAM: XR CHEST 1 VIEW ACC#: [id number] INDICATION: chf COMPARISON: 6/12/2024 TECHNIQUE: AP portable semierect view of the chest. FINDINGS: Endotracheal tube tip is 1.5 cm above the gina. Other life-support tubes and lines are again visualized. There is persistent opacity  in the left lung base. Patchy opacities throughout the right lung again noted. The cardiomediastinal configuration is within normal limits. No acute bony abnormalities.     1.  Persistent left basilar opacity and patchy densities in the right lung. 2.  Endotracheal tube tip is close to the gina. Consider retracting by 1 to 2 cm. Electronically signed by SEMAJ Neely Attestation:        I discussed the risks and benefits of a telehealth visit and I obtained the patient's or legally authorized representative's informed verbal consent to conduct this assessment using telehealth tools.? All of the patient’s or legally authorized representative's questions regarding the telehealth interaction were addressed. I provided my name and disclosed to the patient or legally authorized representative my licensure, certification, or registration. ?This consultation was remotely performed at the request of Mikaela Arredondo MD with the assistance of a trained virtual health liaison working at the originating site.? The consultation used real time licensed and encrypted telehealth equipment which allowed a live video connection between my location and the patient's location.? Aspects of the evaluation that could not be adequately evaluated by virtual assessment were shared with both the patient and the consulting provider.?          A total of 30 minutes of time was spent in direct care and coordination of care with the patient.

## 2024-06-14 NOTE — PROGRESS NOTES
Progress Note  Date:2024       Room:[facility]  Patient Name:Agustín Willis     YOB: 1945     Age:79 y.o.        Subjective    Subjective  Patient followed septic shock with presumptive aspiration pneumonia.  He is back in the ICU after coding and remains intubated.  New RUL infiltrate on CXR with sputum culture growing  MRSA, now on IV Vancomycin.  CXR appears to have improved.         Objective         Vitals Last 24 Hours:  TEMPERATURE:  Temp  Av.1 °F (36.2 °C)  Min: 94.8 °F (34.9 °C)  Max: 98.1 °F (36.7 °C)  RESPIRATIONS RANGE: Resp  Av  Min: 12  Max: 24  PULSE OXIMETRY RANGE: SpO2  Av.2 %  Min: 92 %  Max: 100 %  PULSE RANGE: Pulse  Av.5  Min: 6  Max: 84  BLOOD PRESSURE RANGE: Systolic (24hrs), Av , Min:89 , Max:125   ; Diastolic (24hrs), Av, Min:63, Max:79         Objective:  Vital signs: (most recent): Blood pressure 104/73, pulse 73, temperature 98.1 °F (36.7 °C), temperature source Rectal, resp. rate 24, height 1.73 m (5' 8.11\"), weight 82.1 kg (181 lb), SpO2 96 %.      Vitals and nursing note reviewed.   Constitutional:       General: He is in acute distress.      Appearance: He is ill-appearing and toxic-appearing.   HENT:      Head: Normocephalic and atraumatic.      Right Ear: External ear normal.      Left Ear: External ear normal.      Nose: Nose normal.      Mouth/Throat: drooling     Comments ET tube/orogastric tube  Cardiovascular:      Rate and Rhythm: Normal rate and regular rhythm.      Heart sounds: No murmur heard.  Pulmonary:      Breath sounds: Rhonchi present. No wheezing or rales.   Abdominal:      General: Bowel sounds are normal. There is no distension.      Palpations: Abdomen is soft.      Tenderness: There is no abdominal tenderness.   Genitourinary:  external urinary device    Musculoskeletal:      Cervical back: Neck supple.      Right lower leg: No edema.      Left lower leg: Edema present.   Skin:     Findings: No rash.   Neurological:

## 2024-06-15 ENCOUNTER — APPOINTMENT (OUTPATIENT)
Facility: HOSPITAL | Age: 79
DRG: 870 | End: 2024-06-15
Payer: MEDICARE

## 2024-06-15 LAB
ABO + RH BLD: NORMAL
ALBUMIN SERPL-MCNC: 1.9 G/DL (ref 3.5–5)
ALBUMIN/GLOB SERPL: 0.5 (ref 1.1–2.2)
ALP SERPL-CCNC: 95 U/L (ref 45–117)
ALT SERPL-CCNC: 19 U/L (ref 12–78)
ANION GAP SERPL CALC-SCNC: 8 MMOL/L (ref 5–15)
ARTERIAL PATENCY WRIST A: YES
AST SERPL W P-5'-P-CCNC: 15 U/L (ref 15–37)
BASE DEFICIT BLDA-SCNC: 1.1 MMOL/L
BASOPHILS # BLD: 0 K/UL (ref 0–0.1)
BASOPHILS NFR BLD: 0 % (ref 0–1)
BDY SITE: ABNORMAL
BILIRUB SERPL-MCNC: 0.4 MG/DL (ref 0.2–1)
BLD PROD TYP BPU: NORMAL
BLOOD BANK BLOOD PRODUCT EXPIRATION DATE: NORMAL
BLOOD BANK DISPENSE STATUS: NORMAL
BLOOD BANK ISBT PRODUCT BLOOD TYPE: 5100
BLOOD BANK PRODUCT CODE: NORMAL
BLOOD BANK UNIT TYPE AND RH: NORMAL
BLOOD GROUP ANTIBODIES SERPL: NEGATIVE
BODY TEMPERATURE: 98.7
BPU ID: NORMAL
BUN SERPL-MCNC: 68 MG/DL (ref 6–20)
BUN/CREAT SERPL: 29 (ref 12–20)
CA-I BLD-MCNC: 7.9 MG/DL (ref 8.5–10.1)
CHLORIDE SERPL-SCNC: 95 MMOL/L (ref 97–108)
CO2 SERPL-SCNC: 27 MMOL/L (ref 21–32)
COHGB MFR BLD: 0.2 % (ref 1–2)
CREAT SERPL-MCNC: 2.36 MG/DL (ref 0.7–1.3)
CROSSMATCH RESULT: NORMAL
CRP SERPL-MCNC: 4.31 MG/DL (ref 0–0.3)
DATE LAST DOSE: NORMAL
DIFFERENTIAL METHOD BLD: ABNORMAL
DOSE AMOUNT: NORMAL UNITS
EOSINOPHIL # BLD: 0 K/UL (ref 0–0.4)
EOSINOPHIL NFR BLD: 0 % (ref 0–7)
ERYTHROCYTE [DISTWIDTH] IN BLOOD BY AUTOMATED COUNT: 15.6 % (ref 11.5–14.5)
FIO2 ON VENT: 30 %
GAS FLOW.O2 SETTING OXYMISER: 12
GLOBULIN SER CALC-MCNC: 4.2 G/DL (ref 2–4)
GLUCOSE BLD STRIP.AUTO-MCNC: 109 MG/DL (ref 65–100)
GLUCOSE BLD STRIP.AUTO-MCNC: 123 MG/DL (ref 65–100)
GLUCOSE BLD STRIP.AUTO-MCNC: 132 MG/DL (ref 65–100)
GLUCOSE BLD STRIP.AUTO-MCNC: 140 MG/DL (ref 65–100)
GLUCOSE SERPL-MCNC: 107 MG/DL (ref 65–100)
HCO3 BLDA-SCNC: 22 MMOL/L (ref 22–26)
HCT VFR BLD AUTO: 24.2 % (ref 36.6–50.3)
HGB BLD-MCNC: 7.9 G/DL (ref 12.1–17)
IMM GRANULOCYTES # BLD AUTO: 0.2 K/UL (ref 0–0.04)
IMM GRANULOCYTES NFR BLD AUTO: 1 % (ref 0–0.5)
LYMPHOCYTES # BLD: 1.5 K/UL (ref 0.8–3.5)
LYMPHOCYTES NFR BLD: 8 % (ref 12–49)
MCH RBC QN AUTO: 28.9 PG (ref 26–34)
MCHC RBC AUTO-ENTMCNC: 32.6 G/DL (ref 30–36.5)
MCV RBC AUTO: 88.6 FL (ref 80–99)
METHGB MFR BLD: 0.4 % (ref 0–1.4)
MONOCYTES # BLD: 0.8 K/UL (ref 0–1)
MONOCYTES NFR BLD: 4 % (ref 5–13)
NEUTS SEG # BLD: 15.8 K/UL (ref 1.8–8)
NEUTS SEG NFR BLD: 87 % (ref 32–75)
NRBC # BLD: 0.03 K/UL (ref 0–0.01)
NRBC BLD-RTO: 0.2 PER 100 WBC
OXYHGB MFR BLD: 93.4 % (ref 95–99)
PCO2 BLDA: 31 MMHG (ref 35–45)
PEEP RESPIRATORY: 5
PERFORMED BY:: ABNORMAL
PH BLDA: 7.47 (ref 7.35–7.45)
PHOSPHATE SERPL-MCNC: 4.7 MG/DL (ref 2.6–4.7)
PLATELET # BLD AUTO: 238 K/UL (ref 150–400)
PMV BLD AUTO: 10.1 FL (ref 8.9–12.9)
PO2 BLDA: 72 MMHG (ref 80–100)
POTASSIUM SERPL-SCNC: 4.2 MMOL/L (ref 3.5–5.1)
PROCALCITONIN SERPL-MCNC: 2.45 NG/ML
PROT SERPL-MCNC: 6.1 G/DL (ref 6.4–8.2)
RBC # BLD AUTO: 2.73 M/UL (ref 4.1–5.7)
SAO2 % BLD: 94 % (ref 95–99)
SAO2% DEVICE SAO2% SENSOR NAME: ABNORMAL
SODIUM SERPL-SCNC: 130 MMOL/L (ref 136–145)
SPECIMEN EXP DATE BLD: NORMAL
SPECIMEN SITE: ABNORMAL
TRANSFUSION STATUS PATIENT QL: NORMAL
UFH PPP CHRO-ACNC: 0.36 IU/ML
UNIT DIVISION: 0
UNIT ISSUE DATE/TIME: NORMAL
VANCOMYCIN SERPL-MCNC: 20.8 UG/ML
VENTILATION MODE VENT: ABNORMAL
VT SETTING VENT: 450
WBC # BLD AUTO: 18.3 K/UL (ref 4.1–11.1)

## 2024-06-15 PROCEDURE — 6360000002 HC RX W HCPCS: Performed by: FAMILY MEDICINE

## 2024-06-15 PROCEDURE — 94003 VENT MGMT INPAT SUBQ DAY: CPT

## 2024-06-15 PROCEDURE — 6360000002 HC RX W HCPCS: Performed by: INTERNAL MEDICINE

## 2024-06-15 PROCEDURE — 85025 COMPLETE CBC W/AUTO DIFF WBC: CPT

## 2024-06-15 PROCEDURE — 6370000000 HC RX 637 (ALT 250 FOR IP): Performed by: FAMILY MEDICINE

## 2024-06-15 PROCEDURE — 71045 X-RAY EXAM CHEST 1 VIEW: CPT

## 2024-06-15 PROCEDURE — 6370000000 HC RX 637 (ALT 250 FOR IP)

## 2024-06-15 PROCEDURE — 80053 COMPREHEN METABOLIC PANEL: CPT

## 2024-06-15 PROCEDURE — 2709999900 HC NON-CHARGEABLE SUPPLY

## 2024-06-15 PROCEDURE — 6370000000 HC RX 637 (ALT 250 FOR IP): Performed by: INTERNAL MEDICINE

## 2024-06-15 PROCEDURE — 94761 N-INVAS EAR/PLS OXIMETRY MLT: CPT

## 2024-06-15 PROCEDURE — 82962 GLUCOSE BLOOD TEST: CPT

## 2024-06-15 PROCEDURE — 85520 HEPARIN ASSAY: CPT

## 2024-06-15 PROCEDURE — 82803 BLOOD GASES ANY COMBINATION: CPT

## 2024-06-15 PROCEDURE — 84100 ASSAY OF PHOSPHORUS: CPT

## 2024-06-15 PROCEDURE — 84145 PROCALCITONIN (PCT): CPT

## 2024-06-15 PROCEDURE — 86140 C-REACTIVE PROTEIN: CPT

## 2024-06-15 PROCEDURE — 94640 AIRWAY INHALATION TREATMENT: CPT

## 2024-06-15 PROCEDURE — 2000000000 HC ICU R&B

## 2024-06-15 PROCEDURE — 2580000003 HC RX 258: Performed by: FAMILY MEDICINE

## 2024-06-15 PROCEDURE — 90935 HEMODIALYSIS ONE EVALUATION: CPT

## 2024-06-15 PROCEDURE — 36415 COLL VENOUS BLD VENIPUNCTURE: CPT

## 2024-06-15 PROCEDURE — 2700000000 HC OXYGEN THERAPY PER DAY

## 2024-06-15 PROCEDURE — 80202 ASSAY OF VANCOMYCIN: CPT

## 2024-06-15 PROCEDURE — 36600 WITHDRAWAL OF ARTERIAL BLOOD: CPT

## 2024-06-15 RX ORDER — GLYCOPYRROLATE 0.2 MG/ML
0.1 INJECTION INTRAMUSCULAR; INTRAVENOUS EVERY 8 HOURS
Status: DISCONTINUED | OUTPATIENT
Start: 2024-06-15 | End: 2024-06-25 | Stop reason: HOSPADM

## 2024-06-15 RX ORDER — GLYCOPYRROLATE 0.2 MG/ML
0.4 INJECTION INTRAMUSCULAR; INTRAVENOUS EVERY 8 HOURS
Status: DISCONTINUED | OUTPATIENT
Start: 2024-06-15 | End: 2024-06-15

## 2024-06-15 RX ADMIN — HEPARIN SODIUM 2500 UNITS: 1000 INJECTION INTRAVENOUS; SUBCUTANEOUS at 15:59

## 2024-06-15 RX ADMIN — METHYLPREDNISOLONE SODIUM SUCCINATE 40 MG: 40 INJECTION INTRAMUSCULAR; INTRAVENOUS at 17:41

## 2024-06-15 RX ADMIN — HEPARIN SODIUM 13 UNITS/KG/HR: 10000 INJECTION, SOLUTION INTRAVENOUS at 03:03

## 2024-06-15 RX ADMIN — VANCOMYCIN HYDROCHLORIDE 500 MG: 500 INJECTION, POWDER, LYOPHILIZED, FOR SOLUTION INTRAVENOUS at 14:15

## 2024-06-15 RX ADMIN — SODIUM CHLORIDE, PRESERVATIVE FREE 10 ML: 5 INJECTION INTRAVENOUS at 09:04

## 2024-06-15 RX ADMIN — SODIUM CHLORIDE, PRESERVATIVE FREE 10 ML: 5 INJECTION INTRAVENOUS at 20:18

## 2024-06-15 RX ADMIN — GLYCOPYRROLATE 0.1 MG: 0.2 INJECTION INTRAMUSCULAR; INTRAVENOUS at 20:18

## 2024-06-15 RX ADMIN — GLYCOPYRROLATE 0.1 MG: 0.2 INJECTION INTRAMUSCULAR; INTRAVENOUS at 12:19

## 2024-06-15 RX ADMIN — ACETYLCYSTEINE 600 MG: 200 SOLUTION ORAL; RESPIRATORY (INHALATION) at 05:43

## 2024-06-15 RX ADMIN — METHYLPREDNISOLONE SODIUM SUCCINATE 40 MG: 40 INJECTION INTRAMUSCULAR; INTRAVENOUS at 01:35

## 2024-06-15 RX ADMIN — FOLIC ACID 1 MG: 1 TABLET ORAL at 09:04

## 2024-06-15 RX ADMIN — POLYETHYLENE GLYCOL 3350 17 G: 17 POWDER, FOR SOLUTION ORAL at 18:00

## 2024-06-15 RX ADMIN — METHYLPREDNISOLONE SODIUM SUCCINATE 40 MG: 40 INJECTION INTRAMUSCULAR; INTRAVENOUS at 09:03

## 2024-06-15 RX ADMIN — IPRATROPIUM BROMIDE AND ALBUTEROL SULFATE 1 DOSE: 2.5; .5 SOLUTION RESPIRATORY (INHALATION) at 05:43

## 2024-06-15 RX ADMIN — FENTANYL CITRATE 50 MCG: 0.05 INJECTION, SOLUTION INTRAMUSCULAR; INTRAVENOUS at 21:41

## 2024-06-15 RX ADMIN — IPRATROPIUM BROMIDE AND ALBUTEROL SULFATE 1 DOSE: 2.5; .5 SOLUTION RESPIRATORY (INHALATION) at 19:30

## 2024-06-15 RX ADMIN — ASPIRIN 81 MG 81 MG: 81 TABLET ORAL at 09:03

## 2024-06-15 RX ADMIN — ATORVASTATIN CALCIUM 40 MG: 40 TABLET, FILM COATED ORAL at 20:18

## 2024-06-15 RX ADMIN — IPRATROPIUM BROMIDE AND ALBUTEROL SULFATE 1 DOSE: 2.5; .5 SOLUTION RESPIRATORY (INHALATION) at 00:56

## 2024-06-15 RX ADMIN — FERROUS SULFATE TAB 325 MG (65 MG ELEMENTAL FE) 325 MG: 325 (65 FE) TAB at 09:03

## 2024-06-15 RX ADMIN — THIAMINE HCL TAB 100 MG 100 MG: 100 TAB at 09:03

## 2024-06-15 RX ADMIN — ACETYLCYSTEINE 600 MG: 200 SOLUTION ORAL; RESPIRATORY (INHALATION) at 19:30

## 2024-06-15 RX ADMIN — IPRATROPIUM BROMIDE AND ALBUTEROL SULFATE 1 DOSE: 2.5; .5 SOLUTION RESPIRATORY (INHALATION) at 13:43

## 2024-06-15 RX ADMIN — FINASTERIDE 5 MG: 5 TABLET, FILM COATED ORAL at 09:03

## 2024-06-15 ASSESSMENT — PULMONARY FUNCTION TESTS
PIF_VALUE: 20
PIF_VALUE: 21
PIF_VALUE: 18
PIF_VALUE: 22
PIF_VALUE: 22
PIF_VALUE: 25
PIF_VALUE: 16
PIF_VALUE: 23
PIF_VALUE: 17
PIF_VALUE: 20
PIF_VALUE: 21
PIF_VALUE: 18
PIF_VALUE: 22
PIF_VALUE: 20
PIF_VALUE: 19
PIF_VALUE: 23
PIF_VALUE: 19
PIF_VALUE: 22
PIF_VALUE: 21
PIF_VALUE: 24
PIF_VALUE: 20
PIF_VALUE: 20
PIF_VALUE: 21
PIF_VALUE: 23
PIF_VALUE: 23
PIF_VALUE: 18
PIF_VALUE: 23
PIF_VALUE: 18
PIF_VALUE: 21
PIF_VALUE: 16
PIF_VALUE: 19
PIF_VALUE: 25
PIF_VALUE: 14
PIF_VALUE: 46
PIF_VALUE: 17
PIF_VALUE: 19
PIF_VALUE: 32
PIF_VALUE: 23
PIF_VALUE: 16
PIF_VALUE: 17
PIF_VALUE: 18
PIF_VALUE: 21
PIF_VALUE: 30
PIF_VALUE: 19
PIF_VALUE: 19
PIF_VALUE: 20
PIF_VALUE: 17
PIF_VALUE: 31
PIF_VALUE: 21
PIF_VALUE: 19
PIF_VALUE: 19
PIF_VALUE: 22

## 2024-06-15 ASSESSMENT — PAIN SCALES - GENERAL
PAINLEVEL_OUTOF10: 0
PAINLEVEL_OUTOF10: 0

## 2024-06-15 NOTE — PLAN OF CARE
Problem: Safety - Adult  Goal: Free from fall injury  6/14/2024 2257 by Luh Velazco RN  Outcome: Not Progressing  6/14/2024 1604 by Katelyn Cabello RN  Outcome: Progressing     Problem: Discharge Planning  Goal: Discharge to home or other facility with appropriate resources  6/14/2024 2257 by Luh Velazco RN  Outcome: Not Progressing  Flowsheets (Taken 6/14/2024 2000)  Discharge to home or other facility with appropriate resources: Identify barriers to discharge with patient and caregiver  6/14/2024 1604 by Katelyn Cabello RN  Outcome: Not Progressing     Problem: Chronic Conditions and Co-morbidities  Goal: Patient's chronic conditions and co-morbidity symptoms are monitored and maintained or improved  6/14/2024 2257 by Luh Velazco RN  Outcome: Not Progressing  Flowsheets (Taken 6/14/2024 2000)  Care Plan - Patient's Chronic Conditions and Co-Morbidity Symptoms are Monitored and Maintained or Improved: Monitor and assess patient's chronic conditions and comorbid symptoms for stability, deterioration, or improvement  6/14/2024 1604 by Katelyn Cabello RN  Outcome: Not Progressing     Problem: Skin/Tissue Integrity  Goal: Absence of new skin breakdown  Description: 1.  Monitor for areas of redness and/or skin breakdown  2.  Assess vascular access sites hourly  3.  Every 4-6 hours minimum:  Change oxygen saturation probe site  4.  Every 4-6 hours:  If on nasal continuous positive airway pressure, respiratory therapy assess nares and determine need for appliance change or resting period.  6/14/2024 2257 by Luh Vleazco RN  Outcome: Not Progressing  6/14/2024 1604 by Katelyn Cabello RN  Outcome: Progressing     Problem: Neurosensory - Adult  Goal: Achieves stable or improved neurological status  6/14/2024 2257 by Luh Velazco RN  Outcome: Not Progressing  Flowsheets (Taken 6/14/2024 2000)  Achieves stable or improved neurological status: Assess for and report changes in neurological status  6/14/2024 1604 by  (Taken 6/14/2024 2000)  Maintains or returns to baseline bowel function: Assess bowel function  Goal: Maintains adequate nutritional intake  Outcome: Not Progressing  Flowsheets (Taken 6/14/2024 2000)  Maintains adequate nutritional intake: Monitor percentage of each meal consumed  Goal: Establish and maintain optimal ostomy function  Outcome: Not Progressing  Flowsheets (Taken 6/14/2024 2000)  Establish and maintain optimal ostomy function: Administer IV fluids and TPN as ordered     Problem: Genitourinary - Adult  Goal: Absence of urinary retention  Outcome: Not Progressing  Flowsheets (Taken 6/14/2024 2000)  Absence of urinary retention: Monitor intake/output and perform bladder scan as needed     Problem: Infection - Adult  Goal: Absence of infection at discharge  Outcome: Not Progressing  Flowsheets (Taken 6/14/2024 2000)  Absence of infection at discharge: Assess and monitor for signs and symptoms of infection  Goal: Absence of infection during hospitalization  Outcome: Not Progressing  Flowsheets (Taken 6/14/2024 2000)  Absence of infection during hospitalization: Assess and monitor for signs and symptoms of infection  Goal: Absence of fever/infection during anticipated neutropenic period  Outcome: Not Progressing  Flowsheets (Taken 6/14/2024 2000)  Absence of fever/infection during anticipated neutropenic period: Monitor white blood cell count     Problem: Metabolic/Fluid and Electrolytes - Adult  Goal: Electrolytes maintained within normal limits  Outcome: Not Progressing  Flowsheets (Taken 6/14/2024 2000)  Electrolytes maintained within normal limits: Monitor labs and assess patient for signs and symptoms of electrolyte imbalances  Goal: Hemodynamic stability and optimal renal function maintained  Outcome: Not Progressing  Flowsheets (Taken 6/14/2024 2000)  Hemodynamic stability and optimal renal function maintained: Monitor labs and assess for signs and symptoms of volume excess or deficit  Goal: Glucose  to overcome blocks to healing by use of spiritual practices (prayer, meditation, guided imagery, reiki, breath work, etc)

## 2024-06-15 NOTE — PROGRESS NOTES
Vancomycin Dosing Consult  Agustín Willis is a 79 y.o. male with MRSA PNA. Pharmacy was consulted by Dr. Alonzo to dose and monitor vancomycin. Today is day 4.    Antibiotic regimen: Vancomycin monotherapy    Temp (24hrs), Av.7 °F (36.5 °C), Min:97.2 °F (36.2 °C), Max:98.1 °F (36.7 °C)    Recent Labs     24  0221 24  0100 06/15/24  0130   WBC 14.4* 16.8* 18.3*   CREATININE 2.25* 1.98* 2.36*   BUN 71* 51* 68*     Est CrCl: 28 mL/min, FIDEL being dialyzed prn (TTS schedule ordered)  Concomitant nephrotoxic drugs: None    Cultures:    blood x 2: negative, final   urine: negative, final   sputum: normal respiratory doug, final   sputum: MRSA, prelim   sputum: GPC in clusters, final    MRSA Swab: Detected     Target range:  Pre-HD level 15-20 mcg/ml    Recent level history:  Date/Time Dose & Interval Measured Level (mcg/mL) Associated AUC/ANGELINE    1655 1750mg x 1 dose () 18.7 (Post-HD)    6/15 0130 750 mg x 1 20.8         Assessment/Plan:   Continue to treat MRSA pneumonia with vancomycin.  Pre-HD level is ready for re-dosing. Vancomycin 500 mg x 1 ordered.  Repeat level is scheduled for  with pre-HD labs  Antimicrobial stop date  per consult     Bernie Alfaro, PharmD, BCPS, BCCCP  Clinical Pharmacist   [phone]

## 2024-06-15 NOTE — PROGRESS NOTES
Nephrology f/u          Patient: Agustín Willis MRN: [id number]  SSN: [id number]    YOB: 1945  Age: 79 y.o.  Sex: male      Subjective:   I have seen the patient in ICU   On vent  On single pressor  Will be dialyzed today    Past Medical History:   Diagnosis Date    Benign prostatic disease 8/3/2020    Hypertrophy with Outflow Obstruction    Benign prostatic hyperplasia 8/3/2020    Cervical radiculopathy 8/3/2020    Chronic obstructive lung disease (HCC) 8/3/2020    Cocaine abuse (HCC) 8/3/2020    Dizziness and giddiness 8/3/2020    Hypercholesterolemia 8/3/2020    Hypertensive disorder 8/3/2020    Insomnia 8/3/2020    Kidney disease 8/3/2020    Low back pain 8/3/2020    Osteoarthritis of knee 8/3/2020    Sleep apnea 8/3/2020    Vasovagal syncope 8/3/2020     Past Surgical History:   Procedure Laterality Date    IR NONTUNNELED VASCULAR CATHETER  5/30/2024    IR NONTUNNELED VASCULAR CATHETER 5/30/2024 Stephy Abreu APRN - NP SSR RAD ANGIO IR      Family History   Problem Relation Age of Onset    Hypertension Mother      Social History     Tobacco Use    Smoking status: Former    Smokeless tobacco: Never   Substance Use Topics    Alcohol use: Yes      Current Facility-Administered Medications   Medication Dose Route Frequency Provider Last Rate Last Admin    glycopyrrolate (ROBINUL) injection 0.4 mg  0.4 mg IntraVENous Q8H Jeremy Ramos MD        0.9 % sodium chloride infusion   IntraVENous PRN Jeremy Ramos MD        0.9 % sodium chloride infusion   IntraVENous PRN Mikaela Arredondo MD        fentaNYL (SUBLIMAZE) injection 50 mcg  50 mcg IntraVENous Q2H PRN Mikaela Arredondo MD   50 mcg at 06/14/24 2127    Vancomycin Level: Please draw level on 6/15 at 0600 (Pre-HD)  1 each Other Once Mikaela Arredondo MD        atorvastatin (LIPITOR) tablet 40 mg  40 mg Oral Nightly Mikaela Arrdeondo MD   40 mg at 06/14/24 2122    scopolamine (TRANSDERM-SCOP) transdermal patch 1 patch  1 patch  TransDERmal Q72H Darrell Calixto APRN - NP   1 patch at 06/12/24 1520    vancomycin (VANCOCIN) intermittent dosing (placeholder)  1 each Other RX Placeholder Riley Alonzo MD        methylPREDNISolone sodium succ (SOLU-MEDROL) injection 40 mg  40 mg IntraVENous Q8H Jeremy Ramos MD   40 mg at 06/15/24 0903    acetylcysteine (MUCOMYST) 20 % solution 600 mg  600 mg Inhalation BID RT Jeremy Ramos MD   600 mg at 06/15/24 0543    ipratropium 0.5 mg-albuterol 2.5 mg (DUONEB) nebulizer solution 1 Dose  1 Dose Inhalation Q6H RT Jeremy Ramos MD   1 Dose at 06/15/24 0543    heparin (porcine) injection 2,500 Units  2,500 Units IntraCATHeter PRN Sol Ferraro MD   2,500 Units at 06/13/24 1242    propofol infusion  5-50 mcg/kg/min IntraVENous Continuous Mikaela Arredondo MD 8.2 mL/hr at 06/15/24 0919 15 mcg/kg/min at 06/15/24 0919    norepinephrine (LEVOPHED) 16 mg in sodium chloride 0.9 % 250 mL infusion  1-100 mcg/min IntraVENous Continuous Mikaela Arredondo MD   Paused at 06/14/24 2130    [Held by provider] amiodarone (CORDARONE) tablet 400 mg  400 mg Oral BID Gerry Wilson MD   400 mg at 06/10/24 1016    aspirin chewable tablet 81 mg  81 mg Per NG tube Daily Jeremy Ramos MD   81 mg at 06/15/24 0903    epoetin radha-epbx (RETACRIT) injection 10,000 Units  10,000 Units SubCUTAneous Once per day on Mon Wed Fri Sol Ferraro MD   10,000 Units at 06/14/24 2124    heparin (porcine) injection 4,000 Units  4,000 Units IntraVENous PRN Mikaela Arredondo MD        heparin (porcine) injection 2,000 Units  2,000 Units IntraVENous PRN Mikaela Arredondo MD   2,000 Units at 06/09/24 0734    heparin 25,000 units in dextrose 5% 250 mL (premix) infusion  5-30 Units/kg/hr IntraVENous Continuous Mikaela Arredondo MD 11.4 mL/hr at 06/15/24 0303 13 Units/kg/hr at 06/15/24 0303    heparin (porcine) injection 1,100-1,900 Units  1,100-1,900 Units IntraCATHeter PRN Sol Ferraro MD   1,400 Units at 06/03/24 1231    And

## 2024-06-15 NOTE — CONSULTS
erythromycin >=8 ug/mL Resistant      gentamicin <=0.5 ug/mL Sensitive      Inducible Clindamycin Negative ug/mL  [1]        levofloxacin >=8 ug/mL Resistant      linezolid 2 ug/mL Sensitive      oxacillin >=4 ug/mL Resistant      rifampin <=0.5 ug/mL Sensitive  [2]       tetracycline 2 ug/mL Sensitive      trimethoprim-sulfamethoxazole <=10 ug/mL Sensitive      vancomycin 1 ug/mL Sensitive                   [1]  HIDE     [2]  Rifampin is not to be used for mono-therapy.                          Imaging:  XR CHEST 1 VIEW   Final Result   Asymmetric interstitial and airspace opacities in the right lung   which may represent an infectious process rather than pulmonary edema.      Electronically signed by Andrea Patel      XR CHEST PORTABLE   Final Result   1. The ET tube is in satisfactory position.      Electronically signed by BHUMI LOCKETT      XR CHEST 1 VIEW   Final Result      1. The ET tube tip is near the gina. It can be retracted 2 cm for improved   positioning.      2. No interval change in bilateral lung airspace disease.            Electronically signed by BREANA HAMILTON      XR CHEST 1 VIEW   Final Result   The endotracheal tube terminates above the gina. Stable bilateral   lung opacities.         Electronically signed by Richard Persaud      XR CHEST 1 VIEW   Final Result   1.  Persistent left basilar opacity and patchy densities in the right lung.   2.  Endotracheal tube tip is close to the gina. Consider retracting by 1 to 2   cm.         Electronically signed by SEMAJ Basilio      XR CHEST 1 VIEW   Final Result      Ongoing left lower lobe collapse/consolidation. Similar patchy opacities   throughout the right perihilar region. Worsening hazy left basilar   atelectasis/airspace disease. New small left pleural effusion.          Electronically signed by EMIGDIO BARNES      MRI BRAIN WO CONTRAST   Final Result   1. Numerous small foci of acute infarction in the supratentorial brain and   cerebellum  mild bilateral interstitial opacities unchanged.    XR CHEST PORTABLE    Result Date: 5/29/2024  INDICATION: Intubation Portable AP view of the chest. Direct comparison made to prior chest x-ray dated March 2024. Cardiomediastinal silhouette is stable. ETT tube tip is angled toward the right mainstem bronchus and is 1 cm superior to the level of the gina. NG tube extends the stomach. There is patchy airspace consolidation throughout the right lung. There is very mild left basilar atelectasis. No pleural fluid is visualized but there is no pneumothorax.     1. ET tube tip is angled towards the right mainstem bronchus and is 1 cm superior to the level of the gina. Recommend repositioning. 2. Right lung patchy airspace consolidation.     5/30 remain intubated on ventilator potassium elevated ABG acceptable still hemodynamically unstable on pressors chest x-ray shows extensive infiltrate in the right side mostly upper lobe left lung clear  5/31 hemodynamically unstable on Levophed and vasopressin, remain intubated on 40% FiO2 assist-control mode pCO2 37 pO2 71, started on CVVH creatinine improved to 2.15, chest x-ray shows ET tube 2 cm above the gina lung is fully inflated left-sided clear right-sided has patchy infiltrate lower lobe and some congestive changes extreme, nasal MRSA now on Bactroban continue with meropenem and vancomycin  6/1 remains on pressors on the ventilator sedated on propofol and Precedex.  Right lower lobe infiltrate unchanged.  WBC count remains elevated with procalcitonin improving remains on Merrem and vancomycin nasal MRSA smear was positive but sputum only shows normal doug so far  6/2 remains on norepinephrine for blood pressure support hypothermic yesterday now on warming blanket.  Continues with CRRT with fluid removal.  Potassium and phosphorus to be repleted  6/3 remain intubated on ventilator getting CRRT, ABG acceptable pCO2 33 pO2 75 on 35% FiO2 on assist-control mode on

## 2024-06-15 NOTE — DIALYSIS
Completed 3 hour dialysis and tolerated 2L fluid removal.    Patient still on vent, off sedation while on dialysis.    ROGELIO Rogers received dialysis report.

## 2024-06-15 NOTE — PROGRESS NOTES
General Daily Progress Note      Patient Name:   Agustín Willis       YOB: 1945       Age:  79 y.o.      Admit Date: 5/29/2024      Subjective:   Patient is a 78 y.o. year old male past medical history of COPD BPH cervical radiculopathy cocaine abuse hypertension hyperlipidemia seasonal allergies SVT came to emergency room with respiratory arrest, patient was at nursing home after eating breakfast he vomited everywhere likely aspirated oxygen saturation was 70% initially put on nonrebreather patient was intubated in the ER patient was hypotensive started Levophed 10 patient have a cardiac arrest went to V-fib received shock x 4 received epi and amiodarone  Was started on Levophed and vasopressin received 2 L of IV fluid, received IV Zosyn and vancomycin in the ER     Patient admitted to the ICU for further workup  Patient on ventilator/unresponsive       5/30    Patient on ventilator propofol and Precedex drip  Hypotension on Levophed and vasopressin  Patient has no urine output last night received 500 bolus    Nephrology decided for start CRRT IR team for dialysis cath  BUN 72 creatinine 3.09  CRP 10.5  MRSA nares    5/31    Patient on ventilator 40% O2 propofol Precedex levo and vasopressin drip  Patient on A-fib with rapid ventricular rate ordered amiodarone bolus and started on drip  Patient getting CRRT  No urine output  D-dimers greater than 20    6/3    Patient on ventilator 45% O2 receiving CRRT patient on propofol drip  Patient on amiodarone and Levophed drip  Patient heparin and Precedex drip  Patient is hypothermic    WBC count 22.5    6/4    Patient on ventilator 45% O2 on Levophed drip and amiodarone drip and heparin drip and Precedex drip getting NG tube feeding  BUN 37 creatinine 1.97 WBCs 15  Doppler studies positive for acute DVT of the left brachial vein left radial vein and left ulnar vein    Seen by infectious disease recommended continue IV meropenem    6/5    General ventilated  position      Plan:       Amiodarone 400 mg twice a day on hold  Aspirin 81 mg daily  Lipitor 40 daily  Ferrous sulfate 325 mg daily  Proscar 5 mg daily  Folic acid 1 mg daily  DuoNeb nebulizer every 6 hours  Solu-Medrol 40 mg every 8  Scopolamine patch every 72 hours  Thiamine 100 mg daily  Once my service pharmacy protocol      Discussed with the patient family  /they want full code    Repeat the labs in the morning    Follow-up with nephrology and the pulmonologist and cardiology    Will try to switch propofol to fentanyl due to bradycardia  If patient still bradycardic will discontinue Levophed and start on dopamine drip  Discussed with ICU nurse    Transfuse 1 unit packed RBC        Current Facility-Administered Medications:     glycopyrrolate (ROBINUL) injection 0.1 mg, 0.1 mg, IntraVENous, Q8H, Jeremy Ramos MD, 0.1 mg at 06/15/24 1219    0.9 % sodium chloride infusion, , IntraVENous, PRN, Jeremy Raoms MD    0.9 % sodium chloride infusion, , IntraVENous, PRN, Mikaela Arredondo MD    fentaNYL (SUBLIMAZE) injection 50 mcg, 50 mcg, IntraVENous, Q2H PRN, Mikaela Arredondo MD, 50 mcg at 06/14/24 2127    Vancomycin Level: Please draw level on 6/15 at 0600 (Pre-HD), 1 each, Other, Once, Mikaela Arredondo MD    atorvastatin (LIPITOR) tablet 40 mg, 40 mg, Oral, Nightly, Mikaela Arredondo MD, 40 mg at 06/14/24 2122    scopolamine (TRANSDERM-SCOP) transdermal patch 1 patch, 1 patch, TransDERmal, Q72H, Darrell Calixto APRN - NP, 1 patch at 06/12/24 1520    vancomycin (VANCOCIN) intermittent dosing (placeholder), 1 each, Other, RX Placeholder, Riley Alonzo MD    methylPREDNISolone sodium succ (SOLU-MEDROL) injection 40 mg, 40 mg, IntraVENous, Q8H, Jeremy Ramos MD, 40 mg at 06/15/24 0903    acetylcysteine (MUCOMYST) 20 % solution 600 mg, 600 mg, Inhalation, BID RT, Jeremy Ramos MD, 600 mg at 06/15/24 0543    ipratropium 0.5 mg-albuterol 2.5 mg (DUONEB) nebulizer solution 1 Dose, 1 Dose,  Mikaela Arredondo MD, 1 mg at 06/15/24 0904    thiamine mononitrate tablet 100 mg, 100 mg, Oral, Daily, Mikaela Arredondo MD, 100 mg at 06/15/24 0903    sodium chloride flush 0.9 % injection 5-40 mL, 5-40 mL, IntraVENous, 2 times per day, Mikaela Arredondo MD, 10 mL at 06/15/24 0904    sodium chloride flush 0.9 % injection 5-40 mL, 5-40 mL, IntraVENous, PRN, Mikaela Arredondo MD    0.9 % sodium chloride infusion, , IntraVENous, PRN, Mikaela Arredondo MD    ondansetron (ZOFRAN-ODT) disintegrating tablet 4 mg, 4 mg, Oral, Q8H PRN **OR** ondansetron (ZOFRAN) injection 4 mg, 4 mg, IntraVENous, Q6H PRN, Mikaela Arredondo MD    polyethylene glycol (GLYCOLAX) packet 17 g, 17 g, Oral, Daily PRN, Mikaela Arredondo MD, 17 g at 06/02/24 2100    acetaminophen (TYLENOL) tablet 650 mg, 650 mg, Oral, Q6H PRN, 650 mg at 06/09/24 2047 **OR** acetaminophen (TYLENOL) suppository 650 mg, 650 mg, Rectal, Q6H PRN, Mikaela Arredondo MD    glucose chewable tablet 16 g, 4 tablet, Oral, PRN, Mikaela Arredondo MD    dextrose bolus 10% 125 mL, 125 mL, IntraVENous, PRN **OR** dextrose bolus 10% 250 mL, 250 mL, IntraVENous, PRN, Mikaela Arredondo MD    glucagon injection 1 mg, 1 mg, SubCUTAneous, PRN, Mikaela Arredondo MD    dextrose 10 % infusion, , IntraVENous, Continuous PRN, Mikaela Arredondo MD

## 2024-06-15 NOTE — PROGRESS NOTES
CARDIOLOGY PROGRESS NOTE      Patient Name: Agustín Willis  Age: 79 y.o.  Gender:male  :1945  MRN: [id number]    Agustín Willis is a 78 y.o. year-old male with past medical history significant for hypertension, COPD, cocaine abuse, CKD who was evaluated today due to cardiac arrest. Patient initially presented to the ED via EMS from nursing home due to respiratory distress after vomiting breakfast. Required intubation in ED. Apparently with VF arrest in ED, shock x4.     Patient seen and examined in ICU. Remains intubated, weaning sedation for neuro eval. On heparin gtt. On single pressor support. Neurology following. MRI with numerous small foci of acute infarction.  Apparently with bradycardia overnight, sedation had been increased    Telemetry reviewed.    ROS as above. Current medications reviewed.    Physical Examination    Allergies   Allergen Reactions    Penicillin G Other (See Comments)     Pt states he passes out    Sulfamethoxazole-Trimethoprim      Pt states he passes out     Vitals:    06/15/24 1000   BP: 95/71   Pulse: 67   Resp: 13   Temp:    SpO2: 95%     Vital signs are stable   In no distress  Heart has a regular rate and rhythm  Lungs are coarse  Abdomen is soft  Extremities have mild edema  Skin is dry    Labs reviewed:  Recent Results (from the past 12 hour(s))   POCT Glucose    Collection Time: 24 11:17 PM   Result Value Ref Range    POC Glucose 149 (H) 65 - 100 mg/dL    Performed by: Regino Juarez    C-Reactive Protein    Collection Time: 06/15/24  1:30 AM   Result Value Ref Range    CRP 4.31 (H) 0.00 - 0.30 mg/dL   Procalcitonin    Collection Time: 06/15/24  1:30 AM   Result Value Ref Range    Procalcitonin 2.45 (H) 0 ng/mL   Vancomycin Level, Random    Collection Time: 06/15/24  1:30 AM   Result Value Ref Range    Vancomycin Rm 20.8 ug/mL    Dose Date/Time Not provided      DOSE AMOUNT Not provided Units   CBC with Auto Differential    Collection Time: 06/15/24  1:30 AM   Result    Result Value Ref Range    POC Glucose 109 (H) 65 - 100 mg/dL    Performed by: Juan A Arvizu    Blood Gas, Arterial    Collection Time: 06/15/24  3:06 AM   Result Value Ref Range    pH, Arterial 7.47 (H) 7.35 - 7.45      pCO2, Arterial 31 (L) 35 - 45 mmHg    pO2, Arterial 72 (L) 80 - 100 mmHg    O2 Sat, Arterial 94 (L) 95 - 99 %    HCO3, Arterial 22 22 - 26 mmol/L    Base deficit, arterial blood 1.1 mmol/L    O2 Method VENT      FIO2 Arterial 30 %    Mode ASSIST CONTROL      POC TIDAL VOLUME 450.0      Set Rate, POC 12      POC PEEP/CPA 5.0      Source Arterial      Site Right Radial      Efrain Test YES      Carboxyhgb, Arterial 0.2 (L) 1 - 2 %    Methemoglobin, Arterial 0.4 0 - 1.4 %    Oxyhemoglobin 93.4 (L) 95 - 99 %    Performed by: Jeferson Flores     Temperature 98.7     POCT Glucose    Collection Time: 06/15/24  7:32 AM   Result Value Ref Range    POC Glucose 132 (H) 65 - 100 mg/dL    Performed by: Juan A Arvizu         Case discussed with Dr. Stuart and our impression and recommendations are as follows:  Cardiac arrest, VF per report but no strips  Repeat echo this admission with EF 65-70%, moderate hypokinesis mid inferoseptal segment   Issues with hyperkalemia, now corrected  Troponin negative   New RBBB-VQ and cta chest not done  Unable to complete stress testing due to coarse, congested lungs   Repeat arrest, PEA per reports   Aortic valve regurgitation, previously mild to moderate in Feb. 2024. Noted as moderate to severe on repeat echo. Will continue to monitor with serial echos     Elevated Ddimer, >20. Venous duplex upper extremities positive for DVT, negative in the lower extremities. Recommended chest imaging but defer to primary/pulm, remains on heparin gtt  Atrial fibrillation with RVR, now sinus bradycardia. Continue heparin gtt. Ok to hold oral amiodarone. Continue telemetry. Keep electrolytes WNL. Continue scopolamine patch    Hypotension, BP stable, on Levo   Aspiration pneumonia, per  pulmonary   FIDEL on CKD, CRRT per nephrology  COPD, per pulmonary/primary   Hx polysubstance abuse, UDS negative this visit      Prognosis guarded.     Please do not hesitate to call if additional questions arise.    GINETTE Cunningham  6/15/2024

## 2024-06-15 NOTE — PROGRESS NOTES
Located patients dental bridge in mouth dislodged. Patient appears to be chewing on dental bridge. RN removed from mouth and placed in denture cup with patient label on the outside.

## 2024-06-15 NOTE — CARE COORDINATION
Pt daughter here to see Pt, she stated that Pt wife is not her mother and she does not know how to get a hold of her.    GARRETT called Obdulio again and asked her to please call GARRETT to let me know Pt wife number.

## 2024-06-16 ENCOUNTER — APPOINTMENT (OUTPATIENT)
Facility: HOSPITAL | Age: 79
DRG: 870 | End: 2024-06-16
Payer: MEDICARE

## 2024-06-16 LAB
ALBUMIN SERPL-MCNC: 2 G/DL (ref 3.5–5)
ALBUMIN/GLOB SERPL: 0.6 (ref 1.1–2.2)
ALP SERPL-CCNC: 83 U/L (ref 45–117)
ALT SERPL-CCNC: 18 U/L (ref 12–78)
ANION GAP SERPL CALC-SCNC: 5 MMOL/L (ref 5–15)
ARTERIAL PATENCY WRIST A: YES
AST SERPL W P-5'-P-CCNC: 17 U/L (ref 15–37)
BASE EXCESS BLDA CALC-SCNC: 4 MMOL/L (ref 0–3)
BASOPHILS # BLD: 0 K/UL (ref 0–0.1)
BASOPHILS NFR BLD: 0 % (ref 0–1)
BDY SITE: ABNORMAL
BILIRUB SERPL-MCNC: 0.4 MG/DL (ref 0.2–1)
BODY TEMPERATURE: 97.7
BUN SERPL-MCNC: 49 MG/DL (ref 6–20)
BUN/CREAT SERPL: 31 (ref 12–20)
CA-I BLD-MCNC: 7.7 MG/DL (ref 8.5–10.1)
CHLORIDE SERPL-SCNC: 97 MMOL/L (ref 97–108)
CO2 SERPL-SCNC: 31 MMOL/L (ref 21–32)
COHGB MFR BLD: 0.3 % (ref 1–2)
CREAT SERPL-MCNC: 1.58 MG/DL (ref 0.7–1.3)
DIFFERENTIAL METHOD BLD: ABNORMAL
EOSINOPHIL # BLD: 0 K/UL (ref 0–0.4)
EOSINOPHIL NFR BLD: 0 % (ref 0–7)
ERYTHROCYTE [DISTWIDTH] IN BLOOD BY AUTOMATED COUNT: 15.9 % (ref 11.5–14.5)
FIO2 ON VENT: 30 %
GAS FLOW.O2 SETTING OXYMISER: 12
GLOBULIN SER CALC-MCNC: 3.6 G/DL (ref 2–4)
GLUCOSE BLD STRIP.AUTO-MCNC: 110 MG/DL (ref 65–100)
GLUCOSE BLD STRIP.AUTO-MCNC: 114 MG/DL (ref 65–100)
GLUCOSE BLD STRIP.AUTO-MCNC: 123 MG/DL (ref 65–100)
GLUCOSE BLD STRIP.AUTO-MCNC: 134 MG/DL (ref 65–100)
GLUCOSE SERPL-MCNC: 108 MG/DL (ref 65–100)
HCO3 BLDA-SCNC: 28 MMOL/L (ref 22–26)
HCT VFR BLD AUTO: 25.2 % (ref 36.6–50.3)
HGB BLD-MCNC: 8.2 G/DL (ref 12.1–17)
IMM GRANULOCYTES # BLD AUTO: 0.2 K/UL (ref 0–0.04)
IMM GRANULOCYTES NFR BLD AUTO: 1 % (ref 0–0.5)
LYMPHOCYTES # BLD: 1 K/UL (ref 0.8–3.5)
LYMPHOCYTES NFR BLD: 6 % (ref 12–49)
MAGNESIUM SERPL-MCNC: 1.9 MG/DL (ref 1.6–2.4)
MCH RBC QN AUTO: 29 PG (ref 26–34)
MCHC RBC AUTO-ENTMCNC: 32.5 G/DL (ref 30–36.5)
MCV RBC AUTO: 89 FL (ref 80–99)
METHGB MFR BLD: 0.4 % (ref 0–1.4)
MONOCYTES # BLD: 0.5 K/UL (ref 0–1)
MONOCYTES NFR BLD: 3 % (ref 5–13)
NEUTS SEG # BLD: 14.1 K/UL (ref 1.8–8)
NEUTS SEG NFR BLD: 90 % (ref 32–75)
NRBC # BLD: 0.02 K/UL (ref 0–0.01)
NRBC BLD-RTO: 0.1 PER 100 WBC
OXYHGB MFR BLD: 93.5 % (ref 95–99)
PCO2 BLDA: 41 MMHG (ref 35–45)
PEEP RESPIRATORY: 5
PERFORMED BY:: ABNORMAL
PH BLDA: 7.46 (ref 7.35–7.45)
PHOSPHATE SERPL-MCNC: 3.5 MG/DL (ref 2.6–4.7)
PLATELET # BLD AUTO: 212 K/UL (ref 150–400)
PMV BLD AUTO: 10 FL (ref 8.9–12.9)
PO2 BLDA: 74 MMHG (ref 80–100)
POTASSIUM SERPL-SCNC: 4.2 MMOL/L (ref 3.5–5.1)
PROT SERPL-MCNC: 5.6 G/DL (ref 6.4–8.2)
RBC # BLD AUTO: 2.83 M/UL (ref 4.1–5.7)
SAO2 % BLD: 94 % (ref 95–99)
SAO2% DEVICE SAO2% SENSOR NAME: ABNORMAL
SODIUM SERPL-SCNC: 133 MMOL/L (ref 136–145)
SPECIMEN SITE: ABNORMAL
UFH PPP CHRO-ACNC: 0.62 IU/ML
VT SETTING VENT: 450
WBC # BLD AUTO: 15.8 K/UL (ref 4.1–11.1)

## 2024-06-16 PROCEDURE — 85025 COMPLETE CBC W/AUTO DIFF WBC: CPT

## 2024-06-16 PROCEDURE — 6360000002 HC RX W HCPCS: Performed by: FAMILY MEDICINE

## 2024-06-16 PROCEDURE — 6370000000 HC RX 637 (ALT 250 FOR IP): Performed by: FAMILY MEDICINE

## 2024-06-16 PROCEDURE — 94761 N-INVAS EAR/PLS OXIMETRY MLT: CPT

## 2024-06-16 PROCEDURE — 94003 VENT MGMT INPAT SUBQ DAY: CPT

## 2024-06-16 PROCEDURE — 94640 AIRWAY INHALATION TREATMENT: CPT

## 2024-06-16 PROCEDURE — 6360000002 HC RX W HCPCS: Performed by: INTERNAL MEDICINE

## 2024-06-16 PROCEDURE — 2700000000 HC OXYGEN THERAPY PER DAY

## 2024-06-16 PROCEDURE — G0407 INPT/TELE FOLLOW UP 25: HCPCS | Performed by: PSYCHIATRY & NEUROLOGY

## 2024-06-16 PROCEDURE — 6370000000 HC RX 637 (ALT 250 FOR IP): Performed by: INTERNAL MEDICINE

## 2024-06-16 PROCEDURE — 36415 COLL VENOUS BLD VENIPUNCTURE: CPT

## 2024-06-16 PROCEDURE — 83735 ASSAY OF MAGNESIUM: CPT

## 2024-06-16 PROCEDURE — 80053 COMPREHEN METABOLIC PANEL: CPT

## 2024-06-16 PROCEDURE — 82962 GLUCOSE BLOOD TEST: CPT

## 2024-06-16 PROCEDURE — 2580000003 HC RX 258: Performed by: FAMILY MEDICINE

## 2024-06-16 PROCEDURE — 71045 X-RAY EXAM CHEST 1 VIEW: CPT

## 2024-06-16 PROCEDURE — 84100 ASSAY OF PHOSPHORUS: CPT

## 2024-06-16 PROCEDURE — 82803 BLOOD GASES ANY COMBINATION: CPT

## 2024-06-16 PROCEDURE — 85520 HEPARIN ASSAY: CPT

## 2024-06-16 PROCEDURE — 36600 WITHDRAWAL OF ARTERIAL BLOOD: CPT

## 2024-06-16 PROCEDURE — 2000000000 HC ICU R&B

## 2024-06-16 RX ORDER — FENTANYL CITRATE-0.9 % NACL/PF 10 MCG/ML
25-200 PLASTIC BAG, INJECTION (ML) INTRAVENOUS CONTINUOUS
Status: DISCONTINUED | OUTPATIENT
Start: 2024-06-16 | End: 2024-06-17

## 2024-06-16 RX ADMIN — ACETYLCYSTEINE 600 MG: 200 SOLUTION ORAL; RESPIRATORY (INHALATION) at 09:11

## 2024-06-16 RX ADMIN — METHYLPREDNISOLONE SODIUM SUCCINATE 40 MG: 40 INJECTION INTRAMUSCULAR; INTRAVENOUS at 00:33

## 2024-06-16 RX ADMIN — FINASTERIDE 5 MG: 5 TABLET, FILM COATED ORAL at 09:17

## 2024-06-16 RX ADMIN — METHYLPREDNISOLONE SODIUM SUCCINATE 40 MG: 40 INJECTION INTRAMUSCULAR; INTRAVENOUS at 17:07

## 2024-06-16 RX ADMIN — GLYCOPYRROLATE 0.1 MG: 0.2 INJECTION INTRAMUSCULAR; INTRAVENOUS at 03:50

## 2024-06-16 RX ADMIN — SODIUM CHLORIDE, PRESERVATIVE FREE 10 ML: 5 INJECTION INTRAVENOUS at 09:17

## 2024-06-16 RX ADMIN — PROPOFOL 15 MCG/KG/MIN: 10 INJECTION, EMULSION INTRAVENOUS at 22:30

## 2024-06-16 RX ADMIN — FOLIC ACID 1 MG: 1 TABLET ORAL at 09:17

## 2024-06-16 RX ADMIN — FENTANYL CITRATE 25 MCG/HR: at 06:48

## 2024-06-16 RX ADMIN — IPRATROPIUM BROMIDE AND ALBUTEROL SULFATE 1 DOSE: 2.5; .5 SOLUTION RESPIRATORY (INHALATION) at 01:18

## 2024-06-16 RX ADMIN — FENTANYL CITRATE 50 MCG: 0.05 INJECTION, SOLUTION INTRAMUSCULAR; INTRAVENOUS at 00:33

## 2024-06-16 RX ADMIN — ATORVASTATIN CALCIUM 40 MG: 40 TABLET, FILM COATED ORAL at 20:29

## 2024-06-16 RX ADMIN — PROPOFOL 20 MCG/KG/MIN: 10 INJECTION, EMULSION INTRAVENOUS at 03:51

## 2024-06-16 RX ADMIN — IPRATROPIUM BROMIDE AND ALBUTEROL SULFATE 1 DOSE: 2.5; .5 SOLUTION RESPIRATORY (INHALATION) at 19:30

## 2024-06-16 RX ADMIN — IPRATROPIUM BROMIDE AND ALBUTEROL SULFATE 1 DOSE: 2.5; .5 SOLUTION RESPIRATORY (INHALATION) at 09:11

## 2024-06-16 RX ADMIN — SODIUM CHLORIDE, PRESERVATIVE FREE 10 ML: 5 INJECTION INTRAVENOUS at 20:29

## 2024-06-16 RX ADMIN — HEPARIN SODIUM 13 UNITS/KG/HR: 10000 INJECTION, SOLUTION INTRAVENOUS at 22:30

## 2024-06-16 RX ADMIN — METHYLPREDNISOLONE SODIUM SUCCINATE 40 MG: 40 INJECTION INTRAMUSCULAR; INTRAVENOUS at 09:17

## 2024-06-16 RX ADMIN — PROPOFOL 15 MCG/KG/MIN: 10 INJECTION, EMULSION INTRAVENOUS at 12:13

## 2024-06-16 RX ADMIN — FENTANYL CITRATE 50 MCG: 0.05 INJECTION, SOLUTION INTRAMUSCULAR; INTRAVENOUS at 02:30

## 2024-06-16 RX ADMIN — THIAMINE HCL TAB 100 MG 100 MG: 100 TAB at 09:17

## 2024-06-16 RX ADMIN — GLYCOPYRROLATE 0.1 MG: 0.2 INJECTION INTRAMUSCULAR; INTRAVENOUS at 12:13

## 2024-06-16 RX ADMIN — HEPARIN SODIUM 13 UNITS/KG/HR: 10000 INJECTION, SOLUTION INTRAVENOUS at 01:43

## 2024-06-16 RX ADMIN — ASPIRIN 81 MG 81 MG: 81 TABLET ORAL at 09:17

## 2024-06-16 RX ADMIN — GLYCOPYRROLATE 0.1 MG: 0.2 INJECTION INTRAMUSCULAR; INTRAVENOUS at 20:28

## 2024-06-16 RX ADMIN — IPRATROPIUM BROMIDE AND ALBUTEROL SULFATE 1 DOSE: 2.5; .5 SOLUTION RESPIRATORY (INHALATION) at 14:20

## 2024-06-16 ASSESSMENT — PULMONARY FUNCTION TESTS
PIF_VALUE: 20
PIF_VALUE: 21
PIF_VALUE: 20
PIF_VALUE: 20
PIF_VALUE: 24
PIF_VALUE: 20
PIF_VALUE: 23
PIF_VALUE: 19
PIF_VALUE: 23
PIF_VALUE: 20
PIF_VALUE: 21
PIF_VALUE: 18
PIF_VALUE: 21
PIF_VALUE: 22
PIF_VALUE: 20
PIF_VALUE: 22
PIF_VALUE: 21
PIF_VALUE: 20
PIF_VALUE: 19
PIF_VALUE: 20
PIF_VALUE: 21
PIF_VALUE: 21
PIF_VALUE: 22
PIF_VALUE: 20
PIF_VALUE: 21
PIF_VALUE: 23
PIF_VALUE: 19
PIF_VALUE: 22
PIF_VALUE: 20
PIF_VALUE: 22
PIF_VALUE: 19
PIF_VALUE: 19
PIF_VALUE: 20
PIF_VALUE: 21
PIF_VALUE: 22
PIF_VALUE: 22
PIF_VALUE: 24
PIF_VALUE: 19
PIF_VALUE: 20
PIF_VALUE: 24
PIF_VALUE: 20
PIF_VALUE: 20
PIF_VALUE: 22
PIF_VALUE: 21
PIF_VALUE: 21
PIF_VALUE: 19
PIF_VALUE: 21
PIF_VALUE: 20
PIF_VALUE: 21
PIF_VALUE: 20
PIF_VALUE: 50
PIF_VALUE: 22
PIF_VALUE: 19
PIF_VALUE: 23
PIF_VALUE: 19
PIF_VALUE: 21
PIF_VALUE: 20
PIF_VALUE: 23
PIF_VALUE: 21
PIF_VALUE: 19
PIF_VALUE: 20

## 2024-06-16 NOTE — PLAN OF CARE
Problem: Safety - Adult  Goal: Free from fall injury  Outcome: Not Progressing     Problem: Discharge Planning  Goal: Discharge to home or other facility with appropriate resources  Outcome: Not Progressing  Flowsheets (Taken 6/15/2024 2000)  Discharge to home or other facility with appropriate resources: Identify barriers to discharge with patient and caregiver     Problem: Chronic Conditions and Co-morbidities  Goal: Patient's chronic conditions and co-morbidity symptoms are monitored and maintained or improved  Outcome: Not Progressing  Flowsheets (Taken 6/15/2024 2000)  Care Plan - Patient's Chronic Conditions and Co-Morbidity Symptoms are Monitored and Maintained or Improved: Monitor and assess patient's chronic conditions and comorbid symptoms for stability, deterioration, or improvement     Problem: Skin/Tissue Integrity  Goal: Absence of new skin breakdown  Description: 1.  Monitor for areas of redness and/or skin breakdown  2.  Assess vascular access sites hourly  3.  Every 4-6 hours minimum:  Change oxygen saturation probe site  4.  Every 4-6 hours:  If on nasal continuous positive airway pressure, respiratory therapy assess nares and determine need for appliance change or resting period.  Outcome: Not Progressing     Problem: Neurosensory - Adult  Goal: Achieves stable or improved neurological status  Outcome: Not Progressing  Flowsheets (Taken 6/15/2024 2000)  Achieves stable or improved neurological status: Assess for and report changes in neurological status  Goal: Absence of seizures  Outcome: Not Progressing  Flowsheets (Taken 6/15/2024 2000)  Absence of seizures: Support airway/breathing, administer oxygen as needed  Goal: Remains free of injury related to seizures activity  Outcome: Not Progressing  Flowsheets (Taken 6/15/2024 2000)  Remains free of injury related to seizure activity: Maintain airway, patient safety  and administer oxygen as ordered  Goal: Achieves maximal functionality and self  - Adult  Goal: Maintains hematologic stability  Outcome: Not Progressing  Flowsheets (Taken 6/15/2024 2000)  Maintains hematologic stability: Assess for signs and symptoms of bleeding or hemorrhage     Problem: Pain  Goal: Verbalizes/displays adequate comfort level or baseline comfort level  Outcome: Not Progressing     Problem: Spiritual Care  Goal: Pt/Family able to move forward in process of forgiving self, others, and/or higher power  Description: INTERVENTIONS:  1. Assist patient/family to overcome blocks to healing by use of spiritual practices (prayer, meditation, guided imagery, reiki, breath work, etc).  2. De-myth guilt and help patient/family identify possible irrational spiritual/cultural beliefs and values.  3. Explore possibilities of making amends & reconciliation with self, others, and/or a greater power.  4. Guide patient/family in identifying painful feelings of guilt.  5. Help patient/famiy explore and identify spiritual beliefs, cultural understandings or values that may help or hinder letting go of issue.  6. Help patient/family explore feelings of anger, bitterness, resentment.  7. Help patient/family identify and examine the situation in which these feelings are experienced.  8. Help patient/family identify destructive displacement of feelings onto other individuals.  9. Invite use of sacraments/rituals/ceremonies as appropriate (e.g. - confession, anointing, smudging).  10. Refer patient/family to formal counseling and/or to yamini community for further support work.  Outcome: Not Progressing      Admission

## 2024-06-16 NOTE — PROGRESS NOTES
[facility] NEUROLOGY CONSULTATION          Chief Complaint/Admission Diagnosis: Cardiac arrest (HCC) [I46.9]  Acute respiratory failure (HCC) [J96.00]  Respiratory distress [R06.03]  Chest pain, unspecified type [R07.9]     I have been asked to see this 79 y.o. male in neurological consultation by Mikaela Murphy MD to render advice and opinion regarding coma        Impression:  79-year-old man with past medical history of polysubstance abuse, atrial fibrillation, suspected underlying hypercoagulable state presented for evaluation after a PEA arrest.  His course has been complicated by ventilation dependent acute hypoxic respiratory failure, left upper extremity DVT, septic shock.  MRI brain without contrast revealed numerous small foci of acute infarction in the supratentorial region and in the cerebellum covering multiple vascular territories suggestive of cardio-embolic origin. EEG revealed diffuse encephalopathy. He has minimal response on exam while off sedation for 15 minutes.  Acute encephalopathy is multifactorial in the setting of current infection, FIDEL, multiple strokes, and metabolic disturbances. Prognosis is guarded.     Recommendations:   -Continue sedation wean. Will need re-examination after completely being off sedation for at least 24 hours.  -Antibiotics per ID  - continue ASA, 81, Atorva 40 per tube, otherwise rectal aspirin  -Correct electrolytes as necessary.  -Palliative care consult recommended to discuss goals of care.      HPI:   History was obtained from a review of the electronic record and from the patient and family.??     Review of Symptoms:     A ten system review of constitutional, cardiovascular, respiratory, musculoskeletal, endocrine, skin, HEENT, genitourinary, neurological, and psychiatric systems was obtained and is negative except as noted above in HPI.       Exam:   BP 97/68   Pulse 55   Temp (!) 96.2 °F (35.7 °C) (Rectal)   Resp 11   Ht 1.73 m (5' 8.11\")    Wt 81.5 kg (179 lb 10.8 oz)   SpO2 95%   BMI 27.23 kg/m²    Assisted by Elayne MERCADO.   Patient is comatose. He does not respond to voice or sternal rub. PERRLA, positive corneal, and VBO reflexes. Gag reflex present. No spontaneous movement noted. No response to pain in the extremities     ?   PMH:   Past Medical History:   Diagnosis Date    Benign prostatic disease 8/3/2020    Hypertrophy with Outflow Obstruction    Benign prostatic hyperplasia 8/3/2020    Cervical radiculopathy 8/3/2020    Chronic obstructive lung disease (HCC) 8/3/2020    Cocaine abuse (HCC) 8/3/2020    Dizziness and giddiness 8/3/2020    Hypercholesterolemia 8/3/2020    Hypertensive disorder 8/3/2020    Insomnia 8/3/2020    Kidney disease 8/3/2020    Low back pain 8/3/2020    Osteoarthritis of knee 8/3/2020    Sleep apnea 8/3/2020    Vasovagal syncope 8/3/2020      PSH:   Past Surgical History:   Procedure Laterality Date    IR NONTUNNELED VASCULAR CATHETER  5/30/2024    IR NONTUNNELED VASCULAR CATHETER 5/30/2024 Stephy Abreu APRN - NP SSR RAD ANGIO IR      Allergies:   Allergies   Allergen Reactions    Penicillin G Other (See Comments)     Pt states he passes out    Sulfamethoxazole-Trimethoprim      Pt states he passes out      FH:   Family History   Problem Relation Age of Onset    Hypertension Mother       SH:   Social History     Tobacco Use    Smoking status: Former    Smokeless tobacco: Never   Substance Use Topics    Alcohol use: Yes    Drug use: Yes     Frequency: 3.0 times per week     Types: Cocaine      Meds:   Current Facility-Administered Medications   Medication Dose Route Frequency Provider Last Rate Last Admin    fentaNYL (SUBLIMAZE) infusion 1000 mcg/100mL   mcg/hr IntraVENous Continuous Jeremy Ramos MD 2.5 mL/hr at 06/16/24 0648 25 mcg/hr at 06/16/24 0648    glycopyrrolate (ROBINUL) injection 0.1 mg  0.1 mg IntraVENous Q8H Jeremy Ramos MD   0.1 mg at 06/16/24 0350    [START ON 6/18/2024] Vancomycin - please  VIEW    Result Date: 6/16/2024  EXAM:  XR CHEST 1 VIEW INDICATION:   chf COMPARISON: 6/15/2024 radiograph. TECHNIQUE: Portable frontal view radiograph of the chest was acquired. FINDINGS: Endotracheal tube projects 2.7 cm above the gina, similar to prior. Right jugular access central venous dual-lumen catheter projects over the lower cavoatrial junction and left jugular access central venous catheter projects over the SVC, similar to prior. Orogastric/nasogastric tube sideport and tip project over the left upper abdominal quadrant below the diaphragm with the tip directed towards the region of gastric cardia. Lungs: No evidence of focal consolidation. Pleura: No pleural effusion or pneumothorax. Mediastinum: Superior mediastinal widening with prominent aortic arch projecting over the medial left upper lung/hilum, unchanged since prior studies. No cardiomegaly. Upper abdomen/soft tissue: Unremarkable. MSK: No acute osseous abnormalities.     Central venous lines lines, and supporting tubes in place as detailed. No focal lung opacity. Superior mediastinal widening may suggest aortic arch pulm widening; thoracic aortic aneurysm/dissection versus mediastinal mass not completely excluded. Clinical correlation advised and correlation with prior chest CT/CTA if available recommended. Electronically signed by MILKA FALCON    XR CHEST 1 VIEW    Result Date: 6/15/2024  EXAM: XR CHEST 1 VIEW INDICATION: chf COMPARISON: 6/14/2024 FINDINGS: A portable AP radiograph of the chest was obtained at 213 hours. . Asymmetric interstitial and airspace opacities in the right lung. Tubes and lines are stable.. Cardiomegaly..  The bones and soft tissues are grossly within normal limits.     Asymmetric interstitial and airspace opacities in the right lung which may represent an infectious process rather than pulmonary edema. Electronically signed by Andrea Patel    XR CHEST PORTABLE    Result Date: 6/14/2024  EXAM:  XR CHEST PORTABLE

## 2024-06-16 NOTE — PROGRESS NOTES
at 06/16/24 0818 13 Units/kg/hr at 06/16/24 0818    heparin (porcine) injection 1,100-1,900 Units  1,100-1,900 Units IntraCATHeter PRN Sol Ferraro MD   1,400 Units at 06/03/24 1231    And    heparin (porcine) injection 1,100-1,900 Units  1,100-1,900 Units IntraCATHeter PRN Sol Ferraro MD   1,100 Units at 06/03/24 1226    sodium chloride 0.9 % bolus 2,448 mL  30 mL/kg IntraVENous Once Mikaela Arredondo MD   Held at 05/29/24 1405    ferrous sulfate (IRON 325) tablet 325 mg  325 mg Oral Daily with breakfast Mikaela Arredondo MD   325 mg at 06/15/24 0903    finasteride (PROSCAR) tablet 5 mg  5 mg Oral Daily Mikaela Arredondo MD   5 mg at 06/16/24 0917    folic acid (FOLVITE) tablet 1 mg  1 mg Oral Daily Mikaela Arredondo MD   1 mg at 06/16/24 0917    thiamine mononitrate tablet 100 mg  100 mg Oral Daily Mikaela Arredondo MD   100 mg at 06/16/24 0917    sodium chloride flush 0.9 % injection 5-40 mL  5-40 mL IntraVENous 2 times per day Mikaela Arredondo MD   10 mL at 06/16/24 0917    sodium chloride flush 0.9 % injection 5-40 mL  5-40 mL IntraVENous PRN Mikaela Arredondo MD        0.9 % sodium chloride infusion   IntraVENous PRN Mikaela Arredondo MD        ondansetron (ZOFRAN-ODT) disintegrating tablet 4 mg  4 mg Oral Q8H PRN Mikaela Arredondo MD        Or    ondansetron (ZOFRAN) injection 4 mg  4 mg IntraVENous Q6H PRN Mikaela Arredondo MD        polyethylene glycol (GLYCOLAX) packet 17 g  17 g Oral Daily PRN Mikaela Arredondo MD   17 g at 06/15/24 1800    acetaminophen (TYLENOL) tablet 650 mg  650 mg Oral Q6H PRN Mikaela Arredondo MD   650 mg at 06/09/24 2047    Or    acetaminophen (TYLENOL) suppository 650 mg  650 mg Rectal Q6H PRN Mikaela Arredondo MD        glucose chewable tablet 16 g  4 tablet Oral PRN Mikaela Arredondo MD        dextrose bolus 10% 125 mL  125 mL IntraVENous PRN Mikaela Arredondo MD        Or    dextrose bolus 10% 250 mL  250 mL IntraVENous PRN Mikaela Arredondo MD        glucagon injection 1  IV heparin drip  Goal K is 4.0    Plan:       Signed By: Sol Ferraro MD     June 16, 2024

## 2024-06-16 NOTE — PROGRESS NOTES
1559    propofol infusion, 5-50 mcg/kg/min, IntraVENous, Continuous, Mikaela Arredondo MD, Last Rate: 11 mL/hr at 06/16/24 0351, 20 mcg/kg/min at 06/16/24 0351    norepinephrine (LEVOPHED) 16 mg in sodium chloride 0.9 % 250 mL infusion, 1-100 mcg/min, IntraVENous, Continuous, Mikaela Arredondo MD, Paused at 06/14/24 2130    [Held by provider] amiodarone (CORDARONE) tablet 400 mg, 400 mg, Oral, BID, Gerry Wilson MD, 400 mg at 06/10/24 1016    aspirin chewable tablet 81 mg, 81 mg, Per NG tube, Daily, Jeremy Ramos MD, 81 mg at 06/15/24 0903    epoetin radha-epbx (RETACRIT) injection 10,000 Units, 10,000 Units, SubCUTAneous, Once per day on Mon Wed Fri, Sol Ferraro MD, 10,000 Units at 06/14/24 2124    heparin (porcine) injection 4,000 Units, 4,000 Units, IntraVENous, PRN, Mikaela Arredondo MD    heparin (porcine) injection 2,000 Units, 2,000 Units, IntraVENous, PRN, Mikaela Arredondo MD, 2,000 Units at 06/09/24 0734    heparin 25,000 units in dextrose 5% 250 mL (premix) infusion, 5-30 Units/kg/hr, IntraVENous, Continuous, Mikaela Arredondo MD, Last Rate: 11.4 mL/hr at 06/16/24 0818, 13 Units/kg/hr at 06/16/24 0818    heparin (porcine) injection 1,100-1,900 Units, 1,100-1,900 Units, IntraCATHeter, PRN, 1,400 Units at 06/03/24 1231 **AND** heparin (porcine) injection 1,100-1,900 Units, 1,100-1,900 Units, IntraCATHeter, PRN, Sol Ferraro MD, 1,100 Units at 06/03/24 1226    sodium chloride 0.9 % bolus 2,448 mL, 30 mL/kg, IntraVENous, Once, Mikaela Arredondo MD, Held at 05/29/24 1405    ferrous sulfate (IRON 325) tablet 325 mg, 325 mg, Oral, Daily with breakfast, Mikaela Arredondo MD, 325 mg at 06/15/24 0903    finasteride (PROSCAR) tablet 5 mg, 5 mg, Oral, Daily, Mikaela Arredondo MD, 5 mg at 06/15/24 0903    folic acid (FOLVITE) tablet 1 mg, 1 mg, Oral, Daily, Mikaela Arredondo MD, 1 mg at 06/15/24 0904    thiamine mononitrate tablet 100 mg, 100 mg, Oral, Daily, Mikaela Arredondo MD, 100 mg at 06/15/24  0903    sodium chloride flush 0.9 % injection 5-40 mL, 5-40 mL, IntraVENous, 2 times per day, Mikaela Arredondo MD, 10 mL at 06/15/24 2018    sodium chloride flush 0.9 % injection 5-40 mL, 5-40 mL, IntraVENous, PRN, Mikaela Arredondo MD    0.9 % sodium chloride infusion, , IntraVENous, PRN, Mikaela Arredondo MD    ondansetron (ZOFRAN-ODT) disintegrating tablet 4 mg, 4 mg, Oral, Q8H PRN **OR** ondansetron (ZOFRAN) injection 4 mg, 4 mg, IntraVENous, Q6H PRN, Mikaela Arredondo MD    polyethylene glycol (GLYCOLAX) packet 17 g, 17 g, Oral, Daily PRN, Mikaela Arredondo MD, 17 g at 06/15/24 1800    acetaminophen (TYLENOL) tablet 650 mg, 650 mg, Oral, Q6H PRN, 650 mg at 06/09/24 2047 **OR** acetaminophen (TYLENOL) suppository 650 mg, 650 mg, Rectal, Q6H PRN, Mikaela Arredondo MD    glucose chewable tablet 16 g, 4 tablet, Oral, PRN, Mikaela Arredondo MD    dextrose bolus 10% 125 mL, 125 mL, IntraVENous, PRN **OR** dextrose bolus 10% 250 mL, 250 mL, IntraVENous, PRN, Mikaela Arredondo MD    glucagon injection 1 mg, 1 mg, SubCUTAneous, PRN, Mikaela Arredondo MD    dextrose 10 % infusion, , IntraVENous, Continuous PRN, Mikaela Arredondo MD

## 2024-06-16 NOTE — PROGRESS NOTES
CARDIOLOGY PROGRESS NOTE      Patient Name: Agustín Willis  Age: 79 y.o.  Gender:male  :1945  MRN: [id number]    Agustín Willis is a 78 y.o. year-old male with past medical history significant for hypertension, COPD, cocaine abuse, CKD who was evaluated today due to cardiac arrest. Patient initially presented to the ED via EMS from nursing home due to respiratory distress after vomiting breakfast. Required intubation in ED. Apparently with VF arrest in ED, shock x4.     Patient seen and examined in ICU. Remains intubated, weaning sedation for neuro eval. On heparin gtt. On single pressor support. Neurology following. MRI with numerous small foci of acute infarction.  Mild bradycardia w/ sedation. On HD, T-T-S.    Telemetry reviewed.    ROS as above. Current medications reviewed.    Physical Examination    Allergies   Allergen Reactions    Penicillin G Other (See Comments)     Pt states he passes out    Sulfamethoxazole-Trimethoprim      Pt states he passes out     Vitals:    24 1000   BP: 97/68   Pulse: 55   Resp: 11   Temp:    SpO2: 95%     Vital signs are stable   In no distress  Heart has a regular rate and rhythm  Lungs are coarse  Abdomen is soft  Extremities have mild edema  Skin is dry    Labs reviewed:  Recent Results (from the past 12 hour(s))   CBC with Auto Differential    Collection Time: 24 12:43 AM   Result Value Ref Range    WBC 15.8 (H) 4.1 - 11.1 K/uL    RBC 2.83 (L) 4.10 - 5.70 M/uL    Hemoglobin 8.2 (L) 12.1 - 17.0 g/dL    Hematocrit 25.2 (L) 36.6 - 50.3 %    MCV 89.0 80.0 - 99.0 FL    MCH 29.0 26.0 - 34.0 PG    MCHC 32.5 30.0 - 36.5 g/dL    RDW 15.9 (H) 11.5 - 14.5 %    Platelets 212 150 - 400 K/uL    MPV 10.0 8.9 - 12.9 FL    Nucleated RBCs 0.1 (H) 0.0  WBC    nRBC 0.02 (H) 0.00 - 0.01 K/uL    Neutrophils % 90 (H) 32 - 75 %    Lymphocytes % 6 (L) 12 - 49 %    Monocytes % 3 (L) 5 - 13 %    Eosinophils % 0 0 - 7 %    Basophils % 0 0 - 1 %    Immature Granulocytes % 1 (H)

## 2024-06-17 ENCOUNTER — APPOINTMENT (OUTPATIENT)
Facility: HOSPITAL | Age: 79
DRG: 870 | End: 2024-06-17
Payer: MEDICARE

## 2024-06-17 LAB
ANION GAP SERPL CALC-SCNC: 8 MMOL/L (ref 5–15)
ARTERIAL PATENCY WRIST A: YES
BASE EXCESS BLDA CALC-SCNC: 1.3 MMOL/L (ref 0–3)
BASOPHILS # BLD: 0 K/UL (ref 0–0.1)
BASOPHILS NFR BLD: 0 % (ref 0–1)
BDY SITE: ABNORMAL
BODY TEMPERATURE: 96.8
BUN SERPL-MCNC: 64 MG/DL (ref 6–20)
BUN/CREAT SERPL: 31 (ref 12–20)
CA-I BLD-MCNC: 7.7 MG/DL (ref 8.5–10.1)
CHLORIDE SERPL-SCNC: 96 MMOL/L (ref 97–108)
CO2 SERPL-SCNC: 28 MMOL/L (ref 21–32)
COHGB MFR BLD: 0.3 % (ref 1–2)
CREAT SERPL-MCNC: 2.08 MG/DL (ref 0.7–1.3)
DIFFERENTIAL METHOD BLD: ABNORMAL
EOSINOPHIL # BLD: 0 K/UL (ref 0–0.4)
EOSINOPHIL NFR BLD: 0 % (ref 0–7)
ERYTHROCYTE [DISTWIDTH] IN BLOOD BY AUTOMATED COUNT: 16.2 % (ref 11.5–14.5)
FIO2 ON VENT: 30 %
GAS FLOW.O2 SETTING OXYMISER: 12
GLUCOSE BLD STRIP.AUTO-MCNC: 117 MG/DL (ref 65–100)
GLUCOSE BLD STRIP.AUTO-MCNC: 135 MG/DL (ref 65–100)
GLUCOSE BLD STRIP.AUTO-MCNC: 135 MG/DL (ref 65–100)
GLUCOSE SERPL-MCNC: 128 MG/DL (ref 65–100)
HCO3 BLDA-SCNC: 24 MMOL/L (ref 22–26)
HCT VFR BLD AUTO: 23.7 % (ref 36.6–50.3)
HGB BLD-MCNC: 7.6 G/DL (ref 12.1–17)
IMM GRANULOCYTES # BLD AUTO: 0.2 K/UL (ref 0–0.04)
IMM GRANULOCYTES NFR BLD AUTO: 1 % (ref 0–0.5)
LYMPHOCYTES # BLD: 1.3 K/UL (ref 0.8–3.5)
LYMPHOCYTES NFR BLD: 9 % (ref 12–49)
MAGNESIUM SERPL-MCNC: 1.9 MG/DL (ref 1.6–2.4)
MCH RBC QN AUTO: 29.1 PG (ref 26–34)
MCHC RBC AUTO-ENTMCNC: 32.1 G/DL (ref 30–36.5)
MCV RBC AUTO: 90.8 FL (ref 80–99)
METHGB MFR BLD: 0.6 % (ref 0–1.4)
MONOCYTES # BLD: 0.6 K/UL (ref 0–1)
MONOCYTES NFR BLD: 4 % (ref 5–13)
NEUTS SEG # BLD: 12.5 K/UL (ref 1.8–8)
NEUTS SEG NFR BLD: 86 % (ref 32–75)
NRBC # BLD: 0.03 K/UL (ref 0–0.01)
NRBC BLD-RTO: 0.2 PER 100 WBC
OXYHGB MFR BLD: 94.5 % (ref 95–99)
PCO2 BLDA: 30 MMHG (ref 35–45)
PERFORMED BY:: ABNORMAL
PH BLDA: 7.52 (ref 7.35–7.45)
PHOSPHATE SERPL-MCNC: 4.8 MG/DL (ref 2.6–4.7)
PLATELET # BLD AUTO: 206 K/UL (ref 150–400)
PMV BLD AUTO: 10.1 FL (ref 8.9–12.9)
PO2 BLDA: 77 MMHG (ref 80–100)
POTASSIUM SERPL-SCNC: 4.5 MMOL/L (ref 3.5–5.1)
RBC # BLD AUTO: 2.61 M/UL (ref 4.1–5.7)
SAO2 % BLD: 95 % (ref 95–99)
SAO2% DEVICE SAO2% SENSOR NAME: ABNORMAL
SODIUM SERPL-SCNC: 132 MMOL/L (ref 136–145)
SPECIMEN SITE: ABNORMAL
UFH PPP CHRO-ACNC: 0.5 IU/ML
VENTILATION MODE VENT: ABNORMAL
VT SETTING VENT: 450
WBC # BLD AUTO: 14.6 K/UL (ref 4.1–11.1)

## 2024-06-17 PROCEDURE — 6370000000 HC RX 637 (ALT 250 FOR IP): Performed by: INTERNAL MEDICINE

## 2024-06-17 PROCEDURE — 99233 SBSQ HOSP IP/OBS HIGH 50: CPT | Performed by: INTERNAL MEDICINE

## 2024-06-17 PROCEDURE — 85025 COMPLETE CBC W/AUTO DIFF WBC: CPT

## 2024-06-17 PROCEDURE — 83735 ASSAY OF MAGNESIUM: CPT

## 2024-06-17 PROCEDURE — 6360000002 HC RX W HCPCS: Performed by: INTERNAL MEDICINE

## 2024-06-17 PROCEDURE — 6370000000 HC RX 637 (ALT 250 FOR IP): Performed by: FAMILY MEDICINE

## 2024-06-17 PROCEDURE — 71045 X-RAY EXAM CHEST 1 VIEW: CPT

## 2024-06-17 PROCEDURE — 94003 VENT MGMT INPAT SUBQ DAY: CPT

## 2024-06-17 PROCEDURE — 94761 N-INVAS EAR/PLS OXIMETRY MLT: CPT

## 2024-06-17 PROCEDURE — 2580000003 HC RX 258: Performed by: FAMILY MEDICINE

## 2024-06-17 PROCEDURE — 84100 ASSAY OF PHOSPHORUS: CPT

## 2024-06-17 PROCEDURE — 82803 BLOOD GASES ANY COMBINATION: CPT

## 2024-06-17 PROCEDURE — 6360000002 HC RX W HCPCS: Performed by: FAMILY MEDICINE

## 2024-06-17 PROCEDURE — 85520 HEPARIN ASSAY: CPT

## 2024-06-17 PROCEDURE — 36600 WITHDRAWAL OF ARTERIAL BLOOD: CPT

## 2024-06-17 PROCEDURE — G0406 INPT/TELE FOLLOW UP 15: HCPCS | Performed by: PSYCHIATRY & NEUROLOGY

## 2024-06-17 PROCEDURE — 2700000000 HC OXYGEN THERAPY PER DAY

## 2024-06-17 PROCEDURE — 36415 COLL VENOUS BLD VENIPUNCTURE: CPT

## 2024-06-17 PROCEDURE — 80048 BASIC METABOLIC PNL TOTAL CA: CPT

## 2024-06-17 PROCEDURE — 82962 GLUCOSE BLOOD TEST: CPT

## 2024-06-17 PROCEDURE — 2000000000 HC ICU R&B

## 2024-06-17 PROCEDURE — 89220 SPUTUM SPECIMEN COLLECTION: CPT

## 2024-06-17 PROCEDURE — 94640 AIRWAY INHALATION TREATMENT: CPT

## 2024-06-17 RX ORDER — FENTANYL CITRATE 50 UG/ML
50 INJECTION, SOLUTION INTRAMUSCULAR; INTRAVENOUS
Status: DISCONTINUED | OUTPATIENT
Start: 2024-06-17 | End: 2024-06-18

## 2024-06-17 RX ADMIN — FERROUS SULFATE TAB 325 MG (65 MG ELEMENTAL FE) 325 MG: 325 (65 FE) TAB at 08:41

## 2024-06-17 RX ADMIN — IPRATROPIUM BROMIDE AND ALBUTEROL SULFATE 1 DOSE: 2.5; .5 SOLUTION RESPIRATORY (INHALATION) at 08:13

## 2024-06-17 RX ADMIN — FENTANYL CITRATE 50 MCG: 0.05 INJECTION, SOLUTION INTRAMUSCULAR; INTRAVENOUS at 22:23

## 2024-06-17 RX ADMIN — GLYCOPYRROLATE 0.1 MG: 0.2 INJECTION INTRAMUSCULAR; INTRAVENOUS at 04:58

## 2024-06-17 RX ADMIN — SODIUM CHLORIDE, PRESERVATIVE FREE 10 ML: 5 INJECTION INTRAVENOUS at 20:18

## 2024-06-17 RX ADMIN — SODIUM CHLORIDE, PRESERVATIVE FREE 10 ML: 5 INJECTION INTRAVENOUS at 08:42

## 2024-06-17 RX ADMIN — METHYLPREDNISOLONE SODIUM SUCCINATE 40 MG: 40 INJECTION INTRAMUSCULAR; INTRAVENOUS at 01:12

## 2024-06-17 RX ADMIN — GLYCOPYRROLATE 0.1 MG: 0.2 INJECTION INTRAMUSCULAR; INTRAVENOUS at 11:08

## 2024-06-17 RX ADMIN — ATORVASTATIN CALCIUM 40 MG: 40 TABLET, FILM COATED ORAL at 20:18

## 2024-06-17 RX ADMIN — FOLIC ACID 1 MG: 1 TABLET ORAL at 08:41

## 2024-06-17 RX ADMIN — METHYLPREDNISOLONE SODIUM SUCCINATE 40 MG: 40 INJECTION INTRAMUSCULAR; INTRAVENOUS at 08:41

## 2024-06-17 RX ADMIN — FINASTERIDE 5 MG: 5 TABLET, FILM COATED ORAL at 08:41

## 2024-06-17 RX ADMIN — GLYCOPYRROLATE 0.1 MG: 0.2 INJECTION INTRAMUSCULAR; INTRAVENOUS at 20:18

## 2024-06-17 RX ADMIN — IPRATROPIUM BROMIDE AND ALBUTEROL SULFATE 1 DOSE: 2.5; .5 SOLUTION RESPIRATORY (INHALATION) at 13:49

## 2024-06-17 RX ADMIN — IPRATROPIUM BROMIDE AND ALBUTEROL SULFATE 1 DOSE: 2.5; .5 SOLUTION RESPIRATORY (INHALATION) at 19:13

## 2024-06-17 RX ADMIN — THIAMINE HCL TAB 100 MG 100 MG: 100 TAB at 08:41

## 2024-06-17 RX ADMIN — IPRATROPIUM BROMIDE AND ALBUTEROL SULFATE 1 DOSE: 2.5; .5 SOLUTION RESPIRATORY (INHALATION) at 01:27

## 2024-06-17 RX ADMIN — METHYLPREDNISOLONE SODIUM SUCCINATE 40 MG: 40 INJECTION INTRAMUSCULAR; INTRAVENOUS at 17:33

## 2024-06-17 RX ADMIN — EPOETIN ALFA-EPBX 10000 UNITS: 10000 INJECTION, SOLUTION INTRAVENOUS; SUBCUTANEOUS at 21:18

## 2024-06-17 RX ADMIN — ASPIRIN 81 MG 81 MG: 81 TABLET ORAL at 08:41

## 2024-06-17 ASSESSMENT — PULMONARY FUNCTION TESTS
PIF_VALUE: 21
PIF_VALUE: 23
PIF_VALUE: 20
PIF_VALUE: 22
PIF_VALUE: 19
PIF_VALUE: 21
PIF_VALUE: 22
PIF_VALUE: 22
PIF_VALUE: 20
PIF_VALUE: 20
PIF_VALUE: 26
PIF_VALUE: 23
PIF_VALUE: 21
PIF_VALUE: 21
PIF_VALUE: 19
PIF_VALUE: 19
PIF_VALUE: 22
PIF_VALUE: 17
PIF_VALUE: 20
PIF_VALUE: 21
PIF_VALUE: 18
PIF_VALUE: 21
PIF_VALUE: 22
PIF_VALUE: 20
PIF_VALUE: 21
PIF_VALUE: 22
PIF_VALUE: 22
PIF_VALUE: 21
PIF_VALUE: 20
PIF_VALUE: 20
PIF_VALUE: 19
PIF_VALUE: 20
PIF_VALUE: 21
PIF_VALUE: 23
PIF_VALUE: 22
PIF_VALUE: 21
PIF_VALUE: 21
PIF_VALUE: 22
PIF_VALUE: 21
PIF_VALUE: 22
PIF_VALUE: 19
PIF_VALUE: 21
PIF_VALUE: 20
PIF_VALUE: 22
PIF_VALUE: 21
PIF_VALUE: 21
PIF_VALUE: 22
PIF_VALUE: 21
PIF_VALUE: 19
PIF_VALUE: 21
PIF_VALUE: 21
PIF_VALUE: 22

## 2024-06-17 NOTE — PROGRESS NOTES
IMPRESSION:   Acute hypoxic respiratory failure extubated on 6/5 and reintubated on 6/10  Status post cardiac arrest  Chronic atrial fibrillation  Septic shock  Left upper extremity DVT  Stroke embolic  Sepsis treated with antibiotics  Aspiration pneumonia right midlung  COPD   MRSA nasal colonization  History of cocaine abuse polysubstance abuse in the past  Pt is critically ill. Time spent with pt and staff actively rendering care, managing pt and coordinating care as stated below; 30 minutes, exclusive of any procedures      RECOMMENDATIONS/PLAN:   Extubated  on 6/5 reintubated on 6/10 on ventilator sedated with propofol patient is becoming bradycardic will change to either Versed or fentanyl he is getting fentanyl pushes on propofol 10 mics  Mental status has not changed patient had a stroke eyes were rolled  Chest x-ray with continued right lower infiltrate CTA chest showed bilateral infiltrates consistent with aspiration  MRI of the brain was done which shows small foci of acute infarction in the supratentorial region and cerebellum EEG shows diffuse encephalopathy  Patient has a lot of secretion patient on Robinul  Chest x-ray shows right midlung infiltrate fluid overload congestive change left lung clear  Remains on Merrem and vancomycin sputum with normal doug  Heart rate controlled on amiodarone   Renal function improving off CVVH as needed dialysis  Status post cardiac arrest patient coded   Patient is off Levophed     [x] High complexity decision making was performed  [x] See my orders for details  HPI  78-year-old male nursing home resident came in because of shortness of breath respiratory distress patient has episodes of vomiting after eating breakfast this morning subsequently came to the hospital saturation was in 70s he was back to the emergency room subsequently intubated and then he coded hemodynamically unstable now he is on 2 pressors vasopressin and Levophed denies intubated on ventilator  interstitial and airspace opacities.. Left IJ central venous catheter placement with the tip in the region of the mid SVC. No pneumothorax. Other tubes and lines are stable. The bones and soft tissues are grossly within normal limits.     Left IJ central venous catheter placement. No pneumothorax. Right upper lobe airspace disease and mild bilateral interstitial opacities unchanged.    XR CHEST PORTABLE    Result Date: 5/29/2024  INDICATION: Intubation Portable AP view of the chest. Direct comparison made to prior chest x-ray dated March 2024. Cardiomediastinal silhouette is stable. ETT tube tip is angled toward the right mainstem bronchus and is 1 cm superior to the level of the gina. NG tube extends the stomach. There is patchy airspace consolidation throughout the right lung. There is very mild left basilar atelectasis. No pleural fluid is visualized but there is no pneumothorax.     1. ET tube tip is angled towards the right mainstem bronchus and is 1 cm superior to the level of the gina. Recommend repositioning. 2. Right lung patchy airspace consolidation.     5/30 remain intubated on ventilator potassium elevated ABG acceptable still hemodynamically unstable on pressors chest x-ray shows extensive infiltrate in the right side mostly upper lobe left lung clear  5/31 hemodynamically unstable on Levophed and vasopressin, remain intubated on 40% FiO2 assist-control mode pCO2 37 pO2 71, started on CVVH creatinine improved to 2.15, chest x-ray shows ET tube 2 cm above the gina lung is fully inflated left-sided clear right-sided has patchy infiltrate lower lobe and some congestive changes extreme, nasal MRSA now on Bactroban continue with meropenem and vancomycin  6/1 remains on pressors on the ventilator sedated on propofol and Precedex.  Right lower lobe infiltrate unchanged.  WBC count remains elevated with procalcitonin improving remains on Merrem and vancomycin nasal MRSA smear was positive but sputum only

## 2024-06-17 NOTE — PROGRESS NOTES
Progress Note  Date:2024       Room:[facility]  Patient Name:Agustín Willis     YOB: 1945     Age:79 y.o.        Subjective    Subjective  Patient followed septic shock with presumptive aspiration pneumonia secondary now to MRSA on IV Vancomycin. Procal and CRP decreasing. CXR unchanged..         Objective         Vitals Last 24 Hours:  TEMPERATURE:  Temp  Av.6 °F (36.4 °C)  Min: 96.9 °F (36.1 °C)  Max: 98.3 °F (36.8 °C)  RESPIRATIONS RANGE: Resp  Av.5  Min: 12  Max: 31  PULSE OXIMETRY RANGE: SpO2  Av.8 %  Min: 96 %  Max: 100 %  PULSE RANGE: Pulse  Av.9  Min: 44  Max: 75  BLOOD PRESSURE RANGE: Systolic (24hrs), Av , Min:91 , Max:137   ; Diastolic (24hrs), Av, Min:63, Max:98         Objective:  Vital signs: (most recent): Blood pressure 131/82, pulse (!) 48, temperature 96.9 °F (36.1 °C), temperature source Bladder, resp. rate 13, height 1.73 m (5' 8.11\"), weight 81.5 kg (179 lb 10.8 oz), SpO2 98 %.      Vitals and nursing note reviewed.   Constitutional:       General: He is in acute distress.      Appearance: He is ill-appearing and toxic-appearing.   HENT:      Head: Normocephalic and atraumatic.      Right Ear: External ear normal.      Left Ear: External ear normal.      Nose: Nose normal.      Mouth/Throat: drooling     Comments ET tube/orogastric tube  Cardiovascular:      Rate and Rhythm: Normal rate and regular rhythm.      Heart sounds: No murmur heard.  Pulmonary:      Breath sounds: Rhonchi present. No wheezing or rales.   Abdominal:      General: Bowel sounds are normal. There is no distension.      Palpations: Abdomen is soft.      Tenderness: There is no abdominal tenderness.   Genitourinary:  external urinary device    Musculoskeletal:      Cervical back: Neck supple.      Right lower leg: No edema.      Left lower leg: Edema present.   Skin:     Findings: No rash.   Neurological: intubated  Psychiatric: intubated      Labs/Imaging/Diagnostics

## 2024-06-17 NOTE — PLAN OF CARE
Problem: Nutrition Deficit:  Goal: Optimize nutritional status  Outcome: Progressing  Flowsheets (Taken 6/17/2024 1146)  Nutrient intake appropriate for improving, restoring, or maintaining nutritional needs:   Assess nutritional status and recommend course of action   Recommend, monitor, and adjust tube feedings and TPN/PPN based on assessed needs

## 2024-06-17 NOTE — PROGRESS NOTES
failure extubated  Aspiration pneumonia  COPD  History of cocaine abuse in the past  History of hypertension hyperlipidemia  Acute on chronic kidney disease  Lactic acidosis  Hypokalemia  MRSA nares  A-fib with rapid ventricular rate  Left Ventricle: Normal left ventricular systolic function with a visually estimated EF of 65 - 70%. Left ventricle size is normal. Increased wall thickness. Mild septal thickening. Moderate hypokinesis of the following segments: mid inferoseptal. Normal diastolic function.    Right Ventricle: Right ventricle is moderately dilated.    Aortic Valve: Moderate to severe regurgitation.    Left Atrium: Left atrium is mildly dilated. Left atrial volume index is normal (16-34 mL/m2).    Right Atrium: Right atrium is mildly dilated.    Image quality is good. Procedure performed with the patient in a supine position      Plan:       Amiodarone 400 mg twice a day on hold  Aspirin 81 mg daily  Lipitor 40 daily  Ferrous sulfate 325 mg daily  Proscar 5 mg daily  Folic acid 1 mg daily  Robinul 0.1 mg IV every 8 hours  DuoNeb nebulizer every 6 hours  Solu-Medrol 40 mg every 8  Scopolamine patch every 72 hours  Vancomycin with pharmacy protocol  Thiamine 100 mg daily  Vancomycin with pharmacy protocol        Patient on propofol and fentanyl drip  On heparin drip      Overall prognosis is poor    Patient is full code        Current Facility-Administered Medications:     fentaNYL (SUBLIMAZE) infusion 1000 mcg/100mL,  mcg/hr, IntraVENous, Continuous, Jeremy Ramos MD, Last Rate: 5 mL/hr at 06/17/24 0719, 50 mcg/hr at 06/17/24 0719    glycopyrrolate (ROBINUL) injection 0.1 mg, 0.1 mg, IntraVENous, Q8H, Jermey Ramos MD, 0.1 mg at 06/17/24 0458    [START ON 6/18/2024] Vancomycin - please draw level 6/18 with AM labs.  Thanks!, , Other, Once, Mikaela Arredondo MD    0.9 % sodium chloride infusion, , IntraVENous, PRN, Jeremy Ramos MD    0.9 % sodium chloride infusion, , IntraVENous, PRN,  Continuous, Mikaela Arredondo MD, Last Rate: 11.4 mL/hr at 06/17/24 0211, 13 Units/kg/hr at 06/17/24 0211    heparin (porcine) injection 1,100-1,900 Units, 1,100-1,900 Units, IntraCATHeter, PRN, 1,400 Units at 06/03/24 1231 **AND** heparin (porcine) injection 1,100-1,900 Units, 1,100-1,900 Units, IntraCATHeter, PRN, Sol Ferraro MD, 1,100 Units at 06/03/24 1226    sodium chloride 0.9 % bolus 2,448 mL, 30 mL/kg, IntraVENous, Once, Mikaela Arredondo MD, Held at 05/29/24 1405    ferrous sulfate (IRON 325) tablet 325 mg, 325 mg, Oral, Daily with breakfast, Mikaela Arredondo MD, 325 mg at 06/17/24 0841    finasteride (PROSCAR) tablet 5 mg, 5 mg, Oral, Daily, Mikaela Arredondo MD, 5 mg at 06/17/24 0841    folic acid (FOLVITE) tablet 1 mg, 1 mg, Oral, Daily, Mikaela Arredondo MD, 1 mg at 06/17/24 0841    thiamine mononitrate tablet 100 mg, 100 mg, Oral, Daily, Mikaela Arredondo MD, 100 mg at 06/17/24 0841    sodium chloride flush 0.9 % injection 5-40 mL, 5-40 mL, IntraVENous, 2 times per day, Mikaela Arredondo MD, 10 mL at 06/17/24 0842    sodium chloride flush 0.9 % injection 5-40 mL, 5-40 mL, IntraVENous, PRN, Mikaela Arredondo MD    0.9 % sodium chloride infusion, , IntraVENous, PRN, Mikaela Arredondo MD    ondansetron (ZOFRAN-ODT) disintegrating tablet 4 mg, 4 mg, Oral, Q8H PRN **OR** ondansetron (ZOFRAN) injection 4 mg, 4 mg, IntraVENous, Q6H PRN, Mikaela Arredondo MD    polyethylene glycol (GLYCOLAX) packet 17 g, 17 g, Oral, Daily PRN, Mikaela Arredondo MD, 17 g at 06/15/24 1800    acetaminophen (TYLENOL) tablet 650 mg, 650 mg, Oral, Q6H PRN, 650 mg at 06/09/24 2047 **OR** acetaminophen (TYLENOL) suppository 650 mg, 650 mg, Rectal, Q6H PRN, Mikaela Arredondo MD    glucose chewable tablet 16 g, 4 tablet, Oral, PRN, Mikaela Arredondo MD    dextrose bolus 10% 125 mL, 125 mL, IntraVENous, PRN **OR** dextrose bolus 10% 250 mL, 250 mL, IntraVENous, PRN, Mikaela Arredondo MD    glucagon injection 1 mg, 1 mg, SubCUTAneous,  MARCELLUSN, Mikaela Arredondo MD    dextrose 10 % infusion, , IntraVENous, Continuous PRBOBBY, Mikaela Arredondo MD

## 2024-06-17 NOTE — CONSULTS
Clinical Ethics Consultation Note    Other    Received ethics consult for Mr. Agustín Willis due to being unable to locate the patient's wife. For medical decision making it is ethically appropriate to move on to the next surrogate in the hierarchy until the wife is located and able to participate in decision making. It may be beneficial to contact other relatives to help connect with the patient's wife, a wellness check at her address if known may also be beneficial.     Thank you for involving Ethics in the care of this patient. Please contact ethics on-call via [employer] with any immediate questions or concerns.     Mariana Sutton   Ethics Consultant

## 2024-06-17 NOTE — CARE COORDINATION
CM reviewed Pt medicals, Pt remains on the vent.    [employer] search revealed phone numbers for Pt wife.     CM called all the phone numbers and left a VM requesting a return call.    # [phone] did not accept calls at this time.    GARRETT called Pt sister, Obdulio, asked her if she would please give CM the number to Pt wife. Obdulio stated that she does not give out people's home numbers.    Obdulio stated that she will call Pt wife and asked her to call CM.     10:00  Obdulio called CM back, she stated that she has tried to get a hold of Pt wife, she stated that she did not answer the phone. Obdulio stated that Pt wife has Dialysis today so it may be this afternoon before she can reach her.

## 2024-06-17 NOTE — PROGRESS NOTES
Comprehensive Nutrition Assessment    Type and Reason for Visit:  Reassess    Nutrition Recommendations/Plan:   NPO, advance as medically appropriate.  Modify TF to Vital AF 1.2 Rickie (Peptide Based) via OGT at 40 mL/hr, advance by 10 mL/hr every 4 hrs as tolerated to new goal rate of 50 mL/hr.  Flush with 75 mL H2O q4hrs.  Provides: 1440 kcal(93%), 90 gm protein(92%), 1422 mL H2O(92%).                     +290 kcal(112%) from Propofol at 11 mL/hr.    Consider phos binder as appropriate.    If additional or increased rate of pressor support required, please hold TF.    Continue to monitor and record TF rate, flushes, tolerance, BM in I/Os.      Malnutrition Assessment:  Malnutrition Status:  No malnutrition (06/12/24 1151)    Context:  Acute Illness     Findings of the 6 clinical characteristics of malnutrition:  Energy Intake:  No significant decrease in energy intake  Weight Loss:  No significant weight loss     Body Fat Loss:  No significant body fat loss     Muscle Mass Loss:  No significant muscle mass loss    Fluid Accumulation:  No significant fluid accumulation     Strength:  Not Performed    Nutrition Assessment:    Admitted for cardiac arrest, on vent, receiving levo at 2mics being weaned off propofol. Plan to d/c CRRT today. OGT in place, per MD randle to start TF. (6/6) Pt recently extubated, SLP cleared for puree + mild thick liqs. RD to initiate ONS to help meet EENs. (6/10) Code BLUE in afternoon(1400), Pt intubated and returned to CCU.     (6/12) Pt remains intubated, on very low dose levo, sedated on propofol. RD consulted for TF rec's. Pt hemodynamically stable for EN initiation, will provide regimen above. Wt loss not clinically significant, but is concerning- will trend given possibly impacted by fluid shifts.     (6/14) Pt remains intubated, on stable, low dose pressor support, sedated on stable propofol. TF infusing and tolerated at goal rate. RD to modify regimen to meet new EENs. Labs: Na

## 2024-06-17 NOTE — PROGRESS NOTES
Patient with sung blood in inline suction, and increasing through shift. MD notified. Hold heparin and reassess tomorrow.

## 2024-06-17 NOTE — PROGRESS NOTES
Nephrology f/u          Patient: Agustín Willis MRN: [id number]  SSN: [id number]    YOB: 1945  Age: 79 y.o.  Sex: male      Subjective:   I have seen the patient in ICU   On vent  On single pressor  was dialyzed 15    Past Medical History:   Diagnosis Date    Benign prostatic disease 8/3/2020    Hypertrophy with Outflow Obstruction    Benign prostatic hyperplasia 8/3/2020    Cervical radiculopathy 8/3/2020    Chronic obstructive lung disease (HCC) 8/3/2020    Cocaine abuse (HCC) 8/3/2020    Dizziness and giddiness 8/3/2020    Hypercholesterolemia 8/3/2020    Hypertensive disorder 8/3/2020    Insomnia 8/3/2020    Kidney disease 8/3/2020    Low back pain 8/3/2020    Osteoarthritis of knee 8/3/2020    Sleep apnea 8/3/2020    Vasovagal syncope 8/3/2020     Past Surgical History:   Procedure Laterality Date    IR NONTUNNELED VASCULAR CATHETER  5/30/2024    IR NONTUNNELED VASCULAR CATHETER 5/30/2024 Stephy Abreu APRN - NP SSR RAD ANGIO IR      Family History   Problem Relation Age of Onset    Hypertension Mother      Social History     Tobacco Use    Smoking status: Former    Smokeless tobacco: Never   Substance Use Topics    Alcohol use: Yes      Current Facility-Administered Medications   Medication Dose Route Frequency Provider Last Rate Last Admin    fentaNYL (SUBLIMAZE) injection 50 mcg  50 mcg IntraVENous Q2H PRN Jeremy Ramos MD        glycopyrrolate (ROBINUL) injection 0.1 mg  0.1 mg IntraVENous Q8H Jeremy Ramos MD   0.1 mg at 06/17/24 1108    [START ON 6/18/2024] Vancomycin - please draw level 6/18 with AM labs.  Thanks!   Other Once Mikaela Arredondo MD        0.9 % sodium chloride infusion   IntraVENous PRN Jeremy Ramos MD        0.9 % sodium chloride infusion   IntraVENous PRN Mikaela Arredondo MD        fentaNYL (SUBLIMAZE) injection 50 mcg  50 mcg IntraVENous Q2H PRN Mikaela Arredondo MD   50 mcg at 06/16/24 0230    atorvastatin (LIPITOR) tablet 40 mg  40 mg Oral Nightly     Sulfamethoxazole-Trimethoprim      Pt states he passes out       Review of Systems:  Unable to obtain    Objective:     Vitals:    06/17/24 1200 06/17/24 1300 06/17/24 1400 06/17/24 1500   BP: 106/76 109/76 105/72 111/79   Pulse: (!) 47 50 57 64   Resp: 12 12 12 12   Temp:       TempSrc:       SpO2: 99% 99% 98% 97%   Weight:       Height:            Physical Exam:  General: On sedation  Eyes: sclera anicteric  Oral Cavity: ET tube in place  Neck: no JVD  Respiratory.  On ventilatory support  Heart: normal sounds  Abdomen: soft and non tender   : no damico  Lower Extremities: no edema  Neuro: On sedation        Assessment/Plan:     Acute kidney injury on chronic kidney disease stage IIIa  2/2 anuric ischemic ATN from septic shock  On admission BUN/creatinine 58/2.06 and peaked at 72/3.09  Baseline creatinine 1.36 on 3/12/2024  Renal ultrasound no hydronephrosis  Off CRRT since 6/03  Was volume up/worsening BUN/hyperkalemia  reinitiated on hemodialysis  Status post dialyzed on 6/08 and removed 2 L  Was dialyzed on 6/11 and removed 2.0 L.  Was dialyzed on 6/15 and removed 2.0 L.  Plan to continue hemodialysis Tuesday Thursday Saturday.    Hyperkalemia  Resolved  Hemodialysis with 2K bath    PEA cardiac arrest  Rapid response was called on 6/09 due to unresponsiveness and no pulse  Required CPR  Reintubated  On no pressor support  Chest x-ray no pulmonary edema  Tele neuro consultation done    Status post cardiac arrest  V-fib  Status post DC shock cardioversion    Acute hypoxic respiratory failure  Aspiration pneumonia  Underlying COPD  On IV antibiotics  Chest x-ray today showed new infiltrates    Anemia   hemoglobin 7.9  Iron saturation 12 and ferritin 1100   Started on Epo subcu  Blood transfusion if Hb less than 7.0    Atrial fibrillation  on IV heparin drip  Goal K is 4.0    Plan:       Signed By: Sol Ferraro MD     June 17, 2024

## 2024-06-17 NOTE — PLAN OF CARE
Problem: Safety - Adult  Goal: Free from fall injury  Outcome: Not Progressing     Problem: Discharge Planning  Goal: Discharge to home or other facility with appropriate resources  Outcome: Not Progressing  Flowsheets (Taken 6/16/2024 2000)  Discharge to home or other facility with appropriate resources: Identify barriers to discharge with patient and caregiver     Problem: Chronic Conditions and Co-morbidities  Goal: Patient's chronic conditions and co-morbidity symptoms are monitored and maintained or improved  Outcome: Not Progressing  Flowsheets (Taken 6/16/2024 2000)  Care Plan - Patient's Chronic Conditions and Co-Morbidity Symptoms are Monitored and Maintained or Improved: Monitor and assess patient's chronic conditions and comorbid symptoms for stability, deterioration, or improvement     Problem: Skin/Tissue Integrity  Goal: Absence of new skin breakdown  Description: 1.  Monitor for areas of redness and/or skin breakdown  2.  Assess vascular access sites hourly  3.  Every 4-6 hours minimum:  Change oxygen saturation probe site  4.  Every 4-6 hours:  If on nasal continuous positive airway pressure, respiratory therapy assess nares and determine need for appliance change or resting period.  Outcome: Not Progressing     Problem: Neurosensory - Adult  Goal: Achieves stable or improved neurological status  Outcome: Not Progressing  Flowsheets (Taken 6/16/2024 2000)  Achieves stable or improved neurological status: Assess for and report changes in neurological status  Goal: Absence of seizures  Outcome: Not Progressing  Flowsheets (Taken 6/16/2024 2000)  Absence of seizures: Support airway/breathing, administer oxygen as needed  Goal: Remains free of injury related to seizures activity  Outcome: Not Progressing  Flowsheets (Taken 6/16/2024 2000)  Remains free of injury related to seizure activity: Maintain airway, patient safety  and administer oxygen as ordered  Goal: Achieves maximal functionality and self  protect limb and body alignment per provider's orders  Goal: Return ADL status to a safe level of function  Outcome: Not Progressing  Flowsheets (Taken 6/16/2024 2000)  Return ADL Status to a Safe Level of Function: Administer medication as ordered     Problem: Gastrointestinal - Adult  Goal: Minimal or absence of nausea and vomiting  Outcome: Not Progressing  Flowsheets (Taken 6/16/2024 2000)  Minimal or absence of nausea and vomiting: Administer IV fluids as ordered to ensure adequate hydration  Goal: Maintains or returns to baseline bowel function  Outcome: Not Progressing  Flowsheets (Taken 6/16/2024 2000)  Maintains or returns to baseline bowel function: Assess bowel function  Goal: Maintains adequate nutritional intake  Outcome: Not Progressing  Flowsheets (Taken 6/16/2024 2000)  Maintains adequate nutritional intake: Monitor intake and output, weight and lab values  Goal: Establish and maintain optimal ostomy function  Outcome: Not Progressing  Flowsheets (Taken 6/16/2024 2000)  Establish and maintain optimal ostomy function: Administer IV fluids and TPN as ordered     Problem: Genitourinary - Adult  Goal: Absence of urinary retention  Outcome: Not Progressing  Flowsheets (Taken 6/16/2024 2000)  Absence of urinary retention: Assess patient’s ability to void and empty bladder     Problem: Infection - Adult  Goal: Absence of infection at discharge  Outcome: Not Progressing  Flowsheets (Taken 6/16/2024 2000)  Absence of infection at discharge: Assess and monitor for signs and symptoms of infection  Goal: Absence of infection during hospitalization  Outcome: Not Progressing  Flowsheets (Taken 6/16/2024 2000)  Absence of infection during hospitalization: Assess and monitor for signs and symptoms of infection  Goal: Absence of fever/infection during anticipated neutropenic period  Outcome: Not Progressing  Flowsheets (Taken 6/16/2024 2000)  Absence of fever/infection during anticipated neutropenic period: Monitor  2000)  Incisions, Wounds, or Drain Sites Healing Without Sign and Symptoms of Infection: TWICE DAILY: Assess and document skin integrity  Goal: Oral mucous membranes remain intact  Outcome: Not Progressing  Flowsheets (Taken 6/16/2024 2000)  Oral Mucous Membranes Remain Intact: Assess oral mucosa and hygiene practices     Problem: Musculoskeletal - Adult  Goal: Return mobility to safest level of function  Outcome: Not Progressing  Flowsheets (Taken 6/16/2024 2000)  Return Mobility to Safest Level of Function: Assess patient stability and activity tolerance for standing, transferring and ambulating with or without assistive devices  Goal: Maintain proper alignment of affected body part  Outcome: Not Progressing  Flowsheets (Taken 6/16/2024 2000)  Maintain proper alignment of affected body part: Support and protect limb and body alignment per provider's orders  Goal: Return ADL status to a safe level of function  Outcome: Not Progressing  Flowsheets (Taken 6/16/2024 2000)  Return ADL Status to a Safe Level of Function: Administer medication as ordered     Problem: Gastrointestinal - Adult  Goal: Minimal or absence of nausea and vomiting  Outcome: Not Progressing  Flowsheets (Taken 6/16/2024 2000)  Minimal or absence of nausea and vomiting: Administer IV fluids as ordered to ensure adequate hydration  Goal: Maintains or returns to baseline bowel function  Outcome: Not Progressing  Flowsheets (Taken 6/16/2024 2000)  Maintains or returns to baseline bowel function: Assess bowel function  Goal: Maintains adequate nutritional intake  Outcome: Not Progressing  Flowsheets (Taken 6/16/2024 2000)  Maintains adequate nutritional intake: Monitor intake and output, weight and lab values  Goal: Establish and maintain optimal ostomy function  Outcome: Not Progressing  Flowsheets (Taken 6/16/2024 2000)  Establish and maintain optimal ostomy function: Administer IV fluids and TPN as ordered     Problem: Genitourinary - Adult  Goal:

## 2024-06-17 NOTE — PROGRESS NOTES
[facility] NEUROLOGY CONSULTATION          Chief Complaint/Admission Diagnosis: Cardiac arrest (HCC) [I46.9]  Acute respiratory failure (HCC) [J96.00]  Respiratory distress [R06.03]  Chest pain, unspecified type [R07.9]     I have been asked to see this 79 y.o. male in neurological consultation by Mikaela Murphy MD to render advice and opinion regarding cardiac arrest.     Impression:  79-year-old man with past medical history of polysubstance abuse, atrial fibrillation, suspected underlying hypercoagulable state presented for evaluation after a PEA arrest.  His course has been complicated by ventilation dependent acute hypoxic respiratory failure, left upper extremity DVT, septic shock.  MRI brain without contrast revealed numerous small foci of acute infarction in the supratentorial region and in the cerebellum covering multiple vascular territories suggestive of cardio-embolic origin. EEG revealed diffuse encephalopathy. Exam is unchanged. Acute encephalopathy is multifactorial in the setting of current infection, FIDEL, multiple strokes, and metabolic disturbances. Prognosis is guarded.     Recommendations:   -Sedation wean when appropriate. Will need re-examination after completely being off sedation for at least 24 hours.  -Antibiotics per ID  - Continue ASA, 81, Atorva 40 per tube, otherwise rectal aspirin  -Correct electrolytes as necessary.  -Palliative care consult recommended to discuss goals of care.  -Neurology will be available once patient is off sedation to reassess. Will sign off forn ow. Please call if any further questions.      HPI:   History was obtained from a review of the electronic record and from the patient and family.??     ?  Review of Symptoms:     Unable to obtain due to coma.    ?    Exam:   /84   Pulse (!) 46   Temp 96.9 °F (36.1 °C) (Bladder)   Resp 12   Ht 1.73 m (5' 8.11\")   Wt 81.5 kg (179 lb 10.8 oz)   SpO2 99%   BMI 27.23 kg/m²    Assisted by Elayne  Mikaela Arredondo MD        Or    dextrose bolus 10% 250 mL  250 mL IntraVENous PRN Mikaela Arredondo MD        glucagon injection 1 mg  1 mg SubCUTAneous PRMikaela Blanca MD        dextrose 10 % infusion   IntraVENous Continuous PRMikaela Blanca MD                Labs:     Recent Results (from the past 24 hour(s))   POCT Glucose    Collection Time: 06/16/24 11:50 AM   Result Value Ref Range    POC Glucose 134 (H) 65 - 100 mg/dL    Performed by: Tarun Moore (Float Pool)    POCT Glucose    Collection Time: 06/16/24  5:08 PM   Result Value Ref Range    POC Glucose 123 (H) 65 - 100 mg/dL    Performed by: MAAME RUIZ    POCT Glucose    Collection Time: 06/17/24  1:16 AM   Result Value Ref Range    POC Glucose 135 (H) 65 - 100 mg/dL    Performed by: Juan A Arvizu    CBC with Auto Differential    Collection Time: 06/17/24  1:20 AM   Result Value Ref Range    WBC 14.6 (H) 4.1 - 11.1 K/uL    RBC 2.61 (L) 4.10 - 5.70 M/uL    Hemoglobin 7.6 (L) 12.1 - 17.0 g/dL    Hematocrit 23.7 (L) 36.6 - 50.3 %    MCV 90.8 80.0 - 99.0 FL    MCH 29.1 26.0 - 34.0 PG    MCHC 32.1 30.0 - 36.5 g/dL    RDW 16.2 (H) 11.5 - 14.5 %    Platelets 206 150 - 400 K/uL    MPV 10.1 8.9 - 12.9 FL    Nucleated RBCs 0.2 (H) 0.0  WBC    nRBC 0.03 (H) 0.00 - 0.01 K/uL    Neutrophils % 86 (H) 32 - 75 %    Lymphocytes % 9 (L) 12 - 49 %    Monocytes % 4 (L) 5 - 13 %    Eosinophils % 0 0 - 7 %    Basophils % 0 0 - 1 %    Immature Granulocytes % 1 (H) 0 - 0.5 %    Neutrophils Absolute 12.5 (H) 1.8 - 8.0 K/UL    Lymphocytes Absolute 1.3 0.8 - 3.5 K/UL    Monocytes Absolute 0.6 0.0 - 1.0 K/UL    Eosinophils Absolute 0.0 0.0 - 0.4 K/UL    Basophils Absolute 0.0 0.0 - 0.1 K/UL    Immature Granulocytes Absolute 0.2 (H) 0.00 - 0.04 K/UL    Differential Type AUTOMATED     Basic Metabolic Panel    Collection Time: 06/17/24  1:20 AM   Result Value Ref Range    Sodium 132 (L) 136 - 145 mmol/L    Potassium 4.5 3.5 - 5.1 mmol/L    Chloride 96 (L) 97 -

## 2024-06-17 NOTE — PROGRESS NOTES
CARDIOLOGY PROGRESS NOTE      Patient Name: Agustín Willis  Age: 79 y.o.  Gender:male  :1945  MRN: [id number]    Agustín Willis is a 78 y.o. year-old male with past medical history significant for hypertension, COPD, cocaine abuse, CKD who was evaluated today due to cardiac arrest. Patient initially presented to the ED via EMS from nursing home due to respiratory distress after vomiting breakfast. Required intubation in ED. Apparently with VF arrest in ED, shock x4.     Patient seen and examined in ICU. Remains intubated, weaning sedation for neuro eval. On heparin gtt. On single pressor support. Neurology following. MRI with numerous small foci of acute infarction.  Mild bradycardia w/ sedation. On HD, T-T-S.    Telemetry reviewed.    ROS as above. Current medications reviewed.    Physical Examination    Allergies   Allergen Reactions    Penicillin G Other (See Comments)     Pt states he passes out    Sulfamethoxazole-Trimethoprim      Pt states he passes out     Vitals:    24 1000   BP: 129/84   Pulse: (!) 46   Resp: 12   Temp:    SpO2: 99%     Vital signs are stable   In no distress  Heart has a regular rate and rhythm  Lungs are coarse  Abdomen is soft  Extremities have mild edema  Skin is dry    Labs reviewed:  Recent Results (from the past 12 hour(s))   Blood Gas, Arterial    Collection Time: 24  4:59 AM   Result Value Ref Range    pH, Arterial 7.52 (H) 7.35 - 7.45      pCO2, Arterial 30 (L) 35 - 45 mmHg    pO2, Arterial 77 (L) 80 - 100 mmHg    O2 Sat, Arterial 95 95 - 99 %    HCO3, Arterial 24 22 - 26 mmol/L    Base Excess, Arterial 1.3 0 - 3 mmol/L    O2 Method VENT      FIO2 Arterial 30 %    Mode ASSIST CONTROL      POC TIDAL VOLUME 450.0      Set Rate, POC 12      Source Arterial      Site Right Radial      Efrain Test YES      Carboxyhgb, Arterial 0.3 (L) 1 - 2 %    Methemoglobin, Arterial 0.6 0 - 1.4 %    Oxyhemoglobin 94.5 (L) 95 - 99 %    Performed by: Dani Walsh     Temperature

## 2024-06-18 ENCOUNTER — APPOINTMENT (OUTPATIENT)
Facility: HOSPITAL | Age: 79
DRG: 870 | End: 2024-06-18
Payer: MEDICARE

## 2024-06-18 LAB
ARTERIAL PATENCY WRIST A: YES
BASE EXCESS BLDA CALC-SCNC: 1.1 MMOL/L (ref 0–3)
BASOPHILS # BLD: 0 K/UL (ref 0–0.1)
BASOPHILS NFR BLD: 0 % (ref 0–1)
BDY SITE: ABNORMAL
BODY TEMPERATURE: 96.8
COHGB MFR BLD: 0.1 % (ref 1–2)
CRP SERPL-MCNC: 1.46 MG/DL (ref 0–0.3)
DATE LAST DOSE: NORMAL
DATE LAST DOSE: NORMAL
DIFFERENTIAL METHOD BLD: ABNORMAL
DOSE AMOUNT: NORMAL UNITS
DOSE AMOUNT: NORMAL UNITS
EOSINOPHIL # BLD: 0 K/UL (ref 0–0.4)
EOSINOPHIL NFR BLD: 0 % (ref 0–7)
ERYTHROCYTE [DISTWIDTH] IN BLOOD BY AUTOMATED COUNT: 16.5 % (ref 11.5–14.5)
FIO2 ON VENT: 30 %
GAS FLOW.O2 SETTING OXYMISER: 12
GLUCOSE BLD STRIP.AUTO-MCNC: 116 MG/DL (ref 65–100)
GLUCOSE BLD STRIP.AUTO-MCNC: 95 MG/DL (ref 65–100)
HCO3 BLDA-SCNC: 25 MMOL/L (ref 22–26)
HCT VFR BLD AUTO: 26.1 % (ref 36.6–50.3)
HGB BLD-MCNC: 8.6 G/DL (ref 12.1–17)
IMM GRANULOCYTES # BLD AUTO: 0.2 K/UL (ref 0–0.04)
IMM GRANULOCYTES NFR BLD AUTO: 1 % (ref 0–0.5)
LYMPHOCYTES # BLD: 0.9 K/UL (ref 0.8–3.5)
LYMPHOCYTES NFR BLD: 8 % (ref 12–49)
MCH RBC QN AUTO: 30 PG (ref 26–34)
MCHC RBC AUTO-ENTMCNC: 33 G/DL (ref 30–36.5)
MCV RBC AUTO: 90.9 FL (ref 80–99)
METHGB MFR BLD: 0.3 % (ref 0–1.4)
MONOCYTES # BLD: 0.4 K/UL (ref 0–1)
MONOCYTES NFR BLD: 4 % (ref 5–13)
NEUTS SEG # BLD: 10 K/UL (ref 1.8–8)
NEUTS SEG NFR BLD: 87 % (ref 32–75)
NRBC # BLD: 0 K/UL (ref 0–0.01)
NRBC BLD-RTO: 0 PER 100 WBC
OXYHGB MFR BLD: 92.4 % (ref 95–99)
PCO2 BLDA: 34 MMHG (ref 35–45)
PEEP RESPIRATORY: 5
PERFORMED BY:: ABNORMAL
PERFORMED BY:: ABNORMAL
PERFORMED BY:: NORMAL
PH BLDA: 7.47 (ref 7.35–7.45)
PLATELET # BLD AUTO: 206 K/UL (ref 150–400)
PMV BLD AUTO: 9.8 FL (ref 8.9–12.9)
PO2 BLDA: 64 MMHG (ref 80–100)
PROCALCITONIN SERPL-MCNC: 0.38 NG/ML
RBC # BLD AUTO: 2.87 M/UL (ref 4.1–5.7)
SAO2 % BLD: 93 % (ref 95–99)
SAO2% DEVICE SAO2% SENSOR NAME: ABNORMAL
SPECIMEN SITE: ABNORMAL
VANCOMYCIN SERPL-MCNC: 16.7 UG/ML
VANCOMYCIN SERPL-MCNC: 9.4 UG/ML
VENTILATION MODE VENT: ABNORMAL
VT SETTING VENT: 410
WBC # BLD AUTO: 11.5 K/UL (ref 4.1–11.1)

## 2024-06-18 PROCEDURE — 90935 HEMODIALYSIS ONE EVALUATION: CPT

## 2024-06-18 PROCEDURE — 94003 VENT MGMT INPAT SUBQ DAY: CPT

## 2024-06-18 PROCEDURE — 2580000003 HC RX 258: Performed by: FAMILY MEDICINE

## 2024-06-18 PROCEDURE — 6360000002 HC RX W HCPCS: Performed by: INTERNAL MEDICINE

## 2024-06-18 PROCEDURE — 6360000002 HC RX W HCPCS: Performed by: FAMILY MEDICINE

## 2024-06-18 PROCEDURE — 2580000003 HC RX 258: Performed by: INTERNAL MEDICINE

## 2024-06-18 PROCEDURE — 6370000000 HC RX 637 (ALT 250 FOR IP)

## 2024-06-18 PROCEDURE — 2000000000 HC ICU R&B

## 2024-06-18 PROCEDURE — 2709999900 HC NON-CHARGEABLE SUPPLY

## 2024-06-18 PROCEDURE — 99233 SBSQ HOSP IP/OBS HIGH 50: CPT | Performed by: INTERNAL MEDICINE

## 2024-06-18 PROCEDURE — 89220 SPUTUM SPECIMEN COLLECTION: CPT

## 2024-06-18 PROCEDURE — 94761 N-INVAS EAR/PLS OXIMETRY MLT: CPT

## 2024-06-18 PROCEDURE — 86140 C-REACTIVE PROTEIN: CPT

## 2024-06-18 PROCEDURE — 71045 X-RAY EXAM CHEST 1 VIEW: CPT

## 2024-06-18 PROCEDURE — 85025 COMPLETE CBC W/AUTO DIFF WBC: CPT

## 2024-06-18 PROCEDURE — 36415 COLL VENOUS BLD VENIPUNCTURE: CPT

## 2024-06-18 PROCEDURE — 80202 ASSAY OF VANCOMYCIN: CPT

## 2024-06-18 PROCEDURE — 6370000000 HC RX 637 (ALT 250 FOR IP): Performed by: FAMILY MEDICINE

## 2024-06-18 PROCEDURE — 82803 BLOOD GASES ANY COMBINATION: CPT

## 2024-06-18 PROCEDURE — 36600 WITHDRAWAL OF ARTERIAL BLOOD: CPT

## 2024-06-18 PROCEDURE — 82962 GLUCOSE BLOOD TEST: CPT

## 2024-06-18 PROCEDURE — 84145 PROCALCITONIN (PCT): CPT

## 2024-06-18 PROCEDURE — 94640 AIRWAY INHALATION TREATMENT: CPT

## 2024-06-18 PROCEDURE — 6370000000 HC RX 637 (ALT 250 FOR IP): Performed by: INTERNAL MEDICINE

## 2024-06-18 PROCEDURE — 2700000000 HC OXYGEN THERAPY PER DAY

## 2024-06-18 RX ORDER — FENTANYL CITRATE 50 UG/ML
50 INJECTION, SOLUTION INTRAMUSCULAR; INTRAVENOUS
Status: DISCONTINUED | OUTPATIENT
Start: 2024-06-18 | End: 2024-06-23

## 2024-06-18 RX ADMIN — METHYLPREDNISOLONE SODIUM SUCCINATE 40 MG: 40 INJECTION INTRAMUSCULAR; INTRAVENOUS at 16:06

## 2024-06-18 RX ADMIN — FENTANYL CITRATE 50 MCG: 50 INJECTION, SOLUTION INTRAMUSCULAR; INTRAVENOUS at 19:34

## 2024-06-18 RX ADMIN — FOLIC ACID 1 MG: 1 TABLET ORAL at 08:14

## 2024-06-18 RX ADMIN — GLYCOPYRROLATE 0.1 MG: 0.2 INJECTION INTRAMUSCULAR; INTRAVENOUS at 03:54

## 2024-06-18 RX ADMIN — METHYLPREDNISOLONE SODIUM SUCCINATE 40 MG: 40 INJECTION INTRAMUSCULAR; INTRAVENOUS at 08:14

## 2024-06-18 RX ADMIN — FENTANYL CITRATE 50 MCG: 0.05 INJECTION, SOLUTION INTRAMUSCULAR; INTRAVENOUS at 02:13

## 2024-06-18 RX ADMIN — METHYLPREDNISOLONE SODIUM SUCCINATE 40 MG: 40 INJECTION INTRAMUSCULAR; INTRAVENOUS at 00:58

## 2024-06-18 RX ADMIN — IPRATROPIUM BROMIDE AND ALBUTEROL SULFATE 1 DOSE: 2.5; .5 SOLUTION RESPIRATORY (INHALATION) at 06:56

## 2024-06-18 RX ADMIN — SODIUM CHLORIDE, PRESERVATIVE FREE 10 ML: 5 INJECTION INTRAVENOUS at 20:03

## 2024-06-18 RX ADMIN — IPRATROPIUM BROMIDE AND ALBUTEROL SULFATE 1 DOSE: 2.5; .5 SOLUTION RESPIRATORY (INHALATION) at 13:34

## 2024-06-18 RX ADMIN — FENTANYL CITRATE 50 MCG: 0.05 INJECTION, SOLUTION INTRAMUSCULAR; INTRAVENOUS at 00:58

## 2024-06-18 RX ADMIN — FENTANYL CITRATE 50 MCG: 50 INJECTION, SOLUTION INTRAMUSCULAR; INTRAVENOUS at 16:06

## 2024-06-18 RX ADMIN — FERROUS SULFATE TAB 325 MG (65 MG ELEMENTAL FE) 325 MG: 325 (65 FE) TAB at 08:14

## 2024-06-18 RX ADMIN — FENTANYL CITRATE 50 MCG: 0.05 INJECTION, SOLUTION INTRAMUSCULAR; INTRAVENOUS at 08:14

## 2024-06-18 RX ADMIN — FENTANYL CITRATE 50 MCG: 50 INJECTION, SOLUTION INTRAMUSCULAR; INTRAVENOUS at 20:58

## 2024-06-18 RX ADMIN — FINASTERIDE 5 MG: 5 TABLET, FILM COATED ORAL at 08:14

## 2024-06-18 RX ADMIN — ASPIRIN 81 MG 81 MG: 81 TABLET ORAL at 08:14

## 2024-06-18 RX ADMIN — THIAMINE HCL TAB 100 MG 100 MG: 100 TAB at 08:14

## 2024-06-18 RX ADMIN — IPRATROPIUM BROMIDE AND ALBUTEROL SULFATE 1 DOSE: 2.5; .5 SOLUTION RESPIRATORY (INHALATION) at 01:54

## 2024-06-18 RX ADMIN — FENTANYL CITRATE 50 MCG: 0.05 INJECTION, SOLUTION INTRAMUSCULAR; INTRAVENOUS at 03:54

## 2024-06-18 RX ADMIN — SODIUM CHLORIDE, PRESERVATIVE FREE 10 ML: 5 INJECTION INTRAVENOUS at 08:15

## 2024-06-18 RX ADMIN — HEPARIN SODIUM 2500 UNITS: 1000 INJECTION INTRAVENOUS; SUBCUTANEOUS at 11:10

## 2024-06-18 RX ADMIN — ONDANSETRON 4 MG: 2 INJECTION INTRAMUSCULAR; INTRAVENOUS at 20:59

## 2024-06-18 RX ADMIN — IPRATROPIUM BROMIDE AND ALBUTEROL SULFATE 1 DOSE: 2.5; .5 SOLUTION RESPIRATORY (INHALATION) at 19:29

## 2024-06-18 RX ADMIN — GLYCOPYRROLATE 0.1 MG: 0.2 INJECTION INTRAMUSCULAR; INTRAVENOUS at 12:49

## 2024-06-18 RX ADMIN — FENTANYL CITRATE 50 MCG: 50 INJECTION, SOLUTION INTRAMUSCULAR; INTRAVENOUS at 23:01

## 2024-06-18 RX ADMIN — ATORVASTATIN CALCIUM 40 MG: 40 TABLET, FILM COATED ORAL at 20:03

## 2024-06-18 RX ADMIN — VANCOMYCIN HYDROCHLORIDE 500 MG: 500 INJECTION, POWDER, LYOPHILIZED, FOR SOLUTION INTRAVENOUS at 16:06

## 2024-06-18 RX ADMIN — GLYCOPYRROLATE 0.1 MG: 0.2 INJECTION INTRAMUSCULAR; INTRAVENOUS at 20:03

## 2024-06-18 RX ADMIN — FENTANYL CITRATE 50 MCG: 0.05 INJECTION, SOLUTION INTRAMUSCULAR; INTRAVENOUS at 13:03

## 2024-06-18 ASSESSMENT — PULMONARY FUNCTION TESTS
PIF_VALUE: 18
PIF_VALUE: 18
PIF_VALUE: 21
PIF_VALUE: 20
PIF_VALUE: 21
PIF_VALUE: 19
PIF_VALUE: 24
PIF_VALUE: 22
PIF_VALUE: 20
PIF_VALUE: 19
PIF_VALUE: 18
PIF_VALUE: 18
PIF_VALUE: 17
PIF_VALUE: 20
PIF_VALUE: 23
PIF_VALUE: 21
PIF_VALUE: 21
PIF_VALUE: 17
PIF_VALUE: 19
PIF_VALUE: 18
PIF_VALUE: 23
PIF_VALUE: 21
PIF_VALUE: 20
PIF_VALUE: 21
PIF_VALUE: 20
PIF_VALUE: 13
PIF_VALUE: 20
PIF_VALUE: 17
PIF_VALUE: 17
PIF_VALUE: 19
PIF_VALUE: 19
PIF_VALUE: 32
PIF_VALUE: 17
PIF_VALUE: 23
PIF_VALUE: 23
PIF_VALUE: 20
PIF_VALUE: 17
PIF_VALUE: 19
PIF_VALUE: 19
PIF_VALUE: 20

## 2024-06-18 ASSESSMENT — PAIN SCALES - GENERAL
PAINLEVEL_OUTOF10: 1
PAINLEVEL_OUTOF10: 0

## 2024-06-18 NOTE — PROGRESS NOTES
is attempting follow up with the patient in 276. Patient was unresponsive at the time.  conducted a life review and found from prior interaction patient is Gnosticism, values his yamini, and values prayer.  offered pastoral presence by bedside, reading of psalms, and prayer.     TISHA Leiva.Div, M.S, [facility]   can be reached by calling the  at [facility]   [phone]

## 2024-06-18 NOTE — PROGRESS NOTES
effect.    2. Numerous chronic microhemorrhages as on prior study again may indicate   cerebral amyloid angiopathy, with extensive chronic white matter disease.         Electronically signed by Gerardo Ugarte      XR ABDOMEN (KUB) (SINGLE AP VIEW)   Final Result   No unexpected radiopaque foreign bodies in the abdomen.      Electronically signed by Renu Carrington      XR CHEST 1 VIEW   Final Result   Grossly stable bilateral pulmonary infiltrates, right greater than left.         Electronically signed by SEMAJ Basilio      XR CHEST PORTABLE   Final Result      Bilateral lung infiltrates. Orogastric tube as described. Otherwise stable lines   and tubes.         Electronically signed by KATHRINE EDWARDS      CT HEAD WO CONTRAST   Final Result   No acute intracranial abnormality.         Electronically signed by KEERTHI Footway      XR CHEST PORTABLE   Final Result   1. Satisfactory intubation.   2. New RUL airspace disease/pneumonia.      Electronically signed by KEERTHI HERNANDEZ      XR CHEST 1 VIEW   Final Result   Stable bibasilar atelectasis and low lung volumes. Stable lines and tubes,   except for extubation and removal of gastric tube..  .         Electronically signed by KATHRINE EDWARDS      XR CHEST 1 VIEW   Final Result   Shifting bibasilar atelectasis is increased on the right and   decreased on the left. Stable support devices.         XR CHEST 1 VIEW   Final Result   1. No interval change         Vascular duplex upper extremity venous bilateral   Final Result      XR CHEST PORTABLE   Final Result   Unchanged right perihilar and left basilar opacities.      XR CHEST PORTABLE   Final Result      Unchanged radiographic appearance.         XR CHEST 1 VIEW   Final Result   Stable right pulmonary edema.      Vascular duplex lower extremity venous bilateral   Final Result      XR CHEST 1 VIEW   Final Result   Right lung interstitial and airspace disease likely represents asymmetric edema.      XR CHEST PORTABLE  NITRU Negative 06/06/2024 09:15 PM    LEUKOCYTESUR Negative 06/06/2024 09:15 PM         Assessment:   Cardiac arrest/  Acute stroke  Bradycardia  MRSA pneumonia    IMPRESSION:  1. 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 No associated mass effect.   2. Numerous chronic microhemorrhages as on prior study again may indicate  cerebral amyloid angiopathy, with extensive chronic white matter disease  Acute hypoxic respiratory failure intubated  Dilated pupil questionable stroke/CT scan of the head negative  Acute DVT of the left arm  Cardiac arrest  Start post V-fib with 4 shocks  Septic shock with hypotension  Acute hypoxic respiratory failure extubated  Aspiration pneumonia  COPD  History of cocaine abuse in the past  History of hypertension hyperlipidemia  Acute on chronic kidney disease  Lactic acidosis  Hypokalemia  MRSA nares  A-fib with rapid ventricular rate  Left Ventricle: Normal left ventricular systolic function with a visually estimated EF of 65 - 70%. Left ventricle size is normal. Increased wall thickness. Mild septal thickening. Moderate hypokinesis of the following segments: mid inferoseptal. Normal diastolic function.    Right Ventricle: Right ventricle is moderately dilated.    Aortic Valve: Moderate to severe regurgitation.    Left Atrium: Left atrium is mildly dilated. Left atrial volume index is normal (16-34 mL/m2).    Right Atrium: Right atrium is mildly dilated.    Image quality is good. Procedure performed with the patient in a supine position      Plan:       Amiodarone 400 mg twice a day on hold  Aspirin 81 mg daily  Lipitor 40 daily  Ferrous sulfate 325 mg daily  Proscar 5 mg daily  Folic acid 1 mg daily  Robinul 0.1 mg IV every 8 hours  DuoNeb nebulizer every 6 hours  Solu-Medrol  propofol infusion, 5-50 mcg/kg/min, IntraVENous, Continuous, Mikaela Arredondo MD, Stopped at 06/17/24 1200    norepinephrine (LEVOPHED) 16 mg in sodium chloride 0.9 % 250 mL infusion, 1-100 mcg/min, IntraVENous, Continuous, Mikaela Arredondo MD, Paused at 06/14/24 2130    [Held by provider] amiodarone (CORDARONE) tablet 400 mg, 400 mg, Oral, BID, Gerry Wilson MD, 400 mg at 06/10/24 1016    aspirin chewable tablet 81 mg, 81 mg, Per NG tube, Daily, Jeremy Ramos MD, 81 mg at 06/18/24 0814    epoetin radha-epbx (RETACRIT) injection 10,000 Units, 10,000 Units, SubCUTAneous, Once per day on Mon Wed Fri, Sol Ferraro MD, 10,000 Units at 06/17/24 2118    heparin (porcine) injection 4,000 Units, 4,000 Units, IntraVENous, PRN, Mikaela Arredondo MD    heparin (porcine) injection 2,000 Units, 2,000 Units, IntraVENous, PRN, Mikaela Arredondo MD, 2,000 Units at 06/09/24 0734    heparin 25,000 units in dextrose 5% 250 mL (premix) infusion, 5-30 Units/kg/hr, IntraVENous, Continuous, Mikaela Arredondo MD, Stopped at 06/17/24 1504    heparin (porcine) injection 1,100-1,900 Units, 1,100-1,900 Units, IntraCATHeter, PRN, 1,400 Units at 06/03/24 1231 **AND** heparin (porcine) injection 1,100-1,900 Units, 1,100-1,900 Units, IntraCATHeter, PRN, Sol Ferraro MD, 1,100 Units at 06/03/24 1226    sodium chloride 0.9 % bolus 2,448 mL, 30 mL/kg, IntraVENous, Once, Mikaela Arredondo MD, Held at 05/29/24 1405    ferrous sulfate (IRON 325) tablet 325 mg, 325 mg, Oral, Daily with breakfast, Mikaela Arredondo MD, 325 mg at 06/18/24 0814    finasteride (PROSCAR) tablet 5 mg, 5 mg, Oral, Daily, Mikaela Arredondo MD, 5 mg at 06/18/24 0814    folic acid (FOLVITE) tablet 1 mg, 1 mg, Oral, Daily, Mikaela Arredondo MD, 1 mg at 06/18/24 0814    thiamine mononitrate tablet 100 mg, 100 mg, Oral, Daily, Mikaela Arredondo MD, 100 mg at 06/18/24 0814    sodium chloride flush 0.9 % injection 5-40 mL, 5-40 mL, IntraVENous, 2 times per day, Maria Elena

## 2024-06-18 NOTE — CARE COORDINATION
CM reviewed Pt medicals, Pt remains on the vent.    CM called Pt sister again to check if she was able to get a hold of Pt wife. Left a VM requesting a return call.    A consult for Ethics has lisa submitted to inquire if Pt daughter can make decision for Pt.     10:30  Mariana Sutton contacted CM and stated that it would be ok from an Ethics standpoint with letting Pt daughter make decision.     Mariana stated that it would be fine for Pt daughter to make end of life decisions.     CM attempted to contact Pt daughter @ [phone], left a VM requesting a return call.     12:00  CM called Pt daughter, left a VM requesting a return call.     2:45  CM called Pt daughter, left a VM requesting a return call.

## 2024-06-18 NOTE — PROGRESS NOTES
Vancomycin Dosing Consult  Agustín Willis is a 79 y.o. male with MRSA PNA. Pharmacy was consulted by Dr. Alonzo to dose and monitor vancomycin. Today is day 7.    Antibiotic regimen: Vancomycin monotherapy    Temp (24hrs), Av.4 °F (35.8 °C), Min:96.3 °F (35.7 °C), Max:96.5 °F (35.8 °C)    Recent Labs     24  0043 24  0120 24  0100   WBC 15.8* 14.6* 11.5*   CREATININE 1.58* 2.08*  --    BUN 49* 64*  --      Est CrCl: 27 mL/min, FIDEL being dialyzed prn (TTS schedule ordered)  Concomitant nephrotoxic drugs: Vasopressors    Cultures:    blood x 2: negative, final   urine: negative, final   sputum: normal respiratory doug, final   sputum: MRSA, final   sputum: GPC in clusters, final    MRSA Swab: Detected     Target range:  Pre-HD level 15-20 mcg/ml    Recent level history:  Date/Time Dose & Interval Measured Level (mcg/mL) Associated AUC/ANGELINE    1655 1750mg x 1 dose () 18.7 (Post-HD)    6/15 0130 750 mg x 1 20.8      0100 500 mg x 1 16.7         Assessment/Plan:   Continue to treat MRSA pneumonia with vancomycin.  Pre-HD level is ready for re-dosing. Vancomycin 750 mg x 1 ordered.  Another level is scheduled for THURS () with pre-HD labs  Antimicrobial stop date  per consult     ADDENDUM  Level is ready for re-dosing. Vancomycin 500 post HD x 2 doses was ordered. Repeat level is scheduled for  with AM labs.    Bernie Alfaro, PharmD, BCPS, BCCCP  Clinical Pharmacist   [phone]

## 2024-06-18 NOTE — PROGRESS NOTES
IMPRESSION:   Acute hypoxic respiratory failure extubated on 6/5 and reintubated on 6/10  Status post cardiac arrest  Chronic atrial fibrillation  Septic shock  Left upper extremity DVT  Stroke embolic  Sepsis treated with antibiotics  Aspiration pneumonia right midlung  COPD   MRSA nasal colonization  History of cocaine abuse polysubstance abuse in the past  Pt is critically ill. Time spent with pt and staff actively rendering care, managing pt and coordinating care as stated below; 30 minutes, exclusive of any procedures      RECOMMENDATIONS/PLAN:   Extubated  on 6/5 reintubated on 6/10 on ventilator   Not on any sedation since 6/70  Mental status has not changed patient had a stroke eyes were rolled  Chest x-ray with continued right lower infiltrate CTA chest showed bilateral infiltrates consistent with aspiration  MRI of the brain was done which shows small foci of acute infarction in the supratentorial region and cerebellum EEG shows diffuse encephalopathy  Patient has a lot of secretion patient on Robinul  Chest x-ray shows right midlung infiltrate fluid overload congestive change left lung clear  Remains on Merrem and vancomycin sputum with normal doug  Heart rate controlled on amiodarone   Renal function improving off CVVH as needed dialysis  Status post cardiac arrest patient coded   Patient is off Levophed     [x] High complexity decision making was performed  [x] See my orders for details  HPI  78-year-old male nursing home resident came in because of shortness of breath respiratory distress patient has episodes of vomiting after eating breakfast this morning subsequently came to the hospital saturation was in 70s he was back to the emergency room subsequently intubated and then he coded hemodynamically unstable now he is on 2 pressors vasopressin and Levophed denies intubated on ventilator critical care consult was called, past medical history of polysubstance abuse cocaine abuse COPD hypertension  norepinephrine for blood pressure support hypothermic yesterday now on warming blanket.  Continues with CRRT with fluid removal.  Potassium and phosphorus to be repleted  6/3 remain intubated on ventilator getting CRRT, ABG acceptable pCO2 33 pO2 75 on 35% FiO2 on assist-control mode on meropenem will continue to wean pressors  6/4 on assist-control mode pCO2 31 pO2 80 on 35% FiO2 getting IV fluid 200 cc/h on amiodarone hemodynamically unstable on Levophed and NEOS epinephrine   6/5 remain intubated on ventilator we will do sedation vacation ABG stentable 35% FiO2 still on vasopressors now on phenylephrine   6/6 alert awake talking on nasal cannula extubated on 6/5 patient on meropenem speech evaluation he is not on oxygen at home  6/7 oral congestion continue suctioning, alert awake on nasal cannula continue with the suctioning high risk for aspiration on IV heparin and meropenem\  6/8 alert awake on oxygen via nasal cannula chest x-ray atelectasis lower lung volume on Merrem  6/9 alert awake on nasal cannula on IV Solu-Medrol nebulizer treatment will change to prednisone in a.m.  6/10 on 2-1/2 L nasal cannula will discontinue Solu-Medrol start patient on prednisone on meropenem  6/11 patient CODE BLUE yesterday reintubated now back on ventilator assist-control mode start patient on nebulizer treatment along with Mucomyst will start patient on Solu-Medrol start prednisone recent sputum C&S Gram stain chest x-ray still showed bilateral infiltrate hemoglobin 7.2, BUN 92 creatinine 2.4  6/12 remain intubated on ventilator sedated with propofol hemodynamically unstable on Levophed and also getting heparin off CRRT on dialysis atrial fibrillation rate is controlled on heparin, WBC 13.9 hemoglobin 7.0, chest x-ray shows right upper lobe infiltrate  6/13 sedated on ventilator ABG acceptable still on Levophed try to wean but if blood pressure drops we will try again, chest x-ray shows ET tube 1 cm close to the gina will

## 2024-06-18 NOTE — PROGRESS NOTES
Nephrology f/u          Patient: Agustín Willis MRN: [id number]  SSN: [id number]    YOB: 1945  Age: 79 y.o.  Sex: male      Subjective:   I have seen the patient in ICU   On vent  Off single pressor    Past Medical History:   Diagnosis Date    Benign prostatic disease 8/3/2020    Hypertrophy with Outflow Obstruction    Benign prostatic hyperplasia 8/3/2020    Cervical radiculopathy 8/3/2020    Chronic obstructive lung disease (HCC) 8/3/2020    Cocaine abuse (HCC) 8/3/2020    Dizziness and giddiness 8/3/2020    Hypercholesterolemia 8/3/2020    Hypertensive disorder 8/3/2020    Insomnia 8/3/2020    Kidney disease 8/3/2020    Low back pain 8/3/2020    Osteoarthritis of knee 8/3/2020    Sleep apnea 8/3/2020    Vasovagal syncope 8/3/2020     Past Surgical History:   Procedure Laterality Date    IR NONTUNNELED VASCULAR CATHETER  5/30/2024    IR NONTUNNELED VASCULAR CATHETER 5/30/2024 Stephy Abreu APRN - NP SSR RAD ANGIO IR      Family History   Problem Relation Age of Onset    Hypertension Mother      Social History     Tobacco Use    Smoking status: Former    Smokeless tobacco: Never   Substance Use Topics    Alcohol use: Yes      Current Facility-Administered Medications   Medication Dose Route Frequency Provider Last Rate Last Admin    vancomycin (VANCOCIN) 500 mg in sodium chloride 0.9 % 100 mL IVPB (mini-bag)  500 mg IntraVENous Once per day on Tue Thu Sat Riley Alonzo  mL/hr at 06/18/24 1606 500 mg at 06/18/24 1606    [START ON 6/22/2024] Vancomycin - please draw level 6/22 with AM labs.  Thanks!   Other Once Riley Alonzo MD        fentaNYL (SUBLIMAZE) injection 50 mcg  50 mcg IntraVENous Q1H PRN Jeremy Ramos MD   50 mcg at 06/18/24 1606    glycopyrrolate (ROBINUL) injection 0.1 mg  0.1 mg IntraVENous Q8H Jeremy Ramos MD   0.1 mg at 06/18/24 1249    0.9 % sodium chloride infusion   IntraVENous PRN Jeremy Ramos MD        0.9 % sodium chloride infusion    Units at 06/03/24 1231    And    heparin (porcine) injection 1,100-1,900 Units  1,100-1,900 Units IntraCATHeter PRN Sol Ferraro MD   1,100 Units at 06/03/24 1226    sodium chloride 0.9 % bolus 2,448 mL  30 mL/kg IntraVENous Once Mikaela Arredondo MD   Held at 05/29/24 1405    ferrous sulfate (IRON 325) tablet 325 mg  325 mg Oral Daily with breakfast Mikaela Arredondo MD   325 mg at 06/18/24 0814    finasteride (PROSCAR) tablet 5 mg  5 mg Oral Daily Mikaela Arredondo MD   5 mg at 06/18/24 0814    folic acid (FOLVITE) tablet 1 mg  1 mg Oral Daily Mikaela Arredondo MD   1 mg at 06/18/24 0814    thiamine mononitrate tablet 100 mg  100 mg Oral Daily Mikaela Arredondo MD   100 mg at 06/18/24 0814    sodium chloride flush 0.9 % injection 5-40 mL  5-40 mL IntraVENous 2 times per day Mikaela Arredondo MD   10 mL at 06/18/24 0815    sodium chloride flush 0.9 % injection 5-40 mL  5-40 mL IntraVENous PRN Mikaela Arredondo MD        0.9 % sodium chloride infusion   IntraVENous PRN Mikaela Arredondo MD        ondansetron (ZOFRAN-ODT) disintegrating tablet 4 mg  4 mg Oral Q8H PRN Mikaela Arredondo MD        Or    ondansetron (ZOFRAN) injection 4 mg  4 mg IntraVENous Q6H PRN Mikaela Arredondo MD        polyethylene glycol (GLYCOLAX) packet 17 g  17 g Oral Daily PRN Mikaela Arredondo MD   17 g at 06/15/24 1800    acetaminophen (TYLENOL) tablet 650 mg  650 mg Oral Q6H PRN Mikaela Arredondo MD   650 mg at 06/09/24 2047    Or    acetaminophen (TYLENOL) suppository 650 mg  650 mg Rectal Q6H PRN Mikaela Arredondo MD        glucose chewable tablet 16 g  4 tablet Oral PRN Mikaela Arredondo MD        dextrose bolus 10% 125 mL  125 mL IntraVENous PRN Mikaela Arredondo MD        Or    dextrose bolus 10% 250 mL  250 mL IntraVENous PRN Mikaela Arredondo MD        glucagon injection 1 mg  1 mg SubCUTAneous PRN Mikaela Arredondo MD        dextrose 10 % infusion   IntraVENous Continuous PRN Mikaela Arredondo MD            Allergies   Allergen  Reactions    Penicillin G Other (See Comments)     Pt states he passes out    Sulfamethoxazole-Trimethoprim      Pt states he passes out       Review of Systems:  Unable to obtain    Objective:     Vitals:    06/18/24 1200 06/18/24 1330 06/18/24 1334 06/18/24 1500   BP: (!) 118/90 (!) 112/90     Pulse: 67 69 70    Resp: 12 23 19    Temp:    96.8 °F (36 °C)   TempSrc:    Rectal   SpO2: 98% 98% 98%    Weight:       Height:            Physical Exam:  General: On sedation  Eyes: sclera anicteric  Oral Cavity: ET tube in place  Neck: no JVD  Respiratory.  On ventilatory support  Heart: normal sounds  Abdomen: soft and non tender   : no damico  Lower Extremities: no edema  Neuro: On sedation        Assessment/Plan:     Acute kidney injury on chronic kidney disease stage IIIa  2/2 anuric ischemic ATN from septic shock  On admission BUN/creatinine 58/2.06 and peaked at 72/3.09  Baseline creatinine 1.36 on 3/12/2024  Renal ultrasound no hydronephrosis  Off CRRT since 6/03  Was volume up/worsening BUN/hyperkalemia  reinitiated on hemodialysis  Status post dialyzed on 6/08 and removed 2 L  Was dialyzed on 6/11 and removed 2.0 L.  Was dialyzed on 6/15 and removed 2.0 L.  Plan to continue hemodialysis Tuesday Thursday Saturday.    Hyperkalemia  Resolved  Hemodialysis with 2K bath    PEA cardiac arrest  Rapid response was called on 6/09 due to unresponsiveness and no pulse  Required CPR  Reintubated  On no pressor support  Chest x-ray no pulmonary edema  Tele neuro consultation done    Status post cardiac arrest  V-fib  Status post DC shock cardioversion    Acute hypoxic respiratory failure  Aspiration pneumonia  Underlying COPD  On IV antibiotics  Chest x-ray today showed new infiltrates    Anemia   hemoglobin 7.9  Iron saturation 12 and ferritin 1100   Started on Epo subcu  Blood transfusion if Hb less than 7.0    Atrial fibrillation  on IV heparin drip  Goal K is 4.0    Plan:       Signed By: Sol Ferraro MD     June 18,

## 2024-06-18 NOTE — PROGRESS NOTES
CARDIOLOGY PROGRESS NOTE      Patient Name: Agustín Willis  Age: 79 y.o.  Gender:male  :1945  MRN: [id number]    Agustín Willis is a 78 y.o. year-old male with past medical history significant for hypertension, COPD, cocaine abuse, CKD who was evaluated today due to cardiac arrest. Patient initially presented to the ED via EMS from nursing home due to respiratory distress after vomiting breakfast. Required intubation in ED. Apparently with VF arrest in ED, shock x4.     Patient seen and examined in ICU. Remains intubated.  Finishing HD.      Telemetry reviewed.  SR 60s    Unable to obtain ROS. Current medications reviewed.    Physical Examination    Allergies   Allergen Reactions    Penicillin G Other (See Comments)     Pt states he passes out    Sulfamethoxazole-Trimethoprim      Pt states he passes out     Vitals:    24 1115   BP: (!) 117/91   Pulse: 70   Resp: 12   Temp:    SpO2: 99%     Vital signs are stable   Intubated  Heart has a regular rate and rhythm  Lungs are coarse  Abdomen is soft  Extremities have mild edema  Skin is dry    Labs reviewed:  Recent Results (from the past 12 hour(s))   Vancomycin Level, Random    Collection Time: 24  1:00 AM   Result Value Ref Range    Vancomycin Rm 16.7 ug/mL    Dose Date/Time Not provided      DOSE AMOUNT Not provided Units   CBC with Auto Differential    Collection Time: 24  1:00 AM   Result Value Ref Range    WBC 11.5 (H) 4.1 - 11.1 K/uL    RBC 2.87 (L) 4.10 - 5.70 M/uL    Hemoglobin 8.6 (L) 12.1 - 17.0 g/dL    Hematocrit 26.1 (L) 36.6 - 50.3 %    MCV 90.9 80.0 - 99.0 FL    MCH 30.0 26.0 - 34.0 PG    MCHC 33.0 30.0 - 36.5 g/dL    RDW 16.5 (H) 11.5 - 14.5 %    Platelets 206 150 - 400 K/uL    MPV 9.8 8.9 - 12.9 FL    Nucleated RBCs 0.0 0.0  WBC    nRBC 0.00 0.00 - 0.01 K/uL    Neutrophils % 87 (H) 32 - 75 %    Lymphocytes % 8 (L) 12 - 49 %    Monocytes % 4 (L) 5 - 13 %    Eosinophils % 0 0 - 7 %    Basophils % 0 0 - 1 %    Immature  Granulocytes % 1 (H) 0 - 0.5 %    Neutrophils Absolute 10.0 (H) 1.8 - 8.0 K/UL    Lymphocytes Absolute 0.9 0.8 - 3.5 K/UL    Monocytes Absolute 0.4 0.0 - 1.0 K/UL    Eosinophils Absolute 0.0 0.0 - 0.4 K/UL    Basophils Absolute 0.0 0.0 - 0.1 K/UL    Immature Granulocytes Absolute 0.2 (H) 0.00 - 0.04 K/UL    Differential Type AUTOMATED     C-Reactive Protein    Collection Time: 06/18/24  1:00 AM   Result Value Ref Range    CRP 1.46 (H) 0.00 - 0.30 mg/dL   Procalcitonin    Collection Time: 06/18/24  1:00 AM   Result Value Ref Range    Procalcitonin 0.38 (H) 0 ng/mL   POCT Glucose    Collection Time: 06/18/24  1:17 AM   Result Value Ref Range    POC Glucose 116 (H) 65 - 100 mg/dL    Performed by: Juan A Arvizu    Blood Gas, Arterial    Collection Time: 06/18/24  3:57 AM   Result Value Ref Range    pH, Arterial 7.47 (H) 7.35 - 7.45      pCO2, Arterial 34 (L) 35 - 45 mmHg    pO2, Arterial 64 (L) 80 - 100 mmHg    O2 Sat, Arterial 93 (L) 95 - 99 %    HCO3, Arterial 25 22 - 26 mmol/L    Base Excess, Arterial 1.1 0 - 3 mmol/L    O2 Method VENT      FIO2 Arterial 30 %    Mode ASSIST CONTROL      POC TIDAL VOLUME 410.0      Set Rate, POC 12      POC PEEP/CPA 5.0      Source Arterial      Site Left Radial      Efrain Test YES      Carboxyhgb, Arterial 0.1 (L) 1 - 2 %    Methemoglobin, Arterial 0.3 0 - 1.4 %    Oxyhemoglobin 92.4 (L) 95 - 99 %    Performed by: Marc Lowery     Temperature 96.8     POCT Glucose    Collection Time: 06/18/24  5:46 AM   Result Value Ref Range    POC Glucose 95 65 - 100 mg/dL    Performed by: Juan A Arvizu    Vancomycin Level, Random    Collection Time: 06/18/24 11:30 AM   Result Value Ref Range    Vancomycin Rm 9.4 ug/mL    Dose Date/Time Not provided      DOSE AMOUNT Not provided Units        Case discussed with Dr. Pulido and our impression and recommendations are as follows:  Cardiac arrest, VF per report but no strips  Repeat echo this admission with EF 65-70%, moderate hypokinesis mid inferoseptal  segment   Issues with hyperkalemia, now corrected  Troponin negative   New RBBB-VQ and cta chest not done but was on heparin gtt due to VTE up LUE  Unable to complete stress testing due to coarse, congested lungs   Repeat arrest, PEA per reports   Aortic valve regurgitation, previously mild to moderate in Feb. 2024. Noted as moderate to severe on repeat echo. Will continue to monitor with serial echos     Elevated Ddimer, >20. Venous duplex upper extremities positive for DVT, negative in the lower extremities. Recommended chest imaging but deferred to primary/pulm, hep gtt on hold due to sung blood in suction line, resume when able  Atrial fibrillation with RVR, now sinus rhythm. Heparin gtt as able. Ok to hold oral amiodarone given recent bradycardia. Continue telemetry. Keep electrolytes WNL. Continue scopolamine patch    Hypotension, BP acceptable, has been on and off pressors   Aspiration pneumonia, per pulmonary/ID  FIDEL on CKD, HD per nephrology  COPD, per pulmonary/primary   Hx polysubstance abuse, UDS negative this visit      Prognosis guarded. Ethics committee involved.    Please do not hesitate to call if additional questions arise.    ROMULO SOLIS, APRN - NP  6/18/2024

## 2024-06-18 NOTE — PROGRESS NOTES
Progress Note  Date:2024       Room:[facility]  Patient Name:Agustín Willis     YOB: 1945     Age:79 y.o.        Subjective    Subjective  Patient followed septic shock with presumptive aspiration pneumonia secondary now to MRSA on IV Vancomycin. Procal and CRP decreasing. CXR unchanged. He remains intubated in the ICU.      Objective         Vitals Last 24 Hours:  TEMPERATURE:  Temp  Av.8 °F (36 °C)  Min: 96.3 °F (35.7 °C)  Max: 97.4 °F (36.3 °C)  RESPIRATIONS RANGE: Resp  Av.9  Min: 5  Max: 32  PULSE OXIMETRY RANGE: SpO2  Av.6 %  Min: 95 %  Max: 99 %  PULSE RANGE: Pulse  Av.4  Min: 47  Max: 79  BLOOD PRESSURE RANGE: Systolic (24hrs), Av , Min:105 , Max:146   ; Diastolic (24hrs), Av, Min:72, Max:98         Objective:  Vital signs: (most recent): Blood pressure (!) 118/90, pulse 67, temperature 96.9 °F (36.1 °C), temperature source Rectal, resp. rate 12, height 1.73 m (5' 8.11\"), weight 81.9 kg (180 lb 8.9 oz), SpO2 98 %.      Vitals and nursing note reviewed.   Constitutional:       General: He is in acute distress.      Appearance: He is ill-appearing and toxic-appearing.   HENT:      Head: Normocephalic and atraumatic.      Right Ear: External ear normal.      Left Ear: External ear normal.      Nose: Nose normal.      Mouth/Throat: drooling     Comments ET tube/orogastric tube  Cardiovascular:      Rate and Rhythm: Normal rate and regular rhythm.      Heart sounds: No murmur heard.  Pulmonary:      Breath sounds: Rhonchi present. No wheezing or rales.   Abdominal:      General: Bowel sounds are normal. There is no distension.      Palpations: Abdomen is soft.      Tenderness: There is no abdominal tenderness.   Genitourinary:  external urinary device    Musculoskeletal:      Cervical back: Neck supple.      Right lower leg: No edema.      Left lower leg: No pham   Skin:     Findings: No rash.   Neurological: intubated  Psychiatric: intubated      Labs/Imaging/Diagnostics     Labs:  CBC:  Recent Labs     06/16/24  0043 06/17/24  0120 06/18/24  0100   WBC 15.8* 14.6* 11.5*   RBC 2.83* 2.61* 2.87*   HGB 8.2* 7.6* 8.6*   HCT 25.2* 23.7* 26.1*   MCV 89.0 90.8 90.9   RDW 15.9* 16.2* 16.5*    206 206       CHEMISTRIES:  Recent Labs     06/16/24  0043 06/17/24  0120   * 132*   K 4.2 4.5   CL 97 96*   CO2 31 28   BUN 49* 64*   CREATININE 1.58* 2.08*   GLUCOSE 108* 128*   PHOS 3.5 4.8*   MG 1.9 1.9         Lactic acid 6.39      Procal 0.38 <2.45 <4.01 <5.46 <6.77 <5.94 <0.27 <0.71 <2.45  <72.72 <0.22                  CRP 1.46 < 6.71 <13.60 <12.40 <12.00 <17.00 <18.70 < 16.20 < 12.30 <20.20 <29.80                MRSA nasal PCR Positive    Blood cultures (5/29) No growth FINAL  Blood cultures (5/29)  No growth FINAL  Urine culture (5/29) No growth FINAL  Sputum culture (5/29) Light normal respiratory doug FINAL  Sputum culture (6/11)    Methicillin Resistant Staphylococcus aureus     Methicillin-Resistant Staphylococcus aureus (1)    Antibiotic Interpretation Microscan     clindamycin Resistant >=4 ug/mL    ciprofloxacin Resistant >=8 ug/mL    doxycycline Sensitive 1 ug/mL    erythromycin Resistant >=8 ug/mL    gentamicin Sensitive <=0.5 ug/mL    levofloxacin Resistant >=8 ug/mL    linezolid Sensitive 2 ug/mL    oxacillin Resistant >=4 ug/mL    rifampin Sensitive <=0.5 ug/mL    tetracycline Sensitive 2 ug/mL    trimethoprim-sulfamethoxazole Sensitive <=10 ug/mL    vancomycin Sensitive 1 ug/mL      Imaging Last 24 Hours:    XR CHEST PORTABLE    Result Date: 6/18/2024    No significant change              .            Assessment//Plan           Hospital Problems             Last Modified POA    * (Principal) Acute respiratory failure (HCC) 5/29/2024 Yes    Cardiac arrest (HCC) 5/29/2024 Yes    Septic shock ([facility]) 6/12/2024 Yes    Aspiration pneumonia of right lung due to gastric secretions ([facility]) 6/12/2024 Yes    Admitted to intensive care unit 6/12/2024 Yes    Pneumonia of right upper

## 2024-06-18 NOTE — PLAN OF CARE
Problem: Safety - Adult  Goal: Free from fall injury  Outcome: Progressing     Problem: Discharge Planning  Goal: Discharge to home or other facility with appropriate resources  Outcome: Progressing  Flowsheets (Taken 6/17/2024 2000)  Discharge to home or other facility with appropriate resources: Identify barriers to discharge with patient and caregiver     Problem: Chronic Conditions and Co-morbidities  Goal: Patient's chronic conditions and co-morbidity symptoms are monitored and maintained or improved  Outcome: Progressing  Flowsheets (Taken 6/17/2024 2000)  Care Plan - Patient's Chronic Conditions and Co-Morbidity Symptoms are Monitored and Maintained or Improved: Monitor and assess patient's chronic conditions and comorbid symptoms for stability, deterioration, or improvement     Problem: Skin/Tissue Integrity  Goal: Absence of new skin breakdown  Description: 1.  Monitor for areas of redness and/or skin breakdown  2.  Assess vascular access sites hourly  3.  Every 4-6 hours minimum:  Change oxygen saturation probe site  4.  Every 4-6 hours:  If on nasal continuous positive airway pressure, respiratory therapy assess nares and determine need for appliance change or resting period.  Outcome: Progressing     Problem: Neurosensory - Adult  Goal: Achieves stable or improved neurological status  Outcome: Progressing  Flowsheets (Taken 6/17/2024 2000)  Achieves stable or improved neurological status: Assess for and report changes in neurological status  Goal: Absence of seizures  Outcome: Progressing  Flowsheets (Taken 6/17/2024 2000)  Absence of seizures:   Monitor for seizure activity.  If seizure occurs, document type and location of movements and any associated apnea   If seizure occurs, turn head to side and suction secretions as needed  Goal: Remains free of injury related to seizures activity  Outcome: Progressing  Flowsheets (Taken 6/17/2024 2000)  Remains free of injury related to seizure activity:  values that may help or hinder letting go of issue.  6. Help patient/family explore feelings of anger, bitterness, resentment.  7. Help patient/family identify and examine the situation in which these feelings are experienced.  8. Help patient/family identify destructive displacement of feelings onto other individuals.  9. Invite use of sacraments/rituals/ceremonies as appropriate (e.g. - confession, anointing, smudging).  10. Refer patient/family to formal counseling and/or to [facility]i community for further support work.  Outcome: Progressing     Problem: Nutrition Deficit:  Goal: Optimize nutritional status  6/17/2024 2339 by Luh Velazco RN  Outcome: Progressing  6/17/2024 1146 by Angella Dueñas RD  Outcome: Progressing  Flowsheets (Taken 6/17/2024 1146)  Nutrient intake appropriate for improving, restoring, or maintaining nutritional needs:   Assess nutritional status and recommend course of action   Recommend, monitor, and adjust tube feedings and TPN/PPN based on assessed needs

## 2024-06-19 LAB
ALBUMIN SERPL-MCNC: 2.2 G/DL (ref 3.5–5)
ALBUMIN/GLOB SERPL: 0.5 (ref 1.1–2.2)
ALP SERPL-CCNC: 75 U/L (ref 45–117)
ALT SERPL-CCNC: 17 U/L (ref 12–78)
ANION GAP SERPL CALC-SCNC: 9 MMOL/L (ref 5–15)
ARTERIAL PATENCY WRIST A: YES
AST SERPL W P-5'-P-CCNC: 16 U/L (ref 15–37)
BASE EXCESS BLDA CALC-SCNC: 1.1 MMOL/L (ref 0–3)
BDY SITE: ABNORMAL
BILIRUB SERPL-MCNC: 0.5 MG/DL (ref 0.2–1)
BODY TEMPERATURE: 97.2
BUN SERPL-MCNC: 53 MG/DL (ref 6–20)
BUN/CREAT SERPL: 29 (ref 12–20)
CA-I BLD-MCNC: 1.13 MMOL/L (ref 1.13–1.32)
CA-I BLD-MCNC: 8.6 MG/DL (ref 8.5–10.1)
CHLORIDE SERPL-SCNC: 97 MMOL/L (ref 97–108)
CO2 SERPL-SCNC: 27 MMOL/L (ref 21–32)
COHGB MFR BLD: 0.1 % (ref 1–2)
CREAT SERPL-MCNC: 1.83 MG/DL (ref 0.7–1.3)
CRP SERPL-MCNC: 1.12 MG/DL (ref 0–0.3)
ERYTHROCYTE [DISTWIDTH] IN BLOOD BY AUTOMATED COUNT: 17.3 % (ref 11.5–14.5)
FIO2 ON VENT: 0.3 %
GAS FLOW.O2 SETTING OXYMISER: 12
GLOBULIN SER CALC-MCNC: 4.4 G/DL (ref 2–4)
GLUCOSE BLD STRIP.AUTO-MCNC: 113 MG/DL (ref 65–100)
GLUCOSE SERPL-MCNC: 109 MG/DL (ref 65–100)
HCO3 BLDA-SCNC: 25 MMOL/L (ref 22–26)
HCT VFR BLD AUTO: 29.7 % (ref 36.6–50.3)
HGB BLD-MCNC: 9.5 G/DL (ref 12.1–17)
MCH RBC QN AUTO: 29.8 PG (ref 26–34)
MCHC RBC AUTO-ENTMCNC: 32 G/DL (ref 30–36.5)
MCV RBC AUTO: 93.1 FL (ref 80–99)
METHGB MFR BLD: 0.4 % (ref 0–1.4)
NRBC # BLD: 0.02 K/UL (ref 0–0.01)
NRBC BLD-RTO: 0.2 PER 100 WBC
OXYHGB MFR BLD: 92.9 % (ref 95–99)
PCO2 BLDA: 35 MMHG (ref 35–45)
PEEP RESPIRATORY: 5
PERFORMED BY:: ABNORMAL
PERFORMED BY:: ABNORMAL
PH BLDA: 7.46 (ref 7.35–7.45)
PHOSPHATE SERPL-MCNC: 5.4 MG/DL (ref 2.6–4.7)
PLATELET # BLD AUTO: 211 K/UL (ref 150–400)
PMV BLD AUTO: 10.7 FL (ref 8.9–12.9)
PO2 BLDA: 66 MMHG (ref 80–100)
POTASSIUM SERPL-SCNC: 4.8 MMOL/L (ref 3.5–5.1)
PROCALCITONIN SERPL-MCNC: 0.25 NG/ML
PROT SERPL-MCNC: 6.6 G/DL (ref 6.4–8.2)
RBC # BLD AUTO: 3.19 M/UL (ref 4.1–5.7)
SAO2 % BLD: 93 % (ref 95–99)
SAO2% DEVICE SAO2% SENSOR NAME: ABNORMAL
SODIUM SERPL-SCNC: 133 MMOL/L (ref 136–145)
SPECIMEN SITE: ABNORMAL
UFH PPP CHRO-ACNC: 0.44 IU/ML
VENTILATION MODE VENT: ABNORMAL
VT SETTING VENT: 410
WBC # BLD AUTO: 10.3 K/UL (ref 4.1–11.1)

## 2024-06-19 PROCEDURE — 85520 HEPARIN ASSAY: CPT

## 2024-06-19 PROCEDURE — 84145 PROCALCITONIN (PCT): CPT

## 2024-06-19 PROCEDURE — 94640 AIRWAY INHALATION TREATMENT: CPT

## 2024-06-19 PROCEDURE — 86140 C-REACTIVE PROTEIN: CPT

## 2024-06-19 PROCEDURE — 2580000003 HC RX 258: Performed by: FAMILY MEDICINE

## 2024-06-19 PROCEDURE — 6360000002 HC RX W HCPCS: Performed by: INTERNAL MEDICINE

## 2024-06-19 PROCEDURE — 80053 COMPREHEN METABOLIC PANEL: CPT

## 2024-06-19 PROCEDURE — 6370000000 HC RX 637 (ALT 250 FOR IP): Performed by: INTERNAL MEDICINE

## 2024-06-19 PROCEDURE — 36600 WITHDRAWAL OF ARTERIAL BLOOD: CPT

## 2024-06-19 PROCEDURE — 82803 BLOOD GASES ANY COMBINATION: CPT

## 2024-06-19 PROCEDURE — 84100 ASSAY OF PHOSPHORUS: CPT

## 2024-06-19 PROCEDURE — 6360000002 HC RX W HCPCS: Performed by: FAMILY MEDICINE

## 2024-06-19 PROCEDURE — 36415 COLL VENOUS BLD VENIPUNCTURE: CPT

## 2024-06-19 PROCEDURE — 82962 GLUCOSE BLOOD TEST: CPT

## 2024-06-19 PROCEDURE — 2000000000 HC ICU R&B

## 2024-06-19 PROCEDURE — 94761 N-INVAS EAR/PLS OXIMETRY MLT: CPT

## 2024-06-19 PROCEDURE — 82330 ASSAY OF CALCIUM: CPT

## 2024-06-19 PROCEDURE — 2700000000 HC OXYGEN THERAPY PER DAY

## 2024-06-19 PROCEDURE — 6370000000 HC RX 637 (ALT 250 FOR IP): Performed by: FAMILY MEDICINE

## 2024-06-19 PROCEDURE — 94003 VENT MGMT INPAT SUBQ DAY: CPT

## 2024-06-19 PROCEDURE — 85027 COMPLETE CBC AUTOMATED: CPT

## 2024-06-19 PROCEDURE — 99233 SBSQ HOSP IP/OBS HIGH 50: CPT | Performed by: INTERNAL MEDICINE

## 2024-06-19 RX ADMIN — THIAMINE HCL TAB 100 MG 100 MG: 100 TAB at 08:42

## 2024-06-19 RX ADMIN — GLYCOPYRROLATE 0.1 MG: 0.2 INJECTION INTRAMUSCULAR; INTRAVENOUS at 13:43

## 2024-06-19 RX ADMIN — IPRATROPIUM BROMIDE AND ALBUTEROL SULFATE 1 DOSE: 2.5; .5 SOLUTION RESPIRATORY (INHALATION) at 19:22

## 2024-06-19 RX ADMIN — HEPARIN SODIUM 13 UNITS/KG/HR: 10000 INJECTION, SOLUTION INTRAVENOUS at 13:40

## 2024-06-19 RX ADMIN — ATORVASTATIN CALCIUM 40 MG: 40 TABLET, FILM COATED ORAL at 19:33

## 2024-06-19 RX ADMIN — EPOETIN ALFA-EPBX 10000 UNITS: 10000 INJECTION, SOLUTION INTRAVENOUS; SUBCUTANEOUS at 18:03

## 2024-06-19 RX ADMIN — SODIUM CHLORIDE, PRESERVATIVE FREE 10 ML: 5 INJECTION INTRAVENOUS at 19:34

## 2024-06-19 RX ADMIN — IPRATROPIUM BROMIDE AND ALBUTEROL SULFATE 1 DOSE: 2.5; .5 SOLUTION RESPIRATORY (INHALATION) at 13:52

## 2024-06-19 RX ADMIN — FENTANYL CITRATE 50 MCG: 50 INJECTION, SOLUTION INTRAMUSCULAR; INTRAVENOUS at 15:15

## 2024-06-19 RX ADMIN — METHYLPREDNISOLONE SODIUM SUCCINATE 40 MG: 40 INJECTION INTRAMUSCULAR; INTRAVENOUS at 17:08

## 2024-06-19 RX ADMIN — SODIUM CHLORIDE, PRESERVATIVE FREE 10 ML: 5 INJECTION INTRAVENOUS at 08:43

## 2024-06-19 RX ADMIN — IPRATROPIUM BROMIDE AND ALBUTEROL SULFATE 1 DOSE: 2.5; .5 SOLUTION RESPIRATORY (INHALATION) at 01:44

## 2024-06-19 RX ADMIN — FENTANYL CITRATE 50 MCG: 50 INJECTION, SOLUTION INTRAMUSCULAR; INTRAVENOUS at 10:01

## 2024-06-19 RX ADMIN — ASPIRIN 81 MG 81 MG: 81 TABLET ORAL at 08:42

## 2024-06-19 RX ADMIN — FINASTERIDE 5 MG: 5 TABLET, FILM COATED ORAL at 08:42

## 2024-06-19 RX ADMIN — FENTANYL CITRATE 50 MCG: 50 INJECTION, SOLUTION INTRAMUSCULAR; INTRAVENOUS at 05:14

## 2024-06-19 RX ADMIN — FOLIC ACID 1 MG: 1 TABLET ORAL at 08:42

## 2024-06-19 RX ADMIN — METHYLPREDNISOLONE SODIUM SUCCINATE 40 MG: 40 INJECTION INTRAMUSCULAR; INTRAVENOUS at 08:42

## 2024-06-19 RX ADMIN — FERROUS SULFATE TAB 325 MG (65 MG ELEMENTAL FE) 325 MG: 325 (65 FE) TAB at 08:42

## 2024-06-19 RX ADMIN — GLYCOPYRROLATE 0.1 MG: 0.2 INJECTION INTRAMUSCULAR; INTRAVENOUS at 03:33

## 2024-06-19 RX ADMIN — GLYCOPYRROLATE 0.1 MG: 0.2 INJECTION INTRAMUSCULAR; INTRAVENOUS at 19:33

## 2024-06-19 RX ADMIN — METHYLPREDNISOLONE SODIUM SUCCINATE 40 MG: 40 INJECTION INTRAMUSCULAR; INTRAVENOUS at 01:02

## 2024-06-19 RX ADMIN — IPRATROPIUM BROMIDE AND ALBUTEROL SULFATE 1 DOSE: 2.5; .5 SOLUTION RESPIRATORY (INHALATION) at 07:13

## 2024-06-19 ASSESSMENT — PULMONARY FUNCTION TESTS
PIF_VALUE: 16
PIF_VALUE: 19
PIF_VALUE: 18
PIF_VALUE: 20
PIF_VALUE: 19
PIF_VALUE: 20
PIF_VALUE: 19
PIF_VALUE: 20
PIF_VALUE: 19
PIF_VALUE: 19
PIF_VALUE: 23
PIF_VALUE: 19
PIF_VALUE: 20
PIF_VALUE: 19
PIF_VALUE: 18
PIF_VALUE: 17
PIF_VALUE: 19
PIF_VALUE: 20
PIF_VALUE: 18
PIF_VALUE: 18
PIF_VALUE: 20
PIF_VALUE: 19
PIF_VALUE: 31
PIF_VALUE: 18
PIF_VALUE: 22
PIF_VALUE: 28
PIF_VALUE: 20
PIF_VALUE: 20
PIF_VALUE: 21
PIF_VALUE: 18
PIF_VALUE: 19
PIF_VALUE: 20
PIF_VALUE: 20
PIF_VALUE: 18
PIF_VALUE: 19
PIF_VALUE: 19
PIF_VALUE: 18

## 2024-06-19 ASSESSMENT — PAIN SCALES - GENERAL
PAINLEVEL_OUTOF10: 0
PAINLEVEL_OUTOF10: 1
PAINLEVEL_OUTOF10: 0

## 2024-06-19 NOTE — PROGRESS NOTES
Nephrology f/u          Patient: Agustín Willis MRN: [id number]  SSN: [id number]    YOB: 1945  Age: 79 y.o.  Sex: male      Subjective:   I have seen the patient in ICU.  On vent  Off single pressor  Was dialyzed yesterday    Past Medical History:   Diagnosis Date    Benign prostatic disease 8/3/2020    Hypertrophy with Outflow Obstruction    Benign prostatic hyperplasia 8/3/2020    Cervical radiculopathy 8/3/2020    Chronic obstructive lung disease (HCC) 8/3/2020    Cocaine abuse (HCC) 8/3/2020    Dizziness and giddiness 8/3/2020    Hypercholesterolemia 8/3/2020    Hypertensive disorder 8/3/2020    Insomnia 8/3/2020    Kidney disease 8/3/2020    Low back pain 8/3/2020    Osteoarthritis of knee 8/3/2020    Sleep apnea 8/3/2020    Vasovagal syncope 8/3/2020     Past Surgical History:   Procedure Laterality Date    IR NONTUNNELED VASCULAR CATHETER  5/30/2024    IR NONTUNNELED VASCULAR CATHETER 5/30/2024 Stephy Abreu APRN - NP SSR RAD ANGIO IR      Family History   Problem Relation Age of Onset    Hypertension Mother      Social History     Tobacco Use    Smoking status: Former    Smokeless tobacco: Never   Substance Use Topics    Alcohol use: Yes      Current Facility-Administered Medications   Medication Dose Route Frequency Provider Last Rate Last Admin    vancomycin (VANCOCIN) 500 mg in sodium chloride 0.9 % 100 mL IVPB (mini-bag)  500 mg IntraVENous Once per day on Tue Thu Sat Riley Alonzo MD   Stopped at 06/18/24 1745    [START ON 6/22/2024] Vancomycin - please draw level 6/22 with AM labs.  Thanks!   Other Once Riley Alonzo MD        fentaNYL (SUBLIMAZE) injection 50 mcg  50 mcg IntraVENous Q1H PRN Jeremy Ramos MD   50 mcg at 06/19/24 1001    glycopyrrolate (ROBINUL) injection 0.1 mg  0.1 mg IntraVENous Q8H Jeremy Ramos MD   0.1 mg at 06/19/24 0333    0.9 % sodium chloride infusion   IntraVENous PRN Jeremy Ramos MD        0.9 % sodium chloride infusion    Units at 06/03/24 1231    And    heparin (porcine) injection 1,100-1,900 Units  1,100-1,900 Units IntraCATHeter PRN Sol Ferraro MD   1,100 Units at 06/03/24 1226    sodium chloride 0.9 % bolus 2,448 mL  30 mL/kg IntraVENous Once Mikaela Arredondo MD   Held at 05/29/24 1405    ferrous sulfate (IRON 325) tablet 325 mg  325 mg Oral Daily with breakfast Mikaela Arredondo MD   325 mg at 06/19/24 0842    finasteride (PROSCAR) tablet 5 mg  5 mg Oral Daily Mikaela Arredondo MD   5 mg at 06/19/24 0842    folic acid (FOLVITE) tablet 1 mg  1 mg Oral Daily Mikaela Arredondo MD   1 mg at 06/19/24 0842    thiamine mononitrate tablet 100 mg  100 mg Oral Daily Mikaela Arredondo MD   100 mg at 06/19/24 0842    sodium chloride flush 0.9 % injection 5-40 mL  5-40 mL IntraVENous 2 times per day Mikaela Arredondo MD   10 mL at 06/19/24 0843    sodium chloride flush 0.9 % injection 5-40 mL  5-40 mL IntraVENous PRN Mikaela Arredondo MD        0.9 % sodium chloride infusion   IntraVENous PRN Mikaela Arredondo MD        ondansetron (ZOFRAN-ODT) disintegrating tablet 4 mg  4 mg Oral Q8H PRN Mikaela Arredondo MD        Or    ondansetron (ZOFRAN) injection 4 mg  4 mg IntraVENous Q6H PRN Mikaela Arredondo MD   4 mg at 06/18/24 2059    polyethylene glycol (GLYCOLAX) packet 17 g  17 g Oral Daily PRN Mikaela Arredondo MD   17 g at 06/15/24 1800    acetaminophen (TYLENOL) tablet 650 mg  650 mg Oral Q6H PRN Mikaela Arredondo MD   650 mg at 06/09/24 2047    Or    acetaminophen (TYLENOL) suppository 650 mg  650 mg Rectal Q6H PRN Mikaela Arredondo MD        glucose chewable tablet 16 g  4 tablet Oral PRN Mikaela Arredondo MD        dextrose bolus 10% 125 mL  125 mL IntraVENous PRN Mikaela Arredondo MD        Or    dextrose bolus 10% 250 mL  250 mL IntraVENous PRN Mikaela Arredondo MD        glucagon injection 1 mg  1 mg SubCUTAneous PRN Mikaela Arredondo MD        dextrose 10 % infusion   IntraVENous Continuous PRN Mikaela Arredondo MD

## 2024-06-19 NOTE — PROGRESS NOTES
IMPRESSION:   Acute hypoxic respiratory failure extubated on 6/5 and reintubated on 6/10  Status post cardiac arrest  Chronic atrial fibrillation  Septic shock  Left upper extremity DVT  Stroke embolic  Sepsis treated with antibiotics  Aspiration pneumonia right midlung  COPD   MRSA nasal colonization  History of cocaine abuse polysubstance abuse in the past  Pt is critically ill. Time spent with pt and staff actively rendering care, managing pt and coordinating care as stated below; 30 minutes, exclusive of any procedures      RECOMMENDATIONS/PLAN:   Extubated  on 6/5 reintubated on 6/10 on ventilator   Not on any sedation since 6/70  Mental status has not changed patient had a stroke eyes were rolled  Chest x-ray with continued right lower infiltrate CTA chest showed bilateral infiltrates consistent with aspiration  MRI of the brain was done which shows small foci of acute infarction in the supratentorial region and cerebellum EEG shows diffuse encephalopathy  Patient has a lot of secretion patient on Robinul  Chest x-ray shows right midlung infiltrate fluid overload congestive change left lung clear  Remains on Merrem and vancomycin sputum with normal doug  Heart rate controlled on amiodarone   Renal function improving off CVVH as needed dialysis  Status post cardiac arrest patient coded   Patient is off Levophed     [x] High complexity decision making was performed  [x] See my orders for details  HPI  78-year-old male nursing home resident came in because of shortness of breath respiratory distress patient has episodes of vomiting after eating breakfast this morning subsequently came to the hospital saturation was in 70s he was back to the emergency room subsequently intubated and then he coded hemodynamically unstable now he is on 2 pressors vasopressin and Levophed denies intubated on ventilator critical care consult was called, past medical history of polysubstance abuse cocaine abuse COPD hypertension  insomnia      PMH:  has a past medical history of Benign prostatic disease, Benign prostatic hyperplasia, Cervical radiculopathy, Chronic obstructive lung disease (HCC), Cocaine abuse (HCC), Dizziness and giddiness, Hypercholesterolemia, Hypertensive disorder, Insomnia, Kidney disease, Low back pain, Osteoarthritis of knee, Sleep apnea, and Vasovagal syncope.    PSH:   has a past surgical history that includes IR NONTUNNELED VASCULAR CATHETER > 5 YEARS (5/30/2024).     FHX: family history includes Hypertension in his mother.     SHX:  reports that he has quit smoking. He has never used smokeless tobacco. He reports current alcohol use. He reports current drug use. Frequency: 3.00 times per week. Drug: Cocaine.    ALL:   Allergies   Allergen Reactions    Penicillin G Other (See Comments)     Pt states he passes out    Sulfamethoxazole-Trimethoprim      Pt states he passes out        MEDS:   [x] Reviewed - As Below   [] Not reviewed    Current Facility-Administered Medications   Medication Dose Route Frequency Provider Last Rate Last Admin    vancomycin (VANCOCIN) 500 mg in sodium chloride 0.9 % 100 mL IVPB (mini-bag)  500 mg IntraVENous Once per day on Tue Thu Sat Riley Alonzo MD   Stopped at 06/18/24 1745    [START ON 6/22/2024] Vancomycin - please draw level 6/22 with AM labs.  Thanks!   Other Once Riley Alonzo MD        fentaNYL (SUBLIMAZE) injection 50 mcg  50 mcg IntraVENous Q1H PRN Jeremy Ramos MD   50 mcg at 06/19/24 0514    glycopyrrolate (ROBINUL) injection 0.1 mg  0.1 mg IntraVENous Q8H Jeremy Ramos MD   0.1 mg at 06/19/24 0333    0.9 % sodium chloride infusion   IntraVENous PRN Jeremy Ramos MD        0.9 % sodium chloride infusion   IntraVENous PRN Mikaela Arredondo MD        atorvastatin (LIPITOR) tablet 40 mg  40 mg Oral Nightly Mikaela Arredondo MD   40 mg at 06/18/24 2003    scopolamine (TRANSDERM-SCOP) transdermal patch 1 patch  1 patch TransDERmal Q72H Darrell Calixto,  (FOLVITE) tablet 1 mg  1 mg Oral Daily Mikaela Arredondo MD   1 mg at 06/18/24 0814    thiamine mononitrate tablet 100 mg  100 mg Oral Daily Mikaela Arredondo MD   100 mg at 06/18/24 0814    sodium chloride flush 0.9 % injection 5-40 mL  5-40 mL IntraVENous 2 times per day Mikaela Arredondo MD   10 mL at 06/18/24 2003    sodium chloride flush 0.9 % injection 5-40 mL  5-40 mL IntraVENous PRN Mikaela Arredondo MD        0.9 % sodium chloride infusion   IntraVENous PRN Mikaela Arredondo MD        ondansetron (ZOFRAN-ODT) disintegrating tablet 4 mg  4 mg Oral Q8H PRN Mikaela Arredondo MD        Or    ondansetron (ZOFRAN) injection 4 mg  4 mg IntraVENous Q6H PRN Mikaela Arredondo MD   4 mg at 06/18/24 2059    polyethylene glycol (GLYCOLAX) packet 17 g  17 g Oral Daily PRN Mikaela Arredondo MD   17 g at 06/15/24 1800    acetaminophen (TYLENOL) tablet 650 mg  650 mg Oral Q6H PRN Mikaela Arredondo MD   650 mg at 06/09/24 2047    Or    acetaminophen (TYLENOL) suppository 650 mg  650 mg Rectal Q6H PRN Mikaela Arredondo MD        glucose chewable tablet 16 g  4 tablet Oral PRN Mikaela Arredondo MD        dextrose bolus 10% 125 mL  125 mL IntraVENous PRN Mikaela Arredondo MD        Or    dextrose bolus 10% 250 mL  250 mL IntraVENous PRN Mikaela Arredondo MD        glucagon injection 1 mg  1 mg SubCUTAneous PRN Mikaela Arredondo MD        dextrose 10 % infusion   IntraVENous Continuous PRN Mikaela Arredondo MD            ARTERIAL BLOOD GAS  Recent Labs     06/19/24  0238   PHART 7.46*   [id number] 35   PO2ART 66*   [id number] 25   BEART 1.1   FIO2A 0.30   [id number] 93*   OXYHEM 92.9*   CARBOXHGBART 0.1*   METHGBART 0.4   TEMP 97.2     6/2 a/C14  PEEP 5 FiO2 35% with pO2 60 pCO2 32 pH 7.47   6/1-A/C 14  PEEP 7 FiO2 40% with pO2 85 pCO2 35 pH 7.41  Labs:    Recent Labs     06/17/24  0120 06/18/24  0100 06/19/24  0230   WBC 14.6* 11.5* 10.3   HGB 7.6* 8.6* 9.5*    206 211     Procalcitonin 35.7, 50.1, 72.7,  Recent Labs

## 2024-06-19 NOTE — PROGRESS NOTES
General Daily Progress Note      Patient Name:   Agustín Willis       YOB: 1945       Age:  79 y.o.      Admit Date: 5/29/2024      Subjective:   Patient is a 78 y.o. year old male past medical history of COPD BPH cervical radiculopathy cocaine abuse hypertension hyperlipidemia seasonal allergies SVT came to emergency room with respiratory arrest, patient was at nursing home after eating breakfast he vomited everywhere likely aspirated oxygen saturation was 70% initially put on nonrebreather patient was intubated in the ER patient was hypotensive started Levophed 10 patient have a cardiac arrest went to V-fib received shock x 4 received epi and amiodarone  Was started on Levophed and vasopressin received 2 L of IV fluid, received IV Zosyn and vancomycin in the ER     Patient admitted to the ICU for further workup  Patient on ventilator/unresponsive       5/30    Patient on ventilator propofol and Precedex drip  Hypotension on Levophed and vasopressin  Patient has no urine output last night received 500 bolus    Nephrology decided for start CRRT IR team for dialysis cath  BUN 72 creatinine 3.09  CRP 10.5  MRSA nares    5/31    Patient on ventilator 40% O2 propofol Precedex levo and vasopressin drip  Patient on A-fib with rapid ventricular rate ordered amiodarone bolus and started on drip  Patient getting CRRT  No urine output  D-dimers greater than 20    6/3    Patient on ventilator 45% O2 receiving CRRT patient on propofol drip  Patient on amiodarone and Levophed drip  Patient heparin and Precedex drip  Patient is hypothermic    WBC count 22.5    6/4    Patient on ventilator 45% O2 on Levophed drip and amiodarone drip and heparin drip and Precedex drip getting NG tube feeding  BUN 37 creatinine 1.97 WBCs 15  Doppler studies positive for acute DVT of the left brachial vein left radial vein and left ulnar vein    Seen by infectious disease recommended continue IV meropenem    6/5    General ventilated  temporoparietal junction and left   parietal lobe as above. No associated mass effect.    2. Numerous chronic microhemorrhages as on prior study again may indicate   cerebral amyloid angiopathy, with extensive chronic white matter disease.         Electronically signed by Gerardo Ugarte      XR ABDOMEN (KUB) (SINGLE AP VIEW)   Final Result   No unexpected radiopaque foreign bodies in the abdomen.      Electronically signed by Renu Carrington      XR CHEST 1 VIEW   Final Result   Grossly stable bilateral pulmonary infiltrates, right greater than left.         Electronically signed by SEMAJ Basilio      XR CHEST PORTABLE   Final Result      Bilateral lung infiltrates. Orogastric tube as described. Otherwise stable lines   and tubes.         Electronically signed by KATHRINE EDWARDS      CT HEAD WO CONTRAST   Final Result   No acute intracranial abnormality.         Electronically signed by KEERTHI HERNANDEZ      XR CHEST PORTABLE   Final Result   1. Satisfactory intubation.   2. New RUL airspace disease/pneumonia.      Electronically signed by KEERTHI HERNANDEZ      XR CHEST 1 VIEW   Final Result   Stable bibasilar atelectasis and low lung volumes. Stable lines and tubes,   except for extubation and removal of gastric tube..  .         Electronically signed by KATHRINE EDWARDS      XR CHEST 1 VIEW   Final Result   Shifting bibasilar atelectasis is increased on the right and   decreased on the left. Stable support devices.         XR CHEST 1 VIEW   Final Result   1. No interval change         Vascular duplex upper extremity venous bilateral   Final Result      XR CHEST PORTABLE   Final Result   Unchanged right perihilar and left basilar opacities.      XR CHEST PORTABLE   Final Result      Unchanged radiographic appearance.         XR CHEST 1 VIEW   Final Result   Stable right pulmonary edema.      Vascular duplex lower extremity venous bilateral   Final Result      XR CHEST 1 VIEW   Final Result   Right lung interstitial and

## 2024-06-19 NOTE — CARE COORDINATION
0800: Chart reviewed.    Per notes; TTS dialysis patient vented on IV steroids and hep gtt followed via cardiology, dietitian, ID and nephrology.    CM will attempt to reach patient's daughter, Senthil Humphries [phone] again today.    CM will continue to follow patient and recs of medical team.    1200: CM telephoned Senthil Humphries who stated she was at work, but could speak with the physician and/or nurse after 2:30PM today via phone.    Ms. Humphries stated she lives in [place]. She also relayed she was going to check with her aunt Obdulio Cho to see if she was able to reach the patient's wife regarding decision making.    1430: Princess Willis, patient's spouse present on unit. CM and nurse met with her at bedside. MD spoke with Mrs. Willis via phone: CM at side.    Nurse updated.    Mrs. Willis's contact info [phone] entered in system.    Updates attached in [facility] to [facility].

## 2024-06-19 NOTE — PROGRESS NOTES
Progress Note  Date:2024       Room:[facility]  Patient Name:Agustín Willis     YOB: 1945     Age:79 y.o.        Subjective    Subjective  Patient followed septic shock with presumptive aspiration pneumonia secondary now to MRSA on IV Vancomycin. Procal and CRP decreasing. CXR unchanged. He remains intubated in the ICU. He is awake but unresponsive.     Objective         Vitals Last 24 Hours:  TEMPERATURE:  Temp  Av.2 °F (36.2 °C)  Min: 96.8 °F (36 °C)  Max: 98 °F (36.7 °C)  RESPIRATIONS RANGE: Resp  Av.2  Min: 12  Max: 33  PULSE OXIMETRY RANGE: SpO2  Av.3 %  Min: 95 %  Max: 100 %  PULSE RANGE: Pulse  Av.9  Min: 51  Max: 89  BLOOD PRESSURE RANGE: Systolic (24hrs), Av , Min:106 , Max:143   ; Diastolic (24hrs), Av, Min:69, Max:101         Objective:  Vital signs: (most recent): Blood pressure 107/69, pulse 54, temperature 98 °F (36.7 °C), temperature source Rectal, resp. rate 12, height 1.73 m (5' 8.11\"), weight 82 kg (180 lb 12.4 oz), SpO2 98 %.      Vitals and nursing note reviewed.   Constitutional:       General: He is in acute distress.      Appearance: He is ill-appearing and toxic-appearing.   HENT:      Head: Normocephalic and atraumatic.      Right Ear: External ear normal.      Left Ear: External ear normal.      Nose: Nose normal.      Mouth/Throat: drooling     Comments ET tube/orogastric tube  Cardiovascular:      Rate and Rhythm: Normal rate and regular rhythm.      Heart sounds: No murmur heard.  Pulmonary:      Breath sounds: Rhonchi present. No wheezing or rales.   Abdominal:      General: Bowel sounds are normal. There is no distension.      Palpations: Abdomen is soft.      Tenderness: There is no abdominal tenderness.   Genitourinary:  external urinary device    Musculoskeletal:      Cervical back: Neck supple.      Right lower leg: No edema.      Left lower leg: No pham   Skin:     Findings: No rash.   Neurological: intubated  Psychiatric: intubated       Labs/Imaging/Diagnostics    Labs:  CBC:  Recent Labs     06/17/24  0120 06/18/24  0100 06/19/24  0230   WBC 14.6* 11.5* 10.3   RBC 2.61* 2.87* 3.19*   HGB 7.6* 8.6* 9.5*   HCT 23.7* 26.1* 29.7*   MCV 90.8 90.9 93.1   RDW 16.2* 16.5* 17.3*    206 211       CHEMISTRIES:  Recent Labs     06/17/24  0120 06/19/24  0230   * 133*   K 4.5 4.8   CL 96* 97   CO2 28 27   BUN 64* 53*   CREATININE 2.08* 1.83*   GLUCOSE 128* 109*   PHOS 4.8* 5.4*   MG 1.9  --          Lactic acid 6.39      Procal 0.25 <0.38 <2.45 <4.01 <5.46 <6.77 <5.94 <0.27 <0.71 <2.45  <72.72 <0.22                  CRP 1.12 <1.46 < 6.71 <13.60 <12.40 <12.00 <17.00 <18.70 < 16.20 < 12.30 <20.20 <29.80                MRSA nasal PCR Positive    Blood cultures (5/29) No growth FINAL  Blood cultures (5/29)  No growth FINAL  Urine culture (5/29) No growth FINAL  Sputum culture (5/29) Light normal respiratory doug FINAL  Sputum culture (6/11)    Methicillin Resistant Staphylococcus aureus     Methicillin-Resistant Staphylococcus aureus (1)    Antibiotic Interpretation Microscan     clindamycin Resistant >=4 ug/mL    ciprofloxacin Resistant >=8 ug/mL    doxycycline Sensitive 1 ug/mL    erythromycin Resistant >=8 ug/mL    gentamicin Sensitive <=0.5 ug/mL    levofloxacin Resistant >=8 ug/mL    linezolid Sensitive 2 ug/mL    oxacillin Resistant >=4 ug/mL    rifampin Sensitive <=0.5 ug/mL    tetracycline Sensitive 2 ug/mL    trimethoprim-sulfamethoxazole Sensitive <=10 ug/mL    vancomycin Sensitive 1 ug/mL      Imaging Last 24 Hours:    XR CHEST PORTABLE    Result Date: 6/18/2024    No significant change              .            Assessment//Plan           Hospital Problems             Last Modified POA    * (Principal) Acute respiratory failure (HCC) 5/29/2024 Yes    Cardiac arrest ([facility]) 5/29/2024 Yes    Septic shock ([facility]) 6/12/2024 Yes    Aspiration pneumonia of right lung due to gastric secretions ([facility]) 6/12/2024 Yes    Admitted to intensive care unit  6/12/2024 Yes    Pneumonia of right upper lobe due to methicillin resistant Staphylococcus aureus (MRSA) (HCC) 6/12/2024 Yes    Acute metabolic encephalopathy 6/14/2024 Yes      Severe sepsis/septic shock with hypothermia, leukocytosis, elevated lactic acid and procal/CRP, resolving  Presumptive aspiration pneumonia, primarily right lung, sputum culture grew normal doug,  status post IV Meropenem  New RUL HCAP, secondary to Methicillin Resistant Staphylococcus aureus, Day #8 IV Vancomycin  Acute hypoxic respiratory failure, re- intubated  Status post cardiac arrest with PEA  FIDEL  Penicillin allergy  8.   MRSA nasal colonization status post nasal Mupirocin  9.   Moderate encephalopathy (EEG)    Comment:  Procal and CRP still decreasing.  WBC normal but he remains on Solumedrol.       Plan   Continue IV Vancomycin for MRSA pneumonia 6 more days  2    On Friday, repeat procal and CRP       Electronically signed by Riley Alonzo MD

## 2024-06-19 NOTE — PROGRESS NOTES
CARDIOLOGY PROGRESS NOTE      Patient Name: Agustín Willis  Age: 79 y.o.  Gender:male  :1945  MRN: [id number]    Agustín Willis is a 78 y.o. year-old male with past medical history significant for hypertension, COPD, cocaine abuse, CKD who was evaluated today due to cardiac arrest. Patient initially presented to the ED via EMS from nursing home due to respiratory distress after vomiting breakfast. Required intubation in ED. Apparently with VF arrest in ED, shock x4.     Patient seen and examined in ICU. Remains intubated.      Telemetry reviewed.    Unable to obtain ROS. Current medications reviewed.    Physical Examination    Allergies   Allergen Reactions    Penicillin G Other (See Comments)     Pt states he passes out    Sulfamethoxazole-Trimethoprim      Pt states he passes out     Vitals:    24 1116   BP:    Pulse: 55   Resp: 12   Temp:    SpO2: 98%     Vital signs are stable   Intubated  Heart has a bradycardic rate and regular rhythm  Lungs are coarse  Abdomen is soft  Extremities have mild edema  Skin is dry    Labs reviewed:  Recent Results (from the past 12 hour(s))   CBC    Collection Time: 24  2:30 AM   Result Value Ref Range    WBC 10.3 4.1 - 11.1 K/uL    RBC 3.19 (L) 4.10 - 5.70 M/uL    Hemoglobin 9.5 (L) 12.1 - 17.0 g/dL    Hematocrit 29.7 (L) 36.6 - 50.3 %    MCV 93.1 80.0 - 99.0 FL    MCH 29.8 26.0 - 34.0 PG    MCHC 32.0 30.0 - 36.5 g/dL    RDW 17.3 (H) 11.5 - 14.5 %    Platelets 211 150 - 400 K/uL    MPV 10.7 8.9 - 12.9 FL    Nucleated RBCs 0.2 (H) 0.0  WBC    nRBC 0.02 (H) 0.00 - 0.01 K/uL   Comprehensive Metabolic Panel    Collection Time: 24  2:30 AM   Result Value Ref Range    Sodium 133 (L) 136 - 145 mmol/L    Potassium 4.8 3.5 - 5.1 mmol/L    Chloride 97 97 - 108 mmol/L    CO2 27 21 - 32 mmol/L    Anion Gap 9 5 - 15 mmol/L    Glucose 109 (H) 65 - 100 mg/dL    BUN 53 (H) 6 - 20 mg/dL    Creatinine 1.83 (H) 0.70 - 1.30 mg/dL    BUN/Creatinine Ratio 29 (H) 12 -

## 2024-06-20 ENCOUNTER — APPOINTMENT (OUTPATIENT)
Facility: HOSPITAL | Age: 79
DRG: 870 | End: 2024-06-20
Payer: MEDICARE

## 2024-06-20 LAB
ALBUMIN SERPL-MCNC: 2.3 G/DL (ref 3.5–5)
ALBUMIN/GLOB SERPL: 0.5 (ref 1.1–2.2)
ALP SERPL-CCNC: 71 U/L (ref 45–117)
ALT SERPL-CCNC: 15 U/L (ref 12–78)
ANION GAP SERPL CALC-SCNC: 10 MMOL/L (ref 5–15)
ARTERIAL PATENCY WRIST A: YES
AST SERPL W P-5'-P-CCNC: 15 U/L (ref 15–37)
BASE EXCESS BLDA CALC-SCNC: 0 MMOL/L (ref 0–3)
BDY SITE: ABNORMAL
BILIRUB SERPL-MCNC: 0.5 MG/DL (ref 0.2–1)
BODY TEMPERATURE: 97.4
BUN SERPL-MCNC: 72 MG/DL (ref 6–20)
BUN/CREAT SERPL: 31 (ref 12–20)
CA-I BLD-MCNC: 8.6 MG/DL (ref 8.5–10.1)
CHLORIDE SERPL-SCNC: 97 MMOL/L (ref 97–108)
CO2 SERPL-SCNC: 25 MMOL/L (ref 21–32)
COHGB MFR BLD: 0.3 % (ref 1–2)
CREAT SERPL-MCNC: 2.32 MG/DL (ref 0.7–1.3)
ERYTHROCYTE [DISTWIDTH] IN BLOOD BY AUTOMATED COUNT: 17.5 % (ref 11.5–14.5)
FIO2 ON VENT: 30 %
GAS FLOW.O2 SETTING OXYMISER: 12
GLOBULIN SER CALC-MCNC: 4.2 G/DL (ref 2–4)
GLUCOSE BLD STRIP.AUTO-MCNC: 117 MG/DL (ref 65–100)
GLUCOSE SERPL-MCNC: 120 MG/DL (ref 65–100)
HCO3 BLDA-SCNC: 24 MMOL/L (ref 22–26)
HCT VFR BLD AUTO: 30.1 % (ref 36.6–50.3)
HGB BLD-MCNC: 9.7 G/DL (ref 12.1–17)
MCH RBC QN AUTO: 29.8 PG (ref 26–34)
MCHC RBC AUTO-ENTMCNC: 32.2 G/DL (ref 30–36.5)
MCV RBC AUTO: 92.3 FL (ref 80–99)
METHGB MFR BLD: 0.5 % (ref 0–1.4)
NRBC # BLD: 0.02 K/UL (ref 0–0.01)
NRBC BLD-RTO: 0.2 PER 100 WBC
OXYHGB MFR BLD: 93.4 % (ref 95–99)
PCO2 BLDA: 36 MMHG (ref 35–45)
PEEP RESPIRATORY: 5
PERFORMED BY:: ABNORMAL
PERFORMED BY:: ABNORMAL
PH BLDA: 7.44 (ref 7.35–7.45)
PLATELET # BLD AUTO: 187 K/UL (ref 150–400)
PMV BLD AUTO: 9.7 FL (ref 8.9–12.9)
PO2 BLDA: 73 MMHG (ref 80–100)
POTASSIUM SERPL-SCNC: 5 MMOL/L (ref 3.5–5.1)
PROT SERPL-MCNC: 6.5 G/DL (ref 6.4–8.2)
RBC # BLD AUTO: 3.26 M/UL (ref 4.1–5.7)
SAO2 % BLD: 94 % (ref 95–99)
SAO2% DEVICE SAO2% SENSOR NAME: ABNORMAL
SODIUM SERPL-SCNC: 132 MMOL/L (ref 136–145)
SPECIMEN SITE: ABNORMAL
UFH PPP CHRO-ACNC: 0.79 IU/ML
UFH PPP CHRO-ACNC: 0.89 IU/ML
VENTILATION MODE VENT: ABNORMAL
VT SETTING VENT: 410
WBC # BLD AUTO: 11 K/UL (ref 4.1–11.1)

## 2024-06-20 PROCEDURE — 94003 VENT MGMT INPAT SUBQ DAY: CPT

## 2024-06-20 PROCEDURE — 85520 HEPARIN ASSAY: CPT

## 2024-06-20 PROCEDURE — 80053 COMPREHEN METABOLIC PANEL: CPT

## 2024-06-20 PROCEDURE — 6360000002 HC RX W HCPCS: Performed by: FAMILY MEDICINE

## 2024-06-20 PROCEDURE — 36415 COLL VENOUS BLD VENIPUNCTURE: CPT

## 2024-06-20 PROCEDURE — 82962 GLUCOSE BLOOD TEST: CPT

## 2024-06-20 PROCEDURE — 2700000000 HC OXYGEN THERAPY PER DAY

## 2024-06-20 PROCEDURE — 2580000003 HC RX 258: Performed by: FAMILY MEDICINE

## 2024-06-20 PROCEDURE — 36600 WITHDRAWAL OF ARTERIAL BLOOD: CPT

## 2024-06-20 PROCEDURE — 6360000002 HC RX W HCPCS: Performed by: INTERNAL MEDICINE

## 2024-06-20 PROCEDURE — 6370000000 HC RX 637 (ALT 250 FOR IP): Performed by: INTERNAL MEDICINE

## 2024-06-20 PROCEDURE — 71045 X-RAY EXAM CHEST 1 VIEW: CPT

## 2024-06-20 PROCEDURE — 2709999900 HC NON-CHARGEABLE SUPPLY

## 2024-06-20 PROCEDURE — 90935 HEMODIALYSIS ONE EVALUATION: CPT

## 2024-06-20 PROCEDURE — 99233 SBSQ HOSP IP/OBS HIGH 50: CPT | Performed by: INTERNAL MEDICINE

## 2024-06-20 PROCEDURE — 82803 BLOOD GASES ANY COMBINATION: CPT

## 2024-06-20 PROCEDURE — 6370000000 HC RX 637 (ALT 250 FOR IP): Performed by: FAMILY MEDICINE

## 2024-06-20 PROCEDURE — 94761 N-INVAS EAR/PLS OXIMETRY MLT: CPT

## 2024-06-20 PROCEDURE — 2000000000 HC ICU R&B

## 2024-06-20 PROCEDURE — 2580000003 HC RX 258: Performed by: INTERNAL MEDICINE

## 2024-06-20 PROCEDURE — 94640 AIRWAY INHALATION TREATMENT: CPT

## 2024-06-20 PROCEDURE — 85027 COMPLETE CBC AUTOMATED: CPT

## 2024-06-20 RX ORDER — PREDNISONE 20 MG/1
20 TABLET ORAL 2 TIMES DAILY
Status: DISCONTINUED | OUTPATIENT
Start: 2024-06-20 | End: 2024-06-22

## 2024-06-20 RX ADMIN — FENTANYL CITRATE 50 MCG: 50 INJECTION, SOLUTION INTRAMUSCULAR; INTRAVENOUS at 02:09

## 2024-06-20 RX ADMIN — PREDNISONE 20 MG: 20 TABLET ORAL at 20:49

## 2024-06-20 RX ADMIN — METHYLPREDNISOLONE SODIUM SUCCINATE 40 MG: 40 INJECTION INTRAMUSCULAR; INTRAVENOUS at 08:57

## 2024-06-20 RX ADMIN — THIAMINE HCL TAB 100 MG 100 MG: 100 TAB at 08:58

## 2024-06-20 RX ADMIN — VANCOMYCIN HYDROCHLORIDE 500 MG: 500 INJECTION, POWDER, LYOPHILIZED, FOR SOLUTION INTRAVENOUS at 16:58

## 2024-06-20 RX ADMIN — GLYCOPYRROLATE 0.1 MG: 0.2 INJECTION INTRAMUSCULAR; INTRAVENOUS at 03:49

## 2024-06-20 RX ADMIN — GLYCOPYRROLATE 0.1 MG: 0.2 INJECTION INTRAMUSCULAR; INTRAVENOUS at 20:48

## 2024-06-20 RX ADMIN — SODIUM CHLORIDE, PRESERVATIVE FREE 10 ML: 5 INJECTION INTRAVENOUS at 08:58

## 2024-06-20 RX ADMIN — FERROUS SULFATE TAB 325 MG (65 MG ELEMENTAL FE) 325 MG: 325 (65 FE) TAB at 08:58

## 2024-06-20 RX ADMIN — METHYLPREDNISOLONE SODIUM SUCCINATE 40 MG: 40 INJECTION INTRAMUSCULAR; INTRAVENOUS at 01:16

## 2024-06-20 RX ADMIN — FENTANYL CITRATE 50 MCG: 50 INJECTION, SOLUTION INTRAMUSCULAR; INTRAVENOUS at 00:09

## 2024-06-20 RX ADMIN — ATORVASTATIN CALCIUM 40 MG: 40 TABLET, FILM COATED ORAL at 20:49

## 2024-06-20 RX ADMIN — FENTANYL CITRATE 50 MCG: 50 INJECTION, SOLUTION INTRAMUSCULAR; INTRAVENOUS at 21:42

## 2024-06-20 RX ADMIN — SODIUM CHLORIDE, PRESERVATIVE FREE 10 ML: 5 INJECTION INTRAVENOUS at 20:49

## 2024-06-20 RX ADMIN — HEPARIN SODIUM 2500 UNITS: 1000 INJECTION INTRAVENOUS; SUBCUTANEOUS at 11:42

## 2024-06-20 RX ADMIN — FENTANYL CITRATE 50 MCG: 50 INJECTION, SOLUTION INTRAMUSCULAR; INTRAVENOUS at 06:05

## 2024-06-20 RX ADMIN — FOLIC ACID 1 MG: 1 TABLET ORAL at 08:58

## 2024-06-20 RX ADMIN — IPRATROPIUM BROMIDE AND ALBUTEROL SULFATE 1 DOSE: 2.5; .5 SOLUTION RESPIRATORY (INHALATION) at 01:50

## 2024-06-20 RX ADMIN — FENTANYL CITRATE 50 MCG: 50 INJECTION, SOLUTION INTRAMUSCULAR; INTRAVENOUS at 04:57

## 2024-06-20 RX ADMIN — HEPARIN SODIUM 12 UNITS/KG/HR: 10000 INJECTION, SOLUTION INTRAVENOUS at 08:55

## 2024-06-20 RX ADMIN — IPRATROPIUM BROMIDE AND ALBUTEROL SULFATE 1 DOSE: 2.5; .5 SOLUTION RESPIRATORY (INHALATION) at 20:31

## 2024-06-20 RX ADMIN — ASPIRIN 81 MG 81 MG: 81 TABLET ORAL at 08:58

## 2024-06-20 RX ADMIN — FINASTERIDE 5 MG: 5 TABLET, FILM COATED ORAL at 08:58

## 2024-06-20 RX ADMIN — IPRATROPIUM BROMIDE AND ALBUTEROL SULFATE 1 DOSE: 2.5; .5 SOLUTION RESPIRATORY (INHALATION) at 13:43

## 2024-06-20 RX ADMIN — IPRATROPIUM BROMIDE AND ALBUTEROL SULFATE 1 DOSE: 2.5; .5 SOLUTION RESPIRATORY (INHALATION) at 08:44

## 2024-06-20 ASSESSMENT — PULMONARY FUNCTION TESTS
PIF_VALUE: 25
PIF_VALUE: 17
PIF_VALUE: 19
PIF_VALUE: 23
PIF_VALUE: 22
PIF_VALUE: 25
PIF_VALUE: 24
PIF_VALUE: 21
PIF_VALUE: 21
PIF_VALUE: 20
PIF_VALUE: 27
PIF_VALUE: 24
PIF_VALUE: 23
PIF_VALUE: 20
PIF_VALUE: 14
PIF_VALUE: 18
PIF_VALUE: 20
PIF_VALUE: 20
PIF_VALUE: 23
PIF_VALUE: 17
PIF_VALUE: 18
PIF_VALUE: 27
PIF_VALUE: 23
PIF_VALUE: 22
PIF_VALUE: 22
PIF_VALUE: 27
PIF_VALUE: 27

## 2024-06-20 ASSESSMENT — PAIN SCALES - GENERAL
PAINLEVEL_OUTOF10: 1
PAINLEVEL_OUTOF10: 0

## 2024-06-20 NOTE — PROGRESS NOTES
Progress Note  Date:2024       Room:[facility]  Patient Name:Agustín Willis     YOB: 1945     Age:79 y.o.        Subjective    Subjective  Patient followed septic shock with presumptive aspiration pneumonia secondary now to MRSA on IV Vancomycin. Procal and CRP decreasing. CXR unchanged. He remains intubated in the ICU. He is  unresponsive today.     Objective         Vitals Last 24 Hours:  TEMPERATURE:  Temp  Av.9 °F (36.6 °C)  Min: 97.3 °F (36.3 °C)  Max: 98.6 °F (37 °C)  RESPIRATIONS RANGE: Resp  Av.3  Min: 12  Max: 29  PULSE OXIMETRY RANGE: SpO2  Av %  Min: 96 %  Max: 99 %  PULSE RANGE: Pulse  Av.6  Min: 53  Max: 92  BLOOD PRESSURE RANGE: Systolic (24hrs), Av , Min:78 , Max:143   ; Diastolic (24hrs), Av, Min:47, Max:103         Objective:  Vital signs: (most recent): Blood pressure (!) 141/103, pulse 80, temperature 98.6 °F (37 °C), temperature source Rectal, resp. rate 12, height 1.73 m (5' 8.11\"), weight 77.2 kg (170 lb 3.1 oz), SpO2 99 %.      Vitals and nursing note reviewed.   Constitutional:       General: He is in acute distress.      Appearance: He is ill-appearing and toxic-appearing.   HENT:      Head: Normocephalic and atraumatic.      Right Ear: External ear normal.      Left Ear: External ear normal.      Nose: Nose normal.      Mouth/Throat: drooling     Comments ET tube/orogastric tube; respiratory secretions are bloody  Cardiovascular:      Rate and Rhythm: Normal rate and regular rhythm.      Heart sounds: No murmur heard.  Pulmonary:      Breath sounds: Rhonchi present. No wheezing or rales.   Abdominal:      General: Bowel sounds are normal. There is no distension.      Palpations: Abdomen is soft.      Tenderness: There is no abdominal tenderness.   Genitourinary:  external urinary device    Musculoskeletal:      Cervical back: Neck supple.      Right lower leg: No edema.      Left lower leg: No pham   Skin:     Findings: No rash.   Neurological:

## 2024-06-20 NOTE — PROGRESS NOTES
General Daily Progress Note      Patient Name:   Agustín Willis       YOB: 1945       Age:  79 y.o.      Admit Date: 5/29/2024      Subjective:   Patient is a 78 y.o. year old male past medical history of COPD BPH cervical radiculopathy cocaine abuse hypertension hyperlipidemia seasonal allergies SVT came to emergency room with respiratory arrest, patient was at nursing home after eating breakfast he vomited everywhere likely aspirated oxygen saturation was 70% initially put on nonrebreather patient was intubated in the ER patient was hypotensive started Levophed 10 patient have a cardiac arrest went to V-fib received shock x 4 received epi and amiodarone  Was started on Levophed and vasopressin received 2 L of IV fluid, received IV Zosyn and vancomycin in the ER     Patient admitted to the ICU for further workup  Patient on ventilator/unresponsive       5/30    Patient on ventilator propofol and Precedex drip  Hypotension on Levophed and vasopressin  Patient has no urine output last night received 500 bolus    Nephrology decided for start CRRT IR team for dialysis cath  BUN 72 creatinine 3.09  CRP 10.5  MRSA nares    5/31    Patient on ventilator 40% O2 propofol Precedex levo and vasopressin drip  Patient on A-fib with rapid ventricular rate ordered amiodarone bolus and started on drip  Patient getting CRRT  No urine output  D-dimers greater than 20    6/3    Patient on ventilator 45% O2 receiving CRRT patient on propofol drip  Patient on amiodarone and Levophed drip  Patient heparin and Precedex drip  Patient is hypothermic    WBC count 22.5    6/4    Patient on ventilator 45% O2 on Levophed drip and amiodarone drip and heparin drip and Precedex drip getting NG tube feeding  BUN 37 creatinine 1.97 WBCs 15  Doppler studies positive for acute DVT of the left brachial vein left radial vein and left ulnar vein    Seen by infectious disease recommended continue IV meropenem    6/5    General ventilated  at 06/20/24 0858    folic acid (FOLVITE) tablet 1 mg, 1 mg, Oral, Daily, Mikaela Arredondo MD, 1 mg at 06/20/24 0858    thiamine mononitrate tablet 100 mg, 100 mg, Oral, Daily, Mikaela Arredondo MD, 100 mg at 06/20/24 0858    sodium chloride flush 0.9 % injection 5-40 mL, 5-40 mL, IntraVENous, 2 times per day, Mikaela Arredondo MD, 10 mL at 06/20/24 0858    sodium chloride flush 0.9 % injection 5-40 mL, 5-40 mL, IntraVENous, PRN, Mikaela Arredondo MD    0.9 % sodium chloride infusion, , IntraVENous, PRN, Mikaela Arredondo MD    ondansetron (ZOFRAN-ODT) disintegrating tablet 4 mg, 4 mg, Oral, Q8H PRN **OR** ondansetron (ZOFRAN) injection 4 mg, 4 mg, IntraVENous, Q6H PRN, Mikaela Arredondo MD, 4 mg at 06/18/24 2059    polyethylene glycol (GLYCOLAX) packet 17 g, 17 g, Oral, Daily PRN, Mikaela Arredondo MD, 17 g at 06/15/24 1800    acetaminophen (TYLENOL) tablet 650 mg, 650 mg, Oral, Q6H PRN, 650 mg at 06/09/24 2047 **OR** acetaminophen (TYLENOL) suppository 650 mg, 650 mg, Rectal, Q6H PRN, Mikaela Arredondo MD    glucose chewable tablet 16 g, 4 tablet, Oral, PRN, Mikaela Arredondo MD    dextrose bolus 10% 125 mL, 125 mL, IntraVENous, PRN **OR** dextrose bolus 10% 250 mL, 250 mL, IntraVENous, PRN, Mikaela Arredondo MD    glucagon injection 1 mg, 1 mg, SubCUTAneous, PRN, Mikaela Arredondo MD    dextrose 10 % infusion, , IntraVENous, Continuous PRN, Mikaela Arrdeondo MD

## 2024-06-20 NOTE — PROGRESS NOTES
IMPRESSION:   Acute hypoxic respiratory failure extubated on 6/5 and reintubated on 6/10  Status post cardiac arrest  Chronic atrial fibrillation  Septic shock resolved off pressors  Left upper extremity DVT  Embolic stroke  Acute kidney injury on dialysis  Sepsis treated with antibiotics  Aspiration pneumonia right midlung  COPD   MRSA nasal colonization  History of cocaine abuse polysubstance abuse in the past  Pt is critically ill. Time spent with pt and staff actively rendering care, managing pt and coordinating care as stated below; 30 minutes, exclusive of any procedures      RECOMMENDATIONS/PLAN:   Extubated  on 6/5 reintubated on 6/10 on ventilator   Not on any sedation since 6/17  Mental status has not changed patient had a stroke eyes were rolled down off sedation no response  Chest x-ray with continued right lower infiltrate CTA chest showed bilateral infiltrates consistent with aspiration  Patient is getting dialysis  MRI of the brain was done which shows small foci of acute infarction in the supratentorial region and cerebellum EEG shows diffuse encephalopathy  Patient has a lot of secretion patient on Robinul  Chest x-ray shows right midlung infiltrate fluid overload congestive change left lung clear  Remains on vancomycin sputum with normal doug  Heart rate controlled on amiodarone   Renal function improving off CVVH  on dialysis  Status post cardiac arrest patient coded   Patient is off Levophed     [x] High complexity decision making was performed  [x] See my orders for details  HPI  78-year-old male nursing home resident came in because of shortness of breath respiratory distress patient has episodes of vomiting after eating breakfast this morning subsequently came to the hospital saturation was in 70s he was back to the emergency room subsequently intubated and then he coded hemodynamically unstable now he is on 2 pressors vasopressin and Levophed denies intubated on ventilator critical care  Riley Alonzo MD        methylPREDNISolone sodium succ (SOLU-MEDROL) injection 40 mg  40 mg IntraVENous Q8H Jeremy Ramos MD   40 mg at 06/20/24 0857    ipratropium 0.5 mg-albuterol 2.5 mg (DUONEB) nebulizer solution 1 Dose  1 Dose Inhalation Q6H RT Jeremy Ramos MD   1 Dose at 06/20/24 0844    heparin (porcine) injection 2,500 Units  2,500 Units IntraCATHeter PRN Sol Ferraro MD   2,500 Units at 06/18/24 1110    propofol infusion  5-50 mcg/kg/min IntraVENous Continuous Mikaela Arredondo MD   Stopped at 06/17/24 1200    norepinephrine (LEVOPHED) 16 mg in sodium chloride 0.9 % 250 mL infusion  1-100 mcg/min IntraVENous Continuous Mikaela Arredondo MD   Paused at 06/14/24 2130    [Held by provider] amiodarone (CORDARONE) tablet 400 mg  400 mg Oral BID Gerry Wilson MD   400 mg at 06/10/24 1016    aspirin chewable tablet 81 mg  81 mg Per NG tube Daily Jeremy Ramos MD   81 mg at 06/20/24 0858    epoetin radha-epbx (RETACRIT) injection 10,000 Units  10,000 Units SubCUTAneous Once per day on Mon Wed Fri Sol Ferraro MD   10,000 Units at 06/19/24 1803    heparin (porcine) injection 4,000 Units  4,000 Units IntraVENous PRN Mikaela Arredondo MD        heparin (porcine) injection 2,000 Units  2,000 Units IntraVENous PRN Mikaela Arredondo MD   2,000 Units at 06/09/24 0734    heparin 25,000 units in dextrose 5% 250 mL (premix) infusion  5-30 Units/kg/hr IntraVENous Continuous Mikaela Arredondo MD 10.5 mL/hr at 06/20/24 0855 12 Units/kg/hr at 06/20/24 0855    heparin (porcine) injection 1,100-1,900 Units  1,100-1,900 Units IntraCATHeter PRN Sol Ferraro MD   1,400 Units at 06/03/24 1231    And    heparin (porcine) injection 1,100-1,900 Units  1,100-1,900 Units IntraCATHeter PRN Sol Ferraro MD   1,100 Units at 06/03/24 1226    sodium chloride 0.9 % bolus 2,448 mL  30 mL/kg IntraVENous Once Mikaela Arredondo MD   Held at 05/29/24 1405    ferrous sulfate (IRON 325) tablet 325 mg  325 mg Oral Daily  with meropenem and vancomycin  6/1 remains on pressors on the ventilator sedated on propofol and Precedex.  Right lower lobe infiltrate unchanged.  WBC count remains elevated with procalcitonin improving remains on Merrem and vancomycin nasal MRSA smear was positive but sputum only shows normal doug so far  6/2 remains on norepinephrine for blood pressure support hypothermic yesterday now on warming blanket.  Continues with CRRT with fluid removal.  Potassium and phosphorus to be repleted  6/3 remain intubated on ventilator getting CRRT, ABG acceptable pCO2 33 pO2 75 on 35% FiO2 on assist-control mode on meropenem will continue to wean pressors  6/4 on assist-control mode pCO2 31 pO2 80 on 35% FiO2 getting IV fluid 200 cc/h on amiodarone hemodynamically unstable on Levophed and NEOS epinephrine   6/5 remain intubated on ventilator we will do sedation vacation ABG stentable 35% FiO2 still on vasopressors now on phenylephrine   6/6 alert awake talking on nasal cannula extubated on 6/5 patient on meropenem speech evaluation he is not on oxygen at home  6/7 oral congestion continue suctioning, alert awake on nasal cannula continue with the suctioning high risk for aspiration on IV heparin and meropenem\  6/8 alert awake on oxygen via nasal cannula chest x-ray atelectasis lower lung volume on Merrem  6/9 alert awake on nasal cannula on IV Solu-Medrol nebulizer treatment will change to prednisone in a.m.  6/10 on 2-1/2 L nasal cannula will discontinue Solu-Medrol start patient on prednisone on meropenem  6/11 patient CODE BLUE yesterday reintubated now back on ventilator assist-control mode start patient on nebulizer treatment along with Mucomyst will start patient on Solu-Medrol start prednisone recent sputum C&S Gram stain chest x-ray still showed bilateral infiltrate hemoglobin 7.2, BUN 92 creatinine 2.4  6/12 remain intubated on ventilator sedated with propofol hemodynamically unstable on Levophed and also getting

## 2024-06-20 NOTE — PROGRESS NOTES
CARDIOLOGY PROGRESS NOTE      Patient Name: Agustín Willis  Age: 79 y.o.  Gender:male  :1945  MRN: [id number]    Agustín Willis is a 78 y.o. year-old male with past medical history significant for hypertension, COPD, cocaine abuse, CKD who was evaluated today due to cardiac arrest. Patient initially presented to the ED via EMS from nursing home due to respiratory distress after vomiting breakfast. Required intubation in ED. Apparently with VF arrest in ED, shock x4.     Patient seen and examined in ICU. Intubated.  On heparin gtt.     Telemetry reviewed.    Unable to obtain ROS. Current medications reviewed.    Physical Examination    Allergies   Allergen Reactions    Penicillin G Other (See Comments)     Pt states he passes out    Sulfamethoxazole-Trimethoprim      Pt states he passes out     Vitals:    24 1116   BP:    Pulse: 55   Resp: 12   Temp:    SpO2: 98%     Vital signs are stable   Intubated  Heart has a bradycardic rate and regular rhythm  Lungs are coarse  Abdomen is soft  Extremities have mild edema  Skin is dry    Labs reviewed:  Recent Results (from the past 12 hour(s))   CBC    Collection Time: 24  2:30 AM   Result Value Ref Range    WBC 10.3 4.1 - 11.1 K/uL    RBC 3.19 (L) 4.10 - 5.70 M/uL    Hemoglobin 9.5 (L) 12.1 - 17.0 g/dL    Hematocrit 29.7 (L) 36.6 - 50.3 %    MCV 93.1 80.0 - 99.0 FL    MCH 29.8 26.0 - 34.0 PG    MCHC 32.0 30.0 - 36.5 g/dL    RDW 17.3 (H) 11.5 - 14.5 %    Platelets 211 150 - 400 K/uL    MPV 10.7 8.9 - 12.9 FL    Nucleated RBCs 0.2 (H) 0.0  WBC    nRBC 0.02 (H) 0.00 - 0.01 K/uL   Comprehensive Metabolic Panel    Collection Time: 24  2:30 AM   Result Value Ref Range    Sodium 133 (L) 136 - 145 mmol/L    Potassium 4.8 3.5 - 5.1 mmol/L    Chloride 97 97 - 108 mmol/L    CO2 27 21 - 32 mmol/L    Anion Gap 9 5 - 15 mmol/L    Glucose 109 (H) 65 - 100 mg/dL    BUN 53 (H) 6 - 20 mg/dL    Creatinine 1.83 (H) 0.70 - 1.30 mg/dL    BUN/Creatinine Ratio 29  due to VTE of LUE  Unable to complete stress testing then had a repeat arrest (PEA per report), remains intubated  Repeat arrest, PEA per reports   Aortic valve regurgitation, previously mild to moderate in Feb. 2024. Noted as moderate to severe on repeat echo. Will continue to monitor with serial echos     Elevated Ddimer, >20. Venous duplex upper extremities positive for DVT, negative in the lower extremities. Recommended chest imaging but deferred to primary/pulm, hep gtt briefly on hold due to sung blood in suction line, resumed and will need to monitor for bleeding, h/h stable  Atrial fibrillation with RVR, now sinus rhythm. Heparin gtt. Ok to hold oral amiodarone given recent bradycardia. Continue telemetry. Keep electrolytes WNL. Continue scopolamine patch    Hypotension, BP acceptable, has been on and off pressors   Aspiration pneumonia, per pulmonary/ID  FIDEL on CKD, HD per nephrology  COPD, per pulmonary/primary   Hx polysubstance abuse, UDS negative this visit      Prognosis guarded. Ethics committee involved.  Follow peripherally.    Please do not hesitate to call if additional questions arise.    ROMULO SOLIS, APRN - NP  6/19/2024

## 2024-06-20 NOTE — PLAN OF CARE
Pt not on sedation, daughter Terese to call and speak with provider to discuss comfort measures.      Problem: Safety - Adult  Goal: Free from fall injury  Outcome: Not Progressing     Problem: Discharge Planning  Goal: Discharge to home or other facility with appropriate resources  Outcome: Not Progressing     Problem: Chronic Conditions and Co-morbidities  Goal: Patient's chronic conditions and co-morbidity symptoms are monitored and maintained or improved  Outcome: Not Progressing     Problem: Skin/Tissue Integrity  Goal: Absence of new skin breakdown  Outcome: Not Progressing

## 2024-06-20 NOTE — PROGRESS NOTES
Nephrology f/u          Patient: Agustín Willis MRN: [id number]  SSN: [id number]    YOB: 1945  Age: 79 y.o.  Sex: male      Subjective:   I have seen the patient in ICU.  On vent  Off single pressor  Was dialyzed today    Past Medical History:   Diagnosis Date    Benign prostatic disease 8/3/2020    Hypertrophy with Outflow Obstruction    Benign prostatic hyperplasia 8/3/2020    Cervical radiculopathy 8/3/2020    Chronic obstructive lung disease (HCC) 8/3/2020    Cocaine abuse (HCC) 8/3/2020    Dizziness and giddiness 8/3/2020    Hypercholesterolemia 8/3/2020    Hypertensive disorder 8/3/2020    Insomnia 8/3/2020    Kidney disease 8/3/2020    Low back pain 8/3/2020    Osteoarthritis of knee 8/3/2020    Sleep apnea 8/3/2020    Vasovagal syncope 8/3/2020     Past Surgical History:   Procedure Laterality Date    IR NONTUNNELED VASCULAR CATHETER  5/30/2024    IR NONTUNNELED VASCULAR CATHETER 5/30/2024 Stephy Abreu APRN - NP SSR RAD ANGIO IR      Family History   Problem Relation Age of Onset    Hypertension Mother      Social History     Tobacco Use    Smoking status: Former    Smokeless tobacco: Never   Substance Use Topics    Alcohol use: Yes      Current Facility-Administered Medications   Medication Dose Route Frequency Provider Last Rate Last Admin    predniSONE (DELTASONE) tablet 20 mg  20 mg Oral BID Jeremy Ramos MD        [START ON 6/22/2024] Vancomycin - please draw level 6/22 with AM labs.  Thanks!   Other Once Riley Alonzo MD        fentaNYL (SUBLIMAZE) injection 50 mcg  50 mcg IntraVENous Q1H PRN Jeremy Ramos MD   50 mcg at 06/20/24 0605    glycopyrrolate (ROBINUL) injection 0.1 mg  0.1 mg IntraVENous Q8H Jeremy Ramos MD   0.1 mg at 06/20/24 0349    0.9 % sodium chloride infusion   IntraVENous PRN Jeremy Ramos MD        0.9 % sodium chloride infusion   IntraVENous PRN Mikaela Arredondo MD        atorvastatin (LIPITOR) tablet 40 mg  40 mg Oral Nightly  Mikaela Arredondo MD   40 mg at 06/19/24 1933    scopolamine (TRANSDERM-SCOP) transdermal patch 1 patch  1 patch TransDERmal Q72H Darrell Calixto APRN - NP   1 patch at 06/18/24 1250    vancomycin (VANCOCIN) intermittent dosing (placeholder)  1 each Other RX Placeholder Riley Alonzo MD        ipratropium 0.5 mg-albuterol 2.5 mg (DUONEB) nebulizer solution 1 Dose  1 Dose Inhalation Q6H RT Jeremy Ramos MD   1 Dose at 06/20/24 1343    heparin (porcine) injection 2,500 Units  2,500 Units IntraCATHeter PRN Sol Ferraro MD   2,500 Units at 06/20/24 1142    propofol infusion  5-50 mcg/kg/min IntraVENous Continuous Mikaela Arredondo MD   Stopped at 06/17/24 1200    norepinephrine (LEVOPHED) 16 mg in sodium chloride 0.9 % 250 mL infusion  1-100 mcg/min IntraVENous Continuous Mikaela Arredondo MD   Paused at 06/14/24 2130    [Held by provider] amiodarone (CORDARONE) tablet 400 mg  400 mg Oral BID Gerry Wilson MD   400 mg at 06/10/24 1016    aspirin chewable tablet 81 mg  81 mg Per NG tube Daily Jeremy Ramos MD   81 mg at 06/20/24 0858    epoetin radha-epbx (RETACRIT) injection 10,000 Units  10,000 Units SubCUTAneous Once per day on Mon Wed Fri Sol Ferraro MD   10,000 Units at 06/19/24 1803    heparin (porcine) injection 4,000 Units  4,000 Units IntraVENous PRN Mikaela Arredondo MD        heparin (porcine) injection 2,000 Units  2,000 Units IntraVENous PRN Mikaela Arredondo MD   2,000 Units at 06/09/24 0734    heparin 25,000 units in dextrose 5% 250 mL (premix) infusion  5-30 Units/kg/hr IntraVENous Continuous Mikaela Arredondo MD   Stopped at 06/20/24 1622    heparin (porcine) injection 1,100-1,900 Units  1,100-1,900 Units IntraCATHeter PRN Sol Ferraro MD   1,400 Units at 06/03/24 1231    And    heparin (porcine) injection 1,100-1,900 Units  1,100-1,900 Units IntraCATHeter PRN Sol Ferraro MD   1,100 Units at 06/03/24 1226    sodium chloride 0.9 % bolus 2,448 mL  30 mL/kg IntraVENous Once

## 2024-06-20 NOTE — DIALYSIS
Patient fairly tolerated dialysis well, twice hypotension during HD, treatment time 3 hours and UF removed 2.0 Liters. Post HD /103 mmHg, HR 85 bpm.  Reported to ARIEL Bowser.

## 2024-06-20 NOTE — PROGRESS NOTES
Spoke with patient's daughter Terese.  She is in agreement with patient's estranged wife Mady and wants to make patient comfort care.  She has a brother that she wants to talk to him to make sure he is also in agreement with comfort care before giving the final say in patient's treatment.  Pt's son name is Zackary Willis.  I advised Terese that she can call 24/7 to let us know her decision and let us know ASAP.

## 2024-06-20 NOTE — CARE COORDINATION
1015: GARRETT spoke with MD regarding case.    CM telephoned patient's daughter, Senthil Humphries [phone] to confirm her understanding of patient's wife, Princess Willis's wishes. VM left requesting a return call at her earliest convenience today.    CM telephoned Mrs. Princess Willis [phone] to share above information. Understanding verbalized. Mrs. Willis stated she spoke with Obdulio Marquis, patient's sister regarding her wishes but has not been in contact with Senthil.    1140: CM attempted to contact Senthil again. VM received leaving a message requesting a return call.    1350: Case discussed with nurse.     1500: Daughter returned call to unit speaking with nurse.

## 2024-06-21 ENCOUNTER — APPOINTMENT (OUTPATIENT)
Facility: HOSPITAL | Age: 79
DRG: 870 | End: 2024-06-21
Payer: MEDICARE

## 2024-06-21 LAB
ALBUMIN SERPL-MCNC: 2.3 G/DL (ref 3.5–5)
ALBUMIN/GLOB SERPL: 0.5 (ref 1.1–2.2)
ALP SERPL-CCNC: 72 U/L (ref 45–117)
ALT SERPL-CCNC: 17 U/L (ref 12–78)
ANION GAP SERPL CALC-SCNC: 7 MMOL/L (ref 5–15)
ARTERIAL PATENCY WRIST A: YES
AST SERPL W P-5'-P-CCNC: 15 U/L (ref 15–37)
BASE DEFICIT BLDA-SCNC: 1.3 MMOL/L
BASOPHILS # BLD: 0 K/UL (ref 0–0.1)
BASOPHILS NFR BLD: 0 % (ref 0–1)
BDY SITE: ABNORMAL
BILIRUB SERPL-MCNC: 0.6 MG/DL (ref 0.2–1)
BODY TEMPERATURE: 99.1
BUN SERPL-MCNC: 44 MG/DL (ref 6–20)
BUN/CREAT SERPL: 22 (ref 12–20)
CA-I BLD-MCNC: 1.13 MMOL/L (ref 1.13–1.32)
CA-I BLD-MCNC: 8.6 MG/DL (ref 8.5–10.1)
CHLORIDE SERPL-SCNC: 98 MMOL/L (ref 97–108)
CO2 SERPL-SCNC: 27 MMOL/L (ref 21–32)
COHGB MFR BLD: 0.4 % (ref 1–2)
CREAT SERPL-MCNC: 2 MG/DL (ref 0.7–1.3)
CRP SERPL-MCNC: 2.35 MG/DL (ref 0–0.3)
DIFFERENTIAL METHOD BLD: ABNORMAL
EOSINOPHIL # BLD: 0 K/UL (ref 0–0.4)
EOSINOPHIL NFR BLD: 0 % (ref 0–7)
ERYTHROCYTE [DISTWIDTH] IN BLOOD BY AUTOMATED COUNT: 18.2 % (ref 11.5–14.5)
FIO2 ON VENT: 30 %
GAS FLOW.O2 SETTING OXYMISER: 12
GLOBULIN SER CALC-MCNC: 4.3 G/DL (ref 2–4)
GLUCOSE SERPL-MCNC: 100 MG/DL (ref 65–100)
HCO3 BLDA-SCNC: 22 MMOL/L (ref 22–26)
HCT VFR BLD AUTO: 31.2 % (ref 36.6–50.3)
HGB BLD-MCNC: 9.9 G/DL (ref 12.1–17)
IMM GRANULOCYTES # BLD AUTO: 0.1 K/UL (ref 0–0.04)
IMM GRANULOCYTES NFR BLD AUTO: 1 % (ref 0–0.5)
LYMPHOCYTES # BLD: 0.4 K/UL (ref 0.8–3.5)
LYMPHOCYTES NFR BLD: 4 % (ref 12–49)
MCH RBC QN AUTO: 29.6 PG (ref 26–34)
MCHC RBC AUTO-ENTMCNC: 31.7 G/DL (ref 30–36.5)
MCV RBC AUTO: 93.1 FL (ref 80–99)
METHGB MFR BLD: 0.4 % (ref 0–1.4)
MONOCYTES # BLD: 0.7 K/UL (ref 0–1)
MONOCYTES NFR BLD: 6 % (ref 5–13)
NEUTS SEG # BLD: 10.5 K/UL (ref 1.8–8)
NEUTS SEG NFR BLD: 89 % (ref 32–75)
NRBC # BLD: 0.02 K/UL (ref 0–0.01)
NRBC BLD-RTO: 0.2 PER 100 WBC
OXYHGB MFR BLD: 93.3 % (ref 95–99)
PCO2 BLDA: 33 MMHG (ref 35–45)
PEEP RESPIRATORY: 5
PERFORMED BY:: ABNORMAL
PH BLDA: 7.44 (ref 7.35–7.45)
PLATELET # BLD AUTO: 167 K/UL (ref 150–400)
PMV BLD AUTO: 10 FL (ref 8.9–12.9)
PO2 BLDA: 74 MMHG (ref 80–100)
POTASSIUM SERPL-SCNC: 4.6 MMOL/L (ref 3.5–5.1)
PROCALCITONIN SERPL-MCNC: 0.18 NG/ML
PROT SERPL-MCNC: 6.6 G/DL (ref 6.4–8.2)
RBC # BLD AUTO: 3.35 M/UL (ref 4.1–5.7)
SAO2 % BLD: 94 % (ref 95–99)
SAO2% DEVICE SAO2% SENSOR NAME: ABNORMAL
SODIUM SERPL-SCNC: 132 MMOL/L (ref 136–145)
SPECIMEN SITE: ABNORMAL
UFH PPP CHRO-ACNC: <0.1 IU/ML
VENTILATION MODE VENT: ABNORMAL
VT SETTING VENT: 410
WBC # BLD AUTO: 11.8 K/UL (ref 4.1–11.1)

## 2024-06-21 PROCEDURE — 6360000002 HC RX W HCPCS: Performed by: INTERNAL MEDICINE

## 2024-06-21 PROCEDURE — 6370000000 HC RX 637 (ALT 250 FOR IP): Performed by: INTERNAL MEDICINE

## 2024-06-21 PROCEDURE — 80053 COMPREHEN METABOLIC PANEL: CPT

## 2024-06-21 PROCEDURE — 99232 SBSQ HOSP IP/OBS MODERATE 35: CPT | Performed by: INTERNAL MEDICINE

## 2024-06-21 PROCEDURE — 6370000000 HC RX 637 (ALT 250 FOR IP)

## 2024-06-21 PROCEDURE — 82803 BLOOD GASES ANY COMBINATION: CPT

## 2024-06-21 PROCEDURE — 85025 COMPLETE CBC W/AUTO DIFF WBC: CPT

## 2024-06-21 PROCEDURE — 71045 X-RAY EXAM CHEST 1 VIEW: CPT

## 2024-06-21 PROCEDURE — 82330 ASSAY OF CALCIUM: CPT

## 2024-06-21 PROCEDURE — 1100000000 HC RM PRIVATE

## 2024-06-21 PROCEDURE — 6370000000 HC RX 637 (ALT 250 FOR IP): Performed by: FAMILY MEDICINE

## 2024-06-21 PROCEDURE — 36600 WITHDRAWAL OF ARTERIAL BLOOD: CPT

## 2024-06-21 PROCEDURE — 84145 PROCALCITONIN (PCT): CPT

## 2024-06-21 PROCEDURE — 2700000000 HC OXYGEN THERAPY PER DAY

## 2024-06-21 PROCEDURE — 86140 C-REACTIVE PROTEIN: CPT

## 2024-06-21 PROCEDURE — 94761 N-INVAS EAR/PLS OXIMETRY MLT: CPT

## 2024-06-21 PROCEDURE — 94640 AIRWAY INHALATION TREATMENT: CPT

## 2024-06-21 PROCEDURE — 2580000003 HC RX 258: Performed by: FAMILY MEDICINE

## 2024-06-21 PROCEDURE — 36415 COLL VENOUS BLD VENIPUNCTURE: CPT

## 2024-06-21 PROCEDURE — 94003 VENT MGMT INPAT SUBQ DAY: CPT

## 2024-06-21 PROCEDURE — 85520 HEPARIN ASSAY: CPT

## 2024-06-21 RX ORDER — MORPHINE SULFATE 2 MG/ML
2 INJECTION, SOLUTION INTRAMUSCULAR; INTRAVENOUS
Status: DISCONTINUED | OUTPATIENT
Start: 2024-06-21 | End: 2024-06-25 | Stop reason: HOSPADM

## 2024-06-21 RX ORDER — MORPHINE SULFATE 4 MG/ML
4 INJECTION, SOLUTION INTRAMUSCULAR; INTRAVENOUS
Status: DISCONTINUED | OUTPATIENT
Start: 2024-06-21 | End: 2024-06-25 | Stop reason: HOSPADM

## 2024-06-21 RX ORDER — LORAZEPAM 2 MG/ML
1 INJECTION INTRAMUSCULAR EVERY 6 HOURS PRN
Status: DISCONTINUED | OUTPATIENT
Start: 2024-06-21 | End: 2024-06-23

## 2024-06-21 RX ADMIN — GLYCOPYRROLATE 0.1 MG: 0.2 INJECTION INTRAMUSCULAR; INTRAVENOUS at 21:05

## 2024-06-21 RX ADMIN — FENTANYL CITRATE 50 MCG: 50 INJECTION, SOLUTION INTRAMUSCULAR; INTRAVENOUS at 06:38

## 2024-06-21 RX ADMIN — FOLIC ACID 1 MG: 1 TABLET ORAL at 09:00

## 2024-06-21 RX ADMIN — IPRATROPIUM BROMIDE AND ALBUTEROL SULFATE 1 DOSE: 2.5; .5 SOLUTION RESPIRATORY (INHALATION) at 01:15

## 2024-06-21 RX ADMIN — GLYCOPYRROLATE 0.1 MG: 0.2 INJECTION INTRAMUSCULAR; INTRAVENOUS at 02:33

## 2024-06-21 RX ADMIN — FENTANYL CITRATE 50 MCG: 50 INJECTION, SOLUTION INTRAMUSCULAR; INTRAVENOUS at 07:43

## 2024-06-21 RX ADMIN — IPRATROPIUM BROMIDE AND ALBUTEROL SULFATE 1 DOSE: 2.5; .5 SOLUTION RESPIRATORY (INHALATION) at 19:59

## 2024-06-21 RX ADMIN — FENTANYL CITRATE 50 MCG: 50 INJECTION, SOLUTION INTRAMUSCULAR; INTRAVENOUS at 10:17

## 2024-06-21 RX ADMIN — THIAMINE HCL TAB 100 MG 100 MG: 100 TAB at 09:00

## 2024-06-21 RX ADMIN — FINASTERIDE 5 MG: 5 TABLET, FILM COATED ORAL at 09:00

## 2024-06-21 RX ADMIN — IPRATROPIUM BROMIDE AND ALBUTEROL SULFATE 1 DOSE: 2.5; .5 SOLUTION RESPIRATORY (INHALATION) at 14:19

## 2024-06-21 RX ADMIN — PREDNISONE 20 MG: 20 TABLET ORAL at 09:00

## 2024-06-21 RX ADMIN — GLYCOPYRROLATE 0.1 MG: 0.2 INJECTION INTRAMUSCULAR; INTRAVENOUS at 13:21

## 2024-06-21 RX ADMIN — FENTANYL CITRATE 50 MCG: 50 INJECTION, SOLUTION INTRAMUSCULAR; INTRAVENOUS at 09:00

## 2024-06-21 RX ADMIN — IPRATROPIUM BROMIDE AND ALBUTEROL SULFATE 1 DOSE: 2.5; .5 SOLUTION RESPIRATORY (INHALATION) at 08:19

## 2024-06-21 RX ADMIN — ASPIRIN 81 MG 81 MG: 81 TABLET ORAL at 09:00

## 2024-06-21 RX ADMIN — SODIUM CHLORIDE, PRESERVATIVE FREE 10 ML: 5 INJECTION INTRAVENOUS at 09:00

## 2024-06-21 RX ADMIN — FENTANYL CITRATE 50 MCG: 50 INJECTION, SOLUTION INTRAMUSCULAR; INTRAVENOUS at 05:19

## 2024-06-21 RX ADMIN — FENTANYL CITRATE 50 MCG: 50 INJECTION, SOLUTION INTRAMUSCULAR; INTRAVENOUS at 02:33

## 2024-06-21 RX ADMIN — FENTANYL CITRATE 50 MCG: 50 INJECTION, SOLUTION INTRAMUSCULAR; INTRAVENOUS at 01:10

## 2024-06-21 RX ADMIN — FERROUS SULFATE TAB 325 MG (65 MG ELEMENTAL FE) 325 MG: 325 (65 FE) TAB at 09:00

## 2024-06-21 ASSESSMENT — PULMONARY FUNCTION TESTS
PIF_VALUE: 22
PIF_VALUE: 16
PIF_VALUE: 21
PIF_VALUE: 18
PIF_VALUE: 23
PIF_VALUE: 26
PIF_VALUE: 24
PIF_VALUE: 19
PIF_VALUE: 21
PIF_VALUE: 21
PIF_VALUE: 22
PIF_VALUE: 38
PIF_VALUE: 19
PIF_VALUE: 25
PIF_VALUE: 21
PIF_VALUE: 19
PIF_VALUE: 25
PIF_VALUE: 24
PIF_VALUE: 33

## 2024-06-21 ASSESSMENT — PAIN SCALES - GENERAL
PAINLEVEL_OUTOF10: 0
PAINLEVEL_OUTOF10: 2
PAINLEVEL_OUTOF10: 0
PAINLEVEL_OUTOF10: 0

## 2024-06-21 NOTE — PROGRESS NOTES
Nephrology f/u          Patient: Agustín Willis MRN: [id number]  SSN: [id number]    YOB: 1945  Age: 79 y.o.  Sex: male      Subjective:   I have seen the patient in ICU.  On vent  Off single pressor  Was dialyzed yesterday    Past Medical History:   Diagnosis Date    Benign prostatic disease 8/3/2020    Hypertrophy with Outflow Obstruction    Benign prostatic hyperplasia 8/3/2020    Cervical radiculopathy 8/3/2020    Chronic obstructive lung disease (HCC) 8/3/2020    Cocaine abuse (HCC) 8/3/2020    Dizziness and giddiness 8/3/2020    Hypercholesterolemia 8/3/2020    Hypertensive disorder 8/3/2020    Insomnia 8/3/2020    Kidney disease 8/3/2020    Low back pain 8/3/2020    Osteoarthritis of knee 8/3/2020    Sleep apnea 8/3/2020    Vasovagal syncope 8/3/2020     Past Surgical History:   Procedure Laterality Date    IR NONTUNNELED VASCULAR CATHETER  5/30/2024    IR NONTUNNELED VASCULAR CATHETER 5/30/2024 Stephy Abreu APRN - NP SSR RAD ANGIO IR      Family History   Problem Relation Age of Onset    Hypertension Mother      Social History     Tobacco Use    Smoking status: Former    Smokeless tobacco: Never   Substance Use Topics    Alcohol use: Yes      Current Facility-Administered Medications   Medication Dose Route Frequency Provider Last Rate Last Admin    LORazepam (ATIVAN) injection 1 mg  1 mg IntraVENous Q6H PRN Mikaela Arredondo MD        morphine (PF) injection 2 mg  2 mg IntraVENous Q2H PRN Mikaela Arredondo MD        Or    morphine (PF) injection 4 mg  4 mg IntraVENous Q2H PRN Mikaela Arredondo MD        predniSONE (DELTASONE) tablet 20 mg  20 mg Oral BID Jeremy Ramos MD   20 mg at 06/21/24 0900    [START ON 6/22/2024] Vancomycin - please draw level 6/22 with AM labs.  Thanks!   Other Once Riley Alonzo MD        fentaNYL (SUBLIMAZE) injection 50 mcg  50 mcg IntraVENous Q1H PRN Jeremy Ramos MD   50 mcg at 06/21/24 1017    glycopyrrolate (ROBINUL) injection 0.1 mg  0.1 mg

## 2024-06-21 NOTE — PROGRESS NOTES
Patient admitted for respiratory arrest following aspiration, currently in CVICU intubated on no IV medications.    0745: 50mcg IV fentanyl administered for ventilator dyssynchrony.     0800: Assessed patient per intensive care protocol. Neuro status noted to be poor. Thick bloody secretions noted in ETT tube. Case management called to advise that patient's wife would like terminal extubation in afternoon.     0900: 50mcg fentanyl administered for ventilator dyssynchrony    1017: 50mcg fentanyl adminstered for ventilator dyssynchrony.    1030: Turned patient to left side and cuff leak with desaturations noted. RT came to bedside to insert more air and resolve cuff leak.     1200: Received comfort care orders from Dr. Nolasco.    1500: Patient terminally extubated to 2 Liters NC    1800: Report given to ROGELIO Bull, and transferred to [facility]

## 2024-06-21 NOTE — CARE COORDINATION
GARRETT called Pt wife, Princess Willis at [phone], she stated that it would be ok to make Pt comfort care.    CM called and left a VM with Dr. Arredondo asking him to call Princess.    CM called Pt Nurse Tamia, let her know what Pt wife said. Tamia stated that she thought the daughter was making decisions.     GARRETT talked with Ernestina,  Director, Ernestina stated that the wife will be the one to make Legal Medical Decisions for Pt. Ernestina stated only if we cannot get a hold of the wife the daughter will then make decisions.     10:20  GARRETT called Pt daughter, Senthil, left a VM letting her know that Pt will be extubated at 3:00 pm today.   CM asked Senthil to call CM back if she wants to come and see Pt before he is extubated.    GARRETT called Princess, let her know that we are taking Pt off the vent at 3:00 pm today. Princess stated, \"ok, thank you\"     GARRETT called Dr. Arredondo, let him know that CM talked with Princess and she is ok with Pt being extubated at 3:00 pm.      Dr. Arredondo stated that he is putting in comfort order now.   CM will follow.     3:12  Senthil called GARRETT and stated that she just got off work and that she could not come to see Pt until tomorrow.    GARRETT let Senthil know that they may have already taken Pt off the vent.

## 2024-06-21 NOTE — PROGRESS NOTES
IMPRESSION:   Acute hypoxic respiratory failure extubated on 6/5 and reintubated on 6/10  Status post cardiac arrest  Chronic atrial fibrillation  Septic shock resolved off pressors  Left upper extremity DVT  Embolic stroke  Acute kidney injury on dialysis  Sepsis treated with antibiotics  Aspiration pneumonia right midlung  COPD   MRSA nasal colonization  History of cocaine abuse polysubstance abuse in the past  Pt is critically ill. Time spent with pt and staff actively rendering care, managing pt and coordinating care as stated below; 30 minutes, exclusive of any procedures      RECOMMENDATIONS/PLAN:   Extubated  on 6/5 reintubated on 6/10 on ventilator   Not on any sedation since 6/17  Mental status has not changed patient had a stroke eyes were rolled down off sedation no response  Chest x-ray with continued right lower infiltrate CTA chest showed bilateral infiltrates consistent with aspiration  Patient is getting dialysis  MRI of the brain was done which shows small foci of acute infarction in the supratentorial region and cerebellum EEG shows diffuse encephalopathy  Patient has a lot of secretion patient on Robinul  Chest x-ray shows right midlung infiltrate fluid overload congestive change left lung clear  Remains on vancomycin sputum with normal doug  Heart rate controlled on amiodarone   Renal function improving off CVVH  on dialysis  Status post cardiac arrest patient coded   Patient is off Levophed     [x] High complexity decision making was performed  [x] See my orders for details  HPI  78-year-old male nursing home resident came in because of shortness of breath respiratory distress patient has episodes of vomiting after eating breakfast this morning subsequently came to the hospital saturation was in 70s he was back to the emergency room subsequently intubated and then he coded hemodynamically unstable now he is on 2 pressors vasopressin and Levophed denies intubated on ventilator critical care  consult was called, past medical history of polysubstance abuse cocaine abuse COPD hypertension insomnia      PMH:  has a past medical history of Benign prostatic disease, Benign prostatic hyperplasia, Cervical radiculopathy, Chronic obstructive lung disease (HCC), Cocaine abuse (HCC), Dizziness and giddiness, Hypercholesterolemia, Hypertensive disorder, Insomnia, Kidney disease, Low back pain, Osteoarthritis of knee, Sleep apnea, and Vasovagal syncope.    PSH:   has a past surgical history that includes IR NONTUNNELED VASCULAR CATHETER > 5 YEARS (5/30/2024).     FHX: family history includes Hypertension in his mother.     SHX:  reports that he has quit smoking. He has never used smokeless tobacco. He reports current alcohol use. He reports current drug use. Frequency: 3.00 times per week. Drug: Cocaine.    ALL:   Allergies   Allergen Reactions    Penicillin G Other (See Comments)     Pt states he passes out    Sulfamethoxazole-Trimethoprim      Pt states he passes out        MEDS:   [x] Reviewed - As Below   [] Not reviewed    Current Facility-Administered Medications   Medication Dose Route Frequency Provider Last Rate Last Admin    predniSONE (DELTASONE) tablet 20 mg  20 mg Oral BID Jeremy Ramos MD   20 mg at 06/20/24 2049    [START ON 6/22/2024] Vancomycin - please draw level 6/22 with AM labs.  Thanks!   Other Once Riley Alonzo MD        fentaNYL (SUBLIMAZE) injection 50 mcg  50 mcg IntraVENous Q1H PRN Jeremy Ramos MD   50 mcg at 06/21/24 0638    glycopyrrolate (ROBINUL) injection 0.1 mg  0.1 mg IntraVENous Q8H Jeremy Ramos MD   0.1 mg at 06/21/24 0233    0.9 % sodium chloride infusion   IntraVENous PRN Jeremy Ramos MD        0.9 % sodium chloride infusion   IntraVENous PRN Mikaela Arredondo MD        atorvastatin (LIPITOR) tablet 40 mg  40 mg Oral Nightly Mikaela Arredondo MD   40 mg at 06/20/24 2049    scopolamine (TRANSDERM-SCOP) transdermal patch 1 patch  1 patch TransDERmal Q72H  his mental status with recent embolic stroke unable to protect his airway medical ethics is also involved trying to reach the family but no response.  6/20 intubated on ventilator assist-control mode pCO2 36 pO2 73 on 30% FiO2, chest x-ray showed bilateral infiltrate and atelectasis, WBC normal hemoglobin 9.7 on vancomycin  6/21 remain intubated on ventilator patient was having bloody secretions from the endotracheal tube heparin stopped awaiting for the family to make decision, ABG acceptable assist-control mode, chest x-ray shows right midlung infiltrate and left basilar atelectasis, hemoglobin stable 9.9 continue with Robinul

## 2024-06-21 NOTE — PROGRESS NOTES
General Daily Progress Note      Patient Name:   Agustín Willis       YOB: 1945       Age:  79 y.o.      Admit Date: 5/29/2024      Subjective:   Patient is a 78 y.o. year old male past medical history of COPD BPH cervical radiculopathy cocaine abuse hypertension hyperlipidemia seasonal allergies SVT came to emergency room with respiratory arrest, patient was at nursing home after eating breakfast he vomited everywhere likely aspirated oxygen saturation was 70% initially put on nonrebreather patient was intubated in the ER patient was hypotensive started Levophed 10 patient have a cardiac arrest went to V-fib received shock x 4 received epi and amiodarone  Was started on Levophed and vasopressin received 2 L of IV fluid, received IV Zosyn and vancomycin in the ER     Patient admitted to the ICU for further workup  Patient on ventilator/unresponsive       5/30    Patient on ventilator propofol and Precedex drip  Hypotension on Levophed and vasopressin  Patient has no urine output last night received 500 bolus    Nephrology decided for start CRRT IR team for dialysis cath  BUN 72 creatinine 3.09  CRP 10.5  MRSA nares    5/31    Patient on ventilator 40% O2 propofol Precedex levo and vasopressin drip  Patient on A-fib with rapid ventricular rate ordered amiodarone bolus and started on drip  Patient getting CRRT  No urine output  D-dimers greater than 20    6/3    Patient on ventilator 45% O2 receiving CRRT patient on propofol drip  Patient on amiodarone and Levophed drip  Patient heparin and Precedex drip  Patient is hypothermic    WBC count 22.5    6/4    Patient on ventilator 45% O2 on Levophed drip and amiodarone drip and heparin drip and Precedex drip getting NG tube feeding  BUN 37 creatinine 1.97 WBCs 15  Doppler studies positive for acute DVT of the left brachial vein left radial vein and left ulnar vein    Seen by infectious disease recommended continue IV meropenem    6/5    General ventilated  MD Mikaela, Paused at 06/14/24 2130    [Held by provider] amiodarone (CORDARONE) tablet 400 mg, 400 mg, Oral, BID, Gerry Wilson MD, 400 mg at 06/10/24 1016    aspirin chewable tablet 81 mg, 81 mg, Per NG tube, Daily, Jeremy Ramos MD, 81 mg at 06/21/24 0900    epoetin radha-epbx (RETACRIT) injection 10,000 Units, 10,000 Units, SubCUTAneous, Once per day on Mon Wed Fri, Sol Ferraro MD, 10,000 Units at 06/19/24 1803    heparin (porcine) injection 4,000 Units, 4,000 Units, IntraVENous, PRN, Mikaela Arredondo MD    heparin (porcine) injection 2,000 Units, 2,000 Units, IntraVENous, PRN, Mikaela Arredondo MD, 2,000 Units at 06/09/24 0734    heparin 25,000 units in dextrose 5% 250 mL (premix) infusion, 5-30 Units/kg/hr, IntraVENous, Continuous, Mikaela Arredondo MD, Stopped at 06/20/24 1622    heparin (porcine) injection 1,100-1,900 Units, 1,100-1,900 Units, IntraCATHeter, PRN, 1,400 Units at 06/03/24 1231 **AND** heparin (porcine) injection 1,100-1,900 Units, 1,100-1,900 Units, IntraCATHeter, PRN, Sol Ferraro MD, 1,100 Units at 06/03/24 1226    sodium chloride 0.9 % bolus 2,448 mL, 30 mL/kg, IntraVENous, Once, Mikaela Arredondo MD, Held at 05/29/24 1405    ferrous sulfate (IRON 325) tablet 325 mg, 325 mg, Oral, Daily with breakfast, Mikaela Arredondo MD, 325 mg at 06/21/24 0900    finasteride (PROSCAR) tablet 5 mg, 5 mg, Oral, Daily, Mikaela Arredondo MD, 5 mg at 06/21/24 0900    folic acid (FOLVITE) tablet 1 mg, 1 mg, Oral, Daily, Mikaela Arredondo MD, 1 mg at 06/21/24 0900    thiamine mononitrate tablet 100 mg, 100 mg, Oral, Daily, Mikaela Arredondo MD, 100 mg at 06/21/24 0900    sodium chloride flush 0.9 % injection 5-40 mL, 5-40 mL, IntraVENous, 2 times per day, Mikaela Arredondo MD, 10 mL at 06/20/24 2049    sodium chloride flush 0.9 % injection 5-40 mL, 5-40 mL, IntraVENous, PRN, Mikaela Arredondo MD    0.9 % sodium chloride infusion, , IntraVENous, PRN, Mikaela Arredondo MD    ondansetron (ZOFRAN-ODT)  disintegrating tablet 4 mg, 4 mg, Oral, Q8H PRN **OR** ondansetron (ZOFRAN) injection 4 mg, 4 mg, IntraVENous, Q6H PRN, Mikaela Arredondo MD, 4 mg at 06/18/24 2059    polyethylene glycol (GLYCOLAX) packet 17 g, 17 g, Oral, Daily PRN, Mikaela Arredondo MD, 17 g at 06/15/24 1800    acetaminophen (TYLENOL) tablet 650 mg, 650 mg, Oral, Q6H PRN, 650 mg at 06/09/24 2047 **OR** acetaminophen (TYLENOL) suppository 650 mg, 650 mg, Rectal, Q6H PRN, Mikaela Arredondo MD    glucose chewable tablet 16 g, 4 tablet, Oral, PRN, Mikaela Arredondo MD    dextrose bolus 10% 125 mL, 125 mL, IntraVENous, PRN **OR** dextrose bolus 10% 250 mL, 250 mL, IntraVENous, PRN, Mikaela Arredondo MD    glucagon injection 1 mg, 1 mg, SubCUTAneous, PRN, Mikaela Arredondo MD    dextrose 10 % infusion, , IntraVENous, Continuous PRN, Mikaela Arredondo MD

## 2024-06-21 NOTE — PROGRESS NOTES
Progress Note  Date:2024       Room:[facility]  Patient Name:Agustín Willis     YOB: 1945     Age:79 y.o.        Subjective    Subjective  Patient followed for septic shock with presumptive aspiration pneumonia secondary now to MRSA on IV Vancomycin. It appears that patient has been made comfort care. He has been transferred out of the ICU.     Objective         Vitals Last 24 Hours:  TEMPERATURE:  Temp  Av.6 °F (37 °C)  Min: 98.1 °F (36.7 °C)  Max: 99.1 °F (37.3 °C)  RESPIRATIONS RANGE: Resp  Av.9  Min: 12  Max: 95  PULSE OXIMETRY RANGE: SpO2  Av.4 %  Min: 95 %  Max: 99 %  PULSE RANGE: Pulse  Av.8  Min: 61  Max: 90  BLOOD PRESSURE RANGE: Systolic (24hrs), Av , Min:81 , Max:141   ; Diastolic (24hrs), Av, Min:65, Max:103         Objective:  Vital signs: (most recent): Blood pressure 118/78, pulse 63, temperature 98.9 °F (37.2 °C), temperature source Rectal, resp. rate 13, height 1.73 m (5' 8.11\"), weight 79.7 kg (175 lb 11.3 oz), SpO2 95 %.      Vitals and nursing note reviewed.   Constitutional:       General: He is in acute distress.      Appearance: He is ill-appearing and toxic-appearing.   HENT:      Head: Normocephalic and atraumatic.      Right Ear: External ear normal.      Left Ear: External ear normal.      Nose: Nose normal.      Mouth/Throat: drooling     Comments ET tube/orogastric tube removed  Cardiovascular:      Rate and Rhythm: Normal rate and regular rhythm.      Heart sounds: No murmur heard.  Pulmonary:      Breath sounds: Rhonchi present. No wheezing or rales.   Abdominal:      General: Bowel sounds are normal. There is no distension.      Palpations: Abdomen is soft.      Tenderness: There is no abdominal tenderness.   Genitourinary:  external urinary device    Musculoskeletal:      Cervical back: Neck supple.      Right lower leg: No edema.      Left lower leg: No pham   Skin:     Findings: No rash.   Neurological:  unresponsive  Psychiatric:unresponsive   Labs/Imaging/Diagnostics    Labs:  CBC:  Recent Labs     06/19/24 0230 06/20/24  0246 06/21/24 0329   WBC 10.3 11.0 11.8*   RBC 3.19* 3.26* 3.35*   HGB 9.5* 9.7* 9.9*   HCT 29.7* 30.1* 31.2*   MCV 93.1 92.3 93.1   RDW 17.3* 17.5* 18.2*    187 167       CHEMISTRIES:  Recent Labs     06/19/24 0230 06/20/24  0246 06/21/24 0329   * 132* 132*   K 4.8 5.0 4.6   CL 97 97 98   CO2 27 25 27   BUN 53* 72* 44*   CREATININE 1.83* 2.32* 2.00*   GLUCOSE 109* 120* 100   PHOS 5.4*  --   --          Lactic acid 6.39      Procal 0.18 <0.25 <0.38 <2.45 <4.01 <5.46 <6.77 <5.94 <0.27 <0.71 <2.45  <72.72 <0.22                  CRP 2.35 <1.12 <1.46 < 6.71 <13.60 <12.40 <12.00 <17.00 <18.70 < 16.20 < 12.30 <20.20 <29.80                MRSA nasal PCR Positive    Blood cultures (5/29) No growth FINAL  Blood cultures (5/29)  No growth FINAL  Urine culture (5/29) No growth FINAL  Sputum culture (5/29) Light normal respiratory doug FINAL  Sputum culture (6/11)    Methicillin Resistant Staphylococcus aureus     Methicillin-Resistant Staphylococcus aureus (1)    Antibiotic Interpretation Microscan     clindamycin Resistant >=4 ug/mL    ciprofloxacin Resistant >=8 ug/mL    doxycycline Sensitive 1 ug/mL    erythromycin Resistant >=8 ug/mL    gentamicin Sensitive <=0.5 ug/mL    levofloxacin Resistant >=8 ug/mL    linezolid Sensitive 2 ug/mL    oxacillin Resistant >=4 ug/mL    rifampin Sensitive <=0.5 ug/mL    tetracycline Sensitive 2 ug/mL    trimethoprim-sulfamethoxazole Sensitive <=10 ug/mL    vancomycin Sensitive 1 ug/mL      Imaging Last 24 Hours:    XR CHEST PORTABLE    Result Date: 6/20/2024    Right midlung zone airspace disease may represent. Airspace opacity  may represent pneumonia or aspiration. There is patchy airspace opacity in the  left lung base as well              .             Assessment//Plan           Hospital Problems             Last Modified POA    * (Principal) Acute

## 2024-06-21 NOTE — PLAN OF CARE
Problem: Discharge Planning  Goal: Discharge to home or other facility with appropriate resources  6/20/2024 2122 by Lila Clark RN  Outcome: Not Progressing  Flowsheets (Taken 6/20/2024 2000)  Discharge to home or other facility with appropriate resources: Identify barriers to discharge with patient and caregiver  6/20/2024 1341 by Mague March RN  Outcome: Not Progressing     Problem: Chronic Conditions and Co-morbidities  Goal: Patient's chronic conditions and co-morbidity symptoms are monitored and maintained or improved  6/20/2024 2122 by Lila Clark RN  Outcome: Not Progressing  Flowsheets (Taken 6/20/2024 2000)  Care Plan - Patient's Chronic Conditions and Co-Morbidity Symptoms are Monitored and Maintained or Improved: Monitor and assess patient's chronic conditions and comorbid symptoms for stability, deterioration, or improvement  6/20/2024 1341 by Mague March RN  Outcome: Not Progressing     Problem: Neurosensory - Adult  Goal: Achieves stable or improved neurological status  Outcome: Not Progressing  Flowsheets (Taken 6/20/2024 2000)  Achieves stable or improved neurological status: Assess for and report changes in neurological status  Goal: Absence of seizures  Outcome: Not Progressing  Flowsheets (Taken 6/20/2024 2000)  Absence of seizures: Monitor for seizure activity.  If seizure occurs, document type and location of movements and any associated apnea  Goal: Remains free of injury related to seizures activity  Outcome: Not Progressing  Flowsheets (Taken 6/20/2024 2000)  Remains free of injury related to seizure activity: Maintain airway, patient safety  and administer oxygen as ordered  Goal: Achieves maximal functionality and self care  Outcome: Not Progressing  Flowsheets (Taken 6/20/2024 2000)  Achieves maximal functionality and self care: Monitor swallowing and airway patency with patient fatigue and changes in neurological status     Problem: Safety - Adult  Goal: Free

## 2024-06-22 ENCOUNTER — APPOINTMENT (OUTPATIENT)
Facility: HOSPITAL | Age: 79
DRG: 870 | End: 2024-06-22
Payer: MEDICARE

## 2024-06-22 LAB
ARTERIAL PATENCY WRIST A: YES
BASE DEFICIT BLDA-SCNC: 2.1 MMOL/L
BDY SITE: ABNORMAL
CA-I BLD-MCNC: 1.14 MMOL/L (ref 1.13–1.32)
COHGB MFR BLD: 0.3 % (ref 1–2)
GAS FLOW.O2 O2 DELIVERY SYS: 2 L/MIN
HCO3 BLDA-SCNC: 22 MMOL/L (ref 22–26)
METHGB MFR BLD: 0.4 % (ref 0–1.4)
OXYHGB MFR BLD: 94.6 % (ref 95–99)
PCO2 BLDA: 34 MMHG (ref 35–45)
PERFORMED BY:: ABNORMAL
PH BLDA: 7.43 (ref 7.35–7.45)
PO2 BLDA: 82 MMHG (ref 80–100)
SAO2 % BLD: 95 % (ref 95–99)
SAO2% DEVICE SAO2% SENSOR NAME: ABNORMAL
SPECIMEN SITE: ABNORMAL

## 2024-06-22 PROCEDURE — 36600 WITHDRAWAL OF ARTERIAL BLOOD: CPT

## 2024-06-22 PROCEDURE — 2580000003 HC RX 258: Performed by: FAMILY MEDICINE

## 2024-06-22 PROCEDURE — 71045 X-RAY EXAM CHEST 1 VIEW: CPT

## 2024-06-22 PROCEDURE — 94761 N-INVAS EAR/PLS OXIMETRY MLT: CPT

## 2024-06-22 PROCEDURE — 6360000002 HC RX W HCPCS: Performed by: FAMILY MEDICINE

## 2024-06-22 PROCEDURE — 6370000000 HC RX 637 (ALT 250 FOR IP): Performed by: INTERNAL MEDICINE

## 2024-06-22 PROCEDURE — 82803 BLOOD GASES ANY COMBINATION: CPT

## 2024-06-22 PROCEDURE — 94640 AIRWAY INHALATION TREATMENT: CPT

## 2024-06-22 PROCEDURE — 1100000000 HC RM PRIVATE

## 2024-06-22 PROCEDURE — 6360000002 HC RX W HCPCS: Performed by: INTERNAL MEDICINE

## 2024-06-22 PROCEDURE — 99232 SBSQ HOSP IP/OBS MODERATE 35: CPT | Performed by: INTERNAL MEDICINE

## 2024-06-22 PROCEDURE — 82330 ASSAY OF CALCIUM: CPT

## 2024-06-22 PROCEDURE — 2700000000 HC OXYGEN THERAPY PER DAY

## 2024-06-22 RX ADMIN — GLYCOPYRROLATE 0.1 MG: 0.2 INJECTION INTRAMUSCULAR; INTRAVENOUS at 06:32

## 2024-06-22 RX ADMIN — GLYCOPYRROLATE 0.1 MG: 0.2 INJECTION INTRAMUSCULAR; INTRAVENOUS at 15:00

## 2024-06-22 RX ADMIN — GLYCOPYRROLATE 0.1 MG: 0.2 INJECTION INTRAMUSCULAR; INTRAVENOUS at 22:10

## 2024-06-22 RX ADMIN — SODIUM CHLORIDE, PRESERVATIVE FREE 10 ML: 5 INJECTION INTRAVENOUS at 04:34

## 2024-06-22 RX ADMIN — SODIUM CHLORIDE, PRESERVATIVE FREE 10 ML: 5 INJECTION INTRAVENOUS at 22:43

## 2024-06-22 RX ADMIN — SODIUM CHLORIDE, PRESERVATIVE FREE 10 ML: 5 INJECTION INTRAVENOUS at 15:29

## 2024-06-22 RX ADMIN — MORPHINE SULFATE 2 MG: 2 INJECTION, SOLUTION INTRAMUSCULAR; INTRAVENOUS at 15:29

## 2024-06-22 RX ADMIN — IPRATROPIUM BROMIDE AND ALBUTEROL SULFATE 1 DOSE: 2.5; .5 SOLUTION RESPIRATORY (INHALATION) at 08:47

## 2024-06-22 RX ADMIN — IPRATROPIUM BROMIDE AND ALBUTEROL SULFATE 1 DOSE: 2.5; .5 SOLUTION RESPIRATORY (INHALATION) at 01:12

## 2024-06-22 ASSESSMENT — PAIN SCALES - GENERAL: PAINLEVEL_OUTOF10: 4

## 2024-06-22 NOTE — PROGRESS NOTES
Progress Note  Date:2024       Room:[facility]  Patient Name:Agustín Willis     YOB: 1945     Age:79 y.o.        Subjective    Subjective  Patient followed for septic shock with presumptive aspiration pneumonia secondary to MRSA.  He had been on IV Vancomycin but now discontinued on comfort care.  He remains afebrie. CXR today is unchanged.    Objective         Vitals Last 24 Hours:  TEMPERATURE:  Temp  Av.9 °F (36.6 °C)  Min: 97.5 °F (36.4 °C)  Max: 98.8 °F (37.1 °C)  RESPIRATIONS RANGE: Resp  Avg: 15.8  Min: 12  Max: 22  PULSE OXIMETRY RANGE: SpO2  Av.7 %  Min: 90 %  Max: 100 %  PULSE RANGE: Pulse  Av.8  Min: 65  Max: 98  BLOOD PRESSURE RANGE: Systolic (24hrs), Av , Min:106 , Max:158   ; Diastolic (24hrs), Av, Min:75, Max:95         Objective:  Vital signs: (most recent): Blood pressure 128/89, pulse 94, temperature 98.8 °F (37.1 °C), temperature source Axillary, resp. rate 18, height 1.73 m (5' 8.11\"), weight 79.7 kg (175 lb 11.3 oz), SpO2 96 %.      Vitals and nursing note reviewed.   Constitutional:       General: He is in acute distress.      Appearance: He is ill-appearing and toxic-appearing.   HENT:  unremarkable  Cardiovascular:      Rate and Rhythm: Normal rate and regular rhythm.      Heart sounds: No murmur heard.  Pulmonary:      Breath sounds: Rhonchi present. No wheezing or rales.   Abdominal:      General: Bowel sounds are normal. There is no distension.      Palpations: Abdomen is soft.      Tenderness: There is no abdominal tenderness.   Genitourinary:  external urinary device    Musculoskeletal:      Cervical back: Neck supple.      Right lower leg: No edema.      Left lower leg: No pham   Skin:     Findings: No rash.   Neurological: unresponsive  Psychiatric:unresponsive   Labs/Imaging/Diagnostics    Labs:  CBC:  Recent Labs     24  0246 24  0329   WBC 11.0 11.8*   RBC 3.26* 3.35*   HGB 9.7* 9.9*   HCT 30.1* 31.2*   MCV 92.3 93.1   RDW 17.5* 18.2*

## 2024-06-22 NOTE — PROGRESS NOTES
IMPRESSION:   Acute hypoxic respiratory failure extubated on 6/5 and reintubated on 6/10 and reextubated 6/21 currently on nasal oxygen  Status post cardiac arrest  Chronic atrial fibrillation  Septic shock resolved   Left upper extremity DVT  Embolic stroke  Acute kidney injury dialysis has been discontinued as patient is now comfort care  Sepsis treated with antibiotics  Aspiration pneumonia right midlung  COPD   MRSA nasal colonization  History of cocaine abuse polysubstance abuse in the past        RECOMMENDATIONS/PLAN:   Extubated  on 6/5 reintubated on 6/10 on ventilator extubated 6/21 currently on nasal oxygen  Patient now comfort care  Will discontinue nebulized bronchodilators and enteral prednisone  Will maintain Robinul to try and prevent further aspiration     [x] High complexity decision making was performed  [x] See my orders for details  HPI  78-year-old male nursing home resident came in because of shortness of breath respiratory distress patient has episodes of vomiting after eating breakfast this morning subsequently came to the hospital saturation was in 70s he was back to the emergency room subsequently intubated and then he coded hemodynamically unstable now he is on 2 pressors vasopressin and Levophed denies intubated on ventilator critical care consult was called, past medical history of polysubstance abuse cocaine abuse COPD hypertension insomnia      PMH:  has a past medical history of Benign prostatic disease, Benign prostatic hyperplasia, Cervical radiculopathy, Chronic obstructive lung disease (HCC), Cocaine abuse (HCC), Dizziness and giddiness, Hypercholesterolemia, Hypertensive disorder, Insomnia, Kidney disease, Low back pain, Osteoarthritis of knee, Sleep apnea, and Vasovagal syncope.    PSH:   has a past surgical history that includes IR NONTUNNELED VASCULAR CATHETER > 5 YEARS (5/30/2024).     FHX: family history includes Hypertension in his mother.     SHX:  reports that he has  hemoglobin 8.2 after blood transfusion.  6/17 remain on ventilator eyes deviated down with embolic stroke ABG acceptable but unable to extubate because of his mental status will discuss with the family regarding his condition and prognosis  6/18 remain on ventilator on 30% FiO2 pO2 64 will increase FiO2 not on any sedation assist-control mode probably to make decision about tracheostomy or PEG tube or terminal extubation prognosis poor unable to extubate as patient control his secretion high risk for aspiration and reintubation  6/19 off sedation on ventilator open eyes but does not follow any commands secretions still present despite being on Robinul ABG acceptable pO2 66 and pCO2 35 on 30% FiO2 assist-control mode, he is on dialysis 3 times a week he was on CRRT till 6/3, status post cardiac arrest V-fib cardioversion now he is unresponsive awaiting further family response high risk for extubation because of his mental status with recent embolic stroke unable to protect his airway medical ethics is also involved trying to reach the family but no response.  6/20 intubated on ventilator assist-control mode pCO2 36 pO2 73 on 30% FiO2, chest x-ray showed bilateral infiltrate and atelectasis, WBC normal hemoglobin 9.7 on vancomycin  6/21 remain intubated on ventilator patient was having bloody secretions from the endotracheal tube heparin stopped awaiting for the family to make decision, ABG acceptable assist-control mode, chest x-ray shows right midlung infiltrate and left basilar atelectasis, hemoglobin stable 9.9 continue with Robinul  6/22 patient now comfort care and extubated blood gases on 2 L nasal oxygen pO2 82 pCO2 34 pH 7.43.  Will maintain Robinul but discontinue nebulized albuterol Atrovent and enteral prednisone will not follow further  Hamilton Miranda MD

## 2024-06-22 NOTE — PLAN OF CARE
Problem: Discharge Planning  Goal: Discharge to home or other facility with appropriate resources  Outcome: Not Progressing     Problem: Discharge Planning  Goal: Discharge to home or other facility with appropriate resources  Outcome: Not Progressing   Pt. Is on comfort care and non verbal

## 2024-06-22 NOTE — PROGRESS NOTES
Nephrology f/u          Patient: Agustín Willis MRN: [id number]  SSN: [id number]    YOB: 1945  Age: 79 y.o.  Sex: male      Subjective:   I have seen the patient in the room.  Extubated  On nasal cannula  He was last dialyzed on 6/20  No further dialysis as patient is now comfort care  I will discontinue medications except for the comfort meds      Past Medical History:   Diagnosis Date    Benign prostatic disease 8/3/2020    Hypertrophy with Outflow Obstruction    Benign prostatic hyperplasia 8/3/2020    Cervical radiculopathy 8/3/2020    Chronic obstructive lung disease (HCC) 8/3/2020    Cocaine abuse (HCC) 8/3/2020    Dizziness and giddiness 8/3/2020    Hypercholesterolemia 8/3/2020    Hypertensive disorder 8/3/2020    Insomnia 8/3/2020    Kidney disease 8/3/2020    Low back pain 8/3/2020    Osteoarthritis of knee 8/3/2020    Sleep apnea 8/3/2020    Vasovagal syncope 8/3/2020     Past Surgical History:   Procedure Laterality Date    IR NONTUNNELED VASCULAR CATHETER  5/30/2024    IR NONTUNNELED VASCULAR CATHETER 5/30/2024 Stephy Abreu APRN - NP SSR RAD ANGIO IR      Family History   Problem Relation Age of Onset    Hypertension Mother      Social History     Tobacco Use    Smoking status: Former    Smokeless tobacco: Never   Substance Use Topics    Alcohol use: Yes      Current Facility-Administered Medications   Medication Dose Route Frequency Provider Last Rate Last Admin    LORazepam (ATIVAN) injection 1 mg  1 mg IntraVENous Q6H PRN Mikaela Arredondo MD        morphine (PF) injection 2 mg  2 mg IntraVENous Q2H PRN Mikaela Arredondo MD        Or    morphine (PF) injection 4 mg  4 mg IntraVENous Q2H PRN Mikaela Arredondo MD        predniSONE (DELTASONE) tablet 20 mg  20 mg Oral BID Jeremy Ramos MD   20 mg at 06/21/24 0900    fentaNYL (SUBLIMAZE) injection 50 mcg  50 mcg IntraVENous Q1H PRN Jeremy Ramos MD   50 mcg at 06/21/24 1017    glycopyrrolate (ROBINUL) injection 0.1 mg  0.1  mg IntraVENous Q8H Jeremy Ramos MD   0.1 mg at 06/22/24 0632    0.9 % sodium chloride infusion   IntraVENous PRN Jeremy Ramos MD        0.9 % sodium chloride infusion   IntraVENous PRN Mikaela Arredondo MD        atorvastatin (LIPITOR) tablet 40 mg  40 mg Oral Nightly Mikaela Arredondo MD   40 mg at 06/20/24 2049    scopolamine (TRANSDERM-SCOP) transdermal patch 1 patch  1 patch TransDERmal Q72H Darrell Calixto APRN - NP   1 patch at 06/21/24 1330    ipratropium 0.5 mg-albuterol 2.5 mg (DUONEB) nebulizer solution 1 Dose  1 Dose Inhalation Q6H RT Jeremy Ramos MD   1 Dose at 06/22/24 0847    propofol infusion  5-50 mcg/kg/min IntraVENous Continuous Mikaela Arredondo MD   Stopped at 06/17/24 1200    norepinephrine (LEVOPHED) 16 mg in sodium chloride 0.9 % 250 mL infusion  1-100 mcg/min IntraVENous Continuous Mikaela Arredondo MD   Paused at 06/14/24 2130    [Held by provider] amiodarone (CORDARONE) tablet 400 mg  400 mg Oral BID Gerry Wilson MD   400 mg at 06/10/24 1016    aspirin chewable tablet 81 mg  81 mg Per NG tube Daily Jeremy Ramos MD   81 mg at 06/21/24 0900    epoetin radha-epbx (RETACRIT) injection 10,000 Units  10,000 Units SubCUTAneous Once per day on Mon Wed Fri Sol Ferraro MD   10,000 Units at 06/19/24 1803    sodium chloride 0.9 % bolus 2,448 mL  30 mL/kg IntraVENous Once Mikaela Arredondo MD   Held at 05/29/24 1405    ferrous sulfate (IRON 325) tablet 325 mg  325 mg Oral Daily with breakfast Mikaela Arredondo MD   325 mg at 06/21/24 0900    finasteride (PROSCAR) tablet 5 mg  5 mg Oral Daily Mikaela Arredondo MD   5 mg at 06/21/24 0900    folic acid (FOLVITE) tablet 1 mg  1 mg Oral Daily Mikaela Arredondo MD   1 mg at 06/21/24 0900    thiamine mononitrate tablet 100 mg  100 mg Oral Daily Mikaela Arredondo MD   100 mg at 06/21/24 0900    sodium chloride flush 0.9 % injection 5-40 mL  5-40 mL IntraVENous 2 times per day Mikaela Arredondo MD   10 mL at 06/22/24 0434    sodium

## 2024-06-22 NOTE — PLAN OF CARE
Problem: Safety - Adult  Goal: Free from fall injury  Outcome: Progressing     Problem: Discharge Planning  Goal: Discharge to home or other facility with appropriate resources  Outcome: Progressing     Problem: Skin/Tissue Integrity  Goal: Absence of new skin breakdown  Description: 1.  Monitor for areas of redness and/or skin breakdown  2.  Assess vascular access sites hourly  3.  Every 4-6 hours minimum:  Change oxygen saturation probe site  4.  Every 4-6 hours:  If on nasal continuous positive airway pressure, respiratory therapy assess nares and determine need for appliance change or resting period.  Outcome: Progressing     Problem: Pain  Goal: Verbalizes/displays adequate comfort level or baseline comfort level  Outcome: Progressing     Problem: Spiritual Care  Goal: Pt/Family able to move forward in process of forgiving self, others, and/or higher power  Description: INTERVENTIONS:  1. Assist patient/family to overcome blocks to healing by use of spiritual practices (prayer, meditation, guided imagery, reiki, breath work, etc).  2. De-myth guilt and help patient/family identify possible irrational spiritual/cultural beliefs and values.  3. Explore possibilities of making amends & reconciliation with self, others, and/or a greater power.  4. Guide patient/family in identifying painful feelings of guilt.  5. Help patient/famiy explore and identify spiritual beliefs, cultural understandings or values that may help or hinder letting go of issue.  6. Help patient/family explore feelings of anger, bitterness, resentment.  7. Help patient/family identify and examine the situation in which these feelings are experienced.  8. Help patient/family identify destructive displacement of feelings onto other individuals.  9. Invite use of sacraments/rituals/ceremonies as appropriate (e.g. - confession, anointing, smudging).  10. Refer patient/family to formal counseling and/or to yamini community for further support

## 2024-06-23 LAB
BACTERIA SPEC CULT: NORMAL
Lab: NORMAL

## 2024-06-23 PROCEDURE — 36556 INSERT NON-TUNNEL CV CATH: CPT

## 2024-06-23 PROCEDURE — 1100000000 HC RM PRIVATE

## 2024-06-23 PROCEDURE — 2700000000 HC OXYGEN THERAPY PER DAY

## 2024-06-23 PROCEDURE — 6360000002 HC RX W HCPCS: Performed by: INTERNAL MEDICINE

## 2024-06-23 PROCEDURE — 2580000003 HC RX 258: Performed by: FAMILY MEDICINE

## 2024-06-23 PROCEDURE — 6360000002 HC RX W HCPCS: Performed by: FAMILY MEDICINE

## 2024-06-23 PROCEDURE — 94761 N-INVAS EAR/PLS OXIMETRY MLT: CPT

## 2024-06-23 RX ORDER — LORAZEPAM 2 MG/ML
1 INJECTION INTRAMUSCULAR EVERY 4 HOURS
Status: DISCONTINUED | OUTPATIENT
Start: 2024-06-23 | End: 2024-06-25 | Stop reason: HOSPADM

## 2024-06-23 RX ADMIN — GLYCOPYRROLATE 0.1 MG: 0.2 INJECTION INTRAMUSCULAR; INTRAVENOUS at 03:53

## 2024-06-23 RX ADMIN — SODIUM CHLORIDE, PRESERVATIVE FREE 10 ML: 5 INJECTION INTRAVENOUS at 09:48

## 2024-06-23 RX ADMIN — GLYCOPYRROLATE 0.1 MG: 0.2 INJECTION INTRAMUSCULAR; INTRAVENOUS at 20:45

## 2024-06-23 RX ADMIN — LORAZEPAM 1 MG: 2 INJECTION INTRAMUSCULAR; INTRAVENOUS at 20:45

## 2024-06-23 RX ADMIN — LORAZEPAM 1 MG: 2 INJECTION INTRAMUSCULAR; INTRAVENOUS at 12:16

## 2024-06-23 RX ADMIN — GLYCOPYRROLATE 0.1 MG: 0.2 INJECTION INTRAMUSCULAR; INTRAVENOUS at 12:15

## 2024-06-23 RX ADMIN — MORPHINE SULFATE 2 MG: 2 INJECTION, SOLUTION INTRAMUSCULAR; INTRAVENOUS at 17:37

## 2024-06-23 RX ADMIN — MORPHINE SULFATE 2 MG: 2 INJECTION, SOLUTION INTRAMUSCULAR; INTRAVENOUS at 09:48

## 2024-06-23 RX ADMIN — LORAZEPAM 1 MG: 2 INJECTION INTRAMUSCULAR; INTRAVENOUS at 16:12

## 2024-06-23 ASSESSMENT — PAIN SCALES - GENERAL: PAINLEVEL_OUTOF10: 2

## 2024-06-23 NOTE — PLAN OF CARE
Problem: Safety - Adult  Goal: Free from fall injury  6/23/2024 1103 by Myrtle Ahn RN  Outcome: Progressing  6/22/2024 2249 by Ligia Thompson RN  Outcome: Progressing     Problem: Discharge Planning  Goal: Discharge to home or other facility with appropriate resources  6/22/2024 2249 by Ligia Thompson RN  Outcome: Progressing  Flowsheets (Taken 6/22/2024 2212)  Discharge to home or other facility with appropriate resources: Identify barriers to discharge with patient and caregiver     Problem: Chronic Conditions and Co-morbidities  Goal: Patient's chronic conditions and co-morbidity symptoms are monitored and maintained or improved  6/22/2024 2249 by Ligia Thompson RN  Outcome: Progressing     Problem: Skin/Tissue Integrity  Goal: Absence of new skin breakdown  Description: 1.  Monitor for areas of redness and/or skin breakdown  2.  Assess vascular access sites hourly  3.  Every 4-6 hours minimum:  Change oxygen saturation probe site  4.  Every 4-6 hours:  If on nasal continuous positive airway pressure, respiratory therapy assess nares and determine need for appliance change or resting period.  6/22/2024 2249 by Ligia Thompson RN  Outcome: Progressing     Problem: Neurosensory - Adult  Goal: Achieves stable or improved neurological status  6/22/2024 2249 by Ligia Thompson RN  Outcome: Progressing  Goal: Absence of seizures  6/22/2024 2249 by Ligia Thompson RN  Outcome: Progressing  Goal: Remains free of injury related to seizures activity  6/22/2024 2249 by Ligia Thompson RN  Outcome: Progressing  Goal: Achieves maximal functionality and self care  6/22/2024 2249 by Ligia Thompson RN  Outcome: Progressing     Problem: Respiratory - Adult  Goal: Achieves optimal ventilation and oxygenation  6/22/2024 2249 by Ligia Thompson RN  Outcome: Progressing     Problem: Cardiovascular - Adult  Goal: Maintains optimal cardiac output and hemodynamic stability  6/22/2024 2249 by Ligia Thompson RN  Outcome:  limits  6/22/2024 2249 by Ligia Thompson RN  Outcome: Progressing  Goal: Hemodynamic stability and optimal renal function maintained  6/22/2024 2249 by Ligia Thompson RN  Outcome: Progressing  Goal: Glucose maintained within prescribed range  6/22/2024 2249 by Ligia Thompson RN  Outcome: Progressing     Problem: Hematologic - Adult  Goal: Maintains hematologic stability  6/22/2024 2249 by Ligia Thompson RN  Outcome: Progressing     Problem: Pain  Goal: Verbalizes/displays adequate comfort level or baseline comfort level  6/23/2024 1103 by Myrtle Ahn RN  Outcome: Progressing  6/22/2024 2249 by Ligia Thompson RN  Outcome: Progressing     Problem: Spiritual Care  Goal: Pt/Family able to move forward in process of forgiving self, others, and/or higher power  Description: INTERVENTIONS:  1. Assist patient/family to overcome blocks to healing by use of spiritual practices (prayer, meditation, guided imagery, reiki, breath work, etc).  2. De-myth guilt and help patient/family identify possible irrational spiritual/cultural beliefs and values.  3. Explore possibilities of making amends & reconciliation with self, others, and/or a greater power.  4. Guide patient/family in identifying painful feelings of guilt.  5. Help patient/famiy explore and identify spiritual beliefs, cultural understandings or values that may help or hinder letting go of issue.  6. Help patient/family explore feelings of anger, bitterness, resentment.  7. Help patient/family identify and examine the situation in which these feelings are experienced.  8. Help patient/family identify destructive displacement of feelings onto other individuals.  9. Invite use of sacraments/rituals/ceremonies as appropriate (e.g. - confession, anointing, smudging).  10. Refer patient/family to formal counseling and/or to yamini community for further support work.  6/23/2024 1103 by Myrtle Ahn RN  Outcome: Progressing  6/22/2024 2249 by Ligia Thompson

## 2024-06-23 NOTE — PLAN OF CARE
Problem: Safety - Adult  Goal: Free from fall injury  6/22/2024 2249 by Ligia Thompson RN  Outcome: Progressing  6/22/2024 1854 by Myrtle Ahn RN  Outcome: Progressing     Problem: Discharge Planning  Goal: Discharge to home or other facility with appropriate resources  6/22/2024 2249 by Ligia Thompson RN  Outcome: Progressing  6/22/2024 1853 by Mytrle Ahn RN  Outcome: Progressing     Problem: Chronic Conditions and Co-morbidities  Goal: Patient's chronic conditions and co-morbidity symptoms are monitored and maintained or improved  Outcome: Progressing     Problem: Skin/Tissue Integrity  Goal: Absence of new skin breakdown  Description: 1.  Monitor for areas of redness and/or skin breakdown  2.  Assess vascular access sites hourly  3.  Every 4-6 hours minimum:  Change oxygen saturation probe site  4.  Every 4-6 hours:  If on nasal continuous positive airway pressure, respiratory therapy assess nares and determine need for appliance change or resting period.  6/22/2024 2249 by Ligia Thompson RN  Outcome: Progressing  6/22/2024 1854 by Myrtle Ahn RN  Outcome: Progressing     Problem: Neurosensory - Adult  Goal: Achieves stable or improved neurological status  Outcome: Progressing  Goal: Absence of seizures  Outcome: Progressing  Goal: Remains free of injury related to seizures activity  Outcome: Progressing  Goal: Achieves maximal functionality and self care  Outcome: Progressing     Problem: Respiratory - Adult  Goal: Achieves optimal ventilation and oxygenation  Outcome: Progressing     Problem: Cardiovascular - Adult  Goal: Maintains optimal cardiac output and hemodynamic stability  Outcome: Progressing  Goal: Absence of cardiac dysrhythmias or at baseline  Outcome: Progressing     Problem: Skin/Tissue Integrity - Adult  Goal: Skin integrity remains intact  Outcome: Progressing  Goal: Incisions, wounds, or drain sites healing without S/S of infection  Outcome: Progressing  Goal: Oral mucous  membranes remain intact  Outcome: Progressing     Problem: Musculoskeletal - Adult  Goal: Return mobility to safest level of function  Outcome: Progressing  Goal: Maintain proper alignment of affected body part  Outcome: Progressing  Goal: Return ADL status to a safe level of function  Outcome: Progressing     Problem: Gastrointestinal - Adult  Goal: Minimal or absence of nausea and vomiting  Outcome: Progressing  Goal: Maintains or returns to baseline bowel function  Outcome: Progressing  Goal: Maintains adequate nutritional intake  Outcome: Progressing  Goal: Establish and maintain optimal ostomy function  Outcome: Progressing     Problem: Genitourinary - Adult  Goal: Absence of urinary retention  Outcome: Progressing     Problem: Infection - Adult  Goal: Absence of infection at discharge  Outcome: Progressing  Goal: Absence of infection during hospitalization  Outcome: Progressing  Goal: Absence of fever/infection during anticipated neutropenic period  Outcome: Progressing     Problem: Metabolic/Fluid and Electrolytes - Adult  Goal: Electrolytes maintained within normal limits  Outcome: Progressing  Goal: Hemodynamic stability and optimal renal function maintained  Outcome: Progressing  Goal: Glucose maintained within prescribed range  Outcome: Progressing     Problem: Hematologic - Adult  Goal: Maintains hematologic stability  Outcome: Progressing     Problem: Pain  Goal: Verbalizes/displays adequate comfort level or baseline comfort level  6/22/2024 2249 by Ligia Thompson, RN  Outcome: Progressing  6/22/2024 1854 by Myrtle Ahn RN  Outcome: Progressing     Problem: Spiritual Care  Goal: Pt/Family able to move forward in process of forgiving self, others, and/or higher power  Description: INTERVENTIONS:  1. Assist patient/family to overcome blocks to healing by use of spiritual practices (prayer, meditation, guided imagery, reiki, breath work, etc).  2. De-myth guilt and help patient/family identify possible

## 2024-06-24 PROCEDURE — 6360000002 HC RX W HCPCS: Performed by: INTERNAL MEDICINE

## 2024-06-24 PROCEDURE — 6360000002 HC RX W HCPCS: Performed by: FAMILY MEDICINE

## 2024-06-24 PROCEDURE — 1100000000 HC RM PRIVATE

## 2024-06-24 PROCEDURE — 6370000000 HC RX 637 (ALT 250 FOR IP)

## 2024-06-24 RX ADMIN — GLYCOPYRROLATE 0.1 MG: 0.2 INJECTION INTRAMUSCULAR; INTRAVENOUS at 21:04

## 2024-06-24 RX ADMIN — LORAZEPAM 1 MG: 2 INJECTION INTRAMUSCULAR; INTRAVENOUS at 10:54

## 2024-06-24 RX ADMIN — LORAZEPAM 1 MG: 2 INJECTION INTRAMUSCULAR; INTRAVENOUS at 17:03

## 2024-06-24 RX ADMIN — GLYCOPYRROLATE 0.1 MG: 0.2 INJECTION INTRAMUSCULAR; INTRAVENOUS at 04:42

## 2024-06-24 RX ADMIN — LORAZEPAM 1 MG: 2 INJECTION INTRAMUSCULAR; INTRAVENOUS at 04:42

## 2024-06-24 RX ADMIN — LORAZEPAM 1 MG: 2 INJECTION INTRAMUSCULAR; INTRAVENOUS at 21:04

## 2024-06-24 RX ADMIN — GLYCOPYRROLATE 0.1 MG: 0.2 INJECTION INTRAMUSCULAR; INTRAVENOUS at 13:51

## 2024-06-24 RX ADMIN — LORAZEPAM 1 MG: 2 INJECTION INTRAMUSCULAR; INTRAVENOUS at 00:34

## 2024-06-24 ASSESSMENT — PAIN SCALES - GENERAL
PAINLEVEL_OUTOF10: 0
PAINLEVEL_OUTOF10: 0

## 2024-06-24 NOTE — CARE COORDINATION
Pt is not appropriate for GIP. CM has reached out to pt's spouse to discuss hospice at [facility], [facility]. GARRETT has also reached out to [place] to inquire if pt can return under comfort care and if family can transition pt to hospice after dc if they have not done so prior.

## 2024-06-24 NOTE — PROGRESS NOTES
[facility]  Good Help to Those in Need  [phone]     Patient Name: Agustín Willis  YOB: 1945  Age: 79 y.o.    [facility] RN Note:  Hospice consult received, reviewing chart. Will follow up with Unit Nurse and Care Manager to discuss plan of care, patient status and discharge disposition.    In to assess for GIP. Report obtained from ROGELIO Soto. Patient resting comfortably with eyes closed. Does not respond to verbal or tactile stimuli. No evidence of pain. Respiratory effort is even, unlabored.    Patient does not meet criteria for inpatient hospice. Routine hospice at home or in a facility is appropriate. CM update provided.    Thank you for the opportunity to be of service to Mr. Willis and his family.    Remigio Ortega RN  Clinical Liaison  [phone]

## 2024-06-24 NOTE — PROGRESS NOTES
General Daily Progress Note    Patient Name:   Agustín Willis       YOB: 1945       Age:  79 y.o.      Admit Date: 5/29/2024    Subjective:   Patient is a 78 y.o. year old male past medical history of COPD BPH cervical radiculopathy cocaine abuse hypertension hyperlipidemia seasonal allergies SVT came to emergency room with respiratory arrest, patient was at nursing home after eating breakfast he vomited everywhere likely aspirated oxygen saturation was 70% initially put on nonrebreather patient was intubated in the ER patient was hypotensive started Levophed 10 patient have a cardiac arrest went to V-fib received shock x 4 received epi and amiodarone  Was started on Levophed and vasopressin received 2 L of IV fluid, received IV Zosyn and vancomycin in the ER     Patient admitted to the ICU for further workup  Patient on ventilator/unresponsive       5/30    Patient on ventilator propofol and Precedex drip  Hypotension on Levophed and vasopressin  Patient has no urine output last night received 500 bolus    Nephrology decided for start CRRT IR team for dialysis cath  BUN 72 creatinine 3.09  CRP 10.5  MRSA nares    5/31    Patient on ventilator 40% O2 propofol Precedex levo and vasopressin drip  Patient on A-fib with rapid ventricular rate ordered amiodarone bolus and started on drip  Patient getting CRRT  No urine output  D-dimers greater than 20    6/3    Patient on ventilator 45% O2 receiving CRRT patient on propofol drip  Patient on amiodarone and Levophed drip  Patient heparin and Precedex drip  Patient is hypothermic    WBC count 22.5    6/4    Patient on ventilator 45% O2 on Levophed drip and amiodarone drip and heparin drip and Precedex drip getting NG tube feeding  BUN 37 creatinine 1.97 WBCs 15  Doppler studies positive for acute DVT of the left brachial vein left radial vein and left ulnar vein    Seen by infectious disease recommended continue IV meropenem    6/5    General ventilated 5% O2  O2 on propofol Levophed and heparin drip  Still very bradycardic    6/17    Patient on ventilator 30% O2 on propofol and fentanyl drip on heparin drip getting NG tube feeding  Continue dialysis as per nephrology    6/18    Patient on ventilator 30% O2 patient  Getting dialysis    6/19    Patient on ventilator 30% O2 getting NG tube feeding    Unable to get in touch with the family    6/20    Patient on ventilator 30% O2 getting dialysis  On heparin drip    6/21    Patient on ventilator 30% O2    6/22    Patient was terminally extubated yesterday placed on comfort care transferred to the medical floor patient resting the bed not in distress      6/24  Patient resting in bed, on comfort care.  Per RN, patient opens his eyes yesterday but not today.  No complaints to RN.    Vital signs notable for SpO2 92%.    Objective:     Vitals:    06/24/24 1012   BP: 120/82   Pulse: 73   Resp:    Temp: 97.9 °F (36.6 °C)   SpO2: 92%      Review of Systems    Unable to be obtained    Physical Exam:      Constitutional: Extubated  HENT:   Head: Normocephalic and atraumatic.   Eyes: Pupils are equal, round, and reactive to light. EOM are normal.   Cardiovascular: Normal rate, regular rhythm and normal heart sounds.   Pulmonary/Chest: Breath sounds normal. No wheezes. No rales.   Exhibits no tenderness.   Abdominal: Soft. Bowel sounds are normal. There is no abdominal tenderness. There is no rebound and no guarding.   Musculoskeletal: Normal range of motion.   Neurological: Limited exam    XR CHEST 1 VIEW   Final Result   Extubation and transesophageal tube removal. Otherwise stable   radiographic findings.      Electronically signed by Severiano Chilel MD      XR CHEST 1 VIEW   Final Result   No significant change..      Electronically signed by Andrea Patel      XR CHEST 1 VIEW   Final Result   Right midlung zone airspace disease may represent. Airspace opacity   may represent pneumonia or aspiration. There is patchy airspace opacity

## 2024-06-24 NOTE — PROGRESS NOTES
General Daily Progress Note    Patient Name:   Agustín Willis       YOB: 1945       Age:  79 y.o.      Admit Date: 5/29/2024    Subjective:   Patient is a 78 y.o. year old male past medical history of COPD BPH cervical radiculopathy cocaine abuse hypertension hyperlipidemia seasonal allergies SVT came to emergency room with respiratory arrest, patient was at nursing home after eating breakfast he vomited everywhere likely aspirated oxygen saturation was 70% initially put on nonrebreather patient was intubated in the ER patient was hypotensive started Levophed 10 patient have a cardiac arrest went to V-fib received shock x 4 received epi and amiodarone  Was started on Levophed and vasopressin received 2 L of IV fluid, received IV Zosyn and vancomycin in the ER     Patient admitted to the ICU for further workup  Patient on ventilator/unresponsive       5/30    Patient on ventilator propofol and Precedex drip  Hypotension on Levophed and vasopressin  Patient has no urine output last night received 500 bolus    Nephrology decided for start CRRT IR team for dialysis cath  BUN 72 creatinine 3.09  CRP 10.5  MRSA nares    5/31    Patient on ventilator 40% O2 propofol Precedex levo and vasopressin drip  Patient on A-fib with rapid ventricular rate ordered amiodarone bolus and started on drip  Patient getting CRRT  No urine output  D-dimers greater than 20    6/3    Patient on ventilator 45% O2 receiving CRRT patient on propofol drip  Patient on amiodarone and Levophed drip  Patient heparin and Precedex drip  Patient is hypothermic    WBC count 22.5    6/4    Patient on ventilator 45% O2 on Levophed drip and amiodarone drip and heparin drip and Precedex drip getting NG tube feeding  BUN 37 creatinine 1.97 WBCs 15  Doppler studies positive for acute DVT of the left brachial vein left radial vein and left ulnar vein    Seen by infectious disease recommended continue IV meropenem    6/5    General ventilated 5% O2  on propofol drip and heparin drip    6/6    Patient extubated awake on heparin drip  Potassium 5.4 BUN 74 creatinine 2.32    6/7    Patient alert awake feeling much better started on heparin drip    6/8    Patient alert and awake    /97  Potassium 5.4  WBC 20.2 compared to 18.3 yesterday    CXR  Stable bibasilar atelectasis and low lung volumes. Stable lines and tubes,  except for extubation and removal of gastric tube    Patient seen by nephrology, pulmonology, and infectious disease    6/9    Vitals are within normal limits    Creatinine 1.86  CRP is 17.00  WBC 17.5    Patient seen by nephrology, cardiology, and infectious disease    6/11    Patient have a cardiac alert yesterday received CPR few rounds of epi intubated transferred to the ICU initially started on Levophed which was discontinued on ventilator 45% O2 propofol drip and heparin drip BUN 92 creatinine 2.40 WBC count 19,000 chest x-ray shows grossly stable bilateral pulmonary infiltrates right greater than the left  Nurses also call stroke alert CT scan of the head was negative discussed with teleneurology recommend CT of the head and neck start on Keppra    6/12    Patient on ventilator 40% O2 on propofol and heparin drip patient have MRI done yesterday  Sputum culture came back positive for MRSA    MRI      IMPRESSION:  1. 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 No associated mass effect.   2. Numerous chronic microhemorrhages as on prior study again may indicate  cerebral amyloid angiopathy, with extensive chronic white matter disease      6/13  Patient is on ventilator 35% O2 Levophed and heparin drip getting dialysis now  BUN 71 creatinine 2.25  Sputum culture came back positive for MRSA    6/14    Patient still on ventilator 35%  medications    DNR/DNI      Current Facility-Administered Medications:     LORazepam (ATIVAN) injection 1 mg, 1 mg, IntraVENous, Q4H, Mikaela Arredondo MD, 1 mg at 06/24/24 0442    morphine (PF) injection 2 mg, 2 mg, IntraVENous, Q2H PRN, 2 mg at 06/23/24 1737 **OR** morphine (PF) injection 4 mg, 4 mg, IntraVENous, Q2H PRN, Mikaela Arredondo MD    glycopyrrolate (ROBINUL) injection 0.1 mg, 0.1 mg, IntraVENous, Q8H, Jeremy Ramos MD, 0.1 mg at 06/24/24 0442    scopolamine (TRANSDERM-SCOP) transdermal patch 1 patch, 1 patch, TransDERmal, Q72H, Darrell Calixto APRN - NP, 1 patch at 06/21/24 1330    sodium chloride 0.9 % bolus 2,448 mL, 30 mL/kg, IntraVENous, Once, Mikaela Arredondo MD, Held at 05/29/24 1405    sodium chloride flush 0.9 % injection 5-40 mL, 5-40 mL, IntraVENous, PRN, Mikaela Arredondo MD    ondansetron (ZOFRAN-ODT) disintegrating tablet 4 mg, 4 mg, Oral, Q8H PRN **OR** ondansetron (ZOFRAN) injection 4 mg, 4 mg, IntraVENous, Q6H PRN, Mikaela Arredondo MD, 4 mg at 06/18/24 2059    polyethylene glycol (GLYCOLAX) packet 17 g, 17 g, Oral, Daily PRN, Mikaela Arredondo MD, 17 g at 06/15/24 1800    acetaminophen (TYLENOL) tablet 650 mg, 650 mg, Oral, Q6H PRN, 650 mg at 06/09/24 2047 **OR** acetaminophen (TYLENOL) suppository 650 mg, 650 mg, Rectal, Q6H PRN, Mikaela Arredondo MD

## 2024-06-24 NOTE — CARE COORDINATION
Pt remains admitted, now on 4E, currently on comfort care. CM requested IP Hospice Consult from Hospitalist. Hospice notified and will eval.

## 2024-06-25 ENCOUNTER — HOSPITAL ENCOUNTER (INPATIENT)
Facility: HOSPITAL | Age: 79
LOS: 6 days | Discharge: NURSING, REHAB OR LONG-TERM CARE | DRG: 870 | End: 2024-07-01
Attending: STUDENT IN AN ORGANIZED HEALTH CARE EDUCATION/TRAINING PROGRAM | Admitting: FAMILY MEDICINE
Payer: MEDICARE

## 2024-06-25 VITALS
TEMPERATURE: 97.6 F | RESPIRATION RATE: 20 BRPM | BODY MASS INDEX: 23.36 KG/M2 | OXYGEN SATURATION: 97 % | WEIGHT: 154.1 LBS | HEIGHT: 68 IN | HEART RATE: 99 BPM | SYSTOLIC BLOOD PRESSURE: 96 MMHG | DIASTOLIC BLOOD PRESSURE: 63 MMHG

## 2024-06-25 DIAGNOSIS — R62.7 FTT (FAILURE TO THRIVE) IN ADULT: Primary | ICD-10-CM

## 2024-06-25 PROBLEM — R62.51 FAILURE TO THRIVE (CHILD): Status: ACTIVE | Noted: 2024-06-25

## 2024-06-25 LAB
ANION GAP SERPL CALC-SCNC: 13 MMOL/L (ref 5–15)
BASOPHILS # BLD: 0 K/UL (ref 0–0.1)
BASOPHILS NFR BLD: 0 % (ref 0–1)
BUN SERPL-MCNC: 108 MG/DL (ref 6–20)
BUN/CREAT SERPL: 37 (ref 12–20)
CA-I BLD-MCNC: 8.2 MG/DL (ref 8.5–10.1)
CHLORIDE SERPL-SCNC: 107 MMOL/L (ref 97–108)
CO2 SERPL-SCNC: 23 MMOL/L (ref 21–32)
CREAT SERPL-MCNC: 2.91 MG/DL (ref 0.7–1.3)
DIFFERENTIAL METHOD BLD: ABNORMAL
EOSINOPHIL # BLD: 0 K/UL (ref 0–0.4)
EOSINOPHIL NFR BLD: 0 % (ref 0–7)
ERYTHROCYTE [DISTWIDTH] IN BLOOD BY AUTOMATED COUNT: 17.5 % (ref 11.5–14.5)
GLUCOSE SERPL-MCNC: 112 MG/DL (ref 65–100)
HCT VFR BLD AUTO: 32.5 % (ref 36.6–50.3)
HGB BLD-MCNC: 10.2 G/DL (ref 12.1–17)
IMM GRANULOCYTES # BLD AUTO: 0.1 K/UL (ref 0–0.04)
IMM GRANULOCYTES NFR BLD AUTO: 1 % (ref 0–0.5)
LYMPHOCYTES # BLD: 0.8 K/UL (ref 0.8–3.5)
LYMPHOCYTES NFR BLD: 7 % (ref 12–49)
MCH RBC QN AUTO: 29.7 PG (ref 26–34)
MCHC RBC AUTO-ENTMCNC: 31.4 G/DL (ref 30–36.5)
MCV RBC AUTO: 94.5 FL (ref 80–99)
MONOCYTES # BLD: 0.5 K/UL (ref 0–1)
MONOCYTES NFR BLD: 4 % (ref 5–13)
NEUTS SEG # BLD: 10 K/UL (ref 1.8–8)
NEUTS SEG NFR BLD: 88 % (ref 32–75)
NRBC # BLD: 0 K/UL (ref 0–0.01)
NRBC BLD-RTO: 0 PER 100 WBC
PLATELET # BLD AUTO: 63 K/UL (ref 150–400)
PMV BLD AUTO: 11 FL (ref 8.9–12.9)
POTASSIUM SERPL-SCNC: 4.2 MMOL/L (ref 3.5–5.1)
RBC # BLD AUTO: 3.44 M/UL (ref 4.1–5.7)
RBC MORPH BLD: ABNORMAL
SODIUM SERPL-SCNC: 143 MMOL/L (ref 136–145)
WBC # BLD AUTO: 11.4 K/UL (ref 4.1–11.1)

## 2024-06-25 PROCEDURE — 2580000003 HC RX 258: Performed by: STUDENT IN AN ORGANIZED HEALTH CARE EDUCATION/TRAINING PROGRAM

## 2024-06-25 PROCEDURE — 6360000002 HC RX W HCPCS: Performed by: FAMILY MEDICINE

## 2024-06-25 PROCEDURE — 36415 COLL VENOUS BLD VENIPUNCTURE: CPT

## 2024-06-25 PROCEDURE — 1100000000 HC RM PRIVATE

## 2024-06-25 PROCEDURE — 6360000002 HC RX W HCPCS: Performed by: INTERNAL MEDICINE

## 2024-06-25 PROCEDURE — 99285 EMERGENCY DEPT VISIT HI MDM: CPT

## 2024-06-25 PROCEDURE — 85025 COMPLETE CBC W/AUTO DIFF WBC: CPT

## 2024-06-25 PROCEDURE — 80048 BASIC METABOLIC PNL TOTAL CA: CPT

## 2024-06-25 RX ORDER — SODIUM CHLORIDE 9 MG/ML
INJECTION, SOLUTION INTRAVENOUS PRN
Status: DISCONTINUED | OUTPATIENT
Start: 2024-06-25 | End: 2024-07-01 | Stop reason: HOSPADM

## 2024-06-25 RX ORDER — 0.9 % SODIUM CHLORIDE 0.9 %
500 INTRAVENOUS SOLUTION INTRAVENOUS ONCE
Status: COMPLETED | OUTPATIENT
Start: 2024-06-25 | End: 2024-06-25

## 2024-06-25 RX ORDER — FINASTERIDE 5 MG/1
5 TABLET, FILM COATED ORAL DAILY
Status: DISCONTINUED | OUTPATIENT
Start: 2024-06-26 | End: 2024-06-30

## 2024-06-25 RX ORDER — ONDANSETRON 2 MG/ML
4 INJECTION INTRAMUSCULAR; INTRAVENOUS EVERY 6 HOURS PRN
Status: DISCONTINUED | OUTPATIENT
Start: 2024-06-25 | End: 2024-06-30

## 2024-06-25 RX ORDER — ACETAMINOPHEN 650 MG/1
650 SUPPOSITORY RECTAL EVERY 6 HOURS PRN
Status: DISCONTINUED | OUTPATIENT
Start: 2024-06-25 | End: 2024-07-01 | Stop reason: HOSPADM

## 2024-06-25 RX ORDER — HEPARIN SODIUM 5000 [USP'U]/ML
5000 INJECTION, SOLUTION INTRAVENOUS; SUBCUTANEOUS EVERY 8 HOURS SCHEDULED
Status: DISCONTINUED | OUTPATIENT
Start: 2024-06-25 | End: 2024-06-25

## 2024-06-25 RX ORDER — GAUZE BANDAGE 2" X 2"
100 BANDAGE TOPICAL DAILY
Status: DISCONTINUED | OUTPATIENT
Start: 2024-06-26 | End: 2024-06-30

## 2024-06-25 RX ORDER — POLYETHYLENE GLYCOL 3350 17 G/17G
17 POWDER, FOR SOLUTION ORAL DAILY PRN
Status: DISCONTINUED | OUTPATIENT
Start: 2024-06-25 | End: 2024-06-30

## 2024-06-25 RX ORDER — HEPARIN SODIUM 5000 [USP'U]/ML
5000 INJECTION, SOLUTION INTRAVENOUS; SUBCUTANEOUS EVERY 8 HOURS SCHEDULED
Status: DISCONTINUED | OUTPATIENT
Start: 2024-06-25 | End: 2024-06-27

## 2024-06-25 RX ORDER — FOLIC ACID 1 MG/1
1 TABLET ORAL DAILY
Status: DISCONTINUED | OUTPATIENT
Start: 2024-06-26 | End: 2024-06-30

## 2024-06-25 RX ORDER — ATORVASTATIN CALCIUM 40 MG/1
40 TABLET, FILM COATED ORAL DAILY
Status: DISCONTINUED | OUTPATIENT
Start: 2024-06-26 | End: 2024-06-30

## 2024-06-25 RX ORDER — SODIUM CHLORIDE 9 MG/ML
INJECTION, SOLUTION INTRAVENOUS CONTINUOUS
Status: DISCONTINUED | OUTPATIENT
Start: 2024-06-25 | End: 2024-06-26

## 2024-06-25 RX ORDER — ONDANSETRON 4 MG/1
4 TABLET, ORALLY DISINTEGRATING ORAL EVERY 8 HOURS PRN
Status: DISCONTINUED | OUTPATIENT
Start: 2024-06-25 | End: 2024-06-30

## 2024-06-25 RX ORDER — BUDESONIDE AND FORMOTEROL FUMARATE DIHYDRATE 160; 4.5 UG/1; UG/1
2 AEROSOL RESPIRATORY (INHALATION)
Status: DISCONTINUED | OUTPATIENT
Start: 2024-06-25 | End: 2024-06-26

## 2024-06-25 RX ORDER — SODIUM BICARBONATE 650 MG/1
325 TABLET ORAL 4 TIMES DAILY
Status: DISCONTINUED | OUTPATIENT
Start: 2024-06-25 | End: 2024-06-26

## 2024-06-25 RX ORDER — PANTOPRAZOLE SODIUM 40 MG/1
40 TABLET, DELAYED RELEASE ORAL
Status: DISCONTINUED | OUTPATIENT
Start: 2024-06-26 | End: 2024-06-30

## 2024-06-25 RX ORDER — SODIUM CHLORIDE 0.9 % (FLUSH) 0.9 %
5-40 SYRINGE (ML) INJECTION EVERY 12 HOURS SCHEDULED
Status: DISCONTINUED | OUTPATIENT
Start: 2024-06-25 | End: 2024-06-30

## 2024-06-25 RX ORDER — ACETAMINOPHEN 325 MG/1
650 TABLET ORAL EVERY 6 HOURS PRN
Status: DISCONTINUED | OUTPATIENT
Start: 2024-06-25 | End: 2024-07-01 | Stop reason: HOSPADM

## 2024-06-25 RX ORDER — SODIUM CHLORIDE 0.9 % (FLUSH) 0.9 %
5-40 SYRINGE (ML) INJECTION PRN
Status: DISCONTINUED | OUTPATIENT
Start: 2024-06-25 | End: 2024-06-30

## 2024-06-25 RX ADMIN — LORAZEPAM 1 MG: 2 INJECTION INTRAMUSCULAR; INTRAVENOUS at 01:11

## 2024-06-25 RX ADMIN — LORAZEPAM 1 MG: 2 INJECTION INTRAMUSCULAR; INTRAVENOUS at 05:11

## 2024-06-25 RX ADMIN — LORAZEPAM 1 MG: 2 INJECTION INTRAMUSCULAR; INTRAVENOUS at 16:35

## 2024-06-25 RX ADMIN — GLYCOPYRROLATE 0.1 MG: 0.2 INJECTION INTRAMUSCULAR; INTRAVENOUS at 12:56

## 2024-06-25 RX ADMIN — LORAZEPAM 1 MG: 2 INJECTION INTRAMUSCULAR; INTRAVENOUS at 12:56

## 2024-06-25 RX ADMIN — LORAZEPAM 1 MG: 2 INJECTION INTRAMUSCULAR; INTRAVENOUS at 09:20

## 2024-06-25 RX ADMIN — GLYCOPYRROLATE 0.1 MG: 0.2 INJECTION INTRAMUSCULAR; INTRAVENOUS at 05:11

## 2024-06-25 RX ADMIN — SODIUM CHLORIDE 500 ML: 9 INJECTION, SOLUTION INTRAVENOUS at 22:09

## 2024-06-25 ASSESSMENT — LIFESTYLE VARIABLES
HOW OFTEN DO YOU HAVE A DRINK CONTAINING ALCOHOL: PATIENT UNABLE TO ANSWER
HOW MANY STANDARD DRINKS CONTAINING ALCOHOL DO YOU HAVE ON A TYPICAL DAY: PATIENT UNABLE TO ANSWER

## 2024-06-25 ASSESSMENT — PAIN - FUNCTIONAL ASSESSMENT: PAIN_FUNCTIONAL_ASSESSMENT: ADULT NONVERBAL PAIN SCALE (NPVS)

## 2024-06-25 ASSESSMENT — PAIN SCALES - GENERAL: PAINLEVEL_OUTOF10: 0

## 2024-06-25 NOTE — PROGRESS NOTES
midlung zone airspace disease may represent. Airspace opacity   may represent pneumonia or aspiration. There is patchy airspace opacity in the   left lung base as well      Electronically signed by Andrea Patel      XR CHEST 1 VIEW   Final Result   No significant change.      Electronically signed by Gerardo Ugarte      XR CHEST 1 VIEW   Final Result   No significant change.      Electronically signed by Carrillo Torito      XR CHEST 1 VIEW   Final Result   Central venous lines lines, and supporting tubes in place as detailed.   No focal lung opacity.      Superior mediastinal widening may suggest aortic arch pulm widening; thoracic   aortic aneurysm/dissection versus mediastinal mass not completely excluded.   Clinical correlation advised and correlation with prior chest CT/CTA if   available recommended.         Electronically signed by MILKA FALCON      XR CHEST 1 VIEW   Final Result   Asymmetric interstitial and airspace opacities in the right lung   which may represent an infectious process rather than pulmonary edema.      Electronically signed by Andrea Patel      XR CHEST PORTABLE   Final Result   1. The ET tube is in satisfactory position.      Electronically signed by BHUMI LOCKETT      XR CHEST 1 VIEW   Final Result      1. The ET tube tip is near the gina. It can be retracted 2 cm for improved   positioning.      2. No interval change in bilateral lung airspace disease.            Electronically signed by BREANA HAMILTON      XR CHEST 1 VIEW   Final Result   The endotracheal tube terminates above the gina. Stable bilateral   lung opacities.         Electronically signed by Richard Persaud      XR CHEST 1 VIEW   Final Result   1.  Persistent left basilar opacity and patchy densities in the right lung.   2.  Endotracheal tube tip is close to the gina. Consider retracting by 1 to 2   cm.         Electronically signed by SEMAJ Basilio      XR CHEST 1 VIEW   Final Result      Ongoing left lower lobe    1. No interval change         Vascular duplex upper extremity venous bilateral   Final Result      XR CHEST PORTABLE   Final Result   Unchanged right perihilar and left basilar opacities.      XR CHEST PORTABLE   Final Result      Unchanged radiographic appearance.         XR CHEST 1 VIEW   Final Result   Stable right pulmonary edema.      Vascular duplex lower extremity venous bilateral   Final Result      XR CHEST 1 VIEW   Final Result   Right lung interstitial and airspace disease likely represents asymmetric edema.      XR CHEST PORTABLE   Final Result   1. No pneumothorax   2. The ET tube is 2 cm above the gina this could be retracted 2 cm.      IR NONTUNNELED VASCULAR CATHETER > 5 YEARS   Final Result   Technically successful ultrasound guided placement of a right internal jugular   vein temporary dialysis catheter.  A post procedure chest x-ray is pending.         XR CHEST 1 VIEW   Final Result   1. Increasing bibasilar opacities left greater than right most likely due to   atelectasis.   2. Decrease in consolidation in the right upper lobe.   3. The ET tube could be retracted 1.5 to 2 cm.      US RETROPERITONEAL COMPLETE   Final Result   1. Atrophic kidneys. No gross hydronephrosis.   2. Small volume of ascites.         XR CHEST PORTABLE   Final Result   Left IJ central venous catheter placement. No pneumothorax. Right   upper lobe airspace disease and mild bilateral interstitial opacities unchanged.      XR CHEST PORTABLE   Final Result   1. ET tube tip is angled towards the right mainstem bronchus and is 1 cm   superior to the level of the gina. Recommend repositioning.   2. Right lung patchy airspace consolidation.      IR ULTRASOUND GUIDANCE VASCULAR ACCESS    (Results Pending)      Results       Procedure Component Value Units Date/Time    Ling C [facility] Screen [[id number]] Collected: 06/21/24 1840    Order Status: Sent Specimen: Skin Updated: 06/21/24 1850    Culture, C [facility] Screen  06/15/24 1800    acetaminophen (TYLENOL) tablet 650 mg, 650 mg, Oral, Q6H PRN, 650 mg at 06/09/24 2047 **OR** acetaminophen (TYLENOL) suppository 650 mg, 650 mg, Rectal, Q6H PRN, Mikaela Arredondo MD

## 2024-06-25 NOTE — CARE COORDINATION
Transition of Care Plan:    RUR: 18%  Prior Level of Functioning: DEP  Disposition: LTC SNF/HOSPICE  If SNF or IPR: Date FOC offered: 5/29/24  Date FOC received: 5/29/24  Accepting facility: [facility]  Date authorization started with reference number: NA  Date authorization received and expires: NA  Follow up appointments: SNF  DME needed: NO  Transportation at discharge: [facility]  IM/IMM Medicare/ letter given: NA  Is patient a  and connected with [facility]? NO   If yes, was [facility] transfer form completed and [facility] notified?   Caregiver Contact: PRINCESS FITCH   Discharge Caregiver contacted prior to discharge? YES  Care Conference needed? NO  Barriers to discharge: NONE    Pt to dc to [place] [facility] w/ [facility]. Pt's spouse Princess Fitch gives verbal consent for dc back to LTC and waived choice for hospice as she is not from [facility]. Facility contracts with [facility].  Pt going to  [facility], number for report [phone].

## 2024-06-25 NOTE — PLAN OF CARE
Problem: Chronic Conditions and Co-morbidities  Goal: Patient's chronic conditions and co-morbidity symptoms are monitored and maintained or improved  Outcome: Progressing  Flowsheets (Taken 6/25/2024 1436)  Care Plan - Patient's Chronic Conditions and Co-Morbidity Symptoms are Monitored and Maintained or Improved:   Monitor and assess patient's chronic conditions and comorbid symptoms for stability, deterioration, or improvement   Collaborate with multidisciplinary team to address chronic and comorbid conditions and prevent exacerbation or deterioration   Update acute care plan with appropriate goals if chronic or comorbid symptoms are exacerbated and prevent overall improvement and discharge     Problem: Neurosensory - Adult  Goal: Achieves stable or improved neurological status  Outcome: Progressing  Flowsheets (Taken 6/25/2024 1436)  Achieves stable or improved neurological status:   Maintain blood pressure and fluid volume within ordered parameters to optimize cerebral perfusion and minimize risk of hemorrhage   Initiate measures to prevent increased intracranial pressure   Assess for and report changes in neurological status   Monitor temperature, glucose, and sodium. Initiate appropriate interventions as ordered

## 2024-06-25 NOTE — PROGRESS NOTES
General Daily Progress Note    Patient Name:   Agustín Willis       YOB: 1945       Age:  79 y.o.      Admit Date: 5/29/2024    Subjective:   Patient is a 78 y.o. year old male past medical history of COPD BPH cervical radiculopathy cocaine abuse hypertension hyperlipidemia seasonal allergies SVT came to emergency room with respiratory arrest, patient was at nursing home after eating breakfast he vomited everywhere likely aspirated oxygen saturation was 70% initially put on nonrebreather patient was intubated in the ER patient was hypotensive started Levophed 10 patient have a cardiac arrest went to V-fib received shock x 4 received epi and amiodarone  Was started on Levophed and vasopressin received 2 L of IV fluid, received IV Zosyn and vancomycin in the ER     Patient admitted to the ICU for further workup  Patient on ventilator/unresponsive       5/30    Patient on ventilator propofol and Precedex drip  Hypotension on Levophed and vasopressin  Patient has no urine output last night received 500 bolus    Nephrology decided for start CRRT IR team for dialysis cath  BUN 72 creatinine 3.09  CRP 10.5  MRSA nares    5/31    Patient on ventilator 40% O2 propofol Precedex levo and vasopressin drip  Patient on A-fib with rapid ventricular rate ordered amiodarone bolus and started on drip  Patient getting CRRT  No urine output  D-dimers greater than 20    6/3    Patient on ventilator 45% O2 receiving CRRT patient on propofol drip  Patient on amiodarone and Levophed drip  Patient heparin and Precedex drip  Patient is hypothermic    WBC count 22.5    6/4    Patient on ventilator 45% O2 on Levophed drip and amiodarone drip and heparin drip and Precedex drip getting NG tube feeding  BUN 37 creatinine 1.97 WBCs 15  Doppler studies positive for acute DVT of the left brachial vein left radial vein and left ulnar vein    Seen by infectious disease recommended continue IV meropenem    6/5    General ventilated 5% O2  septal thickening. Moderate hypokinesis of the following segments: mid inferoseptal. Normal diastolic function.    Right Ventricle: Right ventricle is moderately dilated.    Aortic Valve: Moderate to severe regurgitation.    Left Atrium: Left atrium is mildly dilated. Left atrial volume index is normal (16-34 mL/m2).    Right Atrium: Right atrium is mildly dilated.    Image quality is good. Procedure performed with the patient in a supine position    Plan:     Continue comfort care medications    DNR/DNI    Pending hospice consult      Current Facility-Administered Medications:     LORazepam (ATIVAN) injection 1 mg, 1 mg, IntraVENous, Q4H, Mikaela Arredondo MD, 1 mg at 06/25/24 0920    morphine (PF) injection 2 mg, 2 mg, IntraVENous, Q2H PRN, 2 mg at 06/23/24 1737 **OR** morphine (PF) injection 4 mg, 4 mg, IntraVENous, Q2H PRN, Mikaela Arredondo MD    glycopyrrolate (ROBINUL) injection 0.1 mg, 0.1 mg, IntraVENous, Q8H, Jeremy Ramos MD, 0.1 mg at 06/25/24 0511    scopolamine (TRANSDERM-SCOP) transdermal patch 1 patch, 1 patch, TransDERmal, Q72H, Darrell Calixto APRN - NP, 1 patch at 06/24/24 1330    sodium chloride 0.9 % bolus 2,448 mL, 30 mL/kg, IntraVENous, Once, Mikaela Arredondo MD, Held at 05/29/24 1405    sodium chloride flush 0.9 % injection 5-40 mL, 5-40 mL, IntraVENous, PRN, Mikaela Arredondo MD    ondansetron (ZOFRAN-ODT) disintegrating tablet 4 mg, 4 mg, Oral, Q8H PRN **OR** ondansetron (ZOFRAN) injection 4 mg, 4 mg, IntraVENous, Q6H PRN, Mikaela Arredondo MD, 4 mg at 06/18/24 2059    polyethylene glycol (GLYCOLAX) packet 17 g, 17 g, Oral, Daily PRN, Mikaela Arredondo MD, 17 g at 06/15/24 1800    acetaminophen (TYLENOL) tablet 650 mg, 650 mg, Oral, Q6H PRN, 650 mg at 06/09/24 2047 **OR** acetaminophen (TYLENOL) suppository 650 mg, 650 mg, Rectal, Q6H PRN, Mikaela Arredondo MD

## 2024-06-26 ENCOUNTER — APPOINTMENT (OUTPATIENT)
Facility: HOSPITAL | Age: 79
DRG: 870 | End: 2024-06-26
Payer: MEDICARE

## 2024-06-26 LAB
ALBUMIN SERPL-MCNC: 1.8 G/DL (ref 3.5–5)
ALBUMIN/GLOB SERPL: 0.4 (ref 1.1–2.2)
ALP SERPL-CCNC: 77 U/L (ref 45–117)
ALT SERPL-CCNC: 17 U/L (ref 12–78)
ANION GAP SERPL CALC-SCNC: 14 MMOL/L (ref 5–15)
APPEARANCE UR: ABNORMAL
AST SERPL W P-5'-P-CCNC: 22 U/L (ref 15–37)
BACTERIA SPEC CULT: NORMAL
BACTERIA URNS QL MICRO: NEGATIVE /HPF
BASOPHILS # BLD: 0 K/UL (ref 0–0.1)
BASOPHILS NFR BLD: 0 % (ref 0–1)
BILIRUB SERPL-MCNC: 0.9 MG/DL (ref 0.2–1)
BILIRUB UR QL: NEGATIVE
BUN SERPL-MCNC: 111 MG/DL (ref 6–20)
BUN/CREAT SERPL: 35 (ref 12–20)
CA-I BLD-MCNC: 8.5 MG/DL (ref 8.5–10.1)
CHLORIDE SERPL-SCNC: 108 MMOL/L (ref 97–108)
CO2 SERPL-SCNC: 21 MMOL/L (ref 21–32)
COLOR UR: ABNORMAL
CREAT SERPL-MCNC: 3.17 MG/DL (ref 0.7–1.3)
DIFFERENTIAL METHOD BLD: ABNORMAL
EKG ATRIAL RATE: 300 BPM
EKG DIAGNOSIS: NORMAL
EKG P AXIS: 255 DEGREES
EKG Q-T INTERVAL: 318 MS
EKG QRS DURATION: 90 MS
EKG QTC CALCULATION (BAZETT): 518 MS
EKG R AXIS: -42 DEGREES
EKG T AXIS: 157 DEGREES
EKG VENTRICULAR RATE: 160 BPM
EOSINOPHIL # BLD: 0 K/UL (ref 0–0.4)
EOSINOPHIL NFR BLD: 0 % (ref 0–7)
EPITH CASTS URNS QL MICRO: ABNORMAL /LPF
ERYTHROCYTE [DISTWIDTH] IN BLOOD BY AUTOMATED COUNT: 17.4 % (ref 11.5–14.5)
GLOBULIN SER CALC-MCNC: 4.8 G/DL (ref 2–4)
GLUCOSE SERPL-MCNC: 89 MG/DL (ref 65–100)
GLUCOSE UR STRIP.AUTO-MCNC: NEGATIVE MG/DL
HCT VFR BLD AUTO: 34.2 % (ref 36.6–50.3)
HGB BLD-MCNC: 10.8 G/DL (ref 12.1–17)
HGB UR QL STRIP: ABNORMAL
IMM GRANULOCYTES # BLD AUTO: 0.1 K/UL (ref 0–0.04)
IMM GRANULOCYTES NFR BLD AUTO: 1 % (ref 0–0.5)
KETONES UR QL STRIP.AUTO: NEGATIVE MG/DL
LEUKOCYTE ESTERASE UR QL STRIP.AUTO: NEGATIVE
LYMPHOCYTES # BLD: 0.5 K/UL (ref 0.8–3.5)
LYMPHOCYTES NFR BLD: 5 % (ref 12–49)
Lab: NORMAL
MAGNESIUM SERPL-MCNC: 2.3 MG/DL (ref 1.6–2.4)
MCH RBC QN AUTO: 29.7 PG (ref 26–34)
MCHC RBC AUTO-ENTMCNC: 31.6 G/DL (ref 30–36.5)
MCV RBC AUTO: 94 FL (ref 80–99)
MONOCYTES # BLD: 0.5 K/UL (ref 0–1)
MONOCYTES NFR BLD: 5 % (ref 5–13)
NEUTS SEG # BLD: 9.8 K/UL (ref 1.8–8)
NEUTS SEG NFR BLD: 89 % (ref 32–75)
NITRITE UR QL STRIP.AUTO: NEGATIVE
NRBC # BLD: 0 K/UL (ref 0–0.01)
NRBC BLD-RTO: 0 PER 100 WBC
PH UR STRIP: 5 (ref 5–8)
PLATELET # BLD AUTO: 65 K/UL (ref 150–400)
PMV BLD AUTO: 10.4 FL (ref 8.9–12.9)
POTASSIUM SERPL-SCNC: 4.3 MMOL/L (ref 3.5–5.1)
PROT SERPL-MCNC: 6.6 G/DL (ref 6.4–8.2)
PROT UR STRIP-MCNC: 30 MG/DL
RBC # BLD AUTO: 3.64 M/UL (ref 4.1–5.7)
RBC #/AREA URNS HPF: >100 /HPF (ref 0–5)
SODIUM SERPL-SCNC: 143 MMOL/L (ref 136–145)
SP GR UR REFRACTOMETRY: 1.02 (ref 1–1.03)
UROBILINOGEN UR QL STRIP.AUTO: 0.1 EU/DL (ref 0.1–1)
WBC # BLD AUTO: 10.9 K/UL (ref 4.1–11.1)
WBC URNS QL MICRO: ABNORMAL /HPF (ref 0–4)

## 2024-06-26 PROCEDURE — 6360000002 HC RX W HCPCS: Performed by: INTERNAL MEDICINE

## 2024-06-26 PROCEDURE — 71045 X-RAY EXAM CHEST 1 VIEW: CPT

## 2024-06-26 PROCEDURE — 81001 URINALYSIS AUTO W/SCOPE: CPT

## 2024-06-26 PROCEDURE — 83735 ASSAY OF MAGNESIUM: CPT

## 2024-06-26 PROCEDURE — 90935 HEMODIALYSIS ONE EVALUATION: CPT

## 2024-06-26 PROCEDURE — 2580000003 HC RX 258: Performed by: FAMILY MEDICINE

## 2024-06-26 PROCEDURE — 6360000002 HC RX W HCPCS: Performed by: FAMILY MEDICINE

## 2024-06-26 PROCEDURE — 2700000000 HC OXYGEN THERAPY PER DAY

## 2024-06-26 PROCEDURE — 87086 URINE CULTURE/COLONY COUNT: CPT

## 2024-06-26 PROCEDURE — 2500000003 HC RX 250 WO HCPCS: Performed by: INTERNAL MEDICINE

## 2024-06-26 PROCEDURE — 6370000000 HC RX 637 (ALT 250 FOR IP): Performed by: FAMILY MEDICINE

## 2024-06-26 PROCEDURE — 2000000000 HC ICU R&B

## 2024-06-26 PROCEDURE — 85025 COMPLETE CBC W/AUTO DIFF WBC: CPT

## 2024-06-26 PROCEDURE — 6370000000 HC RX 637 (ALT 250 FOR IP): Performed by: INTERNAL MEDICINE

## 2024-06-26 PROCEDURE — 3E043XZ INTRODUCTION OF VASOPRESSOR INTO CENTRAL VEIN, PERCUTANEOUS APPROACH: ICD-10-PCS | Performed by: FAMILY MEDICINE

## 2024-06-26 PROCEDURE — 80053 COMPREHEN METABOLIC PANEL: CPT

## 2024-06-26 PROCEDURE — 94640 AIRWAY INHALATION TREATMENT: CPT

## 2024-06-26 PROCEDURE — 2500000003 HC RX 250 WO HCPCS: Performed by: FAMILY MEDICINE

## 2024-06-26 PROCEDURE — 93005 ELECTROCARDIOGRAM TRACING: CPT | Performed by: FAMILY MEDICINE

## 2024-06-26 PROCEDURE — 87040 BLOOD CULTURE FOR BACTERIA: CPT

## 2024-06-26 PROCEDURE — 5A1D70Z PERFORMANCE OF URINARY FILTRATION, INTERMITTENT, LESS THAN 6 HOURS PER DAY: ICD-10-PCS | Performed by: INTERNAL MEDICINE

## 2024-06-26 PROCEDURE — 2709999900 HC NON-CHARGEABLE SUPPLY

## 2024-06-26 PROCEDURE — 94761 N-INVAS EAR/PLS OXIMETRY MLT: CPT

## 2024-06-26 RX ORDER — IPRATROPIUM BROMIDE AND ALBUTEROL SULFATE 2.5; .5 MG/3ML; MG/3ML
1 SOLUTION RESPIRATORY (INHALATION)
Status: DISCONTINUED | OUTPATIENT
Start: 2024-06-26 | End: 2024-06-30

## 2024-06-26 RX ORDER — BUDESONIDE 0.5 MG/2ML
0.5 INHALANT ORAL
Status: DISCONTINUED | OUTPATIENT
Start: 2024-06-26 | End: 2024-06-30

## 2024-06-26 RX ORDER — HEPARIN SODIUM 1000 [USP'U]/ML
2500 INJECTION, SOLUTION INTRAVENOUS; SUBCUTANEOUS PRN
Status: DISCONTINUED | OUTPATIENT
Start: 2024-06-26 | End: 2024-06-30

## 2024-06-26 RX ORDER — NOREPINEPHRINE BITARTRATE 0.06 MG/ML
1-100 INJECTION, SOLUTION INTRAVENOUS CONTINUOUS
Status: DISCONTINUED | OUTPATIENT
Start: 2024-06-26 | End: 2024-06-30

## 2024-06-26 RX ADMIN — SODIUM CHLORIDE, PRESERVATIVE FREE 10 ML: 5 INJECTION INTRAVENOUS at 20:07

## 2024-06-26 RX ADMIN — FOLIC ACID 1 MG: 1 TABLET ORAL at 09:35

## 2024-06-26 RX ADMIN — SODIUM CHLORIDE, PRESERVATIVE FREE 10 ML: 5 INJECTION INTRAVENOUS at 09:36

## 2024-06-26 RX ADMIN — Medication 5 MCG/MIN: at 13:41

## 2024-06-26 RX ADMIN — AMIODARONE HYDROCHLORIDE 1 MG/MIN: 1.8 INJECTION, SOLUTION INTRAVENOUS at 06:10

## 2024-06-26 RX ADMIN — MEROPENEM 1000 MG: 1 INJECTION, POWDER, FOR SOLUTION INTRAVENOUS at 01:27

## 2024-06-26 RX ADMIN — HEPARIN SODIUM 5000 UNITS: 5000 INJECTION INTRAVENOUS; SUBCUTANEOUS at 01:21

## 2024-06-26 RX ADMIN — ATORVASTATIN CALCIUM 40 MG: 40 TABLET, FILM COATED ORAL at 09:35

## 2024-06-26 RX ADMIN — HEPARIN SODIUM 5000 UNITS: 5000 INJECTION INTRAVENOUS; SUBCUTANEOUS at 18:01

## 2024-06-26 RX ADMIN — THIAMINE HCL TAB 100 MG 100 MG: 100 TAB at 09:35

## 2024-06-26 RX ADMIN — BUDESONIDE INHALATION 500 MCG: 0.5 SUSPENSION RESPIRATORY (INHALATION) at 20:16

## 2024-06-26 RX ADMIN — AMIODARONE HYDROCHLORIDE 150 MG: 1.5 INJECTION, SOLUTION INTRAVENOUS at 05:55

## 2024-06-26 RX ADMIN — IPRATROPIUM BROMIDE AND ALBUTEROL SULFATE 1 DOSE: 2.5; .5 SOLUTION RESPIRATORY (INHALATION) at 20:16

## 2024-06-26 RX ADMIN — SODIUM BICARBONATE 325 MG: 650 TABLET ORAL at 09:35

## 2024-06-26 RX ADMIN — Medication 2 PUFF: at 08:20

## 2024-06-26 RX ADMIN — SODIUM CHLORIDE: 9 INJECTION, SOLUTION INTRAVENOUS at 01:21

## 2024-06-26 RX ADMIN — AMIODARONE HYDROCHLORIDE 0.5 MG/MIN: 1.8 INJECTION, SOLUTION INTRAVENOUS at 23:11

## 2024-06-26 RX ADMIN — SODIUM CHLORIDE, PRESERVATIVE FREE 10 ML: 5 INJECTION INTRAVENOUS at 01:21

## 2024-06-26 RX ADMIN — AMIODARONE HYDROCHLORIDE 0.5 MG/MIN: 1.8 INJECTION, SOLUTION INTRAVENOUS at 12:21

## 2024-06-26 RX ADMIN — MEROPENEM 500 MG: 500 INJECTION, POWDER, FOR SOLUTION INTRAVENOUS at 09:36

## 2024-06-26 RX ADMIN — FINASTERIDE 5 MG: 5 TABLET, FILM COATED ORAL at 09:35

## 2024-06-26 RX ADMIN — HEPARIN SODIUM 2500 UNITS: 1000 INJECTION INTRAVENOUS; SUBCUTANEOUS at 14:22

## 2024-06-26 NOTE — ADT AUTH CERT
Mikaela Arredondo MD  Physician  Internal Medicine     Progress Notes      Signed     Date of Service: 6/25/2024 12:40 PM     Signed       Expand All Collapse All    General Daily Progress Note     Patient Name:   Agustín Willis       YOB: 1945       Age:  79 y.o.      Admit Date: 5/29/2024     Subjective:   Patient is a 78 y.o. year old male past medical history of COPD BPH cervical radiculopathy cocaine abuse hypertension hyperlipidemia seasonal allergies SVT came to emergency room with respiratory arrest, patient was at nursing home after eating breakfast he vomited everywhere likely aspirated oxygen saturation was 70% initially put on nonrebreather patient was intubated in the ER patient was hypotensive started Levophed 10 patient have a cardiac arrest went to V-fib received shock x 4 received epi and amiodarone  Was started on Levophed and vasopressin received 2 L of IV fluid, received IV Zosyn and vancomycin in the ER     Patient admitted to the ICU for further workup  Patient on ventilator/unresponsive        5/30     Patient on ventilator propofol and Precedex drip  Hypotension on Levophed and vasopressin  Patient has no urine output last night received 500 bolus     Nephrology decided for start CRRT IR team for dialysis cath  BUN 72 creatinine 3.09  CRP 10.5  MRSA nares     5/31     Patient on ventilator 40% O2 propofol Precedex levo and vasopressin drip  Patient on A-fib with rapid ventricular rate ordered amiodarone bolus and started on drip  Patient getting CRRT  No urine output  D-dimers greater than 20     6/3     Patient on ventilator 45% O2 receiving CRRT patient on propofol drip  Patient on amiodarone and Levophed drip  Patient heparin and Precedex drip  Patient is hypothermic     WBC count 22.5     6/4     Patient on ventilator 45% O2 on Levophed drip and amiodarone drip and heparin drip and Precedex drip getting NG tube feeding  BUN 37 creatinine 1.97 WBCs 15  Doppler studies  left basilar opacity and patchy densities in the right lung.   2.  Endotracheal tube tip is close to the gina. Consider retracting by 1 to 2   cm.           Electronically signed by [employer]       XR CHEST 1 VIEW   Final Result       Ongoing left lower lobe collapse/consolidation. Similar patchy opacities   throughout the right perihilar region. Worsening hazy left basilar   atelectasis/airspace disease. New small left pleural effusion.            Electronically signed by EMIGDIO BARNES       MRI BRAIN WO CONTRAST   Final Result   1. Numerous small foci of acute infarction in the supratentorial brain and   cerebellum covering multiple vascular territories, as well as small to moderate   areas of cortical infarction in the right temporoparietal junction and left   parietal lobe as above. No associated mass effect.    2. Numerous chronic microhemorrhages as on prior study again may indicate   cerebral amyloid angiopathy, with extensive chronic white matter disease.           Electronically signed by [employer]       XR ABDOMEN (KUB) (SINGLE AP VIEW)   Final Result   No unexpected radiopaque foreign bodies in the abdomen.       Electronically signed by Renu Carrington       [employer] CHEST 1 VIEW   Final Result   Grossly stable bilateral pulmonary infiltrates, right greater than left.           Electronically signed by [employer]       XR CHEST PORTABLE   Final Result       Bilateral lung infiltrates. Orogastric tube as described. Otherwise stable lines   and tubes.           Electronically signed by KATHRINE EDWARDS       CT HEAD WO CONTRAST   Final Result   No acute intracranial abnormality.           Electronically signed by [employer]       XR CHEST PORTABLE   Final Result   1. Satisfactory intubation.   2. New RUL airspace disease/pneumonia.       Electronically signed by [employer]       XR CHEST 1 VIEW   Final Result   Stable bibasilar atelectasis and low lung volumes. Stable lines and tubes,   except for

## 2024-06-26 NOTE — ED TRIAGE NOTES
Pt arrives via EMS, was discharged from facility today and sent to a nursing home, pt is NPO, per EMS [facility] states pt can not stay due to not having a peg tube and not being able to give medications. Per EMS pt is on hospice and DNR. Pt is nonverbal.

## 2024-06-26 NOTE — PROGRESS NOTES
Meropenem Extended-Infusion Dosing/Monitoring  Current regimen:  0.5 g every 12 hours    Recent Labs     06/25/24  2144 06/26/24  0515   CREATININE 2.91* 3.17*   * 111*       Estimated CrCl:  n/a mL/min; ESRD on HD    Plan: Change to meropenem 0.5g IV over 180 minutes every 24 hours per [facility] P&T Committee Protocol with respect to extended-infusion ?-lactam antibiotics. Pharmacy will continue to monitor patient daily and will make dosage adjustments based upon changing renal function.

## 2024-06-26 NOTE — CONSULTS
Comprehensive Nutrition Assessment    Type and Reason for Visit:  Initial (New TF)    Nutrition Recommendations/Plan:   NPO, advance as medically appropriate; ??appropriateness to consider PEG for long term nutrition status- ?ability to improved quality of life/prognosis.    Advance TF of Jevity 1.5 Rickie (Standard with fiber) via NGT by 10 mL/hr every 4 hrs as tolerated to new goal rate of 60 mL/hr.  Flush with 120 mL H2O q4hrs.  Provides: 2160 kcal(100%), 92 gm protein(99%), 1814 mL H2O(93%).    Continue to monitor and record TF rate, flushes, tolerance, BM in I/Os.     Malnutrition Assessment:  Malnutrition Status:  Moderate malnutrition (06/26/24 1136)    Context:  Acute Illness     Findings of the 6 clinical characteristics of malnutrition:  Energy Intake:  50% or less of estimated energy requirements for 5 or more days (previously on comfort care)  Weight Loss:  No significant weight loss (consider fluid shifts)     Body Fat Loss:  No significant body fat loss     Muscle Mass Loss:  Mild muscle mass loss Temples (temporalis)  Fluid Accumulation:  No significant fluid accumulation (edema unrelated to nutrition status)     Strength:  Not Performed    Nutrition Assessment:    Pt familiar to RD, followed while IP in CCU and medical floor. Pt placed on comfort care measures on 6/22 s/p prolonged stay on vent; discharged to SNF on hospice on 6/25, returned few hrs later. Now admitted for FTT, Pt's wife desires Full code and NGT for TF and meds. (6/26) Pt transferred to ICU ~7 hrs ago. TF infusing as Jevity 1.5 Rickie at 30 mL/hr, 200 mL H2O q4hrs. Noted Nephrologist's plan to start HD tx today. RD to modify regimen to meet new EENs. Wt loss likely r/t fluid shifts/edema- will monitor as appropriate. Labs: , Cr 3.17, GFR 19, H/H 10.8/34.2. Meds: NS, heparin, amiodarone.    Nutrition Related Findings:    NFPE w/ ?mild muscle wasting; consider edema continuously obscuring possible fat/muscle wasting. No N/V/D

## 2024-06-26 NOTE — H&P
History and Physical    NAME:   Agustín Willis   :  1945   MRN:  [id number]     Date/Time: 2024 10:10 AM    Patient PCP: Mikaela Arredondo MD  ______________________________________________________________________       Subjective:     CHIEF COMPLAINT:     Failure to thrive        HISTORY OF PRESENT ILLNESS:     General Daily Progress Note  Patient is a 79 y.o. year old male past medical history of COPD BPH history of cocaine abuse hypertension hyperlipidemia history of SVT history of cardiac arrest acute stroke bradycardia MRSA pneumonia history of A-fib status post shock respiratory failure was intubated aspiration pneumonia COPD end-stage renal disease on hemodialysis  Patient was discharged to nursing home with hospice care when patient went to the nursing home patient's wife change her mind and she want patient to be full code and sent back to the hospital for further evaluation and treatment and possible PEG tube placement    Initially patient was admitted to the medical floor then transferred to ICU as patient went to A-fib with rapid ventricular rate started on Cardizem drip    BUN #11 creatinine 3.17    Past Medical History:   Diagnosis Date    Benign prostatic disease 8/3/2020    Hypertrophy with Outflow Obstruction    Benign prostatic hyperplasia 8/3/2020    Cervical radiculopathy 8/3/2020    Chronic obstructive lung disease (HCC) 8/3/2020    Cocaine abuse (HCC) 8/3/2020    Dizziness and giddiness 8/3/2020    Hypercholesterolemia 8/3/2020    Hypertensive disorder 8/3/2020    Insomnia 8/3/2020    Kidney disease 8/3/2020    Low back pain 8/3/2020    Osteoarthritis of knee 8/3/2020    Sleep apnea 8/3/2020    Vasovagal syncope 8/3/2020        Past Surgical History:   Procedure Laterality Date    IR NONTUNNELED VASCULAR CATHETER  2024    IR NONTUNNELED VASCULAR CATHETER 2024 Stephy Abreu APRN - NP [facility] IR       Social History     Tobacco Use    Smoking status: Former    Smokeless

## 2024-06-26 NOTE — CONSULTS
Consult  Pulmonary, Critical Care    Name: Agustín Willis MRN: [id number]   : 1945 Hospital: [facility]   Date: 2024  Admission date: 2024 Hospital Day: 2       Subjective/Interval History:   Patient was in the hospital converted to comfort care discharged to the nursing home yesterday wife rescinded DNR status and comfort care and patient sent back to the hospital.  He had a history of cardiac arrest acute CVA chronic atrial fibrillation end-stage renal disease on dialysis sepsis aspiration pneumonia COPD chronic hypoxia on 2 L.  On  he was made comfort care and dialysis was discontinued respiratory treatments and antibiotics were discontinued.  On admission he was placed on the medical floor found to have A-fib with RVR and transferred to the ICU is now on amiodarone heart rate still elevated but slightly improved control    Patient Active Problem List   Diagnosis    Weakness of both legs    Insomnia    Shoulder pain    Low back pain    Vasovagal syncope    Tinnitus of right ear    Bronchitis    Alcohol abuse    Malignant hypertensive heart disease without heart failure    Pain in left knee    Dizziness and giddiness    Osteoarthritis of knee    Diarrhea    Hypercholesterolemia    Ankle pain    Pain in breast    Prediabetes    Sleep apnea    Seasonal allergic rhinitis    Chronic obstructive lung disease (HCC)    Cocaine abuse (HCC)    Kidney disease    Benign prostatic disease    Benign prostatic hyperplasia    Weakness of both arms    Lumbar herniated disc    Cervical radiculopathy    Hypertensive disorder    Thrombocytopenia (HCC)    Macrocytic anemia    Chronic renal disease, stage III ([facility])    Weakness generalized    Bilateral lower extremity edema    Syncope and collapse    SARTHAK on CPAP    Unsteady gait when walking    Ankle edema, bilateral    Drug abuse counseling and surveillance of drug abuser    Alcohol use, unspecified with intoxication, uncomplicated ([facility])

## 2024-06-26 NOTE — ED NOTES
Attempt made to obtain urine sample via straight cath by ROGELIO Mercedes. After bladder scanned showed 604mls. Bleeding noted during attempt to straight cath and afterwards. Straight cath removed, pt urinated scant amount, collected via urine collection cup. No further attempts at this time, external catheter placed.

## 2024-06-26 NOTE — PROGRESS NOTES
Spiritual Care Assessment/Progress Note  [facility]    Name: Agustín Willis MRN: [id number]    Age: 79 y.o.     Sex: male   Language: English     Date: 6/26/2024            Total Time Calculated: 10 min              Spiritual Assessment begun in [facility]  Service Provided For: Patient  Referral/Consult From: Rounding  Encounter Overview/Reason: Initial Encounter    Spiritual beliefs:      [x] Involved in a yamini tradition/spiritual practice:      [] Supported by a yamini community:      [] Claims no spiritual orientation:      [] Seeking spiritual identity:           [] Adheres to an individual form of spirituality:      [] Not able to assess:                Identified resources for coping and support system:   Support System: Unknown       [x] Prayer                  [] Devotional reading               [] Music                  [] Guided Imagery     [] Pet visits                                        [] Other: (COMMENT)     Specific area/focus of visit   Encounter:    Crisis:    Spiritual/Emotional needs: Type: Spiritual Support  Ritual, Rites and Sacraments:    Grief, Loss, and Adjustments:    Ethics/Mediation:    Behavioral Health:    Palliative Care:    Advance Care Planning:      Plan/Referrals: Other (Comment) ( follow up is available by referral)    Narrative:  is visiting for an initial spiritual assessment. The patient is familiar to the  from his previous admission.  offered words of comfort by bedside. Scripture/psalms read and prayer provided as patient has previously expressed his yamini is important to him and he values prayer.      All Skaggs M.Div, M.S, [facility]   can be reached by calling the  at [facility]   [phone]

## 2024-06-26 NOTE — PROGRESS NOTES
I am notified by the ICU nurse that patient is not comfort care and full code now.  He has been transferred from the regular floor to the ICU  He is not on any pressor  Plan to dialyze him today

## 2024-06-26 NOTE — ED PROVIDER NOTES
Onset    Hypertension Mother        Social History:  Social History     Tobacco Use    Smoking status: Former    Smokeless tobacco: Never   Substance Use Topics    Alcohol use: Yes    Drug use: Yes     Frequency: 3.0 times per week     Types: Cocaine       Allergies:  Allergies   Allergen Reactions    Penicillin G Other (See Comments)     Pt states he passes out    Sulfamethoxazole-Trimethoprim      Pt states he passes out       PCP: Mikaela Arredondo MD    Current Meds:   Current Facility-Administered Medications   Medication Dose Route Frequency Provider Last Rate Last Admin    sodium chloride 0.9 % bolus 500 mL  500 mL IntraVENous Once Al Melissa Basilio .7 mL/hr at 06/25/24 2209 500 mL at 06/25/24 2209    budesonide-formoterol (SYMBICORT) 160-4.5 MCG/ACT inhaler 2 puff  2 puff Inhalation BID RT Mikaela Arredondo MD        [START ON 6/26/2024] finasteride (PROSCAR) tablet 5 mg  5 mg Oral Daily Mikaela Arredondo MD        [START ON 6/26/2024] folic acid (FOLVITE) tablet 1 mg  1 mg Oral Daily Mikaela Arredondo MD        [START ON 6/26/2024] pantoprazole (PROTONIX) tablet 40 mg  40 mg Oral QAM AC Mikaela Arredondo MD        [START ON 6/26/2024] atorvastatin (LIPITOR) tablet 40 mg  40 mg Oral Daily Mikaela Arredondo MD        sodium bicarbonate tablet 325 mg  325 mg Oral 4x Daily Mikaela Arredondo MD        [START ON 6/26/2024] thiamine mononitrate tablet 100 mg  100 mg Oral Daily Mikaela Arredondo MD        0.9 % sodium chloride infusion   IntraVENous Continuous Mikaela Arredondo MD        sodium chloride flush 0.9 % injection 5-40 mL  5-40 mL IntraVENous 2 times per day Mikaela Arredondo MD        sodium chloride flush 0.9 % injection 5-40 mL  5-40 mL IntraVENous PRN Mikaela Arredondo MD        0.9 % sodium chloride infusion   IntraVENous PRN Mikaela Arredondo MD        ondansetron (ZOFRAN-ODT) disintegrating tablet 4 mg  4 mg Oral Q8H PRN Mikaela Arredondo MD        Or    ondansetron (ZOFRAN) injection 4 mg   4 mg IntraVENous Q6H PRN Mikaela Arredondo MD        polyethylene glycol (GLYCOLAX) packet 17 g  17 g Oral Daily PRN Mikaela Arredondo MD        acetaminophen (TYLENOL) tablet 650 mg  650 mg Oral Q6H PRN Mikaela Arredondo MD        Or    acetaminophen (TYLENOL) suppository 650 mg  650 mg Rectal Q6H PRN Mikaela Arredondo MD        heparin (porcine) injection 5,000 Units  5,000 Units SubCUTAneous 3 times per day Mikaela Arredondo MD        meropenem (MERREM) 1,000 mg in sodium chloride 0.9 % 100 mL IVPB (mini-bag)  1,000 mg IntraVENous Q8H Mikaela Arredondo MD         Current Outpatient Medications   Medication Sig Dispense Refill    apixaban (ELIQUIS) 2.5 MG TABS tablet Take 1 tablet by mouth 2 times daily      benzonatate (TESSALON) 100 MG capsule Take 1 capsule by mouth 3 times daily as needed for Cough      omeprazole (PRILOSEC) 20 MG delayed release capsule Take 1 capsule by mouth daily      amLODIPine (NORVASC) 5 MG tablet Take 1 tablet by mouth daily 30 tablet 1    budesonide-formoterol (SYMBICORT) 160-4.5 MCG/ACT AERO Inhale 2 puffs into the lungs in the morning and 2 puffs in the evening. 10.2 g 1    aspirin 81 MG EC tablet Take 1 tablet by mouth daily 30 tablet 1    ferrous sulfate (IRON 325) 325 (65 Fe) MG tablet Take 1 tablet by mouth daily (with breakfast) 30 tablet 0    folic acid (FOLVITE) 1 MG tablet Take 1 tablet by mouth daily 30 tablet 0    metoprolol tartrate (LOPRESSOR) 25 MG tablet Take 1 tablet by mouth 2 times daily 60 tablet 1    thiamine mononitrate (THIAMINE) 100 MG tablet Take 1 tablet by mouth daily 30 tablet 0    albuterol sulfate HFA (PROVENTIL;VENTOLIN;PROAIR) 108 (90 Base) MCG/ACT inhaler Inhale 2 puffs into the lungs every 6 hours as needed for Wheezing or Shortness of Breath 18 g 1    finasteride (PROSCAR) 5 MG tablet Take 1 tablet by mouth daily      simvastatin (ZOCOR) 10 MG tablet TAKE 1 TABLET BY MOUTH EVERYDAY AT BEDTIME      sodium bicarbonate 325 MG tablet Take 1 tablet by

## 2024-06-26 NOTE — CARE COORDINATION
06/26/24 1206   Service Assessment   Patient Orientation Other (see comment)   Cognition Other (see comment)   History Provided By Medical Record   Primary Caregiver Family   Support Systems Spouse/Significant Other;Children;Family Members   Patient's Healthcare Decision Maker is: Legal Next of Kin   PCP Verified by CM Yes   Last Visit to PCP Within last 3 months   Prior Functional Level Independent in ADLs/IADLs   Current Functional Level Assistance with the following:;Bathing;Dressing;Toileting;Mobility   Can patient return to prior living arrangement Other (see comment)  (TBD)   Ability to make needs known: Poor   Family able to assist with home care needs: No   Would you like for me to discuss the discharge plan with any other family members/significant others, and if so, who? Yes   Financial Resources Medicaid;Medicare   Community Resources None   Social/Functional History   Lives With Family   Type of Home House   Home Equipment Cane   Receives Help From Family   ADL Assistance Needs assistance   Bath Maximum assistance   Dressing Maximum assistance   Grooming Maximum assistance   Feeding Maximum assistance   Toileting Needs assistance   Ambulation Assistance Non-ambulatory   Occupation Retired   Discharge Planning   Type of Residence Other (Comment)  (TBD)     GI consult.  Pending peg tube.  Readmit.  Patient discharged on 25 June 2024 to SNF    Outbound call to spouse (Royse Cityaubrey Willis) @ [phone].  No answer and unable to leave a voice message d/t mailbox not being set up.  Nature of the call re: plan of care; code status; and discharge disposition.     Outbound call to patient's daughter Senthil Humphries @ [phone].  Informed patient's daughter of her father's readmission; and spouse decided not to do hospice, at the SNF ([facility]).  Daughter voiced \"I didn't know he was back in the hospital, in ICU.\"      Writer spoke w/Dr. Ferraro re: dialysis.  Dr. Ferraro, has agreed to call the spouse and  explain from the nephrologist and kidney standpoint on status of patient.       Advance Care Planning     General Advance Care Planning (ACP) Conversation    Date of Conversation: 6/26/2024  Conducted with:   Other persons present:     Healthcare Decision Maker:   Primary Decision Maker: Princess Willis - Spouse - [phone]    Primary Decision Maker: Senthil Humphries - Child - [phone]    Primary Decision Maker: Zackary Willis - Child - [phone]  Click here to complete Healthcare Decision Makers including selection of the Healthcare Decision Maker Relationship (ie \"Primary\").       Content/Action Overview:    Reviewed DNR/DNI and patient elects Full Code (Attempt Resuscitation)    Length of Voluntary ACP Conversation in minutes:  <16 minutes (Non-Billable)           Readmission Assessment  Number of Days since last admission?: 1-7 days  Previous Disposition: Other (comment) (SNF for hospice)  What was the patient's/caregiver's perception as to why they think they needed to return back to the hospital?: Other (Comment) (Readmit)  Did you visit your Primary Care Physician after you left the hospital, before you returned this time?: No  Why weren't you able to visit your PCP?: Other (Comment) (Readmit)  Did you see a specialist, such as Cardiac, Pulmonary, Orthopedic Physician, etc. after you left the hospital?: No  Who advised the patient to return to the hospital?: Skilled Unit  Does the patient report anything that got in the way of taking their medications?: No  In our efforts to provide the best possible care to you and others like you, can you think of anything that we could have done to help you after you left the hospital the first time, so that you might not have needed to return so soon?: Identify patient's health literacy needs, Discharge instructions that are concise, clear, and non contradictory

## 2024-06-26 NOTE — CONSULTS
CARDIOLOGY CONSULTATION    REASON FOR CONSULT: Atrial fibrillation     REQUESTING PROVIDER: Dr. Arredondo     CHIEF COMPLAINT:  Failure to thrive     HISTORY OF PRESENT ILLNESS:  Agustín Willis is a 79 y.o. year-old male with past medical history significant for hypertension, COPD, cocaine abuse, CKD who was evaluated today due to AFib.  Patient was discharged to nursing home yesterday with hospice/palliative care, however patient's wife has changed her mind. Wishes patient to be full code, sent back to the hospital for further evaluation and possible PEG tube placement. Transferred to the ICU overnight due to Afib RVR. Started on amio gtt. Has been on eliquis, held due to possible PEG.      Records from hospital admission course thus far reviewed.      Telemetry reviewed.       INPATIENT MEDICATIONS:  Home medications reviewed.    Current Facility-Administered Medications:     norepinephrine (LEVOPHED) 16 mg in sodium chloride 0.9 % 250 mL infusion, 1-100 mcg/min, IntraVENous, Continuous, Mikaela Arredondo MD, Last Rate: 2.8 mL/hr at 24 1531, 3 mcg/min at 24 1531    [COMPLETED] amiodarone (NEXTERONE) 150 mg in dextrose 5% 100 ml, 150 mg, IntraVENous, Once, Stopped at 24 0605 **FOLLOWED BY** [] amiodarone (NEXTERONE) 360 mg in dextrose 5% 200 ml, 1 mg/min, IntraVENous, Continuous, Last Rate: 33.3 mL/hr at 24 0610, 1 mg/min at 24 0610 **FOLLOWED BY** amiodarone (NEXTERONE) 360 mg in dextrose 5% 200 ml, 0.5 mg/min, IntraVENous, Continuous, Danie Gallagher MD, Last Rate: 16.7 mL/hr at 24 1221, 0.5 mg/min at 24 1221    budesonide (PULMICORT) nebulizer suspension 500 mcg, 0.5 mg, Nebulization, BID Ruben POWERS Robert S, MD    ipratropium 0.5 mg-albuterol 2.5 mg (DUONEB) nebulizer solution 1 Dose, 1 Dose, Inhalation, BID RTRuben Robert S, MD    heparin (porcine) injection 2,500 Units, 2,500 Units, IntraCATHeter, PRN, Sol Ferraro MD, 2,500 Units at 24 1423     above, all other systems are negative.    PHYSICAL EXAMINATION:    General:  Unresponsive  Cardiovascular:  irregularly irregular rhythm  Respiratory:  Lungs are coarse  Abdomen:  soft, nontender  Extremities:  No lower extremity edema  Skin:  Dry, warm  Psych:  Normal affect      Vitals:    06/26/24 1500   BP: 118/88   Pulse: (!) 132   Resp: 23   Temp:    SpO2:        Recent labs results and imaging reviewed.     Discussed case with Dr. Huffman and our impression and recommendations are as follows:  Atrial fibrillation with RVR, agree with amiodarone gtt and heparin. Keep electrolytes WNL. Continue telemetry    Aortic regurgitation, moderate to severe on most recent echo, will continue to monitor with serial echos     Hypotension, BP labile, on Levo gtt    FIDEL on CKD, nephrology following, plans for HD today   COPD, per primary/pulmonary   Hx polysubstance abuse     Thank you for involving us in the care of this patient.  Please do not hesitate to call me or Dr. Huffman if additional questions arise.    DONNELL MCKEON, APRN - NP  6/26/2024

## 2024-06-26 NOTE — PROGRESS NOTES
Nephrology f/u          Patient: Agustín Willis MRN: [id number]  SSN: [id number]    YOB: 1945  Age: 79 y.o.  Sex: male      Subjective:   The patient was transferred back to intensive care unit today  He was made comfort care and now wife had made him full code again.  He remains unresponsive.  He was last dialyzed on 6/20.    Tried to dialyze him today but he developed severe intradialytic hypotension and could not continue the dialysis support.  The patient is not a candidate for short or long-term dialysis.  Dialysis did not and is not going to change his outcome and prognosis.    I have tried to call his wife but no reply.    From renal standpoint unfortunately I will not be able to offer the dialysis support.  I will try to reach out to his wife again.      Past Medical History:   Diagnosis Date    Benign prostatic disease 8/3/2020    Hypertrophy with Outflow Obstruction    Benign prostatic hyperplasia 8/3/2020    Cervical radiculopathy 8/3/2020    Chronic obstructive lung disease (HCC) 8/3/2020    Cocaine abuse (HCC) 8/3/2020    Dizziness and giddiness 8/3/2020    Hypercholesterolemia 8/3/2020    Hypertensive disorder 8/3/2020    Insomnia 8/3/2020    Kidney disease 8/3/2020    Low back pain 8/3/2020    Osteoarthritis of knee 8/3/2020    Sleep apnea 8/3/2020    Vasovagal syncope 8/3/2020     Past Surgical History:   Procedure Laterality Date    IR NONTUNNELED VASCULAR CATHETER  5/30/2024    IR NONTUNNELED VASCULAR CATHETER 5/30/2024 Stephy Abreu APRN - NP [facility] ANGIO IR      Family History   Problem Relation Age of Onset    Hypertension Mother      Social History     Tobacco Use    Smoking status: Former    Smokeless tobacco: Never   Substance Use Topics    Alcohol use: Yes      Current Facility-Administered Medications   Medication Dose Route Frequency Provider Last Rate Last Admin    norepinephrine (LEVOPHED) 16 mg in sodium chloride 0.9 % 250 mL infusion  1-100 mcg/min IntraVENous

## 2024-06-27 ENCOUNTER — APPOINTMENT (OUTPATIENT)
Facility: HOSPITAL | Age: 79
DRG: 870 | End: 2024-06-27
Payer: MEDICARE

## 2024-06-27 ENCOUNTER — APPOINTMENT (OUTPATIENT)
Facility: HOSPITAL | Age: 79
DRG: 870 | End: 2024-06-27
Attending: FAMILY MEDICINE
Payer: MEDICARE

## 2024-06-27 LAB
ALBUMIN SERPL-MCNC: 1.6 G/DL (ref 3.5–5)
ALBUMIN/GLOB SERPL: 0.4 (ref 1.1–2.2)
ALP SERPL-CCNC: 78 U/L (ref 45–117)
ALT SERPL-CCNC: 16 U/L (ref 12–78)
ANION GAP SERPL CALC-SCNC: 12 MMOL/L (ref 5–15)
ARTERIAL PATENCY WRIST A: YES
AST SERPL W P-5'-P-CCNC: 17 U/L (ref 15–37)
BACTERIA SPEC CULT: NORMAL
BASE DEFICIT BLDA-SCNC: 2.2 MMOL/L
BDY SITE: ABNORMAL
BILIRUB SERPL-MCNC: 0.7 MG/DL (ref 0.2–1)
BNP SERPL-MCNC: 6848 PG/ML
BODY TEMPERATURE: 95
BUN SERPL-MCNC: 90 MG/DL (ref 6–20)
BUN/CREAT SERPL: 30 (ref 12–20)
CA-I BLD-MCNC: 1.12 MMOL/L (ref 1.13–1.32)
CA-I BLD-MCNC: 8.3 MG/DL (ref 8.5–10.1)
CHLORIDE SERPL-SCNC: 106 MMOL/L (ref 97–108)
CO2 SERPL-SCNC: 23 MMOL/L (ref 21–32)
COHGB MFR BLD: 0.4 % (ref 1–2)
COLONY COUNT, CNT: NORMAL
COLONY COUNT, CNT: NORMAL
CREAT SERPL-MCNC: 3.03 MG/DL (ref 0.7–1.3)
ERYTHROCYTE [DISTWIDTH] IN BLOOD BY AUTOMATED COUNT: 17.4 % (ref 11.5–14.5)
FIO2 ON VENT: 36 %
GAS FLOW.O2 O2 DELIVERY SYS: 4 L/MIN
GLOBULIN SER CALC-MCNC: 4.4 G/DL (ref 2–4)
GLUCOSE SERPL-MCNC: 160 MG/DL (ref 65–100)
HCO3 BLDA-SCNC: 20 MMOL/L (ref 22–26)
HCT VFR BLD AUTO: 31.1 % (ref 36.6–50.3)
HGB BLD-MCNC: 10.1 G/DL (ref 12.1–17)
Lab: NORMAL
MAGNESIUM SERPL-MCNC: 2.3 MG/DL (ref 1.6–2.4)
MCH RBC QN AUTO: 30 PG (ref 26–34)
MCHC RBC AUTO-ENTMCNC: 32.5 G/DL (ref 30–36.5)
MCV RBC AUTO: 92.3 FL (ref 80–99)
METHGB MFR BLD: 0.5 % (ref 0–1.4)
NRBC # BLD: 0.02 K/UL (ref 0–0.01)
NRBC BLD-RTO: 0.2 PER 100 WBC
OXYHGB MFR BLD: 95.8 % (ref 95–99)
PCO2 BLDA: 30 MMHG (ref 35–45)
PERFORMED BY:: ABNORMAL
PH BLDA: 7.45 (ref 7.35–7.45)
PHOSPHATE SERPL-MCNC: 5.3 MG/DL (ref 2.6–4.7)
PLATELET # BLD AUTO: 57 K/UL (ref 150–400)
PMV BLD AUTO: 10.6 FL (ref 8.9–12.9)
PO2 BLDA: 91 MMHG (ref 80–100)
POTASSIUM SERPL-SCNC: 4 MMOL/L (ref 3.5–5.1)
PROCALCITONIN SERPL-MCNC: 1.3 NG/ML
PROT SERPL-MCNC: 6 G/DL (ref 6.4–8.2)
RBC # BLD AUTO: 3.37 M/UL (ref 4.1–5.7)
SAO2 % BLD: 97 % (ref 95–99)
SAO2% DEVICE SAO2% SENSOR NAME: ABNORMAL
SODIUM SERPL-SCNC: 141 MMOL/L (ref 136–145)
SPECIMEN SITE: ABNORMAL
WBC # BLD AUTO: 10.7 K/UL (ref 4.1–11.1)

## 2024-06-27 PROCEDURE — 6360000002 HC RX W HCPCS: Performed by: INTERNAL MEDICINE

## 2024-06-27 PROCEDURE — 94640 AIRWAY INHALATION TREATMENT: CPT

## 2024-06-27 PROCEDURE — 2700000000 HC OXYGEN THERAPY PER DAY

## 2024-06-27 PROCEDURE — 06PY33Z REMOVAL OF INFUSION DEVICE FROM LOWER VEIN, PERCUTANEOUS APPROACH: ICD-10-PCS | Performed by: NURSE PRACTITIONER

## 2024-06-27 PROCEDURE — 85027 COMPLETE CBC AUTOMATED: CPT

## 2024-06-27 PROCEDURE — 82803 BLOOD GASES ANY COMBINATION: CPT

## 2024-06-27 PROCEDURE — 71045 X-RAY EXAM CHEST 1 VIEW: CPT

## 2024-06-27 PROCEDURE — 36600 WITHDRAWAL OF ARTERIAL BLOOD: CPT

## 2024-06-27 PROCEDURE — 06H033Z INSERTION OF INFUSION DEVICE INTO INFERIOR VENA CAVA, PERCUTANEOUS APPROACH: ICD-10-PCS | Performed by: NURSE PRACTITIONER

## 2024-06-27 PROCEDURE — 36415 COLL VENOUS BLD VENIPUNCTURE: CPT

## 2024-06-27 PROCEDURE — 82330 ASSAY OF CALCIUM: CPT

## 2024-06-27 PROCEDURE — 84145 PROCALCITONIN (PCT): CPT

## 2024-06-27 PROCEDURE — 6370000000 HC RX 637 (ALT 250 FOR IP)

## 2024-06-27 PROCEDURE — 2000000000 HC ICU R&B

## 2024-06-27 PROCEDURE — 84100 ASSAY OF PHOSPHORUS: CPT

## 2024-06-27 PROCEDURE — 36556 INSERT NON-TUNNEL CV CATH: CPT

## 2024-06-27 PROCEDURE — 83880 ASSAY OF NATRIURETIC PEPTIDE: CPT

## 2024-06-27 PROCEDURE — 94761 N-INVAS EAR/PLS OXIMETRY MLT: CPT

## 2024-06-27 PROCEDURE — 2580000003 HC RX 258: Performed by: FAMILY MEDICINE

## 2024-06-27 PROCEDURE — 6370000000 HC RX 637 (ALT 250 FOR IP): Performed by: FAMILY MEDICINE

## 2024-06-27 PROCEDURE — 2500000003 HC RX 250 WO HCPCS: Performed by: INTERNAL MEDICINE

## 2024-06-27 PROCEDURE — 83735 ASSAY OF MAGNESIUM: CPT

## 2024-06-27 PROCEDURE — 6370000000 HC RX 637 (ALT 250 FOR IP): Performed by: INTERNAL MEDICINE

## 2024-06-27 PROCEDURE — 6360000002 HC RX W HCPCS: Performed by: FAMILY MEDICINE

## 2024-06-27 PROCEDURE — 80053 COMPREHEN METABOLIC PANEL: CPT

## 2024-06-27 RX ORDER — AMIODARONE HYDROCHLORIDE 200 MG/1
200 TABLET ORAL DAILY
Status: DISCONTINUED | OUTPATIENT
Start: 2024-06-27 | End: 2024-06-30

## 2024-06-27 RX ORDER — GLYCOPYRROLATE 0.2 MG/ML
0.1 INJECTION INTRAMUSCULAR; INTRAVENOUS EVERY 8 HOURS
Status: DISCONTINUED | OUTPATIENT
Start: 2024-06-27 | End: 2024-06-29

## 2024-06-27 RX ADMIN — SODIUM CHLORIDE, PRESERVATIVE FREE 10 ML: 5 INJECTION INTRAVENOUS at 09:14

## 2024-06-27 RX ADMIN — THIAMINE HCL TAB 100 MG 100 MG: 100 TAB at 09:13

## 2024-06-27 RX ADMIN — HEPARIN SODIUM 5000 UNITS: 5000 INJECTION INTRAVENOUS; SUBCUTANEOUS at 02:15

## 2024-06-27 RX ADMIN — IPRATROPIUM BROMIDE AND ALBUTEROL SULFATE 1 DOSE: 2.5; .5 SOLUTION RESPIRATORY (INHALATION) at 20:57

## 2024-06-27 RX ADMIN — MEROPENEM 500 MG: 500 INJECTION, POWDER, FOR SOLUTION INTRAVENOUS at 09:13

## 2024-06-27 RX ADMIN — AMIODARONE HYDROCHLORIDE 0.5 MG/MIN: 1.8 INJECTION, SOLUTION INTRAVENOUS at 10:02

## 2024-06-27 RX ADMIN — ATORVASTATIN CALCIUM 40 MG: 40 TABLET, FILM COATED ORAL at 09:13

## 2024-06-27 RX ADMIN — IPRATROPIUM BROMIDE AND ALBUTEROL SULFATE 1 DOSE: 2.5; .5 SOLUTION RESPIRATORY (INHALATION) at 08:42

## 2024-06-27 RX ADMIN — SODIUM CHLORIDE, PRESERVATIVE FREE 10 ML: 5 INJECTION INTRAVENOUS at 21:17

## 2024-06-27 RX ADMIN — BUDESONIDE INHALATION 500 MCG: 0.5 SUSPENSION RESPIRATORY (INHALATION) at 08:42

## 2024-06-27 RX ADMIN — PANTOPRAZOLE SODIUM 40 MG: 40 TABLET, DELAYED RELEASE ORAL at 06:11

## 2024-06-27 RX ADMIN — BUDESONIDE INHALATION 500 MCG: 0.5 SUSPENSION RESPIRATORY (INHALATION) at 20:57

## 2024-06-27 RX ADMIN — FINASTERIDE 5 MG: 5 TABLET, FILM COATED ORAL at 09:13

## 2024-06-27 RX ADMIN — GLYCOPYRROLATE 0.1 MG: 0.2 INJECTION INTRAMUSCULAR; INTRAVENOUS at 09:13

## 2024-06-27 RX ADMIN — GLYCOPYRROLATE 0.1 MG: 0.2 INJECTION INTRAMUSCULAR; INTRAVENOUS at 17:05

## 2024-06-27 RX ADMIN — APIXABAN 2.5 MG: 2.5 TABLET, FILM COATED ORAL at 09:13

## 2024-06-27 RX ADMIN — AMIODARONE HYDROCHLORIDE 200 MG: 200 TABLET ORAL at 17:04

## 2024-06-27 RX ADMIN — FOLIC ACID 1 MG: 1 TABLET ORAL at 09:14

## 2024-06-27 ASSESSMENT — PAIN SCALES - GENERAL: PAINLEVEL_OUTOF10: 0

## 2024-06-27 NOTE — PROGRESS NOTES
General Daily Progress Note      Patient Name:   Agustín Willis       YOB: 1945       Age:  79 y.o.      Admit Date: 6/25/2024      Subjective:     Patient is a 79 y.o. year old male past medical history of COPD BPH history of cocaine abuse hypertension hyperlipidemia history of SVT history of cardiac arrest acute stroke bradycardia MRSA pneumonia history of A-fib status post shock respiratory failure was intubated aspiration pneumonia COPD end-stage renal disease on hemodialysis  Patient was discharged to nursing home with hospice care when patient went to the nursing home patient's wife change her mind and she want patient to be full code and sent back to the hospital for further evaluation and treatment and possible PEG tube placement     Initially patient was admitted to the medical floor then transferred to ICU as patient went to [facility] with rapid ventricular rate started on Cardizem drip    6/27    Patient unresponsive on nasal cannula on amiodarone drip  NG tube in place for feeding and medication  Left arm is swollen history of DVT  Unable to complete dialysis yesterday because of hypotension patient was started on Levophed yesterday  BUN 90 creatinine 3.05             Objective:     Vitals:    06/27/24 0700   BP: 97/71   Pulse: 75   Resp: 20   Temp: 97.7 °F (36.5 °C)   SpO2: 96%        Recent Results (from the past 24 hour(s))   CBC    Collection Time: 06/27/24  2:40 AM   Result Value Ref Range    WBC 10.7 4.1 - 11.1 K/uL    RBC 3.37 (L) 4.10 - 5.70 M/uL    Hemoglobin 10.1 (L) 12.1 - 17.0 g/dL    Hematocrit 31.1 (L) 36.6 - 50.3 %    MCV 92.3 80.0 - 99.0 FL    MCH 30.0 26.0 - 34.0 PG    MCHC 32.5 30.0 - 36.5 g/dL    RDW 17.4 (H) 11.5 - 14.5 %    Platelets 57 (L) 150 - 400 K/uL    MPV 10.6 8.9 - 12.9 FL    Nucleated RBCs 0.2 (H) 0.0  WBC    nRBC 0.02 (H) 0.00 - 0.01 K/uL   Comprehensive Metabolic Panel    Collection Time: 06/27/24  2:40 AM   Result Value Ref Range    Sodium 141 136 - 145  05:46 AM    POCGLU 116 2024 01:17 AM    POCGLU 117 2024 05:35 PM     Lab Results   Component Value Date/Time    COLORU VERONICA 2024 01:26 AM    CLARITYU Turbid 2023 10:28 PM    GLUCOSEU Negative 2024 01:26 AM    GLUCOSEU Negative 2024 02:12 PM    BILIRUBINUR Negative 2024 01:26 AM    KETUA Negative 2024 01:26 AM    BLOODU Large 2024 01:26 AM    PHUR 5.0 2024 01:26 AM    PHUR 5.0 2023 10:28 PM    PROTEINU 30 2024 01:26 AM    NITRU Negative 2024 01:26 AM    LEUKOCYTESUR Negative 2024 01:26 AM         Assessment:       Failure to thrive  Acute hypoxic respiratory failure  Acute metabolic encephalopathy  End-stage renal disease  CVA patient unresponsive  History of cardiac arrest  History of sepsis  History of respiratory failure was intubated due to aspiration pneumonia  Past medical history of cocaine abuse  Hypertension  Per lipidemia  Chronic A-fib      Plan:     Continue NG tube feeding  Continue amiodarone drip  Lipitor 40 daily  Proscar 5 mg daily  Start on Eliquis 2.5 twice daily  DuoNeb nebulizer twice daily  Meropenem 500 every 24 hours  Thiamine 100 mg daily    Overall prognosis very poor        Current Facility-Administered Medications:     norepinephrine (LEVOPHED) 16 mg in sodium chloride 0.9 % 250 mL infusion, 1-100 mcg/min, IntraVENous, Continuous, Mikaela Arredondo MD, Stopped at 24 0246    [COMPLETED] amiodarone (NEXTERONE) 150 mg in dextrose 5% 100 ml, 150 mg, IntraVENous, Once, Stopped at 24 0605 **FOLLOWED BY** [] amiodarone (NEXTERONE) 360 mg in dextrose 5% 200 ml, 1 mg/min, IntraVENous, Continuous, Last Rate: 33.3 mL/hr at 24 0610, 1 mg/min at 24 0610 **FOLLOWED BY** amiodarone (NEXTERONE) 360 mg in dextrose 5% 200 ml, 0.5 mg/min, IntraVENous, Continuous, Danie Gallagher MD, Last Rate: 16.7 mL/hr at 24, 0.5 mg/min at 24    budesonide (PULMICORT) nebulizer

## 2024-06-27 NOTE — PROGRESS NOTES
IR consulted for exchange of left IJ central line. Attempt made to call listed wife Princess Willis -[phone]. No answer and unable to leave a VM due to mailbox not set up.  Attempt to call listed daughter- Senthil Humphries - [phone], no answer and VM left requesting callback.

## 2024-06-27 NOTE — OR NURSING
IR to bedside 271 for CVC exchange  Telephone consent obtained, site prepped  Suture clipped and removed intact  Wire placed, previous CVC removed, new catheter placed over wire  Securement device and CHG Tegaderm dressing  Post imaging ordered  Procedure complete without complication  Report given to Primary RNPenny

## 2024-06-27 NOTE — PROGRESS NOTES
CARDIOLOGY PROGRESS NOTE      Patient Name: Agustín Willis  Age: 79 y.o.  Gender:male  :1945  MRN: [id number]    Patient seen and examined. This is a patient with a history of hypertension, COPD, cocaine abuse, CKD who presented from nursing home due to not having a PEG tube and no way to receive medications now being followed for Afib. Remains in ICU and unresponsive. Spoke with nursing staff regarding overnight events. No other complaints reported.    Telemetry reviewed, SR 70s.    Pertinent review of systems items noted above, all other systems are negative. Current medications reviewed.    Physical Examination    Allergies   Allergen Reactions    Penicillin G Other (See Comments)     Pt states he passes out    Sulfamethoxazole-Trimethoprim      Pt states he passes out     Vitals:    24 1200   BP: 108/71   Pulse: 85   Resp: 23   Temp:    SpO2: 94%     Vital signs are stable  No apparent distress.  Heart has a RRR.  + murmur  Lungs are diminished   Abdomen is soft, nontender, normal bowel sounds.  Extremities have no edema  Skin is dry and warm.  Normal affect    Labs reviewed:  Recent Results (from the past 12 hour(s))   CBC    Collection Time: 24  2:40 AM   Result Value Ref Range    WBC 10.7 4.1 - 11.1 K/uL    RBC 3.37 (L) 4.10 - 5.70 M/uL    Hemoglobin 10.1 (L) 12.1 - 17.0 g/dL    Hematocrit 31.1 (L) 36.6 - 50.3 %    MCV 92.3 80.0 - 99.0 FL    MCH 30.0 26.0 - 34.0 PG    MCHC 32.5 30.0 - 36.5 g/dL    RDW 17.4 (H) 11.5 - 14.5 %    Platelets 57 (L) 150 - 400 K/uL    MPV 10.6 8.9 - 12.9 FL    Nucleated RBCs 0.2 (H) 0.0  WBC    nRBC 0.02 (H) 0.00 - 0.01 K/uL   Comprehensive Metabolic Panel    Collection Time: 24  2:40 AM   Result Value Ref Range    Sodium 141 136 - 145 mmol/L    Potassium 4.0 3.5 - 5.1 mmol/L    Chloride 106 97 - 108 mmol/L    CO2 23 21 - 32 mmol/L    Anion Gap 12 5 - 15 mmol/L    Glucose 160 (H) 65 - 100 mg/dL    BUN 90 (H) 6 - 20 mg/dL    Creatinine 3.03 (H) 0.70

## 2024-06-27 NOTE — CONSULTS
Gastroenterology Consult     Referring Physician: Mikaela Arredondo MD     Consult Date: 6/27/2024     Subjective:     Patient is unable to give any meaningful history most of the historical data was gathered from patient's chart and interviewing the nursing staff patient has history of chronic renal failure polysubstance abuse COPD had previous cardiac arrest and several ischemic strokes to his brain he is currently being fed through a nasogastric tube a consult was placed to evaluate for gastrostomy tube placement patient is currently on Eliquis for atrial fibrillation.    Past Medical History:   Diagnosis Date    Benign prostatic disease 8/3/2020    Hypertrophy with Outflow Obstruction    Benign prostatic hyperplasia 8/3/2020    Cervical radiculopathy 8/3/2020    Chronic obstructive lung disease (HCC) 8/3/2020    Cocaine abuse (HCC) 8/3/2020    Dizziness and giddiness 8/3/2020    Hypercholesterolemia 8/3/2020    Hypertensive disorder 8/3/2020    Insomnia 8/3/2020    Kidney disease 8/3/2020    Low back pain 8/3/2020    Osteoarthritis of knee 8/3/2020    Sleep apnea 8/3/2020    Vasovagal syncope 8/3/2020     Past Surgical History:   Procedure Laterality Date    IR NONTUNNELED VASCULAR CATHETER  5/30/2024    IR NONTUNNELED VASCULAR CATHETER 5/30/2024 Stephy Abreu, KELLEY - NP [facility] RAD ANGIO IR      Family History   Problem Relation Age of Onset    Hypertension Mother      Social History     Tobacco Use    Smoking status: Former    Smokeless tobacco: Never   Substance Use Topics    Alcohol use: Yes      Allergies   Allergen Reactions    Penicillin G Other (See Comments)     Pt states he passes out    Sulfamethoxazole-Trimethoprim      Pt states he passes out     Current Facility-Administered Medications   Medication Dose Route Frequency    apixaban (ELIQUIS) tablet 2.5 mg  2.5 mg Per NG tube BID    glycopyrrolate (ROBINUL) injection 0.1 mg  0.1 mg IntraVENous Q8H    norepinephrine (LEVOPHED) 16 mg in sodium chloride

## 2024-06-27 NOTE — CARE COORDINATION
CM reviewed Pt medicals, Pt comes from [facility].    On 6/24 [facility] assess Pt for GIP, Pt did not meet.    On 6/25 Pt D/C to [facility] with [facility]. Pt was at [place] for 3 hours and Pt wife had Pt returned to the hospital and changed his code status back to full code and does not want hospice.     CM will follow for D/C needs.

## 2024-06-27 NOTE — PROGRESS NOTES
glycopyrrolate (ROBINUL) injection 0.1 mg  0.1 mg IntraVENous Q8H Hamilton Miranda MD   0.1 mg at 06/27/24 0913    norepinephrine (LEVOPHED) 16 mg in sodium chloride 0.9 % 250 mL infusion  1-100 mcg/min IntraVENous Continuous Mikaela Arredondo MD   Stopped at 06/27/24 0246    amiodarone (NEXTERONE) 360 mg in dextrose 5% 200 ml  0.5 mg/min IntraVENous Continuous Danie Gallagher MD 16.7 mL/hr at 06/27/24 1002 0.5 mg/min at 06/27/24 1002    budesonide (PULMICORT) nebulizer suspension 500 mcg  0.5 mg Nebulization BID RT Hamilton Miranda MD   500 mcg at 06/27/24 0842    ipratropium 0.5 mg-albuterol 2.5 mg (DUONEB) nebulizer solution 1 Dose  1 Dose Inhalation BID RT Hamilton Miranda MD   1 Dose at 06/27/24 0842    heparin (porcine) injection 2,500 Units  2,500 Units IntraCATHeter PRN Sol Ferraro MD   2,500 Units at 06/26/24 1422    meropenem (MERREM) 500 mg in sodium chloride 0.9 % 100 mL IVPB (mini-bag)  500 mg IntraVENous Q24H Mikaela Arredondo MD 33.3 mL/hr at 06/27/24 0913 500 mg at 06/27/24 0913    finasteride (PROSCAR) tablet 5 mg  5 mg Oral Daily Mikaela Arredondo MD   5 mg at 06/27/24 0913    folic acid (FOLVITE) tablet 1 mg  1 mg Oral Daily Mikaela Arredondo MD   1 mg at 06/27/24 0914    pantoprazole (PROTONIX) tablet 40 mg  40 mg Oral QAM AC Mikaela Arredondo MD   40 mg at 06/27/24 0611    atorvastatin (LIPITOR) tablet 40 mg  40 mg Oral Daily Mikaela Arredondo MD   40 mg at 06/27/24 0913    thiamine mononitrate tablet 100 mg  100 mg Oral Daily Mikaela Arredondo MD   100 mg at 06/27/24 0913    sodium chloride flush 0.9 % injection 5-40 mL  5-40 mL IntraVENous 2 times per day Mikaela Arredondo MD   10 mL at 06/27/24 0914    sodium chloride flush 0.9 % injection 5-40 mL  5-40 mL IntraVENous PRN Mikaela Arredondo MD        0.9 % sodium chloride infusion   IntraVENous PRN Mikaela Arredondo MD        ondansetron (ZOFRAN-ODT) disintegrating tablet 4 mg  4 mg Oral Q8H PRN Mikaela Arredondo MD        Or     cardioversion    Acute hypoxic respiratory failure  Aspiration pneumonia  Underlying COPD  Received IV antibiotics    Anemia   hemoglobin 7.9  Iron saturation 12 and ferritin 1100       Plan:       Signed By: Sol Ferraro MD     June 27, 2024

## 2024-06-27 NOTE — DISCHARGE SUMMARY
General Daily Progress Note    Patient Name:   Agustín Willis       YOB: 1945       Age:  79 y.o.      Admit Date: 5/29/2024    Subjective:   Patient is a 78 y.o. year old male past medical history of COPD BPH cervical radiculopathy cocaine abuse hypertension hyperlipidemia seasonal allergies SVT came to emergency room with respiratory arrest, patient was at nursing home after eating breakfast he vomited everywhere likely aspirated oxygen saturation was 70% initially put on nonrebreather patient was intubated in the ER patient was hypotensive started Levophed 10 patient have a cardiac arrest went to V-fib received shock x 4 received epi and amiodarone  Was started on Levophed and vasopressin received 2 L of IV fluid, received IV Zosyn and vancomycin in the ER     Patient admitted to the ICU for further workup  Patient on ventilator/unresponsive       5/30    Patient on ventilator propofol and Precedex drip  Hypotension on Levophed and vasopressin  Patient has no urine output last night received 500 bolus    Nephrology decided for start CRRT IR team for dialysis cath  BUN 72 creatinine 3.09  CRP 10.5  MRSA nares    5/31    Patient on ventilator 40% O2 propofol Precedex levo and vasopressin drip  Patient on A-fib with rapid ventricular rate ordered amiodarone bolus and started on drip  Patient getting CRRT  No urine output  D-dimers greater than 20    6/3    Patient on ventilator 45% O2 receiving CRRT patient on propofol drip  Patient on amiodarone and Levophed drip  Patient heparin and Precedex drip  Patient is hypothermic    WBC count 22.5    6/4    Patient on ventilator 45% O2 on Levophed drip and amiodarone drip and heparin drip and Precedex drip getting NG tube feeding  BUN 37 creatinine 1.97 WBCs 15  Doppler studies positive for acute DVT of the left brachial vein left radial vein and left ulnar vein    Seen by infectious disease recommended continue IV meropenem    6/5    General ventilated 5% O2

## 2024-06-27 NOTE — CONSULTS
Consult  Pulmonary, Critical Care    Name: Agustín Willis MRN: [id number]   : 1945 Hospital: [facility]   Date: 2024  Admission date: 2024 Hospital Day: 3       Subjective/Interval History:   Patient was in the hospital converted to comfort care discharged to the nursing home yesterday wife rescinded DNR status and comfort care and patient sent back to the hospital.  He had a history of cardiac arrest acute CVA chronic atrial fibrillation end-stage renal disease on dialysis sepsis aspiration pneumonia COPD chronic hypoxia on 2 L.  On  he was made comfort care and dialysis was discontinued respiratory treatments and antibiotics were discontinued.  On admission he was placed on the medical floor found to have A-fib with RVR and transferred to the ICU is now on amiodarone heart rate still elevated but slightly improved control   remains unresponsive only tolerated 1 hour of dialysis yesterday due to development of hypotension.  Remains on amiodarone has converted to sinus rhythm    Patient Active Problem List   Diagnosis    Weakness of both legs    Insomnia    Shoulder pain    Low back pain    Vasovagal syncope    Tinnitus of right ear    Bronchitis    Alcohol abuse    Malignant hypertensive heart disease without heart failure    Pain in left knee    Dizziness and giddiness    Osteoarthritis of knee    Diarrhea    Hypercholesterolemia    Ankle pain    Pain in breast    Prediabetes    Sleep apnea    Seasonal allergic rhinitis    Chronic obstructive lung disease (HCC)    Cocaine abuse (HCC)    Kidney disease    Benign prostatic disease    Benign prostatic hyperplasia    Weakness of both arms    Lumbar herniated disc    Cervical radiculopathy    Hypertensive disorder    Thrombocytopenia (HCC)    Macrocytic anemia    Chronic renal disease, stage III ([facility])    Weakness generalized    Bilateral lower extremity edema    Syncope and collapse    SARTHAK on CPAP    Unsteady gait when walking     Date: 6/22/2024  Extubation and transesophageal tube removal. Otherwise stable radiographic findings. Electronically signed by Severiano Chilel MD    XR CHEST 1 VIEW    Result Date: 6/21/2024  No significant change.. Electronically signed by Andrea Patel    XR CHEST 1 VIEW    Result Date: 6/20/2024  Right midlung zone airspace disease may represent. Airspace opacity may represent pneumonia or aspiration. There is patchy airspace opacity in the left lung base as well Electronically signed by Andrea Patel     6/26 patient's family rescinded DNR and comfort care status and patient transferred back to the hospital hypothermic.  A-fib with RVR has not received dialysis since the 21st.  Remains unresponsive.  If full CODE STATUS undertaken patient needs PEG tube for medication and tube feedings.  Needs to reinstitute dialysis.  Will resume nebulized albuterol Atrovent budesonide.  Mouth mucosa looks dry probably from mouth breathing but will hold Robinul for now.  Agree with IV Merrem very likely has chronic aspiration.  Chest x-ray is read as clear by radiology but it is not there is blunting of both costophrenic angles consistent effusions interstitial markings likely some degree of pulmonary edema or chronic aspiration  6/27 still with upper airway secretion noise will resume Robinul today question if he will be able to tolerate dialysis.  Blood cultures no growth so far continue current antibiotics  Time of care 45 minutes           Thank you for allowing us to participate in the care of this patient.  We will follow along with you     Hamilton Miranda MD

## 2024-06-28 ENCOUNTER — APPOINTMENT (OUTPATIENT)
Facility: HOSPITAL | Age: 79
DRG: 870 | End: 2024-06-28
Payer: MEDICARE

## 2024-06-28 LAB
ALBUMIN SERPL-MCNC: 1.6 G/DL (ref 3.5–5)
ALBUMIN/GLOB SERPL: 0.4 (ref 1.1–2.2)
ALP SERPL-CCNC: 111 U/L (ref 45–117)
ALT SERPL-CCNC: 15 U/L (ref 12–78)
ANION GAP SERPL CALC-SCNC: 11 MMOL/L (ref 5–15)
ARTERIAL PATENCY WRIST A: YES
AST SERPL W P-5'-P-CCNC: 17 U/L (ref 15–37)
BASE DEFICIT BLDA-SCNC: 1 MMOL/L
BASOPHILS # BLD: 0 K/UL (ref 0–0.1)
BASOPHILS NFR BLD: 0 % (ref 0–1)
BDY SITE: ABNORMAL
BILIRUB SERPL-MCNC: 0.7 MG/DL (ref 0.2–1)
BODY TEMPERATURE: 96.4
BUN SERPL-MCNC: 103 MG/DL (ref 6–20)
BUN/CREAT SERPL: 31 (ref 12–20)
CA-I BLD-MCNC: 8 MG/DL (ref 8.5–10.1)
CHLORIDE SERPL-SCNC: 107 MMOL/L (ref 97–108)
CO2 SERPL-SCNC: 24 MMOL/L (ref 21–32)
COHGB MFR BLD: 0.3 % (ref 1–2)
CREAT SERPL-MCNC: 3.33 MG/DL (ref 0.7–1.3)
DIFFERENTIAL METHOD BLD: ABNORMAL
EOSINOPHIL # BLD: 0 K/UL (ref 0–0.4)
EOSINOPHIL NFR BLD: 0 % (ref 0–7)
ERYTHROCYTE [DISTWIDTH] IN BLOOD BY AUTOMATED COUNT: 17.5 % (ref 11.5–14.5)
FERRITIN SERPL-MCNC: 797 NG/ML (ref 26–388)
FIO2 ON VENT: 32 %
GLOBULIN SER CALC-MCNC: 4.1 G/DL (ref 2–4)
GLUCOSE SERPL-MCNC: 134 MG/DL (ref 65–100)
HCO3 BLDA-SCNC: 23 MMOL/L (ref 22–26)
HCT VFR BLD AUTO: 29.4 % (ref 36.6–50.3)
HGB BLD-MCNC: 9.5 G/DL (ref 12.1–17)
IMM GRANULOCYTES # BLD AUTO: 0.1 K/UL (ref 0–0.04)
IMM GRANULOCYTES NFR BLD AUTO: 1 % (ref 0–0.5)
IRON SATN MFR SERPL: 13 % (ref 20–50)
IRON SERPL-MCNC: 17 UG/DL (ref 35–150)
LYMPHOCYTES # BLD: 0.5 K/UL (ref 0.8–3.5)
LYMPHOCYTES NFR BLD: 5 % (ref 12–49)
MAGNESIUM SERPL-MCNC: 2.1 MG/DL (ref 1.6–2.4)
MCH RBC QN AUTO: 29.7 PG (ref 26–34)
MCHC RBC AUTO-ENTMCNC: 32.3 G/DL (ref 30–36.5)
MCV RBC AUTO: 91.9 FL (ref 80–99)
METHGB MFR BLD: 0.4 % (ref 0–1.4)
MONOCYTES # BLD: 0.3 K/UL (ref 0–1)
MONOCYTES NFR BLD: 3 % (ref 5–13)
NEUTS SEG # BLD: 9.6 K/UL (ref 1.8–8)
NEUTS SEG NFR BLD: 91 % (ref 32–75)
NRBC # BLD: 0 K/UL (ref 0–0.01)
NRBC BLD-RTO: 0 PER 100 WBC
OXYHGB MFR BLD: 90.7 % (ref 95–99)
PCO2 BLDA: 32 MMHG (ref 35–45)
PERFORMED BY:: ABNORMAL
PERIPHERAL SMEAR, MD REVIEW: NORMAL
PH BLDA: 7.47 (ref 7.35–7.45)
PHOSPHATE SERPL-MCNC: 4.5 MG/DL (ref 2.6–4.7)
PLATELET # BLD AUTO: 48 K/UL (ref 150–400)
PMV BLD AUTO: 11.6 FL (ref 8.9–12.9)
PO2 BLDA: 58 MMHG (ref 80–100)
POTASSIUM SERPL-SCNC: 4.2 MMOL/L (ref 3.5–5.1)
PROCALCITONIN SERPL-MCNC: 1.1 NG/ML
PROT SERPL-MCNC: 5.7 G/DL (ref 6.4–8.2)
RBC # BLD AUTO: 3.2 M/UL (ref 4.1–5.7)
RBC MORPH BLD: ABNORMAL
SAO2 % BLD: 91 % (ref 95–99)
SAO2% DEVICE SAO2% SENSOR NAME: ABNORMAL
SODIUM SERPL-SCNC: 142 MMOL/L (ref 136–145)
SPECIMEN SITE: ABNORMAL
TIBC SERPL-MCNC: 131 UG/DL (ref 250–450)
WBC # BLD AUTO: 10.5 K/UL (ref 4.1–11.1)

## 2024-06-28 PROCEDURE — 2580000003 HC RX 258: Performed by: FAMILY MEDICINE

## 2024-06-28 PROCEDURE — 83735 ASSAY OF MAGNESIUM: CPT

## 2024-06-28 PROCEDURE — 83540 ASSAY OF IRON: CPT

## 2024-06-28 PROCEDURE — 71045 X-RAY EXAM CHEST 1 VIEW: CPT

## 2024-06-28 PROCEDURE — 84100 ASSAY OF PHOSPHORUS: CPT

## 2024-06-28 PROCEDURE — 84145 PROCALCITONIN (PCT): CPT

## 2024-06-28 PROCEDURE — 6360000002 HC RX W HCPCS: Performed by: FAMILY MEDICINE

## 2024-06-28 PROCEDURE — 82728 ASSAY OF FERRITIN: CPT

## 2024-06-28 PROCEDURE — 85025 COMPLETE CBC W/AUTO DIFF WBC: CPT

## 2024-06-28 PROCEDURE — 2000000000 HC ICU R&B

## 2024-06-28 PROCEDURE — 93005 ELECTROCARDIOGRAM TRACING: CPT | Performed by: NURSE PRACTITIONER

## 2024-06-28 PROCEDURE — 80053 COMPREHEN METABOLIC PANEL: CPT

## 2024-06-28 PROCEDURE — 6370000000 HC RX 637 (ALT 250 FOR IP): Performed by: FAMILY MEDICINE

## 2024-06-28 PROCEDURE — 94640 AIRWAY INHALATION TREATMENT: CPT

## 2024-06-28 PROCEDURE — 6370000000 HC RX 637 (ALT 250 FOR IP): Performed by: INTERNAL MEDICINE

## 2024-06-28 PROCEDURE — 6360000002 HC RX W HCPCS: Performed by: INTERNAL MEDICINE

## 2024-06-28 PROCEDURE — 6370000000 HC RX 637 (ALT 250 FOR IP)

## 2024-06-28 PROCEDURE — 36415 COLL VENOUS BLD VENIPUNCTURE: CPT

## 2024-06-28 PROCEDURE — 36600 WITHDRAWAL OF ARTERIAL BLOOD: CPT

## 2024-06-28 PROCEDURE — 82803 BLOOD GASES ANY COMBINATION: CPT

## 2024-06-28 RX ADMIN — GLYCOPYRROLATE 0.1 MG: 0.2 INJECTION INTRAMUSCULAR; INTRAVENOUS at 00:21

## 2024-06-28 RX ADMIN — MEROPENEM 500 MG: 500 INJECTION, POWDER, FOR SOLUTION INTRAVENOUS at 09:14

## 2024-06-28 RX ADMIN — GLYCOPYRROLATE 0.1 MG: 0.2 INJECTION INTRAMUSCULAR; INTRAVENOUS at 09:14

## 2024-06-28 RX ADMIN — PANTOPRAZOLE SODIUM 40 MG: 40 TABLET, DELAYED RELEASE ORAL at 05:51

## 2024-06-28 RX ADMIN — SODIUM CHLORIDE, PRESERVATIVE FREE 10 ML: 5 INJECTION INTRAVENOUS at 20:14

## 2024-06-28 RX ADMIN — AMIODARONE HYDROCHLORIDE 200 MG: 200 TABLET ORAL at 09:15

## 2024-06-28 RX ADMIN — THIAMINE HCL TAB 100 MG 100 MG: 100 TAB at 09:14

## 2024-06-28 RX ADMIN — ATORVASTATIN CALCIUM 40 MG: 40 TABLET, FILM COATED ORAL at 09:14

## 2024-06-28 RX ADMIN — FINASTERIDE 5 MG: 5 TABLET, FILM COATED ORAL at 09:15

## 2024-06-28 RX ADMIN — IPRATROPIUM BROMIDE AND ALBUTEROL SULFATE 1 DOSE: 2.5; .5 SOLUTION RESPIRATORY (INHALATION) at 08:16

## 2024-06-28 RX ADMIN — GLYCOPYRROLATE 0.1 MG: 0.2 INJECTION INTRAMUSCULAR; INTRAVENOUS at 18:33

## 2024-06-28 RX ADMIN — SODIUM CHLORIDE, PRESERVATIVE FREE 10 ML: 5 INJECTION INTRAVENOUS at 09:14

## 2024-06-28 RX ADMIN — FOLIC ACID 1 MG: 1 TABLET ORAL at 09:14

## 2024-06-28 RX ADMIN — BUDESONIDE INHALATION 500 MCG: 0.5 SUSPENSION RESPIRATORY (INHALATION) at 08:09

## 2024-06-28 RX ADMIN — BUDESONIDE INHALATION 500 MCG: 0.5 SUSPENSION RESPIRATORY (INHALATION) at 21:24

## 2024-06-28 RX ADMIN — IPRATROPIUM BROMIDE AND ALBUTEROL SULFATE 1 DOSE: 2.5; .5 SOLUTION RESPIRATORY (INHALATION) at 21:24

## 2024-06-28 ASSESSMENT — PAIN SCALES - GENERAL
PAINLEVEL_OUTOF10: 0

## 2024-06-28 NOTE — PROGRESS NOTES
CARDIOLOGY PROGRESS NOTE      Patient Name: Agustín Willis  Age: 79 y.o.  Gender:male  :1945  MRN: [id number]    Patient seen and examined. This is a patient with a history of hypertension, COPD, cocaine abuse, CKD who presented from nursing home due to not having a PEG tube and no way to receive medications now being followed for Afib. Remains in ICU.  Opens eyes but doesn't answer questions. No other complaints reported.    Telemetry reviewed, -120s.    Unable to obtain ROS. Current medications reviewed.    Physical Examination    Allergies   Allergen Reactions    Penicillin G Other (See Comments)     Pt states he passes out    Sulfamethoxazole-Trimethoprim      Pt states he passes out     Vitals:    24 1100   BP:    Pulse:    Resp:    Temp: 96.9 °F (36.1 °C)   SpO2:      Vital signs are stable  Ill appearing  Heart is irregular, tachycardic.  + murmur  Lungs are diminished   Abdomen is soft, nontender, normal bowel sounds.  Extremities have no edema  Skin is dry and warm.  Unable to assess affect    Labs reviewed:  Recent Results (from the past 12 hour(s))   Comprehensive Metabolic Panel    Collection Time: 24  3:43 AM   Result Value Ref Range    Sodium 142 136 - 145 mmol/L    Potassium 4.2 3.5 - 5.1 mmol/L    Chloride 107 97 - 108 mmol/L    CO2 24 21 - 32 mmol/L    Anion Gap 11 5 - 15 mmol/L    Glucose 134 (H) 65 - 100 mg/dL     (H) 6 - 20 mg/dL    Creatinine 3.33 (H) 0.70 - 1.30 mg/dL    BUN/Creatinine Ratio 31 (H) 12 - 20      Est, Glom Filt Rate 18 (L) >60 ml/min/1.73m2    Calcium 8.0 (L) 8.5 - 10.1 mg/dL    Total Bilirubin 0.7 0.2 - 1.0 mg/dL    AST 17 15 - 37 U/L    ALT 15 12 - 78 U/L    Alk Phosphatase 111 45 - 117 U/L    Total Protein 5.7 (L) 6.4 - 8.2 g/dL    Albumin 1.6 (L) 3.5 - 5.0 g/dL    Globulin 4.1 (H) 2.0 - 4.0 g/dL    Albumin/Globulin Ratio 0.4 (L) 1.1 - 2.2     Procalcitonin    Collection Time: 24  3:43 AM   Result Value Ref Range    Procalcitonin 1.10  (H) 0 ng/mL   Magnesium    Collection Time: 06/28/24  3:43 AM   Result Value Ref Range    Magnesium 2.1 1.6 - 2.4 mg/dL   CBC with Auto Differential    Collection Time: 06/28/24  3:43 AM   Result Value Ref Range    WBC 10.5 4.1 - 11.1 K/uL    RBC 3.20 (L) 4.10 - 5.70 M/uL    Hemoglobin 9.5 (L) 12.1 - 17.0 g/dL    Hematocrit 29.4 (L) 36.6 - 50.3 %    MCV 91.9 80.0 - 99.0 FL    MCH 29.7 26.0 - 34.0 PG    MCHC 32.3 30.0 - 36.5 g/dL    RDW 17.5 (H) 11.5 - 14.5 %    Platelets 48 (LL) 150 - 400 K/uL    MPV 11.6 8.9 - 12.9 FL    Nucleated RBCs 0.0 0.0  WBC    nRBC 0.00 0.00 - 0.01 K/uL    Neutrophils % 91 (H) 32 - 75 %    Lymphocytes % 5 (L) 12 - 49 %    Monocytes % 3 (L) 5 - 13 %    Eosinophils % 0 0 - 7 %    Basophils % 0 0 - 1 %    Immature Granulocytes % 1 (H) 0 - 0.5 %    Neutrophils Absolute 9.6 (H) 1.8 - 8.0 K/UL    Lymphocytes Absolute 0.5 (L) 0.8 - 3.5 K/UL    Monocytes Absolute 0.3 0.0 - 1.0 K/UL    Eosinophils Absolute 0.0 0.0 - 0.4 K/UL    Basophils Absolute 0.0 0.0 - 0.1 K/UL    Immature Granulocytes Absolute 0.1 (H) 0.00 - 0.04 K/UL    Differential Type Smear Scanned      RBC Comment Shedd cells  1+       Phosphorus    Collection Time: 06/28/24  3:43 AM   Result Value Ref Range    Phosphorus 4.5 2.6 - 4.7 mg/dL   Path Review, Smear    Collection Time: 06/28/24  3:43 AM   Result Value Ref Range    Peripheral Smear, MD Review PATH REVIEW ORDERED CASE NUMBER [id number]    Blood Gas, Arterial    Collection Time: 06/28/24  4:23 AM   Result Value Ref Range    pH, Arterial 7.47 (H) 7.35 - 7.45      pCO2, Arterial 32 (L) 35 - 45 mmHg    pO2, Arterial 58 (L) 80 - 100 mmHg    O2 Sat, Arterial 91 (L) 95 - 99 %    HCO3, Arterial 23 22 - 26 mmol/L    Base deficit, arterial blood 1.0 mmol/L    O2 Method Nasal Cannula      FIO2 Arterial 32 %    Source Arterial      Site Right Radial      Efrain Test YES      Carboxyhgb, Arterial 0.3 (L) 1 - 2 %    Methemoglobin, Arterial 0.4 0 - 1.4 %    Oxyhemoglobin 90.7 (L) 95 - 99 %

## 2024-06-28 NOTE — CARE COORDINATION
CM reviewed Pt medicals, Pt comes from [facility].     On 6/24 [facility] assess Pt for GIP, Pt did not meet.     On 6/25 Pt D/C to [facility] with [facility]. Pt was at [place] for 3 hours and Pt wife had Pt returned to the hospital and changed his code status back to full code and does not want hospice.      CM will follow for D/C needs.     CM will send medicals to [facility].   Pt is scheduled for a PEG today.

## 2024-06-28 NOTE — PROGRESS NOTES
Frequency Provider Last Rate Last Admin    [Held by provider] apixaban (ELIQUIS) tablet 2.5 mg  2.5 mg Per NG tube BID Mikaela Arredondo MD   2.5 mg at 06/27/24 0913    glycopyrrolate (ROBINUL) injection 0.1 mg  0.1 mg IntraVENous Q8H Hamilton Miranda MD   0.1 mg at 06/28/24 0914    amiodarone (CORDARONE) tablet 200 mg  200 mg Per NG tube Daily Darrell Calixto APRN - NP   200 mg at 06/28/24 0915    norepinephrine (LEVOPHED) 16 mg in sodium chloride 0.9 % 250 mL infusion  1-100 mcg/min IntraVENous Continuous Mikaela Arredondo MD   Stopped at 06/27/24 0246    budesonide (PULMICORT) nebulizer suspension 500 mcg  0.5 mg Nebulization BID RT Hamilton Miranda MD   500 mcg at 06/28/24 0809    ipratropium 0.5 mg-albuterol 2.5 mg (DUONEB) nebulizer solution 1 Dose  1 Dose Inhalation BID RT Hamilton Miranda MD   1 Dose at 06/28/24 0816    heparin (porcine) injection 2,500 Units  2,500 Units IntraCATHeter PRN Sol Ferraro MD   2,500 Units at 06/26/24 1422    meropenem (MERREM) 500 mg in sodium chloride 0.9 % 100 mL IVPB (mini-bag)  500 mg IntraVENous Q24H Mikaela Arredondo MD 33.3 mL/hr at 06/28/24 0914 500 mg at 06/28/24 0914    finasteride (PROSCAR) tablet 5 mg  5 mg Oral Daily Mikaela Arredondo MD   5 mg at 06/28/24 0915    folic acid (FOLVITE) tablet 1 mg  1 mg Oral Daily Mikaela Arredondo MD   1 mg at 06/28/24 0914    pantoprazole (PROTONIX) tablet 40 mg  40 mg Oral QAM AC Mikaela Arredondo MD   40 mg at 06/28/24 0551    atorvastatin (LIPITOR) tablet 40 mg  40 mg Oral Daily Mikaela Arredondo MD   40 mg at 06/28/24 0914    thiamine mononitrate tablet 100 mg  100 mg Oral Daily Mikaela Arredondo MD   100 mg at 06/28/24 0914    sodium chloride flush 0.9 % injection 5-40 mL  5-40 mL IntraVENous 2 times per day Mikaela Arredondo MD   10 mL at 06/28/24 0914    sodium chloride flush 0.9 % injection 5-40 mL  5-40 mL IntraVENous PRN Mikaela Arredondo MD        0.9 % sodium chloride infusion   IntraVENous PRN Mikaela Arredondo MD         ondansetron (ZOFRAN-ODT) disintegrating tablet 4 mg  4 mg Oral Q8H PRN Mikaela Arredondo MD        Or    ondansetron (ZOFRAN) injection 4 mg  4 mg IntraVENous Q6H PRN Mikaela Arredondo MD        polyethylene glycol (GLYCOLAX) packet 17 g  17 g Oral Daily PRN Mikaela Arredondo MD        acetaminophen (TYLENOL) tablet 650 mg  650 mg Oral Q6H PRN Mikaela Arredondo MD        Or    acetaminophen (TYLENOL) suppository 650 mg  650 mg Rectal Q6H PRN Mikaela Arredondo MD            Allergies   Allergen Reactions    Penicillin G Other (See Comments)     Pt states he passes out    Sulfamethoxazole-Trimethoprim      Pt states he passes out       Review of Systems:  Unable to obtain    Objective:     Vitals:    06/28/24 0600 06/28/24 0700 06/28/24 0800 06/28/24 0900   BP: 134/83 106/82 113/75 116/83   Pulse: 80 78 79 83   Resp: 18 16 17 21   Temp:  (!) 95.9 °F (35.5 °C)  (!) 96.6 °F (35.9 °C)   TempSrc:  Rectal  Rectal   SpO2: 93% 94% 93% 92%   Weight:       Height:            Physical Exam:  General: Unresponsive  Eyes: sclera anicteric  Oral Cavity: No thrush or oral  Neck: no JVD  Chest.  Fair bilateral air entry  Heart: normal sounds  Abdomen: soft and non tender   : no damico  Lower Extremities: no edema  Neuro: Unresponsive        Assessment/Plan:     Acute kidney injury on chronic kidney disease stage IIIa  2/2 anuric ischemic ATN from septic shock  On admission BUN/creatinine 58/2.06 and peaked at 72/3.09  Baseline creatinine 1.36 on 3/12/2024  Renal ultrasound no hydronephrosis  Off CRRT since 6/03  Was volume up/worsening BUN/hyperkalemia  reinitiated on hemodialysis  Status post dialyzed on 6/08 and removed 2 L  Was dialyzed on 6/11 and removed 2.0 L.  Was dialyzed on 6/15 and removed 2.0 L.  Last dialyzed 6/20 and removed 2.0 L.  The patient is not a candidate for short or long-term dialysis support.    Hyperkalemia  Resolved    PEA cardiac arrest  Rapid response was called on 6/09 due to unresponsiveness and

## 2024-06-28 NOTE — CONSULTS
Patient's platelet count is low as well as no consent from the family is available at the present time anesthesia has evaluated the patient and would require some more studies before proceeding with PEG tube placement.  Will be guided by family's wishes.  Overall prognosis remains poor.

## 2024-06-28 NOTE — PERIOP NOTE
Attempted to reach Princess Willis at [phone] for PEG tube consent.  No VM available to leave message.

## 2024-06-28 NOTE — PLAN OF CARE
Problem: Discharge Planning  Goal: Discharge to home or other facility with appropriate resources  Outcome: Not Progressing     Problem: Safety - Adult  Goal: Free from fall injury  Outcome: Not Progressing     Problem: Chronic Conditions and Co-morbidities  Goal: Patient's chronic conditions and co-morbidity symptoms are monitored and maintained or improved  Outcome: Not Progressing     Problem: Neurosensory - Adult  Goal: Achieves stable or improved neurological status  Outcome: Not Progressing  Goal: Absence of seizures  Outcome: Not Progressing  Goal: Remains free of injury related to seizures activity  Outcome: Not Progressing  Goal: Achieves maximal functionality and self care  Outcome: Not Progressing     Problem: Respiratory - Adult  Goal: Achieves optimal ventilation and oxygenation  Outcome: Not Progressing     Problem: Cardiovascular - Adult  Goal: Maintains optimal cardiac output and hemodynamic stability  Outcome: Not Progressing  Goal: Absence of cardiac dysrhythmias or at baseline  Outcome: Not Progressing     Problem: Skin/Tissue Integrity - Adult  Goal: Skin integrity remains intact  Outcome: Not Progressing  Goal: Incisions, wounds, or drain sites healing without S/S of infection  Outcome: Not Progressing  Goal: Oral mucous membranes remain intact  Outcome: Not Progressing     Problem: Musculoskeletal - Adult  Goal: Return mobility to safest level of function  Outcome: Not Progressing  Goal: Maintain proper alignment of affected body part  Outcome: Not Progressing  Goal: Return ADL status to a safe level of function  Outcome: Not Progressing     Problem: Gastrointestinal - Adult  Goal: Minimal or absence of nausea and vomiting  Outcome: Not Progressing  Goal: Maintains or returns to baseline bowel function  Outcome: Not Progressing  Goal: Maintains adequate nutritional intake  Outcome: Not Progressing  Goal: Establish and maintain optimal ostomy function  Outcome: Not Progressing     Problem:  which these feelings are experienced.  8. Help patient/family identify destructive displacement of feelings onto other individuals.  9. Invite use of sacraments/rituals/ceremonies as appropriate (e.g. - confession, anointing, smudging).  10. Refer patient/family to formal counseling and/or to [facility] community for further support work.  Outcome: Not Progressing     Problem: Skin/Tissue Integrity  Goal: Absence of new skin breakdown  Description: 1.  Monitor for areas of redness and/or skin breakdown  2.  Assess vascular access sites hourly  3.  Every 4-6 hours minimum:  Change oxygen saturation probe site  4.  Every 4-6 hours:  If on nasal continuous positive airway pressure, respiratory therapy assess nares and determine need for appliance change or resting period.  Outcome: Not Progressing     Problem: ABCDS Injury Assessment  Goal: Absence of physical injury  Outcome: Not Progressing

## 2024-06-28 NOTE — CONSULTS
Consult  Pulmonary, Critical Care    Name: Agustín Willis MRN: [id number]   : 1945 Hospital: [facility]   Date: 2024  Admission date: 2024 Hospital Day: 4       Subjective/Interval History:   Patient was in the hospital converted to comfort care discharged to the nursing home yesterday wife rescinded DNR status and comfort care and patient sent back to the hospital.  He had a history of cardiac arrest acute CVA chronic atrial fibrillation end-stage renal disease on dialysis sepsis aspiration pneumonia COPD chronic hypoxia on 2 L.  On  he was made comfort care and dialysis was discontinued respiratory treatments and antibiotics were discontinued.  On admission he was placed on the medical floor found to have A-fib with RVR and transferred to the ICU is now on amiodarone heart rate still elevated but slightly improved control   remains unresponsive only tolerated 1 hour of dialysis yesterday due to development of hypotension.  Remains on amiodarone has converted to sinus rhythm   no change hide 0 open otherwise unresponsive respirations nonlabored    Patient Active Problem List   Diagnosis    Weakness of both legs    Insomnia    Shoulder pain    Low back pain    Vasovagal syncope    Tinnitus of right ear    Bronchitis    Alcohol abuse    Malignant hypertensive heart disease without heart failure    Pain in left knee    Dizziness and giddiness    Osteoarthritis of knee    Diarrhea    Hypercholesterolemia    Ankle pain    Pain in breast    Prediabetes    Sleep apnea    Seasonal allergic rhinitis    Chronic obstructive lung disease (HCC)    Cocaine abuse (HCC)    Kidney disease    Benign prostatic disease    Benign prostatic hyperplasia    Weakness of both arms    Lumbar herniated disc    Cervical radiculopathy    Hypertensive disorder    Thrombocytopenia (HCC)    Macrocytic anemia    Chronic renal disease, stage III (HCC)    Weakness generalized    Bilateral lower extremity edema

## 2024-06-28 NOTE — CONSULTS
Hematology/Oncology Consult    Patient: Agustín Willis MRN: [id number]     YOB: 1945  Age: 79 y.o.  Sex: male      HPI      Agustín Willis is a 79 y.o. male who is being seen for thrombocytopenia.    Patient readmitted to the hospital 3 days ago after being discharged to a nursing home with hospice care.  Patient was at the nursing home for a few hours when wife changed her mind and wanted patient to be a full code and sent back to this hospital for further evaluation and treatment.  Patient reportedly to have a PEG tube placed today.  Patient does have a history of cocaine abuse, cardiac arrest, CVA, A-fib, end-stage renal disease on hemodialysis. Nephrology stated in their most recent note patient is no longer a candidate for short or long-term dialysis; and they are attempting to contact the family but they have not answered the phone.    Seen in the ICU, non verbal.    Past Medical History:   Diagnosis Date    Benign prostatic disease 8/3/2020    Hypertrophy with Outflow Obstruction    Benign prostatic hyperplasia 8/3/2020    Cervical radiculopathy 8/3/2020    Chronic obstructive lung disease (HCC) 8/3/2020    Cocaine abuse (HCC) 8/3/2020    Dizziness and giddiness 8/3/2020    Hypercholesterolemia 8/3/2020    Hypertensive disorder 8/3/2020    Insomnia 8/3/2020    Kidney disease 8/3/2020    Low back pain 8/3/2020    Osteoarthritis of knee 8/3/2020    Sleep apnea 8/3/2020    Vasovagal syncope 8/3/2020     Past Surgical History:   Procedure Laterality Date    IR NONTUNNELED VASCULAR CATHETER  5/30/2024    IR NONTUNNELED VASCULAR CATHETER 5/30/2024 Stephy Abreu, APRN - NP [facility] RAD ANGIO IR    IR NONTUNNELED VASCULAR CATHETER  6/27/2024    IR NONTUNNELED VASCULAR CATHETER 6/27/2024 Marc Sears Jr., APRN - NP SSR RAD ANGIO IR      Family History   Problem Relation Age of Onset    Hypertension Mother      Social History     Tobacco Use    Smoking status: Former    Smokeless tobacco: Never    MG 2.3 2.3 2.1   PHOS  --  5.3* 4.5   ALT 17 16 15     No results for input(s): \"PH\", \"PCO2\", \"PO2\", \"HCO3\", \"FIO2\" in the last 72 hours.  Recent Results (from the past 24 hour(s))   Comprehensive Metabolic Panel    Collection Time: 06/28/24  3:43 AM   Result Value Ref Range    Sodium 142 136 - 145 mmol/L    Potassium 4.2 3.5 - 5.1 mmol/L    Chloride 107 97 - 108 mmol/L    CO2 24 21 - 32 mmol/L    Anion Gap 11 5 - 15 mmol/L    Glucose 134 (H) 65 - 100 mg/dL     (H) 6 - 20 mg/dL    Creatinine 3.33 (H) 0.70 - 1.30 mg/dL    BUN/Creatinine Ratio 31 (H) 12 - 20      Est, Glom Filt Rate 18 (L) >60 ml/min/1.73m2    Calcium 8.0 (L) 8.5 - 10.1 mg/dL    Total Bilirubin 0.7 0.2 - 1.0 mg/dL    AST 17 15 - 37 U/L    ALT 15 12 - 78 U/L    Alk Phosphatase 111 45 - 117 U/L    Total Protein 5.7 (L) 6.4 - 8.2 g/dL    Albumin 1.6 (L) 3.5 - 5.0 g/dL    Globulin 4.1 (H) 2.0 - 4.0 g/dL    Albumin/Globulin Ratio 0.4 (L) 1.1 - 2.2     Procalcitonin    Collection Time: 06/28/24  3:43 AM   Result Value Ref Range    Procalcitonin 1.10 (H) 0 ng/mL   Magnesium    Collection Time: 06/28/24  3:43 AM   Result Value Ref Range    Magnesium 2.1 1.6 - 2.4 mg/dL   CBC with Auto Differential    Collection Time: 06/28/24  3:43 AM   Result Value Ref Range    WBC 10.5 4.1 - 11.1 K/uL    RBC 3.20 (L) 4.10 - 5.70 M/uL    Hemoglobin 9.5 (L) 12.1 - 17.0 g/dL    Hematocrit 29.4 (L) 36.6 - 50.3 %    MCV 91.9 80.0 - 99.0 FL    MCH 29.7 26.0 - 34.0 PG    MCHC 32.3 30.0 - 36.5 g/dL    RDW 17.5 (H) 11.5 - 14.5 %    Platelets 48 (LL) 150 - 400 K/uL    MPV 11.6 8.9 - 12.9 FL    Nucleated RBCs 0.0 0.0  WBC    nRBC 0.00 0.00 - 0.01 K/uL    Neutrophils % 91 (H) 32 - 75 %    Lymphocytes % 5 (L) 12 - 49 %    Monocytes % 3 (L) 5 - 13 %    Eosinophils % 0 0 - 7 %    Basophils % 0 0 - 1 %    Immature Granulocytes % 1 (H) 0 - 0.5 %    Neutrophils Absolute 9.6 (H) 1.8 - 8.0 K/UL    Lymphocytes Absolute 0.5 (L) 0.8 - 3.5 K/UL    Monocytes Absolute 0.3 0.0 - 1.0

## 2024-06-28 NOTE — PROGRESS NOTES
Bilirubin 0.7 0.2 - 1.0 mg/dL    AST 17 15 - 37 U/L    ALT 15 12 - 78 U/L    Alk Phosphatase 111 45 - 117 U/L    Total Protein 5.7 (L) 6.4 - 8.2 g/dL    Albumin 1.6 (L) 3.5 - 5.0 g/dL    Globulin 4.1 (H) 2.0 - 4.0 g/dL    Albumin/Globulin Ratio 0.4 (L) 1.1 - 2.2     Procalcitonin    Collection Time: 06/28/24  3:43 AM   Result Value Ref Range    Procalcitonin 1.10 (H) 0 ng/mL   Magnesium    Collection Time: 06/28/24  3:43 AM   Result Value Ref Range    Magnesium 2.1 1.6 - 2.4 mg/dL   CBC with Auto Differential    Collection Time: 06/28/24  3:43 AM   Result Value Ref Range    WBC 10.5 4.1 - 11.1 K/uL    RBC 3.20 (L) 4.10 - 5.70 M/uL    Hemoglobin 9.5 (L) 12.1 - 17.0 g/dL    Hematocrit 29.4 (L) 36.6 - 50.3 %    MCV 91.9 80.0 - 99.0 FL    MCH 29.7 26.0 - 34.0 PG    MCHC 32.3 30.0 - 36.5 g/dL    RDW 17.5 (H) 11.5 - 14.5 %    Platelets 48 (LL) 150 - 400 K/uL    MPV 11.6 8.9 - 12.9 FL    Nucleated RBCs 0.0 0.0  WBC    nRBC 0.00 0.00 - 0.01 K/uL    Neutrophils % 91 (H) 32 - 75 %    Lymphocytes % 5 (L) 12 - 49 %    Monocytes % 3 (L) 5 - 13 %    Eosinophils % 0 0 - 7 %    Basophils % 0 0 - 1 %    Immature Granulocytes % 1 (H) 0 - 0.5 %    Neutrophils Absolute 9.6 (H) 1.8 - 8.0 K/UL    Lymphocytes Absolute 0.5 (L) 0.8 - 3.5 K/UL    Monocytes Absolute 0.3 0.0 - 1.0 K/UL    Eosinophils Absolute 0.0 0.0 - 0.4 K/UL    Basophils Absolute 0.0 0.0 - 0.1 K/UL    Immature Granulocytes Absolute 0.1 (H) 0.00 - 0.04 K/UL    Differential Type Smear Scanned      RBC Comment [place] cells  1+       Phosphorus    Collection Time: 06/28/24  3:43 AM   Result Value Ref Range    Phosphorus 4.5 2.6 - 4.7 mg/dL   Blood Gas, Arterial    Collection Time: 06/28/24  4:23 AM   Result Value Ref Range    pH, Arterial 7.47 (H) 7.35 - 7.45      pCO2, Arterial 32 (L) 35 - 45 mmHg    pO2, Arterial 58 (L) 80 - 100 mmHg    O2 Sat, Arterial 91 (L) 95 - 99 %    HCO3, Arterial 23 22 - 26 mmol/L    Base deficit, arterial blood 1.0 mmol/L    O2 Method Nasal  metabolic encephalopathy  End-stage renal disease  CVA patient unresponsive  History of cardiac arrest  History of sepsis  History of respiratory failure was intubated due to aspiration pneumonia  Past medical history of cocaine abuse  Hypertension  Per lipidemia  Chronic A-fib  Thrombocytopenia      Plan:     Continue NG tube feeding  Continue amiodarone drip  Lipitor 40 daily  Proscar 5 mg daily  Start on Eliquis 2.5 twice daily  DuoNeb nebulizer twice daily  Meropenem 500 every 24 hours  Thiamine 100 mg daily    For PEG tube placement    Hematology consult for thrombocytopenia    Repeat the labs in the morning    Overall prognosis very poor        Current Facility-Administered Medications:     apixaban (ELIQUIS) tablet 2.5 mg, 2.5 mg, Per NG tube, BID, Mikaela Arredondo MD, 2.5 mg at 06/27/24 0913    glycopyrrolate (ROBINUL) injection 0.1 mg, 0.1 mg, IntraVENous, Q8H, Hamilton Miranda MD, 0.1 mg at 06/28/24 0021    amiodarone (CORDARONE) tablet 200 mg, 200 mg, Per NG tube, Daily, Darrell Calixto APRN - EYAD, 200 mg at 06/27/24 1704    norepinephrine (LEVOPHED) 16 mg in sodium chloride 0.9 % 250 mL infusion, 1-100 mcg/min, IntraVENous, Continuous, Mikaela Arredondo MD, Stopped at 06/27/24 0246    budesonide (PULMICORT) nebulizer suspension 500 mcg, 0.5 mg, Nebulization, BID RT, Hamilton Miranda MD, 500 mcg at 06/28/24 0809    ipratropium 0.5 mg-albuterol 2.5 mg (DUONEB) nebulizer solution 1 Dose, 1 Dose, Inhalation, BID RT, Hamilton Miranda MD, 1 Dose at 06/28/24 0816    heparin (porcine) injection 2,500 Units, 2,500 Units, IntraCATHeter, PRN, Sol Ferraro MD, 2,500 Units at 06/26/24 1422    meropenem (MERREM) 500 mg in sodium chloride 0.9 % 100 mL IVPB (mini-bag), 500 mg, IntraVENous, Q24H, Mikaela Arredondo MD, Stopped at 06/27/24 1220    finasteride (PROSCAR) tablet 5 mg, 5 mg, Oral, Daily, Mikaela Arredondo MD, 5 mg at 06/27/24 0913    folic acid (FOLVITE) tablet 1 mg, 1 mg, Oral, Daily, Mikaela Arredondo,

## 2024-06-29 ENCOUNTER — APPOINTMENT (OUTPATIENT)
Facility: HOSPITAL | Age: 79
DRG: 870 | End: 2024-06-29
Payer: MEDICARE

## 2024-06-29 LAB
ALBUMIN SERPL-MCNC: 1.6 G/DL (ref 3.5–5)
ANION GAP SERPL CALC-SCNC: 6 MMOL/L (ref 5–15)
APTT PPP: 30.5 SEC (ref 21.2–34.1)
BACTERIA SPEC CULT: NORMAL
BASOPHILS # BLD: 0 K/UL (ref 0–0.1)
BASOPHILS NFR BLD: 0 % (ref 0–1)
BUN SERPL-MCNC: 107 MG/DL (ref 6–20)
BUN/CREAT SERPL: 33 (ref 12–20)
CA-I BLD-MCNC: 8.1 MG/DL (ref 8.5–10.1)
CHLORIDE SERPL-SCNC: 110 MMOL/L (ref 97–108)
CO2 SERPL-SCNC: 25 MMOL/L (ref 21–32)
CREAT SERPL-MCNC: 3.2 MG/DL (ref 0.7–1.3)
DIFFERENTIAL METHOD BLD: ABNORMAL
EKG ATRIAL RATE: 107 BPM
EKG DIAGNOSIS: NORMAL
EKG Q-T INTERVAL: 320 MS
EKG QRS DURATION: 98 MS
EKG QTC CALCULATION (BAZETT): 476 MS
EKG R AXIS: -38 DEGREES
EKG T AXIS: 148 DEGREES
EKG VENTRICULAR RATE: 133 BPM
EOSINOPHIL # BLD: 0.1 K/UL (ref 0–0.4)
EOSINOPHIL NFR BLD: 1 % (ref 0–7)
ERYTHROCYTE [DISTWIDTH] IN BLOOD BY AUTOMATED COUNT: 17.4 % (ref 11.5–14.5)
GLUCOSE SERPL-MCNC: 99 MG/DL (ref 65–100)
HCT VFR BLD AUTO: 29 % (ref 36.6–50.3)
HGB BLD-MCNC: 9.5 G/DL (ref 12.1–17)
IMM GRANULOCYTES # BLD AUTO: 0.1 K/UL (ref 0–0.04)
IMM GRANULOCYTES NFR BLD AUTO: 1 % (ref 0–0.5)
INR PPP: 1.2 (ref 0.9–1.1)
LYMPHOCYTES # BLD: 0.7 K/UL (ref 0.8–3.5)
LYMPHOCYTES NFR BLD: 7 % (ref 12–49)
Lab: NORMAL
MCH RBC QN AUTO: 29.8 PG (ref 26–34)
MCHC RBC AUTO-ENTMCNC: 32.8 G/DL (ref 30–36.5)
MCV RBC AUTO: 90.9 FL (ref 80–99)
MONOCYTES # BLD: 0.3 K/UL (ref 0–1)
MONOCYTES NFR BLD: 3 % (ref 5–13)
NEUTS SEG # BLD: 8.3 K/UL (ref 1.8–8)
NEUTS SEG NFR BLD: 88 % (ref 32–75)
NRBC # BLD: 0 K/UL (ref 0–0.01)
NRBC BLD-RTO: 0 PER 100 WBC
PHOSPHATE SERPL-MCNC: 5.3 MG/DL (ref 2.6–4.7)
PLATELET # BLD AUTO: 44 K/UL (ref 150–400)
PMV BLD AUTO: 11.9 FL (ref 8.9–12.9)
POTASSIUM SERPL-SCNC: 4.6 MMOL/L (ref 3.5–5.1)
PROCALCITONIN SERPL-MCNC: 0.66 NG/ML
PROTHROMBIN TIME: 15.5 SEC (ref 11.9–14.6)
RBC # BLD AUTO: 3.19 M/UL (ref 4.1–5.7)
RBC MORPH BLD: ABNORMAL
SODIUM SERPL-SCNC: 141 MMOL/L (ref 136–145)
THERAPEUTIC RANGE: NORMAL SEC (ref 82–109)
WBC # BLD AUTO: 9.5 K/UL (ref 4.1–11.1)

## 2024-06-29 PROCEDURE — 6370000000 HC RX 637 (ALT 250 FOR IP): Performed by: FAMILY MEDICINE

## 2024-06-29 PROCEDURE — 6360000002 HC RX W HCPCS: Performed by: INTERNAL MEDICINE

## 2024-06-29 PROCEDURE — 2000000000 HC ICU R&B

## 2024-06-29 PROCEDURE — 6360000002 HC RX W HCPCS: Performed by: FAMILY MEDICINE

## 2024-06-29 PROCEDURE — 2580000003 HC RX 258: Performed by: FAMILY MEDICINE

## 2024-06-29 PROCEDURE — 2700000000 HC OXYGEN THERAPY PER DAY

## 2024-06-29 PROCEDURE — 6370000000 HC RX 637 (ALT 250 FOR IP)

## 2024-06-29 PROCEDURE — 2580000003 HC RX 258: Performed by: INTERNAL MEDICINE

## 2024-06-29 PROCEDURE — 71045 X-RAY EXAM CHEST 1 VIEW: CPT

## 2024-06-29 PROCEDURE — 85610 PROTHROMBIN TIME: CPT

## 2024-06-29 PROCEDURE — 80069 RENAL FUNCTION PANEL: CPT

## 2024-06-29 PROCEDURE — 6370000000 HC RX 637 (ALT 250 FOR IP): Performed by: INTERNAL MEDICINE

## 2024-06-29 PROCEDURE — 94640 AIRWAY INHALATION TREATMENT: CPT

## 2024-06-29 PROCEDURE — 94761 N-INVAS EAR/PLS OXIMETRY MLT: CPT

## 2024-06-29 PROCEDURE — 84145 PROCALCITONIN (PCT): CPT

## 2024-06-29 PROCEDURE — 36415 COLL VENOUS BLD VENIPUNCTURE: CPT

## 2024-06-29 PROCEDURE — 85025 COMPLETE CBC W/AUTO DIFF WBC: CPT

## 2024-06-29 PROCEDURE — 85730 THROMBOPLASTIN TIME PARTIAL: CPT

## 2024-06-29 RX ORDER — 0.9 % SODIUM CHLORIDE 0.9 %
250 INTRAVENOUS SOLUTION INTRAVENOUS ONCE
Status: COMPLETED | OUTPATIENT
Start: 2024-06-29 | End: 2024-06-29

## 2024-06-29 RX ORDER — GLYCOPYRROLATE 1 MG/1
1 TABLET ORAL EVERY 8 HOURS
Status: DISCONTINUED | OUTPATIENT
Start: 2024-06-29 | End: 2024-06-30

## 2024-06-29 RX ADMIN — THIAMINE HCL TAB 100 MG 100 MG: 100 TAB at 08:29

## 2024-06-29 RX ADMIN — BUDESONIDE INHALATION 500 MCG: 0.5 SUSPENSION RESPIRATORY (INHALATION) at 08:54

## 2024-06-29 RX ADMIN — ATORVASTATIN CALCIUM 40 MG: 40 TABLET, FILM COATED ORAL at 08:30

## 2024-06-29 RX ADMIN — IPRATROPIUM BROMIDE AND ALBUTEROL SULFATE 1 DOSE: 2.5; .5 SOLUTION RESPIRATORY (INHALATION) at 08:54

## 2024-06-29 RX ADMIN — MEROPENEM 500 MG: 500 INJECTION, POWDER, FOR SOLUTION INTRAVENOUS at 08:30

## 2024-06-29 RX ADMIN — AMIODARONE HYDROCHLORIDE 200 MG: 200 TABLET ORAL at 08:29

## 2024-06-29 RX ADMIN — BUDESONIDE INHALATION 500 MCG: 0.5 SUSPENSION RESPIRATORY (INHALATION) at 20:48

## 2024-06-29 RX ADMIN — SODIUM CHLORIDE 250 ML: 9 INJECTION, SOLUTION INTRAVENOUS at 14:26

## 2024-06-29 RX ADMIN — FOLIC ACID 1 MG: 1 TABLET ORAL at 08:30

## 2024-06-29 RX ADMIN — GLYCOPYRROLATE 0.1 MG: 0.2 INJECTION INTRAMUSCULAR; INTRAVENOUS at 00:37

## 2024-06-29 RX ADMIN — GLYCOPYRROLATE 0.1 MG: 0.2 INJECTION INTRAMUSCULAR; INTRAVENOUS at 08:30

## 2024-06-29 RX ADMIN — GLYCOPYRROLATE 1 MG: 1 TABLET ORAL at 18:06

## 2024-06-29 RX ADMIN — IPRATROPIUM BROMIDE AND ALBUTEROL SULFATE 1 DOSE: 2.5; .5 SOLUTION RESPIRATORY (INHALATION) at 20:48

## 2024-06-29 RX ADMIN — SODIUM CHLORIDE, PRESERVATIVE FREE 10 ML: 5 INJECTION INTRAVENOUS at 21:13

## 2024-06-29 RX ADMIN — PANTOPRAZOLE SODIUM 40 MG: 40 TABLET, DELAYED RELEASE ORAL at 05:42

## 2024-06-29 RX ADMIN — FINASTERIDE 5 MG: 5 TABLET, FILM COATED ORAL at 08:30

## 2024-06-29 RX ADMIN — SODIUM CHLORIDE, PRESERVATIVE FREE 10 ML: 5 INJECTION INTRAVENOUS at 08:31

## 2024-06-29 ASSESSMENT — PAIN SCALES - GENERAL
PAINLEVEL_OUTOF10: 0

## 2024-06-29 NOTE — PROGRESS NOTES
Hematology/Oncology   Progress Note    Patient: Agustín Willis MRN: [id number]     YOB: 1945  Age: 79 y.o.  Sex: male      Admit Date: 6/25/2024    LOS: 4 days     Chief Complaint: Admitted with altered mental status    Subjective:   Eyes open, but non verbal    ROS: could not be obtained due to mental status       Current Facility-Administered Medications:     glycopyrrolate (ROBINUL) tablet 1 mg, 1 mg, PEG Tube, Q8H, Hamilton Miranda MD    [Held by provider] apixaban (ELIQUIS) tablet 2.5 mg, 2.5 mg, Per NG tube, BID, Mikaela Arredondo MD, 2.5 mg at 06/27/24 0913    amiodarone (CORDARONE) tablet 200 mg, 200 mg, Per NG tube, Daily, Darrell Calixto APRN - NP, 200 mg at 06/29/24 0829    norepinephrine (LEVOPHED) 16 mg in sodium chloride 0.9 % 250 mL infusion, 1-100 mcg/min, IntraVENous, Continuous, Mikaela Arredondo MD, Stopped at 06/27/24 0246    budesonide (PULMICORT) nebulizer suspension 500 mcg, 0.5 mg, Nebulization, BID RT, Hamilton Miranda MD, 500 mcg at 06/29/24 0854    ipratropium 0.5 mg-albuterol 2.5 mg (DUONEB) nebulizer solution 1 Dose, 1 Dose, Inhalation, BID RT, Hamilton Miranda MD, 1 Dose at 06/29/24 0854    heparin (porcine) injection 2,500 Units, 2,500 Units, IntraCATHeter, PRN, Sol Ferraro MD, 2,500 Units at 06/26/24 1422    meropenem (MERREM) 500 mg in sodium chloride 0.9 % 100 mL IVPB (mini-bag), 500 mg, IntraVENous, Q24H, Mikaela Arredondo MD, Last Rate: 33.3 mL/hr at 06/29/24 0830, 500 mg at 06/29/24 0830    finasteride (PROSCAR) tablet 5 mg, 5 mg, Oral, Daily, Mikaela Arredondo MD, 5 mg at 06/29/24 0830    folic acid (FOLVITE) tablet 1 mg, 1 mg, Oral, Daily, Mikaela Arredondo MD, 1 mg at 06/29/24 0830    pantoprazole (PROTONIX) tablet 40 mg, 40 mg, Oral, [facility], Mikaela Arredondo MD, 40 mg at 06/29/24 0542    atorvastatin (LIPITOR) tablet 40 mg, 40 mg, Oral, Daily, Mikaela Arredondo MD, 40 mg at 06/29/24 0830    thiamine mononitrate tablet 100 mg, 100 mg, Oral, Daily,

## 2024-06-29 NOTE — PLAN OF CARE
Problem: Discharge Planning  Goal: Discharge to home or other facility with appropriate resources  Outcome: Not Progressing  Flowsheets (Taken 6/28/2024 2000)  Discharge to home or other facility with appropriate resources: Identify barriers to discharge with patient and caregiver     Problem: Safety - Adult  Goal: Free from fall injury  Outcome: Not Progressing     Problem: Chronic Conditions and Co-morbidities  Goal: Patient's chronic conditions and co-morbidity symptoms are monitored and maintained or improved  Outcome: Not Progressing  Flowsheets (Taken 6/28/2024 2000)  Care Plan - Patient's Chronic Conditions and Co-Morbidity Symptoms are Monitored and Maintained or Improved: Monitor and assess patient's chronic conditions and comorbid symptoms for stability, deterioration, or improvement     Problem: Neurosensory - Adult  Goal: Achieves stable or improved neurological status  Outcome: Not Progressing  Flowsheets (Taken 6/28/2024 2000)  Achieves stable or improved neurological status: Assess for and report changes in neurological status  Goal: Absence of seizures  Outcome: Not Progressing  Flowsheets (Taken 6/28/2024 2000)  Absence of seizures: Monitor for seizure activity.  If seizure occurs, document type and location of movements and any associated apnea  Goal: Remains free of injury related to seizures activity  Outcome: Not Progressing  Flowsheets (Taken 6/28/2024 2000)  Remains free of injury related to seizure activity: Maintain airway, patient safety  and administer oxygen as ordered  Goal: Achieves maximal functionality and self care  Outcome: Not Progressing  Flowsheets (Taken 6/28/2024 2000)  Achieves maximal functionality and self care: Monitor swallowing and airway patency with patient fatigue and changes in neurological status     Problem: Respiratory - Adult  Goal: Achieves optimal ventilation and oxygenation  Outcome: Not Progressing  Flowsheets (Taken 6/28/2024 2000)  Achieves optimal  6/28/2024 2000)  Absence of fever/infection during anticipated neutropenic period: Monitor white blood cell count     Problem: Metabolic/Fluid and Electrolytes - Adult  Goal: Electrolytes maintained within normal limits  Outcome: Not Progressing  Flowsheets (Taken 6/28/2024 2000)  Electrolytes maintained within normal limits:   Monitor labs and assess patient for signs and symptoms of electrolyte imbalances   Administer electrolyte replacement as ordered  Goal: Hemodynamic stability and optimal renal function maintained  Outcome: Not Progressing  Flowsheets (Taken 6/28/2024 2000)  Hemodynamic stability and optimal renal function maintained:   Monitor labs and assess for signs and symptoms of volume excess or deficit   Monitor intake, output and patient weight  Goal: Glucose maintained within prescribed range  Outcome: Not Progressing  Flowsheets (Taken 6/28/2024 2000)  Glucose maintained within prescribed range: Monitor blood glucose as ordered     Problem: Hematologic - Adult  Goal: Maintains hematologic stability  Outcome: Not Progressing  Flowsheets (Taken 6/28/2024 2000)  Maintains hematologic stability: Assess for signs and symptoms of bleeding or hemorrhage     Problem: Pain  Goal: Verbalizes/displays adequate comfort level or baseline comfort level  Outcome: Not Progressing  Flowsheets (Taken 6/28/2024 2000)  Verbalizes/displays adequate comfort level or baseline comfort level: Assess pain using appropriate pain scale     Problem: Spiritual Care  Goal: Pt/Family able to move forward in process of forgiving self, others, and/or higher power  Description: INTERVENTIONS:  1. Assist patient/family to overcome blocks to healing by use of spiritual practices (prayer, meditation, guided imagery, reiki, breath work, etc).  2. De-myth guilt and help patient/family identify possible irrational spiritual/cultural beliefs and values.  3. Explore possibilities of making amends & reconciliation with self, others, and/or a

## 2024-06-29 NOTE — PROGRESS NOTES
CARDIOLOGY PROGRESS NOTE      Patient Name: Agustín Willis  Age: 79 y.o.  Gender:male  :1945  MRN: [id number]    Patient seen and examined. This is a patient with a history of hypertension, COPD, cocaine abuse, CKD who presented from nursing home due to not having a PEG tube and no way to receive medications now being followed for Afib. Remains in ICU.  Opens eyes but doesn't answer questions. No other complaints reported.    Telemetry reviewed, -120s.    Unable to obtain ROS. Current medications reviewed.    Physical Examination    Allergies   Allergen Reactions    Penicillin G Other (See Comments)     Pt states he passes out    Sulfamethoxazole-Trimethoprim      Pt states he passes out     Vitals:    24 0910   BP:    Pulse: 79   Resp: 17   Temp:    SpO2: 98%     Vital signs are stable  Ill appearing  Heart is irregular,  + murmur  Lungs are diminished   Abdomen is soft, nontender, normal bowel sounds.  Extremities have no edema  Skin is dry and warm.  Unable to assess affect    Labs reviewed:  Recent Results (from the past 12 hour(s))   Renal Function Panel    Collection Time: 24  3:00 AM   Result Value Ref Range    Sodium 141 136 - 145 mmol/L    Potassium 4.6 3.5 - 5.1 mmol/L    Chloride 110 (H) 97 - 108 mmol/L    CO2 25 21 - 32 mmol/L    Anion Gap 6 5 - 15 mmol/L    Glucose 99 65 - 100 mg/dL     (H) 6 - 20 mg/dL    Creatinine 3.20 (H) 0.70 - 1.30 mg/dL    BUN/Creatinine Ratio 33 (H) 12 - 20      Est, Glom Filt Rate 19 (L) >60 ml/min/1.73m2    Calcium 8.1 (L) 8.5 - 10.1 mg/dL    Phosphorus 5.3 (H) 2.6 - 4.7 mg/dL    Albumin 1.6 (L) 3.5 - 5.0 g/dL   CBC with Auto Differential    Collection Time: 24  3:00 AM   Result Value Ref Range    WBC 9.5 4.1 - 11.1 K/uL    RBC 3.19 (L) 4.10 - 5.70 M/uL    Hemoglobin 9.5 (L) 12.1 - 17.0 g/dL    Hematocrit 29.0 (L) 36.6 - 50.3 %    MCV 90.9 80.0 - 99.0 FL    MCH 29.8 26.0 - 34.0 PG    MCHC 32.8 30.0 - 36.5 g/dL    RDW 17.4 (H) 11.5 - 14.5

## 2024-06-29 NOTE — PROGRESS NOTES
72 hours.    Invalid input(s): \"TOTALCK\", \"TROPININ\"  Lab Results   Component Value Date/Time    CHOL 184 02/05/2024 01:57 PM    TRIG 108 02/05/2024 01:57 PM    HDL 52 02/05/2024 01:57 PM    VLDL 21.6 02/05/2024 01:57 PM    VLDL 25 06/18/2021 12:40 PM    CHOLHDLRATIO 3.5 02/05/2024 01:57 PM     Lab Results   Component Value Date/Time    POCGLU 117 06/20/2024 01:23 AM    POCGLU 113 06/19/2024 11:03 AM    POCGLU 95 06/18/2024 05:46 AM    POCGLU 116 06/18/2024 01:17 AM    POCGLU 117 06/17/2024 05:35 PM     Lab Results   Component Value Date/Time    COLORU VERONICA 06/26/2024 01:26 AM    CLARITYU Turbid 04/05/2023 10:28 PM    GLUCOSEU Negative 06/26/2024 01:26 AM    GLUCOSEU Negative 03/03/2024 02:12 PM    BILIRUBINUR Negative 06/26/2024 01:26 AM    KETUA Negative 06/26/2024 01:26 AM    BLOODU Large 06/26/2024 01:26 AM    PHUR 5.0 06/26/2024 01:26 AM    PHUR 5.0 04/05/2023 10:28 PM    PROTEINU 30 06/26/2024 01:26 AM    NITRU Negative 06/26/2024 01:26 AM    LEUKOCYTESUR Negative 06/26/2024 01:26 AM         Assessment:       Failure to thrive  Acute hypoxic respiratory failure  Acute metabolic encephalopathy  End-stage renal disease  CVA patient unresponsive  History of cardiac arrest  History of sepsis  History of respiratory failure was intubated due to aspiration pneumonia  Past medical history of cocaine abuse  Hypertension  Per lipidemia  Chronic A-fib  Thrombocytopenia      Plan:     Continue NG tube feeding  Continue amiodarone drip  Lipitor 40 daily  Proscar 5 mg daily  Start on Eliquis 2.5 twice daily  DuoNeb nebulizer twice daily  Meropenem 500 every 24 hours  Thiamine 100 mg daily    For PEG tube placement    Hematology consult for thrombocytopenia    Repeat the labs in the morning    Overall prognosis very poor        Current Facility-Administered Medications:     glycopyrrolate (ROBINUL) tablet 1 mg, 1 mg, PEG Tube, Q8H, Hamilton Miranda MD, 1 mg at 06/29/24 5780    [Held by provider] apixaban (ELIQUIS) tablet  2.5 mg, 2.5 mg, Per NG tube, BID, Mikaela Arredondo MD, 2.5 mg at 06/27/24 0913    amiodarone (CORDARONE) tablet 200 mg, 200 mg, Per NG tube, Daily, Darrell Calixto APRN - NP, 200 mg at 06/29/24 0829    norepinephrine (LEVOPHED) 16 mg in sodium chloride 0.9 % 250 mL infusion, 1-100 mcg/min, IntraVENous, Continuous, Mikaela Arredondo MD, Stopped at 06/27/24 0246    budesonide (PULMICORT) nebulizer suspension 500 mcg, 0.5 mg, Nebulization, BID RT, Hamilton Miranda MD, 500 mcg at 06/29/24 0854    ipratropium 0.5 mg-albuterol 2.5 mg (DUONEB) nebulizer solution 1 Dose, 1 Dose, Inhalation, BID RT, Hamilton Miranda MD, 1 Dose at 06/29/24 0854    heparin (porcine) injection 2,500 Units, 2,500 Units, IntraCATHeter, PRN, Sol Ferraro MD, 2,500 Units at 06/26/24 1422    meropenem (MERREM) 500 mg in sodium chloride 0.9 % 100 mL IVPB (mini-bag), 500 mg, IntraVENous, Q24H, Mikaela Arredondo MD, Stopped at 06/29/24 1130    finasteride (PROSCAR) tablet 5 mg, 5 mg, Oral, Daily, Mikaela Arredondo MD, 5 mg at 06/29/24 0830    folic acid (FOLVITE) tablet 1 mg, 1 mg, Oral, Daily, Mikaela Arredondo MD, 1 mg at 06/29/24 0830    pantoprazole (PROTONIX) tablet 40 mg, 40 mg, Oral, QAM AC, Mikaela Arredondo MD, 40 mg at 06/29/24 0542    atorvastatin (LIPITOR) tablet 40 mg, 40 mg, Oral, Daily, Mikaela Arredondo MD, 40 mg at 06/29/24 0830    thiamine mononitrate tablet 100 mg, 100 mg, Oral, Daily, Mikaela Arredondo MD, 100 mg at 06/29/24 0829    sodium chloride flush 0.9 % injection 5-40 mL, 5-40 mL, IntraVENous, 2 times per day, Mikaela Arredondo MD, 10 mL at 06/29/24 0831    sodium chloride flush 0.9 % injection 5-40 mL, 5-40 mL, IntraVENous, PRN, Mikaela Arredondo MD    0.9 % sodium chloride infusion, , IntraVENous, PRN, Mikaela Arredondo MD, Last Rate: 10 mL/hr at 06/29/24 0831, Restarted at 06/29/24 0831    ondansetron (ZOFRAN-ODT) disintegrating tablet 4 mg, 4 mg, Oral, Q8H PRN **OR** ondansetron (ZOFRAN) injection 4 mg, 4 mg,

## 2024-06-29 NOTE — PROGRESS NOTES
Nephrology f/u          Patient: Agustín Willis MRN: [id number]  SSN: [id number]    YOB: 1945  Age: 79 y.o.  Sex: male      Subjective:   The patient was transferred back to intensive care unit.  He was made comfort care and now wife had made him full code again.  He remains unresponsive.  He was last dialyzed on 6/20.    Tried to dialyze him 6/26 but he developed severe intradialytic hypotension and could not continue the dialysis support.  The patient is not a candidate for short or long-term dialysis.  Dialysis did not and is not going to change his outcome and prognosis.    I have tried to call his wife x 2 but no reply.    From renal standpoint unfortunately I will not be able to offer the dialysis support.  I will try to reach out to his wife again.      Past Medical History:   Diagnosis Date    Benign prostatic disease 8/3/2020    Hypertrophy with Outflow Obstruction    Benign prostatic hyperplasia 8/3/2020    Cervical radiculopathy 8/3/2020    Chronic obstructive lung disease (HCC) 8/3/2020    Cocaine abuse (HCC) 8/3/2020    Dizziness and giddiness 8/3/2020    Hypercholesterolemia 8/3/2020    Hypertensive disorder 8/3/2020    Insomnia 8/3/2020    Kidney disease 8/3/2020    Low back pain 8/3/2020    Osteoarthritis of knee 8/3/2020    Sleep apnea 8/3/2020    Vasovagal syncope 8/3/2020     Past Surgical History:   Procedure Laterality Date    IR NONTUNNELED VASCULAR CATHETER  5/30/2024    IR NONTUNNELED VASCULAR CATHETER 5/30/2024 Stephy Abreu, APRN - NP [facility] RAD ANGIO IR    IR NONTUNNELED VASCULAR CATHETER  6/27/2024    IR NONTUNNELED VASCULAR CATHETER 6/27/2024 Marc Sears Jr., APRN - NP SSR RAD ANGIO IR      Family History   Problem Relation Age of Onset    Hypertension Mother      Social History     Tobacco Use    Smoking status: Former    Smokeless tobacco: Never   Substance Use Topics    Alcohol use: Yes      Current Facility-Administered Medications   Medication Dose Route  unresponsiveness and no pulse  Required CPR  Re intubated/extubated  Remains unresponsive/possible anoxic brain encephalopathy.    Status post cardiac arrest  V-fib  Status post DC shock cardioversion    Acute hypoxic respiratory failure  Aspiration pneumonia  Underlying COPD  Received IV antibiotics    Anemia   hemoglobin 7.9  Iron saturation 12 and ferritin 1100       Plan:       Signed By: Sol Ferraro MD     June 29, 2024

## 2024-06-29 NOTE — CONSULTS
Consult  Pulmonary, Critical Care    Name: Agustín Willis MRN: [id number]   : 1945 Hospital: [facility]   Date: 2024  Admission date: 2024 Hospital Day: 5       Subjective/Interval History:   Patient was in the hospital converted to comfort care discharged to the nursing home yesterday wife rescinded DNR status and comfort care and patient sent back to the hospital.  He had a history of cardiac arrest acute CVA chronic atrial fibrillation end-stage renal disease on dialysis sepsis aspiration pneumonia COPD chronic hypoxia on 2 L.  On  he was made comfort care and dialysis was discontinued respiratory treatments and antibiotics were discontinued.  On admission he was placed on the medical floor found to have A-fib with RVR and transferred to the ICU is now on amiodarone heart rate still elevated but slightly improved control   remains unresponsive only tolerated 1 hour of dialysis yesterday due to development of hypotension.  Remains on amiodarone has converted to sinus rhythm   no change  eyes open otherwise unresponsive respirations nonlabored   eyes are open does not look at voice or follow any command  Patient Active Problem List   Diagnosis    Weakness of both legs    Insomnia    Shoulder pain    Low back pain    Vasovagal syncope    Tinnitus of right ear    Bronchitis    Alcohol abuse    Malignant hypertensive heart disease without heart failure    Pain in left knee    Dizziness and giddiness    Osteoarthritis of knee    Diarrhea    Hypercholesterolemia    Ankle pain    Pain in breast    Prediabetes    Sleep apnea    Seasonal allergic rhinitis    Chronic obstructive lung disease (HCC)    Cocaine abuse (HCC)    Kidney disease    Benign prostatic disease    Benign prostatic hyperplasia    Weakness of both arms    Lumbar herniated disc    Cervical radiculopathy    Hypertensive disorder    Thrombocytopenia (HCC)    Macrocytic anemia    Chronic renal disease, stage III

## 2024-06-30 ENCOUNTER — APPOINTMENT (OUTPATIENT)
Facility: HOSPITAL | Age: 79
DRG: 870 | End: 2024-06-30
Payer: MEDICARE

## 2024-06-30 LAB
ANION GAP SERPL CALC-SCNC: 5 MMOL/L (ref 5–15)
BASOPHILS # BLD: 0 K/UL (ref 0–0.1)
BASOPHILS NFR BLD: 0 % (ref 0–1)
BUN SERPL-MCNC: 113 MG/DL (ref 6–20)
BUN/CREAT SERPL: 34 (ref 12–20)
CA-I BLD-MCNC: 8.2 MG/DL (ref 8.5–10.1)
CHLORIDE SERPL-SCNC: 111 MMOL/L (ref 97–108)
CO2 SERPL-SCNC: 24 MMOL/L (ref 21–32)
CREAT SERPL-MCNC: 3.3 MG/DL (ref 0.7–1.3)
DIFFERENTIAL METHOD BLD: ABNORMAL
EOSINOPHIL # BLD: 0.1 K/UL (ref 0–0.4)
EOSINOPHIL NFR BLD: 1 % (ref 0–7)
ERYTHROCYTE [DISTWIDTH] IN BLOOD BY AUTOMATED COUNT: 18 % (ref 11.5–14.5)
GLUCOSE SERPL-MCNC: 154 MG/DL (ref 65–100)
HCT VFR BLD AUTO: 30.1 % (ref 36.6–50.3)
HGB BLD-MCNC: 9.5 G/DL (ref 12.1–17)
IMM GRANULOCYTES # BLD AUTO: 0 K/UL (ref 0–0.04)
IMM GRANULOCYTES NFR BLD AUTO: 1 % (ref 0–0.5)
LYMPHOCYTES # BLD: 0.6 K/UL (ref 0.8–3.5)
LYMPHOCYTES NFR BLD: 8 % (ref 12–49)
MCH RBC QN AUTO: 29.1 PG (ref 26–34)
MCHC RBC AUTO-ENTMCNC: 31.6 G/DL (ref 30–36.5)
MCV RBC AUTO: 92.3 FL (ref 80–99)
MONOCYTES # BLD: 0.3 K/UL (ref 0–1)
MONOCYTES NFR BLD: 4 % (ref 5–13)
NEUTS SEG # BLD: 6.2 K/UL (ref 1.8–8)
NEUTS SEG NFR BLD: 86 % (ref 32–75)
NRBC # BLD: 0 K/UL (ref 0–0.01)
NRBC BLD-RTO: 0 PER 100 WBC
PLATELET # BLD AUTO: 44 K/UL (ref 150–400)
PMV BLD AUTO: 12.5 FL (ref 8.9–12.9)
POTASSIUM SERPL-SCNC: 4.4 MMOL/L (ref 3.5–5.1)
RBC # BLD AUTO: 3.26 M/UL (ref 4.1–5.7)
SODIUM SERPL-SCNC: 140 MMOL/L (ref 136–145)
WBC # BLD AUTO: 7.1 K/UL (ref 4.1–11.1)

## 2024-06-30 PROCEDURE — 6370000000 HC RX 637 (ALT 250 FOR IP): Performed by: FAMILY MEDICINE

## 2024-06-30 PROCEDURE — 6370000000 HC RX 637 (ALT 250 FOR IP)

## 2024-06-30 PROCEDURE — 94640 AIRWAY INHALATION TREATMENT: CPT

## 2024-06-30 PROCEDURE — 6360000002 HC RX W HCPCS: Performed by: INTERNAL MEDICINE

## 2024-06-30 PROCEDURE — 94761 N-INVAS EAR/PLS OXIMETRY MLT: CPT

## 2024-06-30 PROCEDURE — 6370000000 HC RX 637 (ALT 250 FOR IP): Performed by: INTERNAL MEDICINE

## 2024-06-30 PROCEDURE — 71045 X-RAY EXAM CHEST 1 VIEW: CPT

## 2024-06-30 PROCEDURE — 80048 BASIC METABOLIC PNL TOTAL CA: CPT

## 2024-06-30 PROCEDURE — 85025 COMPLETE CBC W/AUTO DIFF WBC: CPT

## 2024-06-30 PROCEDURE — 6360000002 HC RX W HCPCS: Performed by: FAMILY MEDICINE

## 2024-06-30 PROCEDURE — 1100000000 HC RM PRIVATE

## 2024-06-30 PROCEDURE — 2580000003 HC RX 258: Performed by: FAMILY MEDICINE

## 2024-06-30 PROCEDURE — 36415 COLL VENOUS BLD VENIPUNCTURE: CPT

## 2024-06-30 RX ORDER — GLYCOPYRROLATE 0.2 MG/ML
0.1 INJECTION INTRAMUSCULAR; INTRAVENOUS 3 TIMES DAILY
Status: DISCONTINUED | OUTPATIENT
Start: 2024-06-30 | End: 2024-07-01 | Stop reason: HOSPADM

## 2024-06-30 RX ORDER — IPRATROPIUM BROMIDE AND ALBUTEROL SULFATE 2.5; .5 MG/3ML; MG/3ML
1 SOLUTION RESPIRATORY (INHALATION)
Status: DISCONTINUED | OUTPATIENT
Start: 2024-06-30 | End: 2024-06-30

## 2024-06-30 RX ORDER — LORAZEPAM 2 MG/ML
1 INJECTION INTRAMUSCULAR EVERY 6 HOURS PRN
Status: DISCONTINUED | OUTPATIENT
Start: 2024-06-30 | End: 2024-07-01 | Stop reason: HOSPADM

## 2024-06-30 RX ORDER — MORPHINE SULFATE 2 MG/ML
1 INJECTION, SOLUTION INTRAMUSCULAR; INTRAVENOUS
Status: DISCONTINUED | OUTPATIENT
Start: 2024-06-30 | End: 2024-07-01 | Stop reason: HOSPADM

## 2024-06-30 RX ADMIN — MORPHINE SULFATE 1 MG: 2 INJECTION, SOLUTION INTRAMUSCULAR; INTRAVENOUS at 16:52

## 2024-06-30 RX ADMIN — MEROPENEM 500 MG: 500 INJECTION, POWDER, FOR SOLUTION INTRAVENOUS at 09:02

## 2024-06-30 RX ADMIN — GLYCOPYRROLATE 0.1 MG: 0.2 INJECTION INTRAMUSCULAR; INTRAVENOUS at 20:42

## 2024-06-30 RX ADMIN — ATORVASTATIN CALCIUM 40 MG: 40 TABLET, FILM COATED ORAL at 09:02

## 2024-06-30 RX ADMIN — BUDESONIDE INHALATION 500 MCG: 0.5 SUSPENSION RESPIRATORY (INHALATION) at 06:59

## 2024-06-30 RX ADMIN — LORAZEPAM 1 MG: 2 INJECTION INTRAMUSCULAR; INTRAVENOUS at 12:05

## 2024-06-30 RX ADMIN — AMIODARONE HYDROCHLORIDE 200 MG: 200 TABLET ORAL at 09:02

## 2024-06-30 RX ADMIN — FINASTERIDE 5 MG: 5 TABLET, FILM COATED ORAL at 09:02

## 2024-06-30 RX ADMIN — FOLIC ACID 1 MG: 1 TABLET ORAL at 09:02

## 2024-06-30 RX ADMIN — GLYCOPYRROLATE 1 MG: 1 TABLET ORAL at 01:39

## 2024-06-30 RX ADMIN — PANTOPRAZOLE SODIUM 40 MG: 40 TABLET, DELAYED RELEASE ORAL at 09:02

## 2024-06-30 RX ADMIN — SODIUM CHLORIDE, PRESERVATIVE FREE 10 ML: 5 INJECTION INTRAVENOUS at 09:02

## 2024-06-30 RX ADMIN — GLYCOPYRROLATE 1 MG: 1 TABLET ORAL at 09:02

## 2024-06-30 RX ADMIN — IPRATROPIUM BROMIDE AND ALBUTEROL SULFATE 1 DOSE: 2.5; .5 SOLUTION RESPIRATORY (INHALATION) at 06:59

## 2024-06-30 RX ADMIN — MORPHINE SULFATE 1 MG: 2 INJECTION, SOLUTION INTRAMUSCULAR; INTRAVENOUS at 12:04

## 2024-06-30 RX ADMIN — THIAMINE HCL TAB 100 MG 100 MG: 100 TAB at 09:02

## 2024-06-30 ASSESSMENT — PAIN SCALES - GENERAL
PAINLEVEL_OUTOF10: 0

## 2024-06-30 ASSESSMENT — PAIN SCALES - WONG BAKER
WONGBAKER_NUMERICALRESPONSE: NO HURT

## 2024-06-30 NOTE — CONSULTS
06/30/24  0258   CO2 24  --  25 24   SITE  --  Right Radial  --   --        ARTERIAL BLOOD GAS    Imaging:  I have personally reviewed the patient’s radiographs and have reviewed the reports:  XR CHEST PORTABLE    Result Date: 6/26/2024  Enteric tube in expected position. Electronically signed by Zackary Espinoza    XR CHEST 1 VIEW    Result Date: 6/22/2024  Extubation and transesophageal tube removal. Otherwise stable radiographic findings. Electronically signed by Severiano Chilel MD    XR CHEST 1 VIEW    Result Date: 6/21/2024  No significant change.. Electronically signed by Andrea Patel    XR CHEST 1 VIEW    Result Date: 6/20/2024  Right midlung zone airspace disease may represent. Airspace opacity may represent pneumonia or aspiration. There is patchy airspace opacity in the left lung base as well Electronically signed by Andrea Patel     6/26 patient's family rescinded DNR and comfort care status and patient transferred back to the hospital hypothermic.  A-fib with RVR has not received dialysis since the 21st.  Remains unresponsive.  If full CODE STATUS undertaken patient needs PEG tube for medication and tube feedings.  Needs to reinstitute dialysis.  Will resume nebulized albuterol Atrovent budesonide.  Mouth mucosa looks dry probably from mouth breathing but will hold Robinul for now.  Agree with IV Merrem very likely has chronic aspiration.  Chest x-ray is read as clear by radiology but it is not there is blunting of both costophrenic angles consistent effusions interstitial markings likely some degree of pulmonary edema or chronic aspiration  6/27 still with upper airway secretion noise will resume Robinul today question if he will be able to tolerate dialysis.  Blood cultures no growth so far continue current antibiotics  6/28 with resumption of Robinul upper airway noise has cleared lungs sound relatively clear still has diminished sounds in the bases with chest x-ray showing bibasilar atelectasis

## 2024-06-30 NOTE — PROGRESS NOTES
Nephrology f/u          Patient: Agustín Willis MRN: [id number]  SSN: [id number]    YOB: 1945  Age: 79 y.o.  Sex: male      Subjective:   The patient was transferred back to intensive care unit.  He was made comfort care and now wife had made him full code again.  He remains unresponsive.  He was last dialyzed on 6/20.    Tried to dialyze him 6/26 but he developed severe intradialytic hypotension and could not continue the dialysis support.  The patient is not a candidate for short or long-term dialysis.  Dialysis did not and is not going to change his outcome and prognosis.    I have tried to call his wife x 2 but no reply.    From renal standpoint unfortunately I will not be able to offer the dialysis support.  I will try to reach out to his wife again.      Past Medical History:   Diagnosis Date    Benign prostatic disease 8/3/2020    Hypertrophy with Outflow Obstruction    Benign prostatic hyperplasia 8/3/2020    Cervical radiculopathy 8/3/2020    Chronic obstructive lung disease (HCC) 8/3/2020    Cocaine abuse (HCC) 8/3/2020    Dizziness and giddiness 8/3/2020    Hypercholesterolemia 8/3/2020    Hypertensive disorder 8/3/2020    Insomnia 8/3/2020    Kidney disease 8/3/2020    Low back pain 8/3/2020    Osteoarthritis of knee 8/3/2020    Sleep apnea 8/3/2020    Vasovagal syncope 8/3/2020     Past Surgical History:   Procedure Laterality Date    IR NONTUNNELED VASCULAR CATHETER  5/30/2024    IR NONTUNNELED VASCULAR CATHETER 5/30/2024 Stephy Abreu, APRN - NP SSR RAD ANGIO IR    IR NONTUNNELED VASCULAR CATHETER  6/27/2024    IR NONTUNNELED VASCULAR CATHETER 6/27/2024 Marc Sears Jr., APRN - NP SSR RAD ANGIO IR      Family History   Problem Relation Age of Onset    Hypertension Mother      Social History     Tobacco Use    Smoking status: Former    Smokeless tobacco: Never   Substance Use Topics    Alcohol use: Yes      Current Facility-Administered Medications   Medication Dose Route  Restarted at 06/29/24 0831    ondansetron (ZOFRAN-ODT) disintegrating tablet 4 mg  4 mg Oral Q8H PRN Mikaela Arredondo MD        Or    ondansetron (ZOFRAN) injection 4 mg  4 mg IntraVENous Q6H PRN Mikaela Arredondo MD        polyethylene glycol (GLYCOLAX) packet 17 g  17 g Oral Daily PRN Mikaela Arredondo MD        acetaminophen (TYLENOL) tablet 650 mg  650 mg Oral Q6H PRN Mikaela Arredondo MD        Or    acetaminophen (TYLENOL) suppository 650 mg  650 mg Rectal Q6H PRN Mikaela Arredondo MD            Allergies   Allergen Reactions    Penicillin G Other (See Comments)     Pt states he passes out    Sulfamethoxazole-Trimethoprim      Pt states he passes out       Review of Systems:  Unable to obtain    Objective:     Vitals:    06/30/24 0930 06/30/24 0945 06/30/24 1000 06/30/24 1015   BP:   118/77    Pulse: 83 84 84 83   Resp: 18 20 21 17   Temp:       TempSrc:       SpO2: 96% 96% 96% 97%   Weight:       Height:            Physical Exam:  General: Unresponsive  Eyes: sclera anicteric  Oral Cavity: No thrush or oral  Neck: no JVD  Chest.  Fair bilateral air entry  Heart: normal sounds  Abdomen: soft and non tender   : no damico  Lower Extremities: no edema  Neuro: Unresponsive        Assessment/Plan:     Acute kidney injury on chronic kidney disease stage IIIa  2/2 anuric ischemic ATN from septic shock  On admission BUN/creatinine 58/2.06 and peaked at 72/3.09  Baseline creatinine 1.36 on 3/12/2024  Renal ultrasound no hydronephrosis  Off CRRT since 6/03  Was volume up/worsening BUN/hyperkalemia  reinitiated on hemodialysis  Status post dialyzed on 6/08 and removed 2 L  Was dialyzed on 6/11 and removed 2.0 L.  Was dialyzed on 6/15 and removed 2.0 L.  Last dialyzed 6/20 and removed 2.0 L.  The patient is not a candidate for short or long-term dialysis support.    Hyperkalemia  Resolved    PEA cardiac arrest  Rapid response was called on 6/09 due to unresponsiveness and no pulse  Required CPR  Re

## 2024-06-30 NOTE — PROGRESS NOTES
CARDIOLOGY PROGRESS NOTE      Patient Name: Agustín Willis  Age: 79 y.o.  Gender:male  :1945  MRN: [id number]    Patient seen and examined. This is a patient with a history of hypertension, COPD, cocaine abuse, CKD who presented from nursing home due to not having a PEG tube and no way to receive medications now being followed for Afib. Remains in ICU.  Opens eyes but doesn't answer questions. No other complaints reported.    Telemetry reviewed, SR    Unable to obtain ROS. Current medications reviewed.    Physical Examination    Allergies   Allergen Reactions    Penicillin G Other (See Comments)     Pt states he passes out    Sulfamethoxazole-Trimethoprim      Pt states he passes out     Vitals:    24 1015   BP:    Pulse: 83   Resp: 17   Temp:    SpO2: 97%     Vital signs are stable  Ill appearing  Heart is irregular,  + murmur  Lungs are diminished   Abdomen is soft, nontender, normal bowel sounds.  Extremities have no edema  Skin is dry and warm.  Unable to assess affect    Labs reviewed:  Recent Results (from the past 12 hour(s))   CBC with Auto Differential    Collection Time: 24  2:58 AM   Result Value Ref Range    WBC 7.1 4.1 - 11.1 K/uL    RBC 3.26 (L) 4.10 - 5.70 M/uL    Hemoglobin 9.5 (L) 12.1 - 17.0 g/dL    Hematocrit 30.1 (L) 36.6 - 50.3 %    MCV 92.3 80.0 - 99.0 FL    MCH 29.1 26.0 - 34.0 PG    MCHC 31.6 30.0 - 36.5 g/dL    RDW 18.0 (H) 11.5 - 14.5 %    Platelets 44 (LL) 150 - 400 K/uL    MPV 12.5 8.9 - 12.9 FL    Nucleated RBCs 0.0 0.0  WBC    nRBC 0.00 0.00 - 0.01 K/uL    Neutrophils % 86 (H) 32 - 75 %    Lymphocytes % 8 (L) 12 - 49 %    Monocytes % 4 (L) 5 - 13 %    Eosinophils % 1 0 - 7 %    Basophils % 0 0 - 1 %    Immature Granulocytes % 1 (H) 0 - 0.5 %    Neutrophils Absolute 6.2 1.8 - 8.0 K/UL    Lymphocytes Absolute 0.6 (L) 0.8 - 3.5 K/UL    Monocytes Absolute 0.3 0.0 - 1.0 K/UL    Eosinophils Absolute 0.1 0.0 - 0.4 K/UL    Basophils Absolute 0.0 0.0 - 0.1 K/UL

## 2024-06-30 NOTE — PROGRESS NOTES
Patient transferred to [address] room 416. Primary RN and charge nurse met us in the room to help transfer patient from ICU bed to floor bed. Primary RN had already taken phone report and voiced no questions nor concerns at this time. Patient is currently comfort measures only and will discharge in the morning on hospice.

## 2024-06-30 NOTE — PLAN OF CARE
Problem: Neurosensory - Adult  Goal: Achieves stable or improved neurological status  Outcome: Not Progressing  Flowsheets (Taken 6/29/2024 0800 by Arik Gallegos RN)  Achieves stable or improved neurological status:   Assess for and report changes in neurological status   Maintain blood pressure and fluid volume within ordered parameters to optimize cerebral perfusion and minimize risk of hemorrhage   Monitor temperature, glucose, and sodium. Initiate appropriate interventions as ordered  Goal: Absence of seizures  Recent Flowsheet Documentation  Taken 6/29/2024 0800 by Arik Gallegos RN  Absence of seizures:   Monitor for seizure activity.  If seizure occurs, document type and location of movements and any associated apnea   Diagnostic studies as ordered     Problem: Genitourinary - Adult  Goal: Absence of urinary retention  Recent Flowsheet Documentation  Taken 6/29/2024 0800 by Arik Gallegos RN  Absence of urinary retention:   Assess patient’s ability to void and empty bladder   Monitor intake/output and perform bladder scan as needed     Problem: Metabolic/Fluid and Electrolytes - Adult  Goal: Electrolytes maintained within normal limits  Recent Flowsheet Documentation  Taken 6/29/2024 0800 by Arik Gallegos RN  Electrolytes maintained within normal limits: Monitor labs and assess patient for signs and symptoms of electrolyte imbalances  Goal: Hemodynamic stability and optimal renal function maintained  Recent Flowsheet Documentation  Taken 6/29/2024 0800 by Arik Gallegos RN  Hemodynamic stability and optimal renal function maintained:   Monitor labs and assess for signs and symptoms of volume excess or deficit   Monitor intake, output and patient weight     Problem: Hematologic - Adult  Goal: Maintains hematologic stability  Outcome: Not Progressing  Flowsheets (Taken 6/29/2024 0800 by Arik Gallegos RN)  Maintains hematologic stability:   Assess for signs and symptoms of bleeding or

## 2024-06-30 NOTE — PLAN OF CARE
Problem: Discharge Planning  Goal: Discharge to home or other facility with appropriate resources  Outcome: Progressing     Problem: Safety - Adult  Goal: Free from fall injury  Outcome: Progressing     Problem: Chronic Conditions and Co-morbidities  Goal: Patient's chronic conditions and co-morbidity symptoms are monitored and maintained or improved  Outcome: Progressing     Problem: Neurosensory - Adult  Goal: Achieves stable or improved neurological status  Outcome: Progressing  Goal: Absence of seizures  Outcome: Progressing  Goal: Remains free of injury related to seizures activity  Outcome: Progressing  Goal: Achieves maximal functionality and self care  Outcome: Progressing     Problem: Respiratory - Adult  Goal: Achieves optimal ventilation and oxygenation  Outcome: Progressing     Problem: Cardiovascular - Adult  Goal: Maintains optimal cardiac output and hemodynamic stability  Outcome: Progressing  Goal: Absence of cardiac dysrhythmias or at baseline  Outcome: Progressing     Problem: Skin/Tissue Integrity - Adult  Goal: Skin integrity remains intact  Outcome: Progressing  Goal: Incisions, wounds, or drain sites healing without S/S of infection  Outcome: Progressing  Goal: Oral mucous membranes remain intact  Outcome: Progressing     Problem: Musculoskeletal - Adult  Goal: Return mobility to safest level of function  Outcome: Progressing  Goal: Maintain proper alignment of affected body part  Outcome: Progressing  Goal: Return ADL status to a safe level of function  Outcome: Progressing     Problem: Gastrointestinal - Adult  Goal: Minimal or absence of nausea and vomiting  Outcome: Progressing  Goal: Maintains or returns to baseline bowel function  Outcome: Progressing  Goal: Maintains adequate nutritional intake  Outcome: Progressing  Goal: Establish and maintain optimal ostomy function  Outcome: Progressing     Problem: Genitourinary - Adult  Goal: Absence of urinary retention  Outcome:  Detail Level: Zone Detail Level: Generalized

## 2024-06-30 NOTE — PROGRESS NOTES
Hematology/Oncology   Progress Note    Patient: Agustín Willis MRN: [id number]     YOB: 1945  Age: 79 y.o.  Sex: male      Admit Date: 6/25/2024    LOS: 5 days     Chief Complaint: Admitted with altered mental status    Subjective:   Eyes open, but non verbal    ROS: could not be obtained due to mental status       Current Facility-Administered Medications:     morphine (PF) injection 1 mg, 1 mg, IntraVENous, Q2H PRN, Hamilton Miranda MD, 1 mg at 06/30/24 1204    LORazepam (ATIVAN) injection 1 mg, 1 mg, IntraVENous, Q6H PRN, Hamilton Miranda MD, 1 mg at 06/30/24 1205    glycopyrrolate (ROBINUL) tablet 1 mg, 1 mg, PEG Tube, Q8H, Hamilton Miranda MD, 1 mg at 06/30/24 0902    0.9 % sodium chloride infusion, , IntraVENous, PRN, Mikaela Arredondo MD, Last Rate: 10 mL/hr at 06/29/24 0831, Restarted at 06/29/24 0831    acetaminophen (TYLENOL) tablet 650 mg, 650 mg, Oral, Q6H PRN **OR** acetaminophen (TYLENOL) suppository 650 mg, 650 mg, Rectal, Q6H PRN, Mikaela Arredondo MD     Objective:     Vitals:    06/30/24 0930 06/30/24 0945 06/30/24 1000 06/30/24 1015   BP:   118/77    Pulse: 83 84 84 83   Resp: 18 20 21 17   Temp:       TempSrc:       SpO2: 96% 96% 96% 97%   Weight:       Height:              Physical Exam:   Constitutional Chronically ill appearing. Non verbal   Head Normocephalic; no scars   Eyes Conjunctivae and sclerae are clear and without icterus. Pupils are reactive and equal.   ENMT Sinuses are nontender.  No oral exudates, ulcers, masses, thrush or mucositis. Oropharynx clear.  Tongue normal.   Neck Supple without masses or thyromegaly. No jugular venous distension.   Hematologic/Lymphatic No petechiae or purpura.  No tender or palpable lymph nodes in the cervical, supraclavicular, axillary or inguinal area.   Respiratory Lungs are clear to auscultation without rhonchi or wheezing.   Cardiovascular Regular rate and rhythm of heart without murmurs, gallops or rubs.   Chest / Line Site Chest

## 2024-06-30 NOTE — PROGRESS NOTES
General Daily Progress Note      Patient Name:   Agustín Willis       YOB: 1945       Age:  79 y.o.      Admit Date: 6/25/2024      Subjective:     Patient is a 79 y.o. year old male past medical history of COPD BPH history of cocaine abuse hypertension hyperlipidemia history of SVT history of cardiac arrest acute stroke bradycardia MRSA pneumonia history of A-fib status post shock respiratory failure was intubated aspiration pneumonia COPD end-stage renal disease on hemodialysis  Patient was discharged to nursing home with hospice care when patient went to the nursing home patient's wife change her mind and she want patient to be full code and sent back to the hospital for further evaluation and treatment and possible PEG tube placement     Initially patient was admitted to the medical floor then transferred to ICU as patient went to A-fib with rapid ventricular rate started on Cardizem drip    6/27    Patient unresponsive on nasal cannula on amiodarone drip  NG tube in place for feeding and medication  Left arm is swollen history of DVT  Unable to complete dialysis yesterday because of hypotension patient was started on Levophed yesterday  BUN 90 creatinine 3.05    6/28    Patient open eyes nonverbal  Patient scheduled for PEG tube placement  Platelet count 48,000  6/29  Patient admitted for acute hypoxic respiratory failure, pulmonary edema, aspiration pneumonia, A-fib with rapid ventricular rate DVT aortic regurgitation thrombocytopenia  Sepsis with      On IV Levophed     6/30  Patient is drowsy has a cough  Lungs shows rhonchi with bronchospasm haddDuoNebs      Objective:     Vitals:    06/30/24 0700   BP:    Pulse:    Resp:    Temp: (!) 96.4 °F (35.8 °C)   SpO2:         Recent Results (from the past 24 hour(s))   CBC with Auto Differential    Collection Time: 06/30/24  2:58 AM   Result Value Ref Range    WBC 7.1 4.1 - 11.1 K/uL    RBC 3.26 (L) 4.10 - 5.70 M/uL    Hemoglobin 9.5 (L) 12.1 - 17.0  Oral, Daily, Mikaela Arredondo MD, 1 mg at 06/29/24 0830    pantoprazole (PROTONIX) tablet 40 mg, 40 mg, Oral, QAM AC, Mikaela Arredondo MD, 40 mg at 06/29/24 0542    atorvastatin (LIPITOR) tablet 40 mg, 40 mg, Oral, Daily, Mikaela Arredondo MD, 40 mg at 06/29/24 0830    thiamine mononitrate tablet 100 mg, 100 mg, Oral, Daily, Mikaela Arredondo MD, 100 mg at 06/29/24 0829    sodium chloride flush 0.9 % injection 5-40 mL, 5-40 mL, IntraVENous, 2 times per day, Mikaela Arredondo MD, 10 mL at 06/29/24 2113    sodium chloride flush 0.9 % injection 5-40 mL, 5-40 mL, IntraVENous, PRN, Mikaela Arredondo MD    0.9 % sodium chloride infusion, , IntraVENous, PRN, Mikaela Arredondo MD, Last Rate: 10 mL/hr at 06/29/24 0831, Restarted at 06/29/24 0831    ondansetron (ZOFRAN-ODT) disintegrating tablet 4 mg, 4 mg, Oral, Q8H PRN **OR** ondansetron (ZOFRAN) injection 4 mg, 4 mg, IntraVENous, Q6H PRN, Mikaela Arredondo MD    polyethylene glycol (GLYCOLAX) packet 17 g, 17 g, Oral, Daily PRN, Mikaela Arredondo MD    acetaminophen (TYLENOL) tablet 650 mg, 650 mg, Oral, Q6H PRN **OR** acetaminophen (TYLENOL) suppository 650 mg, 650 mg, Rectal, Q6H PRN, Mikaela Arredondo MD

## 2024-06-30 NOTE — PROGRESS NOTES
4 Eyes Skin Assessment     NAME:  Agustín Willis  YOB: 1945  MEDICAL RECORD NUMBER:  [id number]    The patient is being assessed for  Transfer to New Unit    I agree that at least one RN has performed a thorough Head to Toe Skin Assessment on the patient. ALL assessment sites listed below have been assessed.      Areas assessed by both nurses:    Head, Face, Ears, Shoulders, Back, Chest, Arms, Elbows, Hands, Sacrum. Buttock, Coccyx, Ischium, Legs. Feet and Heels, Under Medical Devices , and Other ***        Does the Patient have a Wound? Yes wound(s) were present on assessment. LDA wound assessment was Initiated and completed by RN       Luisito Prevention initiated by RN: Yes  Wound Care Orders initiated by RN: No they are already in place    Pressure Injury (Stage 3,4, Unstageable, DTI, NWPT, and Complex wounds) if present, place Wound referral order by RN under : No. Pt comfort care    New Ostomies, if present place, Ostomy referral order under : No     Nurse 1 eSignature: Electronically signed by Nuzhat Stout RN on 6/30/24 at 4:54 PM EDT    **SHARE this note so that the co-signing nurse can place an eSignature**    Nurse 2 eSignature: {Esignature:[id number]}

## 2024-06-30 NOTE — PROGRESS NOTES
is providing follow up support with the patient in 271. RN notified  of recent transition of the patient to comfort care.  set by bedside with the patient and provided reading of psalms, prayer and blessing.     Chaplain All Skaggs M.Div, M.S, [facility]   can be reached by calling the  at [facility]   [phone]

## 2024-06-30 NOTE — PROGRESS NOTES
I was able to finally get the patient spouse to answer the telephone this AM. During our conversation I made her aware of the patients condition and made her aware that Dr. Ferraro was trying to get in touch with her regarding inability to do dialysis. At this time, the Patient spouse verbalized that she wanted to do comfort care. I called and spoke with Dr. Miranda who then called and spoke with spouse to confirm her wishes at this time. Orders have been placed in the chart.

## 2024-07-01 VITALS
SYSTOLIC BLOOD PRESSURE: 102 MMHG | WEIGHT: 176.37 LBS | DIASTOLIC BLOOD PRESSURE: 71 MMHG | OXYGEN SATURATION: 96 % | HEIGHT: 68 IN | HEART RATE: 78 BPM | BODY MASS INDEX: 26.73 KG/M2 | TEMPERATURE: 97.5 F | RESPIRATION RATE: 18 BRPM

## 2024-07-01 PROCEDURE — 6360000002 HC RX W HCPCS: Performed by: INTERNAL MEDICINE

## 2024-07-01 RX ADMIN — GLYCOPYRROLATE 0.1 MG: 0.2 INJECTION INTRAMUSCULAR; INTRAVENOUS at 13:57

## 2024-07-01 RX ADMIN — GLYCOPYRROLATE 0.1 MG: 0.2 INJECTION INTRAMUSCULAR; INTRAVENOUS at 09:52

## 2024-07-01 RX ADMIN — MORPHINE SULFATE 1 MG: 2 INJECTION, SOLUTION INTRAMUSCULAR; INTRAVENOUS at 01:28

## 2024-07-01 ASSESSMENT — PAIN SCALES - WONG BAKER: WONGBAKER_NUMERICALRESPONSE: HURTS A LITTLE BIT

## 2024-07-01 NOTE — DISCHARGE SUMMARY
capsule  Commonly known as: TESSALON     budesonide-formoterol 160-4.5 MCG/ACT Aero  Commonly known as: SYMBICORT  Inhale 2 puffs into the lungs in the morning and 2 puffs in the evening.     Eliquis 2.5 MG Tabs tablet  Generic drug: apixaban     ferrous sulfate 325 (65 Fe) MG tablet  Commonly known as: IRON 325  Take 1 tablet by mouth daily (with breakfast)     finasteride 5 MG tablet  Commonly known as: PROSCAR     folic acid 1 MG tablet  Commonly known as: FOLVITE  Take 1 tablet by mouth daily     metoprolol tartrate 25 MG tablet  Commonly known as: LOPRESSOR  Take 1 tablet by mouth 2 times daily     omeprazole 20 MG delayed release capsule  Commonly known as: PRILOSEC     simvastatin 10 MG tablet  Commonly known as: ZOCOR     sodium bicarbonate 325 MG tablet     vitamin B-1 100 MG tablet  Commonly known as: THIAMINE  Take 1 tablet by mouth daily                  NOTIFY YOUR PHYSICIAN FOR ANY OF THE FOLLOWING:   Fever over 101 degrees for 24 hours.   Chest pain, shortness of breath, fever, chills, nausea, vomiting, diarrhea, change in mentation, falling, weakness, bleeding. Severe pain or pain not relieved by medications.  Or, any other signs or symptoms that you may have questions about.    DISPOSITION:  x  Home With:   OT  PT  HH  RN       Long term SNF/Inpatient Rehab    Independent/assisted living    Hospice    Other:       PATIENT CONDITION AT DISCHARGE: Stable      PHYSICAL EXAMINATION AT DISCHARGE:  General:          Alert, cooperative, no distress, appears stated age.     HEENT:           Atraumatic, anicteric sclerae, pink conjunctivae                          No oral ulcers, mucosa moist, throat clear, dentition fair  Neck:               Supple, symmetrical  Lungs:             Clear to auscultation bilaterally.  No Wheezing or Rhonchi. No rales.  Chest wall:      No tenderness  No Accessory muscle use.  Heart:              Regular  rhythm,  No  murmur   No edema  Abdomen:        Soft, non-tender. Not  nursing home with hospice care when patient went to the nursing home patient's wife change her mind and she want patient to be full code and sent back to the hospital for further evaluation and treatment and possible PEG tube placement     Initially patient was admitted to the medical floor then transferred to ICU as patient went to A-fib with rapid ventricular rate started on Cardizem drip     6/27     Patient unresponsive on nasal cannula on amiodarone drip  NG tube in place for feeding and medication  Left arm is swollen history of DVT  Unable to complete dialysis yesterday because of hypotension patient was started on Levophed yesterday  BUN 90 creatinine 3.05     6/28     Patient open eyes nonverbal  Patient scheduled for PEG tube placement  Platelet count 48,000  6/29  Patient admitted for acute hypoxic respiratory failure, pulmonary edema, aspiration pneumonia, A-fib with rapid ventricular rate DVT aortic regurgitation thrombocytopenia  Sepsis with        On IV Levophed     6/30  Patient is drowsy has a cough  Lungs shows rhonchi with bronchospasm haddDuoNebs     7/1  Pt lying in bed in no acute or apparent distress. Pt does not make coherent verbal responses when asked questions. He is laying in bed with eyes open, adequate rise and fall of chest.       Creatinine 34  PLT 44  Hgb 9.5  Portable Chest X ray on 6/30 shows persistent bilateral lung airspace disease, slightly improved in the right lung      Pulmonology, Cardiology, Nephrology, hematology saw and evaluated     RN note from 6/30 states that family was called and verbalized that they wanted to do comfort care    Signed:   Jomar Otero  7/1/2024  11:51 AM

## 2024-07-01 NOTE — RT PROTOCOL NOTE
Pulmonary Disease Navigator Note  [facility]    Current GOLD classification for Agustín Willis    Patient's chart reviewed again since readmission. Patient returned to hospital as a full code from the SNF he was admitted to earlier in the day.  As patient is a resident of a skilled nursing facility, he is not a candidate for COPD program.     Time spent in chart review: 25 minutes    Electronically signed by RT Milad on 7/1/24 at 10:51 AM EDT

## 2024-07-01 NOTE — DISCHARGE INSTRUCTIONS
Discharge Instructions       PATIENT ID: Agustín Willis  MRN: [id number]   YOB: 1945    DATE OF ADMISSION: 6/25/2024  DATE OF DISCHARGE: 7/1/2024    PRIMARY CARE PROVIDER: [unfilled]     ATTENDING PHYSICIAN: Mikaela Arredondo MD   DISCHARGING PROVIDER: Mikaela Arredondo MD    To contact this individual call [phone] and ask the  to page.   If unavailable, ask to be transferred the Adult Hospitalist Department.    DISCHARGE DIAGNOSES end-stage renal disease on hemodialysis    CONSULTATIONS: @DON@      PROCEDURES/SURGERIES: Procedure(s):  ESOPHAGOGASTRODUODENOSCOPY PERCUTANEOUS ENDOSCOPIC GASTROSTOMY TUBE PLACEMENT    PENDING TEST RESULTS:   At the time of discharge the following test results are still pending: ***    FOLLOW UP APPOINTMENTS:   Mikaela Arredondo MD  [facility] [place][note 2[place] [facility] [address] [phone]    Schedule an appointment as soon as possible for a visit in 1 week(s)      Gerry Taylor MD  [address] [facility] [address] [phone]             ADDITIONAL CARE RECOMMENDATIONS: ***    DIET: {diet:77797}      ACTIVITY: {discharge activity:82852}    Wound care: {Discharge Wound Care Instructions:88641}    EQUIPMENT needed: ***      DISCHARGE MEDICATIONS:   See Medication Reconciliation Form    It is important that you take the medication exactly as they are prescribed.   Keep your medication in the bottles provided by the pharmacist and keep a list of the medication names, dosages, and times to be taken in your wallet.   Do not take other medications without consulting your doctor.       NOTIFY YOUR PHYSICIAN FOR ANY OF THE FOLLOWING:   Fever over 101 degrees for 24 hours.   Chest pain, shortness of breath, fever, chills, nausea, vomiting, diarrhea, change in mentation, falling, weakness, bleeding. Severe pain or pain not relieved by medications.  Or, any other signs or symptoms that you may have questions about.      DISPOSITION:    Home

## 2024-07-01 NOTE — PROGRESS NOTES
1439 Spoke to Dr. Arredondo and received TORB to discontinue central line access before discharge to [facility].     1500 Called report to nurse Taylor at [facility].    1505 Right IJ triple lumen central line removed. Tip intact. Dry gauze drsg applied after bleeding stopped.     1610 Patient was transported by ambulance staff. Patient has copy of AVS instructions and patient left via stretcher.

## 2024-07-01 NOTE — CONSULTS
Consult  Pulmonary, Critical Care    Name: Agustín Willis MRN: [id number]   : 1945 Hospital: [facility]   Date: 2024  Admission date: 2024 Hospital Day: 7       Subjective/Interval History:   Patient was in the hospital converted to comfort care discharged to the nursing home yesterday wife rescinded DNR status and comfort care and patient sent back to the hospital.  He had a history of cardiac arrest acute CVA chronic atrial fibrillation end-stage renal disease on dialysis sepsis aspiration pneumonia COPD chronic hypoxia on 2 L.  On  he was made comfort care and dialysis was discontinued respiratory treatments and antibiotics were discontinued.  On admission he was placed on the medical floor found to have A-fib with RVR and transferred to the ICU is now on amiodarone heart rate still elevated but slightly improved control   remains unresponsive only tolerated 1 hour of dialysis yesterday due to development of hypotension.  Remains on amiodarone has converted to sinus rhythm   no change  eyes open otherwise unresponsive respirations nonlabored   eyes are open does not look at voice or follow any command   no change remains unresponsive with eyes open   now on comfort care respirations nonlabored  Patient Active Problem List   Diagnosis    Weakness of both legs    Insomnia    Shoulder pain    Low back pain    Vasovagal syncope    Tinnitus of right ear    Bronchitis    Alcohol abuse    Malignant hypertensive heart disease without heart failure    Pain in left knee    Dizziness and giddiness    Osteoarthritis of knee    Diarrhea    Hypercholesterolemia    Ankle pain    Pain in breast    Prediabetes    Sleep apnea    Seasonal allergic rhinitis    Chronic obstructive lung disease (HCC)    Cocaine abuse (HCC)    Kidney disease    Benign prostatic disease    Benign prostatic hyperplasia    Weakness of both arms    Lumbar herniated disc    Cervical radiculopathy     the bases with chest x-ray showing bibasilar atelectasis and/or infiltrates very likely aspiration pneumonia questionably PEG tube today  6/29 upper airway remains fairly clear on Robinul will switch to enteral.  Anesthesia does not feel comfortable sedating him for PEG tube in his current situation.  He likely will not improve any further.  He remains unresponsive has no long-term chance of improvement dialysis has been deemed not to be of any value  6/30 upper airway secretions seem to be well-controlled using Robinul will maintain as is.  Addendum nursing talked with the wife and she wants to transition back to comfort care.  I have spoken with Ms. Landers and she confirms that she does not think it would be in his interest to undergo any life support measures and request transition to comfort care I agree with this decision  7/1 patient now comfort care would maintain Robinul for respiratory comfort will not follow further             Thank you for allowing us to participate in the care of this patient.  We will follow along with you     Hamilton Miranda MD

## 2024-07-01 NOTE — PROGRESS NOTES
General Daily Progress Note      Patient Name:   Agustín Willis       YOB: 1945       Age:  79 y.o.      Admit Date: 6/25/2024      Subjective:     Patient is a 79 y.o. year old male past medical history of COPD BPH history of cocaine abuse hypertension hyperlipidemia history of SVT history of cardiac arrest acute stroke bradycardia MRSA pneumonia history of A-fib status post shock respiratory failure was intubated aspiration pneumonia COPD end-stage renal disease on hemodialysis  Patient was discharged to nursing home with hospice care when patient went to the nursing home patient's wife change her mind and she want patient to be full code and sent back to the hospital for further evaluation and treatment and possible PEG tube placement     Initially patient was admitted to the medical floor then transferred to ICU as patient went to A-fib with rapid ventricular rate started on Cardizem drip    6/27    Patient unresponsive on nasal cannula on amiodarone drip  NG tube in place for feeding and medication  Left arm is swollen history of DVT  Unable to complete dialysis yesterday because of hypotension patient was started on Levophed yesterday  BUN 90 creatinine 3.05    6/28    Patient open eyes nonverbal  Patient scheduled for PEG tube placement  Platelet count 48,000  6/29  Patient admitted for acute hypoxic respiratory failure, pulmonary edema, aspiration pneumonia, A-fib with rapid ventricular rate DVT aortic regurgitation thrombocytopenia  Sepsis with      On IV Levophed     6/30  Patient is drowsy has a cough  Lungs shows rhonchi with bronchospasm haddDuoNebs    7/1  Pt lying in bed in no acute or apparent distress. Pt does not make coherent verbal responses when asked questions. He is laying in bed with eyes open, adequate rise and fall of chest.      Creatinine 34  PLT 44  Hgb 9.5  Portable Chest X ray on 6/30 shows persistent bilateral lung airspace disease, slightly improved in the right  IntraVENous, PRN, Mikaela Arredondo MD, Last Rate: 10 mL/hr at 06/29/24 0831, Restarted at 06/29/24 0831    acetaminophen (TYLENOL) tablet 650 mg, 650 mg, Oral, Q6H PRN **OR** acetaminophen (TYLENOL) suppository 650 mg, 650 mg, Rectal, Q6H PRN, Mikaela Arredondo MD

## 2024-07-01 NOTE — DISCHARGE SUMMARY
Discharge Summary       PATIENT ID: Agustín Willis  MRN: [id number]   YOB: 1945    DATE OF ADMISSION: 6/25/2024   DATE OF DISCHARGE:   PRIMARY CARE PROVIDER: [unfilled]      ATTENDING PHYSICIAN: Mikaela Arredondo  DISCHARGING PROVIDER: Mikaela Arredondo      CONSULTATIONS: IP CONSULT TO GI  IP CONSULT TO CARDIOLOGY  IP CONSULT TO NEPHROLOGY  IP CONSULT TO PULMONOLOGY  IP CONSULT TO HEMATOLOGY    PROCEDURES/SURGERIES: Procedure(s):  ESOPHAGOGASTRODUODENOSCOPY PERCUTANEOUS ENDOSCOPIC GASTROSTOMY TUBE PLACEMENT    ADMITTING DIAGNOSES:    Patient Active Problem List    Diagnosis Date Noted    FTT (failure to thrive) in adult 06/25/2024    Failure to thrive (child) 06/25/2024    Acute metabolic encephalopathy 06/14/2024    Septic shock (HCC) 06/12/2024    Aspiration pneumonia of right lung due to gastric secretions ([facility]) 06/12/2024    Admitted to intensive care unit 06/12/2024    Pneumonia of right upper lobe due to methicillin resistant Staphylococcus aureus (MRSA) ([facility]) 06/12/2024    Acute respiratory failure ([facility]) 05/29/2024    Cardiac arrest ([facility]) 05/29/2024    Gross hematuria 03/04/2024    Altered mental status 02/06/2024    FIDEL (acute kidney injury) ([facility]) 02/05/2024    Edema, lower extremity 10/06/2023    Encounter for immunization 10/06/2023    Persistent cough 08/07/2023    Non-seasonal allergic rhinitis 08/07/2023    Alcohol use, unspecified with intoxication, uncomplicated ([facility]) 06/22/2023    Thrombocytopenia, unspecified ([facility]) 06/22/2023    Stage 3a chronic kidney disease ([facility]) 06/22/2023    Heart murmur, systolic 06/22/2023    Atrial fibrillation ([facility]) 06/22/2023    Cardiac arrhythmia 06/22/2023    SARTHAK on CPAP 04/25/2023    Unsteady gait when walking 04/25/2023    Ankle edema, bilateral 04/25/2023    Drug abuse counseling and surveillance of drug abuser 04/25/2023    Syncope and collapse 04/05/2023    Bilateral lower extremity edema 01/06/2023    Chronic renal disease, stage III ([facility]) 05/18/2022     apixaban     ferrous sulfate 325 (65 Fe) MG tablet  Commonly known as: IRON 325     finasteride 5 MG tablet  Commonly known as: PROSCAR     folic acid 1 MG tablet  Commonly known as: FOLVITE     metoprolol tartrate 25 MG tablet  Commonly known as: LOPRESSOR     omeprazole 20 MG delayed release capsule  Commonly known as: PRILOSEC     simvastatin 10 MG tablet  Commonly known as: ZOCOR     sodium bicarbonate 325 MG tablet     vitamin B-1 100 MG tablet  Commonly known as: THIAMINE                  NOTIFY YOUR PHYSICIAN FOR ANY OF THE FOLLOWING:   Fever over 101 degrees for 24 hours.   Chest pain, shortness of breath, fever, chills, nausea, vomiting, diarrhea, change in mentation, falling, weakness, bleeding. Severe pain or pain not relieved by medications.  Or, any other signs or symptoms that you may have questions about.    DISPOSITION:  x  Home With:   OT  PT  HH  RN       Long term SNF/Inpatient Rehab    Independent/assisted living    Hospice    Other:       PATIENT CONDITION AT DISCHARGE: Stable      PHYSICAL EXAMINATION AT DISCHARGE:  General:          Alert, cooperative, no distress, appears stated age.     HEENT:           Atraumatic, anicteric sclerae, pink conjunctivae                          No oral ulcers, mucosa moist, throat clear, dentition fair  Neck:               Supple, symmetrical  Lungs:             Clear to auscultation bilaterally.  No Wheezing or Rhonchi. No rales.  Chest wall:      No tenderness  No Accessory muscle use.  Heart:              Regular  rhythm,  No  murmur   No edema  Abdomen:        Soft, non-tender. Not distended.  Bowel sounds normal  Extremities:     No cyanosis.  No clubbing,                            Skin turgor normal, Capillary refill normal  Skin:                Not pale.  Not Jaundiced  No rashes   Psych:             Not anxious or agitated.  Neurologic:      Alert, moves all extremities, answers questions appropriately and responds to commands     XR CHEST PORTABLE

## 2024-07-01 NOTE — CARE COORDINATION
Transition of Care Plan:    RUR: 21%  Prior Level of Functioning: DEP  Disposition: SNF LTC  If SNF or IPR: Date FOC offered: 7/1/24  Date FOC received: 7/1/24  Accepting facility: [facility]  Date authorization started with reference number: NA  Date authorization received and expires: NA  Follow up appointments: PER SNF  DME needed: NONE  Transportation at discharge: STRETCHER  IM/IMM Medicare/ letter given: YES/ VERBAL  Is patient a [place] and connected with [facility]? NO   If yes, was [place] transfer form completed and [facility] notified?   Caregiver Contact: COREY FITCH  Discharge Caregiver contacted prior to discharge? YES  Care Conference needed? NO  Barriers to discharge: NONE    Pt to return to [facility] for LTC today. CM spoke w/ pt's LAWANDAOK, charted above to confirm dc plan. Mrs. Fitch does NOT want hospice svc, but for the pt to dc to snf w/ comfort measures only. Hospitalist/CCL obtained verbal consent for DNR as well. Pt will go to room 209A number for report [phone] ask for Unit [address]. HUC to arrange medicaid tx.

## 2024-07-02 LAB
BACTERIA SPEC CULT: NORMAL
BACTERIA SPEC CULT: NORMAL
Lab: NORMAL
Lab: NORMAL

## 2024-07-04 LAB
BACTERIA SPEC CULT: NORMAL
Lab: NORMAL

## 2024-07-12 NOTE — PROGRESS NOTES
Physician Progress Note      PATIENT:               YOSVANY FITCH  CSN #:                  [id number]  :                       1945  ADMIT DATE:       2024 12:47 PM  DISCH DATE:        2024 6:04 PM  RESPONDING  PROVIDER #:        Mikaela River MD          QUERY TEXT:    Patient admitted with failure to thrive. Noted to have dietician assessment   with malnutrition diagnosis in  note. If possible, please document in   progress notes and discharge summary if you are evaluating and /or treating   any of the following:    The medical record reflects the following:  Risk Factors: 80 yo male with ESRD, chronic afib,  COPD  Clinical Indicators: Albumin 1.6;  Nutritional Consult:  Moderate malnutrition   - Mild muscle mass loss Temples (temporalis)  Treatment: Advance TF of Jevity 1.5 Rickie (Standard with fiber)    Thank you,  Herlinda Meraz RN, CCDS    ASPEN Criteria:    https://aspenjournals.onlinelibrary.powers.com/doi/full/10.1177/[email or web address]  5  Options provided:  -- Protein calorie malnutrition mild  -- Protein calorie malnutrition moderate  -- Protein calorie malnutrition severe  -- Underweight with BMI ***  -- Other - I will add my own diagnosis  -- Disagree - Not applicable / Not valid  -- Disagree - Clinically unable to determine / Unknown  -- Refer to Clinical Documentation Reviewer    PROVIDER RESPONSE TEXT:    This patient has mild protein calorie malnutrition.    Query created by: Herlinda Meraz on 2024 7:44 AM      Electronically signed by:  Mikaela River MD 2024 9:14 PM

## 2024-07-26 NOTE — PROGRESS NOTES
IMPRESSION:   Acute hypoxic respiratory failure extubated on 6/5 and reintubated on 6/10  Status post cardiac arrest  A-fib with rate controlled  Septic shock  Left upper extremity DVT  Stroke embolic  Sepsis treated with antibiotics  Aspiration pneumonia right midlung  COPD   MRSA nasal colonization  History of cocaine abuse polysubstance abuse in the past  Pt is critically ill. Time spent with pt and staff actively rendering care, managing pt and coordinating care as stated below; 30 minutes, exclusive of any procedures      RECOMMENDATIONS/PLAN:   Extubated  on 6/5 reintubated on 6/10 on ventilator sedated with propofol patient is becoming bradycardic will change to either Versed or fentanyl he is getting fentanyl pushes on propofol 10 mics  ABG this morning showed pCO2 31 pO2 7230% FiO2  Chest x-ray with continued right lower infiltrate CTA chest showed bilateral infiltrates consistent with aspiration  MRI of the brain was done which shows small foci of acute infarction in the supratentorial region and cerebellum EEG shows diffuse encephalopathy  Patient has a lot of secretion patient on Robinul  Chest x-ray shows right midlung infiltrate fluid overload congestive change left lung clear  Remains on Merrem and vancomycin sputum with normal doug  Heart rate controlled on amiodarone   Renal function improving off CVVH as needed dialysis  Status post cardiac arrest patient coded   Patient is back on pressors on Levophed blood pressure still low we will try to wean again  Continue with nebulizer treatment and will give 3 doses of Solu-Medrol     [x] High complexity decision making was performed  [x] See my orders for details  HPI  78-year-old male nursing home resident came in because of shortness of breath respiratory distress patient has episodes of vomiting after eating breakfast this morning subsequently came to the hospital saturation was in 70s he was back to the emergency room subsequently intubated and  [] : Resident [Time Spent: ___ minutes] : I have spent [unfilled] minutes of time on the encounter. [FreeTextEntry3] : 53F w/htn for VEB for BP check, discussed consolidating medications and starting nifedipine 30mg daily, had LE swelling with amlodipine. Will hold off on HCTZ for now and uptitrate Nifedipine as needed.  RTC in 1 week in person w/BP machine for BP check w/Dr. Bravo.  CHEST PORTABLE    Result Date: 5/29/2024  INDICATION: Intubation Portable AP view of the chest. Direct comparison made to prior chest x-ray dated March 2024. Cardiomediastinal silhouette is stable. ETT tube tip is angled toward the right mainstem bronchus and is 1 cm superior to the level of the gina. NG tube extends the stomach. There is patchy airspace consolidation throughout the right lung. There is very mild left basilar atelectasis. No pleural fluid is visualized but there is no pneumothorax.     1. ET tube tip is angled towards the right mainstem bronchus and is 1 cm superior to the level of the gina. Recommend repositioning. 2. Right lung patchy airspace consolidation.     5/30 remain intubated on ventilator potassium elevated ABG acceptable still hemodynamically unstable on pressors chest x-ray shows extensive infiltrate in the right side mostly upper lobe left lung clear  5/31 hemodynamically unstable on Levophed and vasopressin, remain intubated on 40% FiO2 assist-control mode pCO2 37 pO2 71, started on CVVH creatinine improved to 2.15, chest x-ray shows ET tube 2 cm above the gina lung is fully inflated left-sided clear right-sided has patchy infiltrate lower lobe and some congestive changes extreme, nasal MRSA now on Bactroban continue with meropenem and vancomycin  6/1 remains on pressors on the ventilator sedated on propofol and Precedex.  Right lower lobe infiltrate unchanged.  WBC count remains elevated with procalcitonin improving remains on Merrem and vancomycin nasal MRSA smear was positive but sputum only shows normal doug so far  6/2 remains on norepinephrine for blood pressure support hypothermic yesterday now on warming blanket.  Continues with CRRT with fluid removal.  Potassium and phosphorus to be repleted  6/3 remain intubated on ventilator getting CRRT, ABG acceptable pCO2 33 pO2 75 on 35% FiO2 on assist-control mode on meropenem will continue to wean pressors  6/4 on
